# Patient Record
Sex: FEMALE | Race: BLACK OR AFRICAN AMERICAN | NOT HISPANIC OR LATINO | Employment: OTHER | URBAN - METROPOLITAN AREA
[De-identification: names, ages, dates, MRNs, and addresses within clinical notes are randomized per-mention and may not be internally consistent; named-entity substitution may affect disease eponyms.]

---

## 2017-01-11 ENCOUNTER — LAB VISIT (OUTPATIENT)
Dept: LAB | Facility: HOSPITAL | Age: 77
End: 2017-01-11
Attending: INTERNAL MEDICINE
Payer: MEDICARE

## 2017-01-11 ENCOUNTER — OFFICE VISIT (OUTPATIENT)
Dept: HEMATOLOGY/ONCOLOGY | Facility: CLINIC | Age: 77
End: 2017-01-11
Payer: MEDICARE

## 2017-01-11 VITALS
RESPIRATION RATE: 16 BRPM | BODY MASS INDEX: 39.29 KG/M2 | HEART RATE: 96 BPM | DIASTOLIC BLOOD PRESSURE: 64 MMHG | TEMPERATURE: 99 F | WEIGHT: 236.13 LBS | SYSTOLIC BLOOD PRESSURE: 153 MMHG

## 2017-01-11 DIAGNOSIS — Z51.11 ENCOUNTER FOR ANTINEOPLASTIC CHEMOTHERAPY: ICD-10-CM

## 2017-01-11 DIAGNOSIS — C50.412 MALIGNANT NEOPLASM OF UPPER-OUTER QUADRANT OF LEFT FEMALE BREAST: ICD-10-CM

## 2017-01-11 DIAGNOSIS — C50.412 MALIGNANT NEOPLASM OF UPPER-OUTER QUADRANT OF LEFT FEMALE BREAST: Primary | ICD-10-CM

## 2017-01-11 LAB
ALBUMIN SERPL BCP-MCNC: 3.5 G/DL
ALP SERPL-CCNC: 71 U/L
ALT SERPL W/O P-5'-P-CCNC: 13 U/L
ANION GAP SERPL CALC-SCNC: 11 MMOL/L
AST SERPL-CCNC: 18 U/L
BASOPHILS # BLD AUTO: 0.02 K/UL
BASOPHILS NFR BLD: 0.8 %
BILIRUB SERPL-MCNC: 0.4 MG/DL
BUN SERPL-MCNC: 15 MG/DL
CALCIUM SERPL-MCNC: 9.9 MG/DL
CHLORIDE SERPL-SCNC: 105 MMOL/L
CO2 SERPL-SCNC: 24 MMOL/L
CREAT SERPL-MCNC: 1.3 MG/DL
DIFFERENTIAL METHOD: ABNORMAL
EOSINOPHIL # BLD AUTO: 0.1 K/UL
EOSINOPHIL NFR BLD: 5 %
ERYTHROCYTE [DISTWIDTH] IN BLOOD BY AUTOMATED COUNT: 18.7 %
EST. GFR  (AFRICAN AMERICAN): 46.7 ML/MIN/1.73 M^2
EST. GFR  (NON AFRICAN AMERICAN): 40.5 ML/MIN/1.73 M^2
GLUCOSE SERPL-MCNC: 123 MG/DL
HCT VFR BLD AUTO: 30.7 %
HGB BLD-MCNC: 9.7 G/DL
LYMPHOCYTES # BLD AUTO: 1 K/UL
LYMPHOCYTES NFR BLD: 38.8 %
MCH RBC QN AUTO: 26.4 PG
MCHC RBC AUTO-ENTMCNC: 31.6 %
MCV RBC AUTO: 84 FL
MONOCYTES # BLD AUTO: 0.4 K/UL
MONOCYTES NFR BLD: 14.6 %
NEUTROPHILS # BLD AUTO: 1.1 K/UL
NEUTROPHILS NFR BLD: 40.4 %
PLATELET # BLD AUTO: 130 K/UL
PMV BLD AUTO: 9.4 FL
POTASSIUM SERPL-SCNC: 3.7 MMOL/L
PROT SERPL-MCNC: 7.4 G/DL
RBC # BLD AUTO: 3.67 M/UL
SODIUM SERPL-SCNC: 140 MMOL/L
WBC # BLD AUTO: 2.6 K/UL

## 2017-01-11 PROCEDURE — 1126F AMNT PAIN NOTED NONE PRSNT: CPT | Mod: S$GLB,,, | Performed by: INTERNAL MEDICINE

## 2017-01-11 PROCEDURE — 99999 PR PBB SHADOW E&M-EST. PATIENT-LVL III: CPT | Mod: PBBFAC,,, | Performed by: INTERNAL MEDICINE

## 2017-01-11 PROCEDURE — 3078F DIAST BP <80 MM HG: CPT | Mod: S$GLB,,, | Performed by: INTERNAL MEDICINE

## 2017-01-11 PROCEDURE — 3077F SYST BP >= 140 MM HG: CPT | Mod: S$GLB,,, | Performed by: INTERNAL MEDICINE

## 2017-01-11 PROCEDURE — 1157F ADVNC CARE PLAN IN RCRD: CPT | Mod: S$GLB,,, | Performed by: INTERNAL MEDICINE

## 2017-01-11 PROCEDURE — 99214 OFFICE O/P EST MOD 30 MIN: CPT | Mod: S$GLB,,, | Performed by: INTERNAL MEDICINE

## 2017-01-11 PROCEDURE — 1160F RVW MEDS BY RX/DR IN RCRD: CPT | Mod: S$GLB,,, | Performed by: INTERNAL MEDICINE

## 2017-01-11 PROCEDURE — 99499 UNLISTED E&M SERVICE: CPT | Mod: S$PBB,,, | Performed by: INTERNAL MEDICINE

## 2017-01-11 PROCEDURE — 1159F MED LIST DOCD IN RCRD: CPT | Mod: S$GLB,,, | Performed by: INTERNAL MEDICINE

## 2017-01-11 RX ORDER — SODIUM CHLORIDE 0.9 % (FLUSH) 0.9 %
10 SYRINGE (ML) INJECTION
Status: CANCELLED | OUTPATIENT
Start: 2017-01-11

## 2017-01-11 RX ORDER — FLUOROURACIL 50 MG/ML
600 INJECTION, SOLUTION INTRAVENOUS
Status: CANCELLED | OUTPATIENT
Start: 2017-01-11

## 2017-01-11 RX ORDER — HEPARIN 100 UNIT/ML
500 SYRINGE INTRAVENOUS
Status: CANCELLED | OUTPATIENT
Start: 2017-01-11

## 2017-01-11 RX ORDER — METHOTREXATE SODIUM/PF 25 MG/ML
40 VIAL (ML) INJECTION
Status: CANCELLED | OUTPATIENT
Start: 2017-01-11

## 2017-01-11 NOTE — PROGRESS NOTES
Yes mass  Subjective:       Patient ID: Renata Bergman is a 76 y.o. female.    Chief Complaint: Chemotherapy    HPI Mrs. Hussein is a 76-year-old female with stage I left breast cancer.  She  had 3 weeks of weekly Taxol and  tolerated that well with only some mild diarrhea.  Because of insurance issues it was necessary to change her to CMF and she's had 4 cycles of that.    She continues to feel well with no new problems.  Specifically, she has no pain and no shortness of breath.  Appetite and bowel function have been good.         Breast history: Screening mammogram on May 5 showed an irregular 22 mm mass with abnormal calcifications in the left breast 2:00 position. Follow-up ultrasound July 13 she noted a regular solid 17 mm mass.    On July 21 needle biopsy showed grade 2 infiltrating ductal carcinoma strongly ER VA positive (90%) and HER-2 negative.    On August 1 lumpectomy and sentinel lymph node biopsy were performed. That showed a 1.8 x 1.6 x 1.0 infiltrating carcinoma intermediate grade (histologic grade 3, nuclear grade 2, mitotic index 2.   The sentinel lymph node was negative and margins were negative. Final pathological stage TIc N0 stage IA.  Oncotype was 31 indicating a 21% risk of recurrence with tamoxifen alone.      She was hospitalized on November 18 with new onset of grand mal seizures.  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  LP with cytology was performed.  That was negative for malignancy.protein was elevated at 63, glucose was 74.  Follow-up Lp cytology was negativeand repeat MRI scan showed resoution of th leptomenineal ehancement.    Review of Systems   Constitutional: Positive for fatigue. Negative for activity change, appetite change, fever and unexpected weight change.   HENT: Negative for trouble swallowing.    Respiratory: Negative for cough and shortness of breath.    Gastrointestinal: Negative for abdominal pain, constipation, diarrhea  and nausea.   Musculoskeletal: Negative for back pain.   Neurological: Negative for headaches.   Psychiatric/Behavioral: Negative for dysphoric mood. The patient is not nervous/anxious.        Objective:      Physical Exam   Constitutional: She is oriented to person, place, and time. She appears well-developed and well-nourished.   HENT:   Mouth/Throat: No oropharyngeal exudate.   Eyes: No scleral icterus.   Cardiovascular: Regular rhythm and normal heart sounds.    Pulmonary/Chest: Effort normal and breath sounds normal. She has no wheezes. She has no rales. Right breast exhibits no mass, no nipple discharge and no skin change. Left breast exhibits no mass, no nipple discharge and no skin change.       Abdominal: Soft. She exhibits no mass. There is no tenderness.   Lymphadenopathy:     She has no cervical adenopathy.     She has no axillary adenopathy.        Right: No supraclavicular adenopathy present.        Left: No supraclavicular adenopathy present.   Neurological: She is alert and oriented to person, place, and time. No cranial nerve deficit.   Psychiatric: She has a normal mood and affect. Her behavior is normal. Thought content normal.   Vitals reviewed.      Assessment:      1. Malignant neoplasm of upper-outer quadrant of left female breast    2. Encounter for antineoplastic chemotherapy        Plan:     Complete  Therapy today and RTC 3 weeks.Refer to Rad Onc

## 2017-01-13 ENCOUNTER — INFUSION (OUTPATIENT)
Dept: INFUSION THERAPY | Facility: HOSPITAL | Age: 77
End: 2017-01-13
Attending: INTERNAL MEDICINE
Payer: MEDICARE

## 2017-01-13 VITALS
DIASTOLIC BLOOD PRESSURE: 66 MMHG | SYSTOLIC BLOOD PRESSURE: 132 MMHG | HEIGHT: 65 IN | TEMPERATURE: 98 F | RESPIRATION RATE: 18 BRPM | HEART RATE: 91 BPM

## 2017-01-13 DIAGNOSIS — C50.412 MALIGNANT NEOPLASM OF UPPER-OUTER QUADRANT OF LEFT FEMALE BREAST: Primary | ICD-10-CM

## 2017-01-13 PROCEDURE — 25000003 PHARM REV CODE 250: Performed by: INTERNAL MEDICINE

## 2017-01-13 PROCEDURE — 96367 TX/PROPH/DG ADDL SEQ IV INF: CPT

## 2017-01-13 PROCEDURE — 63600175 PHARM REV CODE 636 W HCPCS: Performed by: INTERNAL MEDICINE

## 2017-01-13 PROCEDURE — 96411 CHEMO IV PUSH ADDL DRUG: CPT

## 2017-01-13 PROCEDURE — 96413 CHEMO IV INFUSION 1 HR: CPT

## 2017-01-13 RX ORDER — HEPARIN 100 UNIT/ML
500 SYRINGE INTRAVENOUS
Status: DISCONTINUED | OUTPATIENT
Start: 2017-01-13 | End: 2017-01-13 | Stop reason: HOSPADM

## 2017-01-13 RX ORDER — SODIUM CHLORIDE 0.9 % (FLUSH) 0.9 %
10 SYRINGE (ML) INJECTION
Status: DISCONTINUED | OUTPATIENT
Start: 2017-01-13 | End: 2017-01-13 | Stop reason: HOSPADM

## 2017-01-13 RX ORDER — METHOTREXATE 25 MG/ML
40 INJECTION INTRA-ARTERIAL; INTRAMUSCULAR; INTRATHECAL; INTRAVENOUS
Status: COMPLETED | OUTPATIENT
Start: 2017-01-13 | End: 2017-01-13

## 2017-01-13 RX ORDER — FLUOROURACIL 50 MG/ML
600 INJECTION, SOLUTION INTRAVENOUS
Status: COMPLETED | OUTPATIENT
Start: 2017-01-13 | End: 2017-01-13

## 2017-01-13 RX ADMIN — CYCLOPHOSPHAMIDE 1325 MG: 1 INJECTION, POWDER, FOR SOLUTION INTRAVENOUS; ORAL at 02:01

## 2017-01-13 RX ADMIN — SODIUM CHLORIDE, PRESERVATIVE FREE 10 ML: 5 INJECTION INTRAVENOUS at 03:01

## 2017-01-13 RX ADMIN — METHOTREXATE 90 MG: 25 INJECTION, SOLUTION INTRA-ARTERIAL; INTRAMUSCULAR; INTRATHECAL; INTRAVENOUS at 02:01

## 2017-01-13 RX ADMIN — FLUOROURACIL 1325 MG: 50 INJECTION, SOLUTION INTRAVENOUS at 02:01

## 2017-01-13 RX ADMIN — SODIUM CHLORIDE: 0.9 INJECTION, SOLUTION INTRAVENOUS at 01:01

## 2017-01-13 RX ADMIN — HEPARIN 500 UNITS: 100 SYRINGE at 03:01

## 2017-01-13 RX ADMIN — DEXAMETHASONE SODIUM PHOSPHATE 0.25 MG: 4 INJECTION, SOLUTION INTRAMUSCULAR; INTRAVENOUS at 01:01

## 2017-01-13 NOTE — NURSING
1248:  [id number].  Dr. Buckley made aware and ok'd to proceed with today's chemo infusion.  Per Dr. Buckley, will send email to inform Dr. Guevara and who will decide how to proceed on Monday.

## 2017-01-13 NOTE — MR AVS SNAPSHOT
Patient Information     Patient Name Sex     Renata Bergman Female 1940      Visit Information        Provider Department Dept Phone Center    2017 1:30 PM NOM, CHEMO [facility] [phone] Grzegorz Caputo      Patient Instructions     None      Your Current Medications Are     allopurinol (ZYLOPRIM) 300 MG tablet    amlodipine (NORVASC) 5 MG tablet    aspirin 81 MG Chew    carvedilol (COREG) 25 MG tablet    chlorthalidone (HYGROTEN) 25 MG Tab    fexofenadine (ALLEGRA) 30 MG tablet    levetiracetam (KEPPRA) 750 MG Tab    lidocaine-prilocaine (EMLA) cream    metformin (GLUCOPHAGE) 850 MG tablet    nitroGLYCERIN (NITROSTAT) 0.4 MG SL tablet    omeprazole (PRILOSEC) 20 MG capsule    oxycodone-acetaminophen (PERCOCET) 5-325 mg per tablet    polyethylene glycol (GLYCOLAX) 17 gram/dose powder    potassium chloride (MICRO-K) 10 MEQ CpSR    rosuvastatin (CRESTOR) 40 MG Tab      Facility-Administered Medications     cyclophosphamide (CYTOXAN) 600 mg/m2 = 1,325 mg in sodium chloride 0.9% 250 mL chemo infusion    fluorouracil injection 1,325 mg    heparin, porcine (PF) 100 unit/mL injection flush 500 Units    methotrexate (PF) 25 mg/mL injection 90 mg    palonosetron 0.25mg/dexamethasone 20mg IVPB    sodium chloride 0.9% 250 mL flush bag    sodium chloride 0.9% flush 10 mL      Appointments for Next Year     2017  8:45 AM FASTING LAB (15 min.) [place] [facility], [place]    1. Do not eat or drink anything for TEN HOURS (10) PRIOR TO TEST. Do not chew gum or eat candy mints, even those claiming to be sugar free. Water is allowed but do not drink any other fluids 2. Take your regular daily medicines as your doctor has ordered. If you are diabetic, do not take your insulin or other diabetic medication until your blood is drawn and you are ready to eat. Your physician may have special instructions for diabetics. Check with your doctor if you have any questions.3. Alcoholic beverages  are not allowed starting at 6:00pm the evening before your appointment.    5th Floor    1/26/2017 10:00 AM ESTABLISHED PATIENT (30 min.) [facility] Internal Medicine Ethel Berger MD    Arrive at check-in approximately 15 minutes before your scheduled appointment time. Bring all outside medical records and imaging, along with a list of your current medications and insurance card.    [facility].    1/27/2017 10:00 AM ESTABLISHED PATIENT (30 min.) [place] - Cardiology Jaycee Puente MD    Arrive at check-in approximately 15 minutes before your scheduled appointment time. Bring all outside medical records and imaging, along with a list of your current medications and insurance card.    8th Floor    2/7/2017  1:30 PM ESTABLISHED PATIENT (30 min.) [facility] - Radiation Oncology Williams Barr Jr., MD    Arrive at check-in approximately 15 minutes before your scheduled appointment time. Bring all outside medical records and imaging, along with a list of your current medications and insurance card.    1st Floor  - [facility]    2/7/2017  2:00 PM SIMULATION 1 HR (60 min.) [facility] SIMULATION, RADIATION    Arrive at check-in approximately 15 minutes before your scheduled appointment time. Bring all outside medical records and imaging, along with a list of your current medications and insurance card.    1st Floor - [facility]    2/9/2017  9:30 AM NON FASTING LAB (10 min.) [facility] LAB, HEMON CANCER DG    Arrive at check-in approximately 15 minutes before your scheduled appointment time. Bring all outside medical records and imaging, along with a list of your current medications and insurance card.    [facility] 3rd Floor    2/9/2017 10:15 AM ESTABLISHED PATIENT SHORT (15 min.) [place] - Hematology Oncology Bismark Guevara MD    Arrive at check-in approximately 15 minutes before your scheduled appointment time. Bring all outside  "medical records and imaging, along with a list of your current medications and insurance card.    [facility] - 3rd Floor         Default Flowsheet Data (last 24 hours)      Amb Complex Vitals Jonathan        01/13/17 1418 01/13/17 1259             Measurements    Height 5' 5" (1.651 m)        BP  132/66       Temp  98.4 °F (36.9 °C)       Pulse  91       Resp  18       Pain Assessment    Pain Score  Zero               Allergies     Lisinopril Anaphylaxis      Medications You Received from 01/12/2017 1510 to 01/13/2017 1510        Date/Time Order Dose Route Action     01/13/2017 1420 cyclophosphamide (CYTOXAN) 600 mg/m2 = 1,325 mg in sodium chloride 0.9% 250 mL chemo infusion 1,325 mg Intravenous New Bag     01/13/2017 1415 fluorouracil injection 1,325 mg 1,325 mg Intravenous New Bag     01/13/2017 1412 methotrexate (PF) 25 mg/mL injection 90 mg 90 mg Intravenous Given     01/13/2017 1328 palonosetron 0.25mg/dexamethasone 20mg IVPB 0.25 mg Intravenous New Bag     01/13/2017 1312 sodium chloride 0.9% 250 mL flush bag   Intravenous New Bag      Current Discharge Medication List     Cannot display discharge medications since this is not an admission.      "

## 2017-01-13 NOTE — PLAN OF CARE
Problem: Patient Care Overview (Adult)  Goal: Plan of Care Review  Outcome: Ongoing (interventions implemented as appropriate)  Tolerated treatment well.  Advised to call MD for any problems or concerns.  AVS given.  RTC on 2/3/17 for infusion.  NAD noted upon discharge.

## 2017-01-26 ENCOUNTER — LAB VISIT (OUTPATIENT)
Dept: LAB | Facility: HOSPITAL | Age: 77
End: 2017-01-26
Attending: INTERNAL MEDICINE
Payer: MEDICARE

## 2017-01-26 ENCOUNTER — TELEPHONE (OUTPATIENT)
Dept: CARDIOLOGY | Facility: CLINIC | Age: 77
End: 2017-01-26

## 2017-01-26 ENCOUNTER — OFFICE VISIT (OUTPATIENT)
Dept: INTERNAL MEDICINE | Facility: CLINIC | Age: 77
End: 2017-01-26
Payer: MEDICARE

## 2017-01-26 VITALS
WEIGHT: 235.25 LBS | BODY MASS INDEX: 39.2 KG/M2 | DIASTOLIC BLOOD PRESSURE: 70 MMHG | SYSTOLIC BLOOD PRESSURE: 110 MMHG | HEIGHT: 65 IN | HEART RATE: 75 BPM

## 2017-01-26 DIAGNOSIS — G40.409 TONIC-CLONIC EPILEPTIC SEIZURES: ICD-10-CM

## 2017-01-26 DIAGNOSIS — E78.00 PURE HYPERCHOLESTEROLEMIA: Chronic | ICD-10-CM

## 2017-01-26 DIAGNOSIS — I25.83 CORONARY ARTERY DISEASE DUE TO LIPID RICH PLAQUE: Chronic | ICD-10-CM

## 2017-01-26 DIAGNOSIS — E13.9 DIABETES MELLITUS DUE TO ABNORMAL INSULIN: Primary | ICD-10-CM

## 2017-01-26 DIAGNOSIS — K21.9 GASTROESOPHAGEAL REFLUX DISEASE WITHOUT ESOPHAGITIS: ICD-10-CM

## 2017-01-26 DIAGNOSIS — I10 HTN (HYPERTENSION), BENIGN: Chronic | ICD-10-CM

## 2017-01-26 DIAGNOSIS — N18.30 CHRONIC RENAL DISEASE, STAGE 3, MODERATELY DECREASED GLOMERULAR FILTRATION RATE BETWEEN 30-59 ML/MIN/1.73 SQUARE METER: Chronic | ICD-10-CM

## 2017-01-26 DIAGNOSIS — E78.5 HYPERLIPIDEMIA: Chronic | ICD-10-CM

## 2017-01-26 DIAGNOSIS — I25.10 CORONARY ARTERY DISEASE DUE TO LIPID RICH PLAQUE: Chronic | ICD-10-CM

## 2017-01-26 LAB
ALBUMIN SERPL BCP-MCNC: 3.7 G/DL
ALP SERPL-CCNC: 76 U/L
ALT SERPL W/O P-5'-P-CCNC: 8 U/L
ANION GAP SERPL CALC-SCNC: 8 MMOL/L
AST SERPL-CCNC: 14 U/L
BILIRUB SERPL-MCNC: 0.4 MG/DL
BUN SERPL-MCNC: 16 MG/DL
CALCIUM SERPL-MCNC: 10 MG/DL
CHLORIDE SERPL-SCNC: 103 MMOL/L
CHOLEST/HDLC SERPL: 2.6 {RATIO}
CO2 SERPL-SCNC: 32 MMOL/L
CREAT SERPL-MCNC: 1.5 MG/DL
EST. GFR  (AFRICAN AMERICAN): 39.3 ML/MIN/1.73 M^2
EST. GFR  (NON AFRICAN AMERICAN): 34.1 ML/MIN/1.73 M^2
GLUCOSE SERPL-MCNC: 123 MG/DL
HDL/CHOLESTEROL RATIO: 38.3 %
HDLC SERPL-MCNC: 175 MG/DL
HDLC SERPL-MCNC: 67 MG/DL
LDLC SERPL CALC-MCNC: 93.6 MG/DL
NONHDLC SERPL-MCNC: 108 MG/DL
POTASSIUM SERPL-SCNC: 3.5 MMOL/L
PROT SERPL-MCNC: 7.8 G/DL
SODIUM SERPL-SCNC: 143 MMOL/L
TRIGL SERPL-MCNC: 72 MG/DL

## 2017-01-26 PROCEDURE — 3060F POS MICROALBUMINURIA REV: CPT | Mod: S$GLB,,, | Performed by: INTERNAL MEDICINE

## 2017-01-26 PROCEDURE — 3044F HG A1C LEVEL LT 7.0%: CPT | Mod: S$GLB,,, | Performed by: INTERNAL MEDICINE

## 2017-01-26 PROCEDURE — 3074F SYST BP LT 130 MM HG: CPT | Mod: S$GLB,,, | Performed by: INTERNAL MEDICINE

## 2017-01-26 PROCEDURE — 3078F DIAST BP <80 MM HG: CPT | Mod: S$GLB,,, | Performed by: INTERNAL MEDICINE

## 2017-01-26 PROCEDURE — 1160F RVW MEDS BY RX/DR IN RCRD: CPT | Mod: S$GLB,,, | Performed by: INTERNAL MEDICINE

## 2017-01-26 PROCEDURE — 99499 UNLISTED E&M SERVICE: CPT | Mod: S$PBB,,, | Performed by: INTERNAL MEDICINE

## 2017-01-26 PROCEDURE — 99214 OFFICE O/P EST MOD 30 MIN: CPT | Mod: S$GLB,,, | Performed by: INTERNAL MEDICINE

## 2017-01-26 PROCEDURE — 1157F ADVNC CARE PLAN IN RCRD: CPT | Mod: S$GLB,,, | Performed by: INTERNAL MEDICINE

## 2017-01-26 PROCEDURE — 99999 PR PBB SHADOW E&M-EST. PATIENT-LVL III: CPT | Mod: PBBFAC,,, | Performed by: INTERNAL MEDICINE

## 2017-01-26 PROCEDURE — 1159F MED LIST DOCD IN RCRD: CPT | Mod: S$GLB,,, | Performed by: INTERNAL MEDICINE

## 2017-01-27 ENCOUNTER — OFFICE VISIT (OUTPATIENT)
Dept: CARDIOLOGY | Facility: CLINIC | Age: 77
End: 2017-01-27
Payer: MEDICARE

## 2017-01-27 VITALS
SYSTOLIC BLOOD PRESSURE: 113 MMHG | BODY MASS INDEX: 39.63 KG/M2 | DIASTOLIC BLOOD PRESSURE: 62 MMHG | HEIGHT: 65 IN | HEART RATE: 66 BPM | WEIGHT: 237.88 LBS

## 2017-01-27 DIAGNOSIS — I25.10 CORONARY ARTERY DISEASE DUE TO LIPID RICH PLAQUE: Primary | Chronic | ICD-10-CM

## 2017-01-27 DIAGNOSIS — Z95.5 STENTED CORONARY ARTERY: ICD-10-CM

## 2017-01-27 DIAGNOSIS — I25.83 CORONARY ARTERY DISEASE DUE TO LIPID RICH PLAQUE: Primary | Chronic | ICD-10-CM

## 2017-01-27 DIAGNOSIS — E78.2 MIXED HYPERLIPIDEMIA: Chronic | ICD-10-CM

## 2017-01-27 DIAGNOSIS — N18.30 CHRONIC RENAL DISEASE, STAGE 3, MODERATELY DECREASED GLOMERULAR FILTRATION RATE BETWEEN 30-59 ML/MIN/1.73 SQUARE METER: Chronic | ICD-10-CM

## 2017-01-27 DIAGNOSIS — I25.2 OLD MI (MYOCARDIAL INFARCTION): ICD-10-CM

## 2017-01-27 DIAGNOSIS — I10 HTN (HYPERTENSION), BENIGN: Chronic | ICD-10-CM

## 2017-01-27 DIAGNOSIS — C50.412 MALIGNANT NEOPLASM OF UPPER-OUTER QUADRANT OF LEFT FEMALE BREAST: ICD-10-CM

## 2017-01-27 DIAGNOSIS — I45.10 RBBB: ICD-10-CM

## 2017-01-27 DIAGNOSIS — E11.51 CONTROLLED TYPE 2 DM WITH PERIPHERAL CIRCULATORY DISORDER: Chronic | ICD-10-CM

## 2017-01-27 PROCEDURE — 3060F POS MICROALBUMINURIA REV: CPT | Mod: S$GLB,,, | Performed by: INTERNAL MEDICINE

## 2017-01-27 PROCEDURE — 3078F DIAST BP <80 MM HG: CPT | Mod: S$GLB,,, | Performed by: INTERNAL MEDICINE

## 2017-01-27 PROCEDURE — 1159F MED LIST DOCD IN RCRD: CPT | Mod: S$GLB,,, | Performed by: INTERNAL MEDICINE

## 2017-01-27 PROCEDURE — 1126F AMNT PAIN NOTED NONE PRSNT: CPT | Mod: S$GLB,,, | Performed by: INTERNAL MEDICINE

## 2017-01-27 PROCEDURE — 99213 OFFICE O/P EST LOW 20 MIN: CPT | Mod: S$GLB,,, | Performed by: INTERNAL MEDICINE

## 2017-01-27 PROCEDURE — 99999 PR PBB SHADOW E&M-EST. PATIENT-LVL III: CPT | Mod: PBBFAC,,, | Performed by: INTERNAL MEDICINE

## 2017-01-27 PROCEDURE — 1157F ADVNC CARE PLAN IN RCRD: CPT | Mod: S$GLB,,, | Performed by: INTERNAL MEDICINE

## 2017-01-27 PROCEDURE — 1160F RVW MEDS BY RX/DR IN RCRD: CPT | Mod: S$GLB,,, | Performed by: INTERNAL MEDICINE

## 2017-01-27 PROCEDURE — 99499 UNLISTED E&M SERVICE: CPT | Mod: S$PBB,,, | Performed by: INTERNAL MEDICINE

## 2017-01-27 PROCEDURE — 3044F HG A1C LEVEL LT 7.0%: CPT | Mod: S$GLB,,, | Performed by: INTERNAL MEDICINE

## 2017-01-27 PROCEDURE — 3074F SYST BP LT 130 MM HG: CPT | Mod: S$GLB,,, | Performed by: INTERNAL MEDICINE

## 2017-01-27 NOTE — PROGRESS NOTES
"Subjective:   Patient ID:  Renata Bergman is a 76 y.o. female who presents for follow-up of Hypertension      HPI: No CV complaints. Since the last visit patient was diagnosed with breast cancer and is undergoing oncology therapy.      Lab Results   Component Value Date     01/26/2017    K 3.5 01/26/2017     01/26/2017    CO2 32 (H) 01/26/2017    BUN 16 01/26/2017    CREATININE 1.5 (H) 01/26/2017     (H) 01/26/2017    HGBA1C 6.2 03/12/2016    HGBA1C 6.2 03/12/2016    MG 2.2 11/19/2016    AST 14 01/26/2017    ALT 8 (L) 01/26/2017    ALBUMIN 3.7 01/26/2017    PROT 7.8 01/26/2017    BILITOT 0.4 01/26/2017    WBC 2.60 (L) 01/11/2017    HGB 9.7 (L) 01/11/2017    HCT 30.7 (L) 01/11/2017    MCV 84 01/11/2017     (L) 01/11/2017    INR 1.0 06/26/2016    TSH 2.004 11/18/2016    CHOL 175 01/26/2017    HDL 67 01/26/2017    LDLCALC 93.6 01/26/2017    TRIG 72 01/26/2017       Review of Systems   Constitution: Positive for malaise/fatigue and weight loss.   HENT: Negative.    Eyes: Negative.    Cardiovascular: Positive for dyspnea on exertion. Negative for chest pain, claudication, irregular heartbeat, leg swelling, near-syncope, palpitations and syncope.   Respiratory: Negative.  Negative for cough, shortness of breath, snoring and wheezing.    Endocrine: Negative.  Negative for cold intolerance, heat intolerance, polydipsia, polyphagia and polyuria.   Skin: Negative.    Musculoskeletal: Positive for arthritis.   Gastrointestinal: Negative.    Genitourinary: Negative.    Neurological: Negative.    Psychiatric/Behavioral: Negative.        Objective:   Physical Exam   Constitutional: She is oriented to person, place, and time. She appears well-developed and well-nourished.   /62  Pulse 66  Ht 5' 5" (1.651 m)  Wt 107.9 kg (237 lb 14 oz)  BMI 39.58 kg/m2     HENT:   Head: Normocephalic.   Eyes: Pupils are equal, round, and reactive to light.   Neck: Normal range of motion. Neck supple. Carotid " Bedside shift change report given to Anette Haridn (oncoming nurse) by Raúl Mchugh (offgoing nurse). Report included the following information SBAR, ED Summary and MAR. bruit is not present. No thyromegaly present.   Cardiovascular: Normal rate, regular rhythm, normal heart sounds and intact distal pulses.  Exam reveals no gallop and no friction rub.    No murmur heard.  Pulses:       Carotid pulses are 2+ on the right side, and 2+ on the left side.       Radial pulses are 2+ on the right side, and 2+ on the left side.        Femoral pulses are 2+ on the right side, and 2+ on the left side.       Popliteal pulses are 2+ on the right side, and 2+ on the left side.        Dorsalis pedis pulses are 2+ on the right side, and 2+ on the left side.        Posterior tibial pulses are 2+ on the right side, and 2+ on the left side.   Pulmonary/Chest: Effort normal and breath sounds normal. No respiratory distress. She has no wheezes. She has no rales. She exhibits no tenderness.   Abdominal: Soft. Bowel sounds are normal.   obese   Musculoskeletal: Normal range of motion. She exhibits no edema.   Neurological: She is alert and oriented to person, place, and time.   Skin: Skin is warm and dry.   Psychiatric: She has a normal mood and affect.   Nursing note and vitals reviewed.      Current Outpatient Prescriptions   Medication Sig    allopurinol (ZYLOPRIM) 300 MG tablet take 1 tablet by mouth once daily    amlodipine (NORVASC) 5 MG tablet TAKE 1 TABLET BY MOUTH DAILY    aspirin 81 MG Chew Take 81 mg by mouth once daily.      carvedilol (COREG) 25 MG tablet TAKE 1 TABLET BY MOUTH TWICE DAILY    chlorthalidone (HYGROTEN) 25 MG Tab Take 1/2 tablet by mouth daily.    fexofenadine (ALLEGRA) 30 MG tablet Take 30 mg by mouth daily as needed.    levetiracetam (KEPPRA) 750 MG Tab Take 500 mg by mouth 2 (two) times daily. Pt unsure of dosage.    lidocaine-prilocaine (EMLA) cream Apply to affected area once at least 15 minutes before chemotherapy    metformin (GLUCOPHAGE) 850 MG tablet TAKE ONE TABLET BY MOUTH TWICE DAILY    nitroGLYCERIN (NITROSTAT) 0.4 MG SL tablet Place 1 tablet (0.4 mg  total) under the tongue every 5 (five) minutes as needed for Chest pain.    omeprazole (PRILOSEC) 20 MG capsule TAKE 2 CAPSULES BY MOUTH ONCE DAILY.    polyethylene glycol (GLYCOLAX) 17 gram/dose powder Take 17 g by mouth once daily. (Patient taking differently: Take 17 g by mouth once daily. Pt taking PRN)    potassium chloride (MICRO-K) 10 MEQ CpSR Take 1 capsule (10 mEq total) by mouth once daily.    rosuvastatin (CRESTOR) 40 MG Tab TK ONE T   PO ONCE D     No current facility-administered medications for this visit.        Assessment:     1. Coronary artery disease due to lipid rich plaque    2. Old MI (myocardial infarction)    3. Controlled type 2 DM with peripheral circulatory disorder    4. HTN (hypertension), benign    5. Chronic renal disease, stage 3, moderately decreased glomerular filtration rate between 30-59 mL/min/1.73 square meter    6. RBBB    7. Mixed hyperlipidemia    8. Stented coronary artery    9. Malignant neoplasm of upper-outer quadrant of left female breast        Plan:     Coronary artery disease due to lipid rich plaque  -     Hemoglobin A1c; Future  -     TSH; Future; Expected date: 1/27/17  -     Comprehensive metabolic panel; Future; Expected date: 7/29/17  -     Lipid panel; Future; Expected date: 7/29/17    Old MI (myocardial infarction)    Controlled type 2 DM with peripheral circulatory disorder    HTN (hypertension), benign    Chronic renal disease, stage 3, moderately decreased glomerular filtration rate between 30-59 mL/min/1.73 square meter  -     Hemoglobin A1c; Future  -     TSH; Future; Expected date: 1/27/17  -     Comprehensive metabolic panel; Future; Expected date: 7/29/17  -     Lipid panel; Future; Expected date: 7/29/17    RBBB    Mixed hyperlipidemia  -     Hemoglobin A1c; Future  -     TSH; Future; Expected date: 1/27/17  -     Comprehensive metabolic panel; Future; Expected date: 7/29/17  -     Lipid panel; Future; Expected date: 7/29/17    Stented coronary  artery    Malignant neoplasm of upper-outer quadrant of left female breast    Follow up as planned    Orders Placed This Encounter   Procedures    Hemoglobin A1c    TSH    Comprehensive metabolic panel    Lipid panel     Return in about 6 months (around 7/27/2017).

## 2017-01-27 NOTE — PROGRESS NOTES
CHIEF COMPLAINT: follow up breast cancer, hypertension, cad, diabetes.    HISTORY OF PRESENT ILLNESS: 76-year-old woman who present for follow up of above.  She has not had anymore seizures. She conitnues to take Keppra twice daily.  No headaches. MRI abnormality resolved and she saw Dr Thrasher who felt that MRI findings were due to post ictal state. Cytology for malignancy negative. .      She has completed 4 cycles of CMF for her breast cancer. She will see Dr Barr for radiation next week.     The was admitted 6/26/16 to 6/27/16 with a NSTEMI. She presented with chest pain and had an elevated troponin at 0.038. PET stress prior discharge was negative.  No chest pain, shortness of breath      She was participating in the healthy back program once a week. This program is on hold due to breast cancer. Back is doing well. She is doing stretches every other day. She is off tylenol in the evening.        She continues to take Coreg 25 mg twice daily, Norvasc 5 mg daily and chlorthaladone 25 mg 1/2 tablet every other daily for her hypertension. She is lightheaded at times. No chest pain, shortness of breath, lightheadedness.        She continues to take allopurinol 300 mg daily for her gout. She has not had any gouty flares. She has occasional left elbow pain.       Her blood sugars have been normal. She continues to take metformin 850 mg twice daily. Occasional diarrhea with certain foods. She denies any polydipsia or polyuria. She continues to take aspirin for her coronary artery disease. Sinuses have been doing well. She has not had to take Allegra lately. She denies any nausea, vomiting, constipation, diarrhea.      Reflux is well controlled on omeprazole 20 mg two tablets daily. She continues to take Crestor 40 mg daily for her hyperlipidemia. She denies any joint pain or muscle pain from the Crestor. Knee is doing well. She is not having to take Tramadol      She is taking vitamin D 6334-1620 units daily for  "vitamin D deficiency      Her daughter who is 46 has stage 2 breast cancer. She has had a lumpectomy and chemo and radiation. Her daughter is doing well. She has finished her treatment. Her daughter might be BRCA positive. Mrs Bergman feels that she is coping well with her diagnosis of breast cancer. No anxiety, depression or insomnia      PAST MEDICAL HISTORY:   1. Hypertension.   2. Diabetes mellitus   3. Hyperlipidemia.   4. Reflux.   5. Gout.   6. Coronary artery disease, status post MI in  with stenting at that time, and then a right coronary artery stent placed in . Had a negative stress test 2008. [facility] 2012 - patent stents and non obstructive cad  7. Osteoarthritis of the right knee. S/P right knee replacement   8. Status post left knee replacement.   9. Status post LISA/BSO secondary to menstrual disorder or polyps after tubal ligation.     MEDICATIONS and ALLERGIES: Updated on epic.      Family History  Mother had breast cancer in her early 50's then had colon cancer in her 60's. She  of uterine cancer  2 Maternal aunts with breast cancer  Daughter with breast cancer at age 45 - BRCA positive  Father  of a gunshot wound  She is an only child    PHYSICAL EXAMINATION:    Visit Vitals    /70 (BP Location: Right arm, Patient Position: Sitting, BP Method: Manual)    Pulse 75    Ht 5' 5" (1.651 m)    Wt 106.7 kg (235 lb 3.7 oz)    BMI 39.14 kg/m2     General: Alert, oriented. No apparent distress. Affect within normal   limits.   Conjunctivae anicteric. Tympanic membranes clear. Oropharynx clear.   Neck supple.   Respiratory effort normal. Lungs clear to auscultation.   Heart: Regular rate and rhythm without murmurs, gallops or rubs.   No lower extremity edema.          ASSESESSMENT      1. Left breast cancer -Finished Chemo. to have radiation  2. Seizure -s table on KEra  3. NSTEMI - Risk factor management.   4. Hypertension -take chlorthalidone as needed due to low BP   5. " Diabetes - controlled. Continue metformin to 850 mg twice a day. CHeck A1C  6. Gout -controlled on allopurinol    7. Hyperlipidemia - on Crestor 40 mg daily. Controlled   8. Obesity - working at diet, exercise and weight loss.   9. Osteoarthritis of the right knee, status post knee replacement - doing well.  10. GERD - controlled on meds  11. Anemia - stable  12 .CRI - CMP.  13. Hypokalemia -  on KCL 10 meq daily            Screening - mammogram done 7/16. Colonoscopy 4/23/12 - normal, and 5/25/15 - 3 small polyps (one adenoma)- due 2018. Bone density was normal November 2008.    Prevnar 12/15  Tdap 5/16  Influenza needs to be held due to chemo      I'll see her back 4 months,  sooner if problems arise.

## 2017-01-27 NOTE — MR AVS SNAPSHOT
[place] - Cardiology   [facility]  [place] [address]  Phone: [phone]                  Renata Bergman   2017 10:00 AM   Office Visit    Description:  Female : 1940   Provider:  Jaycee Puente MD   Department:  [place] - Cardiology           Reason for Visit     Hypertension                To Do List           Future Appointments        Provider Department Dept Phone    2017 1:30 PM Williams Barr Jr., MD [facility] - Radiation Oncology [phone]    2017 2:00 PM SIMULATION, RADIATION [facility] [phone]    2017 9:30 AM LAB, HEMONC CANCER BLDG [facility] [phone]    2017 10:15 AM MD [facility] - Hematology Oncology [phone]    2017 10:00 AM Ethel Berger MD [facility] - Internal Medicine [phone]      Goals (5 Years of Data)     None      [facility]     [facility] Care Line -  Assistance  Registered nurses in the [facility] provide clinical advisement, health education, appointment booking, and other advisory services.  Call for this free service at [phone].             Medications           Message regarding Medications     Verify the changes and/or additions to your medication regime listed below are the same as discussed with your clinician today.  If any of these changes or additions are incorrect, please notify your healthcare provider.        STOP taking these medications     oxycodone-acetaminophen (PERCOCET) 5-325 mg per tablet Take 1 tablet by mouth every 4 (four) hours as needed for Pain.           Verify that the below list of medications is an accurate representation of the medications you are currently taking.  If none reported, the list may be blank. If incorrect, please contact your healthcare provider. Carry this list with you in case of emergency.           Current Medications     allopurinol (ZYLOPRIM) 300 MG tablet  "take 1 tablet by mouth once daily    amlodipine (NORVASC) 5 MG tablet TAKE 1 TABLET BY MOUTH DAILY    aspirin 81 MG Chew Take 81 mg by mouth once daily.      carvedilol (COREG) 25 MG tablet TAKE 1 TABLET BY MOUTH TWICE DAILY    chlorthalidone (HYGROTEN) 25 MG Tab Take 1/2 tablet by mouth daily.    fexofenadine (ALLEGRA) 30 MG tablet Take 30 mg by mouth daily as needed.    levetiracetam (KEPPRA) 750 MG Tab Take 500 mg by mouth 2 (two) times daily. Pt unsure of dosage.    lidocaine-prilocaine (EMLA) cream Apply to affected area once at least 15 minutes before chemotherapy    metformin (GLUCOPHAGE) 850 MG tablet TAKE ONE TABLET BY MOUTH TWICE DAILY    nitroGLYCERIN (NITROSTAT) 0.4 MG SL tablet Place 1 tablet (0.4 mg total) under the tongue every 5 (five) minutes as needed for Chest pain.    omeprazole (PRILOSEC) 20 MG capsule TAKE 2 CAPSULES BY MOUTH ONCE DAILY.    polyethylene glycol (GLYCOLAX) 17 gram/dose powder Take 17 g by mouth once daily.    potassium chloride (MICRO-K) 10 MEQ CpSR Take 1 capsule (10 mEq total) by mouth once daily.    rosuvastatin (CRESTOR) 40 MG Tab TK ONE T   PO ONCE D           Clinical Reference Information           Vital Signs - Last Recorded  Most recent update: 1/27/2017 10:34 AM by Liz Brody MA    BP Pulse Ht Wt BMI    113/62 66 5' 5" (1.651 m) 107.9 kg (237 lb 14 oz) 39.58 kg/m2      Blood Pressure          Most Recent Value    Right Arm BP - Sitting  113/62    Reason for not completing BP on both arms  mastectomy    BP  113/62      Allergies as of 1/27/2017     Lisinopril      Immunizations Administered on Date of Encounter - 1/27/2017     None      "

## 2017-01-30 ENCOUNTER — TELEPHONE (OUTPATIENT)
Dept: CARDIOLOGY | Facility: CLINIC | Age: 77
End: 2017-01-30

## 2017-01-30 NOTE — TELEPHONE ENCOUNTER
----- Message from Britta Sainz sent at 1/30/2017  2:17 PM CST -----  Contact: PT  Pt needs to know if she should be taking her Potassium Meds and can be reached at [phone].  She tried to get a refill and the pharmacy said it was stopped and she was not aware of this.    Thanks

## 2017-01-30 NOTE — TELEPHONE ENCOUNTER
Pt called stating that she could not get a refill for Potassium. Pt was told to dc potassium 11/2016 when she was discharged from the hospital. I contacted the pt(11/2016) to make sure she stopped taking the medication and she said that she stopped taking the medication. Pt stated that Dr. Berger put her back on the potassium after she was discharged from the hospital. I advised pt to contact Dr. Berger to make sure she is to continue taking potassium and to get a refill. Pt verbalized understanding.

## 2017-02-07 ENCOUNTER — OFFICE VISIT (OUTPATIENT)
Dept: RADIATION ONCOLOGY | Facility: CLINIC | Age: 77
End: 2017-02-07
Payer: MEDICARE

## 2017-02-07 ENCOUNTER — HOSPITAL ENCOUNTER (OUTPATIENT)
Dept: RADIATION THERAPY | Facility: HOSPITAL | Age: 77
Discharge: HOME | End: 2017-02-07
Attending: RADIOLOGY
Payer: MEDICARE

## 2017-02-07 VITALS — RESPIRATION RATE: 18 BRPM | DIASTOLIC BLOOD PRESSURE: 68 MMHG | HEART RATE: 86 BPM | SYSTOLIC BLOOD PRESSURE: 147 MMHG

## 2017-02-07 DIAGNOSIS — C50.412 MALIGNANT NEOPLASM OF UPPER-OUTER QUADRANT OF LEFT FEMALE BREAST: Primary | ICD-10-CM

## 2017-02-07 PROCEDURE — 3077F SYST BP >= 140 MM HG: CPT | Mod: S$GLB,,, | Performed by: RADIOLOGY

## 2017-02-07 PROCEDURE — 1157F ADVNC CARE PLAN IN RCRD: CPT | Mod: S$GLB,,, | Performed by: RADIOLOGY

## 2017-02-07 PROCEDURE — 1159F MED LIST DOCD IN RCRD: CPT | Mod: S$GLB,,, | Performed by: RADIOLOGY

## 2017-02-07 PROCEDURE — 1126F AMNT PAIN NOTED NONE PRSNT: CPT | Mod: S$GLB,,, | Performed by: RADIOLOGY

## 2017-02-07 PROCEDURE — 3078F DIAST BP <80 MM HG: CPT | Mod: S$GLB,,, | Performed by: RADIOLOGY

## 2017-02-07 PROCEDURE — 77290 THER RAD SIMULAJ FIELD CPLX: CPT | Mod: TC | Performed by: RADIOLOGY

## 2017-02-07 PROCEDURE — 77334 RADIATION TREATMENT AID(S): CPT | Mod: 26,,, | Performed by: RADIOLOGY

## 2017-02-07 PROCEDURE — 77263 THER RADIOLOGY TX PLNG CPLX: CPT | Mod: ,,, | Performed by: RADIOLOGY

## 2017-02-07 PROCEDURE — 99999 PR PBB SHADOW E&M-EST. PATIENT-LVL III: CPT | Mod: PBBFAC,,, | Performed by: RADIOLOGY

## 2017-02-07 PROCEDURE — 77014 HC CT GUIDANCE RADIATION THERAPY FLDS PLACEMENT: CPT | Mod: TC | Performed by: RADIOLOGY

## 2017-02-07 PROCEDURE — 99499 UNLISTED E&M SERVICE: CPT | Mod: S$PBB,,, | Performed by: RADIOLOGY

## 2017-02-07 PROCEDURE — 77334 RADIATION TREATMENT AID(S): CPT | Mod: TC | Performed by: RADIOLOGY

## 2017-02-07 PROCEDURE — 1160F RVW MEDS BY RX/DR IN RCRD: CPT | Mod: S$GLB,,, | Performed by: RADIOLOGY

## 2017-02-07 PROCEDURE — 77290 THER RAD SIMULAJ FIELD CPLX: CPT | Mod: 26,,, | Performed by: RADIOLOGY

## 2017-02-07 NOTE — MR AVS SNAPSHOT
[facility] Radiation Oncology  [address]  [facility] [address]  Phone: [phone]                  Renata Bergman   2017 1:30 PM   Office Visit    Description:  Female : 1940   Provider:  Williams Barr Jr., MD   Department:  [facility] Radiation Oncology           Reason for Visit     Breast Cancer           Diagnoses this Visit        Comments    Malignant neoplasm of upper-outer quadrant of left female breast    -  Primary            To Do List           Future Appointments        Provider Department Dept Phone    2017 9:30 AM LAB, HEMONC CANCER BLDG [facility] [phone]    2017 10:15 AM Bismark Guevara MD [facility] - Hematology Oncology [phone]    2017 10:00 AM Ethel Berger MD [facility] - Internal Medicine [phone]      Goals (5 Years of Data)     None      [facility] On Call     [facility] [place] [facility] Line -  Assistance  Registered nurses in the [facility] provide clinical advisement, health education, appointment booking, and other advisory services.  Call for this free service at [phone].             Medications           Message regarding Medications     Verify the changes and/or additions to your medication regime listed below are the same as discussed with your clinician today.  If any of these changes or additions are incorrect, please notify your healthcare provider.             Verify that the below list of medications is an accurate representation of the medications you are currently taking.  If none reported, the list may be blank. If incorrect, please contact your healthcare provider. Carry this list with you in case of emergency.           Current Medications     allopurinol (ZYLOPRIM) 300 MG tablet take 1 tablet by mouth once daily    amlodipine (NORVASC) 5 MG tablet TAKE 1 TABLET BY MOUTH DAILY    aspirin 81 MG Chew Take 81 mg by mouth once daily.      carvedilol (COREG) 25 MG tablet TAKE 1  TABLET BY MOUTH TWICE DAILY    chlorthalidone (HYGROTEN) 25 MG Tab Take 1/2 tablet by mouth daily.    fexofenadine (ALLEGRA) 30 MG tablet Take 30 mg by mouth daily as needed.    levetiracetam (KEPPRA) 750 MG Tab Take 500 mg by mouth 2 (two) times daily. Pt unsure of dosage.    lidocaine-prilocaine (EMLA) cream Apply to affected area once at least 15 minutes before chemotherapy    metformin (GLUCOPHAGE) 850 MG tablet TAKE ONE TABLET BY MOUTH TWICE DAILY    nitroGLYCERIN (NITROSTAT) 0.4 MG SL tablet Place 1 tablet (0.4 mg total) under the tongue every 5 (five) minutes as needed for Chest pain.    omeprazole (PRILOSEC) 20 MG capsule TAKE 2 CAPSULES BY MOUTH ONCE DAILY.    polyethylene glycol (GLYCOLAX) 17 gram/dose powder Take 17 g by mouth once daily.    potassium chloride (MICRO-K) 10 MEQ CpSR Take 1 capsule (10 mEq total) by mouth once daily.    rosuvastatin (CRESTOR) 40 MG Tab TK ONE T   PO ONCE D    rosuvastatin (CRESTOR) 40 MG Tab TAKE ONE TABLET BY MOUTH ONCE DAILY           Clinical Reference Information           Your Vitals Were     BP Pulse Resp             147/68 (BP Location: Right arm, Patient Position: Sitting, BP Method: Automatic) 86 18         Blood Pressure          Most Recent Value    BP  (!)  147/68      Allergies as of 2/7/2017     Lisinopril      Immunizations Administered on Date of Encounter - 2/7/2017     None      Language Assistance Services     ATTENTION: Language assistance services are available, free of charge. Please call [phone].      ATENCIÓN: Si allegrala nimo, tiene a vidales disposición servicios gratuitos de asistencia lingüística. Llame al [phone].     [facility] Ý: N?u b?n nói Ti?ng Vi?t, có các d?ch v? h? tr? ngôn ng? mi?n phí dành cho b?n. G?i s? [phone].         [facility] complies with applicable Federal civil rights laws and does not discriminate on the basis of race, color, national origin, age, disability, or sex.

## 2017-02-08 RX ORDER — POTASSIUM CHLORIDE 750 MG/1
10 CAPSULE, EXTENDED RELEASE ORAL DAILY
Qty: 90 CAPSULE | Refills: 4
Start: 2017-02-08 | End: 2017-02-08 | Stop reason: SDUPTHER

## 2017-02-08 RX ORDER — POTASSIUM CHLORIDE 750 MG/1
10 CAPSULE, EXTENDED RELEASE ORAL DAILY
Qty: 90 CAPSULE | Refills: 4 | Status: SHIPPED | OUTPATIENT
Start: 2017-02-08 | End: 2018-02-10 | Stop reason: SDUPTHER

## 2017-02-08 NOTE — TELEPHONE ENCOUNTER
----- Message from Laney Aguirre sent at 2/8/2017  9:25 AM CST -----  Contact: Self/ [phone]   Type: Rx    Name of medication(s): potassium chloride (MICRO-K) 10 MEQ CpSR    Is this a refill? New rx? Refill     Who prescribed medication? Dr. Berger     Pharmacy Name, Phone, & Location: [facility] on file     Comments: pt is calling to have a refill on the medication above. Please call and advise       Thank you

## 2017-02-08 NOTE — PROGRESS NOTES
Subjective:       Patient ID: Renata Bergman is a 76 y.o. female.    Chief Complaint: Breast Cancer (discuss xrt)    HPI Comments: This patient present for initiation of adjuvant radiotherapy for an infiltrating ductal carcinoma of the Lt. breast.     Mrs. 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  She initially presented in July of 2016 when mammogram revealed a high density irregular mass measuring 22 mm with spiculated margins and associated punctate calcifications in the left breast at the 2:00 position.  Core needle biopsy revealed grade 2 infiltrating ductal carcinoma.  Over 90% of the tumor cells were strongly ER and MI positive HER-2 was negative.  She subsequently underwent wire localization lumpectomy along with sentinel lymph node biopsy on 8/1/16.  Pathology revealed a 1.8 x 1.6 x 1 cm focus of infiltrating ductal carcinoma.  The tumor was histologic grade 3, nuclear grade 2, and mitotic index 2.  There was associated DCIS but no evidence of EIC.  The margins of resection were negative.  The superior margin was closest at 3 mm.  There was one sentinel lymph node removed which was negative for tumor involvement.  An Oncotype Dx score returned at 31.  The patient began adjuvant chemotherapy and completed 3 cycles of weekly Taxol.  This was changed secondary to insurance issues to [facility] for 4 cycles.  She now presents for adjuvant radiotherapy.  Today, the patient states she feels fairly well.  Complains of fatigue.  No Breast complaints.      Review of Systems   Constitutional: Positive for activity change and fatigue. Negative for appetite change, chills, fever and unexpected weight change.   Respiratory: Negative for cough and shortness of breath.    Cardiovascular: Negative for chest pain and palpitations.   Gastrointestinal: Negative for abdominal pain, constipation, diarrhea, nausea and vomiting.       Objective:      Physical Exam    Constitutional: She appears well-developed and well-nourished. No distress.   Neck: Normal range of motion. Neck supple.   Pulmonary/Chest: Left breast exhibits no inverted nipple, no mass, no nipple discharge, no skin change and no tenderness. There is no breast swelling.   Lymphadenopathy:     She has no cervical adenopathy.     She has no axillary adenopathy.        Right: No supraclavicular adenopathy present.        Left: No supraclavicular adenopathy present.       Assessment:       1. Malignant neoplasm of upper-outer quadrant of left female breast        Plan:       Discussed the rational for adjuvant radiotherapy to the Lt. breast with the patient and her daughter.  Discussed the procedures, risks and benefits of therapy.  Will plan simulation today with treatment to begin thereafter.

## 2017-02-09 ENCOUNTER — LAB VISIT (OUTPATIENT)
Dept: LAB | Facility: HOSPITAL | Age: 77
End: 2017-02-09
Attending: INTERNAL MEDICINE
Payer: MEDICARE

## 2017-02-09 ENCOUNTER — OFFICE VISIT (OUTPATIENT)
Dept: HEMATOLOGY/ONCOLOGY | Facility: CLINIC | Age: 77
End: 2017-02-09
Payer: MEDICARE

## 2017-02-09 VITALS
TEMPERATURE: 98 F | WEIGHT: 244.69 LBS | DIASTOLIC BLOOD PRESSURE: 67 MMHG | SYSTOLIC BLOOD PRESSURE: 147 MMHG | BODY MASS INDEX: 40.72 KG/M2 | HEART RATE: 92 BPM

## 2017-02-09 DIAGNOSIS — E13.9 DIABETES MELLITUS DUE TO ABNORMAL INSULIN: ICD-10-CM

## 2017-02-09 DIAGNOSIS — C50.912 MALIGNANT NEOPLASM OF LEFT BREAST: ICD-10-CM

## 2017-02-09 DIAGNOSIS — Z51.11 ENCOUNTER FOR ANTINEOPLASTIC CHEMOTHERAPY: ICD-10-CM

## 2017-02-09 DIAGNOSIS — C50.412 MALIGNANT NEOPLASM OF UPPER-OUTER QUADRANT OF LEFT FEMALE BREAST: Primary | ICD-10-CM

## 2017-02-09 DIAGNOSIS — C50.412 MALIGNANT NEOPLASM OF UPPER-OUTER QUADRANT OF LEFT FEMALE BREAST: ICD-10-CM

## 2017-02-09 LAB
ALBUMIN SERPL BCP-MCNC: 3.4 G/DL
ALP SERPL-CCNC: 73 U/L
ALT SERPL W/O P-5'-P-CCNC: 9 U/L
ANION GAP SERPL CALC-SCNC: 7 MMOL/L
AST SERPL-CCNC: 19 U/L
BASOPHILS # BLD AUTO: 0.04 K/UL
BASOPHILS NFR BLD: 0.5 %
BILIRUB SERPL-MCNC: 0.3 MG/DL
BUN SERPL-MCNC: 16 MG/DL
CALCIUM SERPL-MCNC: 9.7 MG/DL
CHLORIDE SERPL-SCNC: 106 MMOL/L
CO2 SERPL-SCNC: 29 MMOL/L
CREAT SERPL-MCNC: 1.3 MG/DL
DIFFERENTIAL METHOD: ABNORMAL
EOSINOPHIL # BLD AUTO: 0.3 K/UL
EOSINOPHIL NFR BLD: 3.7 %
ERYTHROCYTE [DISTWIDTH] IN BLOOD BY AUTOMATED COUNT: 19.8 %
EST. GFR  (AFRICAN AMERICAN): 46.7 ML/MIN/1.73 M^2
EST. GFR  (NON AFRICAN AMERICAN): 40.5 ML/MIN/1.73 M^2
ESTIMATED AVG GLUCOSE: 154 MG/DL
GLUCOSE SERPL-MCNC: 116 MG/DL
HBA1C MFR BLD HPLC: 7 %
HCT VFR BLD AUTO: 30 %
HGB BLD-MCNC: 9.3 G/DL
LYMPHOCYTES # BLD AUTO: 2.1 K/UL
LYMPHOCYTES NFR BLD: 26.1 %
MCH RBC QN AUTO: 25.5 PG
MCHC RBC AUTO-ENTMCNC: 31 %
MCV RBC AUTO: 82 FL
MONOCYTES # BLD AUTO: 0.8 K/UL
MONOCYTES NFR BLD: 10.4 %
NEUTROPHILS # BLD AUTO: 4.6 K/UL
NEUTROPHILS NFR BLD: 58.4 %
PLATELET # BLD AUTO: 283 K/UL
PMV BLD AUTO: 9 FL
POTASSIUM SERPL-SCNC: 4 MMOL/L
PROT SERPL-MCNC: 7.2 G/DL
RBC # BLD AUTO: 3.64 M/UL
SODIUM SERPL-SCNC: 142 MMOL/L
TSH SERPL DL<=0.005 MIU/L-ACNC: 2.23 UIU/ML
WBC # BLD AUTO: 7.92 K/UL

## 2017-02-09 PROCEDURE — 3077F SYST BP >= 140 MM HG: CPT | Mod: S$GLB,,, | Performed by: INTERNAL MEDICINE

## 2017-02-09 PROCEDURE — 1126F AMNT PAIN NOTED NONE PRSNT: CPT | Mod: S$GLB,,, | Performed by: INTERNAL MEDICINE

## 2017-02-09 PROCEDURE — 1157F ADVNC CARE PLAN IN RCRD: CPT | Mod: S$GLB,,, | Performed by: INTERNAL MEDICINE

## 2017-02-09 PROCEDURE — 99499 UNLISTED E&M SERVICE: CPT | Mod: S$PBB,,, | Performed by: INTERNAL MEDICINE

## 2017-02-09 PROCEDURE — 1160F RVW MEDS BY RX/DR IN RCRD: CPT | Mod: S$GLB,,, | Performed by: INTERNAL MEDICINE

## 2017-02-09 PROCEDURE — 99213 OFFICE O/P EST LOW 20 MIN: CPT | Mod: S$GLB,,, | Performed by: INTERNAL MEDICINE

## 2017-02-09 PROCEDURE — 99999 PR PBB SHADOW E&M-EST. PATIENT-LVL III: CPT | Mod: PBBFAC,,, | Performed by: INTERNAL MEDICINE

## 2017-02-09 PROCEDURE — 3078F DIAST BP <80 MM HG: CPT | Mod: S$GLB,,, | Performed by: INTERNAL MEDICINE

## 2017-02-09 PROCEDURE — 1159F MED LIST DOCD IN RCRD: CPT | Mod: S$GLB,,, | Performed by: INTERNAL MEDICINE

## 2017-02-09 NOTE — PROGRESS NOTES
Yes mass  Subjective:       Patient ID: Renata Bergman is a 76 y.o. female.    Chief Complaint: No chief complaint on file.    HPI Mrs. Hussein is a 76-year-old female with stage I left breast cancer.  She  had 3 weeks of weekly Taxol and then because of insurance issues she was changed to CMF and had 5 cycles of that.     That was completed in January 2017.    She has seen Dr. Barr in radiation oncology on February 7 and will start radiation therapy on the 13th.    She's been feeling well overall.  Appetite and bowel function have been good she has no pain.  She does note some occasional shortness of breath.    Breast history: Screening mammogram on May 5 showed an irregular 22 mm mass with abnormal calcifications in the left breast 2:00 position. Follow-up ultrasound July 13 she noted a regular solid 17 mm mass.    On July 21 needle biopsy showed grade 2 infiltrating ductal carcinoma strongly ER MT positive (90%) and HER-2 negative.    On August 1 lumpectomy and sentinel lymph node biopsy were performed. That showed a 1.8 x 1.6 x 1.0 infiltrating carcinoma intermediate grade (histologic grade 3, nuclear grade 2, mitotic index 2.   The sentinel lymph node was negative and margins were negative. Final pathological stage TIc N0 stage IA.  Oncotype was 31 indicating a 21% risk of recurrence with tamoxifen alone.        Review of Systems   Constitutional: Positive for fatigue. Negative for activity change, appetite change, fever and unexpected weight change.   HENT: Negative for trouble swallowing.    Respiratory: Negative for cough and shortness of breath.    Gastrointestinal: Negative for abdominal pain, constipation, diarrhea and nausea.   Musculoskeletal: Negative for back pain.   Neurological: Negative for headaches.   Psychiatric/Behavioral: Negative for dysphoric mood. The patient is not nervous/anxious.        Objective:      Physical Exam   Constitutional: She is oriented to person, place, and time.  She appears well-developed and well-nourished.   HENT:   Mouth/Throat: No oropharyngeal exudate.   Eyes: No scleral icterus.   Cardiovascular: Regular rhythm and normal heart sounds.    Pulmonary/Chest: Effort normal and breath sounds normal. She has no wheezes. She has no rales. Right breast exhibits no mass, no nipple discharge and no skin change. Left breast exhibits no mass, no nipple discharge and no skin change.       Abdominal: Soft. She exhibits no mass. There is no tenderness.   Lymphadenopathy:     She has no cervical adenopathy.     She has no axillary adenopathy.        Right: No supraclavicular adenopathy present.        Left: No supraclavicular adenopathy present.   Neurological: She is alert and oriented to person, place, and time. No cranial nerve deficit.   Psychiatric: She has a normal mood and affect. Her behavior is normal. Thought content normal.   Vitals reviewed.      Assessment:      1. Malignant neoplasm of upper-outer quadrant of left female breast        Plan:   She will follow-up with radiation therapy as planned.  I discussed adjuvant endocrine therapy and provided her with written information regarding letrozole.  She will start that after the completion of radiation.    She may have her port removed.    Follow-up for completion of treatment summary after radiation is ended.

## 2017-02-10 PROCEDURE — 77295 3-D RADIOTHERAPY PLAN: CPT | Mod: 26,,, | Performed by: RADIOLOGY

## 2017-02-10 PROCEDURE — 77300 RADIATION THERAPY DOSE PLAN: CPT | Mod: 26,,, | Performed by: RADIOLOGY

## 2017-02-10 PROCEDURE — 77300 RADIATION THERAPY DOSE PLAN: CPT | Mod: TC | Performed by: RADIOLOGY

## 2017-02-10 PROCEDURE — 77334 RADIATION TREATMENT AID(S): CPT | Mod: 26,,, | Performed by: RADIOLOGY

## 2017-02-10 PROCEDURE — 77334 RADIATION TREATMENT AID(S): CPT | Mod: TC | Performed by: RADIOLOGY

## 2017-02-10 PROCEDURE — 77295 3-D RADIOTHERAPY PLAN: CPT | Mod: TC | Performed by: RADIOLOGY

## 2017-02-13 ENCOUNTER — DOCUMENTATION ONLY (OUTPATIENT)
Dept: RADIATION ONCOLOGY | Facility: CLINIC | Age: 77
End: 2017-02-13

## 2017-02-13 PROCEDURE — 77280 THER RAD SIMULAJ FIELD SMPL: CPT | Mod: TC | Performed by: RADIOLOGY

## 2017-02-13 PROCEDURE — 77280 THER RAD SIMULAJ FIELD SMPL: CPT | Mod: 26,,, | Performed by: RADIOLOGY

## 2017-02-14 PROCEDURE — 77412 RADIATION TX DELIVERY LVL 3: CPT | Performed by: RADIOLOGY

## 2017-02-15 PROCEDURE — 77412 RADIATION TX DELIVERY LVL 3: CPT | Performed by: RADIOLOGY

## 2017-02-15 RX ORDER — METFORMIN HYDROCHLORIDE 850 MG/1
TABLET ORAL
Qty: 180 TABLET | Refills: 4 | Status: SHIPPED | OUTPATIENT
Start: 2017-02-15 | End: 2018-05-10 | Stop reason: SDUPTHER

## 2017-02-16 ENCOUNTER — DOCUMENTATION ONLY (OUTPATIENT)
Dept: RADIATION ONCOLOGY | Facility: CLINIC | Age: 77
End: 2017-02-16

## 2017-02-16 PROCEDURE — 77412 RADIATION TX DELIVERY LVL 3: CPT | Performed by: RADIOLOGY

## 2017-02-17 PROCEDURE — 77336 RADIATION PHYSICS CONSULT: CPT | Performed by: RADIOLOGY

## 2017-02-17 PROCEDURE — 77412 RADIATION TX DELIVERY LVL 3: CPT | Performed by: RADIOLOGY

## 2017-02-17 NOTE — PLAN OF CARE
Problem: Patient Care Overview  Goal: Plan of Care Review  Ambulates with cane Fall precautions in place   Outcome: Ongoing (interventions implemented as appropriate)  Day 3 of radiation to the left breast using miaderm  No skin changes

## 2017-02-20 PROCEDURE — 77412 RADIATION TX DELIVERY LVL 3: CPT | Performed by: RADIOLOGY

## 2017-02-21 PROCEDURE — 77412 RADIATION TX DELIVERY LVL 3: CPT | Performed by: RADIOLOGY

## 2017-02-21 PROCEDURE — 77417 THER RADIOLOGY PORT IMAGE(S): CPT | Performed by: RADIOLOGY

## 2017-02-22 PROCEDURE — 77412 RADIATION TX DELIVERY LVL 3: CPT | Performed by: RADIOLOGY

## 2017-02-23 ENCOUNTER — TELEPHONE (OUTPATIENT)
Dept: SURGERY | Facility: CLINIC | Age: 77
End: 2017-02-23

## 2017-02-23 NOTE — TELEPHONE ENCOUNTER
Pt to have port removed once she is finished with radiation. Pt instructed to call back after the completion of radiation for port to scheduled to be removed in the minor OR, 8th floor clinic tower.

## 2017-02-23 NOTE — TELEPHONE ENCOUNTER
----- Message from Gilson Nieto MD sent at 2/9/2017 12:49 PM CST -----   Need to get her in for a minor procedure room appointment for port removal  ----- Message -----     From: Bismark Guevara MD     Sent: 2/9/2017  10:31 AM       To: Ethel Berger MD, #

## 2017-02-24 ENCOUNTER — DOCUMENTATION ONLY (OUTPATIENT)
Dept: RADIATION ONCOLOGY | Facility: CLINIC | Age: 77
End: 2017-02-24

## 2017-02-24 PROCEDURE — 77412 RADIATION TX DELIVERY LVL 3: CPT | Performed by: RADIOLOGY

## 2017-02-24 NOTE — PLAN OF CARE
Problem: Patient Care Overview  Goal: Plan of Care Review  Ambulates with cane Fall precautions in place   Outcome: Ongoing (interventions implemented as appropriate)  Day 8 of radiation to the left breast no skin changes noted

## 2017-02-27 PROCEDURE — 77412 RADIATION TX DELIVERY LVL 3: CPT | Performed by: RADIOLOGY

## 2017-02-27 PROCEDURE — 77336 RADIATION PHYSICS CONSULT: CPT | Performed by: RADIOLOGY

## 2017-03-01 ENCOUNTER — HOSPITAL ENCOUNTER (OUTPATIENT)
Dept: RADIATION THERAPY | Facility: HOSPITAL | Age: 77
Discharge: HOME | End: 2017-03-01
Attending: RADIOLOGY
Payer: MEDICARE

## 2017-03-01 PROCEDURE — 77412 RADIATION TX DELIVERY LVL 3: CPT | Performed by: RADIOLOGY

## 2017-03-02 ENCOUNTER — DOCUMENTATION ONLY (OUTPATIENT)
Dept: RADIATION ONCOLOGY | Facility: CLINIC | Age: 77
End: 2017-03-02

## 2017-03-02 PROCEDURE — 77412 RADIATION TX DELIVERY LVL 3: CPT | Performed by: RADIOLOGY

## 2017-03-02 PROCEDURE — 77417 THER RADIOLOGY PORT IMAGE(S): CPT | Performed by: RADIOLOGY

## 2017-03-02 NOTE — PLAN OF CARE
Problem: Patient Care Overview  Goal: Plan of Care Review  Ambulates with cane Fall precautions in place   Outcome: Ongoing (interventions implemented as appropriate)  Day 11 of radiation to the left breast no skin changes noted

## 2017-03-03 PROCEDURE — 77412 RADIATION TX DELIVERY LVL 3: CPT | Performed by: RADIOLOGY

## 2017-03-06 PROCEDURE — 77412 RADIATION TX DELIVERY LVL 3: CPT | Performed by: RADIOLOGY

## 2017-03-07 PROCEDURE — 77412 RADIATION TX DELIVERY LVL 3: CPT | Performed by: RADIOLOGY

## 2017-03-08 PROCEDURE — 77412 RADIATION TX DELIVERY LVL 3: CPT | Performed by: RADIOLOGY

## 2017-03-08 PROCEDURE — 77336 RADIATION PHYSICS CONSULT: CPT | Performed by: RADIOLOGY

## 2017-03-09 ENCOUNTER — DOCUMENTATION ONLY (OUTPATIENT)
Dept: RADIATION ONCOLOGY | Facility: CLINIC | Age: 77
End: 2017-03-09

## 2017-03-09 ENCOUNTER — TELEPHONE (OUTPATIENT)
Dept: RADIATION ONCOLOGY | Facility: CLINIC | Age: 77
End: 2017-03-09

## 2017-03-09 PROCEDURE — 77417 THER RADIOLOGY PORT IMAGE(S): CPT | Performed by: RADIOLOGY

## 2017-03-09 PROCEDURE — 77412 RADIATION TX DELIVERY LVL 3: CPT | Performed by: RADIOLOGY

## 2017-03-09 NOTE — PLAN OF CARE
Problem: Patient Care Overview  Goal: Plan of Care Review  Ambulates with cane Fall precautions in place   Outcome: Ongoing (interventions implemented as appropriate)  Pt. On day 16 of xrt to left breast. Doing well.  No skin issues.

## 2017-03-09 NOTE — TELEPHONE ENCOUNTER
Spoke to the patient and arranged for her radiation transportation on request from Kayleen in Radiation Oncology: Faxed transportation request to [employer] for  at 10.15 am starting on 3/13/17 and ending on 4/3/17(weekends excluded).

## 2017-03-10 PROCEDURE — 77412 RADIATION TX DELIVERY LVL 3: CPT | Performed by: RADIOLOGY

## 2017-03-13 PROCEDURE — 77412 RADIATION TX DELIVERY LVL 3: CPT | Performed by: RADIOLOGY

## 2017-03-14 PROCEDURE — 77336 RADIATION PHYSICS CONSULT: CPT | Performed by: RADIOLOGY

## 2017-03-14 PROCEDURE — 77412 RADIATION TX DELIVERY LVL 3: CPT | Performed by: RADIOLOGY

## 2017-03-15 PROCEDURE — 77412 RADIATION TX DELIVERY LVL 3: CPT | Performed by: RADIOLOGY

## 2017-03-16 PROCEDURE — 77412 RADIATION TX DELIVERY LVL 3: CPT | Performed by: RADIOLOGY

## 2017-03-17 PROCEDURE — 77412 RADIATION TX DELIVERY LVL 3: CPT | Performed by: RADIOLOGY

## 2017-03-20 PROCEDURE — 77417 THER RADIOLOGY PORT IMAGE(S): CPT | Performed by: RADIOLOGY

## 2017-03-20 PROCEDURE — 77412 RADIATION TX DELIVERY LVL 3: CPT | Performed by: RADIOLOGY

## 2017-03-21 PROCEDURE — 77412 RADIATION TX DELIVERY LVL 3: CPT | Performed by: RADIOLOGY

## 2017-03-22 PROCEDURE — 77321 SPECIAL TELETX PORT PLAN: CPT | Mod: TC | Performed by: RADIOLOGY

## 2017-03-22 PROCEDURE — 77307 TELETHX ISODOSE PLAN CPLX: CPT | Mod: TC | Performed by: RADIOLOGY

## 2017-03-22 PROCEDURE — 77334 RADIATION TREATMENT AID(S): CPT | Mod: 26,,, | Performed by: RADIOLOGY

## 2017-03-22 PROCEDURE — 77334 RADIATION TREATMENT AID(S): CPT | Mod: TC | Performed by: RADIOLOGY

## 2017-03-22 PROCEDURE — 77321 SPECIAL TELETX PORT PLAN: CPT | Mod: 26,,, | Performed by: RADIOLOGY

## 2017-03-22 PROCEDURE — 77293 RESPIRATOR MOTION MGMT SIMUL: CPT | Mod: TC | Performed by: RADIOLOGY

## 2017-03-22 PROCEDURE — 77336 RADIATION PHYSICS CONSULT: CPT | Performed by: RADIOLOGY

## 2017-03-22 PROCEDURE — 77307 TELETHX ISODOSE PLAN CPLX: CPT | Mod: 26,59,, | Performed by: RADIOLOGY

## 2017-03-23 ENCOUNTER — DOCUMENTATION ONLY (OUTPATIENT)
Dept: RADIATION ONCOLOGY | Facility: CLINIC | Age: 77
End: 2017-03-23

## 2017-03-23 PROCEDURE — 77412 RADIATION TX DELIVERY LVL 3: CPT | Performed by: RADIOLOGY

## 2017-03-23 NOTE — PLAN OF CARE
Problem: Patient Care Overview  Goal: Plan of Care Review  Ambulates with cane Fall precautions in place   Outcome: Ongoing (interventions implemented as appropriate)  Pt. On day 25 of xrt to left breast.  Doing well.  Skin intact.

## 2017-03-24 PROCEDURE — 77412 RADIATION TX DELIVERY LVL 3: CPT | Performed by: RADIOLOGY

## 2017-03-29 PROCEDURE — 77412 RADIATION TX DELIVERY LVL 3: CPT | Performed by: RADIOLOGY

## 2017-03-30 PROCEDURE — 77412 RADIATION TX DELIVERY LVL 3: CPT | Performed by: RADIOLOGY

## 2017-03-31 ENCOUNTER — DOCUMENTATION ONLY (OUTPATIENT)
Dept: RADIATION ONCOLOGY | Facility: CLINIC | Age: 77
End: 2017-03-31

## 2017-03-31 PROCEDURE — 77336 RADIATION PHYSICS CONSULT: CPT | Performed by: RADIOLOGY

## 2017-03-31 PROCEDURE — 77412 RADIATION TX DELIVERY LVL 3: CPT | Performed by: RADIOLOGY

## 2017-03-31 NOTE — PROGRESS NOTES
Call received from Kayleen in radiation oncology requesting an extension on pts. Cab transportation. Arranged for [employer] ([phone]) to  pt. On 4/4, 4/5 and 4/6/17 for 10:15 . Contacted the pt. And informed of the above. Will follow.

## 2017-03-31 NOTE — PLAN OF CARE
Problem: Patient Care Overview  Goal: Plan of Care Review  Ambulates with cane Fall precautions in place   Outcome: Ongoing (interventions implemented as appropriate)  Day 29 of radiation to the left breast tolerating well

## 2017-04-03 ENCOUNTER — HOSPITAL ENCOUNTER (OUTPATIENT)
Dept: RADIATION THERAPY | Facility: HOSPITAL | Age: 77
Discharge: HOME | End: 2017-04-03
Attending: RADIOLOGY
Payer: MEDICARE

## 2017-04-03 PROCEDURE — 77412 RADIATION TX DELIVERY LVL 3: CPT | Performed by: RADIOLOGY

## 2017-04-04 PROCEDURE — 77412 RADIATION TX DELIVERY LVL 3: CPT | Performed by: RADIOLOGY

## 2017-04-05 ENCOUNTER — TELEPHONE (OUTPATIENT)
Dept: HEMATOLOGY/ONCOLOGY | Facility: CLINIC | Age: 77
End: 2017-04-05

## 2017-04-05 PROCEDURE — 77412 RADIATION TX DELIVERY LVL 3: CPT | Performed by: RADIOLOGY

## 2017-04-05 NOTE — TELEPHONE ENCOUNTER
----- Message from Sav Hollingsworth sent at 4/5/2017  8:04 AM CDT -----  Contact: Pt   Pt would like a call back from staff    Pt is requesting to reschedule appt to a later time, if available.     Pt can be reached at [phone]

## 2017-04-06 ENCOUNTER — DOCUMENTATION ONLY (OUTPATIENT)
Dept: RADIATION ONCOLOGY | Facility: CLINIC | Age: 77
End: 2017-04-06

## 2017-04-06 PROCEDURE — 77412 RADIATION TX DELIVERY LVL 3: CPT | Performed by: RADIOLOGY

## 2017-04-06 NOTE — PLAN OF CARE
Problem: Patient Care Overview  Goal: Plan of Care Review  Ambulates with cane Fall precautions in place   Outcome: Ongoing (interventions implemented as appropriate)  Day 33 of radiation to the left breast  Dry desquamation  To the breast noted  Using miaderm

## 2017-04-07 PROCEDURE — 77336 RADIATION PHYSICS CONSULT: CPT | Performed by: RADIOLOGY

## 2017-04-10 ENCOUNTER — OFFICE VISIT (OUTPATIENT)
Dept: HEMATOLOGY/ONCOLOGY | Facility: CLINIC | Age: 77
End: 2017-04-10
Payer: MEDICARE

## 2017-04-10 VITALS
TEMPERATURE: 99 F | SYSTOLIC BLOOD PRESSURE: 127 MMHG | BODY MASS INDEX: 43.12 KG/M2 | HEART RATE: 88 BPM | DIASTOLIC BLOOD PRESSURE: 60 MMHG | HEIGHT: 63 IN | WEIGHT: 243.38 LBS | RESPIRATION RATE: 13 BRPM

## 2017-04-10 DIAGNOSIS — Z13.820 OSTEOPOROSIS SCREENING: ICD-10-CM

## 2017-04-10 DIAGNOSIS — C50.412 MALIGNANT NEOPLASM OF UPPER-OUTER QUADRANT OF LEFT FEMALE BREAST: Primary | ICD-10-CM

## 2017-04-10 PROCEDURE — 1160F RVW MEDS BY RX/DR IN RCRD: CPT | Mod: S$GLB,,, | Performed by: PHYSICIAN ASSISTANT

## 2017-04-10 PROCEDURE — 1126F AMNT PAIN NOTED NONE PRSNT: CPT | Mod: S$GLB,,, | Performed by: PHYSICIAN ASSISTANT

## 2017-04-10 PROCEDURE — 3074F SYST BP LT 130 MM HG: CPT | Mod: S$GLB,,, | Performed by: PHYSICIAN ASSISTANT

## 2017-04-10 PROCEDURE — 99215 OFFICE O/P EST HI 40 MIN: CPT | Mod: S$GLB,,, | Performed by: PHYSICIAN ASSISTANT

## 2017-04-10 PROCEDURE — 1159F MED LIST DOCD IN RCRD: CPT | Mod: S$GLB,,, | Performed by: PHYSICIAN ASSISTANT

## 2017-04-10 PROCEDURE — 99999 PR PBB SHADOW E&M-EST. PATIENT-LVL III: CPT | Mod: PBBFAC,,, | Performed by: PHYSICIAN ASSISTANT

## 2017-04-10 PROCEDURE — 99499 UNLISTED E&M SERVICE: CPT | Mod: S$PBB,,, | Performed by: PHYSICIAN ASSISTANT

## 2017-04-10 PROCEDURE — 3078F DIAST BP <80 MM HG: CPT | Mod: S$GLB,,, | Performed by: PHYSICIAN ASSISTANT

## 2017-04-10 PROCEDURE — 1157F ADVNC CARE PLAN IN RCRD: CPT | Mod: S$GLB,,, | Performed by: PHYSICIAN ASSISTANT

## 2017-04-10 RX ORDER — LETROZOLE 2.5 MG/1
2.5 TABLET, FILM COATED ORAL DAILY
Qty: 30 TABLET | Refills: 2 | Status: SHIPPED | OUTPATIENT
Start: 2017-04-10 | End: 2017-07-18 | Stop reason: SDUPTHER

## 2017-04-10 NOTE — Clinical Note
Please schedule mammogram at same time as shabbri's appt in 3 months (she should be due for annual in July)

## 2017-04-12 NOTE — PROGRESS NOTES
Subjective:       Patient ID: Renata Bergman is a 76 y.o. female.    Chief Complaint: No chief complaint on file.    HPI Comments: Dr. Guevara's patient  Returns for surveillance and completion of treatment summary.     Mrs. Hussein is a 76-year-old female with stage I left breast cancer. She had 3 weeks of weekly Taxol and then because of insurance issues she was changed to CMF and had 5 cycles of that.   That was completed in January 2017.  She completed adjuvant radiation on 4/6/2017.      Patient has not yet started AI therapy.    Overall feeling quite well. Noted some fatigue from radiation but just happy that treatment is over.  No fever, chills, nausea, vomiting, shortness of breath or pain.  No change in bowel/urinary habits.  Emotionally doing quite well, no anxiety or depression.  Up to date on screening c-scope.        Breast history: Screening mammogram on May 5 showed an irregular 22 mm mass with abnormal calcifications in the left breast 2:00 position. Follow-up ultrasound July 13 she noted a regular solid 17 mm mass.     On July 21 needle biopsy showed grade 2 infiltrating ductal carcinoma strongly ER OH positive (90%) and HER-2 negative.     On August 1 lumpectomy and sentinel lymph node biopsy were performed. That showed a 1.8 x 1.6 x 1.0 infiltrating carcinoma intermediate grade (histologic grade 3, nuclear grade 2, mitotic index 2.   The sentinel lymph node was negative and margins were negative. Final pathological stage TIc N0 stage IA.  Oncotype was 31 indicating a 21% risk of recurrence with tamoxifen alone    Review of Systems   Constitutional: Negative.    HENT: Negative for congestion, rhinorrhea, sore throat and trouble swallowing.    Eyes: Negative for visual disturbance.   Respiratory: Negative for cough, chest tightness and shortness of breath.    Cardiovascular: Negative for chest pain, palpitations and leg swelling.   Gastrointestinal: Negative for abdominal pain, blood in stool,  constipation, diarrhea, nausea and vomiting.   Genitourinary: Negative for dysuria, hematuria and vaginal bleeding.   Musculoskeletal: Negative for arthralgias, back pain and myalgias.   Skin: Negative for pallor and rash.   Neurological: Negative for dizziness, weakness and headaches.   Hematological: Negative for adenopathy. Does not bruise/bleed easily.   Psychiatric/Behavioral: Negative for dysphoric mood and suicidal ideas. The patient is not nervous/anxious.        Objective:      Physical Exam   Constitutional: She is oriented to person, place, and time. She appears well-developed and well-nourished. No distress.   HENT:   Head: Normocephalic.   Mouth/Throat: Oropharynx is clear and moist. No oropharyngeal exudate.   Eyes: Conjunctivae are normal. Pupils are equal, round, and reactive to light. No scleral icterus.   Neck: Normal range of motion. Neck supple. No thyromegaly present.   Cardiovascular: Normal rate and regular rhythm.    Pulmonary/Chest: Effort normal and breath sounds normal. No respiratory distress.   Right breast without mass, nodule or skin changes. Left breast with well healed lumpectomy and SLNB incision. No mass, nodule or skin changes. No axillary or supraclavicular adenopathy.   Abdominal: Soft. Bowel sounds are normal. She exhibits no distension and no mass. There is no tenderness.   Musculoskeletal: Normal range of motion. She exhibits no edema or tenderness.   No spinal or paraspinal tenderness to palpation     Lymphadenopathy:     She has no cervical adenopathy.   Neurological: She is alert and oriented to person, place, and time. No cranial nerve deficit.   Skin: Skin is warm and dry.   Psychiatric: She has a normal mood and affect. Her behavior is normal. Thought content normal.   Vitals reviewed.      Assessment:       1. Malignant neoplasm of upper-outer quadrant of left female breast    2. Osteoporosis screening        Plan:       Patient will start Letrozole next week. Side  effects of medication dicussed with patient and written information provided to patient.  She will return to clinic in 3 months for next visit and knows to call in interim if any issues with that medication.    She will start taking vitamin D and calcium supplementation and will be scheduled for baseline bone density as last one was greater than 2 years ago and [facility].   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  Up to date on screening colonoscopy.      We discussed the role of the survivorship clinic in her care.  Today we reviewed all aspects of treatment and the potential long term side effects of treatment. We also discussed the emotional/psychological impact of diagnosis and treatment and I let her know about our psychosocial services (dedicated  and Psychologist).  She declines referral to these services at this time.       Written material on eating well, exercise, fear of recurrence, living with uncertainty and sadness depression provided to patient.       Please see Summary Treatment and Care plan filed under same date.    60 mins spent with patient, over 50% of which was in counseling and completion of treatment summary.

## 2017-04-13 ENCOUNTER — OFFICE VISIT (OUTPATIENT)
Dept: OPTOMETRY | Facility: CLINIC | Age: 77
End: 2017-04-13
Payer: MEDICARE

## 2017-04-13 DIAGNOSIS — Z13.5 SCREENING FOR GLAUCOMA: ICD-10-CM

## 2017-04-13 DIAGNOSIS — H52.4 HYPEROPIA WITH ASTIGMATISM AND PRESBYOPIA, BILATERAL: ICD-10-CM

## 2017-04-13 DIAGNOSIS — H52.03 HYPEROPIA WITH ASTIGMATISM AND PRESBYOPIA, BILATERAL: ICD-10-CM

## 2017-04-13 DIAGNOSIS — H52.203 HYPEROPIA WITH ASTIGMATISM AND PRESBYOPIA, BILATERAL: ICD-10-CM

## 2017-04-13 DIAGNOSIS — H25.13 SENILE NUCLEAR SCLEROSIS, BILATERAL: ICD-10-CM

## 2017-04-13 DIAGNOSIS — E11.9 DIABETES MELLITUS TYPE 2 WITHOUT RETINOPATHY: Primary | ICD-10-CM

## 2017-04-13 PROCEDURE — 92015 DETERMINE REFRACTIVE STATE: CPT | Mod: S$GLB,,, | Performed by: OPTOMETRIST

## 2017-04-13 PROCEDURE — 92014 COMPRE OPH EXAM EST PT 1/>: CPT | Mod: S$GLB,,, | Performed by: OPTOMETRIST

## 2017-04-13 PROCEDURE — 99999 PR PBB SHADOW E&M-EST. PATIENT-LVL II: CPT | Mod: PBBFAC,,, | Performed by: OPTOMETRIST

## 2017-04-13 NOTE — PROGRESS NOTES
HPI     Concerns About Ocular Health    Additional comments: 1 yr chk           Comments   Patient states that ever since she started her Chemotherapy 6 months ago,   she has experienced blurriness and FB sensation.    Artificial Tears PRN OU    Hemoglobin A1C       Date                     Value               Ref Range             Status                02/09/2017               7.0 (H)             4.5 - 6.2 %           Final                  03/12/2016               6.2                 4.5 - 6.2 %           Final                 03/12/2016               6.2                 4.5 - 6.2 %           Final            ----------         Last edited by Moe Noel, OD on 4/13/2017 10:01 AM. (History)            Assessment /Plan     For exam results, see Encounter Report.    Diabetes mellitus type 2 without retinopathy  -No retinopathy noted today.  Continued control with primary care physician and annual comprehensive eye exam.    Senile nuclear sclerosis, bilateral  -Educated patient on presence of cataracts at today's exam, monitor at annual dilated fundus exam. 5+ years surgical estimate.    Screening for glaucoma  -Monitor with annual eye exam and IOP check    Hyperopia with astigmatism and presbyopia, bilateral  Eyeglass Final Rx     Eyeglass Final Rx      Sphere Cylinder Axis Add   Right +2.50 +0.50 005 +2.50   Left +2.50 +0.50 180 +2.50       Type:  Bifocal    Expiration Date:  4/14/2018                  Systane Ultra PRN Dryness blur    RTC 1 yr

## 2017-04-13 NOTE — MR AVS SNAPSHOT
[facility]  [address]  [facility] [address]  Phone: [phone]  Fax: [phone]                  Renata Bergman   2017 9:30 AM   Office Visit    Description:  Female : 1940   Provider:  Moe Noel OD   Department:  [facility]           Reason for Visit     Concerns About Ocular Health           Diagnoses this Visit        Comments    Diabetes mellitus type 2 without retinopathy    -  Primary     Senile nuclear sclerosis, bilateral         Screening for glaucoma         Hyperopia with astigmatism and presbyopia, bilateral                To Do List           Future Appointments        Provider Department Dept Phone    5/3/2017 10:20 AM [facility], DEXA1 [facility] [phone]    2017 10:00 AM Ethel Berger MD [facility] - Internal Medicine [phone]    2017 11:00 AM Williams Barr Jr., MD [facility] - Radiation Oncology [phone]      Goals (5 Years of Data)     None      Follow-Up and Disposition     Return in about 1 year (around 2018).      [facility]     [facility] -  Assistance  Unless otherwise directed by your provider, please contact [facility], our nurse care line that is available for  assistance.     Registered nurses in the [facility] provide: appointment scheduling, clinical advisement, health education, and other advisory services.  Call: [phone] (toll free)               Medications           Message regarding Medications     Verify the changes and/or additions to your medication regime listed below are the same as discussed with your clinician today.  If any of these changes or additions are incorrect, please notify your healthcare provider.             Verify that the below list of medications is an accurate representation of the medications you are currently taking.  If none reported, the list may be blank. If incorrect, please contact your  healthcare provider. Carry this list with you in case of emergency.           Current Medications     allopurinol (ZYLOPRIM) 300 MG tablet take 1 tablet by mouth once daily    amlodipine (NORVASC) 5 MG tablet TAKE 1 TABLET BY MOUTH DAILY    aspirin 81 MG Chew Take 81 mg by mouth once daily.      carvedilol (COREG) 25 MG tablet TAKE 1 TABLET BY MOUTH TWICE DAILY    chlorthalidone (HYGROTEN) 25 MG Tab Take 1/2 tablet by mouth daily.    fexofenadine (ALLEGRA) 30 MG tablet Take 30 mg by mouth daily as needed.    letrozole (FEMARA) 2.5 mg Tab Take 1 tablet (2.5 mg total) by mouth once daily.    levetiracetam (KEPPRA) 750 MG Tab Take 500 mg by mouth 2 (two) times daily. Pt unsure of dosage.    lidocaine-prilocaine (EMLA) cream Apply to affected area once at least 15 minutes before chemotherapy    metformin (GLUCOPHAGE) 850 MG tablet TAKE 1 TABLET BY MOUTH TWICE DAILY    nitroGLYCERIN (NITROSTAT) 0.4 MG SL tablet Place 1 tablet (0.4 mg total) under the tongue every 5 (five) minutes as needed for Chest pain.    omeprazole (PRILOSEC) 20 MG capsule TAKE 2 CAPSULES BY MOUTH ONCE DAILY.    polyethylene glycol (GLYCOLAX) 17 gram/dose powder Take 17 g by mouth once daily.    potassium chloride (MICRO-K) 10 MEQ CpSR Take 1 capsule (10 mEq total) by mouth once daily.    rosuvastatin (CRESTOR) 40 MG Tab TK ONE T   PO ONCE D    rosuvastatin (CRESTOR) 40 MG Tab TAKE ONE TABLET BY MOUTH ONCE DAILY           Clinical Reference Information           Allergies as of 4/13/2017     Lisinopril      Immunizations Administered on Date of Encounter - 4/13/2017     None      Language Assistance Services     ATTENTION: Language assistance services are available, free of charge. Please call [phone].      ATENCIÓN: Si allegrala nimo, tiene a vidales disposición servicios gratuitos de asistencia lingüística. Llame al [phone].     CHÚ Ý: N?u b?n nói Ti?ng Vi?t, có các d?ch v? h? tr? ngôn ng? mi?n phí dành cho b?n. G?i s? [phone].          [facility] complies with applicable Federal civil rights laws and does not discriminate on the basis of race, color, national origin, age, disability, or sex.

## 2017-04-17 ENCOUNTER — TELEPHONE (OUTPATIENT)
Dept: SURGERY | Facility: CLINIC | Age: 77
End: 2017-04-17

## 2017-04-17 NOTE — TELEPHONE ENCOUNTER
Called patient regarding scheduling genetic counseling appointment per Deonna Madrid PA-C.  The patient is scheduled to be seen on Thursday 4/20/17 at 7:30 am.  The patient and her daughter voiced understanding of the appointment date and time.

## 2017-04-20 ENCOUNTER — OFFICE VISIT (OUTPATIENT)
Dept: SURGERY | Facility: CLINIC | Age: 77
End: 2017-04-20
Payer: MEDICARE

## 2017-04-20 DIAGNOSIS — Z80.0 FAMILY HISTORY OF COLON CANCER: ICD-10-CM

## 2017-04-20 DIAGNOSIS — Z80.3 FAMILY HISTORY OF BREAST CANCER: ICD-10-CM

## 2017-04-20 DIAGNOSIS — C50.412 MALIGNANT NEOPLASM OF UPPER-OUTER QUADRANT OF LEFT FEMALE BREAST: ICD-10-CM

## 2017-04-20 DIAGNOSIS — Z85.3 PERSONAL HISTORY OF BREAST CANCER: ICD-10-CM

## 2017-04-20 PROCEDURE — 1159F MED LIST DOCD IN RCRD: CPT | Mod: S$GLB,,, | Performed by: NURSE PRACTITIONER

## 2017-04-20 PROCEDURE — 99214 OFFICE O/P EST MOD 30 MIN: CPT | Mod: S$GLB,,, | Performed by: NURSE PRACTITIONER

## 2017-04-20 NOTE — PROGRESS NOTES
"Patient referred by ASHLEY BETANCOURT for genetic counseling. See pedigree for full family history which will be scanned into Epic media. Suggestive of hereditary breast cancer. Her daughter is with her today and reports "I might have had testing before but no one ever gave me any results". Discussed sporadic verses family clustering, verses hereditary breast cancer. Discussed option for genetic testing per current NCCN guidelines including various cancer susceptibility genes, types of results, implications for self and family, and medicare criteria for testing/cost of testing. She desires to proceed with testing. Her insurance requires to use [email or web address], authorization will be initiated and once approved she can present to [facility] for blood draw for testing, [facility] Plus. Will phone her once results are available.     Time in counseling 45 min, total time 45 min   "

## 2017-04-21 ENCOUNTER — TELEPHONE (OUTPATIENT)
Dept: SURGERY | Facility: CLINIC | Age: 77
End: 2017-04-21

## 2017-05-03 ENCOUNTER — HOSPITAL ENCOUNTER (OUTPATIENT)
Dept: RADIOLOGY | Facility: CLINIC | Age: 77
Discharge: HOME | End: 2017-05-03
Attending: INTERNAL MEDICINE
Payer: MEDICARE

## 2017-05-03 DIAGNOSIS — Z13.820 OSTEOPOROSIS SCREENING: ICD-10-CM

## 2017-05-03 PROCEDURE — 77080 DXA BONE DENSITY AXIAL: CPT | Mod: 26,,, | Performed by: INTERNAL MEDICINE

## 2017-05-03 PROCEDURE — 77080 DXA BONE DENSITY AXIAL: CPT | Mod: TC

## 2017-05-08 ENCOUNTER — DOCUMENTATION ONLY (OUTPATIENT)
Dept: HEMATOLOGY/ONCOLOGY | Facility: CLINIC | Age: 77
End: 2017-05-08

## 2017-05-15 RX ORDER — OMEPRAZOLE 20 MG/1
CAPSULE, DELAYED RELEASE ORAL
Qty: 180 CAPSULE | Refills: 4 | Status: SHIPPED | OUTPATIENT
Start: 2017-05-15 | End: 2018-08-09 | Stop reason: SDUPTHER

## 2017-05-21 ENCOUNTER — HOSPITAL ENCOUNTER (INPATIENT)
Facility: HOSPITAL | Age: 77
LOS: 2 days | Discharge: HOME | DRG: 247 | End: 2017-05-23
Attending: EMERGENCY MEDICINE | Admitting: EMERGENCY MEDICINE
Payer: MEDICARE

## 2017-05-21 DIAGNOSIS — I25.83 CORONARY ATHEROSCLEROSIS DUE TO LIPID RICH PLAQUE (CODE): ICD-10-CM

## 2017-05-21 DIAGNOSIS — C50.412 MALIGNANT NEOPLASM OF UPPER-OUTER QUADRANT OF LEFT FEMALE BREAST: ICD-10-CM

## 2017-05-21 DIAGNOSIS — I25.10 ATHEROSCLEROTIC HEART DISEASE OF NATIVE CORONARY ARTERY WITHOUT ANGINA PECTORIS: ICD-10-CM

## 2017-05-21 DIAGNOSIS — R07.9 CHEST PAIN: Primary | ICD-10-CM

## 2017-05-21 DIAGNOSIS — N18.30 CHRONIC RENAL DISEASE, STAGE 3, MODERATELY DECREASED GLOMERULAR FILTRATION RATE BETWEEN 30-59 ML/MIN/1.73 SQUARE METER: Chronic | ICD-10-CM

## 2017-05-21 DIAGNOSIS — I21.4 NSTEMI (NON-ST ELEVATED MYOCARDIAL INFARCTION): ICD-10-CM

## 2017-05-21 DIAGNOSIS — E11.51 CONTROLLED TYPE 2 DM WITH PERIPHERAL CIRCULATORY DISORDER: Chronic | ICD-10-CM

## 2017-05-21 DIAGNOSIS — I25.83 CORONARY ARTERY DISEASE DUE TO LIPID RICH PLAQUE: Chronic | ICD-10-CM

## 2017-05-21 DIAGNOSIS — I25.10 CORONARY ARTERY DISEASE DUE TO LIPID RICH PLAQUE: Chronic | ICD-10-CM

## 2017-05-21 DIAGNOSIS — E78.2 MIXED HYPERLIPIDEMIA: Chronic | ICD-10-CM

## 2017-05-21 LAB
ALBUMIN SERPL BCP-MCNC: 3.2 G/DL
ALP SERPL-CCNC: 66 U/L
ALT SERPL W/O P-5'-P-CCNC: 14 U/L
ANION GAP SERPL CALC-SCNC: 9 MMOL/L
APTT BLDCRRT: 22.9 SEC
AST SERPL-CCNC: 24 U/L
BASOPHILS # BLD AUTO: 0.02 K/UL
BASOPHILS # BLD AUTO: 0.03 K/UL
BASOPHILS NFR BLD: 0.4 %
BASOPHILS NFR BLD: 0.7 %
BILIRUB SERPL-MCNC: 0.3 MG/DL
BNP SERPL-MCNC: 31 PG/ML
BUN SERPL-MCNC: 14 MG/DL
CALCIUM SERPL-MCNC: 9.4 MG/DL
CHLORIDE SERPL-SCNC: 107 MMOL/L
CO2 SERPL-SCNC: 24 MMOL/L
CREAT SERPL-MCNC: 1.3 MG/DL
DIFFERENTIAL METHOD: ABNORMAL
DIFFERENTIAL METHOD: ABNORMAL
EOSINOPHIL # BLD AUTO: 0.3 K/UL
EOSINOPHIL # BLD AUTO: 0.3 K/UL
EOSINOPHIL NFR BLD: 5.5 %
EOSINOPHIL NFR BLD: 5.6 %
ERYTHROCYTE [DISTWIDTH] IN BLOOD BY AUTOMATED COUNT: 16.8 %
ERYTHROCYTE [DISTWIDTH] IN BLOOD BY AUTOMATED COUNT: 16.8 %
EST. GFR  (AFRICAN AMERICAN): 46.7 ML/MIN/1.73 M^2
EST. GFR  (NON AFRICAN AMERICAN): 40.5 ML/MIN/1.73 M^2
FACT X PPP CHRO-ACNC: 0.96 IU/ML
GLUCOSE SERPL-MCNC: 119 MG/DL
HCT VFR BLD AUTO: 29 %
HCT VFR BLD AUTO: 30.9 %
HGB BLD-MCNC: 9.1 G/DL
HGB BLD-MCNC: 9.6 G/DL
LYMPHOCYTES # BLD AUTO: 1.2 K/UL
LYMPHOCYTES # BLD AUTO: 1.5 K/UL
LYMPHOCYTES NFR BLD: 25.8 %
LYMPHOCYTES NFR BLD: 32.1 %
MCH RBC QN AUTO: 25.6 PG
MCH RBC QN AUTO: 25.6 PG
MCHC RBC AUTO-ENTMCNC: 31.1 %
MCHC RBC AUTO-ENTMCNC: 31.4 %
MCV RBC AUTO: 82 FL
MCV RBC AUTO: 82 FL
MONOCYTES # BLD AUTO: 0.6 K/UL
MONOCYTES # BLD AUTO: 0.7 K/UL
MONOCYTES NFR BLD: 13.9 %
MONOCYTES NFR BLD: 14 %
NEUTROPHILS # BLD AUTO: 2.3 K/UL
NEUTROPHILS # BLD AUTO: 2.5 K/UL
NEUTROPHILS NFR BLD: 47.8 %
NEUTROPHILS NFR BLD: 53.8 %
PLATELET # BLD AUTO: 251 K/UL
PLATELET # BLD AUTO: 268 K/UL
PMV BLD AUTO: 8.9 FL
PMV BLD AUTO: 9.2 FL
POCT GLUCOSE: 109 MG/DL (ref 70–110)
POCT GLUCOSE: 137 MG/DL (ref 70–110)
POTASSIUM SERPL-SCNC: 4.1 MMOL/L
PROT SERPL-MCNC: 6.9 G/DL
RBC # BLD AUTO: 3.55 M/UL
RBC # BLD AUTO: 3.75 M/UL
SODIUM SERPL-SCNC: 140 MMOL/L
TROPONIN I SERPL DL<=0.01 NG/ML-MCNC: 0.02 NG/ML
TROPONIN I SERPL DL<=0.01 NG/ML-MCNC: 0.03 NG/ML
TROPONIN I SERPL DL<=0.01 NG/ML-MCNC: 0.04 NG/ML
TROPONIN I SERPL DL<=0.01 NG/ML-MCNC: 0.07 NG/ML
WBC # BLD AUTO: 4.61 K/UL
WBC # BLD AUTO: 4.73 K/UL

## 2017-05-21 PROCEDURE — 93010 ELECTROCARDIOGRAM REPORT: CPT | Mod: ,,, | Performed by: INTERNAL MEDICINE

## 2017-05-21 PROCEDURE — 25000003 PHARM REV CODE 250: Performed by: HOSPITALIST

## 2017-05-21 PROCEDURE — 93005 ELECTROCARDIOGRAM TRACING: CPT

## 2017-05-21 PROCEDURE — 93306 TTE W/DOPPLER COMPLETE: CPT

## 2017-05-21 PROCEDURE — 84484 ASSAY OF TROPONIN QUANT: CPT

## 2017-05-21 PROCEDURE — 93306 TTE W/DOPPLER COMPLETE: CPT | Mod: 26,,, | Performed by: INTERNAL MEDICINE

## 2017-05-21 PROCEDURE — 80053 COMPREHEN METABOLIC PANEL: CPT

## 2017-05-21 PROCEDURE — 84484 ASSAY OF TROPONIN QUANT: CPT | Mod: 91

## 2017-05-21 PROCEDURE — 99285 EMERGENCY DEPT VISIT HI MDM: CPT | Mod: 25

## 2017-05-21 PROCEDURE — 85520 HEPARIN ASSAY: CPT

## 2017-05-21 PROCEDURE — 83880 ASSAY OF NATRIURETIC PEPTIDE: CPT

## 2017-05-21 PROCEDURE — 20600001 HC STEP DOWN PRIVATE ROOM

## 2017-05-21 PROCEDURE — 36415 COLL VENOUS BLD VENIPUNCTURE: CPT

## 2017-05-21 PROCEDURE — 25000003 PHARM REV CODE 250: Performed by: INTERNAL MEDICINE

## 2017-05-21 PROCEDURE — 85730 THROMBOPLASTIN TIME PARTIAL: CPT

## 2017-05-21 PROCEDURE — 85025 COMPLETE CBC W/AUTO DIFF WBC: CPT

## 2017-05-21 PROCEDURE — 99284 EMERGENCY DEPT VISIT MOD MDM: CPT | Mod: ,,, | Performed by: EMERGENCY MEDICINE

## 2017-05-21 RX ORDER — NAPROXEN SODIUM 220 MG/1
81 TABLET, FILM COATED ORAL DAILY
Status: DISCONTINUED | OUTPATIENT
Start: 2017-05-21 | End: 2017-05-23 | Stop reason: HOSPADM

## 2017-05-21 RX ORDER — CLOPIDOGREL BISULFATE 75 MG/1
75 TABLET ORAL DAILY
Status: DISCONTINUED | OUTPATIENT
Start: 2017-05-22 | End: 2017-05-23 | Stop reason: HOSPADM

## 2017-05-21 RX ORDER — PANTOPRAZOLE SODIUM 40 MG/1
40 TABLET, DELAYED RELEASE ORAL DAILY
Status: DISCONTINUED | OUTPATIENT
Start: 2017-05-21 | End: 2017-05-23 | Stop reason: HOSPADM

## 2017-05-21 RX ORDER — SODIUM CHLORIDE 0.9 % (FLUSH) 0.9 %
3 SYRINGE (ML) INJECTION EVERY 8 HOURS
Status: DISCONTINUED | OUTPATIENT
Start: 2017-05-21 | End: 2017-05-23 | Stop reason: HOSPADM

## 2017-05-21 RX ORDER — POLYETHYLENE GLYCOL 3350 17 G/17G
17 POWDER, FOR SOLUTION ORAL DAILY
Status: DISCONTINUED | OUTPATIENT
Start: 2017-05-21 | End: 2017-05-23 | Stop reason: HOSPADM

## 2017-05-21 RX ORDER — ASPIRIN 325 MG
325 TABLET ORAL
Status: DISCONTINUED | OUTPATIENT
Start: 2017-05-21 | End: 2017-05-21

## 2017-05-21 RX ORDER — ROSUVASTATIN CALCIUM 20 MG/1
40 TABLET, COATED ORAL DAILY
Status: DISCONTINUED | OUTPATIENT
Start: 2017-05-21 | End: 2017-05-23 | Stop reason: HOSPADM

## 2017-05-21 RX ORDER — AMLODIPINE BESYLATE 5 MG/1
5 TABLET ORAL DAILY
Status: DISCONTINUED | OUTPATIENT
Start: 2017-05-21 | End: 2017-05-23 | Stop reason: HOSPADM

## 2017-05-21 RX ORDER — LETROZOLE 2.5 MG/1
2.5 TABLET, FILM COATED ORAL DAILY
Status: DISCONTINUED | OUTPATIENT
Start: 2017-05-21 | End: 2017-05-23 | Stop reason: HOSPADM

## 2017-05-21 RX ORDER — ROSUVASTATIN CALCIUM 20 MG/1
40 TABLET, COATED ORAL DAILY
Status: DISCONTINUED | OUTPATIENT
Start: 2017-05-21 | End: 2017-05-21

## 2017-05-21 RX ORDER — CARVEDILOL 25 MG/1
25 TABLET ORAL 2 TIMES DAILY
Status: DISCONTINUED | OUTPATIENT
Start: 2017-05-21 | End: 2017-05-23 | Stop reason: HOSPADM

## 2017-05-21 RX ORDER — CLOPIDOGREL 300 MG/1
300 TABLET, FILM COATED ORAL ONCE
Status: COMPLETED | OUTPATIENT
Start: 2017-05-21 | End: 2017-05-21

## 2017-05-21 RX ORDER — HEPARIN SODIUM 5000 [USP'U]/ML
5000 INJECTION, SOLUTION INTRAVENOUS; SUBCUTANEOUS EVERY 8 HOURS
Status: DISCONTINUED | OUTPATIENT
Start: 2017-05-21 | End: 2017-05-21

## 2017-05-21 RX ORDER — CETIRIZINE HYDROCHLORIDE 5 MG/1
10 TABLET ORAL DAILY
Status: DISCONTINUED | OUTPATIENT
Start: 2017-05-21 | End: 2017-05-23 | Stop reason: HOSPADM

## 2017-05-21 RX ORDER — LEVETIRACETAM 500 MG/1
500 TABLET ORAL 2 TIMES DAILY
Status: DISCONTINUED | OUTPATIENT
Start: 2017-05-21 | End: 2017-05-23 | Stop reason: HOSPADM

## 2017-05-21 RX ORDER — HEPARIN SODIUM,PORCINE/D5W 25000/250
17 INTRAVENOUS SOLUTION INTRAVENOUS CONTINUOUS
Status: DISCONTINUED | OUTPATIENT
Start: 2017-05-21 | End: 2017-05-22

## 2017-05-21 RX ORDER — ALLOPURINOL 300 MG/1
300 TABLET ORAL DAILY
Status: DISCONTINUED | OUTPATIENT
Start: 2017-05-21 | End: 2017-05-23 | Stop reason: HOSPADM

## 2017-05-21 RX ORDER — CHLORTHALIDONE 25 MG/1
25 TABLET ORAL DAILY
Status: DISCONTINUED | OUTPATIENT
Start: 2017-05-21 | End: 2017-05-23 | Stop reason: HOSPADM

## 2017-05-21 RX ORDER — NITROGLYCERIN 0.4 MG/1
0.4 TABLET SUBLINGUAL EVERY 5 MIN PRN
Status: DISCONTINUED | OUTPATIENT
Start: 2017-05-21 | End: 2017-05-23 | Stop reason: HOSPADM

## 2017-05-21 RX ADMIN — CETIRIZINE HYDROCHLORIDE 10 MG: 5 TABLET, FILM COATED ORAL at 09:05

## 2017-05-21 RX ADMIN — PANTOPRAZOLE SODIUM 40 MG: 40 TABLET, DELAYED RELEASE ORAL at 09:05

## 2017-05-21 RX ADMIN — CARVEDILOL 25 MG: 25 TABLET, FILM COATED ORAL at 09:05

## 2017-05-21 RX ADMIN — LETROZOLE 2.5 MG: 2.5 TABLET, FILM COATED ORAL at 09:05

## 2017-05-21 RX ADMIN — Medication 3 ML: at 02:05

## 2017-05-21 RX ADMIN — LEVETIRACETAM 500 MG: 500 TABLET, FILM COATED ORAL at 09:05

## 2017-05-21 RX ADMIN — HEPARIN SODIUM AND DEXTROSE 17 UNITS/KG/HR: 10000; 5 INJECTION INTRAVENOUS at 03:05

## 2017-05-21 RX ADMIN — ROSUVASTATIN CALCIUM 40 MG: 20 TABLET, FILM COATED ORAL at 09:05

## 2017-05-21 RX ADMIN — HEPARIN SODIUM 5000 UNITS: 5000 INJECTION, SOLUTION INTRAVENOUS; SUBCUTANEOUS at 02:05

## 2017-05-21 RX ADMIN — ASPIRIN 81 MG CHEWABLE TABLET 81 MG: 81 TABLET CHEWABLE at 09:05

## 2017-05-21 RX ADMIN — CHLORTHALIDONE 25 MG: 25 TABLET ORAL at 09:05

## 2017-05-21 RX ADMIN — AMLODIPINE BESYLATE 5 MG: 5 TABLET ORAL at 09:05

## 2017-05-21 RX ADMIN — CLOPIDOGREL BISULFATE 300 MG: 300 TABLET, FILM COATED ORAL at 03:05

## 2017-05-21 RX ADMIN — POLYETHYLENE GLYCOL 3350 17 G: 17 POWDER, FOR SOLUTION ORAL at 09:05

## 2017-05-21 RX ADMIN — ALLOPURINOL 300 MG: 300 TABLET ORAL at 09:05

## 2017-05-21 NOTE — H&P
[facility]  Cardiology  History and Physical     Patient Name: Renata Bergman  MRN: [id number]  Admission Date: 5/21/2017  Code Status: Full Code   Attending Provider: Jesus Covarrubias MD   Primary Care Physician: Ethel Berger MD  Principal Problem:<principal problem not specified>    Patient information was obtained from patient and ER records.     Subjective:     Chief Complaint:  Chest pain      HPI:Ms. Bergman is a 76 YOAAF with PMH of HTN, HLD, DM, former smoker, CAD s/p PCI x4, Last PET stress in 06/2016 which was negative, left breast Ca s/p 38 sessions of XRT last one 3 weeks ago, who woke this early AM (12:45 am) with a 9/10 substernal sharp chest pain radiating down her left arm with some tingling in the finger tips, came to ED, with ASA at home and a SL NTG which has made her pain complete go away.   She denied SOB or palpitations. She reported never had such pain before. The pain she had PRIOR to her previous PCIs was milder than current.     In the ED EKG has old RBB and variable first degree AV block. No e/o ST elevation. Her first set of Troponin negative.     Her BP was stable.     No notes on file    Past Medical History:   Diagnosis Date    Cataract     Coronary artery disease 9/4/2012    Diabetes mellitus due to abnormal insulin 9/4/2012    Diabetes mellitus type II     GERD (gastroesophageal reflux disease) 9/4/2012    Gout, arthritis 9/4/2012    Hyperlipidemia 9/4/2012    Hypertension     Primary osteoarthritis of both knees 9/4/2012       Past Surgical History:   Procedure Laterality Date    CORONARY ANGIOPLASTY      HYSTERECTOMY      JOINT REPLACEMENT      left and right k nee    KNEE SURGERY      TOTAL ABDOMINAL HYSTERECTOMY W/ BILATERAL SALPINGOOPHORECTOMY         Review of patient's allergies indicates:   Allergen Reactions    Lisinopril Anaphylaxis       No current facility-administered medications on file prior to encounter.      Current  Outpatient Prescriptions on File Prior to Encounter   Medication Sig    allopurinol (ZYLOPRIM) 300 MG tablet take 1 tablet by mouth once daily    amlodipine (NORVASC) 5 MG tablet TAKE 1 TABLET BY MOUTH DAILY    aspirin 81 MG Chew Take 81 mg by mouth once daily.      carvedilol (COREG) 25 MG tablet TAKE 1 TABLET BY MOUTH TWICE DAILY    chlorthalidone (HYGROTEN) 25 MG Tab Take 1/2 tablet by mouth daily.    fexofenadine (ALLEGRA) 30 MG tablet Take 30 mg by mouth daily as needed.    letrozole (FEMARA) 2.5 mg Tab Take 1 tablet (2.5 mg total) by mouth once daily.    levetiracetam (KEPPRA) 750 MG Tab Take 500 mg by mouth 2 (two) times daily. Pt unsure of dosage.    metformin (GLUCOPHAGE) 850 MG tablet TAKE 1 TABLET BY MOUTH TWICE DAILY    nitroGLYCERIN (NITROSTAT) 0.4 MG SL tablet Place 1 tablet (0.4 mg total) under the tongue every 5 (five) minutes as needed for Chest pain.    omeprazole (PRILOSEC) 20 MG capsule TAKE 2 CAPSULES BY MOUTH EVERY DAY    potassium chloride (MICRO-K) 10 MEQ CpSR Take 1 capsule (10 mEq total) by mouth once daily.    rosuvastatin (CRESTOR) 40 MG Tab TAKE ONE TABLET BY MOUTH ONCE DAILY    lidocaine-prilocaine (EMLA) cream Apply to affected area once at least 15 minutes before chemotherapy    polyethylene glycol (GLYCOLAX) 17 gram/dose powder Take 17 g by mouth once daily. (Patient taking differently: Take 17 g by mouth once daily. Pt taking PRN)    rosuvastatin (CRESTOR) 40 MG Tab TK ONE T   PO ONCE D     Family History     Problem Relation (Age of Onset)    Cancer Mother (55)    Diabetes Mother    Heart disease Father    Hyperlipidemia Maternal Aunt    Hypertension Mother, Maternal Aunt, Maternal Uncle        Social History Main Topics    Smoking status: Former Smoker     Packs/day: 1.00     Types: Cigarettes     Quit date: 8/13/1962    Smokeless tobacco: Never Used    Alcohol use No      Comment: socially    Drug use: No    Sexual activity: No     Review of Systems    Constitution: Negative for chills, decreased appetite, diaphoresis, fever, weakness, malaise/fatigue, night sweats, weight gain and weight loss.   HENT: Negative.    Eyes: Negative.    Cardiovascular: Positive for chest pain. Negative for claudication, cyanosis, dyspnea on exertion, irregular heartbeat, leg swelling, near-syncope, orthopnea, palpitations, paroxysmal nocturnal dyspnea and syncope.   Respiratory: Negative.    Endocrine: Negative.    Skin: Negative.    Musculoskeletal: Negative.      Objective:     Vital Signs (Most Recent):  Temp: 97.9 °F (36.6 °C) (05/21/17 0228)  Pulse: 67 (05/21/17 0602)  Resp: 20 (05/21/17 0602)  BP: 128/62 (05/21/17 0602)  SpO2: 100 % (05/21/17 0602) Vital Signs (24h Range):  Temp:  [97.9 °F (36.6 °C)] 97.9 °F (36.6 °C)  Pulse:  [67-83] 67  Resp:  [16-25] 20  SpO2:  [98 %-100 %] 100 %  BP: (114-128)/(55-77) 128/62     Weight: 107 kg (236 lb)  Body mass index is 41.81 kg/m².    SpO2: 100 %  O2 Device (Oxygen Therapy): room air    No intake or output data in the 24 hours ending 05/21/17 0704    Lines/Drains/Airways          No matching active lines, drains, or airways          Physical Exam   Constitutional: She is oriented to person, place, and time. She appears well-developed and well-nourished.   HENT:   Head: Normocephalic and atraumatic.   Mouth/Throat: Oropharynx is clear and moist.   Eyes: Pupils are equal, round, and reactive to light.   Neck: Normal range of motion. Neck supple.   Pulmonary/Chest: Effort normal and breath sounds normal.   Abdominal: Soft. Bowel sounds are normal.   Neurological: She is alert and oriented to person, place, and time.   Skin: Skin is warm and dry.   Psychiatric: She has a normal mood and affect.   Nursing note and vitals reviewed.      Significant Labs:   CMP   Recent Labs  Lab 05/21/17  0256      K 4.1      CO2 24   *   BUN 14   CREATININE 1.3   CALCIUM 9.4   PROT 6.9   ALBUMIN 3.2*   BILITOT 0.3   ALKPHOS 66   AST 24    ALT 14   ANIONGAP 9   ESTGFRAFRICA 46.7*   EGFRNONAA 40.5*   , CBC   Recent Labs  Lab 05/21/17  0256   WBC 4.61   HGB 9.6*   HCT 30.9*       and INR No results for input(s): INR, PROTIME in the last 48 hours.    Significant Imaging: X-Ray: CXR: X-Ray Chest 1 View (CXR): No results found for this visit on 05/21/17.    Assessment and Plan:     Chest pain    - In the setting of all risk factors plus addition of radiation exposure, possible cause is CAD  - Trend troponin and if positive start on ACS protocol AND CONSULT Interventional cardiology/cardiology .   - IF consecutive troponins are negative, PET stress tomorrow.   - Mean PRN pain controle, and NTG as needed  - Optimal medical therapy for CAD.         Coronary artery disease due to lipid rich plaque    - Continue ASA, Statin, BB   - ACS protocol if troponin turns positive,         Controlled type 2 DM with peripheral circulatory disorder    - Hold oral Metformin and start sliding scale.         Hyperlipidemia    - Statin        HTN (hypertension), benign    - Continue Amlodipine, BB and Diuretic for now.         Chronic renal disease, stage 3, moderately decreased glomerular filtration rate between 30-59 mL/min/1.73 square meter    - Renal functions at baseline.   - Avoid/reduce nephrotoxicity.               VTE Risk Mitigation         Ordered     heparin (porcine) injection 5,000 Units  Every 8 hours     Route:  Subcutaneous        05/21/17 0655     Medium Risk of VTE  Once      05/21/17 0655          Debbie Mabry MD  Cardiology   [facility]

## 2017-05-21 NOTE — ED NOTES
Pt identifiers Renata Bergman checked and correct  LOC: The patient is awake, alert, aware of environment with an appropriate affect. Oriented x3, speaking appropriately  APPEARANCE: Pt resting comfortably, in no acute distress, pt is clean and well groomed, clothing properly fastened  SKIN: Skin warm, dry and intact, normal skin turgor, moist mucus membranes  RESPIRATORY: Airway is open and patent, respirations are spontaneous, even and unlabored, normal effort and rate  CARDIAC: Normal rate and rhythm, no peripheral edema noted, capillary refill < 3 seconds, bilateral radial pulses 2+  ABDOMEN: Soft, nontender, nondistended.   NEUROLOGIC: facial expression is symmetrical, patient moving all extremities spontaneously, normal sensation in all extremities when touched with a finger.  Follows all commands appropriately  MUSCULOSKELETAL: No obvious deformities.

## 2017-05-21 NOTE — ED TRIAGE NOTES
EMS reports pt having chest pain, described pain as pressure that woke her up about an hour ago. Reports pt took two doses of sublingual nitro at home. EMS reports giving 3 doses of 0.4 of nitro spray and 324 of aspirin in route.

## 2017-05-21 NOTE — CONSULTS
INTERVENTIONAL CARDIOLOGY CONSULT      Referring Physician:  Norman De La Garza MD  Indication: NSTEMI     HPI:    Ms. Bergman is a 76-yo female with CAD, s/p PCI to Cx, LAD (2004) and RCA (2006), with cath in 2012 showing non-obs CAD after being admitted for atypical chest pain and mild elevated troponin, and PET without ischemia in 2016 after admission for the same.  She additionally has CKD, DM, HTN, and HLD.  She has a history of stage 1 left breast cancer for which she is receiving chemo with Taxol then CMF (cytoxan, methotrexate, 5-FU) along with radiation.  She had chest pain 9/10 radiating to her left arm around 1 AM on 5/21, which abated with aspirin and SL nitro in the ER.     Past Medical History:   Diagnosis Date    Cataract     Coronary artery disease 9/4/2012    Diabetes mellitus due to abnormal insulin 9/4/2012    Diabetes mellitus type II     GERD (gastroesophageal reflux disease) 9/4/2012    Gout, arthritis 9/4/2012    Hyperlipidemia 9/4/2012    Hypertension     Primary osteoarthritis of both knees 9/4/2012     Past Surgical History:   Procedure Laterality Date    CORONARY ANGIOPLASTY      HYSTERECTOMY      JOINT REPLACEMENT      left and right k nee    KNEE SURGERY      TOTAL ABDOMINAL HYSTERECTOMY W/ BILATERAL SALPINGOOPHORECTOMY       Family History   Problem Relation Age of Onset    Cancer Mother 55     colon    Diabetes Mother     Hypertension Mother     Hypertension Maternal Aunt     Hyperlipidemia Maternal Aunt     Hypertension Maternal Uncle     Heart disease Father     Glaucoma Neg Hx     Heart attack Neg Hx     Heart failure Neg Hx     Stroke Neg Hx      Social History   Substance Use Topics    Smoking status: Former Smoker     Packs/day: 1.00     Types: Cigarettes     Quit date: 8/13/1962    Smokeless tobacco: Never Used    Alcohol use No      Comment: socially     Review of patient's allergies indicates:   Allergen Reactions    Lisinopril Anaphylaxis       allopurinol  300 mg Oral Daily    amlodipine  5 mg Oral Daily    aspirin  81 mg Oral Daily    carvedilol  25 mg Oral BID    cetirizine  10 mg Oral Daily    chlorthalidone  25 mg Oral Daily    [START ON 5/22/2017] clopidogrel  75 mg Oral Daily    letrozole  2.5 mg Oral Daily    levetiracetam  500 mg Oral BID    pantoprazole  40 mg Oral Daily    polyethylene glycol  17 g Oral Daily    rosuvastatin  40 mg Oral Daily    sodium chloride 0.9%  3 mL Intravenous Q8H     OBJECTIVE:     Vitals:    05/21/17 1100 05/21/17 1200 05/21/17 1500 05/21/17 1600   BP:  (!) 124/58  136/61   Pulse: 77 79 68 77   Resp:  16  20   Temp:  98.5 °F (36.9 °C)  98 °F (36.7 °C)   TempSrc:  Oral  Oral   SpO2:  97%  99%   Weight:       Height:         Gen: Lying in bed, no acute distress, obese  HEENT: Supple, no JVD  Cvs: Regular rate and rhythm, no murmurs  Resp: Normal work of breathing, clear bilaterally  Abd: Soft, non-tender and not distended  Ext: Warm, no edema  Vascular:   LEFT RIGHT   RADIAL 1+ 1+   FEMORAL 1+ 1+   DP 2+ 2+   TP 1+ 1+   Pola's Test Normal Normal     Labs:       Recent Labs  Lab 05/22/17  0346   WBC 4.60   HGB 9.3*   HCT 29.5*          Recent Labs  Lab 05/22/17  0346      K 3.6      CO2 26   BUN 18   CREATININE 1.3   CALCIUM 9.9       Recent Labs  Lab 05/21/17  0816 05/21/17  1157 05/21/17  2202   TROPONINI 0.028* 0.041* 0.068*       IMAGING:   EKGs: Incomplete RBBB, 1st degree AVB, no ischemic changes    TTE: Pending    Prior Ischemic Evaluation:  PET 2016  NORMAL MYOCARDIAL PERFUSION PET STRESS TEST  1. The perfusion scan is free of evidence for myocardial ischemia or injury.   2. Resting wall motion is physiologic. Stress wall motion is physiologic.   3. Visually estimated LV function is normal at rest and normal at stress.   4. The ventricular volumes are normal at rest and stress.   5. The extracardiac distribution of radioactivity is normal.   6. There was no previous study available  to compare.    [facility] 2012  DIAGNOSTIC:       Patient has a right dominant coronary artery.        - Left Main Coronary Artery:             The LM is normal. There is NADIRA 3 flow.       - Left Anterior Descending Artery:             The LAD has luminal irregularities. There is NADIRA 3 flow.       - Left Circumflex Artery:             The LCX has luminal irregularities. There is NADIRA 3 flow.       - Right Coronary Artery:             The RCA has luminal irregularities. There is NADIRA 3 flow. Patent stents.    IMPRESSION:    Ms. Bergman is a 76-yo female with HTN, HLD, DM, stage 1 breast ca s/p chemo/XRT, CAD s/p PCI to LAD and Cx (2004) and RCA (2006), who presents with NSTEMI.  She has CKD and therefore is at increased risk of contrast nephropathy.  She has anemia at baseline.       PROCEDURAL RISK STRATIFICATION:   NADIRA Score:  6   Prior Contrast Allergy:  None  Sedation   Prior Sedation: No issues    Pertinent Allergies:   Latex: No   ASA: No   Heparin-Induced Thrombocytopenia: No  PLAN:   - [facility] +/- PCI  - R radial access, 5-Fr sheath  - Luis A, JR4 catheters  - Hydration per POSEIDON protocol  -The risks, benefits & alternatives of the procedure were explained to the patient.    -The risks of coronary angiography include but are not limited to:  Bleeding, infection, heart rhythm abnormalities, allergic reactions, kidney injury, stroke and death.    -Should stenting be indicated, the patient has agreed to dual anti-platelet therapy for 1-consecutive year with a drug-eluting stent and a minimum of 1-month with the use of a bare metal stent.    -The risks of moderate sedation include hypotension, respiratory depression, arrhythmias, bronchospasm, & death.    -Informed consent was obtained & the patient is agreeable to proceed with the procedure.  -This patient was discussed with the attending interventional cardiologist who agrees with the above assessment & plan.      Taz Evans M.D.  Cardiology Fellow

## 2017-05-21 NOTE — ED PROVIDER NOTES
"Encounter Date: 5/21/2017       History     Chief Complaint   Patient presents with    Chest Pain     chest pain that started 1 hour ago. 5 total doses of NTG given PTA.   Pt with hx of stents.      Review of patient's allergies indicates:   Allergen Reactions    Lisinopril Anaphylaxis      HPI   76F with h/o HTN, HLT, CAD with 4 stents, EF 65% (2012) with normal diastology who presents for chest pain that woke her from her sleep 1 hour prior to arrival. It was substernal, constant, "like an elephant on her chest" and associated with nausea and tingling in her shoulder that radiated to her hand. Takes a baby asa daily and was given 325mg asa prior to arrival by EMS. No longer smokes. Denies fevers or chills. No recent illnesses, cough, congestion or sick contacts. No h/o heart failure. Denies leg swelling. Also h/o breast CA, s/p radiation and chemo.      Past Medical History:   Diagnosis Date    Cataract     Coronary artery disease 9/4/2012    Diabetes mellitus due to abnormal insulin 9/4/2012    Diabetes mellitus type II     GERD (gastroesophageal reflux disease) 9/4/2012    Gout, arthritis 9/4/2012    Hyperlipidemia 9/4/2012    Hypertension     Primary osteoarthritis of both knees 9/4/2012     Past Surgical History:   Procedure Laterality Date    CORONARY ANGIOPLASTY      JOINT REPLACEMENT      left and right k nee    KNEE SURGERY      TOTAL ABDOMINAL HYSTERECTOMY W/ BILATERAL SALPINGOOPHORECTOMY       Family History   Problem Relation Age of Onset    Cancer Mother 55     colon    Diabetes Mother     Hypertension Mother     Hypertension Maternal Aunt     Hyperlipidemia Maternal Aunt     Hypertension Maternal Uncle     Heart disease Father     Glaucoma Neg Hx     Heart attack Neg Hx     Heart failure Neg Hx     Stroke Neg Hx      Social History   Substance Use Topics    Smoking status: Former Smoker     Packs/day: 1.00     Types: Cigarettes     Quit date: 8/13/1962    Smokeless tobacco: " Never Used    Alcohol use No      Comment: socially     Review of Systems   Constitutional: Negative for activity change, chills and fever.   HENT: Negative for congestion and sore throat.    Eyes: Negative for photophobia, redness and visual disturbance.   Respiratory: Positive for chest tightness and shortness of breath.    Cardiovascular: Positive for chest pain. Negative for leg swelling.   Genitourinary: Negative for dysuria and flank pain.   Musculoskeletal: Negative for back pain and neck stiffness.   Skin: Negative for rash and wound.   Neurological: Negative for seizures and facial asymmetry.   Psychiatric/Behavioral: Negative for agitation and hallucinations.       Physical Exam     Initial Vitals [05/21/17 0228]   BP Pulse Resp Temp SpO2   115/77 83 18 97.9 °F (36.6 °C) 98 %     Physical Exam    Constitutional: She appears well-developed and well-nourished. She is not diaphoretic. No distress.   Pleasant, morbidly obese, AA female   HENT:   Head: Normocephalic and atraumatic.   Nose: Nose normal.   Mouth/Throat: Oropharynx is clear and moist. No oropharyngeal exudate.   Eyes: Conjunctivae and EOM are normal. Pupils are equal, round, and reactive to light. Right eye exhibits no discharge. Left eye exhibits no discharge. No scleral icterus.   Neck: Normal range of motion. Neck supple. No thyromegaly present. No tracheal deviation present. No JVD present.   Cardiovascular:   Distant heart sounds, possibly due to increased chest wall subcutaneous adipose; will correlate with EKG and CXR   Pulmonary/Chest: Breath sounds normal. No stridor. No respiratory distress. She has no wheezes. She has no rhonchi. She has no rales. She exhibits no tenderness.   Abdominal: Soft. Bowel sounds are normal. She exhibits no distension and no mass. There is no tenderness. There is no rebound and no guarding.   Musculoskeletal: Normal range of motion. She exhibits no edema or tenderness.   Lymphadenopathy:     She has no  cervical adenopathy.   Neurological: She is alert and oriented to person, place, and time. She has normal strength. No cranial nerve deficit or sensory deficit.   Skin: Skin is warm and dry. No rash and no abscess noted. No erythema. No pallor.   Bilateral knee surgical incisions, well-healed from replacements. Obese lower extremities with no edema.    Psychiatric: She has a normal mood and affect. Thought content normal.         ED Course   Procedures  Labs Reviewed   CBC W/ AUTO DIFFERENTIAL   COMPREHENSIVE METABOLIC PANEL   TROPONIN I   TROPONIN I   B-TYPE NATRIURETIC PEPTIDE                   APC / Resident Notes:   MDM: 76F with chest pain, HTN/HLD/CAD with NSTEM in 2016  Initial ddx included but was not limited to: ACS, exacerbation of CHF/pulmonary edema, pleural effusion, cardiac tamponade, pneumonia/consolidation, pneumothorax. PE also considered given breast cancer hx, but no SOB/hypoxia.    CBC wnl  CMP wnl  CXR negative for acute cardiopulmonary pathology  Trop #1 negative  Pending Trop #2.   Pt admitted to [facility] for ACS rule out given classic presentation of chest pain in the setting of previous cardiac disease.   Rene Mancini MD  PGY-1 [employer] EM           Attending Attestation:   Physician Attestation Statement for Resident:  As the supervising MD   Physician Attestation Statement: I have personally seen and examined this patient.   I agree with the above history. -:   As the supervising MD I agree with the above PE.    As the supervising MD I agree with the above treatment, course, plan, and disposition.   -:     H/o CAD with CP c/w previous CAD sx; initial ACS workup (-). PE considered given breast cancer hx or radiation effects, but no hypoxia/SOB. Admitted to Medicine for serial trops, Cards eval                    ED Course     Clinical Impression:   The encounter diagnosis was Chest pain.          Rene Mancini MD  Resident  05/21/17 2375       Jesus Covarrubias MD  05/21/17 4218

## 2017-05-21 NOTE — PROGRESS NOTES
Assumed care for patient this AM    Patient seen at the bedside, she reports no acute chest pain, her symptoms prior to presentation did occur at rest while sleeping, pain was initially severe enough to wake her up, mild relief with NTG at home and she states that pain officially subsided with the NTG doses she received by EMS.     She is pain free on bedside evaluation today.     Per admit plan, patient was having mildly elevated troponin values and 3rd value obtained that is still uptrending, given her co-morbid hx and known CAD.  ACS protocol started with Plavix load & IV heparin infusion.     NPO for midnight and interventional cardiology consult place.d     -She has an ECHO pending and is hemodynamically stable.  Continuing home meds of coreg 25mg, crestor 40mg.  On no acei/arb due to hx of anaphylaxis.         Norman De La Garza M.D.  Attending Physician  [facility] Medicine Dept.  Pager: [phone]

## 2017-05-21 NOTE — ASSESSMENT & PLAN NOTE
- In the setting of all risk factors plus addition of radiation exposure, possible cause is CAD  - Trend troponin and if positive start on ACS protocol AND CONSULT Interventional cardiology/cardiology .   - IF consecutive troponins are negative, PET stress tomorrow.   - Mean PRN pain controle, and NTG as needed  - Optimal medical therapy for CAD.

## 2017-05-21 NOTE — PROGRESS NOTES
Pt to room via stretcher with family at side.  Telemetry placed.  Pt on RA; VSS. MD Frederic notified. Pt and family oriented to room.  Pt resting in bed.  Bed in lowest position; siderails up x2; call bell in reach.  Admit orders implemented as ordered.  Plan of care reviewed.  Will continue to monitor.

## 2017-05-21 NOTE — PLAN OF CARE
Problem: Patient Care Overview  Goal: Plan of Care Review  Outcome: Ongoing (interventions implemented as appropriate)  Pt remain free of falls, injury and complaints throughout the shift. Generalized skin remains CDI with mild generalized edema noted; VSS. Pt had no CP during the shift. Troponin slowly trending up; team aware. Pt tolerating plan of care.

## 2017-05-21 NOTE — SUBJECTIVE & OBJECTIVE
Past Medical History:   Diagnosis Date    Cataract     Coronary artery disease 9/4/2012    Diabetes mellitus due to abnormal insulin 9/4/2012    Diabetes mellitus type II     GERD (gastroesophageal reflux disease) 9/4/2012    Gout, arthritis 9/4/2012    Hyperlipidemia 9/4/2012    Hypertension     Primary osteoarthritis of both knees 9/4/2012       Past Surgical History:   Procedure Laterality Date    CORONARY ANGIOPLASTY      HYSTERECTOMY      JOINT REPLACEMENT      left and right k nee    KNEE SURGERY      TOTAL ABDOMINAL HYSTERECTOMY W/ BILATERAL SALPINGOOPHORECTOMY         Review of patient's allergies indicates:   Allergen Reactions    Lisinopril Anaphylaxis       No current facility-administered medications on file prior to encounter.      Current Outpatient Prescriptions on File Prior to Encounter   Medication Sig    allopurinol (ZYLOPRIM) 300 MG tablet take 1 tablet by mouth once daily    amlodipine (NORVASC) 5 MG tablet TAKE 1 TABLET BY MOUTH DAILY    aspirin 81 MG Chew Take 81 mg by mouth once daily.      carvedilol (COREG) 25 MG tablet TAKE 1 TABLET BY MOUTH TWICE DAILY    chlorthalidone (HYGROTEN) 25 MG Tab Take 1/2 tablet by mouth daily.    fexofenadine (ALLEGRA) 30 MG tablet Take 30 mg by mouth daily as needed.    letrozole (FEMARA) 2.5 mg Tab Take 1 tablet (2.5 mg total) by mouth once daily.    levetiracetam (KEPPRA) 750 MG Tab Take 500 mg by mouth 2 (two) times daily. Pt unsure of dosage.    metformin (GLUCOPHAGE) 850 MG tablet TAKE 1 TABLET BY MOUTH TWICE DAILY    nitroGLYCERIN (NITROSTAT) 0.4 MG SL tablet Place 1 tablet (0.4 mg total) under the tongue every 5 (five) minutes as needed for Chest pain.    omeprazole (PRILOSEC) 20 MG capsule TAKE 2 CAPSULES BY MOUTH EVERY DAY    potassium chloride (MICRO-K) 10 MEQ CpSR Take 1 capsule (10 mEq total) by mouth once daily.    rosuvastatin (CRESTOR) 40 MG Tab TAKE ONE TABLET BY MOUTH ONCE DAILY    lidocaine-prilocaine (EMLA)  cream Apply to affected area once at least 15 minutes before chemotherapy    polyethylene glycol (GLYCOLAX) 17 gram/dose powder Take 17 g by mouth once daily. (Patient taking differently: Take 17 g by mouth once daily. Pt taking PRN)    rosuvastatin (CRESTOR) 40 MG Tab TK ONE T   PO ONCE D     Family History     Problem Relation (Age of Onset)    Cancer Mother (55)    Diabetes Mother    Heart disease Father    Hyperlipidemia Maternal Aunt    Hypertension Mother, Maternal Aunt, Maternal Uncle        Social History Main Topics    Smoking status: Former Smoker     Packs/day: 1.00     Types: Cigarettes     Quit date: 8/13/1962    Smokeless tobacco: Never Used    Alcohol use No      Comment: socially    Drug use: No    Sexual activity: No     Review of Systems   Constitution: Negative for chills, decreased appetite, diaphoresis, fever, weakness, malaise/fatigue, night sweats, weight gain and weight loss.   HENT: Negative.    Eyes: Negative.    Cardiovascular: Positive for chest pain. Negative for claudication, cyanosis, dyspnea on exertion, irregular heartbeat, leg swelling, near-syncope, orthopnea, palpitations, paroxysmal nocturnal dyspnea and syncope.   Respiratory: Negative.    Endocrine: Negative.    Skin: Negative.    Musculoskeletal: Negative.      Objective:     Vital Signs (Most Recent):  Temp: 97.9 °F (36.6 °C) (05/21/17 0228)  Pulse: 67 (05/21/17 0602)  Resp: 20 (05/21/17 0602)  BP: 128/62 (05/21/17 0602)  SpO2: 100 % (05/21/17 0602) Vital Signs (24h Range):  Temp:  [97.9 °F (36.6 °C)] 97.9 °F (36.6 °C)  Pulse:  [67-83] 67  Resp:  [16-25] 20  SpO2:  [98 %-100 %] 100 %  BP: (114-128)/(55-77) 128/62     Weight: 107 kg (236 lb)  Body mass index is 41.81 kg/m².    SpO2: 100 %  O2 Device (Oxygen Therapy): room air    No intake or output data in the 24 hours ending 05/21/17 0704    Lines/Drains/Airways          No matching active lines, drains, or airways          Physical Exam   Constitutional: She is  oriented to person, place, and time. She appears well-developed and well-nourished.   HENT:   Head: Normocephalic and atraumatic.   Mouth/Throat: Oropharynx is clear and moist.   Eyes: Pupils are equal, round, and reactive to light.   Neck: Normal range of motion. Neck supple.   Pulmonary/Chest: Effort normal and breath sounds normal.   Abdominal: Soft. Bowel sounds are normal.   Neurological: She is alert and oriented to person, place, and time.   Skin: Skin is warm and dry.   Psychiatric: She has a normal mood and affect.   Nursing note and vitals reviewed.      Significant Labs:   CMP   Recent Labs  Lab 05/21/17  0256      K 4.1      CO2 24   *   BUN 14   CREATININE 1.3   CALCIUM 9.4   PROT 6.9   ALBUMIN 3.2*   BILITOT 0.3   ALKPHOS 66   AST 24   ALT 14   ANIONGAP 9   ESTGFRAFRICA 46.7*   EGFRNONAA 40.5*   , CBC   Recent Labs  Lab 05/21/17  0256   WBC 4.61   HGB 9.6*   HCT 30.9*       and INR No results for input(s): INR, PROTIME in the last 48 hours.    Significant Imaging: X-Ray: CXR: X-Ray Chest 1 View (CXR): No results found for this visit on 05/21/17.

## 2017-05-22 DIAGNOSIS — Z98.61 POSTSURGICAL PERCUTANEOUS TRANSLUMINAL CORONARY ANGIOPLASTY STATUS: Primary | ICD-10-CM

## 2017-05-22 PROBLEM — I21.4 NSTEMI (NON-ST ELEVATED MYOCARDIAL INFARCTION): Status: ACTIVE | Noted: 2017-05-21

## 2017-05-22 LAB
ANION GAP SERPL CALC-SCNC: 9 MMOL/L
APTT BLDCRRT: 80.6 SEC
BASOPHILS # BLD AUTO: 0.03 K/UL
BASOPHILS NFR BLD: 0.7 %
BUN SERPL-MCNC: 18 MG/DL
CALCIUM SERPL-MCNC: 9.9 MG/DL
CHLORIDE SERPL-SCNC: 103 MMOL/L
CO2 SERPL-SCNC: 26 MMOL/L
CORONARY STENOSIS: ABNORMAL
CORONARY STENT: YES
CREAT SERPL-MCNC: 1.3 MG/DL
DIASTOLIC DYSFUNCTION: NO
DIFFERENTIAL METHOD: ABNORMAL
EOSINOPHIL # BLD AUTO: 0.3 K/UL
EOSINOPHIL NFR BLD: 7 %
ERYTHROCYTE [DISTWIDTH] IN BLOOD BY AUTOMATED COUNT: 16.8 %
EST. GFR  (AFRICAN AMERICAN): 46.7 ML/MIN/1.73 M^2
EST. GFR  (NON AFRICAN AMERICAN): 40.5 ML/MIN/1.73 M^2
ESTIMATED PA SYSTOLIC PRESSURE: 24.34
FACT X PPP CHRO-ACNC: 1.01 IU/ML
FACT X PPP CHRO-ACNC: 1.02 IU/ML
GLUCOSE SERPL-MCNC: 132 MG/DL
HCT VFR BLD AUTO: 29.5 %
HGB BLD-MCNC: 9.3 G/DL
INR PPP: 1.1
LYMPHOCYTES # BLD AUTO: 2 K/UL
LYMPHOCYTES NFR BLD: 44.1 %
MAGNESIUM SERPL-MCNC: 1.7 MG/DL
MCH RBC QN AUTO: 25.7 PG
MCHC RBC AUTO-ENTMCNC: 31.5 %
MCV RBC AUTO: 82 FL
MITRAL VALVE REGURGITATION: NORMAL
MONOCYTES # BLD AUTO: 0.5 K/UL
MONOCYTES NFR BLD: 10.9 %
NEUTROPHILS # BLD AUTO: 1.7 K/UL
NEUTROPHILS NFR BLD: 37.3 %
PLATELET # BLD AUTO: 257 K/UL
PMV BLD AUTO: 9.2 FL
POCT GLUCOSE: 132 MG/DL (ref 70–110)
POCT GLUCOSE: 152 MG/DL (ref 70–110)
POTASSIUM SERPL-SCNC: 3.6 MMOL/L
PROTHROMBIN TIME: 11.2 SEC
RBC # BLD AUTO: 3.62 M/UL
RETIRED EF AND QEF - SEE NOTES: 60 (ref 55–65)
SODIUM SERPL-SCNC: 138 MMOL/L
TRICUSPID VALVE REGURGITATION: NORMAL
TROPONIN I SERPL DL<=0.01 NG/ML-MCNC: 0.06 NG/ML
TROPONIN I SERPL DL<=0.01 NG/ML-MCNC: 0.21 NG/ML
WBC # BLD AUTO: 4.6 K/UL

## 2017-05-22 PROCEDURE — 93571 IV DOP VEL&/PRESS C FLO 1ST: CPT | Mod: 26,,, | Performed by: INTERNAL MEDICINE

## 2017-05-22 PROCEDURE — 83735 ASSAY OF MAGNESIUM: CPT

## 2017-05-22 PROCEDURE — 85025 COMPLETE CBC W/AUTO DIFF WBC: CPT

## 2017-05-22 PROCEDURE — 85610 PROTHROMBIN TIME: CPT

## 2017-05-22 PROCEDURE — 99152 MOD SED SAME PHYS/QHP 5/>YRS: CPT | Mod: ,,, | Performed by: INTERNAL MEDICINE

## 2017-05-22 PROCEDURE — 4A023N7 MEASUREMENT OF CARDIAC SAMPLING AND PRESSURE, LEFT HEART, PERCUTANEOUS APPROACH: ICD-10-PCS | Performed by: INTERNAL MEDICINE

## 2017-05-22 PROCEDURE — 80048 BASIC METABOLIC PNL TOTAL CA: CPT

## 2017-05-22 PROCEDURE — 93458 L HRT ARTERY/VENTRICLE ANGIO: CPT | Mod: 26,59,, | Performed by: INTERNAL MEDICINE

## 2017-05-22 PROCEDURE — 4A033BC MEASUREMENT OF ARTERIAL PRESSURE, CORONARY, PERCUTANEOUS APPROACH: ICD-10-PCS | Performed by: INTERNAL MEDICINE

## 2017-05-22 PROCEDURE — 99233 SBSQ HOSP IP/OBS HIGH 50: CPT | Mod: ,,, | Performed by: HOSPITALIST

## 2017-05-22 PROCEDURE — 25000003 PHARM REV CODE 250

## 2017-05-22 PROCEDURE — 36415 COLL VENOUS BLD VENIPUNCTURE: CPT

## 2017-05-22 PROCEDURE — 93005 ELECTROCARDIOGRAM TRACING: CPT

## 2017-05-22 PROCEDURE — 92928 PRQ TCAT PLMT NTRAC ST 1 LES: CPT | Mod: LC,,, | Performed by: INTERNAL MEDICINE

## 2017-05-22 PROCEDURE — 027034Z DILATION OF CORONARY ARTERY, ONE ARTERY WITH DRUG-ELUTING INTRALUMINAL DEVICE, PERCUTANEOUS APPROACH: ICD-10-PCS | Performed by: INTERNAL MEDICINE

## 2017-05-22 PROCEDURE — 93010 ELECTROCARDIOGRAM REPORT: CPT | Mod: ,,, | Performed by: INTERNAL MEDICINE

## 2017-05-22 PROCEDURE — 63600175 PHARM REV CODE 636 W HCPCS: Performed by: HOSPITALIST

## 2017-05-22 PROCEDURE — 85730 THROMBOPLASTIN TIME PARTIAL: CPT

## 2017-05-22 PROCEDURE — 84484 ASSAY OF TROPONIN QUANT: CPT | Mod: 91

## 2017-05-22 PROCEDURE — 25000003 PHARM REV CODE 250: Performed by: INTERNAL MEDICINE

## 2017-05-22 PROCEDURE — B2111ZZ FLUOROSCOPY OF MULTIPLE CORONARY ARTERIES USING LOW OSMOLAR CONTRAST: ICD-10-PCS | Performed by: INTERNAL MEDICINE

## 2017-05-22 PROCEDURE — 25000003 PHARM REV CODE 250: Performed by: HOSPITALIST

## 2017-05-22 PROCEDURE — 85520 HEPARIN ASSAY: CPT | Mod: 91

## 2017-05-22 PROCEDURE — 20600001 HC STEP DOWN PRIVATE ROOM

## 2017-05-22 PROCEDURE — C1887 CATHETER, GUIDING: HCPCS

## 2017-05-22 PROCEDURE — 63600175 PHARM REV CODE 636 W HCPCS

## 2017-05-22 PROCEDURE — C1760 CLOSURE DEV, VASC: HCPCS

## 2017-05-22 RX ORDER — ENOXAPARIN SODIUM 100 MG/ML
40 INJECTION SUBCUTANEOUS EVERY 24 HOURS
Status: DISCONTINUED | OUTPATIENT
Start: 2017-05-22 | End: 2017-05-23 | Stop reason: HOSPADM

## 2017-05-22 RX ORDER — POTASSIUM CHLORIDE 750 MG/1
30 CAPSULE, EXTENDED RELEASE ORAL ONCE
Status: COMPLETED | OUTPATIENT
Start: 2017-05-22 | End: 2017-05-22

## 2017-05-22 RX ORDER — SODIUM CHLORIDE 9 MG/ML
INJECTION, SOLUTION INTRAVENOUS CONTINUOUS
Status: DISCONTINUED | OUTPATIENT
Start: 2017-05-22 | End: 2017-05-23 | Stop reason: HOSPADM

## 2017-05-22 RX ORDER — DIPHENHYDRAMINE HCL 50 MG
50 CAPSULE ORAL ONCE
Status: COMPLETED | OUTPATIENT
Start: 2017-05-22 | End: 2017-05-22

## 2017-05-22 RX ORDER — ACETAMINOPHEN 325 MG/1
650 TABLET ORAL EVERY 6 HOURS PRN
Status: DISCONTINUED | OUTPATIENT
Start: 2017-05-22 | End: 2017-05-23 | Stop reason: HOSPADM

## 2017-05-22 RX ORDER — SODIUM CHLORIDE 9 MG/ML
20 INJECTION, SOLUTION INTRAVENOUS CONTINUOUS
Status: ACTIVE | OUTPATIENT
Start: 2017-05-22 | End: 2017-05-22

## 2017-05-22 RX ADMIN — CHLORTHALIDONE 25 MG: 25 TABLET ORAL at 08:05

## 2017-05-22 RX ADMIN — AMLODIPINE BESYLATE 5 MG: 5 TABLET ORAL at 08:05

## 2017-05-22 RX ADMIN — LEVETIRACETAM 500 MG: 500 TABLET, FILM COATED ORAL at 09:05

## 2017-05-22 RX ADMIN — CARVEDILOL 25 MG: 25 TABLET, FILM COATED ORAL at 08:05

## 2017-05-22 RX ADMIN — SODIUM CHLORIDE: 0.9 INJECTION, SOLUTION INTRAVENOUS at 08:05

## 2017-05-22 RX ADMIN — ASPIRIN 81 MG CHEWABLE TABLET 81 MG: 81 TABLET CHEWABLE at 08:05

## 2017-05-22 RX ADMIN — Medication 3 ML: at 10:05

## 2017-05-22 RX ADMIN — CETIRIZINE HYDROCHLORIDE 10 MG: 5 TABLET, FILM COATED ORAL at 08:05

## 2017-05-22 RX ADMIN — LETROZOLE 2.5 MG: 2.5 TABLET, FILM COATED ORAL at 08:05

## 2017-05-22 RX ADMIN — ALLOPURINOL 300 MG: 300 TABLET ORAL at 08:05

## 2017-05-22 RX ADMIN — Medication 3 ML: at 06:05

## 2017-05-22 RX ADMIN — DIPHENHYDRAMINE HYDROCHLORIDE 50 MG: 50 CAPSULE ORAL at 08:05

## 2017-05-22 RX ADMIN — POTASSIUM CHLORIDE 30 MEQ: 750 CAPSULE, EXTENDED RELEASE ORAL at 09:05

## 2017-05-22 RX ADMIN — SODIUM CHLORIDE 2180 ML: 0.9 INJECTION, SOLUTION INTRAVENOUS at 01:05

## 2017-05-22 RX ADMIN — LEVETIRACETAM 500 MG: 500 TABLET, FILM COATED ORAL at 08:05

## 2017-05-22 RX ADMIN — ENOXAPARIN SODIUM 40 MG: 100 INJECTION SUBCUTANEOUS at 09:05

## 2017-05-22 RX ADMIN — ROSUVASTATIN CALCIUM 40 MG: 20 TABLET, FILM COATED ORAL at 08:05

## 2017-05-22 RX ADMIN — CLOPIDOGREL 75 MG: 75 TABLET, FILM COATED ORAL at 08:05

## 2017-05-22 RX ADMIN — PANTOPRAZOLE SODIUM 40 MG: 40 TABLET, DELAYED RELEASE ORAL at 08:05

## 2017-05-22 RX ADMIN — CARVEDILOL 25 MG: 25 TABLET, FILM COATED ORAL at 09:05

## 2017-05-22 RX ADMIN — ACETAMINOPHEN 650 MG: 325 TABLET ORAL at 09:05

## 2017-05-22 NOTE — BRIEF OP NOTE
POST CATH NOTE    Procedure:  [facility]  Referring MD:  Dr. De La Garza  Indication:  NSTEMI  Access:  R Radial    Findings:  OM3 with 50% stenosis, FFR 0.68  LVEDP:  18mmHg    Intervention:  PCI with KONRAD to OM3    Patient tolerated the procedure well, no complications    Post Cath Exam:  Vitals:    05/22/17 0800   BP: 123/61   Pulse: 79   Resp: 19   Temp: 97.4 °F (36.3 °C)     No unusual pain, hematoma or thrill at vascular access site.  Distal pulse present without signs of ischemia.    Recommendation:  -continue dual antiplatelet therapy daily uninterrupted for at least one year  - Vascular risk factor control  - Cardiac rehab referral    Taz Evans MD  Cardiology Fellow

## 2017-05-22 NOTE — PROGRESS NOTES
Pt left for cath via stretcher on RA with NS infusing at 300 mL/hr. Pre cath procedures implemented; pre cath meds administered. Awaiting return.     1245: Pt return with R radial site WDL. Post procedure orders implemented. Pt tolerating plan of care; Will continue to monitor.

## 2017-05-22 NOTE — PLAN OF CARE
Problem: Patient Care Overview  Goal: Plan of Care Review  Outcome: Ongoing (interventions implemented as appropriate)  Plan of care discussed with pt. Pt free of falls/trauma/injuries this shift. [facility] plan for tomorrow 5/22. NPO @ midnight. Heparin infusing until one hour before procedure. VSS & NADN. Pt denies CP, SOB, or pain/discomfort at this time. All questions addressed.  Will continue to monitor.

## 2017-05-22 NOTE — CONSULTS
"Cardiac Rehab     Renata Bergman   [id number]   5/22/2017       Activity taught: Yes    Cardiac Rehab Phase Taught: Phase I & II    Risk Factors-Modifiable: diabetes, nutrition, hypertension, obesity, sedentary lifestyle, stress, exercise    Risk Factors-Non modifiable: age, race    Teaching Method: Verbal, Written and Living with Heart Disease book.    Understanding/Response: Pt verbalized understanding, all questions answered. Pt understands their cardiac rehab order is good for 12 months.   Pt stated she has participated in cardiac rehab previously (10+years ago). Pt is interested in participating in Cardiac Rehab at [facility].     Comments: S/P PTCA. Discussed cardiac rehab and risk factor modification. Team to refer patient to [facility] Phase II. Educational materials were used in the process and given to the patient. They included "Your Guide to Living with Heart Disease", Phase Two Cardiac Rehabilitation information along with a sample Mediterranean diet.The patient expressed understanding of the teaching and expressed desire to take a role in modifying the risk factors when they return home.    JEFF Burdick RN  Cardiac Rehab Nurse      "

## 2017-05-22 NOTE — PLAN OF CARE
05/22/17 1056   Discharge Assessment   Assessment Type Discharge Planning Assessment   Confirmed/corrected address and phone number on facesheet? Yes   Assessment information obtained from? Caregiver;Medical Record   Expected Length of Stay (days) 2   Communicated expected length of stay with patient/caregiver yes   Prior to hospitilization cognitive status: Alert/Oriented   Prior to hospitalization functional status: Independent   Current cognitive status: Alert/Oriented   Current Functional Status: Independent   Arrived From home or self-care   Lives With child(brendan), adult   Able to Return to Prior Arrangements yes   Is patient able to care for self after discharge? Yes   How many people do you have in your home that can help with your care after discharge? 1   Who are your caregiver(s) and their phone number(s)? Agnes partida   Patient's perception of discharge disposition home or selfcare   Readmission Within The Last 30 Days no previous admission in last 30 days   Patient currently being followed by outpatient case management? No   Patient currently receives home health services? No   Does the patient currently use HME? No   Patient currently receives private duty nursing? No   Patient currently receives any other outside agency services? No   Equipment Currently Used at Home bedside commode;cane, straight;walker, rolling   Do you have any problems affording any of your prescribed medications? No   Is the patient taking medications as prescribed? yes   Do you have any financial concerns preventing you from receiving the healthcare you need? No   Does the patient have transportation to healthcare appointments? Yes   Transportation Available family or friend will provide   On Dialysis? No   Does the patient receive services at the Coumadin Clinic? No   Are there any open cases? No   Discharge Plan A Home with family   Admitted with CP, + troponin. Lives with daughter and is independent in her ADLs. Plan is  to DC home. No DC needs identified.

## 2017-05-22 NOTE — PLAN OF CARE
Problem: Patient Care Overview  Goal: Plan of Care Review  Outcome: Ongoing (interventions implemented as appropriate)  Pt remain free of falls, injury, and complaints throughout the shift. Generalized skin remains CDI with mild generalized edema noted; VSS. Pt had LHC today with no complications. Post cath orders implemented. Pt tolerating plan of care.

## 2017-05-22 NOTE — PROGRESS NOTES
Paged Interventional cards fellow to determine when to turn off heparin drip prior to [facility] today. Awaiting callback.    2439 MD Mark Anthony inform to wait for Cath to call to determine time.

## 2017-05-23 VITALS
DIASTOLIC BLOOD PRESSURE: 68 MMHG | TEMPERATURE: 98 F | HEIGHT: 63 IN | BODY MASS INDEX: 42.89 KG/M2 | OXYGEN SATURATION: 99 % | SYSTOLIC BLOOD PRESSURE: 139 MMHG | RESPIRATION RATE: 19 BRPM | WEIGHT: 242.06 LBS | HEART RATE: 84 BPM

## 2017-05-23 LAB
ANION GAP SERPL CALC-SCNC: 9 MMOL/L
APTT BLDCRRT: <21 SEC
BASOPHILS # BLD AUTO: 0.02 K/UL
BASOPHILS NFR BLD: 0.4 %
BUN SERPL-MCNC: 19 MG/DL
CALCIUM SERPL-MCNC: 9.9 MG/DL
CHLORIDE SERPL-SCNC: 107 MMOL/L
CO2 SERPL-SCNC: 22 MMOL/L
CREAT SERPL-MCNC: 1.4 MG/DL
DIFFERENTIAL METHOD: ABNORMAL
EOSINOPHIL # BLD AUTO: 0.3 K/UL
EOSINOPHIL NFR BLD: 6.3 %
ERYTHROCYTE [DISTWIDTH] IN BLOOD BY AUTOMATED COUNT: 17.1 %
EST. GFR  (AFRICAN AMERICAN): 42.7 ML/MIN/1.73 M^2
EST. GFR  (NON AFRICAN AMERICAN): 37 ML/MIN/1.73 M^2
FACT X PPP CHRO-ACNC: 0.33 IU/ML
GLUCOSE SERPL-MCNC: 102 MG/DL
HCT VFR BLD AUTO: 28.8 %
HGB BLD-MCNC: 9 G/DL
INR PPP: 1
LYMPHOCYTES # BLD AUTO: 1.6 K/UL
LYMPHOCYTES NFR BLD: 35.3 %
MAGNESIUM SERPL-MCNC: 1.7 MG/DL
MCH RBC QN AUTO: 25.4 PG
MCHC RBC AUTO-ENTMCNC: 31.3 %
MCV RBC AUTO: 81 FL
MONOCYTES # BLD AUTO: 0.5 K/UL
MONOCYTES NFR BLD: 12.1 %
NEUTROPHILS # BLD AUTO: 2 K/UL
NEUTROPHILS NFR BLD: 45.9 %
PLATELET # BLD AUTO: 224 K/UL
PLATELET BLD QL SMEAR: ABNORMAL
PMV BLD AUTO: 9.4 FL
POCT GLUCOSE: 134 MG/DL (ref 70–110)
POTASSIUM SERPL-SCNC: 4.2 MMOL/L
PROTHROMBIN TIME: 10.5 SEC
RBC # BLD AUTO: 3.55 M/UL
SODIUM SERPL-SCNC: 138 MMOL/L
WBC # BLD AUTO: 4.45 K/UL

## 2017-05-23 PROCEDURE — 85025 COMPLETE CBC W/AUTO DIFF WBC: CPT

## 2017-05-23 PROCEDURE — 93005 ELECTROCARDIOGRAM TRACING: CPT

## 2017-05-23 PROCEDURE — 97161 PT EVAL LOW COMPLEX 20 MIN: CPT

## 2017-05-23 PROCEDURE — 85610 PROTHROMBIN TIME: CPT

## 2017-05-23 PROCEDURE — 36415 COLL VENOUS BLD VENIPUNCTURE: CPT

## 2017-05-23 PROCEDURE — 97165 OT EVAL LOW COMPLEX 30 MIN: CPT

## 2017-05-23 PROCEDURE — G8988 SELF CARE GOAL STATUS: HCPCS | Mod: CH

## 2017-05-23 PROCEDURE — 25000003 PHARM REV CODE 250: Performed by: INTERNAL MEDICINE

## 2017-05-23 PROCEDURE — G8987 SELF CARE CURRENT STATUS: HCPCS | Mod: CH

## 2017-05-23 PROCEDURE — 25000003 PHARM REV CODE 250: Performed by: HOSPITALIST

## 2017-05-23 PROCEDURE — 83735 ASSAY OF MAGNESIUM: CPT

## 2017-05-23 PROCEDURE — 85520 HEPARIN ASSAY: CPT

## 2017-05-23 PROCEDURE — 99238 HOSP IP/OBS DSCHRG MGMT 30/<: CPT | Mod: ,,, | Performed by: INTERNAL MEDICINE

## 2017-05-23 PROCEDURE — 85730 THROMBOPLASTIN TIME PARTIAL: CPT

## 2017-05-23 PROCEDURE — G8989 SELF CARE D/C STATUS: HCPCS | Mod: CH

## 2017-05-23 PROCEDURE — 80048 BASIC METABOLIC PNL TOTAL CA: CPT

## 2017-05-23 RX ORDER — CLOPIDOGREL BISULFATE 75 MG/1
75 TABLET ORAL DAILY
Qty: 30 TABLET | Refills: 0 | Status: SHIPPED | OUTPATIENT
Start: 2017-05-23 | End: 2017-06-28 | Stop reason: SDUPTHER

## 2017-05-23 RX ADMIN — CLOPIDOGREL 75 MG: 75 TABLET, FILM COATED ORAL at 08:05

## 2017-05-23 RX ADMIN — CARVEDILOL 25 MG: 25 TABLET, FILM COATED ORAL at 08:05

## 2017-05-23 RX ADMIN — LETROZOLE 2.5 MG: 2.5 TABLET, FILM COATED ORAL at 08:05

## 2017-05-23 RX ADMIN — LEVETIRACETAM 500 MG: 500 TABLET, FILM COATED ORAL at 08:05

## 2017-05-23 RX ADMIN — ROSUVASTATIN CALCIUM 40 MG: 20 TABLET, FILM COATED ORAL at 08:05

## 2017-05-23 RX ADMIN — POLYETHYLENE GLYCOL 3350 17 G: 17 POWDER, FOR SOLUTION ORAL at 08:05

## 2017-05-23 RX ADMIN — AMLODIPINE BESYLATE 5 MG: 5 TABLET ORAL at 08:05

## 2017-05-23 RX ADMIN — ALLOPURINOL 300 MG: 300 TABLET ORAL at 08:05

## 2017-05-23 RX ADMIN — CHLORTHALIDONE 25 MG: 25 TABLET ORAL at 08:05

## 2017-05-23 RX ADMIN — CETIRIZINE HYDROCHLORIDE 10 MG: 5 TABLET, FILM COATED ORAL at 08:05

## 2017-05-23 RX ADMIN — Medication 3 ML: at 06:05

## 2017-05-23 RX ADMIN — PANTOPRAZOLE SODIUM 40 MG: 40 TABLET, DELAYED RELEASE ORAL at 08:05

## 2017-05-23 RX ADMIN — ASPIRIN 81 MG CHEWABLE TABLET 81 MG: 81 TABLET CHEWABLE at 08:05

## 2017-05-23 NOTE — DISCHARGE INSTRUCTIONS
Continue aspirin and Plavix, also known as clopidogrel, for at least one year.  Follow up with your established cardiologist.

## 2017-05-23 NOTE — HPI
Ms. Bergman is a 76 YOAAF with PMH of HTN, HLD, DM, former smoker, CAD s/p PCI x4, Last PET stress in 06/2016 which was negative, left breast Ca s/p 38 sessions of XRT last one 3 weeks ago, who woke this early AM (12:45 am) with a 9/10 substernal sharp chest pain radiating down her left arm with some tingling in the finger tips, came to ED, with ASA at home and a SL NTG which has made her pain complete go away.   She denied SOB or palpitations. She reported never had such pain before. The pain she had PRIOR to her previous PCIs was milder than current.      In the ED EKG has old RBB and variable first degree AV block. No e/o ST elevation. Her first set of Troponin negative.      Her BP was stable.

## 2017-05-23 NOTE — ASSESSMENT & PLAN NOTE
Stable and currently asymptomatic.  1. Aspirin 81 mg daily.  2. Coreg 25 mg BID.  3.Rosuvastatin 40 mg daily.

## 2017-05-23 NOTE — ASSESSMENT & PLAN NOTE
Creatinine at baseline.   1. Monitor creatinine.  2. Renally dose medications and avoid nephrotoxic agents.

## 2017-05-23 NOTE — ASSESSMENT & PLAN NOTE
-continue ASA/Plavix  -continue coreg, statin, no ACEi/ARB due to hx of anaphylaxis  -s/p placemen of KONRAD to OM3 for occlusive lesion on LH  -cardiac rehab consult placed   -ECHO pending

## 2017-05-23 NOTE — DISCHARGE SUMMARY
[facility] Hospital Medicine  Discharge Summary      Patient Name: Renata Bergman  MRN: [id number]  Admission Date: 5/21/2017  Hospital Length of Stay: 2 days  Discharge Date and Time: 5/23/2017 12:09 PM  Attending Physician: Makenzie Escobar MD   Discharging Provider: Makenzie Escobar MD  Primary Care Provider: Ethel Berger MD    Hospital Medicine Team: [facility] HOSP MED C Makenzie Escobar MD    HPI: Ms. Bergman is a 76 YOAAF with PMH of HTN, HLD, DM, former smoker, CAD s/p PCI x4, Last PET stress in 06/2016 which was negative, left breast Ca s/p 38 sessions of XRT last one 3 weeks ago, who woke this early AM (12:45 am) with a 9/10 substernal sharp chest pain radiating down her left arm with some tingling in the finger tips, came to ED, with ASA at home and a SL NTG which has made her pain complete go away.   She denied SOB or palpitations. She reported never had such pain before. The pain she had PRIOR to her previous PCIs was milder than current.      In the ED EKG has old RBB and variable first degree AV block. No e/o ST elevation. Her first set of Troponin negative.      Her BP was stable.     Procedure(s) (LRB):  HEART CATH-LEFT (Left)      Indwelling Lines/Drains at time of discharge:   Lines/Drains/Airways          No matching active lines, drains, or airways        Hospital Course:    Patient seen at the bedside, she reports no acute chest pain, her symptoms prior to presentation did occur at rest while sleeping, pain was initially severe enough to wake her up, mild relief with NTG at home and she states that pain officially subsided with the NTG doses she received by EMS.      She is pain free on bedside evaluation today.      Per admit plan, patient was having mildly elevated troponin values and 3rd value obtained that is still uptrending, given her co-morbid hx and known CAD.  ACS protocol started with Plavix load & IV heparin infusion.      NPO for midnight and  interventional cardiology consult place.d      -She has an ECHO pending and is hemodynamically stable.  Continuing home meds of coreg 25mg, crestor 40mg.  On no acei/arb due to hx of anaphylaxis.      5/22 - Patient remained chest pain free with no further anginal complaints, she was taken for Cardiac catheterization with Interventional cardiology service.  She was found with lesion to OM3 vessel and had PCI with placement of 3 Drug-eluting stents.  Cardiology service recommends dual anti-platelet therapy for 12 months and a cardiac rehab referral was made.      5/23- Patient without anginal symptoms or SOB. Referred for cardiac rehab. Medically stable for discharge.    Consults:   Consults         Status Ordering Provider     Inpatient consult to Interventional Cardiology  Once     Provider:  (Not yet assigned)    Completed AMARILIS MONTOYA          Significant Diagnostic Studies: Labs:   BMP:   Recent Labs  Lab 05/22/17  0346 05/23/17  0653   * 102    138   K 3.6 4.2    107   CO2 26 22*   BUN 18 19   CREATININE 1.3 1.4   CALCIUM 9.9 9.9   MG 1.7 1.7    and CBC   Recent Labs  Lab 05/21/17  1502 05/22/17  0346 05/23/17  0653   WBC 4.73 4.60 4.45   HGB 9.1* 9.3* 9.0*   HCT 29.0* 29.5* 28.8*    257 224       Pending Diagnostic Studies:     Procedure Component Value Units Date/Time    Anti-Xa Heparin Monitoring [[id number]] Collected:  05/22/17 1005    Order Status:  Sent Lab Status:  In process Updated:  05/22/17 1005    Specimen:  Blood from Blood     Troponin I [[id number]] Collected:  05/22/17 1005    Order Status:  Sent Lab Status:  In process Updated:  05/22/17 1005    Specimen:  Blood from Blood         Final Active Diagnoses:    Diagnosis Date Noted POA    PRINCIPAL PROBLEM:  NSTEMI (non-ST elevated myocardial infarction) [I21.4] 05/21/2017 Yes    Malignant neoplasm of upper-outer quadrant of left female breast [C50.412] 08/18/2016 Yes    Controlled type 2 DM with peripheral  circulatory disorder [E11.51] 10/14/2013 Yes     Chronic    Chronic renal disease, stage 3, moderately decreased glomerular filtration rate between 30-59 mL/min/1.73 square meter [N18.3] 10/14/2013 Yes     Chronic    HTN (hypertension), benign [I10] 09/04/2012 Yes     Chronic    Hyperlipidemia [E78.5] 09/04/2012 Yes     Chronic    Coronary artery disease due to lipid rich plaque [I25.10, I25.83] 09/04/2012 Yes     Chronic      Problems Resolved During this Admission:    Diagnosis Date Noted Date Resolved POA      Discharged Condition: fair    Disposition: Home or Self Care    Follow Up:  Follow-up Information     Schedule an appointment as soon as possible for a visit with Ethel Berger MD.    Specialty:  Internal Medicine  Contact information:  [address]  [place] [address] [phone]             Schedule an appointment as soon as possible for a visit with Jaycee Puente MD.    Specialty:  Cardiology  Contact information:  [address] [facility]  [address]TH FLO[address]  [facility] [id number] [phone]                 Patient Instructions:   No discharge procedures on file.  Medications:  Reconciled Home Medications:   Current Discharge Medication List      START taking these medications    Details   clopidogrel (PLAVIX) 75 mg tablet Take 1 tablet (75 mg total) by mouth once daily.  Qty: 30 tablet, Refills: 0         CONTINUE these medications which have NOT CHANGED    Details   allopurinol (ZYLOPRIM) 300 MG tablet take 1 tablet by mouth once daily  Qty: 90 tablet, Refills: 4      amlodipine (NORVASC) 5 MG tablet TAKE 1 TABLET BY MOUTH DAILY  Qty: 90 tablet, Refills: 4      aspirin 81 MG Chew Take 81 mg by mouth once daily.        carvedilol (COREG) 25 MG tablet TAKE 1 TABLET BY MOUTH TWICE DAILY  Qty: 180 tablet, Refills: 4      chlorthalidone (HYGROTEN) 25 MG Tab Take 1/2 tablet by mouth daily.  Qty: 90 tablet, Refills: 4      fexofenadine (ALLEGRA) 30 MG tablet Take 30 mg by mouth daily as  needed.      letrozole (FEMARA) 2.5 mg Tab Take 1 tablet (2.5 mg total) by mouth once daily.  Qty: 30 tablet, Refills: 2    Associated Diagnoses: Malignant neoplasm of upper-outer quadrant of left female breast      levetiracetam (KEPPRA) 750 MG Tab Take 500 mg by mouth 2 (two) times daily. Pt unsure of dosage.      metformin (GLUCOPHAGE) 850 MG tablet TAKE 1 TABLET BY MOUTH TWICE DAILY  Qty: 180 tablet, Refills: 4      nitroGLYCERIN (NITROSTAT) 0.4 MG SL tablet Place 1 tablet (0.4 mg total) under the tongue every 5 (five) minutes as needed for Chest pain.  Qty: 90 tablet, Refills: 3      omeprazole (PRILOSEC) 20 MG capsule TAKE 2 CAPSULES BY MOUTH EVERY DAY  Qty: 180 capsule, Refills: 4      potassium chloride (MICRO-K) 10 MEQ CpSR Take 1 capsule (10 mEq total) by mouth once daily.  Qty: 90 capsule, Refills: 4      !! rosuvastatin (CRESTOR) 40 MG Tab TAKE ONE TABLET BY MOUTH ONCE DAILY  Qty: 90 tablet, Refills: 4      lidocaine-prilocaine (EMLA) cream Apply to affected area once at least 15 minutes before chemotherapy  Qty: 5 g, Refills: 1    Associated Diagnoses: Malignant neoplasm of upper-outer quadrant of left female breast; Encounter for antineoplastic chemotherapy      polyethylene glycol (GLYCOLAX) 17 gram/dose powder Take 17 g by mouth once daily.  Qty: 595 g, Refills: 11      !! rosuvastatin (CRESTOR) 40 MG Tab TK ONE T   PO ONCE D  Refills: 2       !! - Potential duplicate medications found. Please discuss with provider.        Time spent on the discharge of patient: 30 minutes    Makenzie Escobar MD  Department of Hospital Medicine  [facility]

## 2017-05-23 NOTE — PT/OT/SLP EVAL
Occupational Therapy  Evaluation/Discharge    Renata Bergman   MRN: [id number]   Admitting Diagnosis: NSTEMI (non-ST elevated myocardial infarction)    OT Date of Treatment: 05/23/17   OT Start Time: 0918  OT Stop Time: 0932  OT Total Time (min): 14 min    Billable Minutes:  Evaluation 14 minutes    Diagnosis: NSTEMI (non-ST elevated myocardial infarction)     Past Medical History:   Diagnosis Date    Cataract     Coronary artery disease 9/4/2012    Diabetes mellitus due to abnormal insulin 9/4/2012    Diabetes mellitus type II     GERD (gastroesophageal reflux disease) 9/4/2012    Gout, arthritis 9/4/2012    Hyperlipidemia 9/4/2012    Hypertension     Primary osteoarthritis of both knees 9/4/2012      Past Surgical History:   Procedure Laterality Date    CORONARY ANGIOPLASTY      HYSTERECTOMY      JOINT REPLACEMENT      left and right k nee    KNEE SURGERY      TOTAL ABDOMINAL HYSTERECTOMY W/ BILATERAL SALPINGOOPHORECTOMY         Referring physician: Dr. De La Garza  Date referred to OT: 5/22/17    General Precautions: Standard, fall  Orthopedic Precautions: N/A  Braces: N/A    Do you have any cultural, spiritual, Muslim conflicts, given your current situation?: None     Patient History:  Living Environment  Lives With: child(brendan), adult  Living Arrangements: house  Living Environment Comment: Pt lives with daughter in 1-story house with 0 ARYAN and walk-in shower; daughter works during the day. PTA, pt was using rollator or cane as needed, (I) with ADLs.  Equipment Currently Used at Home: bedside commode, cane, straight, walker, rolling, shower chair    Prior level of function:   Bed Mobility/Transfers: independent  Grooming: independent  Bathing: independent  Upper Body Dressing: independent  Lower Body Dressing: independent  Toileting: independent  Home Management Skills: independent      Subjective:  Communicated with RN prior to session.    Chief Complaint: None stated  Patient/Family stated  goals: Go home    Pain Ratin/10  Pain Rating Post-Intervention: 0/10    Objective:  Patient found with: telemetry    Cognitive Exam:  Oriented to: Person, Place, Time and Situation  Follows Commands/attention: Follows multistep commands  Communication: clear/fluent  Memory:  No Deficits noted  Safety awareness/insight to disability: intact  Coping skills/emotional control: Appropriate to situation    Visual/perceptual:  Intact    Physical Exam:  Postural examination/scapula alignment: No postural abnormalities identified  Skin integrity: Visible skin intact  Edema: None noted     Sensation:   Intact B UE    Upper Extremity Range of Motion:  Right Upper Extremity: WFL  Left Upper Extremity: WFL    Upper Extremity Strength:  Right Upper Extremity: WFL  Left Upper Extremity: WFL   Strength: WFL    Fine motor coordination:   Intact    Functional Mobility:  Bed Mobility:  Supine to Sit: Independent    Transfers:  Sit <> Stand Assistance: Supervision  Sit <> Stand Assistive Device: No Assistive Device  Toilet Transfer Assistance: Independent  Toilet Transfer Assistive Device: No Assistive Device    Functional Ambulation: Within room and hallway >200 ft supervision no AD    Activities of Daily Living:  LE Dressing Level of Assistance: Independent  Grooming Position: Standing at sink  Grooming Level of Assistance: Independent  Toileting Where Assessed: Toilet  Toileting Level of Assistance: Independent    Balance:   Static Sit: NORMAL: No deviations seen in posture held statically  Dynamic Sit: NORMAL: No deviations seen in posture held dynamically  Static Stand: NORMAL: No deviations seen in posture held statically  Dynamic stand: NORMAL: No deviations seen in posture held dynamically    Therapeutic Activities and Exercises:  OT/PT kellyal    AM-PAC 6 CLICK ADL  How much help from another person does this patient currently need?  1 = Unable, Total/Dependent Assistance  2 = A lot, Maximum/Moderate Assistance  3 = A  "little, Minimum/Contact Guard/Supervision  4 = None, Modified [place]/Independent    Putting on and taking off regular lower body clothing? : 4  Bathing (including washing, rinsing, drying)?: 4  Toileting, which includes using toilet, bedpan, or urinal? : 4  Putting on and taking off regular upper body clothing?: 4  Taking care of personal grooming such as brushing teeth?: 4  Eating meals?: 4  Total Score: 24    AM-PAC Raw Score CMS "G-Code Modifier Level of Impairment Assistance   6 % Total / Unable   7 - 9 CM 80 - 100% Maximal Assist   10 - 14 CL 60 - 80% Moderate Assist   15 - 19 CK 40 - 60% Moderate Assist   20 - 22 CJ 20 - 40% Minimal Assist   23 CI 1-20% SBA / CGA   24 CH 0% Independent/ Mod I       Patient left up in chair with all lines intact and call button in reach    Assessment:  Renata Bergman is a 76 y.o. female with a medical diagnosis of NSTEMI (non-ST elevated myocardial infarction) and presents with WFL strength and endurance needed for ADLs and functional mobility. Pt does not have acute OT goals at this time. Safe to return home with no needs.    Activity tolerance: Good    Discharge recommendations: Discharge Facility/Level Of Care Needs: home     Barriers to discharge: Barriers to Discharge: None    Equipment recommendations: none     GOALS:    Occupational Therapy Goals     Not on file          Multidisciplinary Problems (Resolved)        Problem: Occupational Therapy Goal    Goal Priority Disciplines Outcome Interventions   Occupational Therapy Goal   (Resolved)     OT, PT/OT Outcome(s) achieved                    PLAN:  (D/C OT)   Plan of Care reviewed with: patient    OT G-codes  Functional Assessment Tool Used: [facility]  Score: 24  Functional Limitation: Self care  Self Care Current Status ():   Self Care Goal Status ():   Self Care Discharge Status ():     ASIA Hernandez  05/23/2017  "

## 2017-05-23 NOTE — PLAN OF CARE
Problem: Patient Care Overview  Goal: Plan of Care Review  Outcome: Outcome(s) achieved Date Met: 05/23/17  Pt remain free of falls, injury, and complaints throughout the shift. Generalized skin remains CDI with trace generalized edema noted; VSS. PT s/p [facility]; tolerated well. Post cath orders completed; plan for discharge today. Pt tolerating plan of care.

## 2017-05-23 NOTE — PROGRESS NOTES
[facility] Hospital Medicine  Progress Note    Patient Name: Renata Bergman  MRN: [id number]  Patient Class: IP- Inpatient   Admission Date: 5/21/2017  Length of Stay: 1 days  Attending Physician: Norman De La Garza MD  Primary Care Provider: Ethel Berger MD    [facility] Medicine Team: [facility]OS[facility] MED C Norman De La Garza MD    Subjective:     Principal Problem:NSTEMI (non-ST elevated myocardial infarction)    HPI:  Ms. Bergman is a 76 YOAAF with PMH of HTN, HLD, DM, former smoker, CAD s/p PCI x4, Last PET stress in 06/2016 which was negative, left breast Ca s/p 38 sessions of XRT last one 3 weeks ago, who woke this early AM (12:45 am) with a 9/10 substernal sharp chest pain radiating down her left arm with some tingling in the finger tips, came to ED, with ASA at home and a SL NTG which has made her pain complete go away.   She denied SOB or palpitations. She reported never had such pain before. The pain she had PRIOR to her previous PCIs was milder than current.      In the ED EKG has old RBB and variable first degree AV block. No e/o ST elevation. Her first set of Troponin negative.      Her BP was stable.     Hospital Course:  5/21 - Assumed care for patient this AM     Patient seen at the bedside, she reports no acute chest pain, her symptoms prior to presentation did occur at rest while sleeping, pain was initially severe enough to wake her up, mild relief with NTG at home and she states that pain officially subsided with the NTG doses she received by EMS.      She is pain free on bedside evaluation today.      Per admit plan, patient was having mildly elevated troponin values and 3rd value obtained that is still uptrending, given her co-morbid hx and known CAD.  ACS protocol started with Plavix load & IV heparin infusion.      NPO for midnight and interventional cardiology consult place.d      -She has an ECHO pending and is hemodynamically stable.  Continuing home meds of coreg 25mg,  crestor 40mg.  On no acei/arb due to hx of anaphylaxis.     5/22 - Patient remained chest pain free with no further anginal complaints, she was taken for Cardiac catheterization with Interventional cardiology service.  She was found with lesion to OM3 vessel and had PCI with placement of 3 Drug-eluting stents.  Cardiology service recommends dual anti-platelet therapy for 12 months and a cardiac rehab referral was made.     Interval History: no events overnight, remains chest pain free.     Review of Systems   Constitutional: Negative for chills, diaphoresis, fatigue and fever.   HENT: Negative for sinus pressure and sore throat.    Eyes: Negative for visual disturbance.   Respiratory: Negative for chest tightness and shortness of breath.    Cardiovascular: Negative for chest pain, palpitations and leg swelling.   Gastrointestinal: Negative for abdominal distention, diarrhea, nausea and vomiting.   Genitourinary: Negative for dysuria.     Objective:     Vital Signs (Most Recent):  Temp: 97.4 °F (36.3 °C) (05/22/17 0800)  Pulse: 75 (05/22/17 1900)  Resp: 19 (05/22/17 0800)  BP: 108/66 (05/22/17 1730)  SpO2: 98 % (05/22/17 0800) Vital Signs (24h Range):  Temp:  [97.4 °F (36.3 °C)-98.6 °F (37 °C)] 97.4 °F (36.3 °C)  Pulse:  [64-79] 75  Resp:  [17-19] 19  SpO2:  [98 %-99 %] 98 %  BP: (100-177)/(55-90) 108/66     Weight: 109 kg (240 lb 4.8 oz)  Body mass index is 42.57 kg/m².    Intake/Output Summary (Last 24 hours) at 05/22/17 2020  Last data filed at 05/22/17 1800   Gross per 24 hour   Intake          3408.17 ml   Output             2100 ml   Net          1308.17 ml      Physical Exam   Constitutional: She is oriented to person, place, and time.   Morbid obesity   HENT:   Mouth/Throat: No oropharyngeal exudate.   Eyes: Conjunctivae are normal. No scleral icterus.   Neck: No JVD present.   Cardiovascular: Normal rate, regular rhythm, normal heart sounds and intact distal pulses.    Pulmonary/Chest: Effort normal and  breath sounds normal. No respiratory distress. She has no rales.   Abdominal: Soft. Bowel sounds are normal. She exhibits no distension. There is no guarding.   Lymphadenopathy:     She has no cervical adenopathy.   Neurological: She is alert and oriented to person, place, and time.   Skin: Skin is warm and dry.       Significant Labs:   CBC:   Recent Labs  Lab 05/21/17  0256 05/21/17  1502 05/22/17  0346   WBC 4.61 4.73 4.60   HGB 9.6* 9.1* 9.3*   HCT 30.9* 29.0* 29.5*    251 257     CMP:   Recent Labs  Lab 05/21/17  0256 05/22/17  0346    138   K 4.1 3.6    103   CO2 24 26   * 132*   BUN 14 18   CREATININE 1.3 1.3   CALCIUM 9.4 9.9   PROT 6.9  --    ALBUMIN 3.2*  --    BILITOT 0.3  --    ALKPHOS 66  --    AST 24  --    ALT 14  --    ANIONGAP 9 9   EGFRNONAA 40.5* 40.5*     Cardiac Markers:   Recent Labs  Lab 05/21/17  0256   BNP 31     Troponin:   Recent Labs  Lab 05/21/17  2202 05/22/17  0801 05/22/17  1818   TROPONINI 0.068* 0.055* 0.211*       Significant Imaging: I have reviewed all pertinent imaging results/findings within the past 24 hours.    Assessment/Plan:      * NSTEMI (non-ST elevated myocardial infarction)    -continue ASA/Plavix  -continue coreg, statin, no ACEi/ARB due to hx of anaphylaxis  -s/p placemen of KNORAD to OM3 for occlusive lesion on [facility]  -cardiac rehab consult placed   -ECHO pending        Coronary artery disease due to lipid rich plaque    Management as above          HTN (hypertension), benign    Continue coreg/amlodipine/chlorthalidone  -titrate amlodipine if uncontrolled          Hyperlipidemia    Statin therapy          Controlled type 2 DM with peripheral circulatory disorder    -holding home metforming as IP          Chronic renal disease, stage 3, moderately decreased glomerular filtration rate between 30-59 mL/min/1.73 square meter    -moniotor renal function s/p cath  -renal dosing of new medications          Malignant neoplasm of upper-outer quadrant of  left female breast    -letrozole            VTE Risk Mitigation         Ordered     enoxaparin injection 40 mg  Daily     Route:  Subcutaneous        05/22/17 8094          Norman De La Garza MD  Department of Hospital Medicine   [facility]

## 2017-05-23 NOTE — SUBJECTIVE & OBJECTIVE
Interval History: no events overnight, remains chest pain free.     Review of Systems   Constitutional: Negative for chills, diaphoresis, fatigue and fever.   HENT: Negative for sinus pressure and sore throat.    Eyes: Negative for visual disturbance.   Respiratory: Negative for chest tightness and shortness of breath.    Cardiovascular: Negative for chest pain, palpitations and leg swelling.   Gastrointestinal: Negative for abdominal distention, diarrhea, nausea and vomiting.   Genitourinary: Negative for dysuria.     Objective:     Vital Signs (Most Recent):  Temp: 97.4 °F (36.3 °C) (05/22/17 0800)  Pulse: 75 (05/22/17 1900)  Resp: 19 (05/22/17 0800)  BP: 108/66 (05/22/17 1730)  SpO2: 98 % (05/22/17 0800) Vital Signs (24h Range):  Temp:  [97.4 °F (36.3 °C)-98.6 °F (37 °C)] 97.4 °F (36.3 °C)  Pulse:  [64-79] 75  Resp:  [17-19] 19  SpO2:  [98 %-99 %] 98 %  BP: (100-177)/(55-90) 108/66     Weight: 109 kg (240 lb 4.8 oz)  Body mass index is 42.57 kg/m².    Intake/Output Summary (Last 24 hours) at 05/22/17 2020  Last data filed at 05/22/17 1800   Gross per 24 hour   Intake          3408.17 ml   Output             2100 ml   Net          1308.17 ml      Physical Exam   Constitutional: She is oriented to person, place, and time.   Morbid obesity   HENT:   Mouth/Throat: No oropharyngeal exudate.   Eyes: Conjunctivae are normal. No scleral icterus.   Neck: No JVD present.   Cardiovascular: Normal rate, regular rhythm, normal heart sounds and intact distal pulses.    Pulmonary/Chest: Effort normal and breath sounds normal. No respiratory distress. She has no rales.   Abdominal: Soft. Bowel sounds are normal. She exhibits no distension. There is no guarding.   Lymphadenopathy:     She has no cervical adenopathy.   Neurological: She is alert and oriented to person, place, and time.   Skin: Skin is warm and dry.       Significant Labs:   CBC:   Recent Labs  Lab 05/21/17  0256 05/21/17  1502 05/22/17  0346   WBC 4.61 4.73 4.60    HGB 9.6* 9.1* 9.3*   HCT 30.9* 29.0* 29.5*    251 257     CMP:   Recent Labs  Lab 05/21/17  0256 05/22/17  0346    138   K 4.1 3.6    103   CO2 24 26   * 132*   BUN 14 18   CREATININE 1.3 1.3   CALCIUM 9.4 9.9   PROT 6.9  --    ALBUMIN 3.2*  --    BILITOT 0.3  --    ALKPHOS 66  --    AST 24  --    ALT 14  --    ANIONGAP 9 9   EGFRNONAA 40.5* 40.5*     Cardiac Markers:   Recent Labs  Lab 05/21/17  0256   BNP 31     Troponin:   Recent Labs  Lab 05/21/17  2202 05/22/17  0801 05/22/17  1818   TROPONINI 0.068* 0.055* 0.211*       Significant Imaging: I have reviewed all pertinent imaging results/findings within the past 24 hours.

## 2017-05-23 NOTE — PROGRESS NOTES
[facility] Hospital Medicine  Progress Note    Patient Name: Renata Bergman  MRN: [id number]  Patient Class: IP- Inpatient   Admission Date: 5/21/2017  Length of Stay: 2 days  Attending Physician: Makenzie Escobar MD  Primary Care Provider: Ethel Berger MD    [facility] Medicine Team: [facility] HOSP MED C Makenzie Escobar MD    Subjective:     Principal Problem:NSTEMI (non-ST elevated myocardial infarction)    HPI:  Ms. Bergman is a 76 YOAAF with PMH of HTN, HLD, DM, former smoker, CAD s/p PCI x4, Last PET stress in 06/2016 which was negative, left breast Ca s/p 38 sessions of XRT last one 3 weeks ago, who woke this early AM (12:45 am) with a 9/10 substernal sharp chest pain radiating down her left arm with some tingling in the finger tips, came to ED, with ASA at home and a SL NTG which has made her pain complete go away.   She denied SOB or palpitations. She reported never had such pain before. The pain she had PRIOR to her previous PCIs was milder than current.      In the ED EKG has old RBB and variable first degree AV block. No e/o ST elevation. Her first set of Troponin negative.      Her BP was stable.     Hospital Course:  5/21 - Hospital Medicine assumed care for patient this AM     Patient seen at the bedside, she reports no acute chest pain, her symptoms prior to presentation did occur at rest while sleeping, pain was initially severe enough to wake her up, mild relief with NTG at home and she states that pain officially subsided with the NTG doses she received by EMS.      She is pain free on bedside evaluation today.      Per admit plan, patient was having mildly elevated troponin values and 3rd value obtained that is still uptrending, given her co-morbid hx and known CAD.  ACS protocol started with Plavix load & IV heparin infusion.      NPO for midnight and interventional cardiology consult place.d      -She has an ECHO pending and is hemodynamically stable.  Continuing  "home meds of coreg 25mg, crestor 40mg.  On no acei/arb due to hx of anaphylaxis.     5/22 - Patient remained chest pain free with no further anginal complaints, she was taken for Cardiac catheterization with Interventional cardiology service.  She was found with lesion to OM3 vessel and had PCI with placement of 3 Drug-eluting stents.  Cardiology service recommends dual anti-platelet therapy for 12 months and a cardiac rehab referral was made.     5/23- Patient without anginal symptoms or SOB. Referred for cardiac rehab. Medically stable for discharge.    Interval History: "OK. Ready to go home". No acute overnight events. No chest pain or SOB.    Review of Systems   Constitutional: Negative for chills, fatigue, fever and unexpected weight change.   HENT: Negative for ear pain, facial swelling, hearing loss, mouth sores, nosebleeds, rhinorrhea, sinus pressure, sore throat, tinnitus, trouble swallowing and voice change.    Eyes: Negative for photophobia, pain, redness and visual disturbance.   Respiratory: Negative for cough, chest tightness, shortness of breath and wheezing.    Cardiovascular: Negative for chest pain, palpitations and leg swelling.   Gastrointestinal: Negative for abdominal pain, blood in stool, constipation, diarrhea, nausea and vomiting.   Endocrine: Negative for cold intolerance, heat intolerance, polydipsia, polyphagia and polyuria.   Genitourinary: Negative for decreased urine volume, dysuria, flank pain, frequency, hematuria, menstrual problem, urgency, vaginal bleeding, vaginal discharge and vaginal pain.   Musculoskeletal: Negative for arthralgias, back pain, joint swelling, myalgias and neck pain.   Skin: Negative for rash.   Allergic/Immunologic: Negative for environmental allergies, food allergies and immunocompromised state.   Neurological: Negative for dizziness, seizures, syncope, weakness, light-headedness, numbness and headaches.   Hematological: Negative for adenopathy. Does not " bruise/bleed easily.   Psychiatric/Behavioral: Negative for confusion, hallucinations, self-injury, sleep disturbance and suicidal ideas. The patient is not nervous/anxious.      Objective:     Vital Signs (Most Recent):  Temp: 97.6 °F (36.4 °C) (05/23/17 0800)  Pulse: 84 (05/23/17 0800)  Resp: 19 (05/23/17 0800)  BP: 139/68 (05/23/17 0800)  SpO2: 99 % (05/23/17 0800) Vital Signs (24h Range):  Temp:  [97.6 °F (36.4 °C)-98.9 °F (37.2 °C)] 97.6 °F (36.4 °C)  Pulse:  [64-85] 84  Resp:  [17-20] 19  SpO2:  [97 %-99 %] 99 %  BP: (108-177)/(57-90) 139/68     Weight: 109.8 kg (242 lb 1 oz)  Body mass index is 42.88 kg/m².    Intake/Output Summary (Last 24 hours) at 05/23/17 1000  Last data filed at 05/23/17 0400   Gross per 24 hour   Intake          3353.05 ml   Output             1800 ml   Net          1553.05 ml      Physical Exam   Constitutional: She is oriented to person, place, and time. She appears well-developed and well-nourished. She is cooperative. She is easily aroused.  Non-toxic appearance. No distress.   HENT:   Head: Normocephalic and atraumatic. Head is without right periorbital erythema and without left periorbital erythema.   Right Ear: Hearing, tympanic membrane, external ear and ear canal normal. No swelling.   Left Ear: Hearing, tympanic membrane, external ear and ear canal normal. No swelling.   Nose: Nose normal. No nasal deformity.   Mouth/Throat: Uvula is midline, oropharynx is clear and moist and mucous membranes are normal. No oropharyngeal exudate.   Eyes: Conjunctivae, EOM and lids are normal. Pupils are equal, round, and reactive to light. Right eye exhibits no discharge and no exudate. Left eye exhibits no discharge and no exudate. Right conjunctiva is not injected. Left conjunctiva is not injected. No scleral icterus.   Neck: Normal range of motion. Neck supple. No tracheal deviation present. No thyromegaly present.   Cardiovascular: Normal rate, regular rhythm, S1 normal, S2 normal, normal  heart sounds and intact distal pulses.  Exam reveals no gallop and no friction rub.    No murmur heard.  Pulmonary/Chest: Effort normal and breath sounds normal. No accessory muscle usage or stridor. No tachypnea. No respiratory distress. She has no wheezes. She has no rales. She exhibits no tenderness.   Abdominal: Soft. Normal appearance and bowel sounds are normal. She exhibits no distension. There is no tenderness. There is no rebound and no guarding.   Musculoskeletal: Normal range of motion. She exhibits no edema or tenderness.   Neurological: She is alert, oriented to person, place, and time and easily aroused. No cranial nerve deficit. Coordination normal.   Skin: Skin is warm and dry. No lesion and no rash noted. She is not diaphoretic. No cyanosis or erythema. No pallor. Nails show no clubbing.   Psychiatric: She has a normal mood and affect. Her speech is normal and behavior is normal. Judgment and thought content normal.   Nursing note and vitals reviewed.      Significant Labs:   BMP:   Recent Labs  Lab 05/23/17  0653         K 4.2      CO2 22*   BUN 19   CREATININE 1.4   CALCIUM 9.9   MG 1.7     CBC:   Recent Labs  Lab 05/21/17  1502 05/22/17  0346 05/23/17  0653   WBC 4.73 4.60 4.45   HGB 9.1* 9.3* 9.0*   HCT 29.0* 29.5* 28.8*    257 224     POCT Glucose:   Recent Labs  Lab 05/22/17  1910 05/22/17  2108 05/23/17  0756   POCTGLUCOSE 152* 132* 134*       Significant Imaging: I have reviewed all pertinent imaging results/findings within the past 24 hours.    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  1. letrozole          Chronic renal disease, stage 3, moderately decreased glomerular filtration rate between 30-59 mL/min/1.73 square meter    Creatinine at baseline.   1. Monitor creatinine.  2. Renally dose medications and avoid nephrotoxic agents.          Controlled type 2 DM with peripheral circulatory disorder    Well  controlled with diabetic diet.  1. Resume metformin on discharge.          Coronary artery disease due to lipid rich plaque    Stable and currently asymptomatic.  1. Aspirin 81 mg daily.  2. Coreg 25 mg BID.  3.Rosuvastatin 40 mg daily.          Hyperlipidemia    Stable.  1. Rosuvastatin.          HTN (hypertension), benign    Well controlled on current therapy.  1. Amlodipine 5 mg daily.  2. Coreg 25 mg BID.  3. Chlorthalidone 25 mg daily.  4. Monitor blood pressure and titrate therapy as needed in the outpatient setting.           * NSTEMI (non-ST elevated myocardial infarction)    No chest pain or SOB. S/P KONRAD. Echocardiogram with EF 60% and without diastolic dysfunction.   1. ASA and Plavix for at least one year per Interventional Cardiology recommendations.  2. coreg, statin, no ACEi/ARB due to hx of anaphylaxis  3. Outpatient follow up with established cardiologist.           VTE Risk Mitigation         Ordered     enoxaparin injection 40 mg  Daily     Route:  Subcutaneous        05/22/17 6640          Makenzie Escobar MD  Department of Hospital Medicine   [facility]

## 2017-05-23 NOTE — HOSPITAL COURSE
5/21 - Hospital Medicine assumed care for patient this AM     Patient seen at the bedside, she reports no acute chest pain, her symptoms prior to presentation did occur at rest while sleeping, pain was initially severe enough to wake her up, mild relief with NTG at home and she states that pain officially subsided with the NTG doses she received by EMS.      She is pain free on bedside evaluation today.      Per admit plan, patient was having mildly elevated troponin values and 3rd value obtained that is still uptrending, given her co-morbid hx and known CAD.  ACS protocol started with Plavix load & IV heparin infusion.      NPO for midnight and interventional cardiology consult place.d      -She has an ECHO pending and is hemodynamically stable.  Continuing home meds of coreg 25mg, crestor 40mg.  On no acei/arb due to hx of anaphylaxis.     5/22 - Patient remained chest pain free with no further anginal complaints, she was taken for Cardiac catheterization with Interventional cardiology service.  She was found with lesion to OM3 vessel and had PCI with placement of 3 Drug-eluting stents.  Cardiology service recommends dual anti-platelet therapy for 12 months and a cardiac rehab referral was made.     5/23- Patient without anginal symptoms or SOB. Referred for cardiac rehab. Medically stable for discharge.

## 2017-05-23 NOTE — PT/OT/SLP EVAL
Physical Therapy  Evaluation/ Discharge    Renata Bergman   MRN: [id number]   Admitting Diagnosis: NSTEMI (non-ST elevated myocardial infarction)    PT Received On: 17  PT Start Time: 919     PT Stop Time: 934    PT Total Time (min): 15 min       Billable Minutes:  Evaluation 15    Diagnosis: NSTEMI (non-ST elevated myocardial infarction)      Past Medical History:   Diagnosis Date    Cataract     Coronary artery disease 2012    Diabetes mellitus due to abnormal insulin 2012    Diabetes mellitus type II     GERD (gastroesophageal reflux disease) 2012    Gout, arthritis 2012    Hyperlipidemia 2012    Hypertension     Primary osteoarthritis of both knees 2012      Past Surgical History:   Procedure Laterality Date    CORONARY ANGIOPLASTY      HYSTERECTOMY      JOINT REPLACEMENT      left and right k nee    KNEE SURGERY      TOTAL ABDOMINAL HYSTERECTOMY W/ BILATERAL SALPINGOOPHORECTOMY         Referring physician: Dr. De La Garza  Date referred to PT: 17    General Precautions: Standard, fall  Orthopedic Precautions: N/A   Braces: N/A       Do you have any cultural, spiritual, Mormon conflicts, given your current situation?: none     Patient History:  Lives With: child(brendan), adult  Living Arrangements: house  Transportation Available: family or friend will provide  Living Environment Comment: Lives with daughter in [facility] no [place]. Pt was using SPC or RW PRN PTA.   Equipment Currently Used at Home: bedside commode, cane, straight, walker, rolling, shower chair  DME owned (not currently used): none    Previous Level of Function:  Ambulation Skills: independent  Transfer Skills: independent  ADL Skills: independent  Work/Leisure Activity: independent    Subjective:  Communicated with RN prior to session.  Pt agreeable to working with PT.  Chief Complaint: none   Patient goals: return home     Pain Ratin/10               Pain Rating Post-Intervention:  0/10    Objective:   Patient found with: telemetry     Cognitive Exam:  Oriented to: Person, Place, Time and Situation    Follows Commands/attention: Follows multistep  commands  Communication: clear/fluent  Safety awareness/insight to disability: intact    Physical Exam:  Postural examination/scapula alignment: Rounded shoulder and Head forward    Skin integrity: Visible skin intact  Edema: None noted     Sensation:   Intact    Upper Extremity Range of Motion:  Right Upper Extremity: WFL  Left Upper Extremity: WFL    Upper Extremity Strength:  Right Upper Extremity: WFL  Left Upper Extremity: WFL    Lower Extremity Range of Motion:  Right Lower Extremity: WFL  Left Lower Extremity: WFL    Lower Extremity Strength:  Right Lower Extremity: WFL  Left Lower Extremity: WFL     Fine motor coordination:  Intact    Gross motor coordination: WFL    Functional Mobility:  Bed Mobility:  Rolling/Turning to Left: Independent  Rolling/Turning Right: Independent  Scooting/Bridging: Independent  Supine to Sit: Independent    Transfers:  Sit <> Stand Assistance: Supervision  Sit <> Stand Assistive Device: No Assistive Device    Gait:   Gait Distance: x350 feet   Assistance 1: Supervision  Gait Assistive Device: No device  Gait Pattern: 2-point gait      Balance:   Static Sit: NORMAL: No deviations seen in posture held statically  Dynamic Sit: NORMAL: No deviations seen in posture held dynamically  Static Stand: NORMAL: No deviations seen in posture held statically  Dynamic stand: NORMAL: No deviations seen in posture held dynamically    Therapeutic Activities and Exercises:  Patient educated on role of PT and safety with mobility. Patient verbalized and demonstrated understanding of safety precautions with mobility.     AM-PAC 6 CLICK MOBILITY  How much help from another person does this patient currently need?   1 = Unable, Total/Dependent Assistance  2 = A lot, Maximum/Moderate Assistance  3 = A little, Minimum/Contact  Guard/Supervision  4 = None, Modified [place]/Independent    Turning over in bed (including adjusting bedclothes, sheets and blankets)?: 4  Sitting down on and standing up from a chair with arms (e.g., wheelchair, bedside commode, etc.): 4  Moving from lying on back to sitting on the side of the bed?: 4  Moving to and from a bed to a chair (including a wheelchair)?: 4  Need to walk in hospital room?: 4  Climbing 3-5 steps with a railing?: 4  Total Score: 24     AM-PAC Raw Score CMS G-Code Modifier Level of Impairment Assistance   6 % Total / Unable   7 - 9 CM 80 - 100% Maximal Assist   10 - 14 CL 60 - 80% Moderate Assist   15 - 19 CK 40 - 60% Moderate Assist   20 - 22 CJ 20 - 40% Minimal Assist   23 CI 1-20% SBA / CGA   24 CH 0% Independent/ Mod I     Patient left up in chair with all lines intact, call button in reach and RN notified.    Assessment:   Renata Bergman is a 76 y.o. female with a medical diagnosis of NSTEMI (non-ST elevated myocardial infarction) and presents with at PLOF with strength and mobility. Pt would not require PT services in the acute care setting due to PLOF. Pt safe to discharge home with no DME needs. .    Rehab identified problem list/impairments:  none     Rehab potential is good.    Activity tolerance: Good    Discharge recommendations: Discharge Facility/Level Of Care Needs: home     Barriers to discharge: Barriers to Discharge: None    Equipment recommendations: Equipment Needed After Discharge: none       PLAN:    Patient  presents with no functional abnormalities/limitations noted. Patient demonstrated to be a safe ambulator and would require no further PT services in the acute care setting. Pt safe to ambulate on unit. Patient plan to discharge home with no therapy services required.   Plan of Care reviewed with: patient          Devon Figueredoh, PT  05/23/2017

## 2017-05-23 NOTE — ASSESSMENT & PLAN NOTE
Well controlled on current therapy.  1. Amlodipine 5 mg daily.  2. Coreg 25 mg BID.  3. Chlorthalidone 25 mg daily.  4. Monitor blood pressure and titrate therapy as needed in the outpatient setting.

## 2017-05-23 NOTE — PLAN OF CARE
Problem: Patient Care Overview  Goal: Plan of Care Review  Outcome: Ongoing (interventions implemented as appropriate)  Plan of care discussed with pt. Pt free of falls/trauma/injuries this shift. R radial site assessed-no bleeding or hematoma to site. BS checked-WNL. VSS & NADN. Pt denies CP, SOB, or pain/discomfort at this time. All questions addressed.  Will continue to monitor.

## 2017-05-23 NOTE — PLAN OF CARE
Problem: Occupational Therapy Goal  Goal: Occupational Therapy Goal  Outcome: Outcome(s) achieved Date Met: 05/23/17  Eval and D/C OT 5/23/17

## 2017-05-23 NOTE — ASSESSMENT & PLAN NOTE
No chest pain or SOB. S/P KONRAD. Echocardiogram with EF 60% and without diastolic dysfunction.   1. ASA and Plavix for at least one year per Interventional Cardiology recommendations.  2. coreg, statin, no ACEi/ARB due to hx of anaphylaxis  3. Outpatient follow up with established cardiologist.

## 2017-05-23 NOTE — SUBJECTIVE & OBJECTIVE
"Interval History: "OK. Ready to go home". No acute overnight events. No chest pain or SOB.    Review of Systems   Constitutional: Negative for chills, fatigue, fever and unexpected weight change.   HENT: Negative for ear pain, facial swelling, hearing loss, mouth sores, nosebleeds, rhinorrhea, sinus pressure, sore throat, tinnitus, trouble swallowing and voice change.    Eyes: Negative for photophobia, pain, redness and visual disturbance.   Respiratory: Negative for cough, chest tightness, shortness of breath and wheezing.    Cardiovascular: Negative for chest pain, palpitations and leg swelling.   Gastrointestinal: Negative for abdominal pain, blood in stool, constipation, diarrhea, nausea and vomiting.   Endocrine: Negative for cold intolerance, heat intolerance, polydipsia, polyphagia and polyuria.   Genitourinary: Negative for decreased urine volume, dysuria, flank pain, frequency, hematuria, menstrual problem, urgency, vaginal bleeding, vaginal discharge and vaginal pain.   Musculoskeletal: Negative for arthralgias, back pain, joint swelling, myalgias and neck pain.   Skin: Negative for rash.   Allergic/Immunologic: Negative for environmental allergies, food allergies and immunocompromised state.   Neurological: Negative for dizziness, seizures, syncope, weakness, light-headedness, numbness and headaches.   Hematological: Negative for adenopathy. Does not bruise/bleed easily.   Psychiatric/Behavioral: Negative for confusion, hallucinations, self-injury, sleep disturbance and suicidal ideas. The patient is not nervous/anxious.      Objective:     Vital Signs (Most Recent):  Temp: 97.6 °F (36.4 °C) (05/23/17 0800)  Pulse: 84 (05/23/17 0800)  Resp: 19 (05/23/17 0800)  BP: 139/68 (05/23/17 0800)  SpO2: 99 % (05/23/17 0800) Vital Signs (24h Range):  Temp:  [97.6 °F (36.4 °C)-98.9 °F (37.2 °C)] 97.6 °F (36.4 °C)  Pulse:  [64-85] 84  Resp:  [17-20] 19  SpO2:  [97 %-99 %] 99 %  BP: (108-177)/(57-90) 139/68     Weight: " 109.8 kg (242 lb 1 oz)  Body mass index is 42.88 kg/m².    Intake/Output Summary (Last 24 hours) at 05/23/17 1000  Last data filed at 05/23/17 0400   Gross per 24 hour   Intake          3353.05 ml   Output             1800 ml   Net          1553.05 ml      Physical Exam   Constitutional: She is oriented to person, place, and time. She appears well-developed and well-nourished. She is cooperative. She is easily aroused.  Non-toxic appearance. No distress.   HENT:   Head: Normocephalic and atraumatic. Head is without right periorbital erythema and without left periorbital erythema.   Right Ear: Hearing, tympanic membrane, external ear and ear canal normal. No swelling.   Left Ear: Hearing, tympanic membrane, external ear and ear canal normal. No swelling.   Nose: Nose normal. No nasal deformity.   Mouth/Throat: Uvula is midline, oropharynx is clear and moist and mucous membranes are normal. No oropharyngeal exudate.   Eyes: Conjunctivae, EOM and lids are normal. Pupils are equal, round, and reactive to light. Right eye exhibits no discharge and no exudate. Left eye exhibits no discharge and no exudate. Right conjunctiva is not injected. Left conjunctiva is not injected. No scleral icterus.   Neck: Normal range of motion. Neck supple. No tracheal deviation present. No thyromegaly present.   Cardiovascular: Normal rate, regular rhythm, S1 normal, S2 normal, normal heart sounds and intact distal pulses.  Exam reveals no gallop and no friction rub.    No murmur heard.  Pulmonary/Chest: Effort normal and breath sounds normal. No accessory muscle usage or stridor. No tachypnea. No respiratory distress. She has no wheezes. She has no rales. She exhibits no tenderness.   Abdominal: Soft. Normal appearance and bowel sounds are normal. She exhibits no distension. There is no tenderness. There is no rebound and no guarding.   Musculoskeletal: Normal range of motion. She exhibits no edema or tenderness.   Neurological: She is  alert, oriented to person, place, and time and easily aroused. No cranial nerve deficit. Coordination normal.   Skin: Skin is warm and dry. No lesion and no rash noted. She is not diaphoretic. No cyanosis or erythema. No pallor. Nails show no clubbing.   Psychiatric: She has a normal mood and affect. Her speech is normal and behavior is normal. Judgment and thought content normal.   Nursing note and vitals reviewed.      Significant Labs:   BMP:   Recent Labs  Lab 05/23/17  0653         K 4.2      CO2 22*   BUN 19   CREATININE 1.4   CALCIUM 9.9   MG 1.7     CBC:   Recent Labs  Lab 05/21/17  1502 05/22/17  0346 05/23/17  0653   WBC 4.73 4.60 4.45   HGB 9.1* 9.3* 9.0*   HCT 29.0* 29.5* 28.8*    257 224     POCT Glucose:   Recent Labs  Lab 05/22/17  1910 05/22/17  2108 05/23/17  0756   POCTGLUCOSE 152* 132* 134*       Significant Imaging: I have reviewed all pertinent imaging results/findings within the past 24 hours.

## 2017-05-24 ENCOUNTER — PATIENT OUTREACH (OUTPATIENT)
Dept: ADMINISTRATIVE | Facility: CLINIC | Age: 77
End: 2017-05-24
Payer: MEDICARE

## 2017-05-24 LAB
POC ACTIVATED CLOTTING TIME K: 219 SEC (ref 74–137)
POC ACTIVATED CLOTTING TIME K: 219 SEC (ref 74–137)
SAMPLE: ABNORMAL
SAMPLE: ABNORMAL

## 2017-05-26 ENCOUNTER — OFFICE VISIT (OUTPATIENT)
Dept: INTERNAL MEDICINE | Facility: CLINIC | Age: 77
End: 2017-05-26
Payer: MEDICARE

## 2017-05-26 VITALS
HEART RATE: 67 BPM | HEIGHT: 63 IN | OXYGEN SATURATION: 98 % | DIASTOLIC BLOOD PRESSURE: 60 MMHG | BODY MASS INDEX: 43.05 KG/M2 | WEIGHT: 243 LBS | SYSTOLIC BLOOD PRESSURE: 110 MMHG

## 2017-05-26 DIAGNOSIS — E11.51 CONTROLLED TYPE 2 DM WITH PERIPHERAL CIRCULATORY DISORDER: Chronic | ICD-10-CM

## 2017-05-26 DIAGNOSIS — I10 HTN (HYPERTENSION), BENIGN: Chronic | ICD-10-CM

## 2017-05-26 DIAGNOSIS — I21.4 NSTEMI (NON-ST ELEVATED MYOCARDIAL INFARCTION): Primary | ICD-10-CM

## 2017-05-26 DIAGNOSIS — E78.2 MIXED HYPERLIPIDEMIA: Chronic | ICD-10-CM

## 2017-05-26 PROCEDURE — 99999 PR PBB SHADOW E&M-EST. PATIENT-LVL II: CPT | Mod: PBBFAC,,, | Performed by: INTERNAL MEDICINE

## 2017-05-26 PROCEDURE — 1159F MED LIST DOCD IN RCRD: CPT | Mod: S$GLB,,, | Performed by: INTERNAL MEDICINE

## 2017-05-26 PROCEDURE — 99499 UNLISTED E&M SERVICE: CPT | Mod: S$PBB,,, | Performed by: INTERNAL MEDICINE

## 2017-05-26 PROCEDURE — 3045F PR MOST RECENT HEMOGLOBIN A1C LEVEL 7.0-9.0%: CPT | Mod: S$GLB,,, | Performed by: INTERNAL MEDICINE

## 2017-05-26 PROCEDURE — 99214 OFFICE O/P EST MOD 30 MIN: CPT | Mod: S$GLB,,, | Performed by: INTERNAL MEDICINE

## 2017-05-26 NOTE — PROGRESS NOTES
CHIEF COMPLAINT: follow up NSTEMI    HISTORY OF PRESENT ILLNESS: 76-year-old woman who present for follow up of above.  She woke up with Chest pain on 5/21/17 and took a NTG and called EMS. EMS gave her a second NTG and she was chest pain free by the time she got to the ED> Troponins were elevated. 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 She now takes aspirin 81 mg daily and plavix 75 mg daily. No chest pain or shortness of breath.  She continues to take Coreg 25 mg twice daily, Norvasc 5 mg daily and chlorthaladone 25 mg 1/2 tablet every other daily for her hypertension. She is lightheaded at times. No chest pain, shortness of breath, lightheadedness.        She has not had anymore seizures. She conitnues to take Keppra twice daily.  No headaches. MRI abnormality resolved and she saw Dr Thrasher who felt that MRI findings were due to post ictal state. Cytology for malignancy negative. .      She has completed 5 cycles of CMF for her breast cancer. She completed radiation the end of April 6, 2017.  She is now on Femara.       She was participating in the healthy back program once a week. This program is on hold due to breast cancer. Back is doing well. She is doing stretches every other day. She is off tylenol in the evening.       She continues to take allopurinol 300 mg daily for her gout. She has not had any gouty flares. She has occasional left elbow pain.      Her blood sugars have been normal. She continues to take metformin 850 mg twice daily. She checks blood sugars once daily. Occasional diarrhea with certain foods. She denies any polydipsia or polyuria. She continues to take aspirin for her coronary artery disease. Sinuses have been doing well. She has not had to take Allegra lately. She denies any nausea, vomiting, constipation, diarrhea.     Reflux is well controlled on omeprazole 20 mg two tablets daily. She continues to take Crestor 40 mg daily for her  "hyperlipidemia. She denies any joint pain or muscle pain from the Crestor. Knee is doing well. She is not having to take Tramadol     She is taking vitamin D 3757-2997 units daily for vitamin D deficiency     Her daughter who is 46 has stage 2 breast cancer. She has had a lumpectomy and chemo and radiation. Her daughter is doing well. She has finished her treatment. Her daughter might be BRCA positive. Mrs Bergman feels that she is coping well with her diagnosis of breast cancer. No anxiety, depression or insomnia     PAST MEDICAL HISTORY:   1. Hypertension.   2. Diabetes mellitus   3. Hyperlipidemia.   4. Reflux.   5. Gout.   6. Coronary artery disease, status post MI in  with stenting at that time, and then a right coronary artery stent placed in . Had a negative stress test 2008. [facility] 2012 - patent stents and non obstructive cad  7. Osteoarthritis of the right knee. S/P right knee replacement   8. Status post left knee replacement.   9. Status post LISA/BSO secondary to menstrual disorder or polyps after tubal ligation.     MEDICATIONS and ALLERGIES: Updated on epic.       Family History  Mother had breast cancer in her early 50's then had colon cancer in her 60's. She  of uterine cancer  2 Maternal aunts with breast cancer  Daughter with breast cancer at age 45 - BRCA positive  Father  of a gunshot wound  She is an only child    PHYSICAL EXAMINATION:    /60   Pulse 67   Ht 5' 3" (1.6 m)   Wt 110.2 kg (243 lb)   SpO2 98%   BMI 43.05 kg/m²      General: Alert, oriented. No apparent distress. Affect within normal   limits.   Conjunctivae anicteric. Tympanic membranes clear. Oropharynx clear.   Neck supple.   Respiratory effort normal. Lungs clear to auscultation.   Heart: Regular rate and rhythm without murmurs, gallops or rubs.   No lower extremity edema.         ASSESESSMENT  1. NSTEMI - s/p stenting. Continue aspirin and plavix. Follow up with cardiology  2.. Left breast cancer " -Finished chemo and  radiation  2. Seizure -stable on KEppra  3. NSTEMI - Risk factor management.   4. Hypertension -take chlorthalidone as needed due to low BP   5. Diabetes - controlled. Continue metformin to 850 mg twice a day.   6. Gout -controlled on allopurinol    7. Hyperlipidemia - on Crestor 40 mg daily. Controlled   8. Obesity - working at diet, exercise and weight loss.   9. Osteoarthritis of the right knee, status post knee replacement - doing well.  10. GERD - controlled on meds  11. Anemia - stable  12 .CRI - stable.  13. Hypokalemia -  on KCL 10 meq daily           Screening - mammogram done 7/16. Colonoscopy 4/23/12 - normal, and 5/25/15 - 3 small polyps (one adenoma)- due 2018. Bone density was normal November 2008.    Prevnar 12/15  Tdap 5/16  Influenza needs to be held due to chemo     I'll see her back 2 months,  sooner if problems arise.

## 2017-05-31 ENCOUNTER — DOCUMENTATION ONLY (OUTPATIENT)
Dept: SURGERY | Facility: CLINIC | Age: 77
End: 2017-05-31

## 2017-05-31 NOTE — PROGRESS NOTES
Results received from [employer] , positive for pathogenic mutation in BRCA2, [id number]. She was phoned and results discussed . Discussed increased cancer risks associated with BRCA2 pathogenetic mutations including second breast primary, pancreatic cancer and melanoma. Also discussed her children each have a 50% chance of inheriting this same mutation. I also spoke with her daughter today, she again informed me she thought she had genetic testing one year ago but never received the results. Recommended Single Site testing for this mutation in Mrs Hussein's two sons and her daughter (if her results are unavailable), as well as her maternal family members. She has a large family with 13 maternal aunts and uncles and large number of cousins, 5 of which have had breast cancer, in addition to this patient's mother. This patient had a complete hysterectomy. Discussed management of breast cancer risk with increased screening to include semiannual CBE, annual mmg and annual breast MRI per NCCN guidelines. At this time, she states she desires to discuss this with Dr Guevara. He will be copied on this note. She reports she is not interested in prophylactic mastectomy at this time. In addition, I would be happy to follow this patient as part of her survivorship and increased screening, she is due for mmg in July, I can see her at that time for CBE and mammogram and discuss again breast MRI

## 2017-06-02 ENCOUNTER — TELEPHONE (OUTPATIENT)
Dept: CARDIAC REHAB | Facility: CLINIC | Age: 77
End: 2017-06-02

## 2017-06-05 ENCOUNTER — TELEPHONE (OUTPATIENT)
Dept: SURGERY | Facility: CLINIC | Age: 77
End: 2017-06-05

## 2017-06-05 NOTE — TELEPHONE ENCOUNTER
Called patient regarding scheduling mammogram and breast exam appointment with Callie Morgan NP.  The patient is scheduled to be seen on Thursday 7/13/17 at 1:30 pm breast exam with Callie Morgan NP and 2:15 pm mammogram.  The patient voiced understanding of appointment date and time.  Reminder letter mailed to the patient.

## 2017-06-06 ENCOUNTER — OFFICE VISIT (OUTPATIENT)
Dept: SURGERY | Facility: CLINIC | Age: 77
End: 2017-06-06
Payer: MEDICARE

## 2017-06-06 VITALS
HEART RATE: 72 BPM | HEIGHT: 63 IN | TEMPERATURE: 98 F | DIASTOLIC BLOOD PRESSURE: 67 MMHG | SYSTOLIC BLOOD PRESSURE: 146 MMHG | WEIGHT: 244.69 LBS | BODY MASS INDEX: 43.36 KG/M2

## 2017-06-06 DIAGNOSIS — C50.912 MALIGNANT NEOPLASM OF LEFT FEMALE BREAST, UNSPECIFIED SITE OF BREAST: Primary | ICD-10-CM

## 2017-06-06 PROCEDURE — 99999 PR PBB SHADOW E&M-EST. PATIENT-LVL III: CPT | Mod: PBBFAC,,, | Performed by: SURGERY

## 2017-06-06 PROCEDURE — 1126F AMNT PAIN NOTED NONE PRSNT: CPT | Mod: S$GLB,,, | Performed by: SURGERY

## 2017-06-06 PROCEDURE — 99213 OFFICE O/P EST LOW 20 MIN: CPT | Mod: S$GLB,,, | Performed by: SURGERY

## 2017-06-06 PROCEDURE — 99499 UNLISTED E&M SERVICE: CPT | Mod: S$PBB,,, | Performed by: SURGERY

## 2017-06-06 PROCEDURE — 1159F MED LIST DOCD IN RCRD: CPT | Mod: S$GLB,,, | Performed by: SURGERY

## 2017-06-06 NOTE — PROGRESS NOTES
Progress note    SUBJECTIVE:     History of Present Illness:  Patient is a 76 y.o. female with a past medical hx of breast cancer s/p lumpectomy as well as chemo and radiation. She presents today for port removal given she no longer requires it. However, of note in the past two weeks the patient has had an NSTEMI with subsequent PCI and stent placement, patient has been place on plavix. She is doing well since this. Patient reports no significant discomfort at port-site.     Chief Complaint   Patient presents with    Procedure       Review of patient's allergies indicates:   Allergen Reactions    Lisinopril Anaphylaxis       Current Outpatient Prescriptions   Medication Sig Dispense Refill    allopurinol (ZYLOPRIM) 300 MG tablet take 1 tablet by mouth once daily 90 tablet 4    amlodipine (NORVASC) 5 MG tablet TAKE 1 TABLET BY MOUTH DAILY 90 tablet 4    aspirin 81 MG Chew Take 81 mg by mouth once daily.        carvedilol (COREG) 25 MG tablet TAKE 1 TABLET BY MOUTH TWICE DAILY 180 tablet 4    chlorthalidone (HYGROTEN) 25 MG Tab Take 1/2 tablet by mouth daily. 90 tablet 4    clopidogrel (PLAVIX) 75 mg tablet Take 1 tablet (75 mg total) by mouth once daily. 30 tablet 0    fexofenadine (ALLEGRA) 30 MG tablet Take 30 mg by mouth daily as needed.      letrozole (FEMARA) 2.5 mg Tab Take 1 tablet (2.5 mg total) by mouth once daily. 30 tablet 2    levetiracetam (KEPPRA) 750 MG Tab Take 500 mg by mouth 2 (two) times daily. Pt unsure of dosage.      lidocaine-prilocaine (EMLA) cream Apply to affected area once at least 15 minutes before chemotherapy 5 g 1    metformin (GLUCOPHAGE) 850 MG tablet TAKE 1 TABLET BY MOUTH TWICE DAILY 180 tablet 4    nitroGLYCERIN (NITROSTAT) 0.4 MG SL tablet Place 1 tablet (0.4 mg total) under the tongue every 5 (five) minutes as needed for Chest pain. 90 tablet 3    omeprazole (PRILOSEC) 20 MG capsule TAKE 2 CAPSULES BY MOUTH EVERY  capsule 4    polyethylene glycol (GLYCOLAX)  17 gram/dose powder Take 17 g by mouth once daily. (Patient taking differently: Take 17 g by mouth once daily. Pt taking PRN) 595 g 11    potassium chloride (MICRO-K) 10 MEQ CpSR Take 1 capsule (10 mEq total) by mouth once daily. 90 capsule 4    rosuvastatin (CRESTOR) 40 MG Tab TAKE ONE TABLET BY MOUTH ONCE DAILY 90 tablet 4     No current facility-administered medications for this visit.        Past Medical History:   Diagnosis Date    Cataract     Coronary artery disease 9/4/2012    Diabetes mellitus due to abnormal insulin 9/4/2012    Diabetes mellitus type II     GERD (gastroesophageal reflux disease) 9/4/2012    Gout, arthritis 9/4/2012    Hyperlipidemia 9/4/2012    Hypertension     Primary osteoarthritis of both knees 9/4/2012     Past Surgical History:   Procedure Laterality Date    CORONARY ANGIOPLASTY      HYSTERECTOMY      JOINT REPLACEMENT      left and right k nee    KNEE SURGERY      TOTAL ABDOMINAL HYSTERECTOMY W/ BILATERAL SALPINGOOPHORECTOMY       Family History   Problem Relation Age of Onset    Cancer Mother 55     colon    Diabetes Mother     Hypertension Mother     Hypertension Maternal Aunt     Hyperlipidemia Maternal Aunt     Hypertension Maternal Uncle     Heart disease Father     Glaucoma Neg Hx     Heart attack Neg Hx     Heart failure Neg Hx     Stroke Neg Hx      Social History   Substance Use Topics    Smoking status: Former Smoker     Packs/day: 1.00     Types: Cigarettes     Quit date: 8/13/1962    Smokeless tobacco: Never Used    Alcohol use No      Comment: socially        Review of Systems:  Review of Systems   Constitutional: Negative.    HENT: Negative.    Eyes: Negative.    Respiratory: Negative.    Cardiovascular: Negative.    Gastrointestinal: Negative.    Endocrine: Negative.    Genitourinary: Negative.    Musculoskeletal: Negative.    Skin: Negative.    Allergic/Immunologic: Negative.    Neurological: Negative.    Hematological: Negative.   "  Psychiatric/Behavioral: Negative.        OBJECTIVE:     Vital Signs (Most Recent)  Temp: 97.8 °F (36.6 °C) (06/06/17 0944)  Pulse: 72 (06/06/17 0944)  BP: (!) 146/67 (06/06/17 0944)  5' 3" (1.6 m)  111 kg (244 lb 11.2 oz)     Physical Exam:  Physical Exam   Constitutional: She is oriented to person, place, and time. She appears well-developed and well-nourished. No distress.   HENT:   Head: Normocephalic and atraumatic.   Eyes: Pupils are equal, round, and reactive to light.   Neck: No tracheal deviation present.   Cardiovascular: Normal rate.    Pulmonary/Chest: Effort normal. No respiratory distress.   Abdominal: Soft. She exhibits no distension.   Neurological: She is alert and oriented to person, place, and time. No cranial nerve deficit.   Skin: Skin is warm.   Psychiatric: She has a normal mood and affect. Her behavior is normal.         ASSESSMENT/PLAN:     Renata Bergman is a 76 y.o. female with port in place who no longer requires port, port was placed for chemotherapy for breast cancer      Given no urgent need for port removal and recent hx of MI with PCI and pt on plavix, will reschedule removal for future date.      Tom Bustillo MD PGY II  [phone]      I have personally performed a detailed history and physical examination on this patient. My findings are summarized in the resident's note included in the record.  "

## 2017-06-08 ENCOUNTER — TELEPHONE (OUTPATIENT)
Dept: CARDIAC REHAB | Facility: CLINIC | Age: 77
End: 2017-06-08

## 2017-06-12 DIAGNOSIS — I25.10 CORONARY ATHEROSCLEROSIS OF UNSPECIFIED TYPE OF VESSEL, NATIVE OR GRAFT: ICD-10-CM

## 2017-06-12 DIAGNOSIS — Z98.61 S/P PERCUTANEOUS TRANSLUMINAL CORONARY ANGIOPLASTY: Primary | ICD-10-CM

## 2017-06-14 ENCOUNTER — OFFICE VISIT (OUTPATIENT)
Dept: CARDIOLOGY | Facility: CLINIC | Age: 77
End: 2017-06-14
Payer: MEDICARE

## 2017-06-14 VITALS
HEIGHT: 64 IN | BODY MASS INDEX: 41.92 KG/M2 | WEIGHT: 245.56 LBS | HEART RATE: 71 BPM | DIASTOLIC BLOOD PRESSURE: 63 MMHG | SYSTOLIC BLOOD PRESSURE: 135 MMHG

## 2017-06-14 DIAGNOSIS — I10 HTN (HYPERTENSION), BENIGN: Chronic | ICD-10-CM

## 2017-06-14 DIAGNOSIS — I45.10 RBBB: ICD-10-CM

## 2017-06-14 DIAGNOSIS — E78.2 MIXED HYPERLIPIDEMIA: Chronic | ICD-10-CM

## 2017-06-14 DIAGNOSIS — R09.89 RIGHT CAROTID BRUIT: ICD-10-CM

## 2017-06-14 DIAGNOSIS — C50.412 MALIGNANT NEOPLASM OF UPPER-OUTER QUADRANT OF LEFT FEMALE BREAST: ICD-10-CM

## 2017-06-14 DIAGNOSIS — E11.51 CONTROLLED TYPE 2 DM WITH PERIPHERAL CIRCULATORY DISORDER: Chronic | ICD-10-CM

## 2017-06-14 DIAGNOSIS — N18.30 CHRONIC RENAL DISEASE, STAGE 3, MODERATELY DECREASED GLOMERULAR FILTRATION RATE BETWEEN 30-59 ML/MIN/1.73 SQUARE METER: Chronic | ICD-10-CM

## 2017-06-14 DIAGNOSIS — E66.01 MORBID OBESITY WITH BMI OF 40.0-44.9, ADULT: ICD-10-CM

## 2017-06-14 DIAGNOSIS — Z95.5 STENTED CORONARY ARTERY: ICD-10-CM

## 2017-06-14 DIAGNOSIS — I25.2 OLD MI (MYOCARDIAL INFARCTION): ICD-10-CM

## 2017-06-14 DIAGNOSIS — I10 HTN (HYPERTENSION), BENIGN: Primary | Chronic | ICD-10-CM

## 2017-06-14 PROCEDURE — 1159F MED LIST DOCD IN RCRD: CPT | Mod: S$GLB,,, | Performed by: INTERNAL MEDICINE

## 2017-06-14 PROCEDURE — 99499 UNLISTED E&M SERVICE: CPT | Mod: S$PBB,,, | Performed by: INTERNAL MEDICINE

## 2017-06-14 PROCEDURE — 99214 OFFICE O/P EST MOD 30 MIN: CPT | Mod: S$GLB,,, | Performed by: INTERNAL MEDICINE

## 2017-06-14 PROCEDURE — 99999 PR PBB SHADOW E&M-EST. PATIENT-LVL III: CPT | Mod: PBBFAC,,, | Performed by: INTERNAL MEDICINE

## 2017-06-14 PROCEDURE — 3045F PR MOST RECENT HEMOGLOBIN A1C LEVEL 7.0-9.0%: CPT | Mod: S$GLB,,, | Performed by: INTERNAL MEDICINE

## 2017-06-14 PROCEDURE — 1126F AMNT PAIN NOTED NONE PRSNT: CPT | Mod: S$GLB,,, | Performed by: INTERNAL MEDICINE

## 2017-06-14 RX ORDER — NITROGLYCERIN 0.4 MG/1
0.4 TABLET SUBLINGUAL EVERY 5 MIN PRN
Qty: 30 TABLET | Refills: 1 | Status: SHIPPED | OUTPATIENT
Start: 2017-06-14 | End: 2017-06-14 | Stop reason: SDUPTHER

## 2017-06-14 RX ORDER — NITROGLYCERIN 0.4 MG/1
TABLET SUBLINGUAL
Qty: 450 TABLET | Refills: 1 | Status: SHIPPED | OUTPATIENT
Start: 2017-06-14 | End: 2017-12-16 | Stop reason: SDUPTHER

## 2017-06-14 NOTE — PROGRESS NOTES
Subjective:   Patient ID:  Renata Bergman is a 76 y.o. female who presents for follow-up of Hospital Follow Up      HPI: Patient was recently hospitalized with NSTEMI and underwent KONRAD to OM3.  Since the discharge she is doing well and has no more symptoms. She is about to start cardiac rehab.      For her breast CA -She had 3 weeks of weekly Taxol and then because of insurance issues she was changed to CMF and had 5 cycles of that.   That was completed in January 2017.  She completed adjuvant radiation on 4/6/2017.    Lab Results   Component Value Date     05/23/2017    K 4.2 05/23/2017     05/23/2017    CO2 22 (L) 05/23/2017    BUN 19 05/23/2017    CREATININE 1.4 05/23/2017     05/23/2017    HGBA1C 7.0 (H) 02/09/2017    MG 1.7 05/23/2017    AST 24 05/21/2017    ALT 14 05/21/2017    ALBUMIN 3.2 (L) 05/21/2017    PROT 6.9 05/21/2017    BILITOT 0.3 05/21/2017    WBC 4.45 05/23/2017    HGB 9.0 (L) 05/23/2017    HCT 28.8 (L) 05/23/2017    MCV 81 (L) 05/23/2017     05/23/2017    INR 1.0 05/23/2017    TSH 2.233 02/09/2017    CHOL 175 01/26/2017    HDL 67 01/26/2017    LDLCALC 93.6 01/26/2017    TRIG 72 01/26/2017       Review of Systems   Constitution: Positive for weakness.   HENT: Negative.    Eyes: Negative.    Cardiovascular: Positive for dyspnea on exertion. Negative for chest pain, claudication, irregular heartbeat, leg swelling, near-syncope, palpitations and syncope.   Respiratory: Negative.  Negative for cough, shortness of breath, snoring and wheezing.    Endocrine: Negative.  Negative for cold intolerance, heat intolerance, polydipsia, polyphagia and polyuria.   Skin: Negative.    Musculoskeletal: Positive for arthritis, back pain and muscle cramps.   Gastrointestinal: Negative.    Genitourinary: Negative.    Neurological: Positive for light-headedness and loss of balance.   Psychiatric/Behavioral: Negative.        Current Outpatient Prescriptions:     allopurinol (ZYLOPRIM) 300  "MG tablet, take 1 tablet by mouth once daily, Disp: 90 tablet, Rfl: 4    amlodipine (NORVASC) 5 MG tablet, TAKE 1 TABLET BY MOUTH DAILY, Disp: 90 tablet, Rfl: 4    aspirin 81 MG Chew, Take 81 mg by mouth once daily.  , Disp: , Rfl:     carvedilol (COREG) 25 MG tablet, TAKE 1 TABLET BY MOUTH TWICE DAILY, Disp: 180 tablet, Rfl: 4    chlorthalidone (HYGROTEN) 25 MG Tab, Take 1/2 tablet by mouth daily., Disp: 90 tablet, Rfl: 4    clopidogrel (PLAVIX) 75 mg tablet, Take 1 tablet (75 mg total) by mouth once daily., Disp: 30 tablet, Rfl: 0    fexofenadine (ALLEGRA) 30 MG tablet, Take 30 mg by mouth daily as needed., Disp: , Rfl:     letrozole (FEMARA) 2.5 mg Tab, Take 1 tablet (2.5 mg total) by mouth once daily., Disp: 30 tablet, Rfl: 2    levetiracetam (KEPPRA) 750 MG Tab, Take 500 mg by mouth 2 (two) times daily. Pt unsure of dosage., Disp: , Rfl:     metformin (GLUCOPHAGE) 850 MG tablet, TAKE 1 TABLET BY MOUTH TWICE DAILY, Disp: 180 tablet, Rfl: 4    omeprazole (PRILOSEC) 20 MG capsule, TAKE 2 CAPSULES BY MOUTH EVERY DAY, Disp: 180 capsule, Rfl: 4    polyethylene glycol (GLYCOLAX) 17 gram/dose powder, Take 17 g by mouth once daily. (Patient taking differently: Take 17 g by mouth once daily. Pt taking PRN), Disp: 595 g, Rfl: 11    potassium chloride (MICRO-K) 10 MEQ CpSR, Take 1 capsule (10 mEq total) by mouth once daily., Disp: 90 capsule, Rfl: 4    rosuvastatin (CRESTOR) 40 MG Tab, TAKE ONE TABLET BY MOUTH ONCE DAILY, Disp: 90 tablet, Rfl: 4    nitroGLYCERIN (NITROSTAT) 0.4 MG SL tablet, PLACE 1 TABLET UNDER THE TONGUE EVERY 5 MINUTES AS NEEDED FOR CHEST PAIN., Disp: 450 tablet, Rfl: 1    Objective:   Physical Exam   Constitutional: She is oriented to person, place, and time. She appears well-developed and well-nourished.   /63   Pulse 71   Ht 5' 4" (1.626 m)   Wt 111.4 kg (245 lb 9.5 oz)   BMI 42.16 kg/m²      HENT:   Head: Normocephalic.   Eyes: Pupils are equal, round, and reactive to light. "   Neck: Normal range of motion. Neck supple. No thyromegaly present.   Cardiovascular: Normal rate, regular rhythm, normal heart sounds and intact distal pulses.  Exam reveals no gallop and no friction rub.    No murmur heard.  Pulses:       Carotid pulses are 2+ on the right side with bruit, and 2+ on the left side with bruit.       Radial pulses are 2+ on the right side, and 2+ on the left side.        Femoral pulses are 2+ on the right side, and 2+ on the left side.       Popliteal pulses are 2+ on the right side, and 2+ on the left side.        Dorsalis pedis pulses are 2+ on the right side, and 2+ on the left side.        Posterior tibial pulses are 2+ on the right side, and 2+ on the left side.   Pulmonary/Chest: Effort normal and breath sounds normal. No respiratory distress. She has no wheezes. She has no rales. She exhibits no tenderness.   Abdominal: Soft. Bowel sounds are normal.   Musculoskeletal: Normal range of motion. She exhibits no edema.   Neurological: She is alert and oriented to person, place, and time.   Skin: Skin is warm and dry.   Psychiatric: She has a normal mood and affect.   Nursing note and vitals reviewed.      Assessment and Plan:     Problem List Items Addressed This Visit        Cardiology Problems    HTN (hypertension), benign - Primary (Chronic)    Relevant Orders    CAR Ultrasound doppler carotid bliateral    Hemoglobin A1c    Comprehensive metabolic panel    Lipid panel    Hyperlipidemia (Chronic)    Relevant Orders    CAR Ultrasound doppler carotid bliateral    Hemoglobin A1c    Comprehensive metabolic panel    Lipid panel    Controlled type 2 DM with peripheral circulatory disorder (Chronic)    Relevant Orders    CAR Ultrasound doppler carotid bliateral    Hemoglobin A1c    Comprehensive metabolic panel    Lipid panel    Old MI (myocardial infarction)    Relevant Orders    CAR Ultrasound doppler carotid bliateral    Hemoglobin A1c    Comprehensive metabolic panel    Lipid  panel    RBBB       Other    Chronic renal disease, stage 3, moderately decreased glomerular filtration rate between 30-59 mL/min/1.73 square meter (Chronic)    Relevant Orders    CAR Ultrasound doppler carotid bliateral    Hemoglobin A1c    Comprehensive metabolic panel    Lipid panel    Morbid obesity with BMI of 40.0-44.9, adult    Relevant Orders    Hemoglobin A1c    Comprehensive metabolic panel    Lipid panel    Stented coronary artery    Relevant Orders    CAR Ultrasound doppler carotid bliateral    Malignant neoplasm of upper-outer quadrant of left female breast    Relevant Orders    CAR Ultrasound doppler carotid bliateral    Right carotid bruit    Relevant Orders    CAR Ultrasound doppler carotid bliateral    Hemoglobin A1c    Comprehensive metabolic panel    Lipid panel      Other Visit Diagnoses    None.       Sign up for cardiac rehab and continue on the present regimen.  Return in about 6 months (around 12/14/2017).

## 2017-06-19 ENCOUNTER — TELEPHONE (OUTPATIENT)
Dept: CARDIOLOGY | Facility: CLINIC | Age: 77
End: 2017-06-19

## 2017-06-19 ENCOUNTER — OFFICE VISIT (OUTPATIENT)
Dept: RADIATION ONCOLOGY | Facility: CLINIC | Age: 77
End: 2017-06-19
Payer: MEDICARE

## 2017-06-19 ENCOUNTER — CLINICAL SUPPORT (OUTPATIENT)
Dept: CARDIOLOGY | Facility: CLINIC | Age: 77
End: 2017-06-19
Payer: MEDICARE

## 2017-06-19 VITALS
WEIGHT: 244.94 LBS | HEART RATE: 84 BPM | HEIGHT: 64 IN | BODY MASS INDEX: 41.82 KG/M2 | RESPIRATION RATE: 16 BRPM | SYSTOLIC BLOOD PRESSURE: 129 MMHG | DIASTOLIC BLOOD PRESSURE: 58 MMHG

## 2017-06-19 DIAGNOSIS — I25.2 OLD MI (MYOCARDIAL INFARCTION): ICD-10-CM

## 2017-06-19 DIAGNOSIS — E78.2 MIXED HYPERLIPIDEMIA: Chronic | ICD-10-CM

## 2017-06-19 DIAGNOSIS — R09.89 RIGHT CAROTID BRUIT: ICD-10-CM

## 2017-06-19 DIAGNOSIS — I10 HTN (HYPERTENSION), BENIGN: Chronic | ICD-10-CM

## 2017-06-19 DIAGNOSIS — C50.412 MALIGNANT NEOPLASM OF UPPER-OUTER QUADRANT OF LEFT FEMALE BREAST: ICD-10-CM

## 2017-06-19 DIAGNOSIS — C50.412 MALIGNANT NEOPLASM OF UPPER-OUTER QUADRANT OF LEFT FEMALE BREAST: Primary | ICD-10-CM

## 2017-06-19 DIAGNOSIS — E11.51 CONTROLLED TYPE 2 DM WITH PERIPHERAL CIRCULATORY DISORDER: Chronic | ICD-10-CM

## 2017-06-19 DIAGNOSIS — Z95.5 STENTED CORONARY ARTERY: ICD-10-CM

## 2017-06-19 DIAGNOSIS — N18.30 CHRONIC RENAL DISEASE, STAGE 3, MODERATELY DECREASED GLOMERULAR FILTRATION RATE BETWEEN 30-59 ML/MIN/1.73 SQUARE METER: Chronic | ICD-10-CM

## 2017-06-19 LAB — INTERNAL CAROTID STENOSIS: ABNORMAL

## 2017-06-19 PROCEDURE — 99999 PR PBB SHADOW E&M-EST. PATIENT-LVL III: CPT | Mod: PBBFAC,,, | Performed by: RADIOLOGY

## 2017-06-19 PROCEDURE — 99499 UNLISTED E&M SERVICE: CPT | Mod: S$PBB,,, | Performed by: RADIOLOGY

## 2017-06-19 PROCEDURE — 99499 UNLISTED E&M SERVICE: CPT | Mod: S$GLB,,, | Performed by: RADIOLOGY

## 2017-06-19 PROCEDURE — 93880 EXTRACRANIAL BILAT STUDY: CPT | Mod: S$GLB,,, | Performed by: INTERNAL MEDICINE

## 2017-06-19 NOTE — TELEPHONE ENCOUNTER
----- Message from Jaycee Puente MD sent at 6/19/2017  3:04 PM CDT -----  Please inform the patient that Carotid duplex was c/w mild - moderate bilateral disease. Please continue on the same medical regimen.  Will check it again in 6 months.

## 2017-06-19 NOTE — PROGRESS NOTES
Subjective:       Patient ID: Renata Bergman is a 76 y.o. female.    Chief Complaint: Breast Cancer (f/u after xrt)    This patient presents for her initial follow up visit.     Ms. 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  She  presented in July of 2016 when mammogram revealed a high density irregular mass measuring 22 mm with spiculated margins and associated punctate calcifications in the left breast at the 2:00 position. Core needle biopsy revealed grade 2 infiltrating ductal carcinoma. Over 90% of the tumor cells were strongly ER and ME positive HER-2 was negative. She subsequently underwent wire localization lumpectomy along with sentinel lymph node biopsy on 8/1/16. Pathology revealed a 1.8 x 1.6 x 1 cm focus of infiltrating ductal carcinoma. The tumor was histologic grade 3, nuclear grade 2, and mitotic index 2. There was associated DCIS but no evidence of EIC. The margins of resection were negative. The superior margin was closest at 3 mm. There was one sentinel lymph node removed which was negative for tumor involvement. An Oncotype Dx score returned at 31. The patient began adjuvant chemotherapy and completed 3 cycles of weekly Taxol. This was changed secondary to insurance issues to [facility] for 4 cycles. She was referred to our department for adjuvant radiotherapy and completed 45 Gy to the Lt. breast followed by a boost to the primary site on 4/6/17.  Today, the patient states she feels well.  Denies Lt. breast complaints.  Of note the patient underwent genetic testing which was positive for a significant BRCA2 mutation.        Review of Systems   Constitutional: Negative for activity change, appetite change, chills, fatigue and fever.   Respiratory: Negative for cough and shortness of breath.    Cardiovascular: Negative for chest pain and palpitations.   Gastrointestinal: Negative for abdominal pain, constipation, diarrhea, nausea and vomiting.        Objective:      Physical Exam   Constitutional: She appears well-developed and well-nourished. No distress.   Neck: Normal range of motion. Neck supple.   Pulmonary/Chest: Effort normal. No respiratory distress. Right breast exhibits no inverted nipple, no mass, no nipple discharge, no skin change and no tenderness. Left breast exhibits skin change (mild tanning of the skin). Left breast exhibits no inverted nipple, no mass, no nipple discharge and no tenderness.   Lymphadenopathy:     She has no cervical adenopathy.     She has no axillary adenopathy.        Right: No supraclavicular adenopathy present.        Left: No supraclavicular adenopathy present.       Assessment:       1. Malignant neoplasm of upper-outer quadrant of left female breast        Plan:       Recovering well from the acute effects of radiotherapy.  She is planned for follow up MMG and follow up at [facility].  She will continue on hormonal therapy.  Plan follow up with us PRN.

## 2017-06-21 ENCOUNTER — HOSPITAL ENCOUNTER (OUTPATIENT)
Dept: CARDIOLOGY | Facility: CLINIC | Age: 77
Discharge: HOME | End: 2017-06-21
Payer: MEDICARE

## 2017-06-21 DIAGNOSIS — I25.10 CORONARY ATHEROSCLEROSIS OF UNSPECIFIED TYPE OF VESSEL, NATIVE OR GRAFT: ICD-10-CM

## 2017-06-21 DIAGNOSIS — Z98.61 S/P PERCUTANEOUS TRANSLUMINAL CORONARY ANGIOPLASTY: ICD-10-CM

## 2017-06-21 LAB — DIASTOLIC DYSFUNCTION: NO

## 2017-06-21 PROCEDURE — 94621 CARDIOPULM EXERCISE TESTING: CPT | Mod: S$GLB,,, | Performed by: INTERNAL MEDICINE

## 2017-06-26 ENCOUNTER — TELEPHONE (OUTPATIENT)
Dept: CARDIAC REHAB | Facility: CLINIC | Age: 77
End: 2017-06-26

## 2017-06-27 ENCOUNTER — CLINICAL SUPPORT (OUTPATIENT)
Dept: CARDIAC REHAB | Facility: CLINIC | Age: 77
End: 2017-06-27
Payer: MEDICARE

## 2017-06-27 VITALS
RESPIRATION RATE: 16 BRPM | DIASTOLIC BLOOD PRESSURE: 60 MMHG | HEART RATE: 74 BPM | SYSTOLIC BLOOD PRESSURE: 110 MMHG | OXYGEN SATURATION: 99 %

## 2017-06-27 DIAGNOSIS — I25.10 CORONARY ATHEROSCLEROSIS OF UNSPECIFIED TYPE OF VESSEL, NATIVE OR GRAFT: ICD-10-CM

## 2017-06-27 DIAGNOSIS — Z98.61 POSTSURGICAL PERCUTANEOUS TRANSLUMINAL CORONARY ANGIOPLASTY STATUS: ICD-10-CM

## 2017-06-27 PROCEDURE — 93798 PHYS/QHP OP CAR RHAB W/ECG: CPT | Mod: S$GLB,,, | Performed by: DIETITIAN, REGISTERED

## 2017-06-27 PROCEDURE — 99999 PR PBB SHADOW E&M-EST. PATIENT-LVL III: CPT | Mod: PBBFAC,,,

## 2017-06-27 NOTE — PROGRESS NOTES
Met with Renata Bergman for orientation to Phase II cardiac rehab.      Weight: 244 lbs  BMI: 41.87  Abdominal girth: 45.5 inches  Body fat: 36.4%    Pt's weight goal is <220 lbs.    Labs reviewed with patient. Patient confirms she is taking rosuvastatin for cholesterol control.    Lab Results   Component Value Date    CHOL 190 06/21/2017     Lab Results   Component Value Date    HDL 61 06/21/2017     Lab Results   Component Value Date    LDLCALC 111.6 06/21/2017     Lab Results   Component Value Date    TRIG 87 06/21/2017     Lab Results   Component Value Date    CHOLHDL 32.1 06/21/2017         Lab Results   Component Value Date    GLUF 96 06/21/2017     Lab Results   Component Value Date    HGBA1C 6.6 (H) 06/21/2017       Vitamins/supplements: going to start taking fish oil    History of diabetes for 6 years and is followed by Dr. Ethel Berger.  Diabetic medications currently taking include Metformin.  Patient has a home glucometer and checks her glucose 1x/day, may forget some days.  Reports typical morning glucose ranges between 107-120 mg/dL.  Patient verbalizes understanding to bring home glucometer and check glucose pre and post each exercise session.  Per cardiac rehab protocols, patient's glucose must be between 90 and 270 mg/dL to exercise.  Patient denies any recent glucose levels less than 60 mg/dL or greater than 300 mg/dL.  Patient reports that she is not aware of signs and symptoms of hypoglycemia; has many side effects from medication.  Patient verbalizes importance of notifying rehab staff if symptoms of hypoglycemia (or if she is feeling bad) occur while at cardiac rehab.     Patient eats 2-3 meals daily. Pt may skip breakfast or lunch at times. Pt prepares meals at home.  Seasons food with kosher salt, Mrs Dash, garlic powder.  Denies use of a salt shaker at the table on prepared foods. Dines out 3-4 meals per month. Pt reports that she is limiting fried foods, may choose fried foods  1x/month.  Chooses fish 1x/week. Patient willing to increase fish intake (non-fried varieties) to a goal of 2-3 servings per week.  Beverages include ginger ale, sprite, water.  Alcohol: none.    3-day food record given to patient to complete and return for nutritional assessment.  Will follow Renata Bergman while enrolled in Phase II cardiac rehab and encourage compliance to 2 gram sodium, Mediterranean style eating.      Bri Delgadillo MS, RD, LDN

## 2017-06-27 NOTE — PROGRESS NOTES
Patient here for Phase II Cardiac Rehab orientation.      Discussed QOL, Risk factors & goals with patient.  Head to toe nursing assessment done.  Vital signs obtained. Patient denies any chest pain, shortness of breath or palpitations. There is no edema upper and lower extremities. Upper and lower extremity pulses are good. Patient is type II diabetic under control no evidence of foot ulcers or circulation problems. Patient has recently undergone radiation and chemo for breast cancer.    QOL:  Discussed Millie & SF36 Questionnaire scores with patient.  Patient denies any overwhelming stress or anxiety and states she has good coping skills. Patient has been instructed to notify staff in the event that circumstances worsen.  Patient verbalizes understanding.    RISK FACTORS:  The following risk factors are present:  diabetes, nutrition, hyperlipidemia, hypertension, obesity, positive family history, sedentary lifestyle, exercise    GOALS:  Patient has set the following goals:  Decrease cholesterol level  Increase exercise tolerance  Increase knowledge of CAD  Decrease blood pressure  Weight loss  Demonstrate accurate pulse taking  ID & manage personal areas of stress  Control diabetes by adjusting diet and exercise  Learn more about healthy eating    Discussed Cardiac Rehab program in depth with patient.  Medication list updated per patient & marked as reviewed.  Patient has been instructed to notify staff of any problems while attending rehab (ie: chest pain, shortness of breath, lightheadedness, dizziness).  Patient has been instructed to monitor blood pressure readings outside of rehab & to keep a daily log of the readings.  Patient verbalizes understanding.    Jose Lovell RN  Cardiac Rehab Nurse

## 2017-06-27 NOTE — PROGRESS NOTES
Exercise:  Met with the patient for orientation.  Consent forms were signed, entry CPX test results were reviewed, proper attire and shoes were discussed, and strength assessment was performed.         Entry CPX test results included weight (244 lb), abdominal girth (45.50 in), BMI (41.87), and body composition (36.4%).  An estimated MET Level of 3.0 and a Peak VO2 of 10.2ml/kg/min (2.9 actual METS) were measured.  This places the patient in the high risk category.  Upon exit CPX an estimated MET Level of 3.9 will be desired to achieve the goal of a 30% improvement.     Based on entry CPX test, the target heart rate range for patient is 92 to 110 bpm.  The patient will attend cardiac rehab class 3 times per week which will include both aerobic and resistance training which will be modified to fit limitations.  Aerobic exercise will be 30-60 minutes with a goal intensity of 12-15 on the RPE scale.  Resistance training will incorporate free weights 1-2 sets, 10-15 repetitions with a beginning weight of 3 to 5 pounds based on strength assessment.    There were no limitations noted by patient but she did state she had bilateral knee replacement and has on occasion some pain in the left knee.  The patient stated she is currently not exercising.  Patient was encouraged to begin exercising aerobically and to prepare to exercise at least two additional days per week outside of attending cardiac rehab class for a minimum of 30 minutes.  The patient stated understanding.      The patient will begin Cardiac Rehab on Monday, July  at 10:00am.    Exercise prescription will be adjusted based on tolerance of exercise intensity by patient.    Lisa Garnica., CEP

## 2017-06-28 RX ORDER — CLOPIDOGREL BISULFATE 75 MG/1
75 TABLET ORAL DAILY
Qty: 30 TABLET | Refills: 6 | Status: SHIPPED | OUTPATIENT
Start: 2017-06-28 | End: 2018-01-23 | Stop reason: SDUPTHER

## 2017-06-29 ENCOUNTER — TELEPHONE (OUTPATIENT)
Dept: INTERNAL MEDICINE | Facility: CLINIC | Age: 77
End: 2017-06-29

## 2017-06-29 NOTE — TELEPHONE ENCOUNTER
----- Message from Andrews Reina MA sent at 6/29/2017  8:53 AM CDT -----  Contact: [employer]- [phone] Fax # [phone]   Requesting Orders for a New True Metrix Glucometer with Diagnosis code and last office notes. Please call. Thanks!

## 2017-07-03 ENCOUNTER — CLINICAL SUPPORT (OUTPATIENT)
Dept: CARDIAC REHAB | Facility: CLINIC | Age: 77
End: 2017-07-03
Payer: MEDICARE

## 2017-07-03 DIAGNOSIS — I25.10 CORONARY ATHEROSCLEROSIS OF UNSPECIFIED TYPE OF VESSEL, NATIVE OR GRAFT: ICD-10-CM

## 2017-07-03 DIAGNOSIS — Z98.61 S/P PERCUTANEOUS TRANSLUMINAL CORONARY ANGIOPLASTY: ICD-10-CM

## 2017-07-03 PROCEDURE — 93798 PHYS/QHP OP CAR RHAB W/ECG: CPT | Mod: S$GLB,,, | Performed by: INTERNAL MEDICINE

## 2017-07-05 ENCOUNTER — CLINICAL SUPPORT (OUTPATIENT)
Dept: CARDIAC REHAB | Facility: CLINIC | Age: 77
End: 2017-07-05
Payer: MEDICARE

## 2017-07-05 DIAGNOSIS — I25.10 CORONARY ATHEROSCLEROSIS OF UNSPECIFIED TYPE OF VESSEL, NATIVE OR GRAFT: ICD-10-CM

## 2017-07-05 DIAGNOSIS — Z98.61 POSTSURGICAL PERCUTANEOUS TRANSLUMINAL CORONARY ANGIOPLASTY STATUS: ICD-10-CM

## 2017-07-05 PROCEDURE — 93798 PHYS/QHP OP CAR RHAB W/ECG: CPT | Mod: S$GLB,,, | Performed by: INTERNAL MEDICINE

## 2017-07-07 ENCOUNTER — CLINICAL SUPPORT (OUTPATIENT)
Dept: CARDIAC REHAB | Facility: CLINIC | Age: 77
End: 2017-07-07
Payer: MEDICARE

## 2017-07-07 DIAGNOSIS — Z98.61 S/P PERCUTANEOUS TRANSLUMINAL CORONARY ANGIOPLASTY: ICD-10-CM

## 2017-07-07 DIAGNOSIS — I25.10 CORONARY ATHEROSCLEROSIS OF UNSPECIFIED TYPE OF VESSEL, NATIVE OR GRAFT: ICD-10-CM

## 2017-07-07 PROCEDURE — 93798 PHYS/QHP OP CAR RHAB W/ECG: CPT | Mod: S$GLB,,, | Performed by: INTERNAL MEDICINE

## 2017-07-10 ENCOUNTER — CLINICAL SUPPORT (OUTPATIENT)
Dept: CARDIAC REHAB | Facility: CLINIC | Age: 77
End: 2017-07-10
Payer: MEDICARE

## 2017-07-10 DIAGNOSIS — I25.10 CORONARY ATHEROSCLEROSIS OF UNSPECIFIED TYPE OF VESSEL, NATIVE OR GRAFT: ICD-10-CM

## 2017-07-10 DIAGNOSIS — Z98.61 POSTSURGICAL PERCUTANEOUS TRANSLUMINAL CORONARY ANGIOPLASTY STATUS: ICD-10-CM

## 2017-07-10 PROCEDURE — 93798 PHYS/QHP OP CAR RHAB W/ECG: CPT | Mod: S$GLB,,, | Performed by: INTERNAL MEDICINE

## 2017-07-11 ENCOUNTER — OFFICE VISIT (OUTPATIENT)
Dept: HEMATOLOGY/ONCOLOGY | Facility: CLINIC | Age: 77
End: 2017-07-11
Payer: MEDICARE

## 2017-07-11 VITALS
RESPIRATION RATE: 16 BRPM | SYSTOLIC BLOOD PRESSURE: 176 MMHG | WEIGHT: 244.69 LBS | BODY MASS INDEX: 42 KG/M2 | DIASTOLIC BLOOD PRESSURE: 74 MMHG | HEART RATE: 86 BPM | TEMPERATURE: 99 F

## 2017-07-11 DIAGNOSIS — C50.412 MALIGNANT NEOPLASM OF UPPER-OUTER QUADRANT OF LEFT BREAST IN FEMALE, ESTROGEN RECEPTOR POSITIVE: Primary | ICD-10-CM

## 2017-07-11 DIAGNOSIS — Z17.0 MALIGNANT NEOPLASM OF UPPER-OUTER QUADRANT OF LEFT BREAST IN FEMALE, ESTROGEN RECEPTOR POSITIVE: Primary | ICD-10-CM

## 2017-07-11 PROCEDURE — 1126F AMNT PAIN NOTED NONE PRSNT: CPT | Mod: S$GLB,,, | Performed by: INTERNAL MEDICINE

## 2017-07-11 PROCEDURE — 99499 UNLISTED E&M SERVICE: CPT | Mod: S$PBB,,, | Performed by: INTERNAL MEDICINE

## 2017-07-11 PROCEDURE — 1159F MED LIST DOCD IN RCRD: CPT | Mod: S$GLB,,, | Performed by: INTERNAL MEDICINE

## 2017-07-11 PROCEDURE — 99999 PR PBB SHADOW E&M-EST. PATIENT-LVL III: CPT | Mod: PBBFAC,,, | Performed by: INTERNAL MEDICINE

## 2017-07-11 PROCEDURE — 99213 OFFICE O/P EST LOW 20 MIN: CPT | Mod: S$GLB,,, | Performed by: INTERNAL MEDICINE

## 2017-07-11 NOTE — PROGRESS NOTES
Yes mass  Subjective:       Patient ID: Renata Bergman is a 76 y.o. female.    Chief Complaint: Breast Cancer    HPI Mrs. Hussein is a 76-year-old female with stage I left breast cancer.  She  had 3 weeks of weekly Taxol and then because of insurance issues she was changed to CMF and had 5 cycles of that.     That was completed in January 2017.  She then received radiation therapy and started letrozole in April 2017.    Since her last visit she had an MI and is currently in cardiac rehabilitation.    She's been feeling well overall.  Appetite is off a bit, bowel function has been good.  She has no pain.  She denies shortness of breath    Breast history: Screening mammogram on May 5 showed an irregular 22 mm mass with abnormal calcifications in the left breast 2:00 position. Follow-up ultrasound July 13 she noted a regular solid 17 mm mass.    On July 21 needle biopsy showed grade 2 infiltrating ductal carcinoma strongly ER FL positive (90%) and HER-2 negative.    On August 1 lumpectomy and sentinel lymph node biopsy were performed. That showed a 1.8 x 1.6 x 1.0 infiltrating carcinoma intermediate grade (histologic grade 3, nuclear grade 2, mitotic index 2.   The sentinel lymph node was negative and margins were negative. Final pathological stage TIc N0 stage IA.  Oncotype was 31 indicating a 21% risk of recurrence with tamoxifen alone.    completed 45 Gy to the Lt. breast followed by a boost to the primary site on 4/6/17    Review of Systems   Constitutional: Positive for fatigue. Negative for activity change, appetite change, fever and unexpected weight change.   HENT: Negative for trouble swallowing.    Respiratory: Negative for cough and shortness of breath.    Gastrointestinal: Negative for abdominal pain, constipation, diarrhea and nausea.   Musculoskeletal: Negative for back pain.   Neurological: Negative for headaches.   Psychiatric/Behavioral: Negative for dysphoric mood. The patient is not  nervous/anxious.        Objective:      Physical Exam   Constitutional: She is oriented to person, place, and time. She appears well-developed and well-nourished.   HENT:   Mouth/Throat: No oropharyngeal exudate.   Eyes: No scleral icterus.   Cardiovascular: Regular rhythm and normal heart sounds.    Pulmonary/Chest: Effort normal and breath sounds normal. She has no wheezes. She has no rales. Right breast exhibits no mass, no nipple discharge and no skin change. Left breast exhibits no mass, no nipple discharge and no skin change.       Abdominal: Soft. She exhibits no mass. There is no tenderness.   Lymphadenopathy:     She has no cervical adenopathy.     She has no axillary adenopathy.        Right: No supraclavicular adenopathy present.        Left: No supraclavicular adenopathy present.   Neurological: She is alert and oriented to person, place, and time. No cranial nerve deficit.   Psychiatric: She has a normal mood and affect. Her behavior is normal. Thought content normal.   Vitals reviewed.      Assessment:      1. Malignant neoplasm of upper-outer quadrant of left breast in female, estrogen receptor positive        Plan:     She is scheduled to have mammograms this week.  Return to clinic in 3 months.

## 2017-07-12 ENCOUNTER — CLINICAL SUPPORT (OUTPATIENT)
Dept: CARDIAC REHAB | Facility: CLINIC | Age: 77
End: 2017-07-12
Payer: MEDICARE

## 2017-07-12 DIAGNOSIS — I25.10 CORONARY ATHEROSCLEROSIS OF UNSPECIFIED TYPE OF VESSEL, NATIVE OR GRAFT: ICD-10-CM

## 2017-07-12 DIAGNOSIS — Z98.61 POSTSURGICAL PERCUTANEOUS TRANSLUMINAL CORONARY ANGIOPLASTY STATUS: ICD-10-CM

## 2017-07-12 PROCEDURE — 93798 PHYS/QHP OP CAR RHAB W/ECG: CPT | Mod: S$GLB,,, | Performed by: INTERNAL MEDICINE

## 2017-07-13 ENCOUNTER — HOSPITAL ENCOUNTER (OUTPATIENT)
Dept: RADIOLOGY | Facility: HOSPITAL | Age: 77
Discharge: HOME | End: 2017-07-13
Attending: INTERNAL MEDICINE
Payer: MEDICARE

## 2017-07-13 ENCOUNTER — OFFICE VISIT (OUTPATIENT)
Dept: SURGERY | Facility: CLINIC | Age: 77
End: 2017-07-13
Payer: MEDICARE

## 2017-07-13 VITALS
BODY MASS INDEX: 40.57 KG/M2 | DIASTOLIC BLOOD PRESSURE: 72 MMHG | HEART RATE: 73 BPM | SYSTOLIC BLOOD PRESSURE: 113 MMHG | WEIGHT: 243.5 LBS | HEIGHT: 65 IN | TEMPERATURE: 98 F

## 2017-07-13 DIAGNOSIS — Z15.01 BRCA2 GENE MUTATION POSITIVE: ICD-10-CM

## 2017-07-13 DIAGNOSIS — C50.412 MALIGNANT NEOPLASM OF UPPER-OUTER QUADRANT OF LEFT FEMALE BREAST: ICD-10-CM

## 2017-07-13 DIAGNOSIS — Z15.09 BRCA2 GENE MUTATION POSITIVE: ICD-10-CM

## 2017-07-13 DIAGNOSIS — Z80.3 FAMILY HISTORY OF BREAST CANCER: ICD-10-CM

## 2017-07-13 DIAGNOSIS — Z85.3 PERSONAL HISTORY OF BREAST CANCER: Primary | ICD-10-CM

## 2017-07-13 DIAGNOSIS — Z12.39 BREAST CANCER SCREENING, HIGH RISK PATIENT: ICD-10-CM

## 2017-07-13 PROCEDURE — 77062 BREAST TOMOSYNTHESIS BI: CPT | Mod: 26,,, | Performed by: RADIOLOGY

## 2017-07-13 PROCEDURE — 77066 DX MAMMO INCL CAD BI: CPT | Mod: 26,,, | Performed by: RADIOLOGY

## 2017-07-13 PROCEDURE — 1126F AMNT PAIN NOTED NONE PRSNT: CPT | Mod: S$GLB,,, | Performed by: NURSE PRACTITIONER

## 2017-07-13 PROCEDURE — 99999 PR PBB SHADOW E&M-EST. PATIENT-LVL III: CPT | Mod: PBBFAC,,, | Performed by: NURSE PRACTITIONER

## 2017-07-13 PROCEDURE — 99213 OFFICE O/P EST LOW 20 MIN: CPT | Mod: S$GLB,,, | Performed by: NURSE PRACTITIONER

## 2017-07-13 PROCEDURE — 77066 DX MAMMO INCL CAD BI: CPT | Mod: TC

## 2017-07-13 PROCEDURE — 1159F MED LIST DOCD IN RCRD: CPT | Mod: S$GLB,,, | Performed by: NURSE PRACTITIONER

## 2017-07-13 NOTE — PROGRESS NOTES
Subjective:      Patient ID: Renata Bergman is a 76 y.o. female.    Chief Complaint: No chief complaint on file.      HPI: (PF, EPF - 1-3) (Detailed, Comp, - 4) patient presents for breast cancer surveillance . She is BRCA2 positive.     7/21/2016 core biopsy left breast with IDC, ER/SD +, HER-2 negative  8/1/2016 left lumpectomy with DCIS and IDC 1.8cm, negative SN. Adjuvant XRT    2016 BRCA2, [id number]      Review of Systems   Constitutional: Negative for appetite change, fatigue and fever.   Respiratory: Negative for cough and shortness of breath.    Cardiovascular: Negative for chest pain.   Musculoskeletal: Negative for back pain.     Objective:   Physical Exam  Assessment:       No diagnosis found.    Plan:       ***

## 2017-07-13 NOTE — LETTER
July 13, 2017      Gilson Nieto MD  [address]  [place] [facility] [address]           [facility]  [address]  [place] [facility] [address]  Phone: [phone]  Fax: [phone]          Patient: Renata Bergman   MR Number: [id number]   YOB: 1940   Date of Visit: 7/13/2017       Dear Dr. Gilson Nieto:    Thank you for referring Renata Bergman to me for evaluation. Attached you will find relevant portions of my assessment and plan of care.    If you have questions, please do not hesitate to call me. I look forward to following Renata Bergman along with you.    Sincerely,    Callie Morgan, NURIA    Enclosure  CC:  No Recipients    If you would like to receive this communication electronically, please contact [email or web address] or [phone] to request more information on [employer] [facility] access.    For providers and/or their staff who would like to refer a patient to [facility], please contact us through our one-stop-shop provider referral line, [facility], at [phone].    If you feel you have received this communication in error or would no longer like to receive these types of communications, please e-mail [email or web address]

## 2017-07-13 NOTE — PROGRESS NOTES
patient presents for breast cancer surveillance . She is BRCA2 positive. She was seen by Dr Guevara 2 days ago with no clinical abnormality or concerns and is requesting not to have another CBE today. Denies breast mass, pain, nipple discharge, skin changes.     7/21/2016 core biopsy left breast with IDC, ER/WA +, HER-2 negative  8/1/2016 left lumpectomy with DCIS and IDC 1.8cm, negative SN. Adjuvant XRT    2016 BRCA2, [id number]    We discussed again results of her genetic testing, associated cancer risks with BRCA2 pathogenic mutations, and options for increased screening. She has a history of complete hysterectomy. Discussed option of referral to GI secondary to pancreatic cancer risk, patient declines. Discussed prophylactic mastectomy, patient is not interested secondary to her age. Discussed increased breast cancer screening including semiannual CBE , annual mmg, and consideration of annual MRI. She states she would like to return in 6 months for CBE and discussion again at that time for MRI. She will continue her f/u as planned with medical oncology as well. I would like to try to better coordinate these appointments and avoid both appointments within the same month/week.     At this time, will plan on her returning in 6 months CBE    Highly encouraged her children to get tested for the BRCA2 mutation identified in this patient, her daughter is with her today and is scheduled for testing with her oncologist. She states her sons have copies of her genetic test results but have not made appointments for testing as of yet. Also encouraged testing for any living maternal family members.     Time in counseling 20 min, total time 20 min

## 2017-07-14 ENCOUNTER — CLINICAL SUPPORT (OUTPATIENT)
Dept: CARDIAC REHAB | Facility: CLINIC | Age: 77
End: 2017-07-14
Payer: MEDICARE

## 2017-07-14 DIAGNOSIS — I25.10 CORONARY ATHEROSCLEROSIS OF UNSPECIFIED TYPE OF VESSEL, NATIVE OR GRAFT: ICD-10-CM

## 2017-07-14 DIAGNOSIS — Z98.61 POSTSURGICAL PERCUTANEOUS TRANSLUMINAL CORONARY ANGIOPLASTY STATUS: ICD-10-CM

## 2017-07-14 PROCEDURE — 93798 PHYS/QHP OP CAR RHAB W/ECG: CPT | Mod: S$GLB,,, | Performed by: INTERNAL MEDICINE

## 2017-07-17 ENCOUNTER — CLINICAL SUPPORT (OUTPATIENT)
Dept: CARDIAC REHAB | Facility: CLINIC | Age: 77
End: 2017-07-17
Payer: MEDICARE

## 2017-07-17 DIAGNOSIS — Z98.61 POSTSURGICAL PERCUTANEOUS TRANSLUMINAL CORONARY ANGIOPLASTY STATUS: ICD-10-CM

## 2017-07-17 DIAGNOSIS — I25.10 CORONARY ARTERY DISEASE DUE TO LIPID RICH PLAQUE: Chronic | ICD-10-CM

## 2017-07-17 DIAGNOSIS — I25.83 CORONARY ARTERY DISEASE DUE TO LIPID RICH PLAQUE: Chronic | ICD-10-CM

## 2017-07-17 PROCEDURE — 93798 PHYS/QHP OP CAR RHAB W/ECG: CPT | Mod: S$GLB,,, | Performed by: INTERNAL MEDICINE

## 2017-07-18 ENCOUNTER — TELEPHONE (OUTPATIENT)
Dept: INTERNAL MEDICINE | Facility: CLINIC | Age: 77
End: 2017-07-18

## 2017-07-18 DIAGNOSIS — C50.919 BREAST CANCER, STAGE 1, UNSPECIFIED LATERALITY: Primary | ICD-10-CM

## 2017-07-18 RX ORDER — LETROZOLE 2.5 MG/1
2.5 TABLET, FILM COATED ORAL DAILY
Qty: 30 TABLET | Refills: 11 | Status: SHIPPED | OUTPATIENT
Start: 2017-07-18 | End: 2018-04-24 | Stop reason: ALTCHOICE

## 2017-07-18 NOTE — TELEPHONE ENCOUNTER
Pt states that she received a letter from [employer] stating that she had a diabetic monitor approved. Pt wants to know what she should do with this. Please advise. Pt states that the letter came in the mail on yesterday.

## 2017-07-18 NOTE — TELEPHONE ENCOUNTER
----- Message from Darlene Pelayo sent at 7/17/2017 12:47 PM CDT -----  Contact: self [phone]  Patient receive a letter from [email or web address] advise her to speak to PCP regarding her diabetic  meter     Please advise

## 2017-07-18 NOTE — TELEPHONE ENCOUNTER
----- Message from Rhonda Escalona sent at 7/18/2017 12:22 PM CDT -----  Contact: Pt can be reached at [phone]  Pt is calling to request a refill for letrozole (FEMARA) 2.5 mg Tab, pt is out of medication.      [facility] on [address] phone [phone]      Thank you!

## 2017-07-19 ENCOUNTER — CLINICAL SUPPORT (OUTPATIENT)
Dept: CARDIAC REHAB | Facility: CLINIC | Age: 77
End: 2017-07-19
Payer: MEDICARE

## 2017-07-19 DIAGNOSIS — Z98.61 S/P PERCUTANEOUS TRANSLUMINAL CORONARY ANGIOPLASTY: ICD-10-CM

## 2017-07-19 DIAGNOSIS — I25.10 CORONARY ATHEROSCLEROSIS OF UNSPECIFIED TYPE OF VESSEL, NATIVE OR GRAFT: ICD-10-CM

## 2017-07-19 PROCEDURE — 93798 PHYS/QHP OP CAR RHAB W/ECG: CPT | Mod: S$GLB,,, | Performed by: INTERNAL MEDICINE

## 2017-07-19 NOTE — TELEPHONE ENCOUNTER
Gave message to Bakari with [employer].a meter will be sent to pt's home. Bakari spoke with pt and got everything taking care of.

## 2017-07-21 ENCOUNTER — CLINICAL SUPPORT (OUTPATIENT)
Dept: CARDIAC REHAB | Facility: CLINIC | Age: 77
End: 2017-07-21
Payer: MEDICARE

## 2017-07-21 DIAGNOSIS — I25.10 CORONARY ATHEROSCLEROSIS OF UNSPECIFIED TYPE OF VESSEL, NATIVE OR GRAFT: ICD-10-CM

## 2017-07-21 DIAGNOSIS — Z98.61 POSTSURGICAL PERCUTANEOUS TRANSLUMINAL CORONARY ANGIOPLASTY STATUS: ICD-10-CM

## 2017-07-21 PROCEDURE — 93798 PHYS/QHP OP CAR RHAB W/ECG: CPT | Mod: S$GLB,,, | Performed by: INTERNAL MEDICINE

## 2017-07-24 ENCOUNTER — CLINICAL SUPPORT (OUTPATIENT)
Dept: CARDIAC REHAB | Facility: CLINIC | Age: 77
End: 2017-07-24
Payer: MEDICARE

## 2017-07-24 DIAGNOSIS — I25.10 CORONARY ATHEROSCLEROSIS OF UNSPECIFIED TYPE OF VESSEL, NATIVE OR GRAFT: ICD-10-CM

## 2017-07-24 DIAGNOSIS — Z98.61 S/P PERCUTANEOUS TRANSLUMINAL CORONARY ANGIOPLASTY: ICD-10-CM

## 2017-07-24 PROCEDURE — 93798 PHYS/QHP OP CAR RHAB W/ECG: CPT | Mod: S$GLB,,, | Performed by: INTERNAL MEDICINE

## 2017-07-26 ENCOUNTER — CLINICAL SUPPORT (OUTPATIENT)
Dept: CARDIAC REHAB | Facility: CLINIC | Age: 77
End: 2017-07-26
Payer: MEDICARE

## 2017-07-26 DIAGNOSIS — Z98.61 POSTSURGICAL PERCUTANEOUS TRANSLUMINAL CORONARY ANGIOPLASTY STATUS: ICD-10-CM

## 2017-07-26 DIAGNOSIS — I25.10 CORONARY ATHEROSCLEROSIS OF UNSPECIFIED TYPE OF VESSEL, NATIVE OR GRAFT: ICD-10-CM

## 2017-07-26 PROCEDURE — 93798 PHYS/QHP OP CAR RHAB W/ECG: CPT | Mod: S$GLB,,, | Performed by: INTERNAL MEDICINE

## 2017-07-28 ENCOUNTER — CLINICAL SUPPORT (OUTPATIENT)
Dept: CARDIAC REHAB | Facility: CLINIC | Age: 77
End: 2017-07-28
Payer: MEDICARE

## 2017-07-28 DIAGNOSIS — Z98.61 POSTSURGICAL PERCUTANEOUS TRANSLUMINAL CORONARY ANGIOPLASTY STATUS: ICD-10-CM

## 2017-07-28 DIAGNOSIS — I25.10 CORONARY ATHEROSCLEROSIS OF UNSPECIFIED TYPE OF VESSEL, NATIVE OR GRAFT: ICD-10-CM

## 2017-07-28 PROCEDURE — 93798 PHYS/QHP OP CAR RHAB W/ECG: CPT | Mod: S$GLB,,, | Performed by: INTERNAL MEDICINE

## 2017-07-28 NOTE — PROGRESS NOTES
Patient tolerated exercises well without complaints.  Patient had brief run of atrial tachycardia after exercises during weight session with HR up to 137 but was asymptomatic. Dr. Up reviewed monitor strips and states patient was OK to continue exercise session.  Patient comleted exercise session with complaints.

## 2017-07-31 ENCOUNTER — OFFICE VISIT (OUTPATIENT)
Dept: INTERNAL MEDICINE | Facility: CLINIC | Age: 77
End: 2017-07-31
Payer: MEDICARE

## 2017-07-31 VITALS
DIASTOLIC BLOOD PRESSURE: 60 MMHG | BODY MASS INDEX: 42.04 KG/M2 | HEART RATE: 88 BPM | WEIGHT: 246.25 LBS | SYSTOLIC BLOOD PRESSURE: 116 MMHG | OXYGEN SATURATION: 98 % | HEIGHT: 64 IN

## 2017-07-31 DIAGNOSIS — Z00.00 ROUTINE GENERAL MEDICAL EXAMINATION AT A HEALTH CARE FACILITY: ICD-10-CM

## 2017-07-31 DIAGNOSIS — E78.2 MIXED HYPERLIPIDEMIA: Chronic | ICD-10-CM

## 2017-07-31 DIAGNOSIS — I10 HTN (HYPERTENSION), BENIGN: Chronic | ICD-10-CM

## 2017-07-31 DIAGNOSIS — K21.9 GASTROESOPHAGEAL REFLUX DISEASE WITHOUT ESOPHAGITIS: ICD-10-CM

## 2017-07-31 DIAGNOSIS — Z17.0 MALIGNANT NEOPLASM OF UPPER-OUTER QUADRANT OF LEFT BREAST IN FEMALE, ESTROGEN RECEPTOR POSITIVE: ICD-10-CM

## 2017-07-31 DIAGNOSIS — E11.51 CONTROLLED TYPE 2 DM WITH PERIPHERAL CIRCULATORY DISORDER: Chronic | ICD-10-CM

## 2017-07-31 DIAGNOSIS — M10.9 GOUT, ARTHRITIS: ICD-10-CM

## 2017-07-31 DIAGNOSIS — R56.9 SEIZURE: Primary | ICD-10-CM

## 2017-07-31 DIAGNOSIS — E66.01 MORBID OBESITY WITH BMI OF 40.0-44.9, ADULT: ICD-10-CM

## 2017-07-31 DIAGNOSIS — I25.10 CORONARY ARTERY DISEASE DUE TO LIPID RICH PLAQUE: Chronic | ICD-10-CM

## 2017-07-31 DIAGNOSIS — E78.5 HYPERLIPIDEMIA, UNSPECIFIED HYPERLIPIDEMIA TYPE: Chronic | ICD-10-CM

## 2017-07-31 DIAGNOSIS — C50.412 MALIGNANT NEOPLASM OF UPPER-OUTER QUADRANT OF LEFT BREAST IN FEMALE, ESTROGEN RECEPTOR POSITIVE: ICD-10-CM

## 2017-07-31 DIAGNOSIS — I25.83 CORONARY ARTERY DISEASE DUE TO LIPID RICH PLAQUE: Chronic | ICD-10-CM

## 2017-07-31 PROCEDURE — 99397 PER PM REEVAL EST PAT 65+ YR: CPT | Mod: S$GLB,,, | Performed by: INTERNAL MEDICINE

## 2017-07-31 PROCEDURE — 99499 UNLISTED E&M SERVICE: CPT | Mod: S$PBB,,, | Performed by: INTERNAL MEDICINE

## 2017-07-31 PROCEDURE — 99213 OFFICE O/P EST LOW 20 MIN: CPT | Mod: 25,S$GLB,, | Performed by: INTERNAL MEDICINE

## 2017-07-31 PROCEDURE — 99999 PR PBB SHADOW E&M-EST. PATIENT-LVL III: CPT | Mod: PBBFAC,,, | Performed by: INTERNAL MEDICINE

## 2017-07-31 NOTE — PROGRESS NOTES
CHIEF COMPLAINT: Annual exam and follow up NSTEMI, breast cancer, diabetes, hypertension, hyperlipidemia.     HISTORY OF PRESENT ILLNESS: 76-year-old woman who present for follow up of above.    She accidentally missed her evening Keppra dose 7/27/17 and the next day she felt much better. She was more energetic. She felt that her balance was better.  She was able to do cardiac rehab better.  SHe is taking Keppra 750 mg twice daily due to a seizure in Nov 2016.     She woke up with Chest pain on 5/21/17 and took a NTG and called EMS. EMS gave her a second NTG and she was chest pain free by the time she got to the ED> Troponins were elevated. 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 She now takes aspirin 81 mg daily and plavix 75 mg daily. No chest pain or shortness of breath.  She continues to take Coreg 25 mg twice daily, Norvasc 5 mg daily and chlorthaladone 25 mg 1/2 tablet every other daily for her hypertension. No chest pain, shortness of breath, lightheadedness.  She is in cardiac rehab and is now able to do more     She has completed 5 cycles of CMF for her breast cancer. She completed radiation the end of April 6, 2017.  She is now on Femara.     She was participating in the healthy back program once a week. This program is on hold due to breast cancer. Back is doing well. She is doing stretches every other day. She is off tylenol in the evening.       She continues to take allopurinol 300 mg daily for her gout. She has not had any gouty flares. She has occasional left elbow pain.      Her blood sugars have been normal. She continues to take metformin 850 mg twice daily. She checks blood sugars once daily. Occasional diarrhea with certain foods. She denies any polydipsia or polyuria. She continues to take aspirin for her coronary artery disease. Sinuses have been doing well. She has not had to take Allegra lately. She denies any nausea, vomiting, constipation,  "diarrhea.     Reflux is well controlled on omeprazole 20 mg two tablets daily. She continues to take Crestor 40 mg daily for her hyperlipidemia. She denies any joint pain or muscle pain from the Crestor. Knee is doing well. She is not having to take Tramadol     She is taking vitamin D 2936-4809 units daily for vitamin D deficiency     Her daughter who is 46 has stage 2 breast cancer. She has had a lumpectomy and chemo and radiation. Her daughter is doing well. She has finished her treatment. Her daughter might be BRCA positive. Mrs Bergman feels that she is coping well with her diagnosis of breast cancer. No anxiety, depression or insomnia     PAST MEDICAL HISTORY:   1. Hypertension.   2. Diabetes mellitus   3. Hyperlipidemia.   4. Reflux.   5. Gout.   6. Coronary artery disease, status post MI in  with stenting at that time, and then a right coronary artery stent placed in . Had a negative stress test 2008. [facility] 2012 - patent stents and non obstructive cad  7. Osteoarthritis of the right knee. S/P right knee replacement   8. Status post left knee replacement.   9. Status post LISA/BSO secondary to menstrual disorder or polyps after tubal ligation.     MEDICATIONS and ALLERGIES: Updated on epic.       Family History  Mother had breast cancer in her early 50's then had colon cancer in her 60's. She  of uterine cancer  2 Maternal aunts with breast cancer  Daughter with breast cancer at age 45 - BRCA positive  Father  of a gunshot wound  She is an only child    PHYSICAL EXAMINATION:    /60 (BP Location: Right arm, Patient Position: Sitting, BP Method: Manual)   Pulse 88   Ht 5' 4" (1.626 m)   Wt 111.7 kg (246 lb 4.1 oz)   SpO2 98%   BMI 42.27 kg/m²     General: Alert, oriented. No apparent distress. Affect within normal   limits.   Conjunctivae anicteric. Tympanic membranes clear. Oropharynx clear.   Neck supple.   Respiratory effort normal. Lungs clear to auscultation.   Heart: " Regular rate and rhythm without murmurs, gallops or rubs.   No lower extremity edema.      Labs  6/21/17 reviewed       ASSESESSMENT    Annual exam - discussed diet, exercise and safety issues.    Screening - mammogram done 7/17. Colonoscopy 4/23/12 - normal, and 5/25/15 - 3 small polyps (one adenoma)- due 2018. Bone density was normal November 2008.    Prevnar 12/15  Tdap 5/16  Influenza this fall    Other medical problems discussed at this visit. Billing will be separate and based on time. Spent 15 minutes in counseling regarding these medical problems.    1. NSTEMI - s/p stenting. Continue aspirin and plavix. Follow up with cardiology  2.. Left breast cancer -Finished chemo and  radiation  2. Seizure -on KEppra- having some sedation on Keppra.  Only had one seizure. Decrease Keppra to 750 mg 1/2 in am and whole in evening and see neurology to see if could taper to a lower dose or even off.   3. NSTEMI - Risk factor management.   4. Hypertension -take chlorthalidone as needed due to low BP   5. Diabetes - controlled. Continue metformin to 850 mg twice a day.   6. Gout -controlled on allopurinol    7. Hyperlipidemia - on Crestor 40 mg daily. Controlled   8. Obesity - working at diet, exercise and weight loss.   9. Osteoarthritis of the right knee, status post knee replacement - doing well.  10. GERD - controlled on meds  11. Anemia - stable  12 .CRI - stable.  13. Hypokalemia -  on KCL 10 meq daily           Screening - mammogram done 7/17. Colonoscopy 4/23/12 - normal, and 5/25/15 - 3 small polyps (one adenoma)- due 2018. Bone density was normal November 2008.    Prevnar 12/15  Tdap 5/16  Influenza this fall     I'll see her back 4 months,  sooner if problems arise.

## 2017-08-02 ENCOUNTER — CLINICAL SUPPORT (OUTPATIENT)
Dept: CARDIAC REHAB | Facility: CLINIC | Age: 77
End: 2017-08-02
Payer: MEDICARE

## 2017-08-02 DIAGNOSIS — I25.10 CORONARY ATHEROSCLEROSIS OF UNSPECIFIED TYPE OF VESSEL, NATIVE OR GRAFT: ICD-10-CM

## 2017-08-02 DIAGNOSIS — Z98.61 S/P PERCUTANEOUS TRANSLUMINAL CORONARY ANGIOPLASTY: ICD-10-CM

## 2017-08-02 PROCEDURE — 93798 PHYS/QHP OP CAR RHAB W/ECG: CPT | Mod: S$GLB,,, | Performed by: INTERNAL MEDICINE

## 2017-08-04 ENCOUNTER — CLINICAL SUPPORT (OUTPATIENT)
Dept: CARDIAC REHAB | Facility: CLINIC | Age: 77
End: 2017-08-04
Payer: MEDICARE

## 2017-08-04 DIAGNOSIS — Z98.61 POSTSURGICAL PERCUTANEOUS TRANSLUMINAL CORONARY ANGIOPLASTY STATUS: ICD-10-CM

## 2017-08-04 DIAGNOSIS — I25.10 CORONARY ATHEROSCLEROSIS OF UNSPECIFIED TYPE OF VESSEL, NATIVE OR GRAFT: ICD-10-CM

## 2017-08-04 PROCEDURE — 93798 PHYS/QHP OP CAR RHAB W/ECG: CPT | Mod: S$GLB,,, | Performed by: INTERNAL MEDICINE

## 2017-08-07 ENCOUNTER — CLINICAL SUPPORT (OUTPATIENT)
Dept: CARDIAC REHAB | Facility: CLINIC | Age: 77
End: 2017-08-07
Payer: MEDICARE

## 2017-08-07 DIAGNOSIS — I25.10 CORONARY ATHEROSCLEROSIS OF UNSPECIFIED TYPE OF VESSEL, NATIVE OR GRAFT: ICD-10-CM

## 2017-08-07 DIAGNOSIS — Z98.61 POSTSURGICAL PERCUTANEOUS TRANSLUMINAL CORONARY ANGIOPLASTY STATUS: ICD-10-CM

## 2017-08-07 PROCEDURE — 93798 PHYS/QHP OP CAR RHAB W/ECG: CPT | Mod: S$GLB,,, | Performed by: INTERNAL MEDICINE

## 2017-08-09 ENCOUNTER — CLINICAL SUPPORT (OUTPATIENT)
Dept: CARDIAC REHAB | Facility: CLINIC | Age: 77
End: 2017-08-09
Payer: MEDICARE

## 2017-08-09 DIAGNOSIS — Z98.61 POSTSURGICAL PERCUTANEOUS TRANSLUMINAL CORONARY ANGIOPLASTY STATUS: ICD-10-CM

## 2017-08-09 DIAGNOSIS — I25.10 CORONARY ATHEROSCLEROSIS OF UNSPECIFIED TYPE OF VESSEL, NATIVE OR GRAFT: ICD-10-CM

## 2017-08-09 PROCEDURE — 93798 PHYS/QHP OP CAR RHAB W/ECG: CPT | Mod: S$GLB,,, | Performed by: INTERNAL MEDICINE

## 2017-08-09 NOTE — PROGRESS NOTES
Patient complained of shortness of breath at a 15 on scale while on the treadmill. Stopped exercise and she sat down. O2 sats =100%, drank 1 cup water. Shortness of breath improved while seated and at an 11. Resumed exercise after 6 minutes on the NuStep. Shortness of breath resolved. No chest pain noted. Tolerated rest of class well.

## 2017-08-11 ENCOUNTER — CLINICAL SUPPORT (OUTPATIENT)
Dept: CARDIAC REHAB | Facility: CLINIC | Age: 77
End: 2017-08-11
Payer: MEDICARE

## 2017-08-11 DIAGNOSIS — Z98.61 POSTSURGICAL PERCUTANEOUS TRANSLUMINAL CORONARY ANGIOPLASTY STATUS: ICD-10-CM

## 2017-08-11 DIAGNOSIS — I25.10 CORONARY ATHEROSCLEROSIS OF UNSPECIFIED TYPE OF VESSEL, NATIVE OR GRAFT: ICD-10-CM

## 2017-08-11 PROCEDURE — 93798 PHYS/QHP OP CAR RHAB W/ECG: CPT | Mod: S$GLB,,, | Performed by: INTERNAL MEDICINE

## 2017-08-14 ENCOUNTER — CLINICAL SUPPORT (OUTPATIENT)
Dept: CARDIAC REHAB | Facility: CLINIC | Age: 77
End: 2017-08-14
Payer: MEDICARE

## 2017-08-14 DIAGNOSIS — E11.8 TYPE 2 DIABETES MELLITUS WITH COMPLICATION, UNSPECIFIED LONG TERM INSULIN USE STATUS: Primary | ICD-10-CM

## 2017-08-14 DIAGNOSIS — Z98.61 POSTSURGICAL PERCUTANEOUS TRANSLUMINAL CORONARY ANGIOPLASTY STATUS: ICD-10-CM

## 2017-08-14 DIAGNOSIS — I25.10 CORONARY ATHEROSCLEROSIS OF UNSPECIFIED TYPE OF VESSEL, NATIVE OR GRAFT: ICD-10-CM

## 2017-08-14 LAB
GLUCOSE SERPL-MCNC: 117 MG/DL (ref 70–110)
GLUCOSE SERPL-MCNC: 142 MG/DL (ref 70–110)

## 2017-08-14 PROCEDURE — 82948 REAGENT STRIP/BLOOD GLUCOSE: CPT | Mod: 91,S$GLB,, | Performed by: INTERNAL MEDICINE

## 2017-08-14 PROCEDURE — 82948 REAGENT STRIP/BLOOD GLUCOSE: CPT | Mod: S$GLB,,, | Performed by: INTERNAL MEDICINE

## 2017-08-14 PROCEDURE — 93798 PHYS/QHP OP CAR RHAB W/ECG: CPT | Mod: S$GLB,,, | Performed by: INTERNAL MEDICINE

## 2017-08-16 ENCOUNTER — CLINICAL SUPPORT (OUTPATIENT)
Dept: CARDIAC REHAB | Facility: CLINIC | Age: 77
End: 2017-08-16
Payer: MEDICARE

## 2017-08-16 DIAGNOSIS — Z98.61 POSTSURGICAL PERCUTANEOUS TRANSLUMINAL CORONARY ANGIOPLASTY STATUS: ICD-10-CM

## 2017-08-16 DIAGNOSIS — I25.10 CORONARY ATHEROSCLEROSIS OF UNSPECIFIED TYPE OF VESSEL, NATIVE OR GRAFT: ICD-10-CM

## 2017-08-16 PROCEDURE — 93798 PHYS/QHP OP CAR RHAB W/ECG: CPT | Mod: S$GLB,,, | Performed by: INTERNAL MEDICINE

## 2017-08-16 NOTE — PROGRESS NOTES
During exercise pt c/o feeling lightheaded bp checked 130/70 and glucose 134. Pt was able to continue to exercise doing non-weight bearing exercises without difficulty. Pt able to be discharged with vss and no further c/o of feeling lightheaded.

## 2017-08-17 NOTE — PROGRESS NOTES
[facility]:   INITIAL VISIT    Name: Renata Begrman  MRN:[id number]   CSN: [id number]  Date of service: 8/17/2017    Age:77 y.o.   Gender:female     Referring Physician/Service: Ethel Berger MD  [id number] [place] [address]   The patient is here today alone  History obtained from the patient as well as her daughter (over the phone)    CHIEF COMPLAINT:   - evaluation of seizure    PRESENT ILLNESS:    This is a 77 y.o. right handed female who presents with a chief complaint of 1 seizure ~ 9 months ago    She presents to discuss the need for continued AED therapy after having taken Levetiracetam for 9 months following a single seizure episode. She was previously taking 750mg of Levetiracetam BID, but has recently weaned her medication by taking 250-750mg daily. She has been experiencing fatigue during cardiac rehab (s/p MI and stent placement) and is concerned that the AED could be precipitating her fatigue.    a) Seizure type I  The patient does not have an aura and has no recollection of the seizure     Observers who have witnessed (daughter) the seizure, describe it as: Daughter heard the patient fall. Patient was on the floor with hands clenched and her head was moving side-to-side. Eyes were closed. Convulsion lasted 30 seconds and then she had urinary incontinence. No hx of tongue biting and during the seizure patient reportedly had labored breathing .      Seizure duration is typically 30.   The last seizure occurred Nov, 2017.  Triggers for seizures: Unknown.  If yes, details: Patient suspects that the change in delivery of Taxol (port to vein) may have caused the seizure  Compliance with anti-epileptic medications: yes  Last dose of AED taken on:  Date 8/18; in the morning        The last generalized motor seizure was Nov, 2017.  There is no history of tongue biting, but there is a history of bladder incontinence.  The longest seizure-free interval: 9 months  There is no history of  status epilepticus.  There is no history of physical injury as a result of seizures.   Postictal symptoms include: confusion that resolved in the emergency department. She remembers the ambulance ride     Possible lateralizing signs by history: (Neurological signs): none     Any other relevant information:  - hx of stage I breast ca who was on chemotx at the time of the seizure  - MRI Brain suggestive of PRES that resolved on repeat study    Any other relevant information:  Neurology notes of 11/18/16:   Patient is a 76 y.o. female with stage I breast cancer currently on CMF (cyclophosphamide, MTX, 5-fu), previous 3 rounds of taxol presents to the ED due to event concerning for seizure. Patient accompanied by daughter who states that at about midnight, patient was found having a generalized tonic clonic seizure described as rhythmic flexion and extension of both arms with eyes open associated with urinary incontinence. Patient denies any tongue biting. Patient denies ever having a seizure before in the past. Patient does not endorse remembering the event and states that the next thing she recalls was being in the ambulance. Patient described as confused post-ictally which had resolved while admitted in the ED. CT head was negative for any acute intracranial process. 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    MRI Brain:  Results for orders placed or performed during the hospital encounter of 12/21/16   MRI Brain W WO Contrast    Narrative    Procedure: MRI the brain with andwithout contrast.    Technique: Sagittal and axial T1, axial T2, axial FLAIR, axial gradient, axial diffusion, and axial, sagittal, and coronal postcontrast T1 images of the whole brain.  10 ml of  Gadavist injected intravenously.         Comparison: 11/18/2016    Findings: Resolution of previous leptomeningeal enhancement and edema signal involving the frontal lobes and cerebellum there is no current diffusion signal abnormality. The ventricles are stable without hydrocephalus. There is no midline shift or mass effect.    There is continued scattered foci of T2/flair signal hyperintensity in the supratentorial white matter suggestive of chronic ischemic change with previous cortical/subcortical signal hyperintensity in the frontal subcortical white matter and parietal cortex having resolved good configuration suggestive for sequela prior posterior reversible encephalopathy.    Impression     Interval resolution of previous gyriform enhancement and edema signal within the frontal cortex and subcortical white matter with additional resolution of enhancement involving the cerebellar hemispheres overall configuration most compatible with sequela prior posterior reversible encephalopathy.    Continued superimposed scattered foci of T2/flair signal abnormality supratentorial white matter suggestive for sequela of chronic ischemic change. No evidence for acute infarction or current enhancing lesion.      Electronically signed by: MELODY FLOOD DO  Date:     12/21/16  Time:    14:04    Results for orders placed or performed during the hospital encounter of 11/18/16   CT Head Without Contrast    Narrative    Clinical indication: Seizure, no history of epilepsy, being treated for breast cancer.    Comparison: None.    Technique: 5-mm axial images of the head were performed without the administration of contrast.  Sagittal and coronal reformatted images were also obtained.    Findings:    There is generalized cerebral volume loss. There is patchy hypoattenuation in the deep cerebral and periventricular white matter. While nonspecific, this likely represents sequela of chronic microvascular ischemic change. Otherwise,  gray-white matter differentiation appears relatively well maintained. There is no evidence of acute intracranial hemorrhage, hydrocephalus, midline shift or mass effect. The paranasal sinuses and mastoid air cells are free of acute process. The osseous calvarium is intact. The surrounding soft tissues are unremarkable.    Impression    1. No CT evidence of acute intracranial abnormality.  Please note that MRI would provide more sensitive assessment for intracranial metastatic disease.  ______________________________________     Electronically signed by resident: NEGRA MARTINEZ MD  Date:     11/18/16  Time:    05:15            As the supervising and teaching physician, I personally reviewed the images and resident's interpretation and I agree with the findings.          Electronically signed by: JOHNSON LEYVA  Date:     11/18/16  Time:    05:24       Additional tests:  1)CT Scan: as noted   2) EEG\Video Monitoring: no  3) PET Scan: no  4) Neuropsychological evaluation: no  5) DEXA Scan: no  6) Others: no    RISK FACTORS FOR SEIZURES:    1. Head Trauma:  No    2. CNS Infections:  No  3. CNS Tumors: No     4. CNS Vascular Disease: No     5. Febrile Seizures: No    6. Developmental Delay: No       7. Family History of Seizures: No    8. Birth history: No abnormalities per patient     Pregnancy/Labor/Delivery: No abnormalities per patient    CURRENT MEDICATIONS:   Current Outpatient Prescriptions   Medication Sig Dispense Refill    allopurinol (ZYLOPRIM) 300 MG tablet take 1 tablet by mouth once daily 90 tablet 4    amlodipine (NORVASC) 5 MG tablet TAKE 1 TABLET BY MOUTH DAILY 90 tablet 4    aspirin 81 MG Chew Take 81 mg by mouth once daily.        carvedilol (COREG) 25 MG tablet TAKE 1 TABLET BY MOUTH TWICE DAILY 180 tablet 4    chlorthalidone (HYGROTEN) 25 MG Tab Take 1/2 tablet by mouth daily. (Patient taking differently: Take 1/2 tablet by mouth every other daily.) 90 tablet 4    clopidogrel (PLAVIX) 75 mg tablet  Take 1 tablet (75 mg total) by mouth once daily. 30 tablet 6    fexofenadine (ALLEGRA) 30 MG tablet Take 30 mg by mouth daily as needed.      letrozole (FEMARA) 2.5 mg Tab Take 1 tablet (2.5 mg total) by mouth once daily. 30 tablet 11    levetiracetam (KEPPRA) 750 MG Tab Take 750 mg by mouth 2 (two) times daily. Pt unsure of dosage.      metformin (GLUCOPHAGE) 850 MG tablet TAKE 1 TABLET BY MOUTH TWICE DAILY 180 tablet 4    nitroGLYCERIN (NITROSTAT) 0.4 MG SL tablet PLACE 1 TABLET UNDER THE TONGUE EVERY 5 MINUTES AS NEEDED FOR CHEST PAIN. 450 tablet 1    omeprazole (PRILOSEC) 20 MG capsule TAKE 2 CAPSULES BY MOUTH EVERY  capsule 4    polyethylene glycol (GLYCOLAX) 17 gram/dose powder Take 17 g by mouth once daily. (Patient taking differently: Take 17 g by mouth once daily. Pt taking PRN) 595 g 11    potassium chloride (MICRO-K) 10 MEQ CpSR Take 1 capsule (10 mEq total) by mouth once daily. 90 capsule 4    rosuvastatin (CRESTOR) 40 MG Tab TAKE ONE TABLET BY MOUTH ONCE DAILY 90 tablet 4     No current facility-administered medications for this visit.       Folic acid: no    CURRENT ANTI EPILEPTIC MEDICATIONS:  Levetiracetam 250-750mg q day    VAGAL NERVE STIMULATOR: n/a    PRIOR ANTICONVULSANT HISTORY: none      PAST MEDICAL HISTORY:   Active Ambulatory Problems     Diagnosis Date Noted    HTN (hypertension), benign 09/04/2012    Hyperlipidemia 09/04/2012    Coronary artery disease due to lipid rich plaque 09/04/2012    Gout, arthritis 09/04/2012    GERD (gastroesophageal reflux disease) 09/04/2012    Primary osteoarthritis of both knees 09/04/2012    S/P coronary artery stent placement 09/10/2012    Controlled type 2 DM with peripheral circulatory disorder 10/14/2013    Chronic renal disease, stage 3, moderately decreased glomerular filtration rate between 30-59 mL/min/1.73 square meter 10/14/2013    Carpal tunnel syndrome 06/23/2014    Morbid obesity with BMI of 40.0-44.9, adult 10/22/2014     Family history of colon cancer 05/25/2015    Normocytic anemia 06/27/2016    Stented coronary artery     Old MI (myocardial infarction)     RBBB 06/27/2016    Malignant neoplasm of upper-outer quadrant of left female breast 08/18/2016    History of left breast cancer 08/25/2016    Encounter for antineoplastic chemotherapy 09/08/2016    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    NSTEMI (non-ST elevated myocardial infarction) 05/21/2017    Right carotid bruit 06/14/2017     Resolved Ambulatory Problems     Diagnosis Date Noted    Atypical chest pain 06/27/2016    NSTEMI (non-ST elevated myocardial infarction) 06/27/2016    Troponin I above reference range 06/27/2016    Generalized convulsive seizure 11/18/2016    Hypokalemia 11/19/2016    Hypomagnesemia 11/19/2016    Hypophosphatemia 11/19/2016     Past Medical History:   Diagnosis Date    Breast cancer     Cataract     Coronary artery disease 9/4/2012    Diabetes mellitus due to abnormal insulin 9/4/2012    Diabetes mellitus type II     GERD (gastroesophageal reflux disease) 9/4/2012    Gout, arthritis 9/4/2012    Hyperlipidemia 9/4/2012    Hypertension     Primary osteoarthritis of both knees 9/4/2012      PAST PSYCHIATRIC HISTORY:  none    PAST SURGICAL HISTORY including Epilepsy surgery:   Past Surgical History:   Procedure Laterality Date    BREAST LUMPECTOMY      CORONARY ANGIOPLASTY      HYSTERECTOMY      JOINT REPLACEMENT      left and right k nee    KNEE SURGERY      TOTAL ABDOMINAL HYSTERECTOMY W/ BILATERAL SALPINGOOPHORECTOMY          FAMILY HISTORY:   Family History   Problem Relation Age of Onset    Cancer Mother 55     colon    Diabetes Mother     Hypertension Mother     Breast cancer Mother     Hypertension Maternal Aunt     Hyperlipidemia Maternal Aunt     Breast cancer Maternal Aunt     Hypertension Maternal Uncle     Heart disease Father     Glaucoma Neg Hx     Heart attack Neg Hx     Heart failure  Neg Hx     Stroke Neg Hx          SOCIAL HISTORY:   Social History     Social History    Marital status:      Spouse name: N/A    Number of children: N/A    Years of education: N/A     Occupational History    Not on file.     Social History Main Topics    Smoking status: Former Smoker     Packs/day: 1.00     Types: Cigarettes     Quit date: 8/13/1962    Smokeless tobacco: Never Used    Alcohol use No      Comment: socially    Drug use: No    Sexual activity: No     Other Topics Concern    Not on file     Social History Narrative    No narrative on file      a) Marital status: not                                                     b) Living situation: patient lives with daughter  c) Employed/Unemployed/Other: Retired     DRIVING HISTORY:  Currently driving: Yes       LEVEL OF EDUCATION: some college     SUBSTANCE USE:no alcohol use, tobacco, or illicit drugs currently    ALLERGIES: Lisinopril     REVIEW OF SYSTEMS:  Review of Systems    GENERAL EXAMINATION:  There were no vitals taken for this visit.     General Appearance:    This is an average built female who appears well.  HEENT: There are no dysmorphic features  Skin: There are no obvious stigmata for neurocutaneous disorders.  Neck: supple   Cardiovascular: regular rate and rhythm  Lungs: clear  Abdomen: deferred  The spine is non-tender.  Kyphosis:  NoScoliosis: No   Extremities: Warm and well perfused    NEUROLOGICAL EXAMINATION:  Mental status: Alert and oriented x 4; pleasant and cooperative with exam  Memory: Recent memory intact, Remote memory intact, Age correct, Month correct  Attention and concentration: intact  Fund of knowledge: adequate  Speech: normal  Dysarthria: No   Eyes: PERRL; EOM intact; No nystagmus.The visual pursuits  were smooth with normal saccadic eye movements.   Fundoscopic Exam: normal w/o hemorrhages, exudates, or papilledema  No facial asymmetry. Intact facial sensation bilaterally.  Hearing was intact  bilaterally to finger rub  Tongue and palate was in the midline  Intact SCM and trapezii bilaterally     Motor examination:  Normal bulk and tone bilaterally. Power: no pronater drift; 5/5 bilaterally symmetric UE/LE  Abnormal movements: none  Deep tendon reflexes: 2+ bilaterally symmetric UE/LE with flexor plantars  Dysmetria: No     Sensory examination:   Normal, light touch, pin prick, and vibration bilaterally symmetric UE/LE    Gait:  Normal gait and station; able to tandem walk without any difficulty    OTHER RELEVANT LABS AND TESTS:    IMPRESSION:    Tonic-clonic epileptic seizures  76yo F with hx of 1 seizure in the setting of post-chemotx  - MRI Brain initially suggestive of possible PRES - subsequently resolved  - however, patient is high risk for seizures in view of continuing tx for breast ca    Plan:  Continue Levetiracetam 1000mg XR q day pm  - will check levels to ensure therapeutic level  - had a detailed discussion with patient re risk/benefits of continuing/discontinuing AED - she agreed with plan of care as outlined.    Seizure precautions/restrictions.    Malignant neoplasm of upper-outer quadrant of left female breast  - followed by Oncology    Controlled type 2 DM with peripheral circulatory disorder  - followed by PCP    HTN (hypertension), benign  - followed by PCP    Hyperlipidemia  - followed by PCP    NSTEMI (non-ST elevated myocardial infarction)  - followed by Cardiology    Old MI (myocardial infarction)  - followed by Cardiology    The patient was asked to call me/the clinic 1 week after the test(s) are done to obtain results.    More than 50% of the time with the patient (as well as family/caregiver(s) was spent on face-to-face counseling about:    1. Diagnosis, plans, prognosis, medications and possible side-effects, risks and benefits of treatment, other alternatives to AEDs.  2. Risks related to continued seizures, status epilepticus, SUDEP, driving restrictions and seizure  precautions ( no baths but showers are ok, no swimming unsupervised, no use of heavy machinery, no use of sharp moving objects, avoid heights).   3. Issues related to pregnancy, OCP and breast feeding as it relates to epilepsy.  4. The potential of teratogenicity and suicidal risks of anti-epileptic medications.  5.Avoid any activity that compromise patient safety related to seizures.    Questions and concerns raised by the patient and family/care-giver(s) were addressed and they indicated understanding of everything discussed and agreed to plans as above.    Return in 4 months or earlier prn    Patti Peralta MD, DAKOTA(), FACNS.  Neurology-Epilepsy.

## 2017-08-18 ENCOUNTER — OFFICE VISIT (OUTPATIENT)
Dept: NEUROLOGY | Facility: CLINIC | Age: 77
End: 2017-08-18
Payer: MEDICARE

## 2017-08-18 VITALS
HEART RATE: 95 BPM | BODY MASS INDEX: 42.34 KG/M2 | SYSTOLIC BLOOD PRESSURE: 143 MMHG | HEIGHT: 64 IN | WEIGHT: 248 LBS | DIASTOLIC BLOOD PRESSURE: 79 MMHG

## 2017-08-18 DIAGNOSIS — I25.2 OLD MI (MYOCARDIAL INFARCTION): ICD-10-CM

## 2017-08-18 DIAGNOSIS — G40.409 TONIC-CLONIC EPILEPTIC SEIZURES: Primary | ICD-10-CM

## 2017-08-18 DIAGNOSIS — I21.4 NSTEMI (NON-ST ELEVATED MYOCARDIAL INFARCTION): ICD-10-CM

## 2017-08-18 DIAGNOSIS — E78.5 HYPERLIPIDEMIA, UNSPECIFIED HYPERLIPIDEMIA TYPE: Chronic | ICD-10-CM

## 2017-08-18 DIAGNOSIS — Z85.3 HISTORY OF LEFT BREAST CANCER: ICD-10-CM

## 2017-08-18 DIAGNOSIS — E11.51 CONTROLLED TYPE 2 DM WITH PERIPHERAL CIRCULATORY DISORDER: Chronic | ICD-10-CM

## 2017-08-18 DIAGNOSIS — I10 HTN (HYPERTENSION), BENIGN: Chronic | ICD-10-CM

## 2017-08-18 PROCEDURE — 3077F SYST BP >= 140 MM HG: CPT | Mod: S$GLB,,, | Performed by: PSYCHIATRY & NEUROLOGY

## 2017-08-18 PROCEDURE — 1126F AMNT PAIN NOTED NONE PRSNT: CPT | Mod: S$GLB,,, | Performed by: PSYCHIATRY & NEUROLOGY

## 2017-08-18 PROCEDURE — 3008F BODY MASS INDEX DOCD: CPT | Mod: S$GLB,,, | Performed by: PSYCHIATRY & NEUROLOGY

## 2017-08-18 PROCEDURE — 99999 PR PBB SHADOW E&M-EST. PATIENT-LVL III: CPT | Mod: PBBFAC,,, | Performed by: PSYCHIATRY & NEUROLOGY

## 2017-08-18 PROCEDURE — 1159F MED LIST DOCD IN RCRD: CPT | Mod: S$GLB,,, | Performed by: PSYCHIATRY & NEUROLOGY

## 2017-08-18 PROCEDURE — 99215 OFFICE O/P EST HI 40 MIN: CPT | Mod: S$GLB,,, | Performed by: PSYCHIATRY & NEUROLOGY

## 2017-08-18 PROCEDURE — 99499 UNLISTED E&M SERVICE: CPT | Mod: S$PBB,,, | Performed by: PSYCHIATRY & NEUROLOGY

## 2017-08-18 PROCEDURE — 3078F DIAST BP <80 MM HG: CPT | Mod: S$GLB,,, | Performed by: PSYCHIATRY & NEUROLOGY

## 2017-08-18 PROCEDURE — 3044F HG A1C LEVEL LT 7.0%: CPT | Mod: S$GLB,,, | Performed by: PSYCHIATRY & NEUROLOGY

## 2017-08-18 RX ORDER — LEVETIRACETAM 500 MG/1
1000 TABLET, EXTENDED RELEASE ORAL DAILY
Qty: 60 TABLET | Refills: 5 | Status: SHIPPED | OUTPATIENT
Start: 2017-08-18 | End: 2018-07-30 | Stop reason: SDUPTHER

## 2017-08-18 NOTE — MEDICAL/APP STUDENT
EPILEPSY CLINIC:   INITIAL VISIT    Name: Renata Bergman  MRN:[id number]   CSN: [id number]  Date of service: 8/18/2017    Age:77 y.o.   Gender:female     Referring Physician/Service: Ethel Berger MD  [address] [place] [address]   The patient is here today with: hx of a single seizure  History obtained from the patient and her daughter via cell-phone call    CHIEF COMPLAINT: Consult      PRESENT ILLNESS:      This is a 77 y.o.  year old female who presents with a chief complaint of   Chief Complaint   Patient presents with    Consult    .    The history of seizure began 9 months ago.    a) Seizure type I  The patient does not recall an aura    The seizures are described by the patient as: patient was unaware at the time    Observers who have witnessed (daughter) the seizure, describe it as: Daughter heard the patient fall. Patient was on the floor with hands clenched and her head was moving side-to-side. Eyes were closed. Convulsion lasted 30 seconds and then she had urinary incontinence. No tongue biting. During the seizure she had breathing problems during the convulsion, but had no problems breathing post-ictally. Patient reports that the daughter recalls her following instructions after the seizure and was even able to walk to the ambulance unassisted.      Seizure duration is typically 30.   The last seizure occurred Nov, 2017.  Triggers for seizures: Unknown.  If yes, details: Patient suspects that the change in delivery of Taxol (port to vein) may have caused the seizure  Compliance with anti-epileptic medications: yes  Last dose of AED taken on:  Date 8/18; in the morning        The last generalized motor seizure was Nov, 2017.  There is no history of tongue biting, but there is a history of  Bladder incontinence.  The longest seizure-free interval: 9 months  There is no history of status epilepticus.  There is no history of physical injury as a result of seizures.   Postictal symptoms  include: confusion that resolved in the emergency department. She remembers the ambulance ride    Possible lateralizing signs by history: (Neurological signs): none    Any other relevant information:  This 78 yo retired  is presenting today after having taken Keppra for 9 months following a single seizure episode. She was previously taking 750mg of Keppra BID, but has recently weaned her medication by taking 750mg and 250mg doses per day (1000mg total). She has been experiencing fatigue during cardiac rehab (s/p MI and stent placement) and is concerned that her Keppra could be precipitating her fatigue.    PREVIOUS EVALUATIONS:      Previous EEGs: Read by Dr. Darby  - done at [facility] on 11/22/16 showed:     EEG FINDINGS:  The patient was awake throughout the recording.  The patient was   interacting with the family and the technologist.  The posterior dominant rhythm   was a somewhat irregular 9 to 10 Hz frequency seen in the occipital, parietal,   and posterior temporal regions bilaterally.  There was a mixture of irregular   mid range beta activity seen diffusely.  Some intermixed theta activity was also   seen diffusely.  There was no evidence for lateralized or focal changes and no   spike or sharp wave activity was seen.  Activation procedures were not carried   out.  The patient was asked questions and reported the date, location, name of   the president, and sum of the nickel, dime, and dianne correctly.  The patient   then was able to spell world forward and backwards.     IMPRESSION:  Mildly abnormal EEG with an intermixed theta activity noted   indicative of a mild nonfocal and nonspecific encephalopathy.     CLINICAL CORRELATION:  The patient is a 76-year-old female with a history of   breast cancer, hypertension, and coronary artery stent, who was reported to have   had a convulsion.  There is no prior history of seizures.  This only shows some   organized disorganization and slowing of the  background rhythms indicative of   an encephalopathy.  The pattern does not indicate a specific etiology      Results for orders placed or performed during the hospital encounter of 12/21/16   MRI Brain W WO Contrast    Narrative    Procedure: MRI the brain with andwithout contrast.    Technique: Sagittal and axial T1, axial T2, axial FLAIR, axial gradient, axial diffusion, and axial, sagittal, and coronal postcontrast T1 images of the whole brain.  10 ml of Gadavist injected intravenously.         Comparison: 11/18/2016    Findings: Resolution of previous leptomeningeal enhancement and edema signal involving the frontal lobes and cerebellum there is no current diffusion signal abnormality. The ventricles are stable without hydrocephalus. There is no midline shift or mass effect.    There is continued scattered foci of T2/flair signal hyperintensity in the supratentorial white matter suggestive of chronic ischemic change with previous cortical/subcortical signal hyperintensity in the frontal subcortical white matter and parietal cortex having resolved good configuration suggestive for sequela prior posterior reversible encephalopathy.    Impression     Interval resolution of previous gyriform enhancement and edema signal within the frontal cortex and subcortical white matter with additional resolution of enhancement involving the cerebellar hemispheres overall configuration most compatible with sequela prior posterior reversible encephalopathy.    Continued superimposed scattered foci of T2/flair signal abnormality supratentorial white matter suggestive for sequela of chronic ischemic change. No evidence for acute infarction or current enhancing lesion.      Electronically signed by: MELODY FLOOD DO  Date:     12/21/16  Time:    14:04    Results for orders placed or performed during the hospital encounter of 11/18/16   CT Head Without Contrast    Narrative    Clinical indication: Seizure, no history of epilepsy,  being treated for breast cancer.    Comparison: None.    Technique: 5-mm axial images of the head were performed without the administration of contrast.  Sagittal and coronal reformatted images were also obtained.    Findings:    There is generalized cerebral volume loss. There is patchy hypoattenuation in the deep cerebral and periventricular white matter. While nonspecific, this likely represents sequela of chronic microvascular ischemic change. Otherwise, gray-white matter differentiation appears relatively well maintained. There is no evidence of acute intracranial hemorrhage, hydrocephalus, midline shift or mass effect. The paranasal sinuses and mastoid air cells are free of acute process. The osseous calvarium is intact. The surrounding soft tissues are unremarkable.    Impression    1. No CT evidence of acute intracranial abnormality.  Please note that MRI would provide more sensitive assessment for intracranial metastatic disease.  ______________________________________     Electronically signed by resident: NEGRA MARTINEZ MD  Date:     11/18/16  Time:    05:15            As the supervising and teaching physician, I personally reviewed the images and resident's interpretation and I agree with the findings.          Electronically signed by: JOHNSON LEYVA  Date:     11/18/16  Time:    05:24           Previous MRIs:   Results for orders placed or performed during the hospital encounter of 12/21/16   MRI Brain W WO Contrast    Narrative    Procedure: MRI the brain with andwithout contrast.    Technique: Sagittal and axial T1, axial T2, axial FLAIR, axial gradient, axial diffusion, and axial, sagittal, and coronal postcontrast T1 images of the whole brain.  10 ml of Gadavist injected intravenously.         Comparison: 11/18/2016    Findings: Resolution of previous leptomeningeal enhancement and edema signal involving the frontal lobes and cerebellum there is no current diffusion signal abnormality. The ventricles  are stable without hydrocephalus. There is no midline shift or mass effect.    There is continued scattered foci of T2/flair signal hyperintensity in the supratentorial white matter suggestive of chronic ischemic change with previous cortical/subcortical signal hyperintensity in the frontal subcortical white matter and parietal cortex having resolved good configuration suggestive for sequela prior posterior reversible encephalopathy.    Impression     Interval resolution of previous gyriform enhancement and edema signal within the frontal cortex and subcortical white matter with additional resolution of enhancement involving the cerebellar hemispheres overall configuration most compatible with sequela prior posterior reversible encephalopathy.    Continued superimposed scattered foci of T2/flair signal abnormality supratentorial white matter suggestive for sequela of chronic ischemic change. No evidence for acute infarction or current enhancing lesion.      Electronically signed by: MELODY FLOOD DO  Date:     12/21/16  Time:    14:04    Results for orders placed or performed during the hospital encounter of 11/18/16   CT Head Without Contrast    Narrative    Clinical indication: Seizure, no history of epilepsy, being treated for breast cancer.    Comparison: None.    Technique: 5-mm axial images of the head were performed without the administration of contrast.  Sagittal and coronal reformatted images were also obtained.    Findings:    There is generalized cerebral volume loss. There is patchy hypoattenuation in the deep cerebral and periventricular white matter. While nonspecific, this likely represents sequela of chronic microvascular ischemic change. Otherwise, gray-white matter differentiation appears relatively well maintained. There is no evidence of acute intracranial hemorrhage, hydrocephalus, midline shift or mass effect. The paranasal sinuses and mastoid air cells are free of acute process. The osseous  calvarium is intact. The surrounding soft tissues are unremarkable.    Impression    1. No CT evidence of acute intracranial abnormality.  Please note that MRI would provide more sensitive assessment for intracranial metastatic disease.  ______________________________________     Electronically signed by resident: NEGRA MARTINEZ MD  Date:     11/18/16  Time:    05:15            As the supervising and teaching physician, I personally reviewed the images and resident's interpretation and I agree with the findings.          Electronically signed by: JOHNSON LEYVA  Date:     11/18/16  Time:    05:24         Additional tests:  1)CT Scan: see above   2) EEG\Video Monitoring: see above  3) PET Scan:   4) Neuropsychological evaluation:   5) DEXA Scan:  6) Others:     RISK FACTORS FOR SEIZURES:    1. Head Trauma:  No    2. CNS Infections:  No  3. CNS Tumors: No     4. CNS Vascular Disease: No     5. Febrile Seizures: No    6. Developmental Delay: No       7. Family History of Seizures: No    8. Birth history: No abnormalities per patient    Pregnancy/Labor/Delivery: No abnormalities per patient    CURRENT MEDICATIONS:   Current Outpatient Prescriptions   Medication Sig Dispense Refill    allopurinol (ZYLOPRIM) 300 MG tablet take 1 tablet by mouth once daily 90 tablet 4    amlodipine (NORVASC) 5 MG tablet TAKE 1 TABLET BY MOUTH DAILY 90 tablet 4    aspirin 81 MG Chew Take 81 mg by mouth once daily.        carvedilol (COREG) 25 MG tablet TAKE 1 TABLET BY MOUTH TWICE DAILY 180 tablet 4    chlorthalidone (HYGROTEN) 25 MG Tab Take 1/2 tablet by mouth daily. (Patient taking differently: Take 1/2 tablet by mouth every other daily.) 90 tablet 4    clopidogrel (PLAVIX) 75 mg tablet Take 1 tablet (75 mg total) by mouth once daily. 30 tablet 6    fexofenadine (ALLEGRA) 30 MG tablet Take 30 mg by mouth daily as needed.      letrozole (FEMARA) 2.5 mg Tab Take 1 tablet (2.5 mg total) by mouth once daily. 30 tablet 11    levetiracetam  (KEPPRA) 750 MG Tab Take 750 mg by mouth 2 (two) times daily. Pt unsure of dosage.      metformin (GLUCOPHAGE) 850 MG tablet TAKE 1 TABLET BY MOUTH TWICE DAILY 180 tablet 4    nitroGLYCERIN (NITROSTAT) 0.4 MG SL tablet PLACE 1 TABLET UNDER THE TONGUE EVERY 5 MINUTES AS NEEDED FOR CHEST PAIN. 450 tablet 1    omeprazole (PRILOSEC) 20 MG capsule TAKE 2 CAPSULES BY MOUTH EVERY  capsule 4    polyethylene glycol (GLYCOLAX) 17 gram/dose powder Take 17 g by mouth once daily. (Patient taking differently: Take 17 g by mouth once daily. Pt taking PRN) 595 g 11    potassium chloride (MICRO-K) 10 MEQ CpSR Take 1 capsule (10 mEq total) by mouth once daily. 90 capsule 4    rosuvastatin (CRESTOR) 40 MG Tab TAKE ONE TABLET BY MOUTH ONCE DAILY 90 tablet 4     No current facility-administered medications for this visit.         Folic acid: not taking currently    CURRENT ANTI EPILEPTIC MEDICATIONS:  Anticonvulsants have been prescribed and taken in the past including  levetiracetam (Keppra).    VAGAL NERVE STIMULATOR: none    PRIOR ANTICONVULSANT HISTORY:   1) Acetazolamide (Diamox, AZM): medication not used  2) Carbamazepine (Tegretol, CBZ): medication not used  3) Ethosuximide (Zarontin, ESM): medication not used  4) Gabapentin(Neurontin, GPN): medication not used  5) Eslicarbazepine:  medication not used  6) Lacosamide (Vimpat, LCS): medication not used  7) Lamotrigine (Lamictal, LTG): medication not used    9) Methosuximide (Celontin, MSM): medication not used  10) Methylphenytoin (Mesantoin, MHT):medication not used  11) Oxcarbazepine (Trileptal, OXC): medication not used  12) Phenobarbital (PB): medication not used  13) Phenytoin (Dilantin, PHT):  medication not used  14) Pregabalin (Lyrica, PGB): medication not used  15) Primidone (Mysoline, PRM): medication not used  16) Retigabine (Potega, RTG): medication not used  17) Rufinamide (Banzel, RUF): medication not used  18) Tiagabine (Gabatril, TGB): medication not  used  19) Topiramate (Topamax, TPM):  medication not used  20) Vigabatrin (Sabril, VGB): medication not used  21) Valproic acid (Depakote, VPA): medication not used  22) Zonisamide (Zonegran, ZNA): medication not used    Benzodiazepines:  1) Diazepam: Rectal (Diastat): medication not used  2) Diazepam: Oral (Valium, DZ): medication not used  3) Clorazepate (Tranxene, CLZ):  medication not used  4) Clobazem (Omfi, Frisium, CLB): medication not used    Vagal Nerve Stimulator: medication not used      PAST MEDICAL HISTORY:   Active Ambulatory Problems     Diagnosis Date Noted    HTN (hypertension), benign 09/04/2012    Hyperlipidemia 09/04/2012    Coronary artery disease due to lipid rich plaque 09/04/2012    Gout, arthritis 09/04/2012    GERD (gastroesophageal reflux disease) 09/04/2012    Primary osteoarthritis of both knees 09/04/2012    S/P coronary artery stent placement 09/10/2012    Controlled type 2 DM with peripheral circulatory disorder 10/14/2013    Chronic renal disease, stage 3, moderately decreased glomerular filtration rate between 30-59 mL/min/1.73 square meter 10/14/2013    Carpal tunnel syndrome 06/23/2014    Morbid obesity with BMI of 40.0-44.9, adult 10/22/2014    Family history of colon cancer 05/25/2015    Normocytic anemia 06/27/2016    Stented coronary artery     Old MI (myocardial infarction)     RBBB 06/27/2016    Malignant neoplasm of upper-outer quadrant of left female breast 08/18/2016    History of left breast cancer 08/25/2016    Encounter for antineoplastic chemotherapy 09/08/2016    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    NSTEMI (non-ST elevated myocardial infarction) 05/21/2017    Right carotid bruit 06/14/2017     Resolved Ambulatory Problems     Diagnosis Date Noted    Atypical chest pain 06/27/2016    NSTEMI (non-ST elevated myocardial infarction) 06/27/2016    Troponin I above reference range 06/27/2016    Generalized convulsive seizure 11/18/2016     Hypokalemia 11/19/2016    Hypomagnesemia 11/19/2016    Hypophosphatemia 11/19/2016     Past Medical History:   Diagnosis Date    Breast cancer     Cataract     Coronary artery disease 9/4/2012    Diabetes mellitus due to abnormal insulin 9/4/2012    Diabetes mellitus type II     GERD (gastroesophageal reflux disease) 9/4/2012    Gout, arthritis 9/4/2012    Hyperlipidemia 9/4/2012    Hypertension     Primary osteoarthritis of both knees 9/4/2012        PAST PSYCHIATRIC HISTORY:  none    PAST SURGICAL HISTORY including Epilepsy surgery:   Past Surgical History:   Procedure Laterality Date    BREAST LUMPECTOMY      CORONARY ANGIOPLASTY      HYSTERECTOMY      JOINT REPLACEMENT      left and right k nee    KNEE SURGERY      TOTAL ABDOMINAL HYSTERECTOMY W/ BILATERAL SALPINGOOPHORECTOMY          FAMILY HISTORY:   Family History   Problem Relation Age of Onset    Cancer Mother 55     colon    Diabetes Mother     Hypertension Mother     Breast cancer Mother     Hypertension Maternal Aunt     Hyperlipidemia Maternal Aunt     Breast cancer Maternal Aunt     Hypertension Maternal Uncle     Heart disease Father     Glaucoma Neg Hx     Heart attack Neg Hx     Heart failure Neg Hx     Stroke Neg Hx          SOCIAL HISTORY:   Social History     Social History    Marital status:      Spouse name: N/A    Number of children: N/A    Years of education: N/A     Occupational History    Not on file.     Social History Main Topics    Smoking status: Former Smoker     Packs/day: 1.00     Types: Cigarettes     Quit date: 8/13/1962    Smokeless tobacco: Never Used    Alcohol use No      Comment: socially    Drug use: No    Sexual activity: No     Other Topics Concern    Not on file     Social History Narrative    No narrative on file      a) Marital status: not                                                     b) Living situation: patient lives with daughter  c)  "Employed/Unemployed/Other: Retired    DRIVING HISTORY:  Currently driving: Yes      LEVEL OF EDUCATION: some college    SUBSTANCE USE:no alcohol use, tobacco, or illicit drugs currently    ALLERGIES: Lisinopril     REVIEW OF SYSTEMS:  Review of Systems    GENERAL EXAMINATION:  BP (!) 143/79   Pulse 95   Ht 5' 4" (1.626 m)   Wt 112.5 kg (248 lb 0.3 oz)   BMI 42.57 kg/m²      General Appearance:    This is an average built female who appears well.  HEENT: There are no dysmorphic features  Skin: There are no obvious stigmata for neurocutaneous disorders.  Neck: supple   Cardiovascular: regular rate and rhythm  Lungs: clear  Abdomen: deferred  The spine is non-tender.  Kyphosis:  NoScoliosis: No   Extremities: Warm and well perfused    NEUROLOGICAL EXAMINATION:  Mental status: Alert and oriented x 4; pleasant and cooperative with exam  Memory: Recent memory intact, Remote memory intact, Age correct, Month correct  Attention and concentration: intact  Fund of knowledge: adequate  Speech: normal  Dysarthria: No   Eyes: PERRL; EOM intact; No nystagmus.The visual pursuits  were smooth with normal saccadic eye movements. Fundoscopic Exam: normal w/o hemorrhages, exudates, or papilledema  No facial asymmetry. Intact facial sensation bilaterally.  Hearing was intact bilaterally to finger rub  Tongue and palate was in the midline  Intact SCM and trapezii bilaterally     Motor examination:  Normal bulk and tone bilaterally. Power: no pronater drift; 5/5 bilaterally symmetric UE/LE  Abnormal movements: none  Deep tendon reflexes: 2+ bilaterally symmetric UE/LE with flexor plantars  Dysmetria: No     Sensory examination:   Normal, light touch, pin prick, and vibration bilaterally symmetric UE/LE    Gait:  Normal gait and station; able to tandem walk without any difficulty    OTHER RELEVANT LABS AND TESTS:    IMPRESSION:  The patient's history is consistent with single seizure in the context of breast cancer  - rule out: "     Problem List Items Addressed This Visit     None      Visit Diagnoses    None.            Related Condition(s): Breast cancer, T1cN0 Stage 1A, on letrozole    PLAN:  There are no diagnoses linked to this encounter.      2014 EPILEPSY QUALITY MEASUREMENT (AAN)  1 a. Seizure Frequency:  1b. Seizure Intervention:    2.  a. Etiology:   b. Seizure Type:   c. Epilepsy Syndrome:    3.  a. Side-effects of AED:   b. Intervention for side-effects:      4. Screening for psychiatric or behavioral disorders:    5. Personalized epilepsy safety issues and education:     6. Counseling for women of child-bearing potential and epilepsy:     7. Referral of treatment-resistant epilepsy to comprehensive epilepsy center (q 2 years): N/A.    The patient was asked to call me/the clinic 1 week after the test(s) are done to obtain results.    More than 50% of the time with the patient (as well as family/caregiver(s) was spent on face-to-face counseling about:    1. Diagnosis, plans, prognosis, medications and possible side-effects, risks and benefits of treatment, other alternatives to AEDs.  2. Risks related to continued seizures, status epilepticus, SUDEP, driving restrictions and seizure precautions ( no baths but showers are ok, no swimming unsupervised, no use of heavy machinery, no use of sharp moving objects, avoid heights).   3. Issues related to pregnancy, OCP and breast feeding as it relates to epilepsy.  4. The potential of teratogenicity and suicidal risks of anti-epileptic medications.  5.Avoid any activity that compromise patient safety related to seizures.     Questions and concerns raised by the patient and family/care-giver(s) were addressed and they indicated understanding of everything discussed and agreed to plans as above.    No Follow-up on file.    Patti Peralta MD, DAKOTA(), FACNS.  Neurology-Epilepsy.

## 2017-08-18 NOTE — LETTER
August 21, 2017      Ethel Berger MD  [address]  [facility] [address]           [facility] - Neurology Epilepsy  [address]  [facility] [address]  Phone: [phone]  Fax: [phone]          Patient: Renata Bergman   MR Number: [id number]   YOB: 1940   Date of Visit: 8/18/2017       Dear Dr. Ethel Berger:    Thank you for referring Renata Bergman to me for evaluation. Attached you will find relevant portions of my assessment and plan of care.    If you have questions, please do not hesitate to call me. I look forward to following Rneata Bergman along with you.    Sincerely,    Patti Peralta MD    Enclosure  CC:  No Recipients    If you would like to receive this communication electronically, please contact [email or web address] or [phone] to request more information on [employer] access.    For providers and/or their staff who would like to refer a patient to [facility], please contact us through our one-stop-shop provider referral line, [facility], at [phone].    If you feel you have received this communication in error or would no longer like to receive these types of communications, please e-mail [email or web address]

## 2017-08-21 ENCOUNTER — CLINICAL SUPPORT (OUTPATIENT)
Dept: CARDIAC REHAB | Facility: CLINIC | Age: 77
End: 2017-08-21
Payer: MEDICARE

## 2017-08-21 DIAGNOSIS — Z98.61 POSTSURGICAL PERCUTANEOUS TRANSLUMINAL CORONARY ANGIOPLASTY STATUS: ICD-10-CM

## 2017-08-21 DIAGNOSIS — I25.10 CORONARY ATHEROSCLEROSIS OF UNSPECIFIED TYPE OF VESSEL, NATIVE OR GRAFT: ICD-10-CM

## 2017-08-21 PROCEDURE — 93798 PHYS/QHP OP CAR RHAB W/ECG: CPT | Mod: S$GLB,,, | Performed by: INTERNAL MEDICINE

## 2017-08-21 NOTE — ASSESSMENT & PLAN NOTE
78yo F with hx of 1 seizure in the setting of post-chemotx  - MRI Brain initially suggestive of possible PRES - subsequently resolved  - however, patient is high risk for seizures in view of continuing tx for breast ca    Plan:  Continue Levetiracetam 1000mg XR q day pm  - will check levels to ensure therapeutic level  - had a detailed discussion with patient re risk/benefits of continuing/discontinuing AED - she agreed with plan of care as outlined.    Seizure precautions/restrictions.

## 2017-08-25 ENCOUNTER — CLINICAL SUPPORT (OUTPATIENT)
Dept: CARDIAC REHAB | Facility: CLINIC | Age: 77
End: 2017-08-25
Payer: MEDICARE

## 2017-08-25 DIAGNOSIS — I25.10 CORONARY ATHEROSCLEROSIS OF UNSPECIFIED TYPE OF VESSEL, NATIVE OR GRAFT: ICD-10-CM

## 2017-08-25 DIAGNOSIS — Z98.61 POSTSURGICAL PERCUTANEOUS TRANSLUMINAL CORONARY ANGIOPLASTY STATUS: ICD-10-CM

## 2017-08-25 PROCEDURE — 93798 PHYS/QHP OP CAR RHAB W/ECG: CPT | Mod: S$GLB,,, | Performed by: INTERNAL MEDICINE

## 2017-08-28 ENCOUNTER — CLINICAL SUPPORT (OUTPATIENT)
Dept: CARDIAC REHAB | Facility: CLINIC | Age: 77
End: 2017-08-28
Payer: MEDICARE

## 2017-08-28 DIAGNOSIS — Z98.61 POSTSURGICAL PERCUTANEOUS TRANSLUMINAL CORONARY ANGIOPLASTY STATUS: ICD-10-CM

## 2017-08-28 DIAGNOSIS — I25.10 CORONARY ATHEROSCLEROSIS OF UNSPECIFIED TYPE OF VESSEL, NATIVE OR GRAFT: ICD-10-CM

## 2017-08-28 PROCEDURE — 93798 PHYS/QHP OP CAR RHAB W/ECG: CPT | Mod: S$GLB,,, | Performed by: INTERNAL MEDICINE

## 2017-08-30 ENCOUNTER — CLINICAL SUPPORT (OUTPATIENT)
Dept: CARDIAC REHAB | Facility: CLINIC | Age: 77
End: 2017-08-30
Payer: MEDICARE

## 2017-08-30 DIAGNOSIS — I25.10 CORONARY ATHEROSCLEROSIS OF UNSPECIFIED TYPE OF VESSEL, NATIVE OR GRAFT: ICD-10-CM

## 2017-08-30 DIAGNOSIS — Z98.61 S/P PERCUTANEOUS TRANSLUMINAL CORONARY ANGIOPLASTY: ICD-10-CM

## 2017-08-30 PROCEDURE — 93798 PHYS/QHP OP CAR RHAB W/ECG: CPT | Mod: S$GLB,,, | Performed by: INTERNAL MEDICINE

## 2017-09-01 ENCOUNTER — CLINICAL SUPPORT (OUTPATIENT)
Dept: CARDIAC REHAB | Facility: CLINIC | Age: 77
End: 2017-09-01
Payer: MEDICARE

## 2017-09-01 DIAGNOSIS — I25.10 CORONARY ATHEROSCLEROSIS OF UNSPECIFIED TYPE OF VESSEL, NATIVE OR GRAFT: ICD-10-CM

## 2017-09-01 DIAGNOSIS — Z98.61 POSTSURGICAL PERCUTANEOUS TRANSLUMINAL CORONARY ANGIOPLASTY STATUS: ICD-10-CM

## 2017-09-01 PROCEDURE — 93798 PHYS/QHP OP CAR RHAB W/ECG: CPT | Mod: S$GLB,,, | Performed by: INTERNAL MEDICINE

## 2017-09-06 ENCOUNTER — CLINICAL SUPPORT (OUTPATIENT)
Dept: CARDIAC REHAB | Facility: CLINIC | Age: 77
End: 2017-09-06
Payer: MEDICARE

## 2017-09-06 DIAGNOSIS — Z95.5 S/P CORONARY ARTERY STENT PLACEMENT: ICD-10-CM

## 2017-09-06 DIAGNOSIS — I25.10 CORONARY ATHEROSCLEROSIS OF UNSPECIFIED TYPE OF VESSEL, NATIVE OR GRAFT: ICD-10-CM

## 2017-09-06 PROCEDURE — 93798 PHYS/QHP OP CAR RHAB W/ECG: CPT | Mod: S$GLB,,, | Performed by: INTERNAL MEDICINE

## 2017-09-08 ENCOUNTER — CLINICAL SUPPORT (OUTPATIENT)
Dept: CARDIAC REHAB | Facility: CLINIC | Age: 77
End: 2017-09-08
Payer: MEDICARE

## 2017-09-08 DIAGNOSIS — Z98.61 POSTSURGICAL PERCUTANEOUS TRANSLUMINAL CORONARY ANGIOPLASTY STATUS: ICD-10-CM

## 2017-09-08 DIAGNOSIS — I25.10 CORONARY ATHEROSCLEROSIS OF UNSPECIFIED TYPE OF VESSEL, NATIVE OR GRAFT: ICD-10-CM

## 2017-09-08 PROCEDURE — 93798 PHYS/QHP OP CAR RHAB W/ECG: CPT | Mod: S$GLB,,, | Performed by: INTERNAL MEDICINE

## 2017-09-11 ENCOUNTER — CLINICAL SUPPORT (OUTPATIENT)
Dept: CARDIAC REHAB | Facility: CLINIC | Age: 77
End: 2017-09-11
Payer: MEDICARE

## 2017-09-11 DIAGNOSIS — Z98.61 POSTSURGICAL PERCUTANEOUS TRANSLUMINAL CORONARY ANGIOPLASTY STATUS: ICD-10-CM

## 2017-09-11 DIAGNOSIS — I25.10 CORONARY ATHEROSCLEROSIS OF UNSPECIFIED TYPE OF VESSEL, NATIVE OR GRAFT: ICD-10-CM

## 2017-09-11 PROCEDURE — 93798 PHYS/QHP OP CAR RHAB W/ECG: CPT | Mod: S$GLB,,, | Performed by: INTERNAL MEDICINE

## 2017-09-15 ENCOUNTER — CLINICAL SUPPORT (OUTPATIENT)
Dept: CARDIAC REHAB | Facility: CLINIC | Age: 77
End: 2017-09-15
Payer: MEDICARE

## 2017-09-15 DIAGNOSIS — I25.10 CORONARY ATHEROSCLEROSIS OF UNSPECIFIED TYPE OF VESSEL, NATIVE OR GRAFT: ICD-10-CM

## 2017-09-15 DIAGNOSIS — Z98.61 POSTSURGICAL PERCUTANEOUS TRANSLUMINAL CORONARY ANGIOPLASTY STATUS: ICD-10-CM

## 2017-09-15 PROCEDURE — 93798 PHYS/QHP OP CAR RHAB W/ECG: CPT | Mod: S$GLB,,, | Performed by: INTERNAL MEDICINE

## 2017-09-18 ENCOUNTER — CLINICAL SUPPORT (OUTPATIENT)
Dept: CARDIAC REHAB | Facility: CLINIC | Age: 77
End: 2017-09-18
Payer: MEDICARE

## 2017-09-18 DIAGNOSIS — I25.10 CORONARY ATHEROSCLEROSIS OF UNSPECIFIED TYPE OF VESSEL, NATIVE OR GRAFT: ICD-10-CM

## 2017-09-18 DIAGNOSIS — Z98.61 POSTSURGICAL PERCUTANEOUS TRANSLUMINAL CORONARY ANGIOPLASTY STATUS: Primary | ICD-10-CM

## 2017-09-18 DIAGNOSIS — Z98.61 STATUS POST PERCUTANEOUS TRANSLUMINAL CORONARY ANGIOPLASTY: ICD-10-CM

## 2017-09-18 PROCEDURE — 93798 PHYS/QHP OP CAR RHAB W/ECG: CPT | Mod: S$GLB,,, | Performed by: INTERNAL MEDICINE

## 2017-09-20 ENCOUNTER — CLINICAL SUPPORT (OUTPATIENT)
Dept: CARDIAC REHAB | Facility: CLINIC | Age: 77
End: 2017-09-20
Payer: MEDICARE

## 2017-09-20 DIAGNOSIS — I25.10 CORONARY ATHEROSCLEROSIS OF UNSPECIFIED TYPE OF VESSEL, NATIVE OR GRAFT: ICD-10-CM

## 2017-09-20 DIAGNOSIS — Z98.61 POSTSURGICAL PERCUTANEOUS TRANSLUMINAL CORONARY ANGIOPLASTY STATUS: ICD-10-CM

## 2017-09-20 PROCEDURE — 93798 PHYS/QHP OP CAR RHAB W/ECG: CPT | Mod: S$GLB,,, | Performed by: INTERNAL MEDICINE

## 2017-09-22 ENCOUNTER — CLINICAL SUPPORT (OUTPATIENT)
Dept: CARDIAC REHAB | Facility: CLINIC | Age: 77
End: 2017-09-22
Payer: MEDICARE

## 2017-09-22 DIAGNOSIS — Z98.61 POSTSURGICAL PERCUTANEOUS TRANSLUMINAL CORONARY ANGIOPLASTY STATUS: ICD-10-CM

## 2017-09-22 DIAGNOSIS — I25.10 CORONARY ATHEROSCLEROSIS OF UNSPECIFIED TYPE OF VESSEL, NATIVE OR GRAFT: ICD-10-CM

## 2017-09-22 PROCEDURE — 93798 PHYS/QHP OP CAR RHAB W/ECG: CPT | Mod: S$GLB,,, | Performed by: INTERNAL MEDICINE

## 2017-09-25 ENCOUNTER — CLINICAL SUPPORT (OUTPATIENT)
Dept: CARDIAC REHAB | Facility: CLINIC | Age: 77
End: 2017-09-25
Payer: MEDICARE

## 2017-09-25 DIAGNOSIS — Z98.61 POSTSURGICAL PERCUTANEOUS TRANSLUMINAL CORONARY ANGIOPLASTY STATUS: ICD-10-CM

## 2017-09-25 DIAGNOSIS — I25.10 CORONARY ATHEROSCLEROSIS OF UNSPECIFIED TYPE OF VESSEL, NATIVE OR GRAFT: ICD-10-CM

## 2017-09-25 PROCEDURE — 93798 PHYS/QHP OP CAR RHAB W/ECG: CPT | Mod: S$GLB,,, | Performed by: INTERNAL MEDICINE

## 2017-09-29 ENCOUNTER — CLINICAL SUPPORT (OUTPATIENT)
Dept: CARDIAC REHAB | Facility: CLINIC | Age: 77
End: 2017-09-29
Payer: MEDICARE

## 2017-09-29 DIAGNOSIS — I25.10 CORONARY ATHEROSCLEROSIS OF UNSPECIFIED TYPE OF VESSEL, NATIVE OR GRAFT: ICD-10-CM

## 2017-09-29 DIAGNOSIS — Z98.61 POSTSURGICAL PERCUTANEOUS TRANSLUMINAL CORONARY ANGIOPLASTY STATUS: ICD-10-CM

## 2017-09-29 PROCEDURE — 93798 PHYS/QHP OP CAR RHAB W/ECG: CPT | Mod: S$GLB,,, | Performed by: INTERNAL MEDICINE

## 2017-09-30 RX ORDER — ALLOPURINOL 300 MG/1
TABLET ORAL
Qty: 90 TABLET | Refills: 4 | Status: SHIPPED | OUTPATIENT
Start: 2017-09-30 | End: 2018-10-07 | Stop reason: SDUPTHER

## 2017-10-02 ENCOUNTER — CLINICAL SUPPORT (OUTPATIENT)
Dept: CARDIAC REHAB | Facility: CLINIC | Age: 77
End: 2017-10-02
Payer: MEDICARE

## 2017-10-02 DIAGNOSIS — Z98.61 POSTSURGICAL PERCUTANEOUS TRANSLUMINAL CORONARY ANGIOPLASTY STATUS: ICD-10-CM

## 2017-10-02 DIAGNOSIS — I25.10 CORONARY ATHEROSCLEROSIS OF NATIVE CORONARY ARTERY: ICD-10-CM

## 2017-10-02 PROCEDURE — 93798 PHYS/QHP OP CAR RHAB W/ECG: CPT | Mod: S$GLB,,, | Performed by: INTERNAL MEDICINE

## 2017-10-04 ENCOUNTER — HOSPITAL ENCOUNTER (OUTPATIENT)
Dept: CARDIOLOGY | Facility: CLINIC | Age: 77
Discharge: HOME | End: 2017-10-04
Payer: MEDICARE

## 2017-10-04 DIAGNOSIS — I25.10 CORONARY ATHEROSCLEROSIS: ICD-10-CM

## 2017-10-04 DIAGNOSIS — Z98.61 POSTSURGICAL PERCUTANEOUS TRANSLUMINAL CORONARY ANGIOPLASTY STATUS: ICD-10-CM

## 2017-10-04 LAB — DIASTOLIC DYSFUNCTION: NO

## 2017-10-04 PROCEDURE — 94621 CARDIOPULM EXERCISE TESTING: CPT | Mod: S$GLB,,, | Performed by: INTERNAL MEDICINE

## 2017-10-05 RX ORDER — AMLODIPINE BESYLATE 5 MG/1
TABLET ORAL
Qty: 90 TABLET | Refills: 4 | Status: SHIPPED | OUTPATIENT
Start: 2017-10-05 | End: 2018-10-07 | Stop reason: SDUPTHER

## 2017-10-06 ENCOUNTER — CLINICAL SUPPORT (OUTPATIENT)
Dept: CARDIAC REHAB | Facility: CLINIC | Age: 77
End: 2017-10-06
Payer: MEDICARE

## 2017-10-06 DIAGNOSIS — I25.10 CORONARY ARTERY DISEASE, ANGINA PRESENCE UNSPECIFIED, UNSPECIFIED VESSEL OR LESION TYPE, UNSPECIFIED WHETHER NATIVE OR TRANSPLANTED HEART: ICD-10-CM

## 2017-10-06 DIAGNOSIS — Z98.61 POSTSURGICAL PERCUTANEOUS TRANSLUMINAL CORONARY ANGIOPLASTY STATUS: ICD-10-CM

## 2017-10-06 PROCEDURE — 93798 PHYS/QHP OP CAR RHAB W/ECG: CPT | Mod: S$GLB,,, | Performed by: INTERNAL MEDICINE

## 2017-10-06 PROCEDURE — 99999 PR PBB SHADOW E&M-EST. PATIENT-LVL II: CPT | Mod: PBBFAC,,,

## 2017-10-06 NOTE — PROGRESS NOTES
Exercise:  Met with the patient for exit interview.  Entry and exit CPX test results were discussed as well as current and future exercise routine.      On entry, an estimated MET Level of 3.0 and a Peak VO2 of 10.2ml/kg/min (2.9 actual METS) were measured.  Upon exit, the patient achieved 3.0 estimated METS on the CPX test and a Peak VO2 of 9.4ml/kg/min (2.7 actual METS).      The goal of a 30% improvement to 3.9 was not reached.    Based on exit CPX test, the target heart range during aerobic exercise for this patient is 91 to 109 bpm.      Exit CPX test results also included weight (247 lb), abdominal girth (43.5 in), BMI (42.39), and body composition (37.8%).    Patient stated she walks 1 to 2 days/week for 35 to 45 minutes.  She is also stretching after each walk but not doing resistance training exercises.      Aerobic exercise recommendations of 5 to 6 days/week for 30 to 60 minutes, resistance training 2 to 3 non-consecutive days/week and stretching after each session of exercise was reviewed and patient was asked to call if they have and questions in the future.  Patient stated understanding.    Patient was compliant with attendance to the rehab classes.    Lisa Garnica., CEP

## 2017-10-06 NOTE — PROGRESS NOTES
"Met with Renata Bergman for exit to Phase II cardiac rehab.      Weight: 247 lbs  BMI: 42.39  Abdominal girth: 43.5 inches  Body fat: 37.8%    Pt did not reach weight goal of <220 lbs. Pt did have a 2" decrease in abdominal girth.    Labs reviewed with patient. Patient confirms she is taking rosuvastatin for cholesterol control.    Lab Results   Component Value Date    CHOL 153 10/04/2017     Lab Results   Component Value Date    HDL 51 10/04/2017     Lab Results   Component Value Date    LDLCALC 83.8 10/04/2017     Lab Results   Component Value Date    TRIG 91 10/04/2017     Lab Results   Component Value Date    CHOLHDL 33.3 10/04/2017         Lab Results   Component Value Date    GLUF 117 (H) 10/04/2017     Lab Results   Component Value Date    HGBA1C 6.3 (H) 10/04/2017         Patient eats3 meals daily. Pt prepares meals at home.  Seasons food with Mrs Dash, kosher salt, garlic powder.  Denies use of a salt shaker at the table on prepared foods. Pt reports that she has not been dining out lately, eating mostly at home. No fried foods. Chooses fish 2x/week. Vegetables daily. Beverages include water, sugar free lemonade.  Alcohol none.    Renata Bergman encouraged to contact cardiac rehab staff with any additional questions or concerns regarding diet.      Bri Delgadillo MS, RD, LDN        "

## 2017-10-06 NOTE — PROGRESS NOTES
Patient here for exit interview for Phase II Cardiac rehab.    Discussed with patient pre/post labs, stress tests, QOL, risk factors, goals & home exercise prescription.      QOL:  The following changes were noted on Millie & SF36 Questionnaire:  Millie:                 PRE:       POST:              SF36         PRE:        POST:  Anxiety                    0            0                                         102             123                                Somatic                   5            3                                       Depression             0            0                                       Hostility                   1            0          Patient reports that she is having some situational family issues.  Gave patient telephone # to behavioral medicine program in the event that she feels she needs to speak with someone.   Patient verbalizes understanding.       RISK FACTORS:    Hypertension-well controlled  Diabetes-HGA1C improvement for 6.6 to 6.2.  Sedentary lifestyle-pt reports that she is exercising 3 days per week.  Strongly encouraged 5-6 days per week.   Hyperlipidemia-well controlled.  Stress/anxiety-see above note.  Obesity-2 inch loss in abdominal girth, but noted 3 lb weight gain.    GOALS:  The following goals have been met:  Decrease cholesterol level  Increase exercise tolerance  Increase knowledge of CAD  Decrease blood pressure  Accurate pulse taking  ID & manage personal areas of stress  Control diabetes by adjusting diet and exercise  Learn more about healthy eating    Home exercise prescription discussed with patient.  Patient has been instructed to follow up with Cardiologist.   Information on Medical Fitness Program at [facility] given to patient.  Patient plans to join [employer] near her home.     Flores Hernandes RN  Cardiac Rehab Nurse

## 2017-10-10 ENCOUNTER — TELEPHONE (OUTPATIENT)
Dept: CARDIOLOGY | Facility: CLINIC | Age: 77
End: 2017-10-10

## 2017-10-10 NOTE — TELEPHONE ENCOUNTER
----- Message from Jaycee Puente MD sent at 10/9/2017  6:36 PM CDT -----  Mrs. Hussein,  Please review results of  Your CPX.  It shows findings consistent with your recent heart attack, but it should get better with the exercise program ( rehabilitation)

## 2017-10-10 NOTE — TELEPHONE ENCOUNTER
----- Message from Jaycee Puente MD sent at 10/9/2017  6:37 PM CDT -----  ...and your blood work is fine and it also should get better with rehab

## 2017-10-16 NOTE — PROGRESS NOTES
"Subjective:       Patient ID: Renata Bergman is a 77 y.o. female.    Chief Complaint: Breast Cancer    Mrs. Hussein is a 76-year-old female with stage I left breast cancer.  She  had 3 weeks of weekly Taxol and then because of insurance issues she was changed to CMF and had 5 cycles of that.     That was completed in January 2017.  She then received radiation therapy and started letrozole in April 2017.    She has some mild aches and pains. She also complains of dry mouth, using listerine without ETOH.   Some occasional hot flashes in the afternoon.      Just finished cardiac rehab for history of MI. No chest pain or shortness of breath. Has f/u with cardiology in January.  Weight has been stable but reports "losing inches". Anxious to keep on exercise regimen.   Some problems with equilibrium, she states this has been present for over a year, using a walker. No acute change since last visit.   Appetite is good.        Breast history: Screening mammogram on May 5 showed an irregular 22 mm mass with abnormal calcifications in the left breast 2:00 position. Follow-up ultrasound July 13 she noted a regular solid 17 mm mass.     On July 21 2016 needle biopsy showed grade 2 infiltrating ductal carcinoma strongly ER NE positive (90%) and HER-2 negative.     On August 1 lumpectomy and sentinel lymph node biopsy were performed. That showed a 1.8 x 1.6 x 1.0 infiltrating carcinoma intermediate grade (histologic grade 3, nuclear grade 2, mitotic index 2.   The sentinel lymph node was negative and margins were negative. Final pathological stage TIc N0 stage IA.  Oncotype was 31 indicating a 21% risk of recurrence with tamoxifen alone.     completed 45 Gy to the Lt. breast followed by a boost to the primary site on 4/6/17         Review of Systems   Constitutional: Negative.    HENT: Negative for congestion, rhinorrhea, sore throat and trouble swallowing.         Dry mouth     Eyes: Negative for visual disturbance. "   Respiratory: Negative for cough, chest tightness and shortness of breath.    Cardiovascular: Negative for chest pain, palpitations and leg swelling.   Gastrointestinal: Negative for abdominal pain, blood in stool, constipation, diarrhea, nausea and vomiting.   Genitourinary: Negative for dysuria, hematuria and vaginal bleeding.   Musculoskeletal: Positive for gait problem (ambulates with walker). Negative for arthralgias, back pain and myalgias.   Skin: Negative for pallor and rash.   Neurological: Negative for dizziness, weakness and headaches.   Hematological: Negative for adenopathy. Does not bruise/bleed easily.   Psychiatric/Behavioral: Negative for dysphoric mood and suicidal ideas. The patient is not nervous/anxious.        Objective:      Physical Exam   Constitutional: She is oriented to person, place, and time. She appears well-developed and well-nourished. No distress.   ECOG 0   HENT:   Head: Normocephalic.   Mouth/Throat: Oropharynx is clear and moist. No oropharyngeal exudate.   Eyes: Conjunctivae are normal. Pupils are equal, round, and reactive to light. No scleral icterus.   Neck: Normal range of motion. Neck supple. No thyromegaly present.   Cardiovascular: Normal rate and regular rhythm.    Pulmonary/Chest: Effort normal and breath sounds normal. No respiratory distress.   Right breast without mass, nodule or skin changes. Left breast with well healed lumpectomy and SLNB incision. No mass, nodule or skin changes. No axillary or supraclavicular adenopathy.  Lungs clear   Abdominal: Soft. Bowel sounds are normal. She exhibits no distension and no mass. There is no tenderness.   Musculoskeletal: Normal range of motion. She exhibits no edema or tenderness.   No spinal or paraspinal tenderness to palpation     Lymphadenopathy:     She has no cervical adenopathy.   Neurological: She is alert and oriented to person, place, and time. No cranial nerve deficit.   Ambulates with walker   Skin: Skin is warm  and dry.   Psychiatric: She has a normal mood and affect. Her behavior is normal. Thought content normal.   Vitals reviewed.      Assessment:       1. Malignant neoplasm of upper-outer quadrant of left breast in female, estrogen receptor positive    2. Morbid obesity with BMI of 40.0-44.9, adult    3. Weight loss        Plan:       Continue Letrozole and return to clinic in 3 months. Mammogram due 7/2018  Referral to [facility] as patient just out of cardiac rehab and expresses desire for weight loss.          Distress Screening Results: Psychosocial Distress screening score of Distress Score: 0 noted and reviewed. No intervention indicated.

## 2017-10-17 ENCOUNTER — OFFICE VISIT (OUTPATIENT)
Dept: HEMATOLOGY/ONCOLOGY | Facility: CLINIC | Age: 77
End: 2017-10-17
Payer: MEDICARE

## 2017-10-17 VITALS
HEART RATE: 94 BPM | DIASTOLIC BLOOD PRESSURE: 88 MMHG | WEIGHT: 244.69 LBS | RESPIRATION RATE: 17 BRPM | BODY MASS INDEX: 42 KG/M2 | TEMPERATURE: 98 F | SYSTOLIC BLOOD PRESSURE: 177 MMHG

## 2017-10-17 DIAGNOSIS — C50.412 MALIGNANT NEOPLASM OF UPPER-OUTER QUADRANT OF LEFT BREAST IN FEMALE, ESTROGEN RECEPTOR POSITIVE: ICD-10-CM

## 2017-10-17 DIAGNOSIS — E66.01 MORBID OBESITY WITH BMI OF 40.0-44.9, ADULT: ICD-10-CM

## 2017-10-17 DIAGNOSIS — R63.4 WEIGHT LOSS: ICD-10-CM

## 2017-10-17 DIAGNOSIS — Z17.0 MALIGNANT NEOPLASM OF UPPER-OUTER QUADRANT OF LEFT BREAST IN FEMALE, ESTROGEN RECEPTOR POSITIVE: ICD-10-CM

## 2017-10-17 PROCEDURE — 99213 OFFICE O/P EST LOW 20 MIN: CPT | Mod: S$GLB,,, | Performed by: PHYSICIAN ASSISTANT

## 2017-10-17 PROCEDURE — 99999 PR PBB SHADOW E&M-EST. PATIENT-LVL III: CPT | Mod: PBBFAC,,, | Performed by: PHYSICIAN ASSISTANT

## 2017-10-17 PROCEDURE — 99499 UNLISTED E&M SERVICE: CPT | Mod: S$PBB,,, | Performed by: PHYSICIAN ASSISTANT

## 2017-11-13 RX ORDER — CARVEDILOL 25 MG/1
TABLET ORAL
Qty: 180 TABLET | Refills: 5 | Status: SHIPPED | OUTPATIENT
Start: 2017-11-13 | End: 2019-02-03 | Stop reason: SDUPTHER

## 2017-11-13 RX ORDER — CHLORTHALIDONE 25 MG/1
TABLET ORAL
Qty: 90 TABLET | Refills: 4 | Status: SHIPPED | OUTPATIENT
Start: 2017-11-13 | End: 2019-02-10 | Stop reason: SDUPTHER

## 2017-11-20 ENCOUNTER — IMMUNIZATION (OUTPATIENT)
Dept: INTERNAL MEDICINE | Facility: CLINIC | Age: 77
End: 2017-11-20
Payer: MEDICARE

## 2017-11-20 ENCOUNTER — OFFICE VISIT (OUTPATIENT)
Dept: INTERNAL MEDICINE | Facility: CLINIC | Age: 77
End: 2017-11-20
Payer: MEDICARE

## 2017-11-20 VITALS
WEIGHT: 248 LBS | DIASTOLIC BLOOD PRESSURE: 70 MMHG | HEART RATE: 80 BPM | BODY MASS INDEX: 42.34 KG/M2 | SYSTOLIC BLOOD PRESSURE: 110 MMHG | OXYGEN SATURATION: 96 % | HEIGHT: 64 IN

## 2017-11-20 DIAGNOSIS — M10.9 GOUT, ARTHRITIS: ICD-10-CM

## 2017-11-20 DIAGNOSIS — K21.9 GASTROESOPHAGEAL REFLUX DISEASE WITHOUT ESOPHAGITIS: ICD-10-CM

## 2017-11-20 DIAGNOSIS — E11.51 CONTROLLED TYPE 2 DM WITH PERIPHERAL CIRCULATORY DISORDER: Chronic | ICD-10-CM

## 2017-11-20 DIAGNOSIS — I10 HTN (HYPERTENSION), BENIGN: Primary | Chronic | ICD-10-CM

## 2017-11-20 DIAGNOSIS — E78.5 HYPERLIPIDEMIA, UNSPECIFIED HYPERLIPIDEMIA TYPE: Chronic | ICD-10-CM

## 2017-11-20 DIAGNOSIS — Z17.0 MALIGNANT NEOPLASM OF UPPER-OUTER QUADRANT OF LEFT BREAST IN FEMALE, ESTROGEN RECEPTOR POSITIVE: ICD-10-CM

## 2017-11-20 DIAGNOSIS — C50.412 MALIGNANT NEOPLASM OF UPPER-OUTER QUADRANT OF LEFT BREAST IN FEMALE, ESTROGEN RECEPTOR POSITIVE: ICD-10-CM

## 2017-11-20 PROCEDURE — 99499 UNLISTED E&M SERVICE: CPT | Mod: S$PBB,,, | Performed by: INTERNAL MEDICINE

## 2017-11-20 PROCEDURE — 90662 IIV NO PRSV INCREASED AG IM: CPT | Mod: S$GLB,,, | Performed by: INTERNAL MEDICINE

## 2017-11-20 PROCEDURE — 99999 PR PBB SHADOW E&M-EST. PATIENT-LVL II: CPT | Mod: PBBFAC,,, | Performed by: INTERNAL MEDICINE

## 2017-11-20 PROCEDURE — G0008 ADMIN INFLUENZA VIRUS VAC: HCPCS | Mod: S$GLB,,, | Performed by: INTERNAL MEDICINE

## 2017-11-20 PROCEDURE — 99214 OFFICE O/P EST MOD 30 MIN: CPT | Mod: S$GLB,,, | Performed by: INTERNAL MEDICINE

## 2017-11-20 NOTE — PROGRESS NOTES
CHIEF COMPLAINT: follow up NSTEMI, breast cancer, diabetes, hypertension, hyperlipidemia.     HISTORY OF PRESENT ILLNESS: 77-year-old woman who present for follow up of above.    She is taking Keppra  mg one tablet twice daily. Her gait is unsteady.  Worse some days than others. Fatigue and unsteady gait are not as bad as the regular release Keppra       Left heart catherization 5/2017 revealed a stenosis in the OMG and she has 3 drug eluding stents. NO chest pain or shortness of breath. She  takes aspirin 81 mg daily and plavix 75 mg daily. No chest pain or shortness of breath.  She continues to take Coreg 25 mg twice daily, Norvasc 5 mg daily and chlorthaladone 25 mg 1/2 tablet every other daily for her hypertension. No chest pain, shortness of breath, lightheadedness.  She is in cardiac rehab and is now able to do more     She has completed 5 cycles of CMF for her breast cancer. She completed radiation the end of April 6, 2017.  She is now on Femara since 4/2017. She has had irritation of her lips since on the Femara which has gotten worse.      She was participating in the healthy back program once a week. This program is on hold due to breast cancer. Back is doing well. She is doing stretches every other day. She is off tylenol in the evening.       She continues to take allopurinol 300 mg daily for her gout. She has not had any gouty flares. She has occasional left elbow pain.      Her blood sugars have been normal. She continues to take metformin 850 mg twice daily. She checks blood sugars once daily. Occasional diarrhea with certain foods. She denies any polydipsia or polyuria. She continues to take aspirin for her coronary artery disease. Sinuses have been doing well. She has not had to take Allegra lately. She denies any nausea, vomiting, constipation, diarrhea.     Reflux is well controlled on omeprazole 20 mg two tablets daily. She continues to take Crestor 40 mg daily for her hyperlipidemia. She  "denies any joint pain or muscle pain from the Crestor. Knee is doing well. She is not having to take Tramadol     She is taking vitamin D 5112-8293 units daily for vitamin D deficiency     Her daughter who is 46 has stage 2 breast cancer. She has had a lumpectomy and chemo and radiation. Her daughter is doing well. She has finished her treatment. Her daughter might be BRCA positive. Mrs Bergman feels that she is coping well with her diagnosis of breast cancer. No anxiety, depression or insomnia     PAST MEDICAL HISTORY:   1. Hypertension.   2. Diabetes mellitus   3. Hyperlipidemia.   4. Reflux.   5. Gout.   6. Coronary artery disease, status post MI in  with stenting at that time, and then a right coronary artery stent placed in . Had a negative stress test 2008. [facility] 2012 - patent stents and non obstructive cad  7. Osteoarthritis of the right knee. S/P right knee replacement   8. Status post left knee replacement.   9. Status post LISA/BSO secondary to menstrual disorder or polyps after tubal ligation.     MEDICATIONS and ALLERGIES: Updated on epic.       Family History  Mother had breast cancer in her early 50's then had colon cancer in her 60's. She  of uterine cancer  2 Maternal aunts with breast cancer  Daughter with breast cancer at age 45 - BRCA positive  Father  of a gunshot wound  She is an only child    PHYSICAL EXAMINATION:    /70   Pulse 80   Ht 5' 4" (1.626 m)   Wt 112.5 kg (248 lb)   SpO2 96%   BMI 42.57 kg/m²     General: Alert, oriented. No apparent distress. Affect within normal   limits.   Conjunctivae anicteric. Tympanic membranes clear. Oropharynx clear. Irritation of the lips  Neck supple.   Respiratory effort normal. Lungs clear to auscultation.   Heart: Regular rate and rhythm without murmurs, gallops or rubs.   No lower extremity edema.       Labs  10/4/17 reviewed       ASSESESSMENT     1. NSTEMI - s/p stenting. Continue aspirin and plavix. Follow up with " cardiology  2.. Left breast cancer -Finished chemo and  radiation. On Femara -  3. Seizure -Better on Keppra XR - take both at night  4. Hypertension -stable  5. Diabetes - controlled. Continue metformin to 850 mg twice a day.   6. Gout -controlled on allopurinol    7. Hyperlipidemia - on Crestor 40 mg daily. Controlled   8. Obesity - working at diet, exercise and weight loss.   9. Osteoarthritis of the right knee, status post knee replacement - doing well.  10. GERD - controlled on meds  11. Anemia - stable  12 .CRI - stable.  13. Hypokalemia -  on KCL 10 meq daily  14. Irritation of the lips - hydrocortisone cream twice daily         Screening - mammogram done 7/17. Colonoscopy 4/23/12 - normal, and 5/25/15 - 3 small polyps (one adenoma)- due 2018. Bone density was normal November 2008.    Prevnar 12/15  Tdap 5/16  Influenza this fall     I'll see her back 4 months,  sooner if problems arise.

## 2017-12-16 DIAGNOSIS — N18.30 CHRONIC RENAL DISEASE, STAGE 3, MODERATELY DECREASED GLOMERULAR FILTRATION RATE BETWEEN 30-59 ML/MIN/1.73 SQUARE METER: Chronic | ICD-10-CM

## 2017-12-16 DIAGNOSIS — I25.2 OLD MI (MYOCARDIAL INFARCTION): ICD-10-CM

## 2017-12-16 DIAGNOSIS — R09.89 RIGHT CAROTID BRUIT: ICD-10-CM

## 2017-12-16 DIAGNOSIS — E11.51 CONTROLLED TYPE 2 DM WITH PERIPHERAL CIRCULATORY DISORDER: Chronic | ICD-10-CM

## 2017-12-16 DIAGNOSIS — I10 HTN (HYPERTENSION), BENIGN: Chronic | ICD-10-CM

## 2017-12-16 DIAGNOSIS — E78.2 MIXED HYPERLIPIDEMIA: Chronic | ICD-10-CM

## 2017-12-16 DIAGNOSIS — C50.412 MALIGNANT NEOPLASM OF UPPER-OUTER QUADRANT OF LEFT FEMALE BREAST: ICD-10-CM

## 2017-12-16 DIAGNOSIS — Z95.5 STENTED CORONARY ARTERY: ICD-10-CM

## 2017-12-17 RX ORDER — NITROGLYCERIN 0.4 MG/1
TABLET SUBLINGUAL
Qty: 450 TABLET | Refills: 0 | Status: SHIPPED | OUTPATIENT
Start: 2017-12-17 | End: 2023-06-27 | Stop reason: SDUPTHER

## 2017-12-30 ENCOUNTER — LAB VISIT (OUTPATIENT)
Dept: LAB | Facility: HOSPITAL | Age: 77
End: 2017-12-30
Attending: INTERNAL MEDICINE
Payer: MEDICARE

## 2017-12-30 DIAGNOSIS — E11.51 CONTROLLED TYPE 2 DM WITH PERIPHERAL CIRCULATORY DISORDER: Chronic | ICD-10-CM

## 2017-12-30 DIAGNOSIS — E78.2 MIXED HYPERLIPIDEMIA: Chronic | ICD-10-CM

## 2017-12-30 DIAGNOSIS — I25.2 OLD MI (MYOCARDIAL INFARCTION): ICD-10-CM

## 2017-12-30 DIAGNOSIS — I25.83 CORONARY ARTERY DISEASE DUE TO LIPID RICH PLAQUE: Chronic | ICD-10-CM

## 2017-12-30 DIAGNOSIS — I25.10 CORONARY ARTERY DISEASE DUE TO LIPID RICH PLAQUE: Chronic | ICD-10-CM

## 2017-12-30 DIAGNOSIS — E66.01 MORBID OBESITY WITH BMI OF 40.0-44.9, ADULT: ICD-10-CM

## 2017-12-30 DIAGNOSIS — I10 HTN (HYPERTENSION), BENIGN: Chronic | ICD-10-CM

## 2017-12-30 DIAGNOSIS — R09.89 RIGHT CAROTID BRUIT: ICD-10-CM

## 2017-12-30 DIAGNOSIS — N18.30 CHRONIC RENAL DISEASE, STAGE 3, MODERATELY DECREASED GLOMERULAR FILTRATION RATE BETWEEN 30-59 ML/MIN/1.73 SQUARE METER: Chronic | ICD-10-CM

## 2017-12-30 LAB
ALBUMIN SERPL BCP-MCNC: 3.5 G/DL
ALP SERPL-CCNC: 75 U/L
ALT SERPL W/O P-5'-P-CCNC: 13 U/L
ANION GAP SERPL CALC-SCNC: 10 MMOL/L
AST SERPL-CCNC: 18 U/L
BILIRUB SERPL-MCNC: 0.4 MG/DL
BUN SERPL-MCNC: 15 MG/DL
CALCIUM SERPL-MCNC: 10.3 MG/DL
CHLORIDE SERPL-SCNC: 104 MMOL/L
CHOLEST SERPL-MCNC: 170 MG/DL
CHOLEST/HDLC SERPL: 3 {RATIO}
CO2 SERPL-SCNC: 28 MMOL/L
CREAT SERPL-MCNC: 1.4 MG/DL
EST. GFR  (AFRICAN AMERICAN): 42.4 ML/MIN/1.73 M^2
EST. GFR  (NON AFRICAN AMERICAN): 36.8 ML/MIN/1.73 M^2
ESTIMATED AVG GLUCOSE: 131 MG/DL
GLUCOSE SERPL-MCNC: 111 MG/DL
HBA1C MFR BLD HPLC: 6.2 %
HDLC SERPL-MCNC: 56 MG/DL
HDLC SERPL: 32.9 %
LDLC SERPL CALC-MCNC: 95.8 MG/DL
NONHDLC SERPL-MCNC: 114 MG/DL
POTASSIUM SERPL-SCNC: 3.9 MMOL/L
PROT SERPL-MCNC: 7.7 G/DL
SODIUM SERPL-SCNC: 142 MMOL/L
TRIGL SERPL-MCNC: 91 MG/DL
TSH SERPL DL<=0.005 MIU/L-ACNC: 2.67 UIU/ML

## 2017-12-30 PROCEDURE — 84443 ASSAY THYROID STIM HORMONE: CPT

## 2017-12-30 PROCEDURE — 80061 LIPID PANEL: CPT

## 2017-12-30 PROCEDURE — 80053 COMPREHEN METABOLIC PANEL: CPT

## 2017-12-30 PROCEDURE — 83036 HEMOGLOBIN GLYCOSYLATED A1C: CPT

## 2017-12-30 PROCEDURE — 36415 COLL VENOUS BLD VENIPUNCTURE: CPT | Mod: PO

## 2018-01-02 ENCOUNTER — TELEPHONE (OUTPATIENT)
Dept: CARDIOLOGY | Facility: CLINIC | Age: 78
End: 2018-01-02

## 2018-01-02 NOTE — TELEPHONE ENCOUNTER
----- Message from Jaycee Puente MD sent at 1/2/2018 11:29 AM CST -----  Please mail patient the blood work results and inform the patient that we will discuss it in detail during the upcoming visit. It looks good.

## 2018-01-05 ENCOUNTER — OFFICE VISIT (OUTPATIENT)
Dept: CARDIOLOGY | Facility: CLINIC | Age: 78
End: 2018-01-05
Payer: MEDICARE

## 2018-01-05 VITALS
WEIGHT: 249.44 LBS | HEIGHT: 64 IN | BODY MASS INDEX: 42.58 KG/M2 | SYSTOLIC BLOOD PRESSURE: 137 MMHG | DIASTOLIC BLOOD PRESSURE: 66 MMHG

## 2018-01-05 DIAGNOSIS — E11.51 CONTROLLED TYPE 2 DM WITH PERIPHERAL CIRCULATORY DISORDER: Chronic | ICD-10-CM

## 2018-01-05 DIAGNOSIS — I65.23 BILATERAL CAROTID ARTERY STENOSIS: ICD-10-CM

## 2018-01-05 DIAGNOSIS — Z95.5 S/P CORONARY ARTERY STENT PLACEMENT: ICD-10-CM

## 2018-01-05 DIAGNOSIS — I25.2 OLD MI (MYOCARDIAL INFARCTION): ICD-10-CM

## 2018-01-05 DIAGNOSIS — I45.10 RBBB: ICD-10-CM

## 2018-01-05 DIAGNOSIS — E78.2 MIXED HYPERLIPIDEMIA: Chronic | ICD-10-CM

## 2018-01-05 DIAGNOSIS — I25.10 CORONARY ARTERY DISEASE DUE TO LIPID RICH PLAQUE: Primary | Chronic | ICD-10-CM

## 2018-01-05 DIAGNOSIS — I10 HTN (HYPERTENSION), BENIGN: Chronic | ICD-10-CM

## 2018-01-05 DIAGNOSIS — Z85.3 HISTORY OF LEFT BREAST CANCER: ICD-10-CM

## 2018-01-05 DIAGNOSIS — E66.01 MORBID OBESITY WITH BMI OF 40.0-44.9, ADULT: ICD-10-CM

## 2018-01-05 DIAGNOSIS — I25.83 CORONARY ARTERY DISEASE DUE TO LIPID RICH PLAQUE: Primary | Chronic | ICD-10-CM

## 2018-01-05 PROCEDURE — 99214 OFFICE O/P EST MOD 30 MIN: CPT | Mod: S$GLB,,, | Performed by: INTERNAL MEDICINE

## 2018-01-05 PROCEDURE — 99999 PR PBB SHADOW E&M-EST. PATIENT-LVL III: CPT | Mod: PBBFAC,,, | Performed by: INTERNAL MEDICINE

## 2018-01-05 NOTE — PROGRESS NOTES
"Subjective:   Patient ID:  Renata Bergman is a 77 y.o. female who presents for follow-up of Hypertension      HPI: Doing well. No new symptoms. Admits to dietary indiscretions over the holidays    Lab Results   Component Value Date     12/30/2017    K 3.9 12/30/2017     12/30/2017    CO2 28 12/30/2017    BUN 15 12/30/2017    CREATININE 1.4 12/30/2017     (H) 12/30/2017    HGBA1C 6.2 (H) 12/30/2017    MG 1.7 05/23/2017    AST 18 12/30/2017    ALT 13 12/30/2017    ALBUMIN 3.5 12/30/2017    PROT 7.7 12/30/2017    BILITOT 0.4 12/30/2017    WBC 6.07 10/04/2017    HGB 9.6 (L) 10/04/2017    HCT 31.6 (L) 10/04/2017    MCV 81 (L) 10/04/2017     (H) 10/04/2017    INR 1.0 05/23/2017    TSH 2.665 12/30/2017    CHOL 170 12/30/2017    HDL 56 12/30/2017    LDLCALC 95.8 12/30/2017    TRIG 91 12/30/2017       Review of Systems   Constitution: Positive for weight loss.   HENT: Negative.    Eyes: Negative.    Cardiovascular: Negative.  Negative for chest pain, claudication, dyspnea on exertion, irregular heartbeat, leg swelling, near-syncope, palpitations and syncope.   Respiratory: Positive for cough. Negative for shortness of breath, snoring and wheezing.    Endocrine: Negative.  Negative for cold intolerance, heat intolerance, polydipsia, polyphagia and polyuria.   Skin: Negative.    Musculoskeletal: Positive for arthritis.   Gastrointestinal: Negative.    Genitourinary: Negative.    Neurological: Positive for light-headedness and loss of balance.   Psychiatric/Behavioral: Negative.        Objective:   Physical Exam   Constitutional: She is oriented to person, place, and time. She appears well-developed and well-nourished.   /66 (BP Location: Right arm, Patient Position: Sitting, BP Method: Large (Automatic))   Ht 5' 4" (1.626 m)   Wt 113.2 kg (249 lb 7.2 oz)   BMI 42.82 kg/m²      HENT:   Head: Normocephalic.   Eyes: Pupils are equal, round, and reactive to light.   Neck: Normal range of " motion. Neck supple. No thyromegaly present.   Cardiovascular: Normal rate, regular rhythm, normal heart sounds and intact distal pulses.  Exam reveals no gallop and no friction rub.    No murmur heard.  Pulses:       Carotid pulses are 2+ on the right side with bruit, and 2+ on the left side with bruit.       Radial pulses are 2+ on the right side, and 2+ on the left side.        Femoral pulses are 2+ on the right side, and 2+ on the left side.       Popliteal pulses are 2+ on the right side, and 2+ on the left side.        Dorsalis pedis pulses are 2+ on the right side, and 2+ on the left side.        Posterior tibial pulses are 2+ on the right side, and 2+ on the left side.   Pulmonary/Chest: Effort normal and breath sounds normal. No respiratory distress. She has no wheezes. She has no rales. She exhibits no tenderness.   Abdominal: Soft. Bowel sounds are normal.   Musculoskeletal: Normal range of motion. She exhibits no edema.   Neurological: She is alert and oriented to person, place, and time.   Skin: Skin is warm and dry.   Psychiatric: She has a normal mood and affect.   Nursing note and vitals reviewed.        Assessment and Plan:     Problem List Items Addressed This Visit        Cardiology Problems    HTN (hypertension), benign (Chronic)    Relevant Orders    Comprehensive metabolic panel    Lipid panel    TSH    Hemoglobin A1c    Hyperlipidemia (Chronic)    Relevant Orders    Comprehensive metabolic panel    Lipid panel    TSH    Hemoglobin A1c    Coronary artery disease due to lipid rich plaque - Primary (Chronic)    Relevant Orders    Comprehensive metabolic panel    Lipid panel    TSH    Hemoglobin A1c    Controlled type 2 DM with peripheral circulatory disorder (Chronic)    Relevant Orders    Comprehensive metabolic panel    Lipid panel    TSH    Hemoglobin A1c    Old MI (myocardial infarction)    Relevant Orders    Comprehensive metabolic panel    Lipid panel    TSH    Hemoglobin A1c    RBBB     Bilateral carotid artery stenosis    Relevant Orders    CAR Ultrasound doppler carotid bliateral    Comprehensive metabolic panel    Lipid panel    TSH    Hemoglobin A1c       Other    S/P coronary artery stent placement    Relevant Orders    Comprehensive metabolic panel    Lipid panel    TSH    Hemoglobin A1c    Morbid obesity with BMI of 40.0-44.9, adult    Relevant Orders    Comprehensive metabolic panel    Lipid panel    TSH    Hemoglobin A1c    History of left breast cancer          Patient's Medications   New Prescriptions    No medications on file   Previous Medications    ALLOPURINOL (ZYLOPRIM) 300 MG TABLET    take 1 tablet by mouth once daily    AMLODIPINE (NORVASC) 5 MG TABLET    TAKE 1 TABLET BY MOUTH DAILY    ASPIRIN 81 MG CHEW    Take 81 mg by mouth once daily.      CARVEDILOL (COREG) 25 MG TABLET    TAKE 1 TABLET BY MOUTH TWICE DAILY    CHLORTHALIDONE (HYGROTEN) 25 MG TAB    Take 1/2 tablet by mouth daily.    CLOPIDOGREL (PLAVIX) 75 MG TABLET    Take 1 tablet (75 mg total) by mouth once daily.    FEXOFENADINE (ALLEGRA) 30 MG TABLET    Take 30 mg by mouth daily as needed.    LETROZOLE (FEMARA) 2.5 MG TAB    Take 1 tablet (2.5 mg total) by mouth once daily.    LEVETIRACETAM XR (KEPPRA XR) 500 MG TB24 24 HR TABLET    Take 2 tablets (1,000 mg total) by mouth once daily.    METFORMIN (GLUCOPHAGE) 850 MG TABLET    TAKE 1 TABLET BY MOUTH TWICE DAILY    NITROGLYCERIN (NITROSTAT) 0.4 MG SL TABLET    PLACE 1 TABLET UNDER THE TONGUE EVERY 5 MINUTES AS NEEDED FOR CHEST PAIN.    OMEPRAZOLE (PRILOSEC) 20 MG CAPSULE    TAKE 2 CAPSULES BY MOUTH EVERY DAY    POLYETHYLENE GLYCOL (GLYCOLAX) 17 GRAM/DOSE POWDER    Take 17 g by mouth once daily.    POTASSIUM CHLORIDE (MICRO-K) 10 MEQ CPSR    Take 1 capsule (10 mEq total) by mouth once daily.    ROSUVASTATIN (CRESTOR) 40 MG TAB    TAKE ONE TABLET BY MOUTH ONCE DAILY   Modified Medications    No medications on file   Discontinued Medications    No medications on file      Continue on the present regimen.  Return in about 6 months (around 7/5/2018).

## 2018-01-15 ENCOUNTER — OFFICE VISIT (OUTPATIENT)
Dept: SURGERY | Facility: CLINIC | Age: 78
End: 2018-01-15
Payer: MEDICARE

## 2018-01-15 ENCOUNTER — TELEPHONE (OUTPATIENT)
Dept: HEMATOLOGY/ONCOLOGY | Facility: CLINIC | Age: 78
End: 2018-01-15

## 2018-01-15 VITALS
BODY MASS INDEX: 41.27 KG/M2 | HEIGHT: 64 IN | TEMPERATURE: 98 F | SYSTOLIC BLOOD PRESSURE: 132 MMHG | DIASTOLIC BLOOD PRESSURE: 79 MMHG | HEART RATE: 77 BPM | WEIGHT: 241.75 LBS

## 2018-01-15 DIAGNOSIS — Z85.3 PERSONAL HISTORY OF BREAST CANCER: ICD-10-CM

## 2018-01-15 DIAGNOSIS — Z15.09 BRCA2 GENE MUTATION POSITIVE: Primary | ICD-10-CM

## 2018-01-15 DIAGNOSIS — Z15.01 BRCA2 GENE MUTATION POSITIVE: Primary | ICD-10-CM

## 2018-01-15 PROCEDURE — 99999 PR PBB SHADOW E&M-EST. PATIENT-LVL III: CPT | Mod: PBBFAC,,, | Performed by: NURSE PRACTITIONER

## 2018-01-15 PROCEDURE — 99213 OFFICE O/P EST LOW 20 MIN: CPT | Mod: S$GLB,,, | Performed by: NURSE PRACTITIONER

## 2018-01-15 NOTE — PROGRESS NOTES
Subjective:      Patient ID: Renata Bergman is a 77 y.o. female.    Chief Complaint: Breast Cancer (follow-up)      HPI: (PF, EPF - 1-3) (Detailed, Comp, - 4) returning patient presents for breast cancer surveillance . She is BRCA2 positive. Denies breast mass, pain, nipple discharge, skin changes, unexplained weight loss, new onset bone pain.      7/21/2016 core biopsy left breast with IDC, ER/MT +, HER-2 negative  8/1/2016 left lumpectomy with DCIS and IDC 1.8cm, negative SN. Adjuvant XRT, chemo, and started letrozole in April 2017.     2016 BRCA2 positive, [id number]       7- last mmg with no abnormality reported     Review of Systems   Constitutional: Negative for appetite change, fatigue and fever.   Respiratory: Negative for cough and shortness of breath.    Cardiovascular: Negative for chest pain.   Musculoskeletal: Negative for back pain.     Objective:   Physical Exam   Pulmonary/Chest: Right breast exhibits no inverted nipple, no mass, no nipple discharge, no skin change and no tenderness. Left breast exhibits no inverted nipple, no mass, no nipple discharge, no skin change and no tenderness. Breasts are symmetrical. There is no breast swelling.   Lumpectomy scar left breast with no suspicious mass or skin changes, no upper extremity lymphedema. Breathing non-labored. Port remains right upper chest    Lymphadenopathy:     She has no cervical adenopathy.        Right: No supraclavicular adenopathy present.        Left: No supraclavicular adenopathy present.     Assessment:       1. BRCA2 gene mutation positive    2. Personal history of breast cancer        Plan:       Clinically JOSE  Port remains, on plavix    She can return in one year CBE, to continue to see med onc during the interval, mmg due in July   Call for any interval palpable breast mass, pain, nipple discharge, skin changes or other breast related concerns

## 2018-01-15 NOTE — TELEPHONE ENCOUNTER
----- Message from Aida Stewart sent at 1/15/2018  8:01 AM CST -----  Contact: Pt  Pt calling regarding appt on tomorrow. She would like to move it to a different day          Pt call back number [phone]

## 2018-01-22 ENCOUNTER — OFFICE VISIT (OUTPATIENT)
Dept: HEMATOLOGY/ONCOLOGY | Facility: CLINIC | Age: 78
End: 2018-01-22
Payer: MEDICARE

## 2018-01-22 VITALS
RESPIRATION RATE: 16 BRPM | OXYGEN SATURATION: 95 % | HEART RATE: 86 BPM | DIASTOLIC BLOOD PRESSURE: 63 MMHG | WEIGHT: 243.38 LBS | HEIGHT: 64 IN | SYSTOLIC BLOOD PRESSURE: 138 MMHG | TEMPERATURE: 98 F | BODY MASS INDEX: 41.55 KG/M2

## 2018-01-22 DIAGNOSIS — C50.412 MALIGNANT NEOPLASM OF UPPER-OUTER QUADRANT OF LEFT BREAST IN FEMALE, ESTROGEN RECEPTOR POSITIVE: Primary | ICD-10-CM

## 2018-01-22 DIAGNOSIS — E11.51 CONTROLLED TYPE 2 DM WITH PERIPHERAL CIRCULATORY DISORDER: Chronic | ICD-10-CM

## 2018-01-22 DIAGNOSIS — Z17.0 MALIGNANT NEOPLASM OF UPPER-OUTER QUADRANT OF LEFT BREAST IN FEMALE, ESTROGEN RECEPTOR POSITIVE: Primary | ICD-10-CM

## 2018-01-22 DIAGNOSIS — N18.30 CHRONIC RENAL DISEASE, STAGE 3, MODERATELY DECREASED GLOMERULAR FILTRATION RATE BETWEEN 30-59 ML/MIN/1.73 SQUARE METER: Chronic | ICD-10-CM

## 2018-01-22 PROCEDURE — 99999 PR PBB SHADOW E&M-EST. PATIENT-LVL IV: CPT | Mod: PBBFAC,,, | Performed by: PHYSICIAN ASSISTANT

## 2018-01-22 PROCEDURE — 99214 OFFICE O/P EST MOD 30 MIN: CPT | Mod: S$GLB,,, | Performed by: PHYSICIAN ASSISTANT

## 2018-01-22 PROCEDURE — 99499 UNLISTED E&M SERVICE: CPT | Mod: S$GLB,,, | Performed by: PHYSICIAN ASSISTANT

## 2018-01-22 NOTE — PROGRESS NOTES
Subjective:       Patient ID: Renata Bergman is a 77 y.o. female.    Chief Complaint: Malignant neoplasm of upper-outer quadrant of left breast in    Mrs. Hussein is a 76-year-old female with stage I left breast cancer.  She  had 3 weeks of weekly Taxol and then because of insurance issues she was changed to CMF and had 5 cycles of that.     That was completed in January 2017.  She then received radiation therapy and started letrozole in April 2017.    She has some mild aches and pains but is overall doing quite well.    Some occasional hot flashes in the afternoon.   No breast pain.   Appetite is good.        Breast history: Screening mammogram on May 5 showed an irregular 22 mm mass with abnormal calcifications in the left breast 2:00 position. Follow-up ultrasound July 13 she noted a regular solid 17 mm mass.     On July 21 2016 needle biopsy showed grade 2 infiltrating ductal carcinoma strongly ER [place] positive (90%) and HER-2 negative.     On August 1 lumpectomy and sentinel lymph node biopsy were performed. That showed a 1.8 x 1.6 x 1.0 infiltrating carcinoma intermediate grade (histologic grade 3, nuclear grade 2, mitotic index 2.   The sentinel lymph node was negative and margins were negative. Final pathological stage TIc N0 stage IA.  Oncotype was 31 indicating a 21% risk of recurrence with tamoxifen alone.     completed 45 Gy to the Lt. breast followed by a boost to the primary site on 4/6/17         Review of Systems   Constitutional: Negative.    HENT: Negative for congestion, rhinorrhea, sore throat and trouble swallowing.         Dry mouth     Eyes: Negative for visual disturbance.   Respiratory: Negative for cough, chest tightness and shortness of breath.    Cardiovascular: Negative for chest pain, palpitations and leg swelling (lower extremity edema, chronic).   Gastrointestinal: Negative for abdominal pain, blood in stool, constipation, diarrhea, nausea and vomiting.   Genitourinary: Negative for  dysuria, hematuria and vaginal bleeding.   Musculoskeletal: Positive for gait problem (ambulates with walker). Negative for arthralgias, back pain and myalgias.   Skin: Negative for pallor and rash.   Neurological: Negative for dizziness, weakness and headaches.   Hematological: Negative for adenopathy. Does not bruise/bleed easily.   Psychiatric/Behavioral: Negative for dysphoric mood and suicidal ideas. The patient is not nervous/anxious.        Objective:      Physical Exam   Constitutional: She is oriented to person, place, and time. She appears well-developed and well-nourished. No distress.   ECOG 0  Presents alone   HENT:   Head: Normocephalic.   Mouth/Throat: Oropharynx is clear and moist. No oropharyngeal exudate.   Eyes: Conjunctivae are normal. Pupils are equal, round, and reactive to light. No scleral icterus.   Neck: Normal range of motion. Neck supple. No thyromegaly present.   Cardiovascular: Normal rate and regular rhythm.    Pulmonary/Chest: Effort normal and breath sounds normal. No respiratory distress.   Right breast without mass, nodule or skin changes. Left breast with well healed lumpectomy and SLNB incision. No mass, nodule or skin changes. No axillary or supraclavicular adenopathy.  Lungs clear   Abdominal: Soft. Bowel sounds are normal. She exhibits no distension and no mass. There is no tenderness.   Musculoskeletal: Normal range of motion. She exhibits no edema or tenderness.   No spinal or paraspinal tenderness to palpation     Lymphadenopathy:     She has no cervical adenopathy.   Neurological: She is alert and oriented to person, place, and time. No cranial nerve deficit.   Ambulates with walker   Skin: Skin is warm and dry.   Psychiatric: She has a normal mood and affect. Her behavior is normal. Thought content normal.   Vitals reviewed.      Assessment:       1. Malignant neoplasm of upper-outer quadrant of left breast in female, estrogen receptor positive    2. Chronic renal  disease, stage 3, moderately decreased glomerular filtration rate between 30-59 mL/min/1.73 square meter    3. Controlled type 2 DM with peripheral circulatory disorder        Plan:       1)Continue Letrozole and return to clinic in 3 months. Mammogram due 7/2018  2) Recent CMP per her PCP shows overall stability, has upcoming PCP appt for monitoring  3)recent CMP shows relatively good blood sugar control; to be monitored by PCP as above.              Distress Screening Results: Psychosocial Distress screening score of Distress Score: 3 noted and reviewed. No intervention indicated.

## 2018-01-23 RX ORDER — CLOPIDOGREL BISULFATE 75 MG/1
TABLET ORAL
Qty: 30 TABLET | Refills: 0 | Status: SHIPPED | OUTPATIENT
Start: 2018-01-23 | End: 2018-02-25 | Stop reason: SDUPTHER

## 2018-02-11 NOTE — TELEPHONE ENCOUNTER
----- Message from Jaycee Puente MD sent at 1/26/2017  2:19 PM CST -----  Please mail patient the blood work results and inform the patient that we will discuss it in detail during the upcoming visit. Kidney functions are a little worse, but stable    
Pt notified. Pt verbalized understanding. Copy of results will be given to pt at her appointment tomorrow.   
11-Feb-2018 14:01

## 2018-02-12 ENCOUNTER — CLINICAL SUPPORT (OUTPATIENT)
Dept: CARDIOLOGY | Facility: CLINIC | Age: 78
End: 2018-02-12
Attending: INTERNAL MEDICINE
Payer: MEDICARE

## 2018-02-12 ENCOUNTER — TELEPHONE (OUTPATIENT)
Dept: CARDIOLOGY | Facility: CLINIC | Age: 78
End: 2018-02-12

## 2018-02-12 DIAGNOSIS — I65.23 BILATERAL CAROTID ARTERY STENOSIS: ICD-10-CM

## 2018-02-12 LAB — INTERNAL CAROTID STENOSIS: ABNORMAL

## 2018-02-12 PROCEDURE — 93880 EXTRACRANIAL BILAT STUDY: CPT | Mod: S$GLB,,, | Performed by: INTERNAL MEDICINE

## 2018-02-12 RX ORDER — ROSUVASTATIN CALCIUM 40 MG/1
TABLET, COATED ORAL
Qty: 90 TABLET | Refills: 0 | Status: SHIPPED | OUTPATIENT
Start: 2018-02-12 | End: 2018-04-24 | Stop reason: SDUPTHER

## 2018-02-12 RX ORDER — POTASSIUM CHLORIDE 750 MG/1
CAPSULE, EXTENDED RELEASE ORAL
Qty: 90 CAPSULE | Refills: 0 | Status: SHIPPED | OUTPATIENT
Start: 2018-02-12 | End: 2018-11-09 | Stop reason: SDUPTHER

## 2018-02-12 NOTE — TELEPHONE ENCOUNTER
----- Message from Jaycee Puente MD sent at 2/12/2018  3:09 PM CST -----  Please inform the patient that Carotid duplex was c/w mild-moderate bilateral disease. Please continue on the same medical regimen.  Will check it again in 6 months.

## 2018-02-16 DIAGNOSIS — E11.9 TYPE 2 DIABETES MELLITUS WITHOUT COMPLICATION: ICD-10-CM

## 2018-02-19 RX ORDER — POTASSIUM CHLORIDE 750 MG/1
CAPSULE, EXTENDED RELEASE ORAL
Qty: 90 CAPSULE | Refills: 0 | Status: ON HOLD | OUTPATIENT
Start: 2018-02-19 | End: 2018-05-31 | Stop reason: HOSPADM

## 2018-02-26 RX ORDER — CLOPIDOGREL BISULFATE 75 MG/1
TABLET ORAL
Qty: 30 TABLET | Refills: 0 | Status: SHIPPED | OUTPATIENT
Start: 2018-02-26 | End: 2018-03-25 | Stop reason: SDUPTHER

## 2018-02-28 ENCOUNTER — OFFICE VISIT (OUTPATIENT)
Dept: OPTOMETRY | Facility: CLINIC | Age: 78
End: 2018-02-28
Payer: MEDICARE

## 2018-02-28 ENCOUNTER — TELEPHONE (OUTPATIENT)
Dept: NEUROLOGY | Facility: CLINIC | Age: 78
End: 2018-02-28

## 2018-02-28 DIAGNOSIS — H25.13 SENILE NUCLEAR SCLEROSIS, BILATERAL: ICD-10-CM

## 2018-02-28 DIAGNOSIS — H43.811 PVD (POSTERIOR VITREOUS DETACHMENT), RIGHT EYE: Primary | ICD-10-CM

## 2018-02-28 DIAGNOSIS — E11.9 DIABETES MELLITUS TYPE 2 WITHOUT RETINOPATHY: ICD-10-CM

## 2018-02-28 DIAGNOSIS — H52.4 HYPEROPIA WITH ASTIGMATISM AND PRESBYOPIA, BILATERAL: ICD-10-CM

## 2018-02-28 DIAGNOSIS — Z13.5 SCREENING FOR GLAUCOMA: ICD-10-CM

## 2018-02-28 DIAGNOSIS — H52.03 HYPEROPIA WITH ASTIGMATISM AND PRESBYOPIA, BILATERAL: ICD-10-CM

## 2018-02-28 DIAGNOSIS — H52.203 HYPEROPIA WITH ASTIGMATISM AND PRESBYOPIA, BILATERAL: ICD-10-CM

## 2018-02-28 PROCEDURE — 99999 PR PBB SHADOW E&M-EST. PATIENT-LVL II: CPT | Mod: PBBFAC,,, | Performed by: OPTOMETRIST

## 2018-02-28 PROCEDURE — 92015 DETERMINE REFRACTIVE STATE: CPT | Mod: S$GLB,,, | Performed by: OPTOMETRIST

## 2018-02-28 PROCEDURE — 92014 COMPRE OPH EXAM EST PT 1/>: CPT | Mod: S$GLB,,, | Performed by: OPTOMETRIST

## 2018-02-28 NOTE — PROGRESS NOTES
HPI     Ms. Renata Bergman for urgent visit   Patient complains of floaters OS for 3 days, denies flashes  (-)drops  (-)flashes  (+)floaters  (-)diplopia    KAYE/ADELE 04/13/2017    Last edited by Adan Salcedo, PCT on 2/28/2018 10:04 AM. (History)            Assessment /Plan     For exam results, see Encounter Report.    PVD (posterior vitreous detachment), right eye  -Reviewed signs and symptoms of retinal detachment. Pt instructed to return to clinic immediately with flashes, floaters, or veil of darkness in vision.      Diabetes mellitus type 2 without retinopathy  -No retinopathy noted today.  Continued control with primary care physician and annual comprehensive eye exam.    Senile nuclear sclerosis, bilateral  -Educated patient on presence of cataracts at today's exam, monitor at annual dilated fundus exam. 5 years surgical estimate.'    Screening for glaucoma  -Monitor with annual eye exam and IOP check    Hyperopia with astigmatism and presbyopia, bilateral  Eyeglass Final Rx     Eyeglass Final Rx       Sphere Cylinder Axis Dist VA Add    Right +2.50 +0.50 005 20/30-- +2.50    Left +2.50 +0.50 180 20/30-- +2.50    Type:  Bifocal    Expiration Date:  3/1/2019                  RTC 1 yr

## 2018-02-28 NOTE — PATIENT INSTRUCTIONS
"What is vitreous detachment?  Most of the eye's interior is filled with vitreous, a gel-like substance that helps the eye maintain a round shape. There are millions of fine fibers intertwined within the vitreous that are attached to the surface of the retina, the eye's light-sensitive tissue. As we age, the vitreous slowly shrinks, and these fine fibers pull on the retinal surface. Usually the fibers break, allowing the vitreous to separate and shrink from the retina. This is a vitreous detachment.   As the vitreous shrinks, it becomes somewhat stringy, and the strands can cast tiny shadows on the retina that you may notice as floaters, which appear as little "cobwebs" or specks that seem to float about in your field of vision. If you try to look at these shadows they appear to quickly dart out of the way.  One symptom of a vitreous detachment is a small but sudden increase in the number of new floaters. This increase in floaters may be accompanied by flashes of light (lightning streaks) in your peripheral, or side, vision. In most cases, either you will not notice a vitreous detachment, or you will find it merely annoying because of the increase in floaters.  Because these symptoms are often similar to a retinal detachment you should make an appointment immediately if you notice any new flashes and/or floaters so that a dilated eye exam can rule out a more severe, sight threatening condition.    In most cases, a vitreous detachment, also known as a posterior vitreous detachment, is not sight-threatening and requires no treatment.      "

## 2018-03-26 RX ORDER — CLOPIDOGREL BISULFATE 75 MG/1
TABLET ORAL
Qty: 30 TABLET | Refills: 0 | Status: SHIPPED | OUTPATIENT
Start: 2018-03-26 | End: 2018-04-24 | Stop reason: SDUPTHER

## 2018-03-27 ENCOUNTER — OFFICE VISIT (OUTPATIENT)
Dept: INTERNAL MEDICINE | Facility: CLINIC | Age: 78
End: 2018-03-27
Payer: MEDICARE

## 2018-03-27 VITALS
WEIGHT: 239.44 LBS | SYSTOLIC BLOOD PRESSURE: 110 MMHG | HEIGHT: 64 IN | DIASTOLIC BLOOD PRESSURE: 80 MMHG | OXYGEN SATURATION: 99 % | HEART RATE: 72 BPM | BODY MASS INDEX: 40.88 KG/M2

## 2018-03-27 DIAGNOSIS — I25.83 CORONARY ARTERY DISEASE DUE TO LIPID RICH PLAQUE: Chronic | ICD-10-CM

## 2018-03-27 DIAGNOSIS — M10.9 GOUT, ARTHRITIS: ICD-10-CM

## 2018-03-27 DIAGNOSIS — I25.10 CORONARY ARTERY DISEASE DUE TO LIPID RICH PLAQUE: Chronic | ICD-10-CM

## 2018-03-27 DIAGNOSIS — K21.9 GASTROESOPHAGEAL REFLUX DISEASE WITHOUT ESOPHAGITIS: ICD-10-CM

## 2018-03-27 DIAGNOSIS — E78.2 MIXED HYPERLIPIDEMIA: Primary | Chronic | ICD-10-CM

## 2018-03-27 DIAGNOSIS — I10 HTN (HYPERTENSION), BENIGN: Chronic | ICD-10-CM

## 2018-03-27 DIAGNOSIS — E11.51 CONTROLLED TYPE 2 DM WITH PERIPHERAL CIRCULATORY DISORDER: Chronic | ICD-10-CM

## 2018-03-27 PROBLEM — I21.4 NSTEMI (NON-ST ELEVATED MYOCARDIAL INFARCTION): Status: RESOLVED | Noted: 2017-05-21 | Resolved: 2018-03-27

## 2018-03-27 LAB
CREAT UR-MCNC: 194 MG/DL
MICROALBUMIN UR DL<=1MG/L-MCNC: 228 UG/ML
MICROALBUMIN/CREATININE RATIO: 117.5 UG/MG

## 2018-03-27 PROCEDURE — 3074F SYST BP LT 130 MM HG: CPT | Mod: CPTII,S$GLB,, | Performed by: INTERNAL MEDICINE

## 2018-03-27 PROCEDURE — 3044F HG A1C LEVEL LT 7.0%: CPT | Mod: CPTII,S$GLB,, | Performed by: INTERNAL MEDICINE

## 2018-03-27 PROCEDURE — 99214 OFFICE O/P EST MOD 30 MIN: CPT | Mod: S$GLB,,, | Performed by: INTERNAL MEDICINE

## 2018-03-27 PROCEDURE — 99499 UNLISTED E&M SERVICE: CPT | Mod: S$GLB,,, | Performed by: INTERNAL MEDICINE

## 2018-03-27 PROCEDURE — 82043 UR ALBUMIN QUANTITATIVE: CPT

## 2018-03-27 PROCEDURE — 3079F DIAST BP 80-89 MM HG: CPT | Mod: CPTII,S$GLB,, | Performed by: INTERNAL MEDICINE

## 2018-03-27 PROCEDURE — 99999 PR PBB SHADOW E&M-EST. PATIENT-LVL II: CPT | Mod: PBBFAC,,, | Performed by: INTERNAL MEDICINE

## 2018-03-27 NOTE — PROGRESS NOTES
CHIEF COMPLAINT: follow up NSTEMI, breast cancer, diabetes, hypertension, hyperlipidemia.     HISTORY OF PRESENT ILLNESS: 77-year-old woman who present for follow up of above.     She is taking Keppra  mg 2 in the afternoon.  Gait is better with taking the Keppra in the late afternoon.  No falls.  No seizures.        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 NO chest pain or shortness of breath. She  takes aspirin 81 mg daily and plavix 75 mg daily. No chest pain or shortness of breath.  She continues to take Coreg 25 mg twice daily, Norvasc 5 mg daily and chlorthaladone 25 mg 1/2 tablet every other daily for her hypertension. No chest pain, shortness of breath, lightheadedness.  She is in cardiac rehab and is now able to do more     She has completed 5 cycles of CMF for her breast cancer. She completed radiation the end of April 6, 2017.  She is now on Femara since 4/2017. She has irritation of her lips since on the Femara. Symptoms wax and wane. .      She was participating in the healthy back program once a week. This program is on hold due to breast cancer. Back is doing well. She is doing stretches every other day. She is off tylenol in the evening.  She has occasional right sided back pain.      She continues to take allopurinol 300 mg daily for her gout. She has not had any gouty flares. She has occasional left elbow pain.      Her blood sugars have been normal. She continues to take metformin 850 mg twice daily. She checks blood sugars once daily. Occasional diarrhea with certain foods. She denies any polydipsia or polyuria. She continues to take aspirin for her coronary artery disease. Sinuses have been doing well. She has not had to take Allegra lately. She denies any nausea, vomiting, constipation, diarrhea.     Reflux is well controlled on omeprazole 20 mg two tablets daily. She continues to take Crestor 40 mg daily for her hyperlipidemia. She  "denies any joint pain or muscle pain from the Crestor. Knee is doing well. She is not having to take Tramadol     She is taking vitamin D 3020-0862 units daily for vitamin D deficiency     Her daughter who is 46 has stage 2 breast cancer. She has had a lumpectomy and chemo and radiation. Her daughter is doing well. She has finished her treatment. Her daughter might be BRCA positive. Mrs Bergman feels that she is coping well with her diagnosis of breast cancer. No anxiety, depression or insomnia     PAST MEDICAL HISTORY:   1. Hypertension.   2. Diabetes mellitus   3. Hyperlipidemia.   4. Reflux.   5. Gout.   6. Coronary artery disease, status post MI in  with stenting at that time, and then a right coronary artery stent placed in . Had a negative stress test 2008. [facility] 2012 - patent stents and non obstructive cad  7. Osteoarthritis of the right knee. S/P right knee replacement   8. Status post left knee replacement.   9. Status post LISA/BSO secondary to menstrual disorder or polyps after tubal ligation.   10. Left breast cancer and BRCA2 positive    MEDICATIONS and ALLERGIES: Updated on epic.       Family History  Mother had breast cancer in her early 50's then had colon cancer in her 60's. She  of uterine cancer  2 Maternal aunts with breast cancer  Daughter with breast cancer at age 45 - BRCA positive  Father  of a gunshot wound  She is an only child    PHYSICAL EXAMINATION:     /80   Pulse 72   Ht 5' 4" (1.626 m)   Wt 108.6 kg (239 lb 6.7 oz)   SpO2 99%   BMI 41.10 kg/m²     General: Alert, oriented. No apparent distress. Affect within normal   limits.   Conjunctivae anicteric. Tympanic membranes clear. Oropharynx clear. Irritation of the lips  Neck supple.   Respiratory effort normal. Lungs clear to auscultation.   Heart: Regular rate and rhythm without murmurs, gallops or rubs.   No lower extremity edema.       Labs   reviewed       ASSESESSMENT     1. NSTEMI - s/p " stenting. Continue aspirin and plavix. Follow up with cardiology. Year of Plavix will be over in May 2018. May stop plavix at that time to have port removed.   2.. Left breast cancer with BRCA2 positive -Finished chemo and  radiation. On Femara -follow up with Dr Guevara  3. Seizure -Better on Keppra XR   4. Hypertension -stable  5. Diabetes - controlled. Continue metformin to 850 mg twice a day.   6. Gout -controlled on allopurinol    7. Hyperlipidemia - on Crestor 40 mg daily. Controlled   8. Obesity - working at diet, exercise and weight loss.   9. Osteoarthritis of the right knee, status post knee replacement - doing well.  10. GERD - controlled on meds  11. Anemia - stable  12 .CRI - stable.  13. Hypokalemia -  on KCL 10 meq daily  14. Irritation of the lips -try hydrocortisone cream twice daily         Screening - mammogram done 7/17. Colonoscopy 4/23/12 - normal, and 5/25/15 - 3 small polyps (one adenoma)- due 2018. Bone density was normal November 2008.    Prevnar 12/15  Tdap 5/16  Influenza 2017     I'll see her back 4 months,  sooner if problems arise.

## 2018-04-23 NOTE — PROGRESS NOTES
Yes mass  Subjective:       Patient ID: Renata Bergman is a 77 y.o. female.    Chief Complaint: No chief complaint on file.    HPI Mrs. Hussein is a 77-year-old female with stage I left breast cancer.  She is currently on letrozole endocrine therapy.  Her other medical issues include diabetes, seizures and coronary artery disease.    Her major complaint is some mouth tenderness and loss of taste which started after she began her letrozole.  Otherwise she's been feeling well.  She has no shortness of breath and no unusual pain.      Breast history: Screening mammogram on May 5, 2016 showed an irregular 22 mm mass with abnormal calcifications in the left breast 2:00 position. Follow-up ultrasound July 13 she noted a regular solid 17 mm mass.    On July 21 needle biopsy showed grade 2 infiltrating ductal carcinoma strongly ER [place] positive (90%) and HER-2 negative.    On August 1, 2016 lumpectomy and sentinel lymph node biopsy were performed. That showed a 1.8 x 1.6 x 1.0 infiltrating carcinoma intermediate grade (histologic grade 3, nuclear grade 2, mitotic index 2.   The sentinel lymph node was negative and margins were negative. Final pathological stage TIc N0 stage IA.  Oncotype was 31 indicating a 21% risk of recurrence with tamoxifen alone.    She  had 3 weeks of weekly Taxol and then because of insurance issues she was changed to CMF and had 5 cycles of that.     That was completed in January 2017.  She completed radiation in April 2017 and began letrozole endocrine therapy that month as well.    Review of Systems   Constitutional: Positive for fatigue. Negative for activity change, appetite change, fever and unexpected weight change.   HENT: Positive for mouth sores (tendernessnd loss of taste). Negative for trouble swallowing.    Respiratory: Negative for cough and shortness of breath.    Gastrointestinal: Negative for abdominal pain, constipation, diarrhea and nausea.   Musculoskeletal: Negative for back  pain.   Neurological: Negative for headaches.   Psychiatric/Behavioral: Negative for dysphoric mood. The patient is not nervous/anxious.        Objective:      Physical Exam   Constitutional: She is oriented to person, place, and time. She appears well-developed and well-nourished.   HENT:   Mouth/Throat: No oropharyngeal exudate.   Eyes: No scleral icterus.   Cardiovascular: Regular rhythm and normal heart sounds.    Pulmonary/Chest: Effort normal and breath sounds normal. She has no wheezes. She has no rales. Right breast exhibits no mass, no nipple discharge and no skin change. Left breast exhibits no mass, no nipple discharge and no skin change.       Abdominal: Soft. She exhibits no mass. There is no tenderness.   Lymphadenopathy:     She has no cervical adenopathy.     She has no axillary adenopathy.        Right: No supraclavicular adenopathy present.        Left: No supraclavicular adenopathy present.   Neurological: She is alert and oriented to person, place, and time. No cranial nerve deficit.   Psychiatric: She has a normal mood and affect. Her behavior is normal. Thought content normal.   Vitals reviewed.      Assessment:      1. Malignant neoplasm of upper-outer quadrant of left breast in female, estrogen receptor positive        Plan:   Will change her hormonal therapy to anastrozole to see if her oral symptoms improve.    She's due for mammograms in July - ? Breast [place].  I will see her 3 months after that.    She would like to have her port removed.  We'll check with cardiology to make sure she can be off of her anticoagulants for that procedure.    Distress Screening Results: Psychosocial Distress screening score of Distress Score: 4 noted and reviewed. No intervention indicated.

## 2018-04-24 ENCOUNTER — TELEPHONE (OUTPATIENT)
Dept: CARDIOLOGY | Facility: CLINIC | Age: 78
End: 2018-04-24

## 2018-04-24 ENCOUNTER — OFFICE VISIT (OUTPATIENT)
Dept: HEMATOLOGY/ONCOLOGY | Facility: CLINIC | Age: 78
End: 2018-04-24
Payer: MEDICARE

## 2018-04-24 VITALS
SYSTOLIC BLOOD PRESSURE: 134 MMHG | HEART RATE: 78 BPM | BODY MASS INDEX: 40.11 KG/M2 | DIASTOLIC BLOOD PRESSURE: 67 MMHG | RESPIRATION RATE: 16 BRPM | TEMPERATURE: 98 F | WEIGHT: 233.69 LBS

## 2018-04-24 DIAGNOSIS — Z17.0 MALIGNANT NEOPLASM OF UPPER-OUTER QUADRANT OF LEFT BREAST IN FEMALE, ESTROGEN RECEPTOR POSITIVE: Primary | ICD-10-CM

## 2018-04-24 DIAGNOSIS — C50.412 MALIGNANT NEOPLASM OF UPPER-OUTER QUADRANT OF LEFT BREAST IN FEMALE, ESTROGEN RECEPTOR POSITIVE: Primary | ICD-10-CM

## 2018-04-24 PROCEDURE — 3078F DIAST BP <80 MM HG: CPT | Mod: CPTII,S$GLB,, | Performed by: INTERNAL MEDICINE

## 2018-04-24 PROCEDURE — 99999 PR PBB SHADOW E&M-EST. PATIENT-LVL III: CPT | Mod: PBBFAC,,, | Performed by: INTERNAL MEDICINE

## 2018-04-24 PROCEDURE — 99213 OFFICE O/P EST LOW 20 MIN: CPT | Mod: S$GLB,,, | Performed by: INTERNAL MEDICINE

## 2018-04-24 PROCEDURE — 3075F SYST BP GE 130 - 139MM HG: CPT | Mod: CPTII,S$GLB,, | Performed by: INTERNAL MEDICINE

## 2018-04-24 RX ORDER — ANASTROZOLE 1 MG/1
1 TABLET ORAL DAILY
Qty: 30 TABLET | Refills: 11 | Status: SHIPPED | OUTPATIENT
Start: 2018-04-24 | End: 2018-07-30 | Stop reason: SDUPTHER

## 2018-04-24 RX ORDER — CLOPIDOGREL BISULFATE 75 MG/1
TABLET ORAL
Qty: 30 TABLET | Refills: 0 | Status: SHIPPED | OUTPATIENT
Start: 2018-04-24 | End: 2018-05-21 | Stop reason: SDUPTHER

## 2018-04-24 RX ORDER — ROSUVASTATIN CALCIUM 40 MG/1
TABLET, COATED ORAL
Qty: 90 TABLET | Refills: 4 | Status: SHIPPED | OUTPATIENT
Start: 2018-04-24 | End: 2019-12-20 | Stop reason: SDUPTHER

## 2018-04-24 NOTE — TELEPHONE ENCOUNTER
----- Message from Jaycee Puente MD sent at 4/24/2018 10:38 AM CDT -----  Plavix can be stopped 5 days and ASA 7 days prior to the procedure to be restarted ASAP.     ----- Message -----  From: Bismark Guevara MD  Sent: 4/24/2018   9:53 AM  To: ABILIO PeraltaP, #

## 2018-05-01 ENCOUNTER — TELEPHONE (OUTPATIENT)
Dept: SURGERY | Facility: CLINIC | Age: 78
End: 2018-05-01

## 2018-05-01 NOTE — TELEPHONE ENCOUNTER
----- Message from Bismark Guevaar MD sent at 4/24/2018 12:01 PM CDT -----  For port removal  ----- Message -----  From: Jaycee Puente MD  Sent: 4/24/2018  10:38 AM  To: Bismark Guevara MD, Liz Brody MA    Plavix can be stopped 5 days and ASA 7 days prior to the procedure to be restarted ASAP.     ----- Message -----  From: Bismark Guevara MD  Sent: 4/24/2018   9:53 AM  To: ABILIO PeraltaP, #

## 2018-05-01 NOTE — TELEPHONE ENCOUNTER
Call to pt to scheduled appt for port removal. Person answering phone stated pt was not available but would giver her the message to call back. Direct number provided to pt.

## 2018-05-03 ENCOUNTER — TELEPHONE (OUTPATIENT)
Dept: SURGERY | Facility: CLINIC | Age: 78
End: 2018-05-03

## 2018-05-03 NOTE — TELEPHONE ENCOUNTER
Spoke w/pt. Port removal scheduled on 5/31/18 at 9:30 am. Pt to stop Plavix and aspirin on 5/25/18 in preparation for port removal. Pt voiced understanding.

## 2018-05-03 NOTE — TELEPHONE ENCOUNTER
----- Message from Bismark Guevara MD sent at 4/24/2018 12:01 PM CDT -----  For port removal  ----- Message -----  From: Jaycee Puente MD  Sent: 4/24/2018  10:38 AM  To: Bismark Guevara MD, Liz Brody MA    Plavix can be stopped 5 days and ASA 7 days prior to the procedure to be restarted ASAP.     ----- Message -----  From: Bismark Guevara MD  Sent: 4/24/2018   9:53 AM  To: ABILIO PeraltaP, #

## 2018-05-08 ENCOUNTER — TELEPHONE (OUTPATIENT)
Dept: CARDIOLOGY | Facility: CLINIC | Age: 78
End: 2018-05-08

## 2018-05-08 DIAGNOSIS — E11.51 CONTROLLED TYPE 2 DM WITH PERIPHERAL CIRCULATORY DISORDER: ICD-10-CM

## 2018-05-08 DIAGNOSIS — I25.83 CORONARY ARTERY DISEASE DUE TO LIPID RICH PLAQUE: ICD-10-CM

## 2018-05-08 DIAGNOSIS — I25.10 CORONARY ARTERY DISEASE DUE TO LIPID RICH PLAQUE: ICD-10-CM

## 2018-05-08 DIAGNOSIS — I10 HTN (HYPERTENSION), BENIGN: Primary | ICD-10-CM

## 2018-05-08 DIAGNOSIS — E78.2 MIXED HYPERLIPIDEMIA: ICD-10-CM

## 2018-05-10 RX ORDER — METFORMIN HYDROCHLORIDE 850 MG/1
TABLET ORAL
Qty: 180 TABLET | Refills: 4 | Status: SHIPPED | OUTPATIENT
Start: 2018-05-10 | End: 2019-06-25 | Stop reason: SDUPTHER

## 2018-05-17 RX ORDER — ROSUVASTATIN CALCIUM 40 MG/1
TABLET, COATED ORAL
Qty: 90 TABLET | Refills: 6 | Status: ON HOLD | OUTPATIENT
Start: 2018-05-17 | End: 2018-05-31 | Stop reason: HOSPADM

## 2018-05-17 RX ORDER — POTASSIUM CHLORIDE 750 MG/1
CAPSULE, EXTENDED RELEASE ORAL
Qty: 90 CAPSULE | Refills: 0 | Status: ON HOLD | OUTPATIENT
Start: 2018-05-17 | End: 2018-05-31 | Stop reason: HOSPADM

## 2018-05-21 RX ORDER — CLOPIDOGREL BISULFATE 75 MG/1
TABLET ORAL
Qty: 30 TABLET | Refills: 0 | Status: SHIPPED | OUTPATIENT
Start: 2018-05-21 | End: 2018-06-20 | Stop reason: SDUPTHER

## 2018-05-30 ENCOUNTER — HOSPITAL ENCOUNTER (OUTPATIENT)
Facility: HOSPITAL | Age: 78
Discharge: HOME | End: 2018-05-31
Attending: EMERGENCY MEDICINE | Admitting: EMERGENCY MEDICINE
Payer: MEDICARE

## 2018-05-30 DIAGNOSIS — R07.9 CHEST PAIN IN ADULT: ICD-10-CM

## 2018-05-30 DIAGNOSIS — I24.9 ACUTE CORONARY SYNDROME: Primary | ICD-10-CM

## 2018-05-30 DIAGNOSIS — R07.89 ATYPICAL CHEST PAIN: ICD-10-CM

## 2018-05-30 DIAGNOSIS — R07.9 CHEST PAIN: ICD-10-CM

## 2018-05-30 LAB
ALBUMIN SERPL BCP-MCNC: 3.5 G/DL
ALP SERPL-CCNC: 68 U/L
ALT SERPL W/O P-5'-P-CCNC: 8 U/L
ANION GAP SERPL CALC-SCNC: 12 MMOL/L
AST SERPL-CCNC: 12 U/L
BACTERIA #/AREA URNS AUTO: ABNORMAL /HPF
BASOPHILS # BLD AUTO: 0.03 K/UL
BASOPHILS NFR BLD: 0.4 %
BILIRUB SERPL-MCNC: 0.6 MG/DL
BILIRUB UR QL STRIP: NEGATIVE
BNP SERPL-MCNC: 13 PG/ML
BUN SERPL-MCNC: 19 MG/DL
CALCIUM SERPL-MCNC: 10.5 MG/DL
CHLORIDE SERPL-SCNC: 103 MMOL/L
CK MB SERPL-MCNC: 1.4 NG/ML
CK MB SERPL-RTO: 0.7 %
CK SERPL-CCNC: 195 U/L
CK SERPL-CCNC: 195 U/L
CLARITY UR REFRACT.AUTO: CLEAR
CO2 SERPL-SCNC: 26 MMOL/L
COLOR UR AUTO: YELLOW
CREAT SERPL-MCNC: 1.9 MG/DL
DIFFERENTIAL METHOD: ABNORMAL
EOSINOPHIL # BLD AUTO: 0.3 K/UL
EOSINOPHIL NFR BLD: 4.6 %
ERYTHROCYTE [DISTWIDTH] IN BLOOD BY AUTOMATED COUNT: 19.4 %
EST. GFR  (AFRICAN AMERICAN): 29.3 ML/MIN/1.73 M^2
EST. GFR  (NON AFRICAN AMERICAN): 25.4 ML/MIN/1.73 M^2
ESTIMATED AVG GLUCOSE: 143 MG/DL
GLUCOSE SERPL-MCNC: 133 MG/DL
GLUCOSE UR QL STRIP: NEGATIVE
HBA1C MFR BLD HPLC: 6.6 %
HCT VFR BLD AUTO: 28.7 %
HGB BLD-MCNC: 8.5 G/DL
HGB UR QL STRIP: ABNORMAL
HYALINE CASTS UR QL AUTO: 0 /LPF
IMM GRANULOCYTES # BLD AUTO: 0.02 K/UL
IMM GRANULOCYTES NFR BLD AUTO: 0.3 %
KETONES UR QL STRIP: NEGATIVE
LEUKOCYTE ESTERASE UR QL STRIP: ABNORMAL
LYMPHOCYTES # BLD AUTO: 1.6 K/UL
LYMPHOCYTES NFR BLD: 23.2 %
MCH RBC QN AUTO: 22.8 PG
MCHC RBC AUTO-ENTMCNC: 29.6 G/DL
MCV RBC AUTO: 77 FL
MICROSCOPIC COMMENT: ABNORMAL
MONOCYTES # BLD AUTO: 0.9 K/UL
MONOCYTES NFR BLD: 12.4 %
NEUTROPHILS # BLD AUTO: 4.2 K/UL
NEUTROPHILS NFR BLD: 59.1 %
NITRITE UR QL STRIP: NEGATIVE
NRBC BLD-RTO: 0 /100 WBC
PH UR STRIP: 5 [PH] (ref 5–8)
PLATELET # BLD AUTO: 362 K/UL
PMV BLD AUTO: 9.9 FL
POCT GLUCOSE: 114 MG/DL (ref 70–110)
POCT GLUCOSE: 128 MG/DL (ref 70–110)
POTASSIUM SERPL-SCNC: 3 MMOL/L
PROT SERPL-MCNC: 7.4 G/DL
PROT UR QL STRIP: ABNORMAL
RBC # BLD AUTO: 3.72 M/UL
RBC #/AREA URNS AUTO: 7 /HPF (ref 0–4)
SODIUM SERPL-SCNC: 141 MMOL/L
SP GR UR STRIP: 1.01 (ref 1–1.03)
SQUAMOUS #/AREA URNS AUTO: 2 /HPF
TROPONIN I SERPL DL<=0.01 NG/ML-MCNC: 0.03 NG/ML
TROPONIN I SERPL DL<=0.01 NG/ML-MCNC: 0.03 NG/ML
TROPONIN I SERPL DL<=0.01 NG/ML-MCNC: 0.05 NG/ML
TSH SERPL DL<=0.005 MIU/L-ACNC: 2.98 UIU/ML
URN SPEC COLLECT METH UR: ABNORMAL
UROBILINOGEN UR STRIP-ACNC: NEGATIVE EU/DL
WBC # BLD AUTO: 7.03 K/UL
WBC #/AREA URNS AUTO: 8 /HPF (ref 0–5)

## 2018-05-30 PROCEDURE — 82553 CREATINE MB FRACTION: CPT

## 2018-05-30 PROCEDURE — 84484 ASSAY OF TROPONIN QUANT: CPT | Mod: 91

## 2018-05-30 PROCEDURE — 99219 PR INITIAL OBSERVATION CARE,LEVL II: CPT | Mod: ,,, | Performed by: HOSPITALIST

## 2018-05-30 PROCEDURE — G0378 HOSPITAL OBSERVATION PER HR: HCPCS

## 2018-05-30 PROCEDURE — 93005 ELECTROCARDIOGRAM TRACING: CPT

## 2018-05-30 PROCEDURE — 99285 EMERGENCY DEPT VISIT HI MDM: CPT | Mod: 25

## 2018-05-30 PROCEDURE — 82962 GLUCOSE BLOOD TEST: CPT

## 2018-05-30 PROCEDURE — 81001 URINALYSIS AUTO W/SCOPE: CPT

## 2018-05-30 PROCEDURE — 93010 ELECTROCARDIOGRAM REPORT: CPT | Mod: ,,, | Performed by: INTERNAL MEDICINE

## 2018-05-30 PROCEDURE — 99285 EMERGENCY DEPT VISIT HI MDM: CPT | Mod: ,,, | Performed by: EMERGENCY MEDICINE

## 2018-05-30 PROCEDURE — 25000003 PHARM REV CODE 250: Performed by: HOSPITALIST

## 2018-05-30 PROCEDURE — 83036 HEMOGLOBIN GLYCOSYLATED A1C: CPT

## 2018-05-30 PROCEDURE — 36415 COLL VENOUS BLD VENIPUNCTURE: CPT

## 2018-05-30 PROCEDURE — 82550 ASSAY OF CK (CPK): CPT

## 2018-05-30 PROCEDURE — 84484 ASSAY OF TROPONIN QUANT: CPT

## 2018-05-30 PROCEDURE — 83880 ASSAY OF NATRIURETIC PEPTIDE: CPT

## 2018-05-30 PROCEDURE — 85025 COMPLETE CBC W/AUTO DIFF WBC: CPT

## 2018-05-30 PROCEDURE — 80053 COMPREHEN METABOLIC PANEL: CPT

## 2018-05-30 PROCEDURE — 93010 ELECTROCARDIOGRAM REPORT: CPT | Mod: 76,,, | Performed by: INTERNAL MEDICINE

## 2018-05-30 PROCEDURE — 84443 ASSAY THYROID STIM HORMONE: CPT

## 2018-05-30 RX ORDER — LEVETIRACETAM 500 MG/1
1000 TABLET, EXTENDED RELEASE ORAL DAILY
Status: DISCONTINUED | OUTPATIENT
Start: 2018-05-30 | End: 2018-05-31 | Stop reason: HOSPADM

## 2018-05-30 RX ORDER — INSULIN ASPART 100 [IU]/ML
0-5 INJECTION, SOLUTION INTRAVENOUS; SUBCUTANEOUS
Status: DISCONTINUED | OUTPATIENT
Start: 2018-05-30 | End: 2018-05-31 | Stop reason: HOSPADM

## 2018-05-30 RX ORDER — ACETAMINOPHEN 325 MG/1
650 TABLET ORAL EVERY 8 HOURS PRN
Status: DISCONTINUED | OUTPATIENT
Start: 2018-05-30 | End: 2018-05-31 | Stop reason: HOSPADM

## 2018-05-30 RX ORDER — CLOPIDOGREL BISULFATE 75 MG/1
75 TABLET ORAL DAILY
Status: DISCONTINUED | OUTPATIENT
Start: 2018-05-30 | End: 2018-05-30

## 2018-05-30 RX ORDER — POTASSIUM CHLORIDE 20 MEQ/1
40 TABLET, EXTENDED RELEASE ORAL ONCE
Status: COMPLETED | OUTPATIENT
Start: 2018-05-30 | End: 2018-05-30

## 2018-05-30 RX ORDER — ASPIRIN 325 MG
325 TABLET ORAL
Status: DISCONTINUED | OUTPATIENT
Start: 2018-05-30 | End: 2018-05-30

## 2018-05-30 RX ORDER — LEVETIRACETAM 500 MG/1
1000 TABLET ORAL 2 TIMES DAILY
Status: DISCONTINUED | OUTPATIENT
Start: 2018-05-30 | End: 2018-05-30

## 2018-05-30 RX ORDER — GLUCAGON 1 MG
1 KIT INJECTION
Status: DISCONTINUED | OUTPATIENT
Start: 2018-05-30 | End: 2018-05-31 | Stop reason: HOSPADM

## 2018-05-30 RX ORDER — SODIUM CHLORIDE 9 MG/ML
INJECTION, SOLUTION INTRAVENOUS CONTINUOUS
Status: DISCONTINUED | OUTPATIENT
Start: 2018-05-30 | End: 2018-05-30

## 2018-05-30 RX ORDER — NAPROXEN SODIUM 220 MG/1
81 TABLET, FILM COATED ORAL DAILY
Status: DISCONTINUED | OUTPATIENT
Start: 2018-05-30 | End: 2018-05-30

## 2018-05-30 RX ORDER — SODIUM CHLORIDE 9 MG/ML
INJECTION, SOLUTION INTRAVENOUS CONTINUOUS
Status: ACTIVE | OUTPATIENT
Start: 2018-05-30 | End: 2018-05-30

## 2018-05-30 RX ORDER — ROSUVASTATIN CALCIUM 20 MG/1
40 TABLET, COATED ORAL DAILY
Status: DISCONTINUED | OUTPATIENT
Start: 2018-05-30 | End: 2018-05-31 | Stop reason: HOSPADM

## 2018-05-30 RX ORDER — PROMETHAZINE HYDROCHLORIDE 12.5 MG/1
25 TABLET ORAL EVERY 6 HOURS PRN
Status: DISCONTINUED | OUTPATIENT
Start: 2018-05-30 | End: 2018-05-31 | Stop reason: HOSPADM

## 2018-05-30 RX ORDER — AMLODIPINE BESYLATE 5 MG/1
5 TABLET ORAL DAILY
Status: DISCONTINUED | OUTPATIENT
Start: 2018-05-30 | End: 2018-05-31 | Stop reason: HOSPADM

## 2018-05-30 RX ORDER — ONDANSETRON 8 MG/1
8 TABLET, ORALLY DISINTEGRATING ORAL EVERY 8 HOURS PRN
Status: DISCONTINUED | OUTPATIENT
Start: 2018-05-30 | End: 2018-05-31 | Stop reason: HOSPADM

## 2018-05-30 RX ORDER — POLYETHYLENE GLYCOL 3350 17 G/17G
17 POWDER, FOR SOLUTION ORAL 2 TIMES DAILY PRN
Status: DISCONTINUED | OUTPATIENT
Start: 2018-05-30 | End: 2018-05-31 | Stop reason: HOSPADM

## 2018-05-30 RX ORDER — IBUPROFEN 200 MG
16 TABLET ORAL
Status: DISCONTINUED | OUTPATIENT
Start: 2018-05-30 | End: 2018-05-31 | Stop reason: HOSPADM

## 2018-05-30 RX ORDER — NITROGLYCERIN 0.4 MG/1
0.4 TABLET SUBLINGUAL EVERY 5 MIN PRN
Status: DISCONTINUED | OUTPATIENT
Start: 2018-05-30 | End: 2018-05-31 | Stop reason: HOSPADM

## 2018-05-30 RX ORDER — SODIUM CHLORIDE 0.9 % (FLUSH) 0.9 %
5 SYRINGE (ML) INJECTION
Status: DISCONTINUED | OUTPATIENT
Start: 2018-05-30 | End: 2018-05-31 | Stop reason: HOSPADM

## 2018-05-30 RX ORDER — IBUPROFEN 200 MG
24 TABLET ORAL
Status: DISCONTINUED | OUTPATIENT
Start: 2018-05-30 | End: 2018-05-31 | Stop reason: HOSPADM

## 2018-05-30 RX ORDER — RAMELTEON 8 MG/1
8 TABLET ORAL NIGHTLY PRN
Status: DISCONTINUED | OUTPATIENT
Start: 2018-05-30 | End: 2018-05-31 | Stop reason: HOSPADM

## 2018-05-30 RX ORDER — LEVETIRACETAM 500 MG/1
1000 TABLET, EXTENDED RELEASE ORAL 2 TIMES DAILY
Status: DISCONTINUED | OUTPATIENT
Start: 2018-05-30 | End: 2018-05-30

## 2018-05-30 RX ORDER — CARVEDILOL 25 MG/1
25 TABLET ORAL 2 TIMES DAILY
Status: DISCONTINUED | OUTPATIENT
Start: 2018-05-30 | End: 2018-05-31 | Stop reason: HOSPADM

## 2018-05-30 RX ADMIN — SODIUM CHLORIDE: 0.9 INJECTION, SOLUTION INTRAVENOUS at 10:05

## 2018-05-30 RX ADMIN — LEVETIRACETAM 500 MG: 500 TABLET, EXTENDED RELEASE ORAL at 10:05

## 2018-05-30 RX ADMIN — AMLODIPINE BESYLATE 5 MG: 5 TABLET ORAL at 11:05

## 2018-05-30 RX ADMIN — CARVEDILOL 25 MG: 25 TABLET, FILM COATED ORAL at 08:05

## 2018-05-30 RX ADMIN — CARVEDILOL 25 MG: 25 TABLET, FILM COATED ORAL at 11:05

## 2018-05-30 RX ADMIN — POTASSIUM CHLORIDE 40 MEQ: 1500 TABLET, EXTENDED RELEASE ORAL at 12:05

## 2018-05-30 RX ADMIN — ROSUVASTATIN CALCIUM 40 MG: 20 TABLET, FILM COATED ORAL at 10:05

## 2018-05-30 NOTE — HPI
"The patient is a 77 y.o. Female with GERD, CAD, DM type 2, HLD, and HTN who presents to the ED with a complaint of CP that woke her from sleep this AM. CP is described as a heaviness "like someone is sitting on my chest" and lasted for 45 minutes. Pt also complained of left arm numbness that was intermittent and lasted approximately 15 minutes. She denies any numbness to her face, chest, or legs. Pt states that she has been off of Plavix and aspirin for the past 7 days as she is scheduled to have her port removed tomorrow. Chest pain now 0/10, she is feeling good at this time.      "

## 2018-05-30 NOTE — ASSESSMENT & PLAN NOTE
- follow Dr Guevara here  - pt does not want to take ASA/Plavix given port removal plan tomorrow, at this time given neg cardiac work up, Ok to hold for now, pt and daughter ask if port can be removed today, Dr Lin team paged

## 2018-05-30 NOTE — ED PROVIDER NOTES
"Encounter Date: 5/30/2018    SCRIBE #1 NOTE: I, Rebecca Carvajal, am scribing for, and in the presence of,  Dr. Flynn. I have scribed the entire note.       History     Chief Complaint   Patient presents with    Chest Pain     Patient brought in by [facility]. Patient woke up with chest pain, denies SOB     Time patient was seen by the provider: 6:39 AM      The patient is a 77 y.o. female with co-morbidities including: GERD, CAD, DM type 2, HLD, and HTN who presents to the ED with a complaint of CP that woke her from sleep this AM. CP is described as a heaviness "like someone is sitting on my chest" and lasted for 45 minutes. Pt also complained of left arm numbness that was intermittent and lasted approximately 15 minutes. She denies any numbness to her face, chest, or legs. She now states that she has nausea. Pt states that she has been off of Plavix and aspirin for the past 7 days as she is scheduled to have her port removed tomorrow.       The history is provided by the patient and medical records.     Review of patient's allergies indicates:   Allergen Reactions    Lisinopril Anaphylaxis     Past Medical History:   Diagnosis Date    Breast cancer 2016    DCIS and IDC, stage I    Cataract     Coronary artery disease 9/4/2012    Diabetes mellitus due to abnormal insulin 9/4/2012    Diabetes mellitus type II     Genetic testing     brca2 positive     GERD (gastroesophageal reflux disease) 9/4/2012    Gout, arthritis 9/4/2012    Hyperlipidemia 9/4/2012    Hypertension     Primary osteoarthritis of both knees 9/4/2012     Past Surgical History:   Procedure Laterality Date    BREAST LUMPECTOMY Left 08/01/2016    DCIS and IDC, s/p lumpectomy    CORONARY ANGIOPLASTY      HYSTERECTOMY      JOINT REPLACEMENT      left and right k nee    KNEE SURGERY      TOTAL ABDOMINAL HYSTERECTOMY W/ BILATERAL SALPINGOOPHORECTOMY       Family History   Problem Relation Age of Onset    Cancer Mother 55        colon    " Diabetes Mother     Hypertension Mother     Breast cancer Mother 45    Ovarian cancer Mother     Hypertension Maternal Aunt     Hyperlipidemia Maternal Aunt     Breast cancer Maternal Aunt     Hypertension Maternal Uncle     Heart disease Father     Breast cancer Daughter 45    Breast cancer Maternal Aunt     Glaucoma Neg Hx     Heart attack Neg Hx     Heart failure Neg Hx     Stroke Neg Hx      Social History   Substance Use Topics    Smoking status: Former Smoker     Packs/day: 1.00     Types: Cigarettes     Quit date: 8/13/1962    Smokeless tobacco: Never Used    Alcohol use No      Comment: socially     Review of Systems   Constitutional: Negative for fever.   HENT: Negative for sore throat.    Respiratory: Negative for shortness of breath.    Cardiovascular: Positive for chest pain.   Gastrointestinal: Positive for nausea.   Genitourinary: Negative for dysuria.   Musculoskeletal: Negative for back pain.   Skin: Negative for rash.   Neurological: Positive for numbness (left arm). Negative for weakness.   Hematological: Does not bruise/bleed easily.       Physical Exam     Initial Vitals [05/30/18 0622]   BP Pulse Resp Temp SpO2   (!) 104/50 85 18 98.3 °F (36.8 °C) 99 %      MAP       68         Physical Exam   Appearance: No acute distress.  Skin: No rashes seen.  Good turgor.  No abrasions.  No ecchymoses.  Eyes: No conjunctival injection.  ENT: Oropharynx clear.    Chest: Clear to auscultation bilaterally.  Good air movement.  No wheezes.  No rhonchi.  Cardiovascular: Regular rate and rhythm.  No murmurs. No gallops. No rubs.  Abdomen: Soft.  Not distended.  Nontender.  No guarding.  No rebound.  Musculoskeletal: Good range of motion all joints.  No deformities.  Neck supple.  No meningismus.  Neurologic: Motor intact.  Sensation intact.  Cerebellar intact.  Cranial nerves intact.  Mental Status:  Alert and oriented x 3.  Appropriate, conversant.      ED Course   Procedures  Labs Reviewed    CBC W/ AUTO DIFFERENTIAL - Abnormal; Notable for the following:        Result Value    RBC 3.72 (*)     Hemoglobin 8.5 (*)     Hematocrit 28.7 (*)     MCV 77 (*)     MCH 22.8 (*)     MCHC 29.6 (*)     RDW 19.4 (*)     Platelets 362 (*)     All other components within normal limits   COMPREHENSIVE METABOLIC PANEL - Abnormal; Notable for the following:     Potassium 3.0 (*)     Glucose 133 (*)     Creatinine 1.9 (*)     ALT 8 (*)     eGFR if  29.3 (*)     eGFR if non  25.4 (*)     All other components within normal limits   TROPONIN I - Abnormal; Notable for the following:     Troponin I 0.032 (*)     All other components within normal limits   CK-MB - Abnormal; Notable for the following:      (*)     All other components within normal limits   HEMOGLOBIN A1C - Abnormal; Notable for the following:     Hemoglobin A1C 6.6 (*)     Estimated Avg Glucose 143 (*)     All other components within normal limits   CK - Abnormal; Notable for the following:      (*)     All other components within normal limits   POCT GLUCOSE - Abnormal; Notable for the following:     POCT Glucose 114 (*)     All other components within normal limits   B-TYPE NATRIURETIC PEPTIDE   TROPONIN I   TSH   URINALYSIS   TROPONIN I   POCT GLUCOSE MONITORING CONTINUOUS     EKG Readings: (Independently Interpreted)   SR. Similar to previous. No acute ischemia.           Medical Decision Making:   History:   Old Medical Records: I decided to obtain old medical records.  Initial Assessment:   This is an emergent evaluation of a 77 y.o. female presenting with CP. Pt has a hx of CAD. She has been off of plavix and aspirin for 7 days to have port taken out. 45 minutes of chest heaviness this morning. Will give aspirin, do cardiac workup, and place in observation.   Clinical Tests:   Lab Tests: Ordered and Reviewed  Medical Tests: Ordered and Reviewed    Discussed with  for observation.          Jayla  Attestation:   Scribe #1: I performed the above scribed service and the documentation accurately describes the services I performed. I attest to the accuracy of the note.               Clinical Impression:   The primary encounter diagnosis was Acute coronary syndrome. Diagnoses of Chest pain and Chest pain in adult were also pertinent to this visit.    Disposition:   Disposition: Placed in Observation                        Tom Flynn MD  05/30/18 8378

## 2018-05-30 NOTE — PLAN OF CARE
Problem: Diabetes, Type 2 (Adult)  Goal: Signs and Symptoms of Listed Potential Problems Will be Absent, Minimized or Managed (Diabetes, Type 2)  Signs and symptoms of listed potential problems will be absent, minimized or managed by discharge/transition of care (reference Diabetes, Type 2 (Adult) CPG).   Outcome: Ongoing (interventions implemented as appropriate)  Pt arrived to OBS 5 from ED via stretcher. Pt aao x 4, ambulatory with steady gait. Pt denies CP or discomfort. VSS. Safety precautions maintained. Bed in low positon wheels locked side rails up x 2. Call light within reach. Pt free of falls.

## 2018-05-30 NOTE — ED NOTES
Pt changed into hospital gown and pt's belongings/clothes placed in pt belongings bag and given to pt's daughter.

## 2018-05-30 NOTE — H&P
"[facility] Hospital Medicine  History & Physical    Patient Name: Renata Bergman  MRN: [id number]  Admission Date: 5/30/2018  Attending Physician: Vick Ozuna MD  Primary Care Provider: Ethel Berger MD    [facility] Medicine Team: [facility] HOSP MED A Vick Ozuna MD     Patient information was obtained from patient, relative(s), past medical records and ER records.     Subjective:     Principal Problem:Atypical chest pain    Chief Complaint:   Chief Complaint   Patient presents with    Chest Pain     Patient brought in by [facility]. Patient woke up with chest pain, denies SOB        HPI: The patient is a 77 y.o. Female with GERD, CAD, DM type 2, HLD, and HTN who presents to the ED with a complaint of CP that woke her from sleep this AM. CP is described as a heaviness "like someone is sitting on my chest" and lasted for 45 minutes. Pt also complained of left arm numbness that was intermittent and lasted approximately 15 minutes. She denies any numbness to her face, chest, or legs. Pt states that she has been off of Plavix and aspirin for the past 7 days as she is scheduled to have her port removed tomorrow. Chest pain now 0/10, she is feeling good at this time.        Past Medical History:   Diagnosis Date    Breast cancer 2016    DCIS and IDC, stage I    Cataract     Coronary artery disease 9/4/2012    Diabetes mellitus due to abnormal insulin 9/4/2012    Diabetes mellitus type II     Genetic testing     brca2 positive     GERD (gastroesophageal reflux disease) 9/4/2012    Gout, arthritis 9/4/2012    Hyperlipidemia 9/4/2012    Hypertension     Primary osteoarthritis of both knees 9/4/2012       Past Surgical History:   Procedure Laterality Date    BREAST LUMPECTOMY Left 08/01/2016    DCIS and IDC, s/p lumpectomy    CORONARY ANGIOPLASTY      HYSTERECTOMY      JOINT REPLACEMENT      left and right k nee    KNEE SURGERY      TOTAL ABDOMINAL HYSTERECTOMY W/ BILATERAL " SALPINGOOPHORECTOMY         Review of patient's allergies indicates:   Allergen Reactions    Lisinopril Anaphylaxis       No current facility-administered medications on file prior to encounter.      Current Outpatient Prescriptions on File Prior to Encounter   Medication Sig    allopurinol (ZYLOPRIM) 300 MG tablet take 1 tablet by mouth once daily    amlodipine (NORVASC) 5 MG tablet TAKE 1 TABLET BY MOUTH DAILY    anastrozole (ARIMIDEX) 1 mg Tab Take 1 tablet (1 mg total) by mouth once daily.    aspirin 81 MG Chew Take 81 mg by mouth once daily.      carvedilol (COREG) 25 MG tablet TAKE 1 TABLET BY MOUTH TWICE DAILY    chlorthalidone (HYGROTEN) 25 MG Tab Take 1/2 tablet by mouth daily. (Patient taking differently: Take 1/2 tablet by mouth every other daily.)    clopidogrel (PLAVIX) 75 mg tablet TAKE 1 TABLET BY MOUTH EVERY DAY    fexofenadine (ALLEGRA) 30 MG tablet Take 30 mg by mouth daily as needed.    levetiracetam XR (KEPPRA XR) 500 mg Tb24 24 hr tablet Take 2 tablets (1,000 mg total) by mouth once daily.    metFORMIN (GLUCOPHAGE) 850 MG tablet TAKE 1 TABLET BY MOUTH TWICE DAILY    nitroGLYCERIN (NITROSTAT) 0.4 MG SL tablet PLACE 1 TABLET UNDER THE TONGUE EVERY 5 MINUTES AS NEEDED FOR CHEST PAIN.    omeprazole (PRILOSEC) 20 MG capsule TAKE 2 CAPSULES BY MOUTH EVERY DAY    polyethylene glycol (GLYCOLAX) 17 gram/dose powder Take 17 g by mouth once daily. (Patient taking differently: Take 17 g by mouth once daily. Pt taking PRN)    potassium chloride (MICRO-K) 10 MEQ CpSR TAKE 1 CAPSULE BY MOUTH ONCE DAILY.    potassium chloride (MICRO-K) 10 MEQ CpSR TAKE 1 CAPSULE BY MOUTH ONCE DAILY.    potassium chloride (MICRO-K) 10 MEQ CpSR TAKE 1 CAPSULE BY MOUTH ONCE DAILY.    rosuvastatin (CRESTOR) 40 MG Tab TAKE ONE TABLET BY MOUTH ONCE DAILY    rosuvastatin (CRESTOR) 40 MG Tab TAKE 1 TABLET BY MOUTH EVERY DAY    rosuvastatin (CRESTOR) 40 MG Tab TAKE 1 TABLET BY MOUTH EVERY DAY     Family History      Problem Relation (Age of Onset)    Breast cancer Mother (45), Maternal Aunt, Daughter (45), Maternal Aunt    Cancer Mother (55)    Diabetes Mother    Heart disease Father    Hyperlipidemia Maternal Aunt    Hypertension Mother, Maternal Aunt, Maternal Uncle    Ovarian cancer Mother        Social History Main Topics    Smoking status: Former Smoker     Packs/day: 1.00     Types: Cigarettes     Quit date: 8/13/1962    Smokeless tobacco: Never Used    Alcohol use No      Comment: socially    Drug use: No    Sexual activity: No     Review of Systems   Constitutional: Negative for activity change and fever.   Respiratory: Positive for chest tightness. Negative for cough, shortness of breath and wheezing.    Cardiovascular: Positive for chest pain. Negative for palpitations and leg swelling.   Gastrointestinal: Negative for abdominal distention, abdominal pain, diarrhea, nausea and vomiting.   Endocrine: Negative.    Genitourinary: Negative for dysuria.   Musculoskeletal: Negative for joint swelling and neck pain.   Skin: Negative for pallor and rash.   Neurological: Negative.  Negative for weakness and light-headedness.   Hematological: Negative.    Psychiatric/Behavioral: Negative.         Objective:     Vital Signs (Most Recent):  Temp: 98.3 °F (36.8 °C) (05/30/18 0957)  Pulse: 73 (05/30/18 0957)  Resp: 18 (05/30/18 0957)  BP: (!) 108/54 (05/30/18 0957)  SpO2: 100 % (05/30/18 0957) Vital Signs (24h Range):  Temp:  [98.3 °F (36.8 °C)] 98.3 °F (36.8 °C)  Pulse:  [69-85] 73  Resp:  [18-26] 18  SpO2:  [99 %-100 %] 100 %  BP: (104-119)/(50-59) 108/54        There is no height or weight on file to calculate BMI.    Physical Exam    Appearance: No acute distress.  Skin: No rashes seen.  Good turgor.  No abrasions.  No ecchymoses.  Eyes: No conjunctival injection.  ENT: Oropharynx clear.    Chest: Clear to auscultation bilaterally.  Good air movement.  No wheezes.  No rhonchi.  Cardiovascular: Regular rate and rhythm.  No  murmurs. No gallops. No rubs.  Abdomen: Soft.  Not distended.  Nontender.  No guarding.  No rebound.  Musculoskeletal: Good range of motion all joints.  No deformities.  Neck supple.  No meningismus.  Neurologic: Motor intact.  Sensation intact.  Cerebellar intact.  Cranial nerves intact.  Mental Status:  Alert and oriented x 3.  Appropriate, conversant.          Significant Labs:   CBC:   Recent Labs  Lab 05/30/18  0636   WBC 7.03   HGB 8.5*   HCT 28.7*   *     CMP:   Recent Labs  Lab 05/30/18  0636      K 3.0*      CO2 26   *   BUN 19   CREATININE 1.9*   CALCIUM 10.5   PROT 7.4   ALBUMIN 3.5   BILITOT 0.6   ALKPHOS 68   AST 12   ALT 8*   ANIONGAP 12   EGFRNONAA 25.4*     Cardiac Markers:   Recent Labs  Lab 05/30/18  0636   BNP 13     Troponin:   Recent Labs  Lab 05/30/18  0636 05/30/18  0854   TROPONINI 0.032* 0.026     TSH:   Recent Labs  Lab 05/30/18  0854   TSH 2.985     Urine Studies: No results for input(s): COLORU, APPEARANCEUA, PHUR, SPECGRAV, PROTEINUA, GLUCUA, KETONESU, BILIRUBINUA, OCCULTUA, NITRITE, UROBILINOGEN, LEUKOCYTESUR, RBCUA, WBCUA, BACTERIA, SQUAMEPITHEL, HYALINECASTS in the last 48 hours.    Invalid input(s): BELGICASUPAUL    Significant Imaging: I have reviewed all pertinent imaging results/findings within the past 24 hours.    Assessment/Plan:     * Atypical chest pain    - chest pain that woke her up today, so far cardiac work up is negative. Trop Negative X2  - No chest pain at this time in ED  - EKG reviewed, will get repeat to compare for any new changes  - Telemetry, BB and statin  - Possibly D/C home this afternoon if no further issue.   - checking CXR  - pt does not want to take ASA/Plavix given port removal plan tomorrow, at this time given neg cardiac work up, Ok to hold for now, pt and daughter ask if port can be removed today, Dr Lin team paged  -           Tonic-clonic epileptic seizures    - chronic, resume keppra          History of left breast cancer     - follow Dr Guevara here  - pt does not want to take ASA/Plavix given port removal plan tomorrow, at this time given neg cardiac work up, Ok to hold for now, pt and daughter ask if port can be removed today, Dr Lin team paged          Chronic renal disease, stage 3, moderately decreased glomerular filtration rate between 30-59 mL/min/1.73 square meter    - MARIA ALEJANDRA on CKD stage 3  - Cr at 1.9 on admit, baseline is at 1.4-1.5  - No sign of dehydration on exam, pt report no changes in oral intake  - IVF started at 100cc/hr for now for hydration  - can just encourage fluid intake at home        Controlled type 2 DM with peripheral circulatory disorder    - A1C at 6.6  - POCT glucose check ACHS  - SSI for now while in the hospital            GERD (gastroesophageal reflux disease)    - resume home meds          Coronary artery disease due to lipid rich plaque    - holding asa/plavix for now  - has outpatient cardiologist          HTN (hypertension), benign    - resume home meds            VTE Risk Mitigation         Ordered     IP VTE LOW RISK PATIENT  Once      05/30/18 0849             Vick Ozuna MD  Department of Hospital Medicine   [facility]

## 2018-05-30 NOTE — ASSESSMENT & PLAN NOTE
- MARIA ALEJANDRA on CKD stage 3  - Cr at 1.9 on admit, baseline is at 1.4-1.5  - No sign of dehydration on exam, pt report no changes in oral intake  - IVF started at 100cc/hr for now for hydration  - can just encourage fluid intake at home

## 2018-05-30 NOTE — ED NOTES
Pt reports substernal chest pain which woke her up this morning. Pt was given 1 SL nitro en route per EMS which brought pain from 5 to 0. Pt also reports nausea with no vomiting. Pt has port in place, right chest, and reports being off plavix for last week because port is scheduled to be removed soon. Pt denies SOB, headache.     Patient identifiers verified and correct for Renata Bergman.    LOC: The patient is awake, alert and aware of environment with an appropriate affect, the patient is oriented x 3 and speaking appropriately.  APPEARANCE: Patient resting comfortably and in no acute distress, patient is clean and well groomed, patient's clothing is properly fastened. Limb alert placed on left arm due to lymph node removal. Allergy band and fall risk band applied.   SKIN: The skin is warm and dry, color consistent with ethnicity, patient has normal skin turgor and moist mucus membranes, skin intact, no breakdown or bruising noted. Healed scars to bilateral knees.   MUSCULOSKELETAL: Patient moving all extremities spontaneously, no obvious swelling or deformities noted.  RESPIRATORY: Airway is open and patent, respirations are spontaneous, patient has a normal effort and rate, no accessory muscle use noted  CARDIAC: Patient has a normal rate and regular rhythm, no periphreal edema noted, capillary refill < 3 seconds.  ABDOMEN: Soft and non tender to palpation, no distention noted, normoactive bowel sounds present in all four quadrants.  NEUROLOGIC: PERRL, 3mm bilaterally, eyes open spontaneously, behavior appropriate to situation, follows commands, facial expression symmetrical, bilateral hand grasp equal and even, purposeful motor response noted, normal sensation in all extremities when touched with a finger.

## 2018-05-30 NOTE — ASSESSMENT & PLAN NOTE
- chest pain that woke her up today, so far cardiac work up is negative. Trop Negative X2  - No chest pain at this time in ED  - EKG reviewed, will get repeat to compare for any new changes  - Telemetry, BB and statin  - Possibly D/C home this afternoon if no further issue.   - checking CXR  - pt does not want to take ASA/Plavix given port removal plan tomorrow, at this time given neg cardiac work up, Ok to hold for now, pt and daughter ask if port can be removed today, Dr Lin team paged  -

## 2018-05-30 NOTE — SUBJECTIVE & OBJECTIVE
Past Medical History:   Diagnosis Date    Breast cancer 2016    DCIS and IDC, stage I    Cataract     Coronary artery disease 9/4/2012    Diabetes mellitus due to abnormal insulin 9/4/2012    Diabetes mellitus type II     Genetic testing     brca2 positive     GERD (gastroesophageal reflux disease) 9/4/2012    Gout, arthritis 9/4/2012    Hyperlipidemia 9/4/2012    Hypertension     Primary osteoarthritis of both knees 9/4/2012       Past Surgical History:   Procedure Laterality Date    BREAST LUMPECTOMY Left 08/01/2016    DCIS and IDC, s/p lumpectomy    CORONARY ANGIOPLASTY      HYSTERECTOMY      JOINT REPLACEMENT      left and right k nee    KNEE SURGERY      TOTAL ABDOMINAL HYSTERECTOMY W/ BILATERAL SALPINGOOPHORECTOMY         Review of patient's allergies indicates:   Allergen Reactions    Lisinopril Anaphylaxis       No current facility-administered medications on file prior to encounter.      Current Outpatient Prescriptions on File Prior to Encounter   Medication Sig    allopurinol (ZYLOPRIM) 300 MG tablet take 1 tablet by mouth once daily    amlodipine (NORVASC) 5 MG tablet TAKE 1 TABLET BY MOUTH DAILY    anastrozole (ARIMIDEX) 1 mg Tab Take 1 tablet (1 mg total) by mouth once daily.    aspirin 81 MG Chew Take 81 mg by mouth once daily.      carvedilol (COREG) 25 MG tablet TAKE 1 TABLET BY MOUTH TWICE DAILY    chlorthalidone (HYGROTEN) 25 MG Tab Take 1/2 tablet by mouth daily. (Patient taking differently: Take 1/2 tablet by mouth every other daily.)    clopidogrel (PLAVIX) 75 mg tablet TAKE 1 TABLET BY MOUTH EVERY DAY    fexofenadine (ALLEGRA) 30 MG tablet Take 30 mg by mouth daily as needed.    levetiracetam XR (KEPPRA XR) 500 mg Tb24 24 hr tablet Take 2 tablets (1,000 mg total) by mouth once daily.    metFORMIN (GLUCOPHAGE) 850 MG tablet TAKE 1 TABLET BY MOUTH TWICE DAILY    nitroGLYCERIN (NITROSTAT) 0.4 MG SL tablet PLACE 1 TABLET UNDER THE TONGUE EVERY 5 MINUTES AS NEEDED FOR  CHEST PAIN.    omeprazole (PRILOSEC) 20 MG capsule TAKE 2 CAPSULES BY MOUTH EVERY DAY    polyethylene glycol (GLYCOLAX) 17 gram/dose powder Take 17 g by mouth once daily. (Patient taking differently: Take 17 g by mouth once daily. Pt taking PRN)    potassium chloride (MICRO-K) 10 MEQ CpSR TAKE 1 CAPSULE BY MOUTH ONCE DAILY.    potassium chloride (MICRO-K) 10 MEQ CpSR TAKE 1 CAPSULE BY MOUTH ONCE DAILY.    potassium chloride (MICRO-K) 10 MEQ CpSR TAKE 1 CAPSULE BY MOUTH ONCE DAILY.    rosuvastatin (CRESTOR) 40 MG Tab TAKE ONE TABLET BY MOUTH ONCE DAILY    rosuvastatin (CRESTOR) 40 MG Tab TAKE 1 TABLET BY MOUTH EVERY DAY    rosuvastatin (CRESTOR) 40 MG Tab TAKE 1 TABLET BY MOUTH EVERY DAY     Family History     Problem Relation (Age of Onset)    Breast cancer Mother (45), Maternal Aunt, Daughter (45), Maternal Aunt    Cancer Mother (55)    Diabetes Mother    Heart disease Father    Hyperlipidemia Maternal Aunt    Hypertension Mother, Maternal Aunt, Maternal Uncle    Ovarian cancer Mother        Social History Main Topics    Smoking status: Former Smoker     Packs/day: 1.00     Types: Cigarettes     Quit date: 8/13/1962    Smokeless tobacco: Never Used    Alcohol use No      Comment: socially    Drug use: No    Sexual activity: No     Review of Systems   Constitutional: Negative for activity change and fever.   Respiratory: Positive for chest tightness. Negative for cough, shortness of breath and wheezing.    Cardiovascular: Positive for chest pain. Negative for palpitations and leg swelling.   Gastrointestinal: Negative for abdominal distention, abdominal pain, diarrhea, nausea and vomiting.   Endocrine: Negative.    Genitourinary: Negative for dysuria.   Musculoskeletal: Negative for joint swelling and neck pain.   Skin: Negative for pallor and rash.   Neurological: Negative.  Negative for weakness and light-headedness.   Hematological: Negative.    Psychiatric/Behavioral: Negative.         Objective:      Vital Signs (Most Recent):  Temp: 98.3 °F (36.8 °C) (05/30/18 0957)  Pulse: 73 (05/30/18 0957)  Resp: 18 (05/30/18 0957)  BP: (!) 108/54 (05/30/18 0957)  SpO2: 100 % (05/30/18 0957) Vital Signs (24h Range):  Temp:  [98.3 °F (36.8 °C)] 98.3 °F (36.8 °C)  Pulse:  [69-85] 73  Resp:  [18-26] 18  SpO2:  [99 %-100 %] 100 %  BP: (104-119)/(50-59) 108/54        There is no height or weight on file to calculate BMI.    Physical Exam    Appearance: No acute distress.  Skin: No rashes seen.  Good turgor.  No abrasions.  No ecchymoses.  Eyes: No conjunctival injection.  ENT: Oropharynx clear.    Chest: Clear to auscultation bilaterally.  Good air movement.  No wheezes.  No rhonchi.  Cardiovascular: Regular rate and rhythm.  No murmurs. No gallops. No rubs.  Abdomen: Soft.  Not distended.  Nontender.  No guarding.  No rebound.  Musculoskeletal: Good range of motion all joints.  No deformities.  Neck supple.  No meningismus.  Neurologic: Motor intact.  Sensation intact.  Cerebellar intact.  Cranial nerves intact.  Mental Status:  Alert and oriented x 3.  Appropriate, conversant.          Significant Labs:   CBC:   Recent Labs  Lab 05/30/18  0636   WBC 7.03   HGB 8.5*   HCT 28.7*   *     CMP:   Recent Labs  Lab 05/30/18  0636      K 3.0*      CO2 26   *   BUN 19   CREATININE 1.9*   CALCIUM 10.5   PROT 7.4   ALBUMIN 3.5   BILITOT 0.6   ALKPHOS 68   AST 12   ALT 8*   ANIONGAP 12   EGFRNONAA 25.4*     Cardiac Markers:   Recent Labs  Lab 05/30/18  0636   BNP 13     Troponin:   Recent Labs  Lab 05/30/18  0636 05/30/18  0854   TROPONINI 0.032* 0.026     TSH:   Recent Labs  Lab 05/30/18  0854   TSH 2.985     Urine Studies: No results for input(s): COLORU, APPEARANCEUA, PHUR, SPECGRAV, PROTEINUA, GLUCUA, KETONESU, BILIRUBINUA, OCCULTUA, NITRITE, UROBILINOGEN, LEUKOCYTESUR, RBCUA, WBCUA, BACTERIA, SQUAMEPITHEL, HYALINECASTS in the last 48 hours.    Invalid input(s): RADHA    Significant Imaging: I have  reviewed all pertinent imaging results/findings within the past 24 hours.

## 2018-05-31 ENCOUNTER — OFFICE VISIT (OUTPATIENT)
Dept: SURGERY | Facility: CLINIC | Age: 78
End: 2018-05-31
Attending: HOSPITALIST
Payer: MEDICARE

## 2018-05-31 VITALS
WEIGHT: 239 LBS | RESPIRATION RATE: 17 BRPM | HEART RATE: 73 BPM | TEMPERATURE: 98 F | OXYGEN SATURATION: 98 % | SYSTOLIC BLOOD PRESSURE: 114 MMHG | BODY MASS INDEX: 43.98 KG/M2 | HEIGHT: 62 IN | DIASTOLIC BLOOD PRESSURE: 55 MMHG

## 2018-05-31 VITALS
TEMPERATURE: 98 F | WEIGHT: 232.38 LBS | DIASTOLIC BLOOD PRESSURE: 79 MMHG | BODY MASS INDEX: 42.76 KG/M2 | HEIGHT: 62 IN | HEART RATE: 74 BPM | SYSTOLIC BLOOD PRESSURE: 144 MMHG

## 2018-05-31 DIAGNOSIS — C50.912 MALIGNANT NEOPLASM OF LEFT FEMALE BREAST, UNSPECIFIED ESTROGEN RECEPTOR STATUS, UNSPECIFIED SITE OF BREAST: Primary | ICD-10-CM

## 2018-05-31 DIAGNOSIS — N18.9 CHRONIC KIDNEY DISEASE, UNSPECIFIED CKD STAGE: ICD-10-CM

## 2018-05-31 PROBLEM — I24.9 ACUTE CORONARY SYNDROME: Status: RESOLVED | Noted: 2018-05-30 | Resolved: 2018-05-31

## 2018-05-31 LAB
ANION GAP SERPL CALC-SCNC: 10 MMOL/L
BUN SERPL-MCNC: 20 MG/DL
CALCIUM SERPL-MCNC: 10.5 MG/DL
CHLORIDE SERPL-SCNC: 104 MMOL/L
CO2 SERPL-SCNC: 26 MMOL/L
CREAT SERPL-MCNC: 1.6 MG/DL
EST. GFR  (AFRICAN AMERICAN): 36.1 ML/MIN/1.73 M^2
EST. GFR  (NON AFRICAN AMERICAN): 31.3 ML/MIN/1.73 M^2
GLUCOSE SERPL-MCNC: 110 MG/DL
POCT GLUCOSE: 122 MG/DL (ref 70–110)
POTASSIUM SERPL-SCNC: 3.7 MMOL/L
SODIUM SERPL-SCNC: 140 MMOL/L
TROPONIN I SERPL DL<=0.01 NG/ML-MCNC: 0.04 NG/ML

## 2018-05-31 PROCEDURE — 36415 COLL VENOUS BLD VENIPUNCTURE: CPT

## 2018-05-31 PROCEDURE — 99999 PR PBB SHADOW E&M-EST. PATIENT-LVL III: CPT | Mod: PBBFAC,,, | Performed by: SURGERY

## 2018-05-31 PROCEDURE — 3078F DIAST BP <80 MM HG: CPT | Mod: CPTII,S$GLB,, | Performed by: SURGERY

## 2018-05-31 PROCEDURE — G0378 HOSPITAL OBSERVATION PER HR: HCPCS

## 2018-05-31 PROCEDURE — 84484 ASSAY OF TROPONIN QUANT: CPT

## 2018-05-31 PROCEDURE — 80048 BASIC METABOLIC PNL TOTAL CA: CPT

## 2018-05-31 PROCEDURE — 99217 PR OBSERVATION CARE DISCHARGE: CPT | Mod: ,,, | Performed by: HOSPITALIST

## 2018-05-31 PROCEDURE — 36590 REMOVAL TUNNELED CV CATH: CPT | Mod: S$GLB,,, | Performed by: SURGERY

## 2018-05-31 PROCEDURE — 3077F SYST BP >= 140 MM HG: CPT | Mod: CPTII,S$GLB,, | Performed by: SURGERY

## 2018-05-31 PROCEDURE — 25000003 PHARM REV CODE 250: Performed by: HOSPITALIST

## 2018-05-31 PROCEDURE — 99211 OFF/OP EST MAY X REQ PHY/QHP: CPT | Mod: 25,S$GLB,, | Performed by: SURGERY

## 2018-05-31 RX ADMIN — AMLODIPINE BESYLATE 5 MG: 5 TABLET ORAL at 08:05

## 2018-05-31 RX ADMIN — ROSUVASTATIN CALCIUM 40 MG: 20 TABLET, FILM COATED ORAL at 08:05

## 2018-05-31 RX ADMIN — CARVEDILOL 25 MG: 25 TABLET, FILM COATED ORAL at 08:05

## 2018-05-31 NOTE — PLAN OF CARE
Problem: Patient Care Overview  Goal: Plan of Care Review  Outcome: Ongoing (interventions implemented as appropriate)  Pt aaox3, vss, no s/s of distress noted.  Pt denies pain.  Accuchecks ac/hs.  Safety precautions maintained, pt remains free of falls.  No events noted on telemetry.  Call light within reach.

## 2018-05-31 NOTE — DISCHARGE SUMMARY
"[facility] Hospital Medicine  Discharge Summary      Patient Name: Renata Bergman  MRN: [id number]  Admission Date: 5/30/2018  Hospital Length of Stay: 0 days  Discharge Date and Time: 5/31/2018  9:45 AM  Attending Physician: No att. providers found   Discharging Provider: Vick Ozuna MD  Primary Care Provider: Ethel Berger MD  [facility] Medicine Team: [facility] HOSP MED A Vick Ozuna MD    HPI:   The patient is a 77 y.o. Female with GERD, CAD, DM type 2, HLD, and HTN who presents to the ED with a complaint of CP that woke her from sleep this AM. CP is described as a heaviness "like someone is sitting on my chest" and lasted for 45 minutes. Pt also complained of left arm numbness that was intermittent and lasted approximately 15 minutes. She denies any numbness to her face, chest, or legs. Pt states that she has been off of Plavix and aspirin for the past 7 days as she is scheduled to have her port removed tomorrow. Chest pain now 0/10, she is feeling good at this time.        * No surgery found *      Hospital Course:   Admitted for chest pain work up. Chest pain that woke her up on 5/30 so far cardiac work up is negative. Trop trended down. No chest pain for 24 hrs here. EKG reviewed, no new changes. CXR negative for infectious cause/pulm cause. Pt does not want to take ASA/Plavix given port removal plan. MARIA ALEJANDRA on CKD, Cr at 1.9 on admit, baseline is at 1.4-1.5, no sign of dehydration on exam, pt report no changes in oral intake. Small IVF started at 100cc/hr for hydration, Cr improved to baseline on D/C. She wants to go to her surgeon appt for port removal, appt today. I will D/C her per her wish. Lab work negative with improvement Cr as well, Trop trended down. Called pt, she is doing great. Port removed. She is to follow up with her outpatient cardiologist. Restart ASA and plavix.        Consults:     No new Assessment & Plan notes have been filed under this hospital service since the " last note was generated.  Service: Hospital Medicine    Final Active Diagnoses:    Diagnosis Date Noted POA    PRINCIPAL PROBLEM:  Atypical chest pain [R07.89] 06/27/2016 Yes    Tonic-clonic epileptic seizures [G40.409] 11/22/2016 Yes    History of left breast cancer [Z85.3] 08/25/2016 Not Applicable    Controlled type 2 DM with peripheral circulatory disorder [E11.51] 10/14/2013 Yes     Chronic    Chronic renal disease, stage 3, moderately decreased glomerular filtration rate between 30-59 mL/min/1.73 square meter [N18.3] 10/14/2013 Yes     Chronic    HTN (hypertension), benign [I10] 09/04/2012 Yes     Chronic    GERD (gastroesophageal reflux disease) [K21.9] 09/04/2012 Yes    Coronary artery disease due to lipid rich plaque [I25.10, I25.83] 09/04/2012 Yes     Chronic      Problems Resolved During this Admission:    Diagnosis Date Noted Date Resolved POA    Acute coronary syndrome [I24.9] 05/30/2018 05/31/2018 Yes       Discharged Condition: stable    Disposition: Home or Self Care    Follow Up:    Patient Instructions:     Diet Cardiac     Activity as tolerated         Significant Diagnostic Studies: Labs:   CMP   Recent Labs  Lab 05/30/18  0636 05/31/18  0842    140   K 3.0* 3.7    104   CO2 26 26   * 110   BUN 19 20   CREATININE 1.9* 1.6*   CALCIUM 10.5 10.5   PROT 7.4  --    ALBUMIN 3.5  --    BILITOT 0.6  --    ALKPHOS 68  --    AST 12  --    ALT 8*  --    ANIONGAP 12 10   ESTGFRAFRICA 29.3* 36.1*   EGFRNONAA 25.4* 31.3*   , CBC   Recent Labs  Lab 05/30/18  0636   WBC 7.03   HGB 8.5*   HCT 28.7*   *   , Troponin   Recent Labs  Lab 05/31/18  0842   TROPONINI 0.041*    and All labs within the past 24 hours have been reviewed    Pending Diagnostic Studies:     None         Medications:  Reconciled Home Medications:      Medication List      CHANGE how you take these medications    chlorthalidone 25 MG Tab  Commonly known as:  HYGROTEN  Take 1/2 tablet by mouth daily.  What  changed:  additional instructions     polyethylene glycol 17 gram/dose powder  Commonly known as:  GLYCOLAX  Take 17 g by mouth once daily.  What changed:  additional instructions     potassium chloride 10 MEQ Cpsr  Commonly known as:  MICRO-K  TAKE 1 CAPSULE BY MOUTH ONCE DAILY.  What changed:  Another medication with the same name was removed. Continue taking this medication, and follow the directions you see here.     rosuvastatin 40 MG Tab  Commonly known as:  CRESTOR  TAKE 1 TABLET BY MOUTH EVERY DAY  What changed:  Another medication with the same name was removed. Continue taking this medication, and follow the directions you see here.        CONTINUE taking these medications    allopurinol 300 MG tablet  Commonly known as:  ZYLOPRIM  take 1 tablet by mouth once daily     amLODIPine 5 MG tablet  Commonly known as:  NORVASC  TAKE 1 TABLET BY MOUTH DAILY     anastrozole 1 mg Tab  Commonly known as:  ARIMIDEX  Take 1 tablet (1 mg total) by mouth once daily.     aspirin 81 MG Chew  Take 81 mg by mouth once daily.     carvedilol 25 MG tablet  Commonly known as:  COREG  TAKE 1 TABLET BY MOUTH TWICE DAILY     clopidogrel 75 mg tablet  Commonly known as:  PLAVIX  TAKE 1 TABLET BY MOUTH EVERY DAY     fexofenadine 30 MG tablet  Commonly known as:  ALLEGRA  Take 30 mg by mouth daily as needed.     levetiracetam  mg Tb24 24 hr tablet  Commonly known as:  KEPPRA XR  Take 2 tablets (1,000 mg total) by mouth once daily.     metFORMIN 850 MG tablet  Commonly known as:  GLUCOPHAGE  TAKE 1 TABLET BY MOUTH TWICE DAILY     nitroGLYCERIN 0.4 MG SL tablet  Commonly known as:  NITROSTAT  PLACE 1 TABLET UNDER THE TONGUE EVERY 5 MINUTES AS NEEDED FOR CHEST PAIN.     omeprazole 20 MG capsule  Commonly known as:  PRILOSEC  TAKE 2 CAPSULES BY MOUTH EVERY DAY            Indwelling Lines/Drains at time of discharge:   Lines/Drains/Airways          No matching active lines, drains, or airways          Time spent on the discharge of  patient: >35 minutes  Patient was seen and examined on the date of discharge and determined to be suitable for discharge.         Vick Ozuna MD  Department of Hospital Medicine  [facility]

## 2018-05-31 NOTE — PROGRESS NOTES
Pt discharged from unit.  Pt aaox3, vss, no distress noted.  Discharge instructions given to pt and she verbalized understanding.  Home meds and f/u appts reviewed as well.  Pt informed that if there is a problem w/ her lab work, Dr. Ozuna will notify her.  Pt verbalized understanding.  IV removed from rac w/ catheter intact, no redness or swelling noted to area.  Pt refused w/d and ambulated of unit.  Pt was accompanied by daughter.

## 2018-05-31 NOTE — PROGRESS NOTES
"[facility] Hospital Medicine  Progress Note    Patient Name: Renata Bergman  MRN: [id number]  Patient Class: OP- Observation   Admission Date: 5/30/2018  Length of Stay: 0 days  Attending Physician: No att. providers found  Primary Care Provider: Ethel Berger MD    [facility] Medicine Team: [facility] A Vick Ozuna MD    Subjective:     Principal Problem:Atypical chest pain    HPI:  The patient is a 77 y.o. Female with GERD, CAD, DM type 2, HLD, and HTN who presents to the ED with a complaint of CP that woke her from sleep this AM. CP is described as a heaviness "like someone is sitting on my chest" and lasted for 45 minutes. Pt also complained of left arm numbness that was intermittent and lasted approximately 15 minutes. She denies any numbness to her face, chest, or legs. Pt states that she has been off of Plavix and aspirin for the past 7 days as she is scheduled to have her port removed tomorrow. Chest pain now 0/10, she is feeling good at this time.        Hospital Course:  Admitted for chest pain work up. Negative work up so far.     Interval History: Patient report NO chest pain this AM, she feels great overall. Due to lab work being slow this AM, we have not gotten morning troponin or BMP back yet. I called lab work and they are "in process". She wants to go to her surgeon appt for port removal, appt today. I will D/C her per her wish.       Review of Systems   Constitutional: Negative for activity change and fever.   Respiratory:  Negative for cough, shortness of breath and wheezing.    Cardiovascular:   Negative for chest pain, palpitations and leg swelling.   Gastrointestinal: Negative for abdominal distention, abdominal pain, diarrhea, nausea and vomiting.   Endocrine: Negative.    Genitourinary: Negative for dysuria.   Musculoskeletal: Negative for joint swelling and neck pain.   Skin: Negative for pallor and rash.   Neurological: Negative.  Negative for weakness and " light-headedness.   Hematological: Negative.    Psychiatric/Behavioral: Negative.      Objective:     Vital Signs (Most Recent):  Temp: 98.3 °F (36.8 °C) (05/31/18 0752)  Pulse: 73 (05/31/18 0800)  Resp: 17 (05/31/18 0752)  BP: (!) 114/55 (05/31/18 0752)  SpO2: 98 % (05/31/18 0752) Vital Signs (24h Range):  Temp:  [97.3 °F (36.3 °C)-98.3 °F (36.8 °C)] 97.7 °F (36.5 °C)  Pulse:  [73-82] 74  Resp:  [17-18] 17  SpO2:  [93 %-98 %] 98 %  BP: (105-144)/(52-79) 144/79     Weight: 108.4 kg (239 lb)  Body mass index is 43.71 kg/m².  No intake or output data in the 24 hours ending 05/31/18 1505     Physical Exam  Appearance: No acute distress.  Skin: No rashes seen.  Good turgor.  No abrasions.  No ecchymoses.  Eyes: No conjunctival injection.  ENT: Oropharynx clear.    Chest: Clear to auscultation bilaterally.  Good air movement.  No wheezes.  No rhonchi.  Cardiovascular: Regular rate and rhythm.  No murmurs. No gallops. No rubs.  Abdomen: Soft.  Not distended.  Nontender.  No guarding.  No rebound.  Musculoskeletal: Good range of motion all joints.  No deformities.  Neck supple.  No meningismus.  Neurologic: Motor intact.  Sensation intact.  Cerebellar intact.  Cranial nerves intact.  Mental Status:  Alert and oriented x 3.  Appropriate, conversant.    Computed MELD-Na score unavailable. Necessary lab results were not found in the last year.  Computed MELD score unavailable. Necessary lab results were not found in the last year.    Significant Labs:  CBC:    Recent Labs  Lab 05/30/18  0636   WBC 7.03   HGB 8.5*   HCT 28.7*   *     CMP:    Recent Labs  Lab 05/30/18  0636 05/31/18  0842    140   K 3.0* 3.7    104   CO2 26 26   * 110   BUN 19 20   CREATININE 1.9* 1.6*   CALCIUM 10.5 10.5   PROT 7.4  --    ALBUMIN 3.5  --    BILITOT 0.6  --    ALKPHOS 68  --    AST 12  --    ALT 8*  --    ANIONGAP 12 10   EGFRNONAA 25.4* 31.3*     PTINR:  No results for input(s): INR in the last 48  hours.    Significant Procedures:   Dobutamine Stress Test with Color Flow: No results found for this or any previous visit.    None    Assessment/Plan:      * Atypical chest pain    - Chest pain that woke her up on 5/30 so far cardiac work up is negative. Trop trended down. No chest pain for 24 hrs here. EKG reviewed, now new changes  - CXR negative for infectious cause/pulm cause  - pt does not want to take ASA/Plavix given port removal plan    - She wants to go to her surgeon appt for port removal, appt today. I will D/C her per her wish. As lab work is pending    Update: Lab work negative with improvement Cr as well, Trop trended down. Called pt, she is doing great. Port removed.         Tonic-clonic epileptic seizures    - chronic, resume keppra          History of left breast cancer    - follow Dr Guevara here        Chronic renal disease, stage 3, moderately decreased glomerular filtration rate between 30-59 mL/min/1.73 square meter    - MARIA ALEJANDRA on CKD stage 3  - Cr at 1.9 on admit, baseline is at 1.4-1.5  - No sign of dehydration on exam, pt report no changes in oral intake  - IVF started at 100cc/hr for hydration  - Cr improved        Controlled type 2 DM with peripheral circulatory disorder    - A1C at 6.6  - POCT glucose check ACHS  - SSI for now while in the hospital            GERD (gastroesophageal reflux disease)    - resume home meds          Coronary artery disease due to lipid rich plaque    - holding asa/plavix for now  - has outpatient cardiologist          HTN (hypertension), benign    - resume home meds            VTE Risk Mitigation         Ordered     IP VTE HIGH RISK PATIENT  Once      05/31/18 0748        D/C home    Vick Ozuna MD  Department of Hospital Medicine   [facility]

## 2018-05-31 NOTE — SIGNIFICANT EVENT
Renal function improved to baseline. Trop trended down  Stable at this time.     I called and updated patient via cellphone    Vick Ozuna MD

## 2018-05-31 NOTE — SUBJECTIVE & OBJECTIVE
"Interval History: Patient report NO chest pain this AM, she feels great overall. Due to lab work being slow this AM, we have not gotten morning troponin or BMP back yet. I called lab work and they are "in process". She wants to go to her surgeon appt for port removal, appt today. I will D/C her per her wish.       Review of Systems   Constitutional: Negative for activity change and fever.   Respiratory:  Negative for cough, shortness of breath and wheezing.    Cardiovascular:   Negative for chest pain, palpitations and leg swelling.   Gastrointestinal: Negative for abdominal distention, abdominal pain, diarrhea, nausea and vomiting.   Endocrine: Negative.    Genitourinary: Negative for dysuria.   Musculoskeletal: Negative for joint swelling and neck pain.   Skin: Negative for pallor and rash.   Neurological: Negative.  Negative for weakness and light-headedness.   Hematological: Negative.    Psychiatric/Behavioral: Negative.      Objective:     Vital Signs (Most Recent):  Temp: 98.3 °F (36.8 °C) (05/31/18 0752)  Pulse: 73 (05/31/18 0800)  Resp: 17 (05/31/18 0752)  BP: (!) 114/55 (05/31/18 0752)  SpO2: 98 % (05/31/18 0752) Vital Signs (24h Range):  Temp:  [97.3 °F (36.3 °C)-98.3 °F (36.8 °C)] 97.7 °F (36.5 °C)  Pulse:  [73-82] 74  Resp:  [17-18] 17  SpO2:  [93 %-98 %] 98 %  BP: (105-144)/(52-79) 144/79     Weight: 108.4 kg (239 lb)  Body mass index is 43.71 kg/m².  No intake or output data in the 24 hours ending 05/31/18 1505     Physical Exam  Appearance: No acute distress.  Skin: No rashes seen.  Good turgor.  No abrasions.  No ecchymoses.  Eyes: No conjunctival injection.  ENT: Oropharynx clear.    Chest: Clear to auscultation bilaterally.  Good air movement.  No wheezes.  No rhonchi.  Cardiovascular: Regular rate and rhythm.  No murmurs. No gallops. No rubs.  Abdomen: Soft.  Not distended.  Nontender.  No guarding.  No rebound.  Musculoskeletal: Good range of motion all joints.  No deformities.  Neck supple.  No " meningismus.  Neurologic: Motor intact.  Sensation intact.  Cerebellar intact.  Cranial nerves intact.  Mental Status:  Alert and oriented x 3.  Appropriate, conversant.    Computed MELD-Na score unavailable. Necessary lab results were not found in the last year.  Computed MELD score unavailable. Necessary lab results were not found in the last year.    Significant Labs:  CBC:    Recent Labs  Lab 05/30/18  0636   WBC 7.03   HGB 8.5*   HCT 28.7*   *     CMP:    Recent Labs  Lab 05/30/18  0636 05/31/18  0842    140   K 3.0* 3.7    104   CO2 26 26   * 110   BUN 19 20   CREATININE 1.9* 1.6*   CALCIUM 10.5 10.5   PROT 7.4  --    ALBUMIN 3.5  --    BILITOT 0.6  --    ALKPHOS 68  --    AST 12  --    ALT 8*  --    ANIONGAP 12 10   EGFRNONAA 25.4* 31.3*     PTINR:  No results for input(s): INR in the last 48 hours.    Significant Procedures:   Dobutamine Stress Test with Color Flow: No results found for this or any previous visit.    None

## 2018-05-31 NOTE — LETTER
May 31, 2018      Vick Ozuna MD  [address] [facility] [address]           [facility] - General Surgery  [address]  [place] [address]  Phone: [phone]          Patient: Renata Bergman   MR Number: [id number]   YOB: 1940   Date of Visit: 5/31/2018       Dear Dr. Vick Ozuna:    Thank you for referring Renata Bergman to me for evaluation. Attached you will find relevant portions of my assessment and plan of care.    If you have questions, please do not hesitate to call me. I look forward to following Renata Bergman along with you.    Sincerely,    Gilson Nieto MD    Enclosure  CC:  No Recipients    If you would like to receive this communication electronically, please contact [email or web address] or [phone] to request more information on [employer] access.    For providers and/or their staff who would like to refer a patient to [facility], please contact us through our one-stop-shop provider referral line, [facility], at [phone].    If you feel you have received this communication in error or would no longer like to receive these types of communications, please e-mail [email or web address]

## 2018-05-31 NOTE — ASSESSMENT & PLAN NOTE
- Chest pain that woke her up on 5/30 so far cardiac work up is negative. Trop trended down. No chest pain for 24 hrs here. EKG reviewed, now new changes  - CXR negative for infectious cause/pulm cause  - pt does not want to take ASA/Plavix given port removal plan    - She wants to go to her surgeon appt for port removal, appt today. I will D/C her per her wish. As lab work is pending    Update: Lab work negative with improvement Cr as well, Trop trended down. Called pt, she is doing great. Port removed.

## 2018-05-31 NOTE — ASSESSMENT & PLAN NOTE
- MARIA ALEJANDRA on CKD stage 3  - Cr at 1.9 on admit, baseline is at 1.4-1.5  - No sign of dehydration on exam, pt report no changes in oral intake  - IVF started at 100cc/hr for hydration  - Cr improved

## 2018-05-31 NOTE — PROGRESS NOTES
"Patient report NO chest pain this AM, she feels great overall. Due to lab work being slow this AM, we have not gotten morning troponin or BMP back yet. I called lab work and they are "in process"    She wants to go to her surgeon appt for port removal, appt today. I will D/C her per her wish. I will review lab work when they come back, if there are issue I will call her. CM will set up follow up with her cardiologist.    Restart ASA and plavix when stable post port removal.     Pt verbalized understanding of this plan    Vick Ozuna MD  "

## 2018-05-31 NOTE — PLAN OF CARE
Problem: Patient Care Overview  Goal: Plan of Care Review  Outcome: Ongoing (interventions implemented as appropriate)  Plan of care reviewed with patient and family. No distress noted at this time. IV fluids completed. Cardiac monitoring maintained. Fall precautions in place with bed in lowest position, wheels locked, and call bell within reach. Will continue to monitor closely.

## 2018-05-31 NOTE — PROGRESS NOTES
HPI:  Rentaa Bergman is a 77 y.o. female with h/o left breast cancer who presents for removal of right sided port.    PHYSICAL EXAM:  Physical Exam   Constitutional: She appears well-developed and well-nourished. No distress.   Cardiovascular: Normal rate and regular rhythm.    Pulmonary/Chest: Effort normal.   Abdominal: Soft.   Musculoskeletal:   Well healed incision on right chest       PROCEDURE:  Patient was taken to procedure room and placed supine. She was sterilely prepped and draped. 10ml of 1% lidocaine with epi was injected. A 2cm incision was made at the prior incision site down to the port. The adhesions were dissected and the port was removed. The tract was sutured closed with a 2-0 vicryl suture. Hemostasis was ensured. The incision was closed in layers with 2-0 vicryl in deep dermal and running 4-0 monocryl subcuticular stitch. Mastisol, steri-strips, telfa, and tegaderm were applied.        PLAN:  RTC PRN       I have personally performed a detailed history and physical examination on this patient. My findings are summarized in the resident's note included in the record.

## 2018-05-31 NOTE — PLAN OF CARE
Future Appointments  Date Time Provider Department Center   7/20/2018 9:15 AM LAB, [place] [facility] [place]   7/20/2018 9:30 AM VASCULAR, [place] [facility] VASCARD [place]   7/23/2018 9:30 AM Jaycee Puente MD [facility] CARDIO [place]   7/28/2018 8:00 AM LAB, APPOINTMENT [facility]   7/30/2018 9:30 AM Ethel Berger MD [facility] IM [facility]        05/31/18 0954   Final Note   Assessment Type Final Discharge Note   Discharge Disposition Home   Hospital Follow Up  Appt(s) scheduled? Yes   Discharge plans and expectations educations in teach back method with documentation complete? Yes   Right Care Referral Info   Post Acute Recommendation Home-care   home , not home care

## 2018-05-31 NOTE — PLAN OF CARE
Says Dr Gloria is her card      05/31/18 0911   Discharge Assessment   Assessment Type Discharge Planning Assessment   Confirmed/corrected address and phone number on facesheet? Yes   Assessment information obtained from? Patient   Expected Length of Stay (days) 1   Communicated expected length of stay with patient/caregiver yes   Prior to hospitilization cognitive status: Alert/Oriented   Prior to hospitalization functional status: Independent;Assistive Equipment   Current cognitive status: Alert/Oriented   Current Functional Status: Independent;Assistive Equipment   Lives With child(brendan), adult   Able to Return to Prior Arrangements yes   Is patient able to care for self after discharge? Yes   Who are your caregiver(s) and their phone number(s)? daughter at   [phone]   Patient's perception of discharge disposition home or selfcare   Readmission Within The Last 30 Days no previous admission in last 30 days   Patient currently being followed by outpatient case management? No   Patient currently receives any other outside agency services? No   Equipment Currently Used at Home bedside commode;cane, straight;walker, rolling;shower chair;wheelchair   Do you have any problems affording any of your prescribed medications? No   Is the patient taking medications as prescribed? yes   Does the patient have transportation home? Yes   Transportation Available family or friend will provide   Does the patient receive services at the [facility]? No   Discharge Plan A Home with family   Discharge Plan B Home with family   Patient/Family In Agreement With Plan yes

## 2018-06-04 ENCOUNTER — TELEPHONE (OUTPATIENT)
Dept: ADMINISTRATIVE | Facility: HOSPITAL | Age: 78
End: 2018-06-04

## 2018-06-04 NOTE — TELEPHONE ENCOUNTER
Message from [facility] Nurse:    JUNE KHOURY [id number] WAS DISCHARGED 5/31/18. POST HOSPITAL FOLLOW UP APPOINTMENT WITH PCP NEEDED 7-14 DAYS POST DISCHARGE. PLEASE ADVISE PATIENT.       Thanks,    Sangita Bond, RN  RN Navigator  [address], [address]  [place] [address]  Direct Dial Number: [phone]  Main Number: [phone], ext.

## 2018-06-13 ENCOUNTER — OFFICE VISIT (OUTPATIENT)
Dept: INTERNAL MEDICINE | Facility: CLINIC | Age: 78
End: 2018-06-13
Payer: MEDICARE

## 2018-06-13 VITALS
DIASTOLIC BLOOD PRESSURE: 60 MMHG | HEART RATE: 87 BPM | SYSTOLIC BLOOD PRESSURE: 100 MMHG | WEIGHT: 232.75 LBS | HEIGHT: 65 IN | BODY MASS INDEX: 38.78 KG/M2 | OXYGEN SATURATION: 98 %

## 2018-06-13 DIAGNOSIS — K21.9 GASTROESOPHAGEAL REFLUX DISEASE WITHOUT ESOPHAGITIS: ICD-10-CM

## 2018-06-13 DIAGNOSIS — E53.8 VITAMIN B12 DEFICIENCY: ICD-10-CM

## 2018-06-13 DIAGNOSIS — E55.9 VITAMIN D DEFICIENCY DISEASE: ICD-10-CM

## 2018-06-13 DIAGNOSIS — C50.512 MALIGNANT NEOPLASM OF LOWER-OUTER QUADRANT OF LEFT FEMALE BREAST, UNSPECIFIED ESTROGEN RECEPTOR STATUS: ICD-10-CM

## 2018-06-13 DIAGNOSIS — I25.10 CORONARY ARTERY DISEASE DUE TO LIPID RICH PLAQUE: Primary | Chronic | ICD-10-CM

## 2018-06-13 DIAGNOSIS — M10.9 GOUT, ARTHRITIS: ICD-10-CM

## 2018-06-13 DIAGNOSIS — C50.919 MALIGNANT NEOPLASM OF FEMALE BREAST, UNSPECIFIED ESTROGEN RECEPTOR STATUS, UNSPECIFIED LATERALITY, UNSPECIFIED SITE OF BREAST: ICD-10-CM

## 2018-06-13 DIAGNOSIS — G40.409 TONIC-CLONIC EPILEPTIC SEIZURES: ICD-10-CM

## 2018-06-13 DIAGNOSIS — D64.9 ANEMIA, UNSPECIFIED TYPE: ICD-10-CM

## 2018-06-13 DIAGNOSIS — I10 HTN (HYPERTENSION), BENIGN: Chronic | ICD-10-CM

## 2018-06-13 DIAGNOSIS — I25.83 CORONARY ARTERY DISEASE DUE TO LIPID RICH PLAQUE: Primary | Chronic | ICD-10-CM

## 2018-06-13 PROCEDURE — 3078F DIAST BP <80 MM HG: CPT | Mod: CPTII,S$GLB,, | Performed by: INTERNAL MEDICINE

## 2018-06-13 PROCEDURE — 99499 UNLISTED E&M SERVICE: CPT | Mod: S$GLB,,, | Performed by: INTERNAL MEDICINE

## 2018-06-13 PROCEDURE — 99999 PR PBB SHADOW E&M-EST. PATIENT-LVL III: CPT | Mod: PBBFAC,,, | Performed by: INTERNAL MEDICINE

## 2018-06-13 PROCEDURE — 3074F SYST BP LT 130 MM HG: CPT | Mod: CPTII,S$GLB,, | Performed by: INTERNAL MEDICINE

## 2018-06-13 PROCEDURE — 99214 OFFICE O/P EST MOD 30 MIN: CPT | Mod: S$GLB,,, | Performed by: INTERNAL MEDICINE

## 2018-06-13 NOTE — PROGRESS NOTES
CHIEF COMPLAINT: follow up chest pain,  NSTEMI, breast cancer, diabetes, hypertension, hyperlipidemia.     HISTORY OF PRESENT ILLNESS: 77-year-old woman who present for follow up of above.    She was admitted from 5/30/18 to 5/31/18 due to chest pain.She had been off Asprin and Plavix for 6 days due to upcoming port removal.  Work up was unremarkable. Creatinine was slightly elevated which improved with gentle hydration.   She had her port removed on 5/31/18 and then she started back on her aspirin and plavix. No chest pain since back on aspirin and plavix.     She is taking Keppra  mg 2 in the afternoon.  Gait is better with taking the Keppra in the late afternoon.  No falls.  No seizures.        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 She  takes aspirin 81 mg daily and plavix 75 mg daily. No chest pain or shortness of breath.  She continues to take Coreg 25 mg twice daily, Norvasc 5 mg daily and chlorthaladone 25 mg 1/2 tablet every other daily for her hypertension.   She completed cardiac rehab.    She has completed 5 cycles of CMF for her breast cancer. She completed radiation the end of April 6, 2017.  She took Femara  4/2017 - 4/2018. She had irritation of the lips on Femara. She started on anastrozole 1 mg daily and does not have lip irritation with this medication.     She graduated from the healthy back program. This program is on hold due to breast cancer. Back is doing well. She is doing stretches every other day. She is off tylenol in the evening.  She has occasional right sided back pain. She will start water aerobics in a week.      She continues to take allopurinol 300 mg daily for her gout. She has not had any gouty flares. She has occasional left elbow pain.      Her blood sugars have been normal. She continues to take metformin 850 mg twice daily. She checks blood sugars once daily. Occasional diarrhea with certain foods. She denies  any polydipsia or polyuria. She continues to take aspirin for her coronary artery disease. Sinuses have been doing well. She has not had to take Allegra lately. She denies any nausea, vomiting, constipation, diarrhea.     Reflux is well controlled on omeprazole 20 mg two tablets daily. She continues to take Crestor 40 mg daily for her hyperlipidemia. She denies any joint pain or muscle pain from the Crestor. Knee is doing well. She is not having to take Tramadol     She is taking vitamin D 2154-2816 units daily for vitamin D deficiency     Her daughter who is 46 has stage 2 breast cancer. She has had a lumpectomy and chemo and radiation. Her daughter is doing well. She has finished her treatment. Her daughter might be BRCA positive. Mrs Bergman feels that she is coping well with her diagnosis of breast cancer. No anxiety, depression or insomnia    She is working at her diet and has lost 10 pounds recently.      PAST MEDICAL HISTORY:   1. Hypertension.   2. Diabetes mellitus   3. Hyperlipidemia.   4. Reflux.   5. Gout.   6. Coronary artery disease, status post MI in  with stenting at that time, and then a right coronary artery stent placed in . Had a negative stress test 2008. [facility] 2012 - patent stents and non obstructive cad  7. Osteoarthritis of the right knee. S/P right knee replacement   8. Status post left knee replacement.   9. Status post LISA/BSO secondary to menstrual disorder or polyps after tubal ligation.   10. Left breast cancer and BRCA2 positive    MEDICATIONS and ALLERGIES: Updated on epic.       Family History  Mother had breast cancer in her early 50's then had colon cancer in her 60's. She  of uterine cancer  2 Maternal aunts with breast cancer  Daughter with breast cancer at age 45 - BRCA positive  Father  of a gunshot wound  She is an only child    PHYSICAL EXAMINATION:     /60 (BP Location: Right arm, Patient Position: Sitting, BP Method: Medium (Manual))   Pulse 87   " Ht 5' 5" (1.651 m)   Wt 105.6 kg (232 lb 12.2 oz)   SpO2 98%   BMI 38.73 kg/m²        General: Alert, oriented. No apparent distress. Affect within normal   limits.   Conjunctivae anicteric. Tympanic membranes clear. Oropharynx clear. Irritation of the lips  Neck supple.   Respiratory effort normal. Lungs clear to auscultation.   Heart: Regular rate and rhythm without murmurs, gallops or rubs.   No lower extremity edema.       Labs  12/17 reviewed       ASSESESSMENT     1. CAD with NSTEMI 5/2017- s/p stenting 5/2017 - continue risk factor modification. 2.. Left breast cancer with BRCA2 positive -Finished chemo and  radiation. On anastrazole-follow up with Dr Guevara  3. Seizure -Better on Keppra XR   4. Hypertension -take chlortalidone as needed  5. Diabetes - controlled. Continue metformin to 850 mg twice a day.   6. Gout -controlled on allopurinol    7. Hyperlipidemia - on Crestor 40 mg daily. Controlled   8. Obesity - working at diet, exercise and weight loss.   9. Osteoarthritis of the right knee, status post knee replacement - doing well.  10. GERD - controlled on meds  11. Anemia - check labs - slightly worse  12 .CRI - stable.  13. Hypokalemia -  on KCL 10 meq daily     Screening - mammogram done 7/17. Colonoscopy 4/23/12 - normal, and 5/25/15 - 3 small polyps (one adenoma)- due 2018. Bone density was normal November 2008.    Prevnar 12/15  Tdap 5/16  Influenza 2017     I'll see her back 2 months,  sooner if problems arise.  "

## 2018-06-20 RX ORDER — CLOPIDOGREL BISULFATE 75 MG/1
TABLET ORAL
Qty: 30 TABLET | Refills: 0 | Status: SHIPPED | OUTPATIENT
Start: 2018-06-20 | End: 2018-07-18 | Stop reason: SDUPTHER

## 2018-06-23 DIAGNOSIS — C50.919 BREAST CANCER, STAGE 1, UNSPECIFIED LATERALITY: ICD-10-CM

## 2018-06-25 RX ORDER — LETROZOLE 2.5 MG/1
TABLET, FILM COATED ORAL
Qty: 90 TABLET | Refills: 11 | Status: SHIPPED | OUTPATIENT
Start: 2018-06-25 | End: 2018-07-30

## 2018-07-18 RX ORDER — CLOPIDOGREL BISULFATE 75 MG/1
TABLET ORAL
Qty: 30 TABLET | Refills: 0 | Status: SHIPPED | OUTPATIENT
Start: 2018-07-18 | End: 2018-08-16 | Stop reason: SDUPTHER

## 2018-07-20 ENCOUNTER — TELEPHONE (OUTPATIENT)
Dept: CARDIOLOGY | Facility: CLINIC | Age: 78
End: 2018-07-20

## 2018-07-20 ENCOUNTER — LAB VISIT (OUTPATIENT)
Dept: LAB | Facility: HOSPITAL | Age: 78
End: 2018-07-20
Attending: INTERNAL MEDICINE
Payer: MEDICARE

## 2018-07-20 ENCOUNTER — CLINICAL SUPPORT (OUTPATIENT)
Dept: CARDIOLOGY | Facility: CLINIC | Age: 78
End: 2018-07-20
Attending: INTERNAL MEDICINE
Payer: MEDICARE

## 2018-07-20 DIAGNOSIS — E78.2 MIXED HYPERLIPIDEMIA: ICD-10-CM

## 2018-07-20 DIAGNOSIS — E11.51 CONTROLLED TYPE 2 DM WITH PERIPHERAL CIRCULATORY DISORDER: ICD-10-CM

## 2018-07-20 DIAGNOSIS — I10 HTN (HYPERTENSION), BENIGN: ICD-10-CM

## 2018-07-20 DIAGNOSIS — I25.10 CORONARY ARTERY DISEASE DUE TO LIPID RICH PLAQUE: ICD-10-CM

## 2018-07-20 DIAGNOSIS — I25.83 CORONARY ARTERY DISEASE DUE TO LIPID RICH PLAQUE: ICD-10-CM

## 2018-07-20 DIAGNOSIS — E11.51 CONTROLLED TYPE 2 DM WITH PERIPHERAL CIRCULATORY DISORDER: Chronic | ICD-10-CM

## 2018-07-20 DIAGNOSIS — D64.9 ANEMIA: ICD-10-CM

## 2018-07-20 LAB
ALBUMIN SERPL BCP-MCNC: 3.7 G/DL
ALP SERPL-CCNC: 70 U/L
ALT SERPL W/O P-5'-P-CCNC: 8 U/L
ANION GAP SERPL CALC-SCNC: 10 MMOL/L
AST SERPL-CCNC: 15 U/L
BASOPHILS # BLD AUTO: 0.05 K/UL
BASOPHILS NFR BLD: 0.7 %
BILIRUB SERPL-MCNC: 0.7 MG/DL
BUN SERPL-MCNC: 19 MG/DL
CALCIUM SERPL-MCNC: 10.5 MG/DL
CHLORIDE SERPL-SCNC: 106 MMOL/L
CHOLEST SERPL-MCNC: 147 MG/DL
CHOLEST/HDLC SERPL: 2.8 {RATIO}
CO2 SERPL-SCNC: 25 MMOL/L
CREAT SERPL-MCNC: 1.6 MG/DL
DIFFERENTIAL METHOD: ABNORMAL
EOSINOPHIL # BLD AUTO: 1 K/UL
EOSINOPHIL NFR BLD: 13.3 %
ERYTHROCYTE [DISTWIDTH] IN BLOOD BY AUTOMATED COUNT: 19.4 %
EST. GFR  (AFRICAN AMERICAN): 36.1 ML/MIN/1.73 M^2
EST. GFR  (NON AFRICAN AMERICAN): 31.3 ML/MIN/1.73 M^2
ESTIMATED AVG GLUCOSE: 137 MG/DL
FERRITIN SERPL-MCNC: 13 NG/ML
GLUCOSE SERPL-MCNC: 110 MG/DL
HBA1C MFR BLD HPLC: 6.4 %
HCT VFR BLD AUTO: 29.1 %
HDLC SERPL-MCNC: 52 MG/DL
HDLC SERPL: 35.4 %
HGB BLD-MCNC: 8.4 G/DL
IMM GRANULOCYTES # BLD AUTO: 0.03 K/UL
IMM GRANULOCYTES NFR BLD AUTO: 0.4 %
INTERNAL CAROTID STENOSIS: ABNORMAL
LDLC SERPL CALC-MCNC: 78.8 MG/DL
LYMPHOCYTES # BLD AUTO: 1.7 K/UL
LYMPHOCYTES NFR BLD: 22.8 %
MCH RBC QN AUTO: 22.2 PG
MCHC RBC AUTO-ENTMCNC: 28.9 G/DL
MCV RBC AUTO: 77 FL
MONOCYTES # BLD AUTO: 0.9 K/UL
MONOCYTES NFR BLD: 12.4 %
NEUTROPHILS # BLD AUTO: 3.8 K/UL
NEUTROPHILS NFR BLD: 50.4 %
NONHDLC SERPL-MCNC: 95 MG/DL
NRBC BLD-RTO: 0 /100 WBC
PLATELET # BLD AUTO: 395 K/UL
PMV BLD AUTO: 9.6 FL
POTASSIUM SERPL-SCNC: 4.8 MMOL/L
PROT SERPL-MCNC: 7.7 G/DL
RBC # BLD AUTO: 3.78 M/UL
SODIUM SERPL-SCNC: 141 MMOL/L
TRIGL SERPL-MCNC: 81 MG/DL
TSH SERPL DL<=0.005 MIU/L-ACNC: 2.59 UIU/ML
WBC # BLD AUTO: 7.44 K/UL

## 2018-07-20 PROCEDURE — 84443 ASSAY THYROID STIM HORMONE: CPT

## 2018-07-20 PROCEDURE — 93880 EXTRACRANIAL BILAT STUDY: CPT | Mod: S$GLB,,, | Performed by: INTERNAL MEDICINE

## 2018-07-20 PROCEDURE — 80061 LIPID PANEL: CPT

## 2018-07-20 PROCEDURE — 80053 COMPREHEN METABOLIC PANEL: CPT

## 2018-07-20 PROCEDURE — 85025 COMPLETE CBC W/AUTO DIFF WBC: CPT

## 2018-07-20 PROCEDURE — 36415 COLL VENOUS BLD VENIPUNCTURE: CPT | Mod: PO

## 2018-07-20 PROCEDURE — 83036 HEMOGLOBIN GLYCOSYLATED A1C: CPT

## 2018-07-20 PROCEDURE — 82728 ASSAY OF FERRITIN: CPT

## 2018-07-20 NOTE — TELEPHONE ENCOUNTER
----- Message from Jaycee Puente MD sent at 7/20/2018  2:27 PM CDT -----  Please inform the patient that HbA1c was fine

## 2018-07-20 NOTE — TELEPHONE ENCOUNTER
----- Message from Jaycee Puente MD sent at 7/20/2018  2:28 PM CDT -----  ...and carotid duplex was c/w moderate bilateral blockages. We should repeat this test within 6-12 months

## 2018-07-20 NOTE — TELEPHONE ENCOUNTER
----- Message from Jaycee Puente MD sent at 7/20/2018  3:26 PM CDT -----  Please mail patient the blood work results and inform the patient that we will discuss it in detail during the upcoming visit. It looks good except for CKD

## 2018-07-23 ENCOUNTER — OFFICE VISIT (OUTPATIENT)
Dept: CARDIOLOGY | Facility: CLINIC | Age: 78
End: 2018-07-23
Payer: MEDICARE

## 2018-07-23 VITALS
WEIGHT: 231.69 LBS | BODY MASS INDEX: 39.55 KG/M2 | SYSTOLIC BLOOD PRESSURE: 127 MMHG | HEIGHT: 64 IN | DIASTOLIC BLOOD PRESSURE: 60 MMHG | HEART RATE: 79 BPM

## 2018-07-23 DIAGNOSIS — Z85.3 HISTORY OF LEFT BREAST CANCER: ICD-10-CM

## 2018-07-23 DIAGNOSIS — E78.2 MIXED HYPERLIPIDEMIA: Chronic | ICD-10-CM

## 2018-07-23 DIAGNOSIS — Z95.5 S/P CORONARY ARTERY STENT PLACEMENT: ICD-10-CM

## 2018-07-23 DIAGNOSIS — I45.10 RBBB: ICD-10-CM

## 2018-07-23 DIAGNOSIS — E11.51 CONTROLLED TYPE 2 DM WITH PERIPHERAL CIRCULATORY DISORDER: Chronic | ICD-10-CM

## 2018-07-23 DIAGNOSIS — I10 HTN (HYPERTENSION), BENIGN: Chronic | ICD-10-CM

## 2018-07-23 DIAGNOSIS — E66.01 MORBID OBESITY WITH BMI OF 40.0-44.9, ADULT: ICD-10-CM

## 2018-07-23 DIAGNOSIS — I25.10 CORONARY ARTERY DISEASE DUE TO LIPID RICH PLAQUE: Primary | Chronic | ICD-10-CM

## 2018-07-23 DIAGNOSIS — I25.83 CORONARY ARTERY DISEASE DUE TO LIPID RICH PLAQUE: Primary | Chronic | ICD-10-CM

## 2018-07-23 DIAGNOSIS — I25.2 OLD MI (MYOCARDIAL INFARCTION): ICD-10-CM

## 2018-07-23 DIAGNOSIS — N18.30 CHRONIC RENAL DISEASE, STAGE 3, MODERATELY DECREASED GLOMERULAR FILTRATION RATE BETWEEN 30-59 ML/MIN/1.73 SQUARE METER: Chronic | ICD-10-CM

## 2018-07-23 DIAGNOSIS — Z51.11 ENCOUNTER FOR ANTINEOPLASTIC CHEMOTHERAPY: ICD-10-CM

## 2018-07-23 DIAGNOSIS — I65.23 BILATERAL CAROTID ARTERY STENOSIS: ICD-10-CM

## 2018-07-23 PROCEDURE — 99999 PR PBB SHADOW E&M-EST. PATIENT-LVL III: CPT | Mod: PBBFAC,,, | Performed by: INTERNAL MEDICINE

## 2018-07-23 PROCEDURE — 3044F HG A1C LEVEL LT 7.0%: CPT | Mod: CPTII,S$GLB,, | Performed by: INTERNAL MEDICINE

## 2018-07-23 PROCEDURE — 99499 UNLISTED E&M SERVICE: CPT | Mod: S$GLB,,, | Performed by: INTERNAL MEDICINE

## 2018-07-23 PROCEDURE — 99214 OFFICE O/P EST MOD 30 MIN: CPT | Mod: S$GLB,,, | Performed by: INTERNAL MEDICINE

## 2018-07-23 PROCEDURE — 3074F SYST BP LT 130 MM HG: CPT | Mod: CPTII,S$GLB,, | Performed by: INTERNAL MEDICINE

## 2018-07-23 PROCEDURE — 3078F DIAST BP <80 MM HG: CPT | Mod: CPTII,S$GLB,, | Performed by: INTERNAL MEDICINE

## 2018-07-23 NOTE — PROGRESS NOTES
"Subjective:   Patient ID:  Renata Bergman is a 77 y.o. female who presents for follow-up of Hypertension      HPI: Couple of months ago patient was seen in ER with chest heaviness that woke her up from sleep. She took s.l. NTG and symptoms subsided. She was then evaluated in ER with mildly elevated TP and normal ECG.  Out-patient follow up was recommended.  She is feeling well today.    Lab Results   Component Value Date     07/20/2018    K 4.8 07/20/2018     07/20/2018    CO2 25 07/20/2018    BUN 19 07/20/2018    CREATININE 1.6 (H) 07/20/2018     07/20/2018    HGBA1C 6.4 (H) 07/20/2018    MG 1.7 05/23/2017    AST 15 07/20/2018    ALT 8 (L) 07/20/2018    ALBUMIN 3.7 07/20/2018    PROT 7.7 07/20/2018    BILITOT 0.7 07/20/2018    WBC 7.44 07/20/2018    HGB 8.4 (L) 07/20/2018    HCT 29.1 (L) 07/20/2018    MCV 77 (L) 07/20/2018     (H) 07/20/2018    INR 1.0 05/23/2017    TSH 2.592 07/20/2018    CHOL 147 07/20/2018    HDL 52 07/20/2018    LDLCALC 78.8 07/20/2018    TRIG 81 07/20/2018       Review of Systems   Constitution: Positive for weight loss.   HENT: Negative.    Eyes: Negative.    Cardiovascular: Negative.  Negative for chest pain, claudication, dyspnea on exertion, irregular heartbeat, leg swelling, near-syncope, palpitations and syncope.   Respiratory: Negative.  Negative for cough, shortness of breath, snoring and wheezing.    Endocrine: Negative.  Negative for cold intolerance, heat intolerance, polydipsia, polyphagia and polyuria.   Skin: Negative.    Musculoskeletal: Positive for back pain.   Gastrointestinal: Negative.    Genitourinary: Negative.    Neurological: Negative.    Psychiatric/Behavioral: Negative.        Objective:   Physical Exam   Constitutional: She is oriented to person, place, and time. She appears well-developed and well-nourished.   /60   Pulse 79   Ht 5' 4" (1.626 m)   Wt 105.1 kg (231 lb 11.3 oz)   BMI 39.77 kg/m²      HENT:   Head: " Normocephalic.   Eyes: Pupils are equal, round, and reactive to light.   Neck: Normal range of motion. Neck supple. Carotid bruit is present. No thyromegaly present.   Cardiovascular: Normal rate, regular rhythm, normal heart sounds and intact distal pulses.  Exam reveals no gallop and no friction rub.    No murmur heard.  Pulses:       Carotid pulses are 2+ on the right side with bruit, and 2+ on the left side with bruit.       Radial pulses are 2+ on the right side, and 2+ on the left side.        Femoral pulses are 2+ on the right side, and 2+ on the left side.       Popliteal pulses are 2+ on the right side, and 2+ on the left side.        Dorsalis pedis pulses are 2+ on the right side, and 2+ on the left side.        Posterior tibial pulses are 2+ on the right side, and 2+ on the left side.   Pulmonary/Chest: Effort normal and breath sounds normal. No respiratory distress. She has no wheezes. She has no rales. She exhibits no tenderness.   Abdominal: Soft. Bowel sounds are normal.   obese   Musculoskeletal: Normal range of motion. She exhibits no edema.   Neurological: She is alert and oriented to person, place, and time.   Skin: Skin is warm and dry.   Psychiatric: She has a normal mood and affect.   Nursing note and vitals reviewed.        Assessment and Plan:     Problem List Items Addressed This Visit        Cardiology Problems    HTN (hypertension), benign (Chronic)    Relevant Orders    Cardiac PET Scan Stress    Hyperlipidemia (Chronic)    Relevant Orders    Cardiac PET Scan Stress    Coronary artery disease due to lipid rich plaque - Primary (Chronic)    Relevant Orders    Cardiac PET Scan Stress    Controlled type 2 DM with peripheral circulatory disorder (Chronic)    Old MI (myocardial infarction)    Relevant Orders    Cardiac PET Scan Stress    RBBB    Bilateral carotid artery stenosis    Relevant Orders    Cardiac PET Scan Stress       Other    Chronic renal disease, stage 3, moderately decreased  glomerular filtration rate between 30-59 mL/min/1.73 square meter (Chronic)    S/P coronary artery stent placement    Morbid obesity with BMI of 40.0-44.9, adult    Relevant Orders    Cardiac PET Scan Stress    History of left breast cancer    Encounter for antineoplastic chemotherapy          Patient's Medications   New Prescriptions    No medications on file   Previous Medications    ALLOPURINOL (ZYLOPRIM) 300 MG TABLET    take 1 tablet by mouth once daily    AMLODIPINE (NORVASC) 5 MG TABLET    TAKE 1 TABLET BY MOUTH DAILY    ANASTROZOLE (ARIMIDEX) 1 MG TAB    Take 1 tablet (1 mg total) by mouth once daily.    ASPIRIN 81 MG CHEW    Take 81 mg by mouth once daily.      CARVEDILOL (COREG) 25 MG TABLET    TAKE 1 TABLET BY MOUTH TWICE DAILY    CHLORTHALIDONE (HYGROTEN) 25 MG TAB    Take 1/2 tablet by mouth daily.    CLOPIDOGREL (PLAVIX) 75 MG TABLET    TAKE 1 TABLET BY MOUTH EVERY DAY    FEXOFENADINE (ALLEGRA) 30 MG TABLET    Take 30 mg by mouth daily as needed.    LETROZOLE (FEMARA) 2.5 MG TAB    TAKE 1 TABLET(2.5 MG) BY MOUTH EVERY DAY    LEVETIRACETAM XR (KEPPRA XR) 500 MG TB24 24 HR TABLET    Take 2 tablets (1,000 mg total) by mouth once daily.    METFORMIN (GLUCOPHAGE) 850 MG TABLET    TAKE 1 TABLET BY MOUTH TWICE DAILY    NITROGLYCERIN (NITROSTAT) 0.4 MG SL TABLET    PLACE 1 TABLET UNDER THE TONGUE EVERY 5 MINUTES AS NEEDED FOR CHEST PAIN.    OMEPRAZOLE (PRILOSEC) 20 MG CAPSULE    TAKE 2 CAPSULES BY MOUTH EVERY DAY    POLYETHYLENE GLYCOL (GLYCOLAX) 17 GRAM/DOSE POWDER    Take 17 g by mouth once daily.    POTASSIUM CHLORIDE (MICRO-K) 10 MEQ CPSR    TAKE 1 CAPSULE BY MOUTH ONCE DAILY.    ROSUVASTATIN (CRESTOR) 40 MG TAB    TAKE 1 TABLET BY MOUTH EVERY DAY   Modified Medications    No medications on file   Discontinued Medications    No medications on file     Chest pain history with abnormal TP in May. Known CAD and NSTEMI in 2017.  Repeat PET scan and advise.  Life style modifications encouraged    Follow-up in  about 6 months (around 1/23/2019).

## 2018-07-25 ENCOUNTER — CLINICAL SUPPORT (OUTPATIENT)
Dept: CARDIOLOGY | Facility: CLINIC | Age: 78
End: 2018-07-25
Attending: INTERNAL MEDICINE
Payer: MEDICARE

## 2018-07-25 DIAGNOSIS — I25.10 CORONARY ARTERY DISEASE DUE TO LIPID RICH PLAQUE: Chronic | ICD-10-CM

## 2018-07-25 DIAGNOSIS — I10 HTN (HYPERTENSION), BENIGN: Chronic | ICD-10-CM

## 2018-07-25 DIAGNOSIS — I65.23 BILATERAL CAROTID ARTERY STENOSIS: ICD-10-CM

## 2018-07-25 DIAGNOSIS — E66.01 MORBID OBESITY WITH BMI OF 40.0-44.9, ADULT: ICD-10-CM

## 2018-07-25 DIAGNOSIS — I25.2 OLD MI (MYOCARDIAL INFARCTION): ICD-10-CM

## 2018-07-25 DIAGNOSIS — I25.83 CORONARY ARTERY DISEASE DUE TO LIPID RICH PLAQUE: Chronic | ICD-10-CM

## 2018-07-25 DIAGNOSIS — E78.2 MIXED HYPERLIPIDEMIA: Chronic | ICD-10-CM

## 2018-07-25 LAB — DIASTOLIC DYSFUNCTION: NO

## 2018-07-25 PROCEDURE — A9555 RB82 RUBIDIUM: HCPCS | Mod: S$GLB,,, | Performed by: INTERNAL MEDICINE

## 2018-07-25 PROCEDURE — 93015 CV STRESS TEST SUPVJ I&R: CPT | Mod: S$GLB,,, | Performed by: INTERNAL MEDICINE

## 2018-07-25 PROCEDURE — 78492 MYOCRD IMG PET MLT RST&STRS: CPT | Mod: S$GLB,,, | Performed by: INTERNAL MEDICINE

## 2018-07-26 ENCOUNTER — TELEPHONE (OUTPATIENT)
Dept: CARDIOLOGY | Facility: CLINIC | Age: 78
End: 2018-07-26

## 2018-07-26 NOTE — TELEPHONE ENCOUNTER
----- Message from Jaycee Puente MD sent at 7/26/2018  3:05 PM CDT -----  Please inform the patient that  Pet SCAN WAS NORMAL.  Continue on the present regimen

## 2018-07-30 ENCOUNTER — OFFICE VISIT (OUTPATIENT)
Dept: INTERNAL MEDICINE | Facility: CLINIC | Age: 78
End: 2018-07-30
Payer: MEDICARE

## 2018-07-30 ENCOUNTER — HOSPITAL ENCOUNTER (OUTPATIENT)
Dept: RADIOLOGY | Facility: HOSPITAL | Age: 78
Discharge: HOME | End: 2018-07-30
Attending: INTERNAL MEDICINE
Payer: MEDICARE

## 2018-07-30 VITALS
HEART RATE: 83 BPM | DIASTOLIC BLOOD PRESSURE: 60 MMHG | SYSTOLIC BLOOD PRESSURE: 100 MMHG | WEIGHT: 229.63 LBS | OXYGEN SATURATION: 99 % | BODY MASS INDEX: 39.2 KG/M2 | HEIGHT: 64 IN

## 2018-07-30 DIAGNOSIS — D50.0 IRON DEFICIENCY ANEMIA DUE TO CHRONIC BLOOD LOSS: Primary | ICD-10-CM

## 2018-07-30 DIAGNOSIS — I25.10 CORONARY ARTERY DISEASE DUE TO LIPID RICH PLAQUE: Chronic | ICD-10-CM

## 2018-07-30 DIAGNOSIS — C50.412 MALIGNANT NEOPLASM OF UPPER-OUTER QUADRANT OF LEFT BREAST IN FEMALE, ESTROGEN RECEPTOR POSITIVE: ICD-10-CM

## 2018-07-30 DIAGNOSIS — I25.83 CORONARY ARTERY DISEASE DUE TO LIPID RICH PLAQUE: Chronic | ICD-10-CM

## 2018-07-30 DIAGNOSIS — E78.2 MIXED HYPERLIPIDEMIA: Chronic | ICD-10-CM

## 2018-07-30 DIAGNOSIS — Z17.0 MALIGNANT NEOPLASM OF UPPER-OUTER QUADRANT OF LEFT BREAST IN FEMALE, ESTROGEN RECEPTOR POSITIVE: ICD-10-CM

## 2018-07-30 DIAGNOSIS — K21.9 GASTROESOPHAGEAL REFLUX DISEASE WITHOUT ESOPHAGITIS: ICD-10-CM

## 2018-07-30 DIAGNOSIS — I10 HTN (HYPERTENSION), BENIGN: Chronic | ICD-10-CM

## 2018-07-30 DIAGNOSIS — C50.919 MALIGNANT NEOPLASM OF FEMALE BREAST, UNSPECIFIED ESTROGEN RECEPTOR STATUS, UNSPECIFIED LATERALITY, UNSPECIFIED SITE OF BREAST: ICD-10-CM

## 2018-07-30 DIAGNOSIS — C50.512 MALIGNANT NEOPLASM OF LOWER-OUTER QUADRANT OF LEFT FEMALE BREAST, UNSPECIFIED ESTROGEN RECEPTOR STATUS: ICD-10-CM

## 2018-07-30 DIAGNOSIS — G40.409 TONIC-CLONIC EPILEPTIC SEIZURES: ICD-10-CM

## 2018-07-30 PROCEDURE — 77066 DX MAMMO INCL CAD BI: CPT | Mod: TC,PO

## 2018-07-30 PROCEDURE — 99499 UNLISTED E&M SERVICE: CPT | Mod: S$GLB,,, | Performed by: INTERNAL MEDICINE

## 2018-07-30 PROCEDURE — 3078F DIAST BP <80 MM HG: CPT | Mod: CPTII,S$GLB,, | Performed by: INTERNAL MEDICINE

## 2018-07-30 PROCEDURE — 77062 BREAST TOMOSYNTHESIS BI: CPT | Mod: 26,,, | Performed by: RADIOLOGY

## 2018-07-30 PROCEDURE — 99214 OFFICE O/P EST MOD 30 MIN: CPT | Mod: S$GLB,,, | Performed by: INTERNAL MEDICINE

## 2018-07-30 PROCEDURE — 77066 DX MAMMO INCL CAD BI: CPT | Mod: 26,,, | Performed by: RADIOLOGY

## 2018-07-30 PROCEDURE — 99999 PR PBB SHADOW E&M-EST. PATIENT-LVL III: CPT | Mod: PBBFAC,,, | Performed by: INTERNAL MEDICINE

## 2018-07-30 PROCEDURE — 3074F SYST BP LT 130 MM HG: CPT | Mod: CPTII,S$GLB,, | Performed by: INTERNAL MEDICINE

## 2018-07-30 RX ORDER — FERROUS SULFATE 325(65) MG
325 TABLET, DELAYED RELEASE (ENTERIC COATED) ORAL
Refills: 0 | COMMUNITY
Start: 2018-07-30 | End: 2019-08-09 | Stop reason: SDUPTHER

## 2018-07-30 RX ORDER — LEVETIRACETAM 500 MG/1
1000 TABLET, EXTENDED RELEASE ORAL DAILY
Qty: 60 TABLET | Refills: 11 | Status: SHIPPED | OUTPATIENT
Start: 2018-07-30 | End: 2019-08-16 | Stop reason: SDUPTHER

## 2018-07-30 RX ORDER — ANASTROZOLE 1 MG/1
1 TABLET ORAL DAILY
Qty: 30 TABLET | Refills: 11 | Status: SHIPPED | OUTPATIENT
Start: 2018-07-30 | End: 2019-04-23 | Stop reason: SDUPTHER

## 2018-07-30 RX ORDER — ASCORBIC ACID 500 MG
500 TABLET ORAL DAILY
COMMUNITY
Start: 2018-07-30 | End: 2019-08-09 | Stop reason: SDUPTHER

## 2018-07-30 NOTE — PROGRESS NOTES
CHIEF COMPLAINT: follow up NSTEMI, breast cancer, diabetes, hypertension, hyperlipidemia.     HISTORY OF PRESENT ILLNESS: 77-year-old woman who present for follow up of above.      No chest pain since back on aspirin and plavix. She saw Dr Puente on 7/23/18.  Cardica PET stress was normal on 7/25/18.    She is taking Keppra  mg 2 in the afternoon.  Gait is better with taking the Keppra in the late afternoon.  No falls.  No seizures.        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 She  takes aspirin 81 mg daily and plavix 75 mg daily. No chest pain or shortness of breath.  She continues to take Coreg 25 mg twice daily, Norvasc 5 mg daily and chlorthaladone 25 mg 1/2 tablet every other daily for her hypertension.   She completed cardiac rehab.     She has completed 5 cycles of CMF for her breast cancer. She completed radiation the end of April 6, 2017.  She took Femara  4/2017 - 4/2018. She had irritation of the lips on Femara. She started on anastrozole 1 mg daily and does not have lip irritation with this medication.     She graduated from the healthy back program. This program is on hold due to breast cancer. Back is doing well. She is doing stretches every other day. She is off tylenol in the evening.  She has occasional right sided back pain. She will start water aerobics in a week.      She continues to take allopurinol 300 mg daily for her gout. She has not had any gouty flares. She has occasional left elbow pain.      Her blood sugars have been normal. She continues to take metformin 850 mg twice daily. She checks blood sugars once daily. Occasional diarrhea with certain foods. She denies any polydipsia or polyuria. She continues to take aspirin for her coronary artery disease. Sinuses have been doing well. She has not had to take Allegra lately. She denies any nausea, vomiting, constipation, diarrhea.     Reflux is well controlled on omeprazole  "20 mg two tablets daily. She continues to take Crestor 40 mg daily for her hyperlipidemia. She denies any joint pain or muscle pain from the Crestor. Knee is doing well. She is not having to take Tramadol     She is taking vitamin D 6422-1098 units daily for vitamin D deficiency     Her daughter who is 46 has stage 2 breast cancer. She has had a lumpectomy and chemo and radiation. Her daughter is doing well. She has finished her treatment. Her daughter might be BRCA positive. Mrs Bergman feels that she is coping well with her diagnosis of breast cancer. No anxiety, depression or insomnia.      PAST MEDICAL HISTORY:   1. Hypertension.   2. Diabetes mellitus   3. Hyperlipidemia.   4. Reflux.   5. Gout.   6. Coronary artery disease, status post MI in  with stenting at that time, and then a right coronary artery stent placed in . Had a negative stress test 2008. [facility] 2012 - patent stents and non obstructive cad  7. Osteoarthritis of the right knee. S/P right knee replacement   8. Status post left knee replacement.   9. Status post LISA/BSO secondary to menstrual disorder or polyps after tubal ligation.   10. Left breast cancer and BRCA2 positive    MEDICATIONS and ALLERGIES: Updated on epic.       Family History  Mother had breast cancer in her early 50's then had colon cancer in her 60's. She  of uterine cancer  2 Maternal aunts with breast cancer  Daughter with breast cancer at age 45 - BRCA positive  Father  of a gunshot wound  She is an only child    PHYSICAL EXAMINATION:       /60   Pulse 83   Ht 5' 4" (1.626 m)   Wt 104.1 kg (229 lb 9.6 oz)   SpO2 99%   BMI 39.41 kg/m²     General: Alert, oriented. No apparent distress. Affect within normal   limits.   Conjunctivae anicteric. Tympanic membranes clear. Oropharynx clear. Irritation of the lips  Neck supple.   Respiratory effort normal. Lungs clear to auscultation.   Heart: Regular rate and rhythm without murmurs, gallops or rubs. "   No lower extremity edema.       Labs  12/17 reviewed       ASSESESSMENT     1. CAD with NSTEMI 5/2017- s/p stenting 5/2017 - continue risk factor modification.   2.. Left breast cancer with BRCA2 positive -Finished chemo and  radiation. On anastrazole-follow up with Dr Guevara  3. Seizure -stable on Keppra XR   4. Hypertension -take chlortalidone as needed  5. Diabetes - controlled. Continue metformin to 850 mg twice a day.   6. Gout -controlled on allopurinol    7. Hyperlipidemia - on Crestor 40 mg daily. Controlled   8. Obesity - working at diet, exercise and weight loss.   9. Osteoarthritis of the right knee, status post knee replacement - doing well.  10. GERD - controlled on meds  11. Anemia - low on ferritin.  Fergon once daily with vitamin C once daily. Stool for occult blood  12 .CRI - stable.  13. Hypokalemia -  on KCL 10 meq daily     Screening - mammogram done 7/18. Colonoscopy 4/23/12 - normal, and 5/25/15 - 3 small polyps (one adenoma)- due 2018. Bone density was normal November 2008.    Prevnar 12/15  Tdap 5/16  Influenza 2017     I'll see her back 2 months,  sooner if problems arise.

## 2018-08-09 RX ORDER — OMEPRAZOLE 20 MG/1
CAPSULE, DELAYED RELEASE ORAL
Qty: 180 CAPSULE | Refills: 0 | Status: SHIPPED | OUTPATIENT
Start: 2018-08-09 | End: 2018-11-23 | Stop reason: SDUPTHER

## 2018-08-16 RX ORDER — CLOPIDOGREL BISULFATE 75 MG/1
TABLET ORAL
Qty: 30 TABLET | Refills: 0 | Status: SHIPPED | OUTPATIENT
Start: 2018-08-16 | End: 2018-09-04 | Stop reason: SDUPTHER

## 2018-08-18 ENCOUNTER — LAB VISIT (OUTPATIENT)
Dept: LAB | Facility: HOSPITAL | Age: 78
End: 2018-08-18
Attending: INTERNAL MEDICINE
Payer: MEDICARE

## 2018-08-18 DIAGNOSIS — D50.0 IRON DEFICIENCY ANEMIA DUE TO CHRONIC BLOOD LOSS: ICD-10-CM

## 2018-08-18 DIAGNOSIS — D64.9 ANEMIA, UNSPECIFIED TYPE: ICD-10-CM

## 2018-08-18 LAB
ANION GAP SERPL CALC-SCNC: 9 MMOL/L
ANISOCYTOSIS BLD QL SMEAR: ABNORMAL
BASOPHILS # BLD AUTO: 0.06 K/UL
BASOPHILS NFR BLD: 0.9 %
BUN SERPL-MCNC: 20 MG/DL
CALCIUM SERPL-MCNC: 10.4 MG/DL
CHLORIDE SERPL-SCNC: 104 MMOL/L
CO2 SERPL-SCNC: 28 MMOL/L
CREAT SERPL-MCNC: 1.4 MG/DL
DIFFERENTIAL METHOD: ABNORMAL
EOSINOPHIL # BLD AUTO: 0.9 K/UL
EOSINOPHIL NFR BLD: 12.6 %
ERYTHROCYTE [DISTWIDTH] IN BLOOD BY AUTOMATED COUNT: 24.3 %
EST. GFR  (AFRICAN AMERICAN): 42 ML/MIN/1.73 M^2
EST. GFR  (NON AFRICAN AMERICAN): 37 ML/MIN/1.73 M^2
FERRITIN SERPL-MCNC: 16 NG/ML
GLUCOSE SERPL-MCNC: 108 MG/DL
HAPTOGLOB SERPL-MCNC: 96 MG/DL
HCT VFR BLD AUTO: 31.4 %
HGB BLD-MCNC: 9.4 G/DL
HYPOCHROMIA BLD QL SMEAR: ABNORMAL
IRON SERPL-MCNC: 22 UG/DL
LDH SERPL L TO P-CCNC: 171 U/L
LYMPHOCYTES # BLD AUTO: 1.5 K/UL
LYMPHOCYTES NFR BLD: 22.4 %
MCH RBC QN AUTO: 24.2 PG
MCHC RBC AUTO-ENTMCNC: 29.9 G/DL
MCV RBC AUTO: 81 FL
MONOCYTES # BLD AUTO: 0.8 K/UL
MONOCYTES NFR BLD: 11.9 %
NEUTROPHILS # BLD AUTO: 3.6 K/UL
NEUTROPHILS NFR BLD: 52.1 %
NRBC BLD-RTO: 0 /100 WBC
PLATELET # BLD AUTO: 346 K/UL
PLATELET BLD QL SMEAR: ABNORMAL
PMV BLD AUTO: 9.9 FL
POIKILOCYTOSIS BLD QL SMEAR: SLIGHT
POLYCHROMASIA BLD QL SMEAR: ABNORMAL
POTASSIUM SERPL-SCNC: 3.9 MMOL/L
RBC # BLD AUTO: 3.88 M/UL
RETICS/RBC NFR AUTO: 2.3 %
SATURATED IRON: 5 %
SCHISTOCYTES BLD QL SMEAR: PRESENT
SODIUM SERPL-SCNC: 141 MMOL/L
TOTAL IRON BINDING CAPACITY: 487 UG/DL
TRANSFERRIN SERPL-MCNC: 329 MG/DL
WBC # BLD AUTO: 6.88 K/UL

## 2018-08-18 PROCEDURE — 82728 ASSAY OF FERRITIN: CPT

## 2018-08-18 PROCEDURE — 83615 LACTATE (LD) (LDH) ENZYME: CPT

## 2018-08-18 PROCEDURE — 85025 COMPLETE CBC W/AUTO DIFF WBC: CPT

## 2018-08-18 PROCEDURE — 85045 AUTOMATED RETICULOCYTE COUNT: CPT

## 2018-08-18 PROCEDURE — 36415 COLL VENOUS BLD VENIPUNCTURE: CPT

## 2018-08-18 PROCEDURE — 83010 ASSAY OF HAPTOGLOBIN QUANT: CPT

## 2018-08-18 PROCEDURE — 80048 BASIC METABOLIC PNL TOTAL CA: CPT

## 2018-08-18 PROCEDURE — 83540 ASSAY OF IRON: CPT

## 2018-08-19 ENCOUNTER — TELEPHONE (OUTPATIENT)
Dept: INTERNAL MEDICINE | Facility: CLINIC | Age: 78
End: 2018-08-19

## 2018-08-19 DIAGNOSIS — R19.5 HEME POSITIVE STOOL: Primary | ICD-10-CM

## 2018-08-19 NOTE — TELEPHONE ENCOUNTER
PLease notify pt  Your anemia is slowly improving. YOur blood counts have gone from 8.4 to 9.4 (normal is 12)  Your iron stores level (ferritin) has gone from 13 to 16 (normal is 20)    You do have blood in your stool.     You will need an upper endoscopy and colonoscopy.    Dr ORR will discuss with Dr Agudelo if you can stop your aspirin and plavix prior these procedures.

## 2018-08-20 NOTE — TELEPHONE ENCOUNTER
----- Message from Jaycee Puente MD sent at 8/20/2018 10:09 AM CDT -----  Yes.  ASA 7 days and Plavix at least 5 days.    ----- Message -----  From: Ethel Berger MD  Sent: 8/19/2018  10:23 AM  To: MD Dr Cindi Reeves  Pt is anemic with heme positive stools.  She needs an egd and colonoscopy.  She is on aspirin and plavix. Late may she stopped aspirin and plavix to have her port removed and had chest pain. You have seen her since and she had a negative pet stress.  Is it safe for her to stop the aspirin and plavix for the procedures?  Thanks  Ethel Berger M.D.

## 2018-08-20 NOTE — TELEPHONE ENCOUNTER
PLease notify pt  Your anemia is slowly improving. YOur blood counts have gone from 8.4 to 9.4 (normal is 12)  Your iron stores level (ferritin) has gone from 13 to 16 (normal is 20)     You do have blood in your stool.      You will need an upper endoscopy and colonoscopy.    Dr ORR has gotten in touch with Dr Puente who recommends that you stop the aspirin 7 days prior the procedure and plavix 5 days prior the procedure

## 2018-08-22 ENCOUNTER — TELEPHONE (OUTPATIENT)
Dept: ENDOSCOPY | Facility: HOSPITAL | Age: 78
End: 2018-08-22

## 2018-08-22 NOTE — TELEPHONE ENCOUNTER
Pt needs to be scheduled for an egd/colonoscopy, not booked yet. She is on Plavix and we need to know if OK for her to hold med 5 days prior to procedure. Please message back with response for documentation.

## 2018-08-24 DIAGNOSIS — R19.5 HEME POSITIVE STOOL: Primary | ICD-10-CM

## 2018-08-24 RX ORDER — POLYETHYLENE GLYCOL 3350, SODIUM SULFATE ANHYDROUS, SODIUM BICARBONATE, SODIUM CHLORIDE, POTASSIUM CHLORIDE 236; 22.74; 6.74; 5.86; 2.97 G/4L; G/4L; G/4L; G/4L; G/4L
POWDER, FOR SOLUTION ORAL
Qty: 4000 ML | Refills: 0 | Status: SHIPPED | OUTPATIENT
Start: 2018-08-24 | End: 2019-02-26 | Stop reason: ALTCHOICE

## 2018-09-04 RX ORDER — CLOPIDOGREL BISULFATE 75 MG/1
TABLET ORAL
Qty: 30 TABLET | Refills: 0 | Status: SHIPPED | OUTPATIENT
Start: 2018-09-04 | End: 2018-10-08 | Stop reason: SDUPTHER

## 2018-09-05 ENCOUNTER — TELEPHONE (OUTPATIENT)
Dept: HEMATOLOGY/ONCOLOGY | Facility: CLINIC | Age: 78
End: 2018-09-05

## 2018-09-05 NOTE — TELEPHONE ENCOUNTER
----- Message from Flash Amaral sent at 9/5/2018  1:27 PM CDT -----  Contact: Pt   Will like to schedule f/u appt with dr shea     Contact::[phone]

## 2018-09-07 DIAGNOSIS — N18.9 CHRONIC KIDNEY DISEASE, UNSPECIFIED CKD STAGE: ICD-10-CM

## 2018-09-15 ENCOUNTER — TELEPHONE (OUTPATIENT)
Dept: INTERNAL MEDICINE | Facility: CLINIC | Age: 78
End: 2018-09-15

## 2018-09-15 ENCOUNTER — LAB VISIT (OUTPATIENT)
Dept: LAB | Facility: HOSPITAL | Age: 78
End: 2018-09-15
Attending: INTERNAL MEDICINE
Payer: MEDICARE

## 2018-09-15 DIAGNOSIS — D50.0 IRON DEFICIENCY ANEMIA DUE TO CHRONIC BLOOD LOSS: ICD-10-CM

## 2018-09-15 LAB
ANION GAP SERPL CALC-SCNC: 10 MMOL/L
BASOPHILS # BLD AUTO: 0.03 K/UL
BASOPHILS NFR BLD: 0.5 %
BUN SERPL-MCNC: 23 MG/DL
CALCIUM SERPL-MCNC: 10.8 MG/DL
CHLORIDE SERPL-SCNC: 103 MMOL/L
CO2 SERPL-SCNC: 27 MMOL/L
CREAT SERPL-MCNC: 1.7 MG/DL
DIFFERENTIAL METHOD: ABNORMAL
EOSINOPHIL # BLD AUTO: 0.5 K/UL
EOSINOPHIL NFR BLD: 8.3 %
ERYTHROCYTE [DISTWIDTH] IN BLOOD BY AUTOMATED COUNT: 22.9 %
EST. GFR  (AFRICAN AMERICAN): 33 ML/MIN/1.73 M^2
EST. GFR  (NON AFRICAN AMERICAN): 29 ML/MIN/1.73 M^2
FERRITIN SERPL-MCNC: 14 NG/ML
GLUCOSE SERPL-MCNC: 109 MG/DL
HCT VFR BLD AUTO: 34.5 %
HGB BLD-MCNC: 9.9 G/DL
LYMPHOCYTES # BLD AUTO: 1.6 K/UL
LYMPHOCYTES NFR BLD: 25.6 %
MCH RBC QN AUTO: 23.8 PG
MCHC RBC AUTO-ENTMCNC: 28.7 G/DL
MCV RBC AUTO: 83 FL
MONOCYTES # BLD AUTO: 0.7 K/UL
MONOCYTES NFR BLD: 11.3 %
NEUTROPHILS # BLD AUTO: 3.4 K/UL
NEUTROPHILS NFR BLD: 54.1 %
NRBC BLD-RTO: 0 /100 WBC
PLATELET # BLD AUTO: 346 K/UL
PMV BLD AUTO: 10.1 FL
POTASSIUM SERPL-SCNC: 4.5 MMOL/L
RBC # BLD AUTO: 4.16 M/UL
SODIUM SERPL-SCNC: 140 MMOL/L
WBC # BLD AUTO: 6.3 K/UL

## 2018-09-15 PROCEDURE — 82728 ASSAY OF FERRITIN: CPT

## 2018-09-15 PROCEDURE — 80048 BASIC METABOLIC PNL TOTAL CA: CPT

## 2018-09-15 PROCEDURE — 36415 COLL VENOUS BLD VENIPUNCTURE: CPT

## 2018-09-15 PROCEDURE — 85025 COMPLETE CBC W/AUTO DIFF WBC: CPT

## 2018-09-16 NOTE — TELEPHONE ENCOUNTER
Please notify pt  Your anemia is stable.  Your iron levels are still low.  Continue to take iron supplement and vitamin C 500 mg once daily for your anemia.   Get the colonoscopy and upper endoscopy as scheduled on 10/8/18

## 2018-10-07 RX ORDER — ALLOPURINOL 300 MG/1
TABLET ORAL
Qty: 90 TABLET | Refills: 4 | Status: SHIPPED | OUTPATIENT
Start: 2018-10-07 | End: 2019-10-07 | Stop reason: SDUPTHER

## 2018-10-07 RX ORDER — AMLODIPINE BESYLATE 5 MG/1
TABLET ORAL
Qty: 90 TABLET | Refills: 4 | Status: SHIPPED | OUTPATIENT
Start: 2018-10-07 | End: 2019-02-26 | Stop reason: SDUPTHER

## 2018-10-08 ENCOUNTER — ANESTHESIA EVENT (OUTPATIENT)
Dept: ENDOSCOPY | Facility: HOSPITAL | Age: 78
End: 2018-10-08
Payer: MEDICARE

## 2018-10-08 ENCOUNTER — ANESTHESIA (OUTPATIENT)
Dept: ENDOSCOPY | Facility: HOSPITAL | Age: 78
End: 2018-10-08
Payer: MEDICARE

## 2018-10-08 ENCOUNTER — HOSPITAL ENCOUNTER (OUTPATIENT)
Facility: HOSPITAL | Age: 78
Discharge: HOME | End: 2018-10-08
Attending: INTERNAL MEDICINE | Admitting: INTERNAL MEDICINE
Payer: MEDICARE

## 2018-10-08 VITALS
OXYGEN SATURATION: 97 % | SYSTOLIC BLOOD PRESSURE: 129 MMHG | DIASTOLIC BLOOD PRESSURE: 86 MMHG | BODY MASS INDEX: 30.78 KG/M2 | HEIGHT: 61 IN | TEMPERATURE: 97 F | RESPIRATION RATE: 13 BRPM | WEIGHT: 163 LBS | HEART RATE: 62 BPM

## 2018-10-08 DIAGNOSIS — D50.9 IRON DEFICIENCY ANEMIA: ICD-10-CM

## 2018-10-08 LAB — POCT GLUCOSE: 124 MG/DL (ref 70–110)

## 2018-10-08 PROCEDURE — 63600175 PHARM REV CODE 636 W HCPCS: Performed by: NURSE ANESTHETIST, CERTIFIED REGISTERED

## 2018-10-08 PROCEDURE — 25000003 PHARM REV CODE 250: Performed by: NURSE ANESTHETIST, CERTIFIED REGISTERED

## 2018-10-08 PROCEDURE — 43270 EGD LESION ABLATION: CPT | Mod: 51,,, | Performed by: INTERNAL MEDICINE

## 2018-10-08 PROCEDURE — 44378 SMALL BOWEL ENDOSCOPY: CPT | Performed by: INTERNAL MEDICINE

## 2018-10-08 PROCEDURE — 43255 EGD CONTROL BLEEDING ANY: CPT | Performed by: INTERNAL MEDICINE

## 2018-10-08 PROCEDURE — 45380 COLONOSCOPY AND BIOPSY: CPT | Performed by: INTERNAL MEDICINE

## 2018-10-08 PROCEDURE — 45380 COLONOSCOPY AND BIOPSY: CPT | Mod: ,,, | Performed by: INTERNAL MEDICINE

## 2018-10-08 PROCEDURE — 43255 EGD CONTROL BLEEDING ANY: CPT | Mod: 59,,, | Performed by: INTERNAL MEDICINE

## 2018-10-08 PROCEDURE — 37000009 HC ANESTHESIA EA ADD 15 MINS: Performed by: INTERNAL MEDICINE

## 2018-10-08 PROCEDURE — 27201012 HC FORCEPS, HOT/COLD, DISP: Performed by: INTERNAL MEDICINE

## 2018-10-08 PROCEDURE — 43270 EGD LESION ABLATION: CPT | Performed by: INTERNAL MEDICINE

## 2018-10-08 PROCEDURE — E9220 PRA ENDO ANESTHESIA: HCPCS | Mod: ,,, | Performed by: NURSE ANESTHETIST, CERTIFIED REGISTERED

## 2018-10-08 PROCEDURE — 88305 TISSUE EXAM BY PATHOLOGIST: CPT | Performed by: PATHOLOGY

## 2018-10-08 PROCEDURE — 25000003 PHARM REV CODE 250: Performed by: INTERNAL MEDICINE

## 2018-10-08 PROCEDURE — 37000008 HC ANESTHESIA 1ST 15 MINUTES: Performed by: INTERNAL MEDICINE

## 2018-10-08 RX ORDER — PROPOFOL 10 MG/ML
VIAL (ML) INTRAVENOUS
Status: DISCONTINUED | OUTPATIENT
Start: 2018-10-08 | End: 2018-10-08

## 2018-10-08 RX ORDER — GLYCOPYRROLATE 0.2 MG/ML
INJECTION INTRAMUSCULAR; INTRAVENOUS
Status: DISCONTINUED | OUTPATIENT
Start: 2018-10-08 | End: 2018-10-08

## 2018-10-08 RX ORDER — LIDOCAINE HCL/PF 100 MG/5ML
SYRINGE (ML) INTRAVENOUS
Status: DISCONTINUED | OUTPATIENT
Start: 2018-10-08 | End: 2018-10-08

## 2018-10-08 RX ORDER — SODIUM CHLORIDE 9 MG/ML
INJECTION, SOLUTION INTRAVENOUS CONTINUOUS
Status: DISCONTINUED | OUTPATIENT
Start: 2018-10-08 | End: 2018-10-08 | Stop reason: HOSPADM

## 2018-10-08 RX ORDER — PROPOFOL 10 MG/ML
VIAL (ML) INTRAVENOUS CONTINUOUS PRN
Status: DISCONTINUED | OUTPATIENT
Start: 2018-10-08 | End: 2018-10-08

## 2018-10-08 RX ORDER — CLOPIDOGREL BISULFATE 75 MG/1
TABLET ORAL
Qty: 30 TABLET | Refills: 0 | Status: SHIPPED | OUTPATIENT
Start: 2018-10-08 | End: 2018-11-08 | Stop reason: SDUPTHER

## 2018-10-08 RX ORDER — SODIUM CHLORIDE 0.9 % (FLUSH) 0.9 %
3 SYRINGE (ML) INJECTION
Status: DISCONTINUED | OUTPATIENT
Start: 2018-10-08 | End: 2018-10-08 | Stop reason: HOSPADM

## 2018-10-08 RX ADMIN — SODIUM CHLORIDE: 0.9 INJECTION, SOLUTION INTRAVENOUS at 10:10

## 2018-10-08 RX ADMIN — PROPOFOL 70 MG: 10 INJECTION, EMULSION INTRAVENOUS at 11:10

## 2018-10-08 RX ADMIN — PROPOFOL 150 MCG/KG/MIN: 10 INJECTION, EMULSION INTRAVENOUS at 11:10

## 2018-10-08 RX ADMIN — GLYCOPYRROLATE 0.1 MG: 0.2 INJECTION, SOLUTION INTRAMUSCULAR; INTRAVENOUS at 11:10

## 2018-10-08 RX ADMIN — LIDOCAINE HYDROCHLORIDE 100 MG: 20 INJECTION, SOLUTION INTRAVENOUS at 11:10

## 2018-10-08 NOTE — DISCHARGE INSTRUCTIONS

## 2018-10-08 NOTE — ANESTHESIA POSTPROCEDURE EVALUATION
"Anesthesia Post Evaluation    Patient: Renata Bergman    Procedure(s) Performed: Procedure(s) (LRB):  ESOPHAGOGASTRODUODENOSCOPY (EGD) (N/A)  COLONOSCOPY (N/A)    Final Anesthesia Type: general  Patient location during evaluation: GI PACU  Patient participation: Yes- Able to Participate  Level of consciousness: awake and alert, awake and oriented  Post-procedure vital signs: reviewed and stable  Pain management: adequate  Airway patency: patent  PONV status at discharge: No PONV  Anesthetic complications: no      Cardiovascular status: stable  Respiratory status: unassisted, spontaneous ventilation and room air  Hydration status: euvolemic  Follow-up not needed.        Visit Vitals  /86   Pulse 62   Temp 36.3 °C (97.3 °F)   Resp 13   Ht 5' 1" (1.549 m)   Wt 73.9 kg (163 lb)   SpO2 97%   Breastfeeding? No   BMI 30.80 kg/m²       Pain/Perri Score: Presence of Pain: denies (10/8/2018 12:46 PM)  Perri Score: 10 (10/8/2018  1:08 PM)        "

## 2018-10-08 NOTE — H&P
[facility]  History & Physical    Subjective:      Chief Complaint/Reason for Admission:     EGD and colonoscopy    Renata Bergman is a 78 y.o. female.    Past Medical History:   Diagnosis Date    Breast cancer 2016    DCIS and IDC, stage I    Cataract     Coronary artery disease 9/4/2012    Diabetes mellitus due to abnormal insulin 9/4/2012    Diabetes mellitus type II     Genetic testing     brca2 positive     GERD (gastroesophageal reflux disease) 9/4/2012    Gout, arthritis 9/4/2012    Hyperlipidemia 9/4/2012    Hypertension     Primary osteoarthritis of both knees 9/4/2012     Past Surgical History:   Procedure Laterality Date    BIOPSY-SENTINEL NODE Left 8/1/2016    Performed by [facility] at [facility] 2ND FLR    BREAST LUMPECTOMY Left 08/01/2016    DCIS and IDC, s/p lumpectomy    COLONOSCOPY N/A 5/25/2015    Performed by Marcio Louie MD at [facility] ENDO (4TH FLR)    CORONARY ANGIOPLASTY      HEART CATH-LEFT Left 5/22/2017    Performed by David Duron MD at [facility] CATH LAB    HYSTERECTOMY      INJECTION-SENTINEL NODE Left 8/1/2016    Performed by [facility] at [facility] OR 2ND FLR    OBEJNSOPU-WNDP-Z-CATH Right 11/17/2016    Performed by [facility] at [facility] OR 2ND FLR    DHQUNAGWK-GXPQ-C-CATH-neck or chest CONSENT AM OF SURGERY Right 8/25/2016    Performed by [facility] at [facility] OR 2ND FLR    JOINT REPLACEMENT      left and right k nee    KNEE SURGERY      LUMPECTOMY-BREAST-w/wire localization  Pt to report to [facility] at 8:45 am Left 8/1/2016    Performed by [facility]o MD at [facility] OR 2ND FLR    REMOVAL-PORT-A-CATH vs revision of present port Right 11/17/2016    Performed by [facility] at [facility] OR 2ND FLR    TOTAL ABDOMINAL HYSTERECTOMY W/ BILATERAL SALPINGOOPHORECTOMY       Family History   Problem Relation Age of Onset    Cancer Mother 55        colon    Diabetes Mother     Hypertension  Mother     Breast cancer Mother 45    Ovarian cancer Mother     Hypertension Maternal Aunt     Hyperlipidemia Maternal Aunt     Breast cancer Maternal Aunt     Hypertension Maternal Uncle     Heart disease Father     Breast cancer Daughter 45    Breast cancer Maternal Aunt     Glaucoma Neg Hx     Heart attack Neg Hx     Heart failure Neg Hx     Stroke Neg Hx      Social History     Tobacco Use    Smoking status: Former Smoker     Packs/day: 1.00     Types: Cigarettes     Last attempt to quit: 1962     Years since quittin.1    Smokeless tobacco: Never Used   Substance Use Topics    Alcohol use: No     Comment: socially    Drug use: No       PTA Medications   Medication Sig    allopurinol (ZYLOPRIM) 300 MG tablet TAKE 1 TABLET BY MOUTH ONCE DAILY    amLODIPine (NORVASC) 5 MG tablet TAKE 1 TABLET BY MOUTH DAILY    anastrozole (ARIMIDEX) 1 mg Tab Take 1 tablet (1 mg total) by mouth once daily. Stop Letrozole (femara)    ascorbic acid, vitamin C, (VITAMIN C) 500 MG tablet Take 1 tablet (500 mg total) by mouth once daily.    carvedilol (COREG) 25 MG tablet TAKE 1 TABLET BY MOUTH TWICE DAILY    chlorthalidone (HYGROTEN) 25 MG Tab Take 1/2 tablet by mouth daily. (Patient taking differently: Take 1/2 tablet by mouth every other daily.)    ferrous sulfate 325 (65 FE) MG EC tablet Take 1 tablet (325 mg total) by mouth 3 (three) times daily with meals.    levetiracetam XR (KEPPRA XR) 500 mg Tb24 24 hr tablet Take 2 tablets (1,000 mg total) by mouth once daily.    metFORMIN (GLUCOPHAGE) 850 MG tablet TAKE 1 TABLET BY MOUTH TWICE DAILY    omeprazole (PRILOSEC) 20 MG capsule TAKE 2 CAPSULES BY MOUTH EVERY DAY    polyethylene glycol (GOLYTELY,NULYTELY) 236-22.74-6.74 -5.86 gram suspension take As directed-please contact pt to see if she wants to  or have delivered    potassium chloride (MICRO-K) 10 MEQ CpSR TAKE 1 CAPSULE BY MOUTH ONCE DAILY.    rosuvastatin (CRESTOR) 40 MG Tab TAKE 1  TABLET BY MOUTH EVERY DAY    aspirin 81 MG Chew Take 81 mg by mouth once daily.      clopidogrel (PLAVIX) 75 mg tablet TAKE 1 TABLET BY MOUTH EVERY DAY    fexofenadine (ALLEGRA) 30 MG tablet Take 30 mg by mouth daily as needed.    nitroGLYCERIN (NITROSTAT) 0.4 MG SL tablet PLACE 1 TABLET UNDER THE TONGUE EVERY 5 MINUTES AS NEEDED FOR CHEST PAIN.    polyethylene glycol (GLYCOLAX) 17 gram/dose powder Take 17 g by mouth once daily. (Patient taking differently: Take 17 g by mouth once daily. Pt taking PRN)     Review of patient's allergies indicates:   Allergen Reactions    Lisinopril Anaphylaxis        Review of Systems   Constitutional: Negative for chills, fever and weight loss.   Respiratory: Negative for cough and shortness of breath.    Cardiovascular: Negative for chest pain.   Gastrointestinal: Negative for abdominal pain, diarrhea and melena.       Objective:      Vital Signs (Most Recent)  Temp: 97.7 °F (36.5 °C) (10/08/18 1045)  Pulse: 71 (10/08/18 1045)  Resp: 14 (10/08/18 1045)  BP: (!) 152/94 (10/08/18 1045)  SpO2: 97 % (10/08/18 1045)    Vital Signs Range (Last 24H):  Temp:  [97.7 °F (36.5 °C)]   Pulse:  [71]   Resp:  [14]   BP: (152)/(94)   SpO2:  [97 %]     Physical Exam   Constitutional: She appears well-developed and well-nourished.   Cardiovascular: Normal rate.   Pulmonary/Chest: Effort normal and breath sounds normal.   Abdominal: Soft.   Skin: Skin is warm and dry.   Psychiatric: She has a normal mood and affect. Her behavior is normal. Judgment and thought content normal.           Assessment:      Active Hospital Problems    Diagnosis  POA    Iron deficiency anemia [D50.9]  Yes      Resolved Hospital Problems   No resolved problems to display.       Plan:    EGD and colonoscopy for iron deficency

## 2018-10-08 NOTE — ANESTHESIA PREPROCEDURE EVALUATION
10/08/2018  Renata Bergman is a 78 y.o., female.  Past Medical History:   Diagnosis Date    Breast cancer 2016    DCIS and IDC, stage I    Cataract     Coronary artery disease 9/4/2012    Diabetes mellitus due to abnormal insulin 9/4/2012    Diabetes mellitus type II     Genetic testing     brca2 positive     GERD (gastroesophageal reflux disease) 9/4/2012    Gout, arthritis 9/4/2012    Hyperlipidemia 9/4/2012    Hypertension     Primary osteoarthritis of both knees 9/4/2012     Patient Active Problem List   Diagnosis    HTN (hypertension), benign    Hyperlipidemia    Coronary artery disease due to lipid rich plaque    Gout, arthritis    GERD (gastroesophageal reflux disease)    Primary osteoarthritis of both knees    S/P coronary artery stent placement    Controlled type 2 DM with peripheral circulatory disorder    Chronic renal disease, stage 3, moderately decreased glomerular filtration rate between 30-59 mL/min/1.73 square meter    Carpal tunnel syndrome    Morbid obesity with BMI of 40.0-44.9, adult    Family history of colon cancer    Normocytic anemia    Old MI (myocardial infarction)    RBBB    Malignant neoplasm of upper-outer quadrant of left female breast    History of left breast cancer    Encounter for antineoplastic chemotherapy    Tonic-clonic epileptic seizures    Bilateral carotid artery stenosis     Past Surgical History:   Procedure Laterality Date    BIOPSY-SENTINEL NODE Left 8/1/2016    [facility] 2ND FLR    BREAST LUMPECTOMY Left 08/01/2016    DCIS and IDC, s/p lumpectomy    COLONOSCOPY N/A 5/25/2015    Performed by [facility]ie [facility] at [facility] ENDO (4TH FLR)    CORONARY ANGIOPLASTY      HEART CATH-LEFT Left 5/22/2017    Performed by [facility]on [facility] at [facility] CATH LAB    HYSTERECTOMY      INJECTION-SENTINEL  NODE Left 8/1/2016    Performed by [facility] at [facility] OR 2ND FLR    UKWXKBMTM-BODM-I-CATH Right 11/17/2016    [facility] by [facility] at [facility] OR 2ND FLR    VIKJKZPAX-VJEK-Y-CATH-neck or chest CONSENT AM OF SURGERY Right 8/25/2016    Performed by [facility] at [facility] OR 2ND FLR    JOINT REPLACEMENT      left and right k nee    KNEE SURGERY      LUMPECTOMY-BREAST-w/wire localization  Pt to report to [facility] at 8:45 am Left 8/1/2016    Performed by [facility] MD at [facility] OR 2ND FLR    REMOVAL-PORT-A-CATH vs revision of present port Right 11/17/2016    Performed by [facility] at [facility] OR 2ND FLR    TOTAL ABDOMINAL HYSTERECTOMY W/ BILATERAL SALPINGOOPHORECTOMY       Review of patient's allergies indicates:   Allergen Reactions    Lisinopril Anaphylaxis       Anesthesia Evaluation    I have reviewed the Patient Summary Reports.    I have reviewed the Nursing Notes.   I have reviewed the Medications.     Review of Systems  Anesthesia Hx:  No problems with previous Anesthesia    Cardiovascular:   Hypertension Past MI CAD  CABG/stent  stentsx4   Pulmonary:   Denies Asthma.  Denies Shortness of breath.    Renal/:   Chronic Renal Disease, CRI    Hepatic/GI:   GERD    Musculoskeletal:   Arthritis     Neurological:   Seizures    Endocrine:   Diabetes    7/18 Myocardial Perfusion PET Stress  Test    CONCLUSIONS: NORMAL MYOCARDIAL PERFUSION PET STRESS TEST  1. The perfusion scan is free of evidence for myocardial ischemia or injury.   2. Resting wall motion is physiologic. Stress wall motion is physiologic.   3. LV function is normal at rest and stress.  (normal is >= 51%)  4. The ventricular volumes are normal at rest and stress.   5. The extracardiac distribution of radioactivity is normal.   6. When compared to the previous study from 6/27/16, there are no significant changes.     Physical Exam  General:  Well nourished    Airway/Jaw/Neck:  Airway Findings:  Mouth Opening: Normal Tongue: Normal  General Airway Assessment: Adult  Mallampati: II  TM Distance: Normal, at least 6 cm  Jaw/Neck Findings:  Neck ROM: Normal ROM  Neck Findings:      Dental:  Dental Findings: Upper Dentures        Mental Status:  Mental Status Findings:  Cooperative         Anesthesia Plan  Type of Anesthesia, risks & benefits discussed:  Anesthesia Type:  general  Patient's Preference:   Intra-op Monitoring Plan: standard ASA monitors  Intra-op Monitoring Plan Comments:   Post Op Pain Control Plan: per primary service following discharge from PACU and multimodal analgesia  Post Op Pain Control Plan Comments:   Induction:   IV  Beta Blocker:  Patient is on a Beta-Blocker and has received one dose within the past 24 hours (No further documentation required).       Informed Consent: Patient understands risks and agrees with Anesthesia plan.  Questions answered. Anesthesia consent signed with patient.  ASA Score: 3     Day of Surgery Review of History & Physical:    H&P update referred to the surgeon.         Ready For Surgery From Anesthesia Perspective.

## 2018-10-08 NOTE — TRANSFER OF CARE
"Anesthesia Transfer of Care Note    Patient: Renata Bergman    Procedure(s) Performed: Procedure(s) (LRB):  ESOPHAGOGASTRODUODENOSCOPY (EGD) (N/A)  COLONOSCOPY (N/A)    Patient location: PACU    Anesthesia Type: general    Transport from OR: Transported from OR on room air with adequate spontaneous ventilation    Post pain: adequate analgesia    Post assessment: no apparent anesthetic complications and tolerated procedure well    Post vital signs: stable    Level of consciousness: awake    Nausea/Vomiting: no nausea/vomiting    Complications: none    Transfer of care protocol was followed      Last vitals:   Visit Vitals  BP (!) 152/94 (BP Location: Right leg, Patient Position: Lying)   Pulse 71   Temp 36.5 °C (97.7 °F) (Temporal)   Resp 14   Ht 5' 1" (1.549 m)   Wt 73.9 kg (163 lb)   SpO2 97%   Breastfeeding? No   BMI 30.80 kg/m²     "

## 2018-10-08 NOTE — [employer] PATIENT INSTRUCTIONS
Discharge Summary/Instructions after an Endoscopic Procedure  Patient Name: Renata Bergman  Patient MRN: [id number]  Patient YOB: 1940  Monday, October 08, 2018  Marcio Louie MD  RESTRICTIONS:  During your procedure today, you received medications for sedation.  These   medications may affect your judgment, balance and coordination.  Therefore,   for 24 hours, you have the following restrictions:   - DO NOT drive a car, operate machinery, make legal/financial decisions,   sign important papers or drink alcohol.    ACTIVITY:  Today: no heavy lifting, straining or running due to procedural   sedation/anesthesia.  The following day: return to full activity including work.  DIET:  Eat and drink normally unless instructed otherwise.     TREATMENT FOR COMMON SIDE EFFECTS:  - Mild abdominal pain, nausea, belching, bloating or excessive gas:  rest,   eat lightly and use a heating pad.  - Sore Throat: treat with throat lozenges and/or gargle with warm salt   water.  - Because air was used during the procedure, expelling large amounts of air   from your rectum or belching is normal.  - If a bowel prep was taken, you may not have a bowel movement for 1-3 days.    This is normal.  SYMPTOMS TO WATCH FOR AND REPORT TO YOUR PHYSICIAN:  1. Abdominal pain or bloating, other than gas cramps.  2. Chest pain.  3. Back pain.  4. Signs of infection such as: chills or fever occurring within 24 hours   after the procedure.  5. Rectal bleeding, which would show as bright red, maroon, or black stools.   (A tablespoon of blood from the rectum is not serious, especially if   hemorrhoids are present.)  6. Vomiting.  7. Weakness or dizziness.  GO DIRECTLY TO THE NEAREST EMERGENCY ROOM IF YOU HAVE ANY OF THE FOLLOWING:      Difficulty breathing              Chills and/or fever over 101 F   Persistent vomiting and/or vomiting blood   Severe abdominal pain   Severe chest pain   Black, tarry stools   Bleeding- more than one  tablespoon   Any other symptom or condition that you feel may need urgent attention  Your doctor recommends these additional instructions:  If any biopsies were taken, your doctors clinic will contact you in 1 to 2   weeks with any results.  - Discharge patient to home.   - Await pathology results.   - Telephone endoscopist for pathology results in 2 weeks.   - Repeat colonoscopy in 5 years for surveillance.   - The findings and recommendations were discussed with the patient.   - Return to referring physician.   - Resume Plavix (clopidogrel) at prior dose tomorrow.  For questions, problems or results please call your physician - Marcio Louie MD at Work:  [phone].  [facility], EMERGENCY ROOM PHONE NUMBER: [phone]  IF A COMPLICATION OR EMERGENCY SITUATION ARISES AND YOU ARE UNABLE TO REACH   YOUR PHYSICIAN - GO DIRECTLY TO THE EMERGENCY ROOM.  Marcio Louie MD  10/8/2018 12:36:42 PM  This report has been verified and signed electronically.  [employer]

## 2018-10-09 NOTE — [employer]ATION PATIENT INSTRUCTIONS
Discharge Summary/Instructions after an Endoscopic Procedure  Patient Name: Renata Bergman  Patient MRN: [id number]  Patient YOB: 1940  Monday, October 08, 2018  Marcio Louie MD  RESTRICTIONS:  During your procedure today, you received medications for sedation.  These   medications may affect your judgment, balance and coordination.  Therefore,   for 24 hours, you have the following restrictions:   - DO NOT drive a car, operate machinery, make legal/financial decisions,   sign important papers or drink alcohol.    ACTIVITY:  Today: no heavy lifting, straining or running due to procedural   sedation/anesthesia.  The following day: return to full activity including work.  DIET:  Eat and drink normally unless instructed otherwise.     TREATMENT FOR COMMON SIDE EFFECTS:  - Mild abdominal pain, nausea, belching, bloating or excessive gas:  rest,   eat lightly and use a heating pad.  - Sore Throat: treat with throat lozenges and/or gargle with warm salt   water.  - Because air was used during the procedure, expelling large amounts of air   from your rectum or belching is normal.  - If a bowel prep was taken, you may not have a bowel movement for 1-3 days.    This is normal.  SYMPTOMS TO WATCH FOR AND REPORT TO YOUR PHYSICIAN:  1. Abdominal pain or bloating, other than gas cramps.  2. Chest pain.  3. Back pain.  4. Signs of infection such as: chills or fever occurring within 24 hours   after the procedure.  5. Rectal bleeding, which would show as bright red, maroon, or black stools.   (A tablespoon of blood from the rectum is not serious, especially if   hemorrhoids are present.)  6. Vomiting.  7. Weakness or dizziness.  GO DIRECTLY TO THE NEAREST EMERGENCY ROOM IF YOU HAVE ANY OF THE FOLLOWING:      Difficulty breathing              Chills and/or fever over 101 F   Persistent vomiting and/or vomiting blood   Severe abdominal pain   Severe chest pain   Black, tarry stools   Bleeding- more than one  tablespoon   Any other symptom or condition that you feel may need urgent attention  Your doctor recommends these additional instructions:  If any biopsies were taken, your doctors clinic will contact you in 1 to 2   weeks with any results.  - Discharge patient to home.   - Use a proton pump inhibitor PO daily.   - Resume Plavix (clopidogrel) at prior dose tomorrow.   - The findings and recommendations were discussed with the patient.   - Return to referring physician.  For questions, problems or results please call your physician - Marcio Louie MD at Work:  [phone].  [facility], EMERGENCY ROOM PHONE NUMBER: [phone]  IF A COMPLICATION OR EMERGENCY SITUATION ARISES AND YOU ARE UNABLE TO REACH   YOUR PHYSICIAN - GO DIRECTLY TO THE EMERGENCY ROOM.  Marcio Louie MD  10/9/2018 12:42:30 PM  This report has been verified and signed electronically.  [employer]

## 2018-10-11 ENCOUNTER — TELEPHONE (OUTPATIENT)
Dept: GASTROENTEROLOGY | Facility: CLINIC | Age: 78
End: 2018-10-11

## 2018-10-11 NOTE — TELEPHONE ENCOUNTER
----- Message from Marcio Louie MD sent at 10/11/2018 11:21 AM CDT -----  Abbie -please tell Renata that her colon polyp was benign but was an adenoma and recommend her next surveillance colonoscopy in 5 years.    SPECIMEN  1) Ascending colon polyp.  FINAL PATHOLOGIC DIAGNOSIS  ASCENDING COLON, POLYPECTOMY:  Tubular adenoma  Diagnosed by: Beatrice Sorto M.D.

## 2018-10-15 ENCOUNTER — OFFICE VISIT (OUTPATIENT)
Dept: HEMATOLOGY/ONCOLOGY | Facility: CLINIC | Age: 78
End: 2018-10-15
Payer: MEDICARE

## 2018-10-15 ENCOUNTER — TELEPHONE (OUTPATIENT)
Dept: ENDOSCOPY | Facility: HOSPITAL | Age: 78
End: 2018-10-15

## 2018-10-15 VITALS
OXYGEN SATURATION: 97 % | SYSTOLIC BLOOD PRESSURE: 147 MMHG | HEART RATE: 65 BPM | DIASTOLIC BLOOD PRESSURE: 65 MMHG | RESPIRATION RATE: 18 BRPM | HEIGHT: 61 IN | TEMPERATURE: 98 F | BODY MASS INDEX: 42.92 KG/M2 | WEIGHT: 227.31 LBS

## 2018-10-15 DIAGNOSIS — Z17.0 MALIGNANT NEOPLASM OF UPPER-OUTER QUADRANT OF LEFT BREAST IN FEMALE, ESTROGEN RECEPTOR POSITIVE: Primary | ICD-10-CM

## 2018-10-15 DIAGNOSIS — C50.412 MALIGNANT NEOPLASM OF UPPER-OUTER QUADRANT OF LEFT BREAST IN FEMALE, ESTROGEN RECEPTOR POSITIVE: Primary | ICD-10-CM

## 2018-10-15 PROCEDURE — 99213 OFFICE O/P EST LOW 20 MIN: CPT | Mod: PBBFAC | Performed by: INTERNAL MEDICINE

## 2018-10-15 PROCEDURE — 3078F DIAST BP <80 MM HG: CPT | Mod: CPTII,,, | Performed by: INTERNAL MEDICINE

## 2018-10-15 PROCEDURE — 3077F SYST BP >= 140 MM HG: CPT | Mod: CPTII,,, | Performed by: INTERNAL MEDICINE

## 2018-10-15 PROCEDURE — 1101F PT FALLS ASSESS-DOCD LE1/YR: CPT | Mod: CPTII,,, | Performed by: INTERNAL MEDICINE

## 2018-10-15 PROCEDURE — 99999 PR PBB SHADOW E&M-EST. PATIENT-LVL III: CPT | Mod: PBBFAC,,, | Performed by: INTERNAL MEDICINE

## 2018-10-15 PROCEDURE — 99213 OFFICE O/P EST LOW 20 MIN: CPT | Mod: S$PBB,,, | Performed by: INTERNAL MEDICINE

## 2018-10-15 NOTE — PROGRESS NOTES
Yes mass  Subjective:       Patient ID: Renata Bergman is a 78 y.o. female.    Chief Complaint: No chief complaint on file.    HPI Mrs. Hussein is a 78-year-old female with stage I left breast cancer.  She is currently on letrozole endocrine therapy.  Her other medical issues include diabetes, seizures and coronary artery disease.    At the time of her last visit she was changed from letrozole to anastrozole due to some oral symptoms of mouth tenderness some loss of taste.  She reports that that has resulted in improvement in her symptoms.  She still does have some mild mouth sensitivity.  She is currently taking iron and vitamin C for some iron-deficiency anemia.  She had upper endoscopy and colonoscopy to evaluate that apparently had some bleeding vessels on upper endoscopy that were cauterized.  She reports no shortness of breath.  Bowel function has been normal.  Mammogram in July was unremarkable.      Breast history: Screening mammogram on May 5, 2016 showed an irregular 22 mm mass with abnormal calcifications in the left breast 2:00 position. Follow-up ultrasound July 13 she noted a regular solid 17 mm mass.    On July 21 needle biopsy showed grade 2 infiltrating ductal carcinoma strongly ER VA positive (90%) and HER-2 negative.    On August 1, 2016 lumpectomy and sentinel lymph node biopsy were performed. That showed a 1.8 x 1.6 x 1.0 infiltrating carcinoma intermediate grade (histologic grade 3, nuclear grade 2, mitotic index 2.   The sentinel lymph node was negative and margins were negative. Final pathological stage TIc N0 stage IA.  Oncotype was 31 indicating a 21% risk of recurrence with tamoxifen alone.    She  had 3 weeks of weekly Taxol and then because of insurance issues she was changed to CMF and had 5 cycles of that.     Chemotherapy was completed in January 2017.    She completed radiation in April 2017 and began letrozole endocrine therapy that month as well.    Review of Systems    Constitutional: Positive for fatigue. Negative for activity change, appetite change, fever and unexpected weight change.   HENT: Negative for trouble swallowing.         Mild oral sensitivity   Respiratory: Negative for cough and shortness of breath.    Gastrointestinal: Negative for abdominal pain, constipation, diarrhea and nausea.   Musculoskeletal: Negative for back pain.   Neurological: Negative for headaches.   Psychiatric/Behavioral: Negative for dysphoric mood. The patient is not nervous/anxious.        Objective:      Physical Exam   Constitutional: She is oriented to person, place, and time. She appears well-developed and well-nourished.   HENT:   Mouth/Throat: No oropharyngeal exudate.   Eyes: No scleral icterus.   Cardiovascular: Regular rhythm and normal heart sounds.   Pulmonary/Chest: Effort normal and breath sounds normal. She has no wheezes. She has no rales. Right breast exhibits no mass, no nipple discharge and no skin change. Left breast exhibits no mass, no nipple discharge and no skin change.       Abdominal: Soft. She exhibits no mass. There is no tenderness.   Lymphadenopathy:     She has no cervical adenopathy.     She has no axillary adenopathy.        Right: No supraclavicular adenopathy present.        Left: No supraclavicular adenopathy present.   Neurological: She is alert and oriented to person, place, and time. No cranial nerve deficit.   Psychiatric: She has a normal mood and affect. Her behavior is normal. Thought content normal.   Vitals reviewed.      Assessment:      1. Malignant neoplasm of upper-outer quadrant of left breast in female, estrogen receptor positive        Plan:     Return to clinic in 3 months.  Continue anastrozole.

## 2018-10-17 RX ORDER — OMEPRAZOLE 20 MG/1
CAPSULE, DELAYED RELEASE ORAL
Qty: 180 CAPSULE | Refills: 4 | Status: SHIPPED | OUTPATIENT
Start: 2018-10-17 | End: 2019-02-26 | Stop reason: SDUPTHER

## 2018-10-17 RX ORDER — AMLODIPINE BESYLATE 5 MG/1
TABLET ORAL
Qty: 90 TABLET | Refills: 4 | Status: SHIPPED | OUTPATIENT
Start: 2018-10-17 | End: 2019-12-20 | Stop reason: SDUPTHER

## 2018-11-09 RX ORDER — POTASSIUM CHLORIDE 750 MG/1
TABLET, EXTENDED RELEASE ORAL
Qty: 90 TABLET | Refills: 3 | Status: SHIPPED | OUTPATIENT
Start: 2018-11-09 | End: 2019-11-07 | Stop reason: SDUPTHER

## 2018-11-09 RX ORDER — CLOPIDOGREL BISULFATE 75 MG/1
TABLET ORAL
Qty: 30 TABLET | Refills: 0 | Status: SHIPPED | OUTPATIENT
Start: 2018-11-09 | End: 2018-12-07 | Stop reason: SDUPTHER

## 2018-11-23 RX ORDER — OMEPRAZOLE 20 MG/1
CAPSULE, DELAYED RELEASE ORAL
Qty: 180 CAPSULE | Refills: 4 | Status: SHIPPED | OUTPATIENT
Start: 2018-11-23 | End: 2019-02-26 | Stop reason: SDUPTHER

## 2018-11-25 RX ORDER — OMEPRAZOLE 20 MG/1
CAPSULE, DELAYED RELEASE ORAL
Qty: 180 CAPSULE | Refills: 3 | Status: SHIPPED | OUTPATIENT
Start: 2018-11-25 | End: 2019-12-17 | Stop reason: SDUPTHER

## 2018-12-03 ENCOUNTER — TELEPHONE (OUTPATIENT)
Dept: CARDIOLOGY | Facility: CLINIC | Age: 78
End: 2018-12-03

## 2018-12-03 DIAGNOSIS — I10 HYPERTENSION, ESSENTIAL: ICD-10-CM

## 2018-12-03 DIAGNOSIS — I65.23 BILATERAL CAROTID ARTERY STENOSIS: ICD-10-CM

## 2018-12-03 DIAGNOSIS — I25.10 CORONARY ARTERY DISEASE DUE TO LIPID RICH PLAQUE: Primary | ICD-10-CM

## 2018-12-03 DIAGNOSIS — I25.83 CORONARY ARTERY DISEASE DUE TO LIPID RICH PLAQUE: Primary | ICD-10-CM

## 2018-12-03 DIAGNOSIS — E78.2 MIXED HYPERLIPIDEMIA: ICD-10-CM

## 2018-12-03 DIAGNOSIS — E11.51 CONTROLLED TYPE 2 DM WITH PERIPHERAL CIRCULATORY DISORDER: ICD-10-CM

## 2018-12-09 RX ORDER — CLOPIDOGREL BISULFATE 75 MG/1
TABLET ORAL
Qty: 30 TABLET | Refills: 0 | Status: SHIPPED | OUTPATIENT
Start: 2018-12-09 | End: 2019-01-08 | Stop reason: SDUPTHER

## 2018-12-27 NOTE — TELEPHONE ENCOUNTER
Called patient and informed her refill request for letrozole has been sent to Dr shea and she should check with her pharmacy later today.   3

## 2019-01-08 RX ORDER — CLOPIDOGREL BISULFATE 75 MG/1
TABLET ORAL
Qty: 30 TABLET | Refills: 0 | Status: SHIPPED | OUTPATIENT
Start: 2019-01-08 | End: 2019-02-05 | Stop reason: SDUPTHER

## 2019-01-16 ENCOUNTER — TELEPHONE (OUTPATIENT)
Dept: HEMATOLOGY/ONCOLOGY | Facility: CLINIC | Age: 79
End: 2019-01-16

## 2019-01-16 NOTE — TELEPHONE ENCOUNTER
----- Message from Lavonne Victor RN sent at 1/16/2019 10:37 AM CST -----  Contact: Pt  Can you help Ms Yanez with this? She saw Dr Guevara in October and needs her 3 month f/u scheduled.   Thanks    ----- Message -----  From: Claudia Lobato  Sent: 1/16/2019  10:30 AM  To: [facility]    Pt called to speak to the nurse to schedule an appt and would like a call back.    Pt can be reached at [phone].    Thanks

## 2019-01-29 ENCOUNTER — OFFICE VISIT (OUTPATIENT)
Dept: HEMATOLOGY/ONCOLOGY | Facility: CLINIC | Age: 79
End: 2019-01-29
Payer: MEDICARE

## 2019-01-29 VITALS
HEART RATE: 83 BPM | SYSTOLIC BLOOD PRESSURE: 155 MMHG | HEIGHT: 61 IN | OXYGEN SATURATION: 97 % | RESPIRATION RATE: 20 BRPM | TEMPERATURE: 99 F | DIASTOLIC BLOOD PRESSURE: 72 MMHG | BODY MASS INDEX: 42.87 KG/M2 | WEIGHT: 227.06 LBS

## 2019-01-29 DIAGNOSIS — C50.412 MALIGNANT NEOPLASM OF UPPER-OUTER QUADRANT OF LEFT BREAST IN FEMALE, ESTROGEN RECEPTOR POSITIVE: Primary | ICD-10-CM

## 2019-01-29 DIAGNOSIS — D50.9 IRON DEFICIENCY ANEMIA, UNSPECIFIED IRON DEFICIENCY ANEMIA TYPE: ICD-10-CM

## 2019-01-29 DIAGNOSIS — Z17.0 MALIGNANT NEOPLASM OF UPPER-OUTER QUADRANT OF LEFT BREAST IN FEMALE, ESTROGEN RECEPTOR POSITIVE: Primary | ICD-10-CM

## 2019-01-29 PROCEDURE — 3077F PR MOST RECENT SYSTOLIC BLOOD PRESSURE >= 140 MM HG: ICD-10-PCS | Mod: CPTII,S$GLB,, | Performed by: PHYSICIAN ASSISTANT

## 2019-01-29 PROCEDURE — 99499 RISK ADDL DX/OHS AUDIT: ICD-10-PCS | Mod: S$GLB,,, | Performed by: PHYSICIAN ASSISTANT

## 2019-01-29 PROCEDURE — 1101F PR PT FALLS ASSESS DOC 0-1 FALLS W/OUT INJ PAST YR: ICD-10-PCS | Mod: CPTII,S$GLB,, | Performed by: PHYSICIAN ASSISTANT

## 2019-01-29 PROCEDURE — 99999 PR PBB SHADOW E&M-EST. PATIENT-LVL IV: ICD-10-PCS | Mod: PBBFAC,,, | Performed by: PHYSICIAN ASSISTANT

## 2019-01-29 PROCEDURE — 99999 PR PBB SHADOW E&M-EST. PATIENT-LVL IV: CPT | Mod: PBBFAC,,, | Performed by: PHYSICIAN ASSISTANT

## 2019-01-29 PROCEDURE — 99213 PR OFFICE/OUTPT VISIT, EST, LEVL III, 20-29 MIN: ICD-10-PCS | Mod: S$GLB,,, | Performed by: PHYSICIAN ASSISTANT

## 2019-01-29 PROCEDURE — 99499 UNLISTED E&M SERVICE: CPT | Mod: S$GLB,,, | Performed by: PHYSICIAN ASSISTANT

## 2019-01-29 PROCEDURE — 3078F PR MOST RECENT DIASTOLIC BLOOD PRESSURE < 80 MM HG: ICD-10-PCS | Mod: CPTII,S$GLB,, | Performed by: PHYSICIAN ASSISTANT

## 2019-01-29 PROCEDURE — 3078F DIAST BP <80 MM HG: CPT | Mod: CPTII,S$GLB,, | Performed by: PHYSICIAN ASSISTANT

## 2019-01-29 PROCEDURE — 99213 OFFICE O/P EST LOW 20 MIN: CPT | Mod: S$GLB,,, | Performed by: PHYSICIAN ASSISTANT

## 2019-01-29 PROCEDURE — 1101F PT FALLS ASSESS-DOCD LE1/YR: CPT | Mod: CPTII,S$GLB,, | Performed by: PHYSICIAN ASSISTANT

## 2019-01-29 PROCEDURE — 3077F SYST BP >= 140 MM HG: CPT | Mod: CPTII,S$GLB,, | Performed by: PHYSICIAN ASSISTANT

## 2019-01-29 NOTE — PROGRESS NOTES
Subjective:       Patient ID: Renata Bergman is a 78 y.o. female.    Chief Complaint: Malignant neoplasm of upper-outer quadrant of left breast in    Mrs. Hussein is a 78-year-old female with stage I left breast cancer.  She is currently on anastrazole endocrine therapy.  Her other medical issues include diabetes, seizures and coronary artery disease.    She is tolerating anastrozole well and does not have any oral symptoms which she had with previous AI therapies.     She is currently taking iron and vitamin C for some iron-deficiency anemia.  She reports no shortness of breath.  Bowel function has been normal.  Mammogram in July was unremarkable.    EGD in 10/2018 showed a few bleeding angioectasias which were treated with APC.  C-scope from that same date showed one polyp and recommended repeat in 5 years.         Breast history: Screening mammogram on May 5, 2016 showed an irregular 22 mm mass with abnormal calcifications in the left breast 2:00 position. Follow-up ultrasound July 13 she noted a regular solid 17 mm mass.    On July 21 needle biopsy showed grade 2 infiltrating ductal carcinoma strongly ER OH positive (90%) and HER-2 negative.    On August 1, 2016 lumpectomy and sentinel lymph node biopsy were performed. That showed a 1.8 x 1.6 x 1.0 infiltrating carcinoma intermediate grade (histologic grade 3, nuclear grade 2, mitotic index 2.   The sentinel lymph node was negative and margins were negative. Final pathological stage TIc N0 stage IA.  Oncotype was 31 indicating a 21% risk of recurrence with tamoxifen alone.    She  had 3 weeks of weekly Taxol and then because of insurance issues she was changed to CMF and had 5 cycles of that.     Chemotherapy was completed in January 2017.    She completed radiation in April 2017 and began letrozole endocrine therapy that month as well.          Review of Systems   Constitutional: Negative.    HENT: Negative for congestion, rhinorrhea, sore throat and  trouble swallowing.         Dry mouth     Eyes: Negative for visual disturbance.   Respiratory: Negative for cough, chest tightness and shortness of breath.    Cardiovascular: Negative for chest pain, palpitations and leg swelling (lower extremity edema, chronic).   Gastrointestinal: Negative for abdominal pain, blood in stool, constipation, diarrhea, nausea and vomiting.   Genitourinary: Negative for dysuria, hematuria and vaginal bleeding.   Musculoskeletal: Positive for gait problem (ambulates with walker). Negative for arthralgias, back pain and myalgias.   Skin: Negative for pallor and rash.   Neurological: Negative for dizziness, weakness and headaches.   Hematological: Negative for adenopathy. Does not bruise/bleed easily.   Psychiatric/Behavioral: Negative for dysphoric mood and suicidal ideas. The patient is not nervous/anxious.        Objective:      Physical Exam   Constitutional: She is oriented to person, place, and time. She appears well-developed and well-nourished. No distress.   ECOG 0  Presents alone   HENT:   Head: Normocephalic.   Mouth/Throat: Oropharynx is clear and moist. No oropharyngeal exudate.   Eyes: Conjunctivae are normal. Pupils are equal, round, and reactive to light. No scleral icterus.   Neck: Normal range of motion. Neck supple. No thyromegaly present.   Cardiovascular: Normal rate and regular rhythm.   Pulmonary/Chest: Effort normal and breath sounds normal. No respiratory distress.   Right breast without mass, nodule or skin changes. Left breast with well healed lumpectomy and SLNB incision. No mass, nodule or skin changes. No axillary or supraclavicular adenopathy.  Lungs clear   Abdominal: Soft. Bowel sounds are normal. She exhibits no distension and no mass. There is no tenderness.   Musculoskeletal: Normal range of motion. She exhibits no edema or tenderness.   No spinal or paraspinal tenderness to palpation     Lymphadenopathy:     She has no cervical adenopathy.    Neurological: She is alert and oriented to person, place, and time. No cranial nerve deficit.   Ambulates with walker   Skin: Skin is warm and dry.   Psychiatric: She has a normal mood and affect. Her behavior is normal. Thought content normal.   Vitals reviewed.      Assessment:       1. Malignant neoplasm of upper-outer quadrant of left breast in female, estrogen receptor positive    2. Iron deficiency anemia, unspecified iron deficiency anemia type        Plan:       1)Continue Anastrazole and return to clinic in 3 months, annual mammogram due in July.  2)remains on oral iron, recent GI workup outlined below and bleeding angioectasias treated with APC.   Distress Screening Results: Psychosocial Distress screening score of Distress Score: 0 noted and reviewed. No intervention indicated.

## 2019-02-03 RX ORDER — CARVEDILOL 25 MG/1
TABLET ORAL
Qty: 180 TABLET | Refills: 4 | Status: ON HOLD | OUTPATIENT
Start: 2019-02-03 | End: 2019-12-27 | Stop reason: HOSPADM

## 2019-02-05 RX ORDER — CLOPIDOGREL BISULFATE 75 MG/1
TABLET ORAL
Qty: 30 TABLET | Refills: 0 | Status: SHIPPED | OUTPATIENT
Start: 2019-02-05 | End: 2019-03-07 | Stop reason: SDUPTHER

## 2019-02-10 RX ORDER — CHLORTHALIDONE 25 MG/1
TABLET ORAL
Qty: 90 TABLET | Refills: 4 | Status: ON HOLD | OUTPATIENT
Start: 2019-02-10 | End: 2019-12-27 | Stop reason: HOSPADM

## 2019-02-23 ENCOUNTER — LAB VISIT (OUTPATIENT)
Dept: LAB | Facility: HOSPITAL | Age: 79
End: 2019-02-23
Attending: INTERNAL MEDICINE
Payer: MEDICARE

## 2019-02-23 DIAGNOSIS — I25.83 CORONARY ARTERY DISEASE DUE TO LIPID RICH PLAQUE: ICD-10-CM

## 2019-02-23 DIAGNOSIS — I25.10 CORONARY ARTERY DISEASE DUE TO LIPID RICH PLAQUE: ICD-10-CM

## 2019-02-23 DIAGNOSIS — I10 HYPERTENSION, ESSENTIAL: ICD-10-CM

## 2019-02-23 DIAGNOSIS — I65.23 BILATERAL CAROTID ARTERY STENOSIS: ICD-10-CM

## 2019-02-23 DIAGNOSIS — E11.51 CONTROLLED TYPE 2 DM WITH PERIPHERAL CIRCULATORY DISORDER: ICD-10-CM

## 2019-02-23 DIAGNOSIS — E78.2 MIXED HYPERLIPIDEMIA: ICD-10-CM

## 2019-02-23 LAB
ALBUMIN SERPL BCP-MCNC: 3.7 G/DL
ALP SERPL-CCNC: 71 U/L
ALT SERPL W/O P-5'-P-CCNC: 12 U/L
ANION GAP SERPL CALC-SCNC: 11 MMOL/L
AST SERPL-CCNC: 17 U/L
BILIRUB SERPL-MCNC: 0.5 MG/DL
BUN SERPL-MCNC: 20 MG/DL
CALCIUM SERPL-MCNC: 10.5 MG/DL
CHLORIDE SERPL-SCNC: 100 MMOL/L
CHOLEST SERPL-MCNC: 138 MG/DL
CHOLEST/HDLC SERPL: 2.5 {RATIO}
CO2 SERPL-SCNC: 28 MMOL/L
CREAT SERPL-MCNC: 1.8 MG/DL
EST. GFR  (AFRICAN AMERICAN): 31.1 ML/MIN/1.73 M^2
EST. GFR  (NON AFRICAN AMERICAN): 27 ML/MIN/1.73 M^2
ESTIMATED AVG GLUCOSE: 134 MG/DL
GLUCOSE SERPL-MCNC: 93 MG/DL
HBA1C MFR BLD HPLC: 6.3 %
HDLC SERPL-MCNC: 55 MG/DL
HDLC SERPL: 39.9 %
LDLC SERPL CALC-MCNC: 70 MG/DL
NONHDLC SERPL-MCNC: 83 MG/DL
POTASSIUM SERPL-SCNC: 3.8 MMOL/L
PROT SERPL-MCNC: 7.4 G/DL
SODIUM SERPL-SCNC: 139 MMOL/L
TRIGL SERPL-MCNC: 65 MG/DL
TSH SERPL DL<=0.005 MIU/L-ACNC: 2.41 UIU/ML

## 2019-02-23 PROCEDURE — 36415 COLL VENOUS BLD VENIPUNCTURE: CPT | Mod: PO

## 2019-02-23 PROCEDURE — 80053 COMPREHEN METABOLIC PANEL: CPT

## 2019-02-23 PROCEDURE — 80061 LIPID PANEL: CPT

## 2019-02-23 PROCEDURE — 84443 ASSAY THYROID STIM HORMONE: CPT

## 2019-02-23 PROCEDURE — 83036 HEMOGLOBIN GLYCOSYLATED A1C: CPT

## 2019-02-25 ENCOUNTER — TELEPHONE (OUTPATIENT)
Dept: CARDIOLOGY | Facility: CLINIC | Age: 79
End: 2019-02-25

## 2019-02-25 NOTE — TELEPHONE ENCOUNTER
----- Message from Jaycee Puente MD sent at 2/25/2019  7:49 AM CST -----  Please mail patient the blood work results and inform the patient that we will discuss it in detail during the upcoming visit. It looks good.

## 2019-02-26 ENCOUNTER — OFFICE VISIT (OUTPATIENT)
Dept: CARDIOLOGY | Facility: CLINIC | Age: 79
End: 2019-02-26
Payer: MEDICARE

## 2019-02-26 VITALS
DIASTOLIC BLOOD PRESSURE: 62 MMHG | SYSTOLIC BLOOD PRESSURE: 132 MMHG | HEIGHT: 64 IN | WEIGHT: 227.06 LBS | BODY MASS INDEX: 38.76 KG/M2 | HEART RATE: 69 BPM

## 2019-02-26 DIAGNOSIS — I25.83 CORONARY ARTERY DISEASE DUE TO LIPID RICH PLAQUE: Primary | Chronic | ICD-10-CM

## 2019-02-26 DIAGNOSIS — I65.23 BILATERAL CAROTID ARTERY STENOSIS: ICD-10-CM

## 2019-02-26 DIAGNOSIS — I45.10 RBBB: ICD-10-CM

## 2019-02-26 DIAGNOSIS — N18.30 CHRONIC RENAL DISEASE, STAGE 3, MODERATELY DECREASED GLOMERULAR FILTRATION RATE BETWEEN 30-59 ML/MIN/1.73 SQUARE METER: Chronic | ICD-10-CM

## 2019-02-26 DIAGNOSIS — C50.412 MALIGNANT NEOPLASM OF UPPER-OUTER QUADRANT OF LEFT FEMALE BREAST, UNSPECIFIED ESTROGEN RECEPTOR STATUS: ICD-10-CM

## 2019-02-26 DIAGNOSIS — I10 HTN (HYPERTENSION), BENIGN: Chronic | ICD-10-CM

## 2019-02-26 DIAGNOSIS — I25.2 OLD MI (MYOCARDIAL INFARCTION): ICD-10-CM

## 2019-02-26 DIAGNOSIS — I25.10 CORONARY ARTERY DISEASE DUE TO LIPID RICH PLAQUE: Primary | Chronic | ICD-10-CM

## 2019-02-26 DIAGNOSIS — E78.2 MIXED HYPERLIPIDEMIA: Chronic | ICD-10-CM

## 2019-02-26 DIAGNOSIS — D50.9 IRON DEFICIENCY ANEMIA, UNSPECIFIED IRON DEFICIENCY ANEMIA TYPE: ICD-10-CM

## 2019-02-26 DIAGNOSIS — Z95.5 S/P CORONARY ARTERY STENT PLACEMENT: ICD-10-CM

## 2019-02-26 PROCEDURE — 99214 OFFICE O/P EST MOD 30 MIN: CPT | Mod: S$GLB,,, | Performed by: INTERNAL MEDICINE

## 2019-02-26 PROCEDURE — 99499 RISK ADDL DX/OHS AUDIT: ICD-10-PCS | Mod: S$GLB,,, | Performed by: INTERNAL MEDICINE

## 2019-02-26 PROCEDURE — 3075F PR MOST RECENT SYSTOLIC BLOOD PRESS GE 130-139MM HG: ICD-10-PCS | Mod: CPTII,S$GLB,, | Performed by: INTERNAL MEDICINE

## 2019-02-26 PROCEDURE — 99999 PR PBB SHADOW E&M-EST. PATIENT-LVL III: ICD-10-PCS | Mod: PBBFAC,,, | Performed by: INTERNAL MEDICINE

## 2019-02-26 PROCEDURE — 99499 UNLISTED E&M SERVICE: CPT | Mod: S$GLB,,, | Performed by: INTERNAL MEDICINE

## 2019-02-26 PROCEDURE — 1101F PR PT FALLS ASSESS DOC 0-1 FALLS W/OUT INJ PAST YR: ICD-10-PCS | Mod: CPTII,S$GLB,, | Performed by: INTERNAL MEDICINE

## 2019-02-26 PROCEDURE — 3078F DIAST BP <80 MM HG: CPT | Mod: CPTII,S$GLB,, | Performed by: INTERNAL MEDICINE

## 2019-02-26 PROCEDURE — 3078F PR MOST RECENT DIASTOLIC BLOOD PRESSURE < 80 MM HG: ICD-10-PCS | Mod: CPTII,S$GLB,, | Performed by: INTERNAL MEDICINE

## 2019-02-26 PROCEDURE — 99999 PR PBB SHADOW E&M-EST. PATIENT-LVL III: CPT | Mod: PBBFAC,,, | Performed by: INTERNAL MEDICINE

## 2019-02-26 PROCEDURE — 1101F PT FALLS ASSESS-DOCD LE1/YR: CPT | Mod: CPTII,S$GLB,, | Performed by: INTERNAL MEDICINE

## 2019-02-26 PROCEDURE — 99214 PR OFFICE/OUTPT VISIT, EST, LEVL IV, 30-39 MIN: ICD-10-PCS | Mod: S$GLB,,, | Performed by: INTERNAL MEDICINE

## 2019-02-26 PROCEDURE — 3075F SYST BP GE 130 - 139MM HG: CPT | Mod: CPTII,S$GLB,, | Performed by: INTERNAL MEDICINE

## 2019-02-26 NOTE — PROGRESS NOTES
"Subjective:   Patient ID:  Renata Bergman is a 78 y.o. female who presents for follow-up of Hypertension      HPI:  No complaints. Last week few minutes of palpitations. Otherwise no symptoms. Doing well.    Lab Results   Component Value Date     02/23/2019    K 3.8 02/23/2019     02/23/2019    CO2 28 02/23/2019    BUN 20 02/23/2019    CREATININE 1.8 (H) 02/23/2019    GLU 93 02/23/2019    HGBA1C 6.3 (H) 02/23/2019    MG 1.7 05/23/2017    AST 17 02/23/2019    ALT 12 02/23/2019    ALBUMIN 3.7 02/23/2019    PROT 7.4 02/23/2019    BILITOT 0.5 02/23/2019    WBC 6.30 09/15/2018    HGB 9.9 (L) 09/15/2018    HCT 34.5 (L) 09/15/2018    MCV 83 09/15/2018     09/15/2018    INR 1.0 05/23/2017    TSH 2.411 02/23/2019    CHOL 138 02/23/2019    HDL 55 02/23/2019    LDLCALC 70.0 02/23/2019    TRIG 65 02/23/2019       Review of Systems   Constitution: Negative.   HENT: Negative.    Eyes: Negative.    Cardiovascular: Positive for palpitations. Negative for chest pain, claudication, dyspnea on exertion, irregular heartbeat, leg swelling, near-syncope and syncope.   Respiratory: Negative.  Negative for cough, shortness of breath, snoring and wheezing.    Endocrine: Negative.  Negative for cold intolerance, heat intolerance, polydipsia, polyphagia and polyuria.   Skin: Negative.    Musculoskeletal: Negative.    Gastrointestinal: Negative.    Genitourinary: Negative.    Neurological: Positive for light-headedness and loss of balance.   Psychiatric/Behavioral: Negative.        Objective:   Physical Exam   Constitutional: She is oriented to person, place, and time. She appears well-developed and well-nourished.   /62   Pulse 69   Ht 5' 4" (1.626 m)   Wt 103 kg (227 lb 1.2 oz)   BMI 38.98 kg/m²      HENT:   Head: Normocephalic.   Eyes: Pupils are equal, round, and reactive to light.   Neck: Normal range of motion. Neck supple. Carotid bruit is not present. No thyromegaly present.   Cardiovascular: Normal " rate, regular rhythm, normal heart sounds and intact distal pulses. Exam reveals no gallop and no friction rub.   No murmur heard.  Pulses:       Carotid pulses are 2+ on the right side, and 2+ on the left side.       Radial pulses are 2+ on the right side, and 2+ on the left side.        Femoral pulses are 2+ on the right side, and 2+ on the left side.       Popliteal pulses are 2+ on the right side, and 2+ on the left side.        Dorsalis pedis pulses are 2+ on the right side, and 2+ on the left side.        Posterior tibial pulses are 2+ on the right side, and 2+ on the left side.   Pulmonary/Chest: Effort normal and breath sounds normal. No respiratory distress. She has no wheezes. She has no rales. She exhibits no tenderness.   Abdominal: Soft. Bowel sounds are normal.   Musculoskeletal: Normal range of motion. She exhibits no edema.   Neurological: She is alert and oriented to person, place, and time.   Skin: Skin is warm and dry.   Psychiatric: She has a normal mood and affect.   Nursing note and vitals reviewed.        Assessment and Plan:     Problem List Items Addressed This Visit        Cardiology Problems    HTN (hypertension), benign (Chronic)    Hyperlipidemia (Chronic)    Coronary artery disease due to lipid rich plaque - Primary (Chronic)    Old MI (myocardial infarction)    RBBB    Bilateral carotid artery stenosis    Relevant Orders    CV Ultrasound doppler carotid (Cupid Only)       Other    Chronic renal disease, stage 3, moderately decreased glomerular filtration rate between 30-59 mL/min/1.73 square meter (Chronic)    S/P coronary artery stent placement    Malignant neoplasm of upper-outer quadrant of left female breast    Iron deficiency anemia          Patient's Medications   New Prescriptions    No medications on file   Previous Medications    ALLOPURINOL (ZYLOPRIM) 300 MG TABLET    TAKE 1 TABLET BY MOUTH ONCE DAILY    AMLODIPINE (NORVASC) 5 MG TABLET    TAKE 1 TABLET BY MOUTH DAILY     ANASTROZOLE (ARIMIDEX) 1 MG TAB    Take 1 tablet (1 mg total) by mouth once daily. Stop Letrozole (femara)    ASCORBIC ACID, VITAMIN C, (VITAMIN C) 500 MG TABLET    Take 1 tablet (500 mg total) by mouth once daily.    ASPIRIN 81 MG CHEW    Take 81 mg by mouth once daily.      CARVEDILOL (COREG) 25 MG TABLET    TAKE 1 TABLET BY MOUTH TWICE DAILY    CHLORTHALIDONE (HYGROTEN) 25 MG TAB    TAKE ONE-HALF TABLET BY MOUTH EVERY DAY    CLOPIDOGREL (PLAVIX) 75 MG TABLET    TAKE 1 TABLET BY MOUTH EVERY DAY    FERROUS SULFATE 325 (65 FE) MG EC TABLET    Take 1 tablet (325 mg total) by mouth 3 (three) times daily with meals.    FEXOFENADINE (ALLEGRA) 30 MG TABLET    Take 30 mg by mouth daily as needed.    LEVETIRACETAM XR (KEPPRA XR) 500 MG TB24 24 HR TABLET    Take 2 tablets (1,000 mg total) by mouth once daily.    METFORMIN (GLUCOPHAGE) 850 MG TABLET    TAKE 1 TABLET BY MOUTH TWICE DAILY    NITROGLYCERIN (NITROSTAT) 0.4 MG SL TABLET    PLACE 1 TABLET UNDER THE TONGUE EVERY 5 MINUTES AS NEEDED FOR CHEST PAIN.    OMEPRAZOLE (PRILOSEC) 20 MG CAPSULE    TAKE 2 CAPSULES BY MOUTH EVERY DAY    POTASSIUM CHLORIDE (KLOR-CON) 10 MEQ TBSR    TAKE 1 TABLET BY MOUTH EVERY DAY    ROSUVASTATIN (CRESTOR) 40 MG TAB    TAKE 1 TABLET BY MOUTH EVERY DAY   Modified Medications    No medications on file   Discontinued Medications    AMLODIPINE (NORVASC) 5 MG TABLET    TAKE 1 TABLET BY MOUTH DAILY    OMEPRAZOLE (PRILOSEC) 20 MG CAPSULE    TAKE 2 CAPSULES BY MOUTH EVERY DAY    OMEPRAZOLE (PRILOSEC) 20 MG CAPSULE    TAKE 2 CAPSULES BY MOUTH EVERY DAY    POLYETHYLENE GLYCOL (GOLYTELY,NULYTELY) 236-22.74-6.74 -5.86 GRAM SUSPENSION    take As directed-please contact pt to see if she wants to  or have delivered     Continue on the present regimen.    Follow-up in about 6 months (around 8/26/2019).

## 2019-02-26 NOTE — PROGRESS NOTES
Patient, Renata Bergman (MRN #[id number]), presented with a recorded BMI of 38.98 kg/m^2 and a documented comorbidity(s):  - Hypertension  - Hyperlipidemia  to which the severe obesity is a contributing factor. This is consistent with the definition of severe obesity (BMI 35.0-39.9) with comorbidity (ICD-10 E66.01, Z68.35). The patient's severe obesity was monitored, evaluated, addressed and/or treated. This addendum to the medical record is made on 02/26/2019.

## 2019-03-06 ENCOUNTER — CLINICAL SUPPORT (OUTPATIENT)
Dept: CARDIOLOGY | Facility: CLINIC | Age: 79
End: 2019-03-06
Attending: INTERNAL MEDICINE
Payer: MEDICARE

## 2019-03-06 DIAGNOSIS — I65.23 BILATERAL CAROTID ARTERY STENOSIS: ICD-10-CM

## 2019-03-06 LAB
LEFT CBA DIAS: 13 CM/S
LEFT CBA SYS: 85 CM/S
LEFT CCA DIST DIAS: 11 CM/S
LEFT CCA DIST SYS: 75 CM/S
LEFT CCA MID DIAS: 10 CM/S
LEFT CCA MID SYS: 80 CM/S
LEFT CCA PROX DIAS: 11 CM/S
LEFT CCA PROX SYS: 112 CM/S
LEFT ECA DIAS: 5 CM/S
LEFT ECA SYS: 75 CM/S
LEFT ICA DIST DIAS: 18 CM/S
LEFT ICA DIST SYS: 80 CM/S
LEFT ICA MID DIAS: 17 CM/S
LEFT ICA MID SYS: 83 CM/S
LEFT ICA PROX DIAS: 18 CM/S
LEFT ICA PROX SYS: 114 CM/S
LEFT VERTEBRAL DIAS: 11 CM/S
LEFT VERTEBRAL SYS: 63 CM/S
OHS CV CAROTID RIGHT ICA EDV HIGHEST: 24
OHS CV CAROTID ULTRASOUND LEFT ICA/CCA RATIO: 1.02
OHS CV CAROTID ULTRASOUND RIGHT ICA/CCA RATIO: 1.4
OHS CV PV CAROTID LEFT HIGHEST CCA: 112
OHS CV PV CAROTID LEFT HIGHEST ICA: 114
OHS CV PV CAROTID RIGHT HIGHEST CCA: 104
OHS CV PV CAROTID RIGHT HIGHEST ICA: 146
OHS CV US CAROTID LEFT HIGHEST EDV: 18
RIGHT ARM DIASTOLIC BLOOD PRESSURE: 78 MMHG
RIGHT ARM SYSTOLIC BLOOD PRESSURE: 122 MMHG
RIGHT CBA DIAS: 24 CM/S
RIGHT CBA SYS: 151 CM/S
RIGHT CCA DIST DIAS: 8 CM/S
RIGHT CCA DIST SYS: 65 CM/S
RIGHT CCA MID DIAS: 8 CM/S
RIGHT CCA MID SYS: 69 CM/S
RIGHT CCA PROX DIAS: 12 CM/S
RIGHT CCA PROX SYS: 104 CM/S
RIGHT ECA DIAS: 21 CM/S
RIGHT ECA SYS: 93 CM/S
RIGHT ICA DIST DIAS: 21 CM/S
RIGHT ICA DIST SYS: 85 CM/S
RIGHT ICA MID DIAS: 17 CM/S
RIGHT ICA MID SYS: 78 CM/S
RIGHT ICA PROX DIAS: 24 CM/S
RIGHT ICA PROX SYS: 146 CM/S
RIGHT VERTEBRAL DIAS: 14 CM/S
RIGHT VERTEBRAL SYS: 70 CM/S

## 2019-03-06 PROCEDURE — 93880 EXTRACRANIAL BILAT STUDY: CPT | Mod: S$GLB,,, | Performed by: INTERNAL MEDICINE

## 2019-03-06 PROCEDURE — 93880 CV US DOPPLER CAROTID (CUPID ONLY): ICD-10-PCS | Mod: S$GLB,,, | Performed by: INTERNAL MEDICINE

## 2019-03-07 ENCOUNTER — TELEPHONE (OUTPATIENT)
Dept: CARDIOLOGY | Facility: CLINIC | Age: 79
End: 2019-03-07

## 2019-03-07 RX ORDER — CLOPIDOGREL BISULFATE 75 MG/1
TABLET ORAL
Qty: 30 TABLET | Refills: 0 | Status: SHIPPED | OUTPATIENT
Start: 2019-03-07 | End: 2019-05-13 | Stop reason: SDUPTHER

## 2019-03-07 NOTE — TELEPHONE ENCOUNTER
----- Message from Jaycee Puente MD sent at 3/7/2019  8:13 AM CST -----  Please inform the patient that Carotid duplex was c/w mild bilateral disease. Please continue on the same medical regimen.  Will check it again in 12-18 months.

## 2019-03-27 ENCOUNTER — PES CALL (OUTPATIENT)
Dept: ADMINISTRATIVE | Facility: CLINIC | Age: 79
End: 2019-03-27

## 2019-04-23 DIAGNOSIS — Z17.0 MALIGNANT NEOPLASM OF UPPER-OUTER QUADRANT OF LEFT BREAST IN FEMALE, ESTROGEN RECEPTOR POSITIVE: ICD-10-CM

## 2019-04-23 DIAGNOSIS — C50.412 MALIGNANT NEOPLASM OF UPPER-OUTER QUADRANT OF LEFT BREAST IN FEMALE, ESTROGEN RECEPTOR POSITIVE: ICD-10-CM

## 2019-04-23 RX ORDER — ANASTROZOLE 1 MG/1
TABLET ORAL
Qty: 30 TABLET | Refills: 11 | Status: SHIPPED | OUTPATIENT
Start: 2019-04-23 | End: 2019-06-25 | Stop reason: ALTCHOICE

## 2019-05-01 NOTE — PROGRESS NOTES
Yes mass  Subjective:       Patient ID: Renata Bergman is a 78 y.o. female.    Chief Complaint: No chief complaint on file.    HPI Mrs. Hussein is a 78-year-old female with stage I left breast cancer.  She is currently on letrozole endocrine therapy.  Her other medical issues include diabetes, seizures and coronary artery disease.    She reports some fatigue and some variable appetite but otherwise has been doing well.  She has no unusual pain. She denies any shortness of breath.  She has some occasional constipation.      Breast history: Screening mammogram on May 5, 2016 showed an irregular 22 mm mass with abnormal calcifications in the left breast 2:00 position. Follow-up ultrasound July 13 she noted a regular solid 17 mm mass.    On July 21 needle biopsy showed grade 2 infiltrating ductal carcinoma strongly ER NE positive (90%) and HER-2 negative.    On August 1, 2016 lumpectomy and sentinel lymph node biopsy were performed. That showed a 1.8 x 1.6 x 1.0 infiltrating carcinoma intermediate grade (histologic grade 3, nuclear grade 2, mitotic index 2.   The sentinel lymph node was negative and margins were negative. Final pathological stage TIc N0 stage IA.  Oncotype was 31 indicating a 21% risk of recurrence with tamoxifen alone.    She  had 3 weeks of weekly Taxol and then because of insurance issues she was changed to CMF and had 5 cycles of that.     Chemotherapy was completed in January 2017.    She completed radiation in April 2017 and began letrozole endocrine therapy that month as well.    Review of Systems   Constitutional: Positive for fatigue. Negative for activity change, appetite change (Variable), fever and unexpected weight change.   HENT: Negative for trouble swallowing.         Mild oral sensitivity   Respiratory: Negative for cough and shortness of breath.    Gastrointestinal: Positive for constipation. Negative for abdominal pain, diarrhea and nausea.   Musculoskeletal: Negative for back  pain.   Neurological: Negative for headaches.   Psychiatric/Behavioral: Negative for dysphoric mood. The patient is not nervous/anxious.        Objective:      Physical Exam   Constitutional: She is oriented to person, place, and time. She appears well-developed and well-nourished.   HENT:   Mouth/Throat: No oropharyngeal exudate.   Eyes: No scleral icterus.   Cardiovascular: Regular rhythm and normal heart sounds.   Pulmonary/Chest: Effort normal and breath sounds normal. She has no wheezes. She has no rales. Right breast exhibits no mass, no nipple discharge and no skin change. Left breast exhibits no mass, no nipple discharge and no skin change.       Abdominal: Soft. She exhibits no mass. There is no tenderness.   Lymphadenopathy:     She has no cervical adenopathy.     She has no axillary adenopathy.        Right: No supraclavicular adenopathy present.        Left: No supraclavicular adenopathy present.   Neurological: She is alert and oriented to person, place, and time. No cranial nerve deficit.   Psychiatric: She has a normal mood and affect. Her behavior is normal. Thought content normal.   Vitals reviewed.      Assessment:      1. Malignant neoplasm of upper-outer quadrant of left breast in female, estrogen receptor positive        Plan:     Return to clinic in 3 months, mammogram at that time.  Continue anastrozole.    Distress Screening Results: Psychosocial Distress screening score of Distress Score: 0 noted and reviewed. No intervention indicated.

## 2019-05-02 ENCOUNTER — OFFICE VISIT (OUTPATIENT)
Dept: HEMATOLOGY/ONCOLOGY | Facility: CLINIC | Age: 79
End: 2019-05-02
Payer: MEDICARE

## 2019-05-02 VITALS
HEIGHT: 65 IN | TEMPERATURE: 98 F | WEIGHT: 218.25 LBS | RESPIRATION RATE: 16 BRPM | DIASTOLIC BLOOD PRESSURE: 79 MMHG | HEART RATE: 107 BPM | BODY MASS INDEX: 36.36 KG/M2 | OXYGEN SATURATION: 96 % | SYSTOLIC BLOOD PRESSURE: 144 MMHG

## 2019-05-02 DIAGNOSIS — Z17.0 MALIGNANT NEOPLASM OF UPPER-OUTER QUADRANT OF LEFT BREAST IN FEMALE, ESTROGEN RECEPTOR POSITIVE: Primary | ICD-10-CM

## 2019-05-02 DIAGNOSIS — C50.412 MALIGNANT NEOPLASM OF UPPER-OUTER QUADRANT OF LEFT BREAST IN FEMALE, ESTROGEN RECEPTOR POSITIVE: Primary | ICD-10-CM

## 2019-05-02 PROCEDURE — 99213 PR OFFICE/OUTPT VISIT, EST, LEVL III, 20-29 MIN: ICD-10-PCS | Mod: S$GLB,,, | Performed by: INTERNAL MEDICINE

## 2019-05-02 PROCEDURE — 3078F DIAST BP <80 MM HG: CPT | Mod: CPTII,S$GLB,, | Performed by: INTERNAL MEDICINE

## 2019-05-02 PROCEDURE — 99499 RISK ADDL DX/OHS AUDIT: ICD-10-PCS | Mod: S$GLB,,, | Performed by: INTERNAL MEDICINE

## 2019-05-02 PROCEDURE — 3077F PR MOST RECENT SYSTOLIC BLOOD PRESSURE >= 140 MM HG: ICD-10-PCS | Mod: CPTII,S$GLB,, | Performed by: INTERNAL MEDICINE

## 2019-05-02 PROCEDURE — 99999 PR PBB SHADOW E&M-EST. PATIENT-LVL III: CPT | Mod: PBBFAC,,, | Performed by: INTERNAL MEDICINE

## 2019-05-02 PROCEDURE — 1101F PT FALLS ASSESS-DOCD LE1/YR: CPT | Mod: CPTII,S$GLB,, | Performed by: INTERNAL MEDICINE

## 2019-05-02 PROCEDURE — 99213 OFFICE O/P EST LOW 20 MIN: CPT | Mod: S$GLB,,, | Performed by: INTERNAL MEDICINE

## 2019-05-02 PROCEDURE — 99499 UNLISTED E&M SERVICE: CPT | Mod: S$GLB,,, | Performed by: INTERNAL MEDICINE

## 2019-05-02 PROCEDURE — 3077F SYST BP >= 140 MM HG: CPT | Mod: CPTII,S$GLB,, | Performed by: INTERNAL MEDICINE

## 2019-05-02 PROCEDURE — 3078F PR MOST RECENT DIASTOLIC BLOOD PRESSURE < 80 MM HG: ICD-10-PCS | Mod: CPTII,S$GLB,, | Performed by: INTERNAL MEDICINE

## 2019-05-02 PROCEDURE — 1101F PR PT FALLS ASSESS DOC 0-1 FALLS W/OUT INJ PAST YR: ICD-10-PCS | Mod: CPTII,S$GLB,, | Performed by: INTERNAL MEDICINE

## 2019-05-02 PROCEDURE — 99999 PR PBB SHADOW E&M-EST. PATIENT-LVL III: ICD-10-PCS | Mod: PBBFAC,,, | Performed by: INTERNAL MEDICINE

## 2019-05-02 RX ORDER — LETROZOLE 2.5 MG/1
TABLET, FILM COATED ORAL
Refills: 11 | COMMUNITY
Start: 2019-03-24 | End: 2019-05-02

## 2019-05-13 RX ORDER — CLOPIDOGREL BISULFATE 75 MG/1
TABLET ORAL
Qty: 30 TABLET | Refills: 0 | Status: SHIPPED | OUTPATIENT
Start: 2019-05-13 | End: 2019-09-11 | Stop reason: SDUPTHER

## 2019-05-16 RX ORDER — CLOPIDOGREL BISULFATE 75 MG/1
TABLET ORAL
Qty: 30 TABLET | Refills: 0 | Status: SHIPPED | OUTPATIENT
Start: 2019-05-16 | End: 2019-06-03 | Stop reason: SDUPTHER

## 2019-05-31 ENCOUNTER — TELEPHONE (OUTPATIENT)
Dept: INTERNAL MEDICINE | Facility: CLINIC | Age: 79
End: 2019-05-31

## 2019-05-31 NOTE — TELEPHONE ENCOUNTER
[facility] left [phone] to cancel/reschedule

## 2019-06-03 ENCOUNTER — OFFICE VISIT (OUTPATIENT)
Dept: INTERNAL MEDICINE | Facility: CLINIC | Age: 79
End: 2019-06-03
Payer: MEDICARE

## 2019-06-03 ENCOUNTER — CLINICAL SUPPORT (OUTPATIENT)
Dept: OPTOMETRY | Facility: CLINIC | Age: 79
End: 2019-06-03
Attending: NURSE PRACTITIONER
Payer: MEDICARE

## 2019-06-03 VITALS
WEIGHT: 209 LBS | SYSTOLIC BLOOD PRESSURE: 102 MMHG | DIASTOLIC BLOOD PRESSURE: 60 MMHG | HEIGHT: 65 IN | HEART RATE: 71 BPM | BODY MASS INDEX: 34.82 KG/M2

## 2019-06-03 DIAGNOSIS — I73.9 PERIPHERAL VASCULAR DISEASE: ICD-10-CM

## 2019-06-03 DIAGNOSIS — C50.412 MALIGNANT NEOPLASM OF UPPER-OUTER QUADRANT OF LEFT BREAST IN FEMALE, ESTROGEN RECEPTOR POSITIVE: ICD-10-CM

## 2019-06-03 DIAGNOSIS — I25.10 CORONARY ARTERY DISEASE DUE TO LIPID RICH PLAQUE: Chronic | ICD-10-CM

## 2019-06-03 DIAGNOSIS — Z17.0 MALIGNANT NEOPLASM OF UPPER-OUTER QUADRANT OF LEFT BREAST IN FEMALE, ESTROGEN RECEPTOR POSITIVE: ICD-10-CM

## 2019-06-03 DIAGNOSIS — E11.69 DIABETES MELLITUS TYPE 2 IN OBESE: ICD-10-CM

## 2019-06-03 DIAGNOSIS — G40.409 TONIC-CLONIC EPILEPTIC SEIZURES: ICD-10-CM

## 2019-06-03 DIAGNOSIS — D50.8 OTHER IRON DEFICIENCY ANEMIA: ICD-10-CM

## 2019-06-03 DIAGNOSIS — M10.9 GOUT, ARTHRITIS: ICD-10-CM

## 2019-06-03 DIAGNOSIS — E11.59 TYPE 2 DIABETES MELLITUS WITH OTHER CIRCULATORY COMPLICATION, WITHOUT LONG-TERM CURRENT USE OF INSULIN: ICD-10-CM

## 2019-06-03 DIAGNOSIS — E11.21 TYPE 2 DIABETES MELLITUS WITH DIABETIC NEPHROPATHY, WITHOUT LONG-TERM CURRENT USE OF INSULIN: ICD-10-CM

## 2019-06-03 DIAGNOSIS — I25.83 CORONARY ARTERY DISEASE DUE TO LIPID RICH PLAQUE: Chronic | ICD-10-CM

## 2019-06-03 DIAGNOSIS — E66.09 CLASS 1 OBESITY DUE TO EXCESS CALORIES WITH SERIOUS COMORBIDITY IN ADULT, UNSPECIFIED BMI: ICD-10-CM

## 2019-06-03 DIAGNOSIS — R09.89 PROMINENT AORTA: ICD-10-CM

## 2019-06-03 DIAGNOSIS — I25.2 OLD MI (MYOCARDIAL INFARCTION): ICD-10-CM

## 2019-06-03 DIAGNOSIS — E78.5 HYPERLIPIDEMIA, UNSPECIFIED HYPERLIPIDEMIA TYPE: Chronic | ICD-10-CM

## 2019-06-03 DIAGNOSIS — I10 HTN (HYPERTENSION), BENIGN: Chronic | ICD-10-CM

## 2019-06-03 DIAGNOSIS — E66.9 DIABETES MELLITUS TYPE 2 IN OBESE: ICD-10-CM

## 2019-06-03 DIAGNOSIS — M17.0 PRIMARY OSTEOARTHRITIS OF BOTH KNEES: ICD-10-CM

## 2019-06-03 DIAGNOSIS — I65.23 BILATERAL CAROTID ARTERY STENOSIS: ICD-10-CM

## 2019-06-03 DIAGNOSIS — Z95.5 S/P CORONARY ARTERY STENT PLACEMENT: ICD-10-CM

## 2019-06-03 DIAGNOSIS — Z00.00 ENCOUNTER FOR PREVENTIVE HEALTH EXAMINATION: Primary | ICD-10-CM

## 2019-06-03 DIAGNOSIS — I45.10 RBBB: ICD-10-CM

## 2019-06-03 DIAGNOSIS — K21.9 GASTROESOPHAGEAL REFLUX DISEASE, ESOPHAGITIS PRESENCE NOT SPECIFIED: ICD-10-CM

## 2019-06-03 DIAGNOSIS — N18.30 CHRONIC RENAL DISEASE, STAGE 3, MODERATELY DECREASED GLOMERULAR FILTRATION RATE BETWEEN 30-59 ML/MIN/1.73 SQUARE METER: Chronic | ICD-10-CM

## 2019-06-03 PROBLEM — E11.29 TYPE 2 DIABETES MELLITUS WITH KIDNEY COMPLICATION, WITHOUT LONG-TERM CURRENT USE OF INSULIN: Status: ACTIVE | Noted: 2019-06-03

## 2019-06-03 PROCEDURE — 99999 PR PBB SHADOW E&M-EST. PATIENT-LVL I: CPT | Mod: PBBFAC,,,

## 2019-06-03 PROCEDURE — 92250 FUNDUS PHOTOGRAPHY W/I&R: CPT | Mod: S$GLB,,, | Performed by: OPHTHALMOLOGY

## 2019-06-03 PROCEDURE — 3074F PR MOST RECENT SYSTOLIC BLOOD PRESSURE < 130 MM HG: ICD-10-PCS | Mod: CPTII,S$GLB,, | Performed by: NURSE PRACTITIONER

## 2019-06-03 PROCEDURE — G0439 PPPS, SUBSEQ VISIT: HCPCS | Mod: S$GLB,,, | Performed by: NURSE PRACTITIONER

## 2019-06-03 PROCEDURE — 92250 DIABETIC EYE SCREENING PHOTO: ICD-10-PCS | Mod: S$GLB,,, | Performed by: OPHTHALMOLOGY

## 2019-06-03 PROCEDURE — 99499 RISK ADDL DX/OHS AUDIT: ICD-10-PCS | Mod: S$GLB,,, | Performed by: NURSE PRACTITIONER

## 2019-06-03 PROCEDURE — 3078F DIAST BP <80 MM HG: CPT | Mod: CPTII,S$GLB,, | Performed by: NURSE PRACTITIONER

## 2019-06-03 PROCEDURE — 99999 PR PBB SHADOW E&M-EST. PATIENT-LVL I: ICD-10-PCS | Mod: PBBFAC,,,

## 2019-06-03 PROCEDURE — 3074F SYST BP LT 130 MM HG: CPT | Mod: CPTII,S$GLB,, | Performed by: NURSE PRACTITIONER

## 2019-06-03 PROCEDURE — 3078F PR MOST RECENT DIASTOLIC BLOOD PRESSURE < 80 MM HG: ICD-10-PCS | Mod: CPTII,S$GLB,, | Performed by: NURSE PRACTITIONER

## 2019-06-03 PROCEDURE — 99499 UNLISTED E&M SERVICE: CPT | Mod: S$GLB,,, | Performed by: NURSE PRACTITIONER

## 2019-06-03 PROCEDURE — 99999 PR PBB SHADOW E&M-EST. PATIENT-LVL V: CPT | Mod: PBBFAC,,, | Performed by: NURSE PRACTITIONER

## 2019-06-03 PROCEDURE — G0439 PR MEDICARE ANNUAL WELLNESS SUBSEQUENT VISIT: ICD-10-PCS | Mod: S$GLB,,, | Performed by: NURSE PRACTITIONER

## 2019-06-03 PROCEDURE — 99999 PR PBB SHADOW E&M-EST. PATIENT-LVL V: ICD-10-PCS | Mod: PBBFAC,,, | Performed by: NURSE PRACTITIONER

## 2019-06-03 NOTE — PROGRESS NOTES
"Renata Bergman presented for a  Medicare AWV and comprehensive Health Risk Assessment today. The following components were reviewed and updated:    · Medical history  · Family History  · Social history  · Allergies and Current Medications  · Health Risk Assessment  · Health Maintenance  · Care Team     ** See Completed Assessments for Annual Wellness Visit within the encounter summary.**       The following assessments were completed:  · Living Situation  · CAGE  · Depression Screening  · Timed Get Up and Go  · Whisper Test  · Cognitive Function Screening  ·   ·   ·   · Nutrition Screening  · ADL Screening  · PAQ Screening    Vitals:    06/03/19 1008   BP: 102/60   BP Location: Right arm   Pulse: 71   Weight: 94.8 kg (208 lb 15.9 oz)   Height: 5' 5" (1.651 m)     Body mass index is 34.78 kg/m².  Physical Exam   Constitutional: She is oriented to person, place, and time.   Obese   HENT:   Head: Normocephalic.   Cardiovascular: Normal rate and regular rhythm.   Pulses:       Dorsalis pedis pulses are 1+ on the right side, and 1+ on the left side.        Posterior tibial pulses are 0 on the right side, and 0 on the left side.   Pulmonary/Chest: Effort normal and breath sounds normal.   Abdominal: Soft. Bowel sounds are normal.   Obese   Musculoskeletal: She exhibits edema.        Right foot: There is normal range of motion and no deformity.        Left foot: There is normal range of motion and no deformity.   Gait antalgic, ambulates with walker   Feet:   Right Foot:   Protective Sensation: 7 sites tested.   Skin Integrity: Negative for ulcer, blister, skin breakdown, erythema, warmth, callus or dry skin.   Left Foot:   Protective Sensation: 7 sites tested. 7 sites sensed.   Skin Integrity: Negative for ulcer, blister, skin breakdown, erythema, warmth, callus or dry skin.   Neurological: She is alert and oriented to person, place, and time.   Skin: Skin is warm.   Psychiatric: She has a normal mood and affect. "   Nursing note and vitals reviewed.        Diagnoses and health risks identified today and associated recommendations/orders:    1. Encounter for preventive health examination  Here for Health Risk Assessment/Annual Wellness Visit.  Health maintenance reviewed and updated. Follow up in one year.  Due for Pneumovax - she declined and wishes to discuss with Dr. Guevara.    2. HTN (hypertension), benign  Chronic, stable on current medications. Followed by PCP.    3. Prominent aorta  Chronic, stable on current medications. Noted CXR 8/21/09. Followed by PCP.    4. Coronary artery disease due to lipid rich plaque  Chronic, stable on current medications. Followed by Cardiology.    5. Old MI (myocardial infarction)  Stable on current medications. Followed by Cardiology    6. S/P coronary artery stent placement  Stable on current medications. Followed by Cardiology.    7. RBBB  Chronic, stable. Followed by Cardiology.    8. Bilateral carotid artery stenosis  Chronic, stable on current medications. Followed by PCP, Cardiology.    9. Peripheral vascular disease  Chronic, stable. Bilateral carotid artery stenosis - 40-49% right, 20-39% left noted on U/S. Followed by PCP, Cardiology.    10. Hyperlipidemia, unspecified hyperlipidemia type  Chronic, stable on current medication. Followed by PCP, Cardiology.    11. Type 2 diabetes mellitus with diabetic nephropathy, without long-term current use of insulin  Chronic, stable on current medication. Last A1C 6.3. Followed by PCP.  - Diabetic Eye Screening Photo; Future  - Microalbumin/creatinine urine ratio    12. Chronic renal disease, stage 3, moderately decreased glomerular filtration rate between 30-59 mL/min/1.73 square meter  Chronic, stable.  Followed by PCP.    13. Type 2 diabetes mellitus with other circulatory complication, without long-term current use of insulin  Chronic, stable on current medication. Followed by PCP.    14. Diabetes mellitus type 2 in obese  Chronic, stable  on current medication. Followed by PCP.    15. Class 1 obesity due to excess calories with serious comorbidity in adult, unspecified BMI  Chronic, weight decreased 21 pounds from Physicl 7/2018. She reports that she has not been trying to lose weight. Followed by PCP.    16. Tonic-clonic epileptic seizures  Chronic, stable on current medication. Followed by PCP.    17. Malignant neoplasm of upper-outer quadrant of left breast in female, estrogen receptor positive  Chronic, stable with letrozole. Followed by Oncology    18. Other iron deficiency anemia  Chronic, stable on current medication. Followed by PCP.    19. Gastroesophageal reflux disease, esophagitis presence not specified  Chronic, stable on current medication. Followed by PCP.    20. Gout, arthritis  Chronic, stable on current medication. Followed by PCP.    21. Primary osteoarthritis of both knees  Chronic, stable. Stable S/P TKA. Followed by Orthopedics      Provided Renata with a 5-10 year written screening schedule and personal prevention plan. Recommendations were developed using the USPSTF age appropriate recommendations. Education, counseling, and referrals were provided as needed. After Visit Summary printed and given to patient which includes a list of additional screenings\tests needed.    Follow up in 2 months (on 8/8/2019).with PCP    Norma Whatley NP

## 2019-06-03 NOTE — PATIENT INSTRUCTIONS
Counseling and Referral of Other Preventative  (Italic type indicates deductible and co-insurance are waived)    Patient Name: Renata Bergman  Today's Date: 6/3/2019    Health Maintenance       Date Due Completion Date    Eye Exam 02/28/2019 2/28/2018- Eye Cam done today 6/03    Override on 2/28/2018: Done    Override on 4/13/2017: Done    Urine Microalbumin 05/30/2019 5/30/2018 -specimen sent home    Pneumococcal Vaccine (65+ High/Highest Risk) (2 of 2 - PPSV23) 07/01/2019 (Originally 2/9/2016) 12/15/2015 - Discuss Pneumovax booster with Dr. Guevara    Shingles Vaccine (1 of 2) 09/01/2019 (Originally 8/10/1992) Discuss new shingles vaccine - SHINGRIX - with Dr. Guevara    Influenza Vaccine 08/01/2019 11/20/2017    Hemoglobin A1c 08/23/2019 2/23/2019    Lipid Panel 02/23/2020 2/23/2019    DEXA SCAN 05/03/2020 5/3/2017    Override on 11/6/2008: Done    Aspirin/Antiplatelet Therapy 05/16/2020 5/16/2019    Foot Exam 06/03/2020 6/3/2019 (Done)    Override on 6/3/2019: Done    Override on 5/5/2016: Done    TETANUS VACCINE 05/05/2026 5/5/2016        Orders Placed This Encounter   Procedures    MICROALBUMIN / CREATININE RATIO URINE    Diabetic Eye Screening Photo     The following information is provided to all patients.  This information is to help you find resources for any of the problems found today that may be affecting your health:                Living healthy guide: www.[facility].[place].gov      Understanding Diabetes: www.diabetes.org      Eating healthy: www.cdc.gov/healthyweight      CDC home safety checklist: www.cdc.gov/steadi/patient.html      Agency on Aging: www.goea.[place].gov      Alcoholics anonymous (AA): www.aa.org      Physical Activity: www.soni.nih.gov/sa9jzfc      Tobacco use: www.quitwithusla.org

## 2019-06-03 NOTE — PROGRESS NOTES
HPI     Diabetic Eye Exam      Additional comments: photos              Comments     Screening photos          Last edited by Amparo Head MA on 6/3/2019 11:28 AM. (History)            Assessment /Plan     For exam results, see Encounter Report.    Type 2 diabetes mellitus with diabetic nephropathy, without long-term current use of insulin  -     Diabetic Eye Screening Photo      78 y.o. y/o here for screening for Diabetic Renopathy with non-dilated fundus photos per Ethel Berger MD

## 2019-06-05 ENCOUNTER — TELEPHONE (OUTPATIENT)
Dept: OPHTHALMOLOGY | Facility: CLINIC | Age: 79
End: 2019-06-05

## 2019-06-05 NOTE — TELEPHONE ENCOUNTER
Called patient regarding diabetic eye screening results ; gave patient her results and schedule her an appointment.      ----- Message from Yazmin Ferguson sent at 6/5/2019  8:28 AM CDT -----      ----- Message -----  From: Anatoly Torre MD  Sent: 6/4/2019   6:55 PM  To: Sadie Laurent

## 2019-06-11 ENCOUNTER — TELEPHONE (OUTPATIENT)
Dept: HEMATOLOGY/ONCOLOGY | Facility: CLINIC | Age: 79
End: 2019-06-11

## 2019-06-11 NOTE — TELEPHONE ENCOUNTER
----- Message from Talon Dietrich sent at 6/11/2019  9:53 AM CDT -----  Contact: Patient  Pt would like a call to schedule 3mo f/u with Dr Guevara.      Contact:: [phone]

## 2019-06-13 ENCOUNTER — TELEPHONE (OUTPATIENT)
Dept: CARDIOLOGY | Facility: CLINIC | Age: 79
End: 2019-06-13

## 2019-06-13 DIAGNOSIS — E11.51 CONTROLLED TYPE 2 DM WITH PERIPHERAL CIRCULATORY DISORDER: ICD-10-CM

## 2019-06-13 DIAGNOSIS — I25.10 CORONARY ARTERY DISEASE DUE TO LIPID RICH PLAQUE: ICD-10-CM

## 2019-06-13 DIAGNOSIS — I65.23 BILATERAL CAROTID ARTERY STENOSIS: Primary | ICD-10-CM

## 2019-06-13 DIAGNOSIS — E78.2 MIXED HYPERLIPIDEMIA: ICD-10-CM

## 2019-06-13 DIAGNOSIS — I10 HTN (HYPERTENSION), BENIGN: ICD-10-CM

## 2019-06-13 DIAGNOSIS — I25.2 OLD MI (MYOCARDIAL INFARCTION): ICD-10-CM

## 2019-06-13 DIAGNOSIS — I25.83 CORONARY ARTERY DISEASE DUE TO LIPID RICH PLAQUE: ICD-10-CM

## 2019-06-25 DIAGNOSIS — C50.919 BREAST CANCER, STAGE 1, UNSPECIFIED LATERALITY: ICD-10-CM

## 2019-06-25 RX ORDER — LETROZOLE 2.5 MG/1
TABLET, FILM COATED ORAL
Qty: 90 TABLET | Refills: 0 | Status: SHIPPED | OUTPATIENT
Start: 2019-06-25 | End: 2019-08-09

## 2019-06-25 RX ORDER — METFORMIN HYDROCHLORIDE 850 MG/1
TABLET ORAL
Qty: 180 TABLET | Refills: 1 | Status: ON HOLD | OUTPATIENT
Start: 2019-06-25 | End: 2019-12-27 | Stop reason: HOSPADM

## 2019-06-27 ENCOUNTER — TELEPHONE (OUTPATIENT)
Dept: INTERNAL MEDICINE | Facility: CLINIC | Age: 79
End: 2019-06-27

## 2019-06-27 ENCOUNTER — OFFICE VISIT (OUTPATIENT)
Dept: OPTOMETRY | Facility: CLINIC | Age: 79
End: 2019-06-27
Payer: MEDICARE

## 2019-06-27 DIAGNOSIS — E11.21 TYPE 2 DIABETES MELLITUS WITH DIABETIC NEPHROPATHY, WITHOUT LONG-TERM CURRENT USE OF INSULIN: Primary | ICD-10-CM

## 2019-06-27 DIAGNOSIS — H25.13 SENILE NUCLEAR SCLEROSIS, BILATERAL: ICD-10-CM

## 2019-06-27 DIAGNOSIS — Z13.5 SCREENING FOR GLAUCOMA: ICD-10-CM

## 2019-06-27 PROCEDURE — 92014 COMPRE OPH EXAM EST PT 1/>: CPT | Mod: S$GLB,,, | Performed by: OPTOMETRIST

## 2019-06-27 PROCEDURE — 99999 PR PBB SHADOW E&M-EST. PATIENT-LVL II: CPT | Mod: PBBFAC,,, | Performed by: OPTOMETRIST

## 2019-06-27 PROCEDURE — 92014 PR EYE EXAM, EST PATIENT,COMPREHESV: ICD-10-PCS | Mod: S$GLB,,, | Performed by: OPTOMETRIST

## 2019-06-27 PROCEDURE — 99999 PR PBB SHADOW E&M-EST. PATIENT-LVL II: ICD-10-PCS | Mod: PBBFAC,,, | Performed by: OPTOMETRIST

## 2019-06-27 NOTE — PROGRESS NOTES
HPI     Patient is here for annual diabetic eye exam. Watery eyes and floaters   Hemoglobin A1C       Date                     Value               Ref Range             Status                02/23/2019               6.3 (H)             4.0 - 5.6 %           Final                 07/20/2018               6.4 (H)             4.0 - 5.6 %           Final                 05/30/2018               6.6 (H)             4.0 - 5.6 %           Final              Last edited by Adan Salcedo, PCT on 6/27/2019  8:57 AM. (History)            Assessment /Plan     For exam results, see Encounter Report.    Type 2 diabetes mellitus with diabetic nephropathy, without long-term current use of insulin  -No retinopathy noted today.  Continued control with primary care physician and annual comprehensive eye exam.    Senile nuclear sclerosis, bilateral  -Educated patient on presence of cataracts at today's exam, monitor at annual dilated fundus exam. 5+ years surgical estimate.    Screening for glaucoma  -Monitor with annual eye exam and IOP check      RTC 1 yr

## 2019-07-19 RX ORDER — CLOPIDOGREL BISULFATE 75 MG/1
TABLET ORAL
Qty: 30 TABLET | Refills: 0 | Status: SHIPPED | OUTPATIENT
Start: 2019-07-19 | End: 2019-08-18 | Stop reason: SDUPTHER

## 2019-07-31 ENCOUNTER — TELEPHONE (OUTPATIENT)
Dept: INTERNAL MEDICINE | Facility: CLINIC | Age: 79
End: 2019-07-31

## 2019-07-31 NOTE — TELEPHONE ENCOUNTER
----- Message from Lay Tomlinson sent at 7/31/2019  9:34 AM CDT -----  Contact: Patient [phone]  Pt states that she is calling to speak with someone in your office. She states that she would like to speak with you in regards to getting her appt switched to 08/09/19. Pt states that is the only day that she has a ride. Please call back and advise.      thanks

## 2019-08-02 NOTE — PROGRESS NOTES
Yes mass  Subjective:       Patient ID: Renata Bergman is a 78 y.o. female.    Chief Complaint: No chief complaint on file.    HPI Mrs. Hussein is a 78-year-old female with stage I left breast cancer.  She is currently on letrozole endocrine therapy.  Her other medical issues include diabetes, seizures and coronary artery disease.    Today she reports that she has been feeling well with no new issues.  Specifically she has no pain or shortness of breath      Breast history: Screening mammogram on May 5, 2016 showed an irregular 22 mm mass with abnormal calcifications in the left breast 2:00 position. Follow-up ultrasound July 13 she noted a regular solid 17 mm mass.    On July 21 needle biopsy showed grade 2 infiltrating ductal carcinoma strongly ER [place] positive (90%) and HER-2 negative.    On August 1, 2016 lumpectomy and sentinel lymph node biopsy were performed. That showed a 1.8 x 1.6 x 1.0 infiltrating carcinoma intermediate grade (histologic grade 3, nuclear grade 2, mitotic index 2.   The sentinel lymph node was negative and margins were negative. Final pathological stage TIc N0 stage IA.  Oncotype was 31 indicating a 21% risk of recurrence with tamoxifen alone.    She  had 3 weeks of weekly Taxol and then because of insurance issues she was changed to CMF and had 5 cycles of that.     Chemotherapy was completed in January 2017.    She completed radiation in April 2017 and began letrozole endocrine therapy that month as well.    Review of Systems   Constitutional: Negative for activity change, appetite change, fatigue, fever and unexpected weight change.   HENT: Negative for trouble swallowing.    Respiratory: Negative for cough and shortness of breath.    Gastrointestinal: Negative for abdominal pain, constipation, diarrhea and nausea.   Musculoskeletal: Negative for back pain.   Neurological: Negative for headaches.   Psychiatric/Behavioral: Negative for dysphoric mood. The patient is not  nervous/anxious.        Objective:      Physical Exam   Constitutional: She is oriented to person, place, and time. She appears well-developed and well-nourished.   HENT:   Mouth/Throat: No oropharyngeal exudate.   Eyes: No scleral icterus.   Cardiovascular: Regular rhythm and normal heart sounds.   Pulmonary/Chest: Effort normal and breath sounds normal. She has no wheezes. She has no rales. Right breast exhibits no mass, no nipple discharge and no skin change. Left breast exhibits no mass, no nipple discharge and no skin change.       Abdominal: Soft. She exhibits no mass. There is no tenderness.   Lymphadenopathy:     She has no cervical adenopathy.     She has no axillary adenopathy.        Right: No supraclavicular adenopathy present.        Left: No supraclavicular adenopathy present.   Neurological: She is alert and oriented to person, place, and time. No cranial nerve deficit.   Psychiatric: She has a normal mood and affect. Her behavior is normal. Thought content normal.   Vitals reviewed.      Assessment:     mammograms unremarkable  1. Malignant neoplasm of upper-outer quadrant of left breast in female, estrogen receptor positive        Plan:     Return to clinic in 4 months  Continue anastrozole.

## 2019-08-05 ENCOUNTER — OFFICE VISIT (OUTPATIENT)
Dept: HEMATOLOGY/ONCOLOGY | Facility: CLINIC | Age: 79
End: 2019-08-05
Payer: MEDICARE

## 2019-08-05 ENCOUNTER — HOSPITAL ENCOUNTER (OUTPATIENT)
Dept: RADIOLOGY | Facility: HOSPITAL | Age: 79
Discharge: HOME | End: 2019-08-05
Attending: INTERNAL MEDICINE
Payer: MEDICARE

## 2019-08-05 VITALS
SYSTOLIC BLOOD PRESSURE: 143 MMHG | HEIGHT: 65 IN | WEIGHT: 219.56 LBS | TEMPERATURE: 98 F | BODY MASS INDEX: 36.58 KG/M2 | OXYGEN SATURATION: 97 % | HEART RATE: 85 BPM | DIASTOLIC BLOOD PRESSURE: 65 MMHG | RESPIRATION RATE: 18 BRPM

## 2019-08-05 VITALS — BODY MASS INDEX: 34.66 KG/M2 | WEIGHT: 208 LBS | HEIGHT: 65 IN

## 2019-08-05 DIAGNOSIS — Z17.0 MALIGNANT NEOPLASM OF UPPER-OUTER QUADRANT OF LEFT BREAST IN FEMALE, ESTROGEN RECEPTOR POSITIVE: Primary | ICD-10-CM

## 2019-08-05 DIAGNOSIS — C50.412 MALIGNANT NEOPLASM OF UPPER-OUTER QUADRANT OF LEFT BREAST IN FEMALE, ESTROGEN RECEPTOR POSITIVE: Primary | ICD-10-CM

## 2019-08-05 DIAGNOSIS — Z17.0 MALIGNANT NEOPLASM OF UPPER-OUTER QUADRANT OF LEFT BREAST IN FEMALE, ESTROGEN RECEPTOR POSITIVE: ICD-10-CM

## 2019-08-05 DIAGNOSIS — C50.412 MALIGNANT NEOPLASM OF UPPER-OUTER QUADRANT OF LEFT BREAST IN FEMALE, ESTROGEN RECEPTOR POSITIVE: ICD-10-CM

## 2019-08-05 PROCEDURE — 1101F PT FALLS ASSESS-DOCD LE1/YR: CPT | Mod: CPTII,S$GLB,, | Performed by: INTERNAL MEDICINE

## 2019-08-05 PROCEDURE — 77062 BREAST TOMOSYNTHESIS BI: CPT | Mod: 26,,, | Performed by: RADIOLOGY

## 2019-08-05 PROCEDURE — 77066 MAMMO DIGITAL DIAGNOSTIC BILAT WITH TOMOSYNTHESIS_CAD: ICD-10-PCS | Mod: 26,,, | Performed by: RADIOLOGY

## 2019-08-05 PROCEDURE — 99213 PR OFFICE/OUTPT VISIT, EST, LEVL III, 20-29 MIN: ICD-10-PCS | Mod: S$GLB,,, | Performed by: INTERNAL MEDICINE

## 2019-08-05 PROCEDURE — 77062 BREAST TOMOSYNTHESIS BI: CPT | Mod: TC,PO

## 2019-08-05 PROCEDURE — 99213 OFFICE O/P EST LOW 20 MIN: CPT | Mod: S$GLB,,, | Performed by: INTERNAL MEDICINE

## 2019-08-05 PROCEDURE — 99499 RISK ADDL DX/OHS AUDIT: ICD-10-PCS | Mod: S$GLB,,, | Performed by: INTERNAL MEDICINE

## 2019-08-05 PROCEDURE — 77066 DX MAMMO INCL CAD BI: CPT | Mod: 26,,, | Performed by: RADIOLOGY

## 2019-08-05 PROCEDURE — 3077F PR MOST RECENT SYSTOLIC BLOOD PRESSURE >= 140 MM HG: ICD-10-PCS | Mod: CPTII,S$GLB,, | Performed by: INTERNAL MEDICINE

## 2019-08-05 PROCEDURE — 3077F SYST BP >= 140 MM HG: CPT | Mod: CPTII,S$GLB,, | Performed by: INTERNAL MEDICINE

## 2019-08-05 PROCEDURE — 99499 UNLISTED E&M SERVICE: CPT | Mod: S$GLB,,, | Performed by: INTERNAL MEDICINE

## 2019-08-05 PROCEDURE — 99999 PR PBB SHADOW E&M-EST. PATIENT-LVL III: CPT | Mod: PBBFAC,,, | Performed by: INTERNAL MEDICINE

## 2019-08-05 PROCEDURE — 77062 MAMMO DIGITAL DIAGNOSTIC BILAT WITH TOMOSYNTHESIS_CAD: ICD-10-PCS | Mod: 26,,, | Performed by: RADIOLOGY

## 2019-08-05 PROCEDURE — 3078F DIAST BP <80 MM HG: CPT | Mod: CPTII,S$GLB,, | Performed by: INTERNAL MEDICINE

## 2019-08-05 PROCEDURE — 3078F PR MOST RECENT DIASTOLIC BLOOD PRESSURE < 80 MM HG: ICD-10-PCS | Mod: CPTII,S$GLB,, | Performed by: INTERNAL MEDICINE

## 2019-08-05 PROCEDURE — 99999 PR PBB SHADOW E&M-EST. PATIENT-LVL III: ICD-10-PCS | Mod: PBBFAC,,, | Performed by: INTERNAL MEDICINE

## 2019-08-05 PROCEDURE — 1101F PR PT FALLS ASSESS DOC 0-1 FALLS W/OUT INJ PAST YR: ICD-10-PCS | Mod: CPTII,S$GLB,, | Performed by: INTERNAL MEDICINE

## 2019-08-09 ENCOUNTER — OFFICE VISIT (OUTPATIENT)
Dept: INTERNAL MEDICINE | Facility: CLINIC | Age: 79
End: 2019-08-09
Payer: MEDICARE

## 2019-08-09 VITALS
SYSTOLIC BLOOD PRESSURE: 100 MMHG | DIASTOLIC BLOOD PRESSURE: 60 MMHG | OXYGEN SATURATION: 98 % | WEIGHT: 216.5 LBS | HEIGHT: 65 IN | HEART RATE: 77 BPM | TEMPERATURE: 98 F | BODY MASS INDEX: 36.07 KG/M2

## 2019-08-09 DIAGNOSIS — Z00.00 ROUTINE GENERAL MEDICAL EXAMINATION AT A HEALTH CARE FACILITY: ICD-10-CM

## 2019-08-09 DIAGNOSIS — E55.9 VITAMIN D DEFICIENCY DISEASE: ICD-10-CM

## 2019-08-09 DIAGNOSIS — E13.9 DIABETES MELLITUS DUE TO ABNORMAL INSULIN: Primary | ICD-10-CM

## 2019-08-09 DIAGNOSIS — E53.8 VITAMIN B12 DEFICIENCY: ICD-10-CM

## 2019-08-09 DIAGNOSIS — D64.9 ANEMIA, UNSPECIFIED TYPE: ICD-10-CM

## 2019-08-09 PROCEDURE — 99213 OFFICE O/P EST LOW 20 MIN: CPT | Mod: S$GLB,,, | Performed by: INTERNAL MEDICINE

## 2019-08-09 PROCEDURE — 3074F PR MOST RECENT SYSTOLIC BLOOD PRESSURE < 130 MM HG: ICD-10-PCS | Mod: CPTII,S$GLB,, | Performed by: INTERNAL MEDICINE

## 2019-08-09 PROCEDURE — 99999 PR PBB SHADOW E&M-EST. PATIENT-LVL III: ICD-10-PCS | Mod: PBBFAC,,, | Performed by: INTERNAL MEDICINE

## 2019-08-09 PROCEDURE — 99999 PR PBB SHADOW E&M-EST. PATIENT-LVL III: CPT | Mod: PBBFAC,,, | Performed by: INTERNAL MEDICINE

## 2019-08-09 PROCEDURE — 3074F SYST BP LT 130 MM HG: CPT | Mod: CPTII,S$GLB,, | Performed by: INTERNAL MEDICINE

## 2019-08-09 PROCEDURE — 99499 UNLISTED E&M SERVICE: CPT | Mod: S$GLB,,, | Performed by: INTERNAL MEDICINE

## 2019-08-09 PROCEDURE — 99499 RISK ADDL DX/OHS AUDIT: ICD-10-PCS | Mod: S$GLB,,, | Performed by: INTERNAL MEDICINE

## 2019-08-09 PROCEDURE — 3078F PR MOST RECENT DIASTOLIC BLOOD PRESSURE < 80 MM HG: ICD-10-PCS | Mod: CPTII,S$GLB,, | Performed by: INTERNAL MEDICINE

## 2019-08-09 PROCEDURE — 99213 PR OFFICE/OUTPT VISIT, EST, LEVL III, 20-29 MIN: ICD-10-PCS | Mod: S$GLB,,, | Performed by: INTERNAL MEDICINE

## 2019-08-09 PROCEDURE — 3078F DIAST BP <80 MM HG: CPT | Mod: CPTII,S$GLB,, | Performed by: INTERNAL MEDICINE

## 2019-08-09 RX ORDER — VIT C/E/ZN/COPPR/LUTEIN/ZEAXAN 250MG-90MG
1000 CAPSULE ORAL DAILY
Qty: 90 CAPSULE | Refills: 4 | Status: SHIPPED | OUTPATIENT
Start: 2019-08-09 | End: 2019-09-11

## 2019-08-09 RX ORDER — FERROUS SULFATE 325(65) MG
325 TABLET, DELAYED RELEASE (ENTERIC COATED) ORAL DAILY
Qty: 90 TABLET | Refills: 4 | Status: SHIPPED | OUTPATIENT
Start: 2019-08-09 | End: 2022-04-26

## 2019-08-09 RX ORDER — ASCORBIC ACID 500 MG
500 TABLET ORAL DAILY
Qty: 90 TABLET | Refills: 4 | Status: SHIPPED | OUTPATIENT
Start: 2019-08-09 | End: 2019-09-11

## 2019-08-09 RX ORDER — ANASTROZOLE 1 MG/1
1 TABLET ORAL DAILY
Refills: 11 | COMMUNITY
Start: 2019-07-22 | End: 2019-12-20 | Stop reason: SDUPTHER

## 2019-08-09 NOTE — PROGRESS NOTES
CHIEF COMPLAINT: Annual exam and follow up NSTEMI, breast cancer, diabetes, hypertension, hyperlipidemia.     HISTORY OF PRESENT ILLNESS: 78-year-old woman who present for follow up of above.      No chest pain since back on aspirin and plavix. She saw Dr Puente on 7/23/18.  Cardica PET stress was normal on 7/25/18.     She is taking Keppra  mg 2 in the afternoon.  Gait is better with taking the Keppra in the late afternoon.  No falls.  No seizures.        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 She  takes aspirin 81 mg daily and plavix 75 mg daily. She has carotid artery disawse on ultrasound 3/6/19.  No chest pain or shortness of breath.  She continues to take Coreg 25 mg twice daily, Norvasc 5 mg daily and chlorthaladone 25 mg 1/2 tablet every other daily for her hypertension.   She completed cardiac rehab.     She has completed 5 cycles of CMF for her breast cancer. She completed radiation the end of April 6, 2017.  She took Femara  4/2017 - 4/2018. She had irritation of the lips on Femara. She is taking anastrozole 1 mg daily. Saw Stanley on 8/5/19.     She graduated from the healthy back program. This program is on hold due to breast cancer. Back is doing well. She is doing stretches every other day. She is off tylenol in the evening.  She has occasional right sided back pain. She will start water aerobics in a week.      She continues to take allopurinol 300 mg daily for her gout. She has not had any gouty flares. She has occasional left elbow pain.      Her blood sugars have been normal. She continues to take metformin 850 mg twice daily. She checks blood sugars once daily. Occasional diarrhea with certain foods. She denies any polydipsia or polyuria. She continues to take aspirin for her coronary artery disease. Sinuses have been doing well. She has not had to take Allegra lately. She denies any nausea, vomiting, constipation,  "diarrhea.     Reflux is well controlled on omeprazole 20 mg two tablets daily. She continues to take Crestor 40 mg daily for her hyperlipidemia. She denies any joint pain or muscle pain from the Crestor. Knee is doing well. She is not having to take Tramadol     She is taking vitamin D 1000 units daily for vitamin D deficiency     Her daughter who is 46 has stage 2 breast cancer. She has had a lumpectomy and chemo and radiation. Her daughter is doing well. She has finished her treatment. Her daughter might be BRCA positive. Mrs Bergman feels that she is coping well with her diagnosis of breast cancer. No anxiety, depression or insomnia.      PAST MEDICAL HISTORY:   1. Hypertension.   2. Diabetes mellitus   3. Hyperlipidemia.   4. Reflux.   5. Gout.   6. Coronary artery disease, status post MI in  with stenting at that time, and then a right coronary artery stent placed in . Had a negative stress test 2008. [facility] 2012 - patent stents and non obstructive cad  7. Osteoarthritis of the right knee. S/P right knee replacement   8. Status post left knee replacement.   9. Status post LISA/BSO secondary to menstrual disorder or polyps after tubal ligation.   10. Left breast cancer and BRCA2 positive    MEDICATIONS and ALLERGIES: Updated on epic.       Family History  Mother had breast cancer in her early 50's then had colon cancer in her 60's. She  of uterine cancer  2 Maternal aunts with breast cancer  Daughter with breast cancer at age 45 - BRCA positive  Father  of a gunshot wound  She is an only child    PHYSICAL EXAMINATION:      /60 (BP Location: Right arm, Patient Position: Sitting, BP Method: Large (Manual))   Pulse 77   Temp 97.6 °F (36.4 °C) (Oral)   Ht 5' 5" (1.651 m)   Wt 98.2 kg (216 lb 7.9 oz)   SpO2 98%   BMI 36.03 kg/m²     General: Alert, oriented. No apparent distress. Affect within normal   limits.   Conjunctivae anicteric. Tympanic membranes clear. Oropharynx clear. " Irritation of the lips  Neck supple.   Respiratory effort normal. Lungs clear to auscultation.   Heart: Regular rate and rhythm without murmurs, gallops or rubs.   No lower extremity edema.            ASSESESSMENT    Annual exam - discussed diet, exercise and safety issues.       1. CAD with NSTEMI 5/2017- s/p stenting 5/2017 - continue risk factor modification.   2.. Left breast cancer with BRCA2 positive -Finished chemo and  radiation. On anastrazole-follow up with Dr Guevara 8/5/19 and [facility] 8/5/19  3. Seizure -stable on Keppra XR   4. Hypertension -take chlortalidone as needed  5. Diabetes - controlled. Continue metformin to 850 mg twice a day. labs  6. Gout -controlled on allopurinol    7. Hyperlipidemia - on Crestor 40 mg daily. Controlled   8. Obesity - working at diet, exercise and weight loss.   9. Osteoarthritis of the right knee, status post knee replacement - doing well.  10. GERD - controlled on meds  11. Anemia - llabs.  Fergon once daily with vitamin C once daily. Stool for occult blood  12 .CRI - stable.  13. Hypokalemia -  on KCL 10 meq daily     Screening - mammogram done 7/18. Colonoscopy 4/23/12 - normal, and 5/25/15 - 3 small polyps (one adenoma).  Colonoscopy 10/8/18 - one polyp - due 2023. EGD revealed A single non-bleeding angioectasia in the duodenum. Treated with argon plasma coagulation  (APC).  A few bleeding angioectasias in the stomach. Treated with argon plasma coagulation (APC).    Bone density was normal November 2008.    Prevnar 12/15  Tdap 5/16  Influenza 2018     I'll see her back 6 months,  sooner if problems arise.

## 2019-08-16 DIAGNOSIS — G40.409 TONIC-CLONIC EPILEPTIC SEIZURES: ICD-10-CM

## 2019-08-16 RX ORDER — LEVETIRACETAM 500 MG/1
TABLET, EXTENDED RELEASE ORAL
Qty: 60 TABLET | Refills: 6 | Status: SHIPPED | OUTPATIENT
Start: 2019-08-16 | End: 2020-02-12

## 2019-08-18 RX ORDER — CLOPIDOGREL BISULFATE 75 MG/1
TABLET ORAL
Qty: 30 TABLET | Refills: 0 | Status: SHIPPED | OUTPATIENT
Start: 2019-08-18 | End: 2019-09-11 | Stop reason: SDUPTHER

## 2019-09-07 ENCOUNTER — LAB VISIT (OUTPATIENT)
Dept: LAB | Facility: HOSPITAL | Age: 79
End: 2019-09-07
Attending: INTERNAL MEDICINE
Payer: MEDICARE

## 2019-09-07 DIAGNOSIS — I10 HTN (HYPERTENSION), BENIGN: ICD-10-CM

## 2019-09-07 DIAGNOSIS — I25.83 CORONARY ARTERY DISEASE DUE TO LIPID RICH PLAQUE: ICD-10-CM

## 2019-09-07 DIAGNOSIS — E55.9 VITAMIN D DEFICIENCY DISEASE: ICD-10-CM

## 2019-09-07 DIAGNOSIS — E13.9 DIABETES MELLITUS DUE TO ABNORMAL INSULIN: ICD-10-CM

## 2019-09-07 DIAGNOSIS — I25.10 CORONARY ARTERY DISEASE DUE TO LIPID RICH PLAQUE: ICD-10-CM

## 2019-09-07 DIAGNOSIS — I65.23 BILATERAL CAROTID ARTERY STENOSIS: ICD-10-CM

## 2019-09-07 DIAGNOSIS — I25.2 OLD MI (MYOCARDIAL INFARCTION): ICD-10-CM

## 2019-09-07 DIAGNOSIS — D64.9 ANEMIA, UNSPECIFIED TYPE: ICD-10-CM

## 2019-09-07 DIAGNOSIS — E11.51 CONTROLLED TYPE 2 DM WITH PERIPHERAL CIRCULATORY DISORDER: ICD-10-CM

## 2019-09-07 DIAGNOSIS — E53.8 VITAMIN B12 DEFICIENCY: ICD-10-CM

## 2019-09-07 DIAGNOSIS — E78.2 MIXED HYPERLIPIDEMIA: ICD-10-CM

## 2019-09-07 LAB
25(OH)D3+25(OH)D2 SERPL-MCNC: 19 NG/ML (ref 30–96)
ALBUMIN SERPL BCP-MCNC: 3.9 G/DL (ref 3.5–5.2)
ALP SERPL-CCNC: 64 U/L (ref 55–135)
ALT SERPL W/O P-5'-P-CCNC: 10 U/L (ref 10–44)
ANION GAP SERPL CALC-SCNC: 14 MMOL/L (ref 8–16)
AST SERPL-CCNC: 20 U/L (ref 10–40)
BASOPHILS # BLD AUTO: 0.05 K/UL (ref 0–0.2)
BASOPHILS NFR BLD: 0.8 % (ref 0–1.9)
BILIRUB SERPL-MCNC: 0.6 MG/DL (ref 0.1–1)
BUN SERPL-MCNC: 22 MG/DL (ref 8–23)
CALCIUM SERPL-MCNC: 10.6 MG/DL (ref 8.7–10.5)
CHLORIDE SERPL-SCNC: 102 MMOL/L (ref 95–110)
CHOLEST SERPL-MCNC: 131 MG/DL (ref 120–199)
CHOLEST/HDLC SERPL: 2.6 {RATIO} (ref 2–5)
CO2 SERPL-SCNC: 27 MMOL/L (ref 23–29)
CREAT SERPL-MCNC: 1.9 MG/DL (ref 0.5–1.4)
DIFFERENTIAL METHOD: ABNORMAL
EOSINOPHIL # BLD AUTO: 0.3 K/UL (ref 0–0.5)
EOSINOPHIL NFR BLD: 5.1 % (ref 0–8)
ERYTHROCYTE [DISTWIDTH] IN BLOOD BY AUTOMATED COUNT: 18.1 % (ref 11.5–14.5)
EST. GFR  (AFRICAN AMERICAN): 28.9 ML/MIN/1.73 M^2
EST. GFR  (NON AFRICAN AMERICAN): 25.1 ML/MIN/1.73 M^2
ESTIMATED AVG GLUCOSE: 134 MG/DL (ref 68–131)
FERRITIN SERPL-MCNC: 9 NG/ML (ref 20–300)
FERRITIN SERPL-MCNC: 9 NG/ML (ref 20–300)
GLUCOSE SERPL-MCNC: 112 MG/DL (ref 70–110)
HBA1C MFR BLD HPLC: 6.3 % (ref 4–5.6)
HCT VFR BLD AUTO: 30.9 % (ref 37–48.5)
HDLC SERPL-MCNC: 50 MG/DL (ref 40–75)
HDLC SERPL: 38.2 % (ref 20–50)
HGB BLD-MCNC: 8.7 G/DL (ref 12–16)
IMM GRANULOCYTES # BLD AUTO: 0.02 K/UL (ref 0–0.04)
IMM GRANULOCYTES NFR BLD AUTO: 0.3 % (ref 0–0.5)
LDLC SERPL CALC-MCNC: 65.2 MG/DL (ref 63–159)
LYMPHOCYTES # BLD AUTO: 1.6 K/UL (ref 1–4.8)
LYMPHOCYTES NFR BLD: 24.2 % (ref 18–48)
MCH RBC QN AUTO: 23.3 PG (ref 27–31)
MCHC RBC AUTO-ENTMCNC: 28.2 G/DL (ref 32–36)
MCV RBC AUTO: 83 FL (ref 82–98)
MONOCYTES # BLD AUTO: 0.8 K/UL (ref 0.3–1)
MONOCYTES NFR BLD: 12.6 % (ref 4–15)
NEUTROPHILS # BLD AUTO: 3.8 K/UL (ref 1.8–7.7)
NEUTROPHILS NFR BLD: 57 % (ref 38–73)
NONHDLC SERPL-MCNC: 81 MG/DL
NRBC BLD-RTO: 0 /100 WBC
PLATELET # BLD AUTO: 385 K/UL (ref 150–350)
PMV BLD AUTO: 9.8 FL (ref 9.2–12.9)
POTASSIUM SERPL-SCNC: 3.3 MMOL/L (ref 3.5–5.1)
PROT SERPL-MCNC: 7.7 G/DL (ref 6–8.4)
RBC # BLD AUTO: 3.73 M/UL (ref 4–5.4)
SODIUM SERPL-SCNC: 143 MMOL/L (ref 136–145)
TRIGL SERPL-MCNC: 79 MG/DL (ref 30–150)
TSH SERPL DL<=0.005 MIU/L-ACNC: 1.75 UIU/ML (ref 0.4–4)
URATE SERPL-MCNC: 2.6 MG/DL (ref 2.4–5.7)
VIT B12 SERPL-MCNC: <146 PG/ML (ref 210–950)
WBC # BLD AUTO: 6.66 K/UL (ref 3.9–12.7)

## 2019-09-07 PROCEDURE — 82607 VITAMIN B-12: CPT

## 2019-09-07 PROCEDURE — 80061 LIPID PANEL: CPT

## 2019-09-07 PROCEDURE — 85025 COMPLETE CBC W/AUTO DIFF WBC: CPT

## 2019-09-07 PROCEDURE — 36415 COLL VENOUS BLD VENIPUNCTURE: CPT | Mod: PO

## 2019-09-07 PROCEDURE — 82306 VITAMIN D 25 HYDROXY: CPT

## 2019-09-07 PROCEDURE — 82728 ASSAY OF FERRITIN: CPT

## 2019-09-07 PROCEDURE — 83036 HEMOGLOBIN GLYCOSYLATED A1C: CPT

## 2019-09-07 PROCEDURE — 84550 ASSAY OF BLOOD/URIC ACID: CPT

## 2019-09-07 PROCEDURE — 80053 COMPREHEN METABOLIC PANEL: CPT

## 2019-09-07 PROCEDURE — 84443 ASSAY THYROID STIM HORMONE: CPT

## 2019-09-09 ENCOUNTER — TELEPHONE (OUTPATIENT)
Dept: CARDIOLOGY | Facility: CLINIC | Age: 79
End: 2019-09-09

## 2019-09-09 NOTE — TELEPHONE ENCOUNTER
----- Message from Jaycee Puente MD sent at 9/9/2019 11:14 AM CDT -----  Please mail patient the blood work results and inform the patient that we will discuss it in detail during the upcoming visit.except for chronic kidney disease it looks fine.

## 2019-09-10 ENCOUNTER — TELEPHONE (OUTPATIENT)
Dept: INTERNAL MEDICINE | Facility: CLINIC | Age: 79
End: 2019-09-10

## 2019-09-10 RX ORDER — ERGOCALCIFEROL 1.25 MG/1
50000 CAPSULE ORAL
Qty: 24 CAPSULE | Refills: 1 | Status: SHIPPED | OUTPATIENT
Start: 2019-09-12 | End: 2020-04-06 | Stop reason: SDUPTHER

## 2019-09-10 RX ORDER — CYANOCOBALAMIN 1000 UG/ML
1000 INJECTION, SOLUTION INTRAMUSCULAR; SUBCUTANEOUS WEEKLY
Qty: 12 ML | Refills: 3 | Status: ON HOLD | OUTPATIENT
Start: 2019-09-10 | End: 2021-05-11 | Stop reason: HOSPADM

## 2019-09-10 NOTE — TELEPHONE ENCOUNTER
Please notfiy pt  Your recent blood work reveals that  You are anemic  You are low in iron and vitamin B12 and vitamin D.    Take the iron supplement and vitamin C supplement as we discussed in clinic.     You need to take prescription vitamin D 50,000 units twice a week for vitamin D deficiency    You need to take vitamin B12 injections weekly - do you have someone who can give you vitamin B12 injections at home?    The [facility] the [facility] B12 and D [facility]  [place]

## 2019-09-11 ENCOUNTER — OFFICE VISIT (OUTPATIENT)
Dept: CARDIOLOGY | Facility: CLINIC | Age: 79
End: 2019-09-11
Payer: MEDICARE

## 2019-09-11 VITALS
BODY MASS INDEX: 36.31 KG/M2 | HEART RATE: 74 BPM | SYSTOLIC BLOOD PRESSURE: 108 MMHG | DIASTOLIC BLOOD PRESSURE: 60 MMHG | WEIGHT: 217.94 LBS | HEIGHT: 65 IN

## 2019-09-11 DIAGNOSIS — E78.2 MIXED HYPERLIPIDEMIA: Chronic | ICD-10-CM

## 2019-09-11 DIAGNOSIS — N18.30 CHRONIC RENAL DISEASE, STAGE 3, MODERATELY DECREASED GLOMERULAR FILTRATION RATE BETWEEN 30-59 ML/MIN/1.73 SQUARE METER: Chronic | ICD-10-CM

## 2019-09-11 DIAGNOSIS — I45.10 RBBB: ICD-10-CM

## 2019-09-11 DIAGNOSIS — E11.51 CONTROLLED TYPE 2 DM WITH PERIPHERAL CIRCULATORY DISORDER: Chronic | ICD-10-CM

## 2019-09-11 DIAGNOSIS — I25.83 CORONARY ARTERY DISEASE DUE TO LIPID RICH PLAQUE: Primary | Chronic | ICD-10-CM

## 2019-09-11 DIAGNOSIS — D50.8 OTHER IRON DEFICIENCY ANEMIA: ICD-10-CM

## 2019-09-11 DIAGNOSIS — I10 HTN (HYPERTENSION), BENIGN: Chronic | ICD-10-CM

## 2019-09-11 DIAGNOSIS — R09.89 PROMINENT AORTA: ICD-10-CM

## 2019-09-11 DIAGNOSIS — Z95.5 S/P CORONARY ARTERY STENT PLACEMENT: ICD-10-CM

## 2019-09-11 DIAGNOSIS — C50.412 MALIGNANT NEOPLASM OF UPPER-OUTER QUADRANT OF LEFT FEMALE BREAST, UNSPECIFIED ESTROGEN RECEPTOR STATUS: ICD-10-CM

## 2019-09-11 DIAGNOSIS — I73.9 PERIPHERAL VASCULAR DISEASE: ICD-10-CM

## 2019-09-11 DIAGNOSIS — I25.2 OLD MI (MYOCARDIAL INFARCTION): ICD-10-CM

## 2019-09-11 DIAGNOSIS — I25.10 CORONARY ARTERY DISEASE DUE TO LIPID RICH PLAQUE: Primary | Chronic | ICD-10-CM

## 2019-09-11 DIAGNOSIS — I65.23 BILATERAL CAROTID ARTERY STENOSIS: ICD-10-CM

## 2019-09-11 PROCEDURE — 3288F FALL RISK ASSESSMENT DOCD: CPT | Mod: CPTII,S$GLB,, | Performed by: INTERNAL MEDICINE

## 2019-09-11 PROCEDURE — 99999 PR PBB SHADOW E&M-EST. PATIENT-LVL III: CPT | Mod: PBBFAC,,, | Performed by: INTERNAL MEDICINE

## 2019-09-11 PROCEDURE — 1100F PR PT FALLS ASSESS DOC 2+ FALLS/FALL W/INJURY/YR: ICD-10-PCS | Mod: CPTII,S$GLB,, | Performed by: INTERNAL MEDICINE

## 2019-09-11 PROCEDURE — 99214 OFFICE O/P EST MOD 30 MIN: CPT | Mod: S$GLB,,, | Performed by: INTERNAL MEDICINE

## 2019-09-11 PROCEDURE — 3074F SYST BP LT 130 MM HG: CPT | Mod: CPTII,S$GLB,, | Performed by: INTERNAL MEDICINE

## 2019-09-11 PROCEDURE — 3288F PR FALLS RISK ASSESSMENT DOCUMENTED: ICD-10-PCS | Mod: CPTII,S$GLB,, | Performed by: INTERNAL MEDICINE

## 2019-09-11 PROCEDURE — 3078F PR MOST RECENT DIASTOLIC BLOOD PRESSURE < 80 MM HG: ICD-10-PCS | Mod: CPTII,S$GLB,, | Performed by: INTERNAL MEDICINE

## 2019-09-11 PROCEDURE — 3078F DIAST BP <80 MM HG: CPT | Mod: CPTII,S$GLB,, | Performed by: INTERNAL MEDICINE

## 2019-09-11 PROCEDURE — 3074F PR MOST RECENT SYSTOLIC BLOOD PRESSURE < 130 MM HG: ICD-10-PCS | Mod: CPTII,S$GLB,, | Performed by: INTERNAL MEDICINE

## 2019-09-11 PROCEDURE — 99214 PR OFFICE/OUTPT VISIT, EST, LEVL IV, 30-39 MIN: ICD-10-PCS | Mod: S$GLB,,, | Performed by: INTERNAL MEDICINE

## 2019-09-11 PROCEDURE — 1100F PTFALLS ASSESS-DOCD GE2>/YR: CPT | Mod: CPTII,S$GLB,, | Performed by: INTERNAL MEDICINE

## 2019-09-11 PROCEDURE — 99999 PR PBB SHADOW E&M-EST. PATIENT-LVL III: ICD-10-PCS | Mod: PBBFAC,,, | Performed by: INTERNAL MEDICINE

## 2019-09-11 RX ORDER — CLOPIDOGREL BISULFATE 75 MG/1
75 TABLET ORAL DAILY
Qty: 90 TABLET | Refills: 3 | Status: SHIPPED | OUTPATIENT
Start: 2019-09-11 | End: 2020-09-14 | Stop reason: SDUPTHER

## 2019-09-11 NOTE — TELEPHONE ENCOUNTER
Informed pt of lab results, pt will get vitamins and  her rx's that were called in. Her daughter will give her the B12 injections once a week

## 2019-09-11 NOTE — PROGRESS NOTES
"Subjective:   Patient ID:  Renata Bergman is a 79 y.o. female who presents for follow-up of Coronary Artery Disease      HPI: Patient has no new CV symptoms. She is doing well.  Since the last visit lost 10 lbs stating her appetite is not always good. She was also diagnosed with iron def. anemia and is in the process of work up.    Follows with Dr. Guevara for breast CA.    Blood work shows low K level, CKD and elevated Ca along with worsened anemia.    Lab Results   Component Value Date     09/07/2019    K 3.3 (L) 09/07/2019     09/07/2019    CO2 27 09/07/2019    BUN 22 09/07/2019    CREATININE 1.9 (H) 09/07/2019     (H) 09/07/2019    HGBA1C 6.3 (H) 09/07/2019    MG 1.7 05/23/2017    AST 20 09/07/2019    ALT 10 09/07/2019    ALBUMIN 3.9 09/07/2019    PROT 7.7 09/07/2019    BILITOT 0.6 09/07/2019    WBC 6.66 09/07/2019    HGB 8.7 (L) 09/07/2019    HCT 30.9 (L) 09/07/2019    MCV 83 09/07/2019     (H) 09/07/2019    INR 1.0 05/23/2017    TSH 1.751 09/07/2019    CHOL 131 09/07/2019    HDL 50 09/07/2019    LDLCALC 65.2 09/07/2019    TRIG 79 09/07/2019       Review of Systems   Constitution: Negative.   HENT: Negative.    Eyes: Negative.    Cardiovascular: Positive for dyspnea on exertion and palpitations. Negative for chest pain, claudication, irregular heartbeat, leg swelling, near-syncope and syncope.   Respiratory: Negative.  Negative for cough, shortness of breath, snoring and wheezing.    Endocrine: Negative.  Negative for cold intolerance, heat intolerance, polydipsia, polyphagia and polyuria.   Skin: Negative.    Musculoskeletal: Positive for arthritis and back pain.   Gastrointestinal: Negative.    Genitourinary: Negative.    Neurological: Positive for light-headedness.   Psychiatric/Behavioral: Negative.        Objective:   Physical Exam   Constitutional: She is oriented to person, place, and time. She appears well-developed and well-nourished.   /60   Pulse 74   Ht 5' 5" " (1.651 m)   Wt 98.9 kg (217 lb 14.8 oz)   BMI 36.26 kg/m²      HENT:   Head: Normocephalic.   Eyes: Pupils are equal, round, and reactive to light.   Neck: Normal range of motion. Neck supple. Carotid bruit is not present. No thyromegaly present.   Cardiovascular: Normal rate, regular rhythm, normal heart sounds and intact distal pulses. Exam reveals no gallop and no friction rub.   No murmur heard.  Pulses:       Carotid pulses are 2+ on the right side, and 2+ on the left side.       Radial pulses are 2+ on the right side, and 2+ on the left side.        Femoral pulses are 2+ on the right side, and 2+ on the left side.       Popliteal pulses are 2+ on the right side, and 2+ on the left side.        Dorsalis pedis pulses are 2+ on the right side, and 2+ on the left side.        Posterior tibial pulses are 2+ on the right side, and 2+ on the left side.   Pulmonary/Chest: Effort normal and breath sounds normal. No respiratory distress. She has no wheezes. She has no rales. She exhibits no tenderness.   Abdominal: Soft. Bowel sounds are normal.   Musculoskeletal: Normal range of motion. She exhibits no edema.   Neurological: She is alert and oriented to person, place, and time.   Skin: Skin is warm and dry.   Psychiatric: She has a normal mood and affect.   Nursing note and vitals reviewed.        Assessment and Plan:     Problem List Items Addressed This Visit        Cardiology Problems    HTN (hypertension), benign (Chronic)    Relevant Medications    clopidogrel (PLAVIX) 75 mg tablet    Other Relevant Orders    POTASSIUM    Hyperlipidemia (Chronic)    Coronary artery disease due to lipid rich plaque - Primary (Chronic)    Controlled type 2 DM with peripheral circulatory disorder (Chronic)    Old MI (myocardial infarction)    RBBB    Bilateral carotid artery stenosis    Prominent aorta    Peripheral vascular disease       Other    Chronic renal disease, stage 3, moderately decreased glomerular filtration rate  between 30-59 mL/min/1.73 square meter (Chronic)    S/P coronary artery stent placement    Malignant neoplasm of upper-outer quadrant of left female breast    Iron deficiency anemia          Patient's Medications   New Prescriptions    No medications on file   Previous Medications    ALLOPURINOL (ZYLOPRIM) 300 MG TABLET    TAKE 1 TABLET BY MOUTH ONCE DAILY    AMLODIPINE (NORVASC) 5 MG TABLET    TAKE 1 TABLET BY MOUTH DAILY    ANASTROZOLE (ARIMIDEX) 1 MG TAB    Take 1 mg by mouth once daily .    ASPIRIN 81 MG CHEW    Take 81 mg by mouth once daily.      CARVEDILOL (COREG) 25 MG TABLET    TAKE 1 TABLET BY MOUTH TWICE DAILY    CHLORTHALIDONE (HYGROTEN) 25 MG TAB    TAKE ONE-HALF TABLET BY MOUTH EVERY DAY    CYANOCOBALAMIN 1,000 MCG/ML INJECTION    Inject 1 mL (1,000 mcg total) into the muscle once a week. Dispense #12 25 gague one inch needles and #12 one ml syringes    ERGOCALCIFEROL (ERGOCALCIFEROL) 50,000 UNIT CAP    Take 1 capsule (50,000 Units total) by mouth twice a week.    FERROUS SULFATE 325 (65 FE) MG EC TABLET    Take 1 tablet (325 mg total) by mouth once daily.    FEXOFENADINE (ALLEGRA) 30 MG TABLET    Take 30 mg by mouth daily as needed.    LEVETIRACETAM XR (KEPPRA XR) 500 MG TB24 24 HR TABLET    TAKE 2 TABLETS(1000 MG) BY MOUTH EVERY DAY    METFORMIN (GLUCOPHAGE) 850 MG TABLET    TAKE 1 TABLET BY MOUTH TWICE DAILY    NITROGLYCERIN (NITROSTAT) 0.4 MG SL TABLET    PLACE 1 TABLET UNDER THE TONGUE EVERY 5 MINUTES AS NEEDED FOR CHEST PAIN.    OMEPRAZOLE (PRILOSEC) 20 MG CAPSULE    TAKE 2 CAPSULES BY MOUTH EVERY DAY    POTASSIUM CHLORIDE (KLOR-CON) 10 MEQ TBSR    TAKE 1 TABLET BY MOUTH EVERY DAY    ROSUVASTATIN (CRESTOR) 40 MG TAB    TAKE 1 TABLET BY MOUTH EVERY DAY   Modified Medications    Modified Medication Previous Medication    CLOPIDOGREL (PLAVIX) 75 MG TABLET clopidogrel (PLAVIX) 75 mg tablet       Take 1 tablet (75 mg total) by mouth once daily.    TAKE 1 TABLET BY MOUTH EVERY DAY   Discontinued  Medications    ASCORBIC ACID, VITAMIN C, (VITAMIN C) 500 MG TABLET    Take 1 tablet (500 mg total) by mouth once daily.    CHOLECALCIFEROL, VITAMIN D3, (VITAMIN D3) 1,000 UNIT CAPSULE    Take 1 capsule (1,000 Units total) by mouth once daily.    CLOPIDOGREL (PLAVIX) 75 MG TABLET    TAKE 1 TABLET BY MOUTH EVERY DAY     If Patient continues to loose weight we may have to back off some of her HTN meds.  Replace K with dietary supplements and for couple of days double of potassium pills.  K level in 1 week.  Follow up in about 6 months (around 3/11/2020).

## 2019-09-21 ENCOUNTER — LAB VISIT (OUTPATIENT)
Dept: LAB | Facility: HOSPITAL | Age: 79
End: 2019-09-21
Attending: INTERNAL MEDICINE
Payer: MEDICARE

## 2019-09-21 DIAGNOSIS — I10 HTN (HYPERTENSION), BENIGN: Chronic | ICD-10-CM

## 2019-09-21 LAB — POTASSIUM SERPL-SCNC: 4 MMOL/L (ref 3.5–5.1)

## 2019-09-21 PROCEDURE — 84132 ASSAY OF SERUM POTASSIUM: CPT

## 2019-09-21 PROCEDURE — 36415 COLL VENOUS BLD VENIPUNCTURE: CPT | Mod: PO

## 2019-09-23 ENCOUNTER — TELEPHONE (OUTPATIENT)
Dept: CARDIOLOGY | Facility: CLINIC | Age: 79
End: 2019-09-23

## 2019-09-23 NOTE — TELEPHONE ENCOUNTER
----- Message from Jaycee Puente MD sent at 9/23/2019  8:11 AM CDT -----  Please inform the patient that potassium level normalized.

## 2019-10-07 RX ORDER — ALLOPURINOL 300 MG/1
TABLET ORAL
Qty: 90 TABLET | Refills: 4 | Status: SHIPPED | OUTPATIENT
Start: 2019-10-07 | End: 2020-10-15

## 2019-10-07 RX ORDER — AMLODIPINE BESYLATE 5 MG/1
TABLET ORAL
Qty: 90 TABLET | Refills: 4 | Status: ON HOLD | OUTPATIENT
Start: 2019-10-07 | End: 2019-12-27 | Stop reason: HOSPADM

## 2019-10-08 ENCOUNTER — TELEPHONE (OUTPATIENT)
Dept: INTERNAL MEDICINE | Facility: CLINIC | Age: 79
End: 2019-10-08

## 2019-10-08 NOTE — TELEPHONE ENCOUNTER
----- Message from Destin Kc sent at 10/8/2019  8:29 AM CDT -----  Contact: Patient [phone]  Patient would like to get medical advice.  Symptoms (please be specific):  Vitamin questions    Comments: B12- would like to take the pill the B-12, does not have any one to give shot, also would like to know the MG for  Vitamin C and D      Please call an advise  Thank you

## 2019-10-08 NOTE — TELEPHONE ENCOUNTER
Spoke to patient and states that she wants to know if she can take the Vitamin B12 over the counter instead of the injections---she does not have anyone to give them to her---pls advise

## 2019-10-09 NOTE — TELEPHONE ENCOUNTER
Called and spoke to pt making her aware of the b12 needing to be injections.  Pt verbalized understanding of this.

## 2019-10-09 NOTE — TELEPHONE ENCOUNTER
----- Message from Brittany Brown sent at 10/9/2019  9:10 AM CDT -----  Contact: self/[phone]  Pt called in regard to waiting on an answerer about getting the b12.    Please advise

## 2019-10-21 DIAGNOSIS — C50.412 MALIGNANT NEOPLASM OF UPPER-OUTER QUADRANT OF LEFT BREAST IN FEMALE, ESTROGEN RECEPTOR POSITIVE: ICD-10-CM

## 2019-10-21 DIAGNOSIS — Z17.0 MALIGNANT NEOPLASM OF UPPER-OUTER QUADRANT OF LEFT BREAST IN FEMALE, ESTROGEN RECEPTOR POSITIVE: ICD-10-CM

## 2019-10-21 RX ORDER — ANASTROZOLE 1 MG/1
TABLET ORAL
Qty: 30 TABLET | Refills: 11 | Status: SHIPPED | OUTPATIENT
Start: 2019-10-21 | End: 2020-11-05 | Stop reason: SDUPTHER

## 2019-10-25 DIAGNOSIS — Z12.39 BREAST CANCER SCREENING: ICD-10-CM

## 2019-10-25 DIAGNOSIS — N18.9 CHRONIC KIDNEY DISEASE, UNSPECIFIED CKD STAGE: ICD-10-CM

## 2019-11-07 RX ORDER — POTASSIUM CHLORIDE 750 MG/1
TABLET, EXTENDED RELEASE ORAL
Qty: 90 TABLET | Refills: 0 | Status: ON HOLD | OUTPATIENT
Start: 2019-11-07 | End: 2019-12-27 | Stop reason: HOSPADM

## 2019-11-12 RX ORDER — ROSUVASTATIN CALCIUM 40 MG/1
TABLET, COATED ORAL
Qty: 90 TABLET | Refills: 4 | Status: SHIPPED | OUTPATIENT
Start: 2019-11-12 | End: 2020-12-23

## 2019-11-26 ENCOUNTER — TELEPHONE (OUTPATIENT)
Dept: INTERNAL MEDICINE | Facility: CLINIC | Age: 79
End: 2019-11-26

## 2019-11-26 NOTE — TELEPHONE ENCOUNTER
----- Message from Brittany Brown sent at 11/26/2019  7:43 AM CST -----  Contact: self/[phone]  Pt has a bad cough and wanted to know if she should come in and get a flu shot.      Please advise

## 2019-11-27 RX ORDER — OSELTAMIVIR PHOSPHATE 75 MG/1
75 CAPSULE ORAL 2 TIMES DAILY
Qty: 10 CAPSULE | Refills: 0 | Status: SHIPPED | OUTPATIENT
Start: 2019-11-27 | End: 2019-12-02

## 2019-11-27 NOTE — TELEPHONE ENCOUNTER
Pt hasn't received a flu shot, however her son has the flu. Pt not has a dry cough, pt coughing up clear mucus and she has a slight temperature. Does pt have to come in as per Dr. Mortensen ? Pt wants to know what she should do.

## 2019-12-04 RX ORDER — ERGOCALCIFEROL 1.25 MG/1
CAPSULE ORAL
Qty: 26 CAPSULE | Refills: 0 | OUTPATIENT
Start: 2019-12-04

## 2019-12-04 NOTE — TELEPHONE ENCOUNTER
Refill Authorization Note     is requesting a refill authorization.    Brief assessment and rationale for refill: QUICK DC: rts (3/20)  Name and strength of medication: VITAMIN D2 50,000IU (ERGO) CAP RX                                      Comments:   Last Prescribed Info:   Pharmacy:  [id number] - [address] AT [facility] [place] #:  [id number]     Pharmacy Comments:  --          Fill quantity remaining:  -- Fill quantity used:  -- Next fill due: --       Outpatient Medication Detail      Disp Refills Start End    ergocalciferol (ERGOCALCIFEROL) 50,000 unit Cap 24 capsule 1 9/12/2019     Sig - Route: Take 1 capsule (50,000 Units total) by mouth twice a week. - Oral    Sent to pharmacy as: ergocalciferol (ERGOCALCIFEROL) 50,000 unit Cap    E-Prescribing Status: Receipt confirmed by pharmacy (9/10/2019  5:59 PM CDT)

## 2019-12-04 NOTE — PROGRESS NOTES
Yes mass  Subjective:       Patient ID: Renata Bergman is a 79 y.o. female.    Chief Complaint: No chief complaint on file.    HPI Mrs. Hussein is a 79-year-old female with stage I left breast cancer.  She is currently on letrozole endocrine therapy.  Her other medical issues include diabetes, seizures and coronary artery disease.    She had the flu last week but took medication and is feeling much better.  She has no shortness of breath or fever.  Her appetite is improving.  She has no pain.      Breast history: Screening mammogram on May 5, 2016 showed an irregular 22 mm mass with abnormal calcifications in the left breast 2:00 position. Follow-up ultrasound July 13 she noted a regular solid 17 mm mass.    On July 21 needle biopsy showed grade 2 infiltrating ductal carcinoma strongly ER [place] positive (90%) and HER-2 negative.    On August 1, 2016 lumpectomy and sentinel lymph node biopsy were performed. That showed a 1.8 x 1.6 x 1.0 infiltrating carcinoma intermediate grade (histologic grade 3, nuclear grade 2, mitotic index 2.   The sentinel lymph node was negative and margins were negative. Final pathological stage TIc N0 stage IA.  Oncotype was 31 indicating a 21% risk of recurrence with tamoxifen alone.    She  had 3 weeks of weekly Taxol and then because of insurance issues she was changed to CMF and had 5 cycles of that.     Chemotherapy was completed in January 2017.    She completed radiation in April 2017 and began letrozole endocrine therapy that month as well.    Review of Systems   Constitutional: Positive for fatigue. Negative for activity change, appetite change, fever and unexpected weight change.   HENT: Negative for trouble swallowing.    Respiratory: Negative for cough and shortness of breath.    Gastrointestinal: Negative for abdominal pain, constipation, diarrhea and nausea.   Musculoskeletal: Negative for back pain.   Neurological: Negative for headaches.   Psychiatric/Behavioral: Negative  for dysphoric mood. The patient is not nervous/anxious.        Objective:      Physical Exam   Constitutional: She is oriented to person, place, and time. She appears well-developed and well-nourished.   HENT:   Mouth/Throat: No oropharyngeal exudate.   Eyes: No scleral icterus.   Cardiovascular: Regular rhythm and normal heart sounds.   Pulmonary/Chest: Effort normal and breath sounds normal. She has no wheezes. She has no rales. Right breast exhibits no mass, no nipple discharge and no skin change. Left breast exhibits no mass, no nipple discharge and no skin change.       Abdominal: Soft. She exhibits no mass. There is no tenderness.   Lymphadenopathy:     She has no cervical adenopathy.     She has no axillary adenopathy.        Right: No supraclavicular adenopathy present.        Left: No supraclavicular adenopathy present.   Neurological: She is alert and oriented to person, place, and time. No cranial nerve deficit.   Psychiatric: She has a normal mood and affect. Her behavior is normal. Thought content normal.   Vitals reviewed.      Assessment:     mammogram in August 2019 was unremarkable.  1. Malignant neoplasm of upper-outer quadrant of left breast in female, estrogen receptor positive        Plan:     Return to clinic in 4 months  Continue anastrozole.

## 2019-12-05 ENCOUNTER — OFFICE VISIT (OUTPATIENT)
Dept: HEMATOLOGY/ONCOLOGY | Facility: CLINIC | Age: 79
End: 2019-12-05
Payer: MEDICARE

## 2019-12-05 VITALS
SYSTOLIC BLOOD PRESSURE: 129 MMHG | WEIGHT: 205.5 LBS | BODY MASS INDEX: 34.24 KG/M2 | TEMPERATURE: 98 F | OXYGEN SATURATION: 100 % | DIASTOLIC BLOOD PRESSURE: 59 MMHG | HEART RATE: 86 BPM | RESPIRATION RATE: 18 BRPM | HEIGHT: 65 IN

## 2019-12-05 DIAGNOSIS — C50.412 MALIGNANT NEOPLASM OF UPPER-OUTER QUADRANT OF LEFT BREAST IN FEMALE, ESTROGEN RECEPTOR POSITIVE: Primary | ICD-10-CM

## 2019-12-05 DIAGNOSIS — Z17.0 MALIGNANT NEOPLASM OF UPPER-OUTER QUADRANT OF LEFT BREAST IN FEMALE, ESTROGEN RECEPTOR POSITIVE: Primary | ICD-10-CM

## 2019-12-05 PROCEDURE — 1159F PR MEDICATION LIST DOCUMENTED IN MEDICAL RECORD: ICD-10-PCS | Mod: S$GLB,,, | Performed by: INTERNAL MEDICINE

## 2019-12-05 PROCEDURE — 99499 RISK ADDL DX/OHS AUDIT: ICD-10-PCS | Mod: S$GLB,,, | Performed by: INTERNAL MEDICINE

## 2019-12-05 PROCEDURE — 99999 PR PBB SHADOW E&M-EST. PATIENT-LVL IV: ICD-10-PCS | Mod: PBBFAC,,, | Performed by: INTERNAL MEDICINE

## 2019-12-05 PROCEDURE — 99999 PR PBB SHADOW E&M-EST. PATIENT-LVL IV: CPT | Mod: PBBFAC,,, | Performed by: INTERNAL MEDICINE

## 2019-12-05 PROCEDURE — 99213 PR OFFICE/OUTPT VISIT, EST, LEVL III, 20-29 MIN: ICD-10-PCS | Mod: S$GLB,,, | Performed by: INTERNAL MEDICINE

## 2019-12-05 PROCEDURE — 3078F PR MOST RECENT DIASTOLIC BLOOD PRESSURE < 80 MM HG: ICD-10-PCS | Mod: CPTII,S$GLB,, | Performed by: INTERNAL MEDICINE

## 2019-12-05 PROCEDURE — 3074F PR MOST RECENT SYSTOLIC BLOOD PRESSURE < 130 MM HG: ICD-10-PCS | Mod: CPTII,S$GLB,, | Performed by: INTERNAL MEDICINE

## 2019-12-05 PROCEDURE — 1101F PT FALLS ASSESS-DOCD LE1/YR: CPT | Mod: CPTII,S$GLB,, | Performed by: INTERNAL MEDICINE

## 2019-12-05 PROCEDURE — 1159F MED LIST DOCD IN RCRD: CPT | Mod: S$GLB,,, | Performed by: INTERNAL MEDICINE

## 2019-12-05 PROCEDURE — 3074F SYST BP LT 130 MM HG: CPT | Mod: CPTII,S$GLB,, | Performed by: INTERNAL MEDICINE

## 2019-12-05 PROCEDURE — 1126F PR PAIN SEVERITY QUANTIFIED, NO PAIN PRESENT: ICD-10-PCS | Mod: S$GLB,,, | Performed by: INTERNAL MEDICINE

## 2019-12-05 PROCEDURE — 1101F PR PT FALLS ASSESS DOC 0-1 FALLS W/OUT INJ PAST YR: ICD-10-PCS | Mod: CPTII,S$GLB,, | Performed by: INTERNAL MEDICINE

## 2019-12-05 PROCEDURE — 99213 OFFICE O/P EST LOW 20 MIN: CPT | Mod: S$GLB,,, | Performed by: INTERNAL MEDICINE

## 2019-12-05 PROCEDURE — 3078F DIAST BP <80 MM HG: CPT | Mod: CPTII,S$GLB,, | Performed by: INTERNAL MEDICINE

## 2019-12-05 PROCEDURE — 99499 UNLISTED E&M SERVICE: CPT | Mod: S$GLB,,, | Performed by: INTERNAL MEDICINE

## 2019-12-05 PROCEDURE — 1126F AMNT PAIN NOTED NONE PRSNT: CPT | Mod: S$GLB,,, | Performed by: INTERNAL MEDICINE

## 2019-12-17 RX ORDER — ERGOCALCIFEROL 1.25 MG/1
CAPSULE ORAL
Qty: 26 CAPSULE | Refills: 0 | OUTPATIENT
Start: 2019-12-17

## 2019-12-17 RX ORDER — OMEPRAZOLE 20 MG/1
CAPSULE, DELAYED RELEASE ORAL
Qty: 180 CAPSULE | Refills: 0 | Status: SHIPPED | OUTPATIENT
Start: 2019-12-17 | End: 2020-04-28

## 2019-12-17 NOTE — TELEPHONE ENCOUNTER
I have reviewed and agree with the assessment below.  Has been receiving without complications; will approve 3 more; gerd controlled on medication per LOV.  Thanks

## 2019-12-17 NOTE — PROGRESS NOTES
Quick DC. Refill Too Soon  Refill Authorization Note     is requesting a refill authorization.    Brief assessment and rationale for refill: Quick DC: rts (03/2020)  Name and strength of medication: ergocalciferol (ERGOCALCIFEROL) 50,000 unit Cap       Medication Therapy Plan: last escripted for 6 month supply refill too soon, re routed the original info via right source to the pharmacy. med-minded confirms last filled 12/15/2019    Medication reconciliation completed: Yes                         Comments:   Last Prescribed Info:        Ordering User:  Ethel Berger MD               Pharmacy:  [facility] #[id number] [address] AT [facility] STEVIE #:  [id number]     Pharmacy Comments:  --          Fill quantity remaining:  -- Fill quantity used:  -- Next fill due: --       Outpatient Medication Detail      Disp Refills Start End    ergocalciferol (ERGOCALCIFEROL) 50,000 unit Cap 24 capsule 1 9/12/2019     Sig - Route: Take 1 capsule (50,000 Units total) by mouth twice a week. - Oral    Sent to pharmacy as: ergocalciferol (ERGOCALCIFEROL) 50,000 unit Cap    E-Prescribing Status: Receipt confirmed by pharmacy (9/10/2019  5:59 PM CDT)    Ordering Encounter Report     Associated Reports   View Encounter

## 2019-12-20 DIAGNOSIS — E13.9 DIABETES MELLITUS DUE TO ABNORMAL INSULIN: ICD-10-CM

## 2019-12-20 DIAGNOSIS — D64.9 ANEMIA, UNSPECIFIED TYPE: Primary | ICD-10-CM

## 2019-12-20 RX ORDER — GLIPIZIDE 5 MG/1
2.5 TABLET ORAL
Qty: 30 TABLET | Refills: 2 | Status: CANCELLED | OUTPATIENT
Start: 2019-12-20 | End: 2020-12-19

## 2019-12-20 RX ORDER — METFORMIN HYDROCHLORIDE 850 MG/1
TABLET ORAL
Qty: 60 TABLET | Refills: 2 | OUTPATIENT
Start: 2019-12-20

## 2019-12-20 NOTE — TELEPHONE ENCOUNTER
PLease notify pt  Dr ORR got refill of your metformin.  Your last blood work revealed that your kidney function has worsened and you can no longer take meformin.    You have not see Dr ORR since 7/30/18    Dr ORR would like for you to come in and get some blood work in the next 2 weeks and see one of the nurse practioners for further evaluation of your anemia and diabetes.    Please book her to have labs then see a NP in the next 1-2 weeks.    Book her to see me in February.

## 2019-12-20 NOTE — PROGRESS NOTES
Refill Authorization Note     is requesting a refill authorization.    Brief assessment and rationale for refill: REVIEW: abnormal labs          Medication Therapy Plan: Cr-elevated, eGFR-low; Review    Medication reconciliation completed: No   Pharmacist Review Requested: Yes         Comments:   Requested Prescriptions   Pending Prescriptions Disp Refills    metFORMIN (GLUCOPHAGE) 850 MG tablet [Pharmacy Med Name: METFORMIN 850MG TABLETS] 180 tablet 0     Sig: TAKE 1 TABLET BY MOUTH TWICE DAILY       Endocrinology:  Diabetes - Biguanides Failed - 12/20/2019  9:24 AM        Failed - Cr is 1.4 or below and within 360 days     Creatinine   Date Value Ref Range Status   09/07/2019 1.9 (H) 0.5 - 1.4 mg/dL Final   02/23/2019 1.8 (H) 0.5 - 1.4 mg/dL Final   09/15/2018 1.7 (H) 0.5 - 1.4 mg/dL Final              Failed - eGFR is 30 or above and within 360 days     eGFR if non    Date Value Ref Range Status   09/07/2019 25.1 (A) >60 mL/min/1.73 m^2 Final     Comment:     Calculation used to obtain the estimated glomerular filtration  rate (eGFR) is the CKD-EPI equation.      02/23/2019 27.0 (A) >60 mL/min/1.73 m^2 Final     Comment:     Calculation used to obtain the estimated glomerular filtration  rate (eGFR) is the CKD-EPI equation.      09/15/2018 29 (A) >60 mL/min/1.73 m^2 Final     Comment:     Calculation used to obtain the estimated glomerular filtration  rate (eGFR) is the CKD-EPI equation.        eGFR if    Date Value Ref Range Status   09/07/2019 28.9 (A) >60 mL/min/1.73 m^2 Final   02/23/2019 31.1 (A) >60 mL/min/1.73 m^2 Final   09/15/2018 33 (A) >60 mL/min/1.73 m^2 Final              Passed - Patient is at least 18 years old        Passed - Office visit in past 12 months or future 90 days     Recent Outpatient Visits            2 weeks ago Malignant neoplasm of upper-outer quadrant of left breast in female, estrogen receptor positive    [place] - Hematology Oncology  Bismark Guevara MD    3 months ago Coronary artery disease due to lipid rich plaque    [place] - Cardiology Jaycee Puente MD    4 months ago Diabetes mellitus due to abnormal insulin    [facility] Medicine Ethel Berger MD    4 months ago Malignant neoplasm of upper-outer quadrant of left breast in female, estrogen receptor positive    [place] - Hematology Oncology Bismark Guevara MD    5 months ago Type 2 diabetes mellitus with diabetic nephropathy, without long-term current use of insulin    [facility] - Optometry Moe Noel, PATO                    Passed - HBA1C is 7.9 or below and within 180 days     Hemoglobin A1C   Date Value Ref Range Status   09/07/2019 6.3 (H) 4.0 - 5.6 % Final     Comment:     ADA Screening Guidelines:  5.7-6.4%  Consistent with prediabetes  >or=6.5%  Consistent with diabetes  High levels of fetal hemoglobin interfere with the HbA1C  assay. Heterozygous hemoglobin variants (HbS, HgC, etc)do  not significantly interfere with this assay.   However, presence of multiple variants may affect accuracy.     02/23/2019 6.3 (H) 4.0 - 5.6 % Final     Comment:     ADA Screening Guidelines:  5.7-6.4%  Consistent with prediabetes  >or=6.5%  Consistent with diabetes  High levels of fetal hemoglobin interfere with the HbA1C  assay. Heterozygous hemoglobin variants (HbS, HgC, etc)do  not significantly interfere with this assay.   However, presence of multiple variants may affect accuracy.     07/20/2018 6.4 (H) 4.0 - 5.6 % Final     Comment:     ADA Screening Guidelines:  5.7-6.4%  Consistent with prediabetes  >or=6.5%  Consistent with diabetes  High levels of fetal hemoglobin interfere with the HbA1C  assay. Heterozygous hemoglobin variants (HbS, HgC, etc)do  not significantly interfere with this assay.   However, presence of multiple variants may affect accuracy.

## 2019-12-20 NOTE — TELEPHONE ENCOUNTER
I have reviewed and agree with the assessment below.  Metformin use with eGFR <30 ml/min is contraindicated.  Although A1c is controlled; recommend stopping 2/2 increased risk of accumulation leading to lactic acidosis and other ADRs.  Recommend switching to glipizide 2.5 mg daily, which is not renally cleared and safe to use. Pt does not appear to have hypoglycemia episodes.  Please see trends below based on labs.  Will schedule 3m f/u w/ labs if you approve new therapy.  Thanks     Lab Results   Component Value Date     (H) 09/07/2019    GLU 93 02/23/2019     09/15/2018     08/18/2018     07/20/2018       Lab Results   Component Value Date    CREATININE 1.9 (H) 09/07/2019    CREATININE 1.8 (H) 02/23/2019    CREATININE 1.7 (H) 09/15/2018       Lab Results   Component Value Date    EGFRNONAA 25.1 (A) 09/07/2019    EGFRNONAA 27.0 (A) 02/23/2019    EGFRNONAA 29 (A) 09/15/2018     Lab Results   Component Value Date    ESTGFRAFRICA 28.9 (A) 09/07/2019    ESTGFRAFRICA 31.1 (A) 02/23/2019    ESTGFRAFRICA 33 (A) 09/15/2018

## 2019-12-21 ENCOUNTER — LAB VISIT (OUTPATIENT)
Dept: LAB | Facility: HOSPITAL | Age: 79
End: 2019-12-21
Attending: INTERNAL MEDICINE
Payer: MEDICARE

## 2019-12-21 DIAGNOSIS — D64.9 ANEMIA, UNSPECIFIED TYPE: ICD-10-CM

## 2019-12-21 DIAGNOSIS — E13.9 DIABETES MELLITUS DUE TO ABNORMAL INSULIN: ICD-10-CM

## 2019-12-21 LAB
ALBUMIN SERPL BCP-MCNC: 3.9 G/DL (ref 3.5–5.2)
ALP SERPL-CCNC: 63 U/L (ref 55–135)
ALT SERPL W/O P-5'-P-CCNC: 20 U/L (ref 10–44)
ANION GAP SERPL CALC-SCNC: 13 MMOL/L (ref 8–16)
AST SERPL-CCNC: 17 U/L (ref 10–40)
BASOPHILS # BLD AUTO: 0.03 K/UL (ref 0–0.2)
BASOPHILS NFR BLD: 0.4 % (ref 0–1.9)
BILIRUB SERPL-MCNC: 0.6 MG/DL (ref 0.1–1)
BUN SERPL-MCNC: 20 MG/DL (ref 8–23)
CALCIUM SERPL-MCNC: 10.9 MG/DL (ref 8.7–10.5)
CHLORIDE SERPL-SCNC: 103 MMOL/L (ref 95–110)
CO2 SERPL-SCNC: 27 MMOL/L (ref 23–29)
CREAT SERPL-MCNC: 2.3 MG/DL (ref 0.5–1.4)
DIFFERENTIAL METHOD: ABNORMAL
EOSINOPHIL # BLD AUTO: 0.3 K/UL (ref 0–0.5)
EOSINOPHIL NFR BLD: 3.8 % (ref 0–8)
ERYTHROCYTE [DISTWIDTH] IN BLOOD BY AUTOMATED COUNT: 19.5 % (ref 11.5–14.5)
EST. GFR  (AFRICAN AMERICAN): 23 ML/MIN/1.73 M^2
EST. GFR  (NON AFRICAN AMERICAN): 20 ML/MIN/1.73 M^2
ESTIMATED AVG GLUCOSE: 126 MG/DL (ref 68–131)
FERRITIN SERPL-MCNC: 8 NG/ML (ref 20–300)
GLUCOSE SERPL-MCNC: 116 MG/DL (ref 70–110)
HBA1C MFR BLD HPLC: 6 % (ref 4–5.6)
HCT VFR BLD AUTO: 27.4 % (ref 37–48.5)
HGB BLD-MCNC: 8.4 G/DL (ref 12–16)
LYMPHOCYTES # BLD AUTO: 1.9 K/UL (ref 1–4.8)
LYMPHOCYTES NFR BLD: 26 % (ref 18–48)
MCH RBC QN AUTO: 23.2 PG (ref 27–31)
MCHC RBC AUTO-ENTMCNC: 30.7 G/DL (ref 32–36)
MCV RBC AUTO: 76 FL (ref 82–98)
MONOCYTES # BLD AUTO: 1 K/UL (ref 0.3–1)
MONOCYTES NFR BLD: 14.1 % (ref 4–15)
NEUTROPHILS # BLD AUTO: 4.1 K/UL (ref 1.8–7.7)
NEUTROPHILS NFR BLD: 55.7 % (ref 38–73)
PLATELET # BLD AUTO: 425 K/UL (ref 150–350)
PMV BLD AUTO: 8.8 FL (ref 9.2–12.9)
POTASSIUM SERPL-SCNC: 3.8 MMOL/L (ref 3.5–5.1)
PROT SERPL-MCNC: 7.7 G/DL (ref 6–8.4)
RBC # BLD AUTO: 3.62 M/UL (ref 4–5.4)
SODIUM SERPL-SCNC: 143 MMOL/L (ref 136–145)
WBC # BLD AUTO: 7.32 K/UL (ref 3.9–12.7)

## 2019-12-21 PROCEDURE — 85025 COMPLETE CBC W/AUTO DIFF WBC: CPT

## 2019-12-21 PROCEDURE — 36415 COLL VENOUS BLD VENIPUNCTURE: CPT

## 2019-12-21 PROCEDURE — 80053 COMPREHEN METABOLIC PANEL: CPT

## 2019-12-21 PROCEDURE — 82728 ASSAY OF FERRITIN: CPT

## 2019-12-21 PROCEDURE — 83036 HEMOGLOBIN GLYCOSYLATED A1C: CPT

## 2019-12-26 ENCOUNTER — PATIENT OUTREACH (OUTPATIENT)
Dept: ADMINISTRATIVE | Facility: HOSPITAL | Age: 79
End: 2019-12-26

## 2019-12-26 ENCOUNTER — HOSPITAL ENCOUNTER (OUTPATIENT)
Facility: HOSPITAL | Age: 79
Discharge: HOME | End: 2019-12-27
Attending: EMERGENCY MEDICINE | Admitting: HOSPITALIST
Payer: MEDICARE

## 2019-12-26 DIAGNOSIS — E86.0 DEHYDRATION: Primary | ICD-10-CM

## 2019-12-26 DIAGNOSIS — N17.9 AKI (ACUTE KIDNEY INJURY): ICD-10-CM

## 2019-12-26 DIAGNOSIS — E87.6 HYPOKALEMIA: ICD-10-CM

## 2019-12-26 DIAGNOSIS — R55 SYNCOPE: ICD-10-CM

## 2019-12-26 DIAGNOSIS — M17.0 PRIMARY OSTEOARTHRITIS OF BOTH KNEES: ICD-10-CM

## 2019-12-26 PROBLEM — N18.4 CHRONIC RENAL DISEASE, STAGE 4, SEVERELY DECREASED GLOMERULAR FILTRATION RATE (GFR) BETWEEN 15-29 ML/MIN/1.73 SQUARE METER: Status: ACTIVE | Noted: 2019-12-26

## 2019-12-26 LAB
ALBUMIN SERPL BCP-MCNC: 3.5 G/DL (ref 3.5–5.2)
ALP SERPL-CCNC: 65 U/L (ref 55–135)
ALT SERPL W/O P-5'-P-CCNC: 21 U/L (ref 10–44)
ANION GAP SERPL CALC-SCNC: 11 MMOL/L (ref 8–16)
ANION GAP SERPL CALC-SCNC: 8 MMOL/L (ref 8–16)
AST SERPL-CCNC: 24 U/L (ref 10–40)
BACTERIA #/AREA URNS AUTO: NORMAL /HPF
BASOPHILS # BLD AUTO: 0.04 K/UL (ref 0–0.2)
BASOPHILS NFR BLD: 0.6 % (ref 0–1.9)
BILIRUB SERPL-MCNC: 0.6 MG/DL (ref 0.1–1)
BILIRUB UR QL STRIP: NEGATIVE
BUN SERPL-MCNC: 16 MG/DL (ref 8–23)
BUN SERPL-MCNC: 18 MG/DL (ref 8–23)
CALCIUM SERPL-MCNC: 10 MG/DL (ref 8.7–10.5)
CALCIUM SERPL-MCNC: 9.4 MG/DL (ref 8.7–10.5)
CHLORIDE SERPL-SCNC: 103 MMOL/L (ref 95–110)
CHLORIDE SERPL-SCNC: 105 MMOL/L (ref 95–110)
CLARITY UR REFRACT.AUTO: CLEAR
CO2 SERPL-SCNC: 28 MMOL/L (ref 23–29)
CO2 SERPL-SCNC: 30 MMOL/L (ref 23–29)
COLOR UR AUTO: ABNORMAL
CREAT SERPL-MCNC: 2.4 MG/DL (ref 0.5–1.4)
CREAT SERPL-MCNC: 2.8 MG/DL (ref 0.5–1.4)
CREAT UR-MCNC: 43 MG/DL (ref 15–325)
DIFFERENTIAL METHOD: ABNORMAL
EOSINOPHIL # BLD AUTO: 0.3 K/UL (ref 0–0.5)
EOSINOPHIL NFR BLD: 4.8 % (ref 0–8)
ERYTHROCYTE [DISTWIDTH] IN BLOOD BY AUTOMATED COUNT: 19.6 % (ref 11.5–14.5)
EST. GFR  (AFRICAN AMERICAN): 18.1 ML/MIN/1.73 M^2
EST. GFR  (AFRICAN AMERICAN): 21.8 ML/MIN/1.73 M^2
EST. GFR  (NON AFRICAN AMERICAN): 15.7 ML/MIN/1.73 M^2
EST. GFR  (NON AFRICAN AMERICAN): 18.9 ML/MIN/1.73 M^2
GLUCOSE SERPL-MCNC: 107 MG/DL (ref 70–110)
GLUCOSE SERPL-MCNC: 145 MG/DL (ref 70–110)
GLUCOSE UR QL STRIP: NEGATIVE
HCT VFR BLD AUTO: 26.5 % (ref 37–48.5)
HGB BLD-MCNC: 7.5 G/DL (ref 12–16)
HGB UR QL STRIP: ABNORMAL
HYALINE CASTS UR QL AUTO: 0 /LPF
IMM GRANULOCYTES # BLD AUTO: 0.01 K/UL (ref 0–0.04)
IMM GRANULOCYTES NFR BLD AUTO: 0.1 % (ref 0–0.5)
KETONES UR QL STRIP: NEGATIVE
LEUKOCYTE ESTERASE UR QL STRIP: ABNORMAL
LYMPHOCYTES # BLD AUTO: 1.2 K/UL (ref 1–4.8)
LYMPHOCYTES NFR BLD: 18.2 % (ref 18–48)
MAGNESIUM SERPL-MCNC: 1.8 MG/DL (ref 1.6–2.6)
MCH RBC QN AUTO: 22.1 PG (ref 27–31)
MCHC RBC AUTO-ENTMCNC: 28.3 G/DL (ref 32–36)
MCV RBC AUTO: 78 FL (ref 82–98)
MICROSCOPIC COMMENT: NORMAL
MONOCYTES # BLD AUTO: 1 K/UL (ref 0.3–1)
MONOCYTES NFR BLD: 14.2 % (ref 4–15)
NEUTROPHILS # BLD AUTO: 4.2 K/UL (ref 1.8–7.7)
NEUTROPHILS NFR BLD: 62.1 % (ref 38–73)
NITRITE UR QL STRIP: NEGATIVE
NRBC BLD-RTO: 0 /100 WBC
OSMOLALITY UR: 298 MOSM/KG (ref 50–1200)
PH UR STRIP: 6 [PH] (ref 5–8)
PLATELET # BLD AUTO: 398 K/UL (ref 150–350)
PMV BLD AUTO: 10.2 FL (ref 9.2–12.9)
POCT GLUCOSE: 143 MG/DL (ref 70–110)
POCT GLUCOSE: 163 MG/DL (ref 70–110)
POCT GLUCOSE: 182 MG/DL (ref 70–110)
POTASSIUM SERPL-SCNC: 2.9 MMOL/L (ref 3.5–5.1)
POTASSIUM SERPL-SCNC: 2.9 MMOL/L (ref 3.5–5.1)
PROT SERPL-MCNC: 7.2 G/DL (ref 6–8.4)
PROT UR QL STRIP: ABNORMAL
RBC # BLD AUTO: 3.39 M/UL (ref 4–5.4)
RBC #/AREA URNS AUTO: 1 /HPF (ref 0–4)
SODIUM SERPL-SCNC: 142 MMOL/L (ref 136–145)
SODIUM SERPL-SCNC: 143 MMOL/L (ref 136–145)
SODIUM UR-SCNC: 91 MMOL/L (ref 20–250)
SP GR UR STRIP: 1.01 (ref 1–1.03)
SQUAMOUS #/AREA URNS AUTO: 0 /HPF
TROPONIN I SERPL DL<=0.01 NG/ML-MCNC: 0.01 NG/ML (ref 0–0.03)
TROPONIN I SERPL DL<=0.01 NG/ML-MCNC: 0.03 NG/ML (ref 0–0.03)
URN SPEC COLLECT METH UR: ABNORMAL
UUN UR-MCNC: 161 MG/DL (ref 140–1050)
WBC # BLD AUTO: 6.7 K/UL (ref 3.9–12.7)
WBC #/AREA URNS AUTO: 3 /HPF (ref 0–5)

## 2019-12-26 PROCEDURE — 85025 COMPLETE CBC W/AUTO DIFF WBC: CPT

## 2019-12-26 PROCEDURE — 96361 HYDRATE IV INFUSION ADD-ON: CPT

## 2019-12-26 PROCEDURE — 99220 PR INITIAL OBSERVATION CARE,LEVL III: ICD-10-PCS | Mod: GC,,, | Performed by: HOSPITALIST

## 2019-12-26 PROCEDURE — 63600175 PHARM REV CODE 636 W HCPCS: Performed by: STUDENT IN AN ORGANIZED HEALTH CARE EDUCATION/TRAINING PROGRAM

## 2019-12-26 PROCEDURE — 99285 EMERGENCY DEPT VISIT HI MDM: CPT | Mod: 25

## 2019-12-26 PROCEDURE — G0378 HOSPITAL OBSERVATION PER HR: HCPCS

## 2019-12-26 PROCEDURE — 93010 ELECTROCARDIOGRAM REPORT: CPT | Mod: ,,, | Performed by: INTERNAL MEDICINE

## 2019-12-26 PROCEDURE — 96372 THER/PROPH/DIAG INJ SC/IM: CPT | Mod: 59 | Performed by: EMERGENCY MEDICINE

## 2019-12-26 PROCEDURE — 84484 ASSAY OF TROPONIN QUANT: CPT

## 2019-12-26 PROCEDURE — 25000003 PHARM REV CODE 250: Performed by: STUDENT IN AN ORGANIZED HEALTH CARE EDUCATION/TRAINING PROGRAM

## 2019-12-26 PROCEDURE — 96360 HYDRATION IV INFUSION INIT: CPT

## 2019-12-26 PROCEDURE — 80053 COMPREHEN METABOLIC PANEL: CPT

## 2019-12-26 PROCEDURE — 81001 URINALYSIS AUTO W/SCOPE: CPT

## 2019-12-26 PROCEDURE — 80048 BASIC METABOLIC PNL TOTAL CA: CPT

## 2019-12-26 PROCEDURE — 82962 GLUCOSE BLOOD TEST: CPT

## 2019-12-26 PROCEDURE — 99220 PR INITIAL OBSERVATION CARE,LEVL III: CPT | Mod: GC,,, | Performed by: HOSPITALIST

## 2019-12-26 PROCEDURE — 99285 PR EMERGENCY DEPT VISIT,LEVEL V: ICD-10-PCS | Mod: ,,, | Performed by: EMERGENCY MEDICINE

## 2019-12-26 PROCEDURE — 93005 ELECTROCARDIOGRAM TRACING: CPT

## 2019-12-26 PROCEDURE — 36415 COLL VENOUS BLD VENIPUNCTURE: CPT

## 2019-12-26 PROCEDURE — 84300 ASSAY OF URINE SODIUM: CPT

## 2019-12-26 PROCEDURE — 63600175 PHARM REV CODE 636 W HCPCS: Performed by: EMERGENCY MEDICINE

## 2019-12-26 PROCEDURE — 25000003 PHARM REV CODE 250: Performed by: EMERGENCY MEDICINE

## 2019-12-26 PROCEDURE — 99285 EMERGENCY DEPT VISIT HI MDM: CPT | Mod: ,,, | Performed by: EMERGENCY MEDICINE

## 2019-12-26 PROCEDURE — 83935 ASSAY OF URINE OSMOLALITY: CPT

## 2019-12-26 PROCEDURE — 82570 ASSAY OF URINE CREATININE: CPT

## 2019-12-26 PROCEDURE — 93010 EKG 12-LEAD: ICD-10-PCS | Mod: ,,, | Performed by: INTERNAL MEDICINE

## 2019-12-26 PROCEDURE — 83735 ASSAY OF MAGNESIUM: CPT

## 2019-12-26 PROCEDURE — 84540 ASSAY OF URINE/UREA-N: CPT

## 2019-12-26 RX ORDER — SODIUM CHLORIDE 0.9 % (FLUSH) 0.9 %
10 SYRINGE (ML) INJECTION
Status: DISCONTINUED | OUTPATIENT
Start: 2019-12-26 | End: 2019-12-27 | Stop reason: HOSPADM

## 2019-12-26 RX ORDER — IBUPROFEN 200 MG
16 TABLET ORAL
Status: DISCONTINUED | OUTPATIENT
Start: 2019-12-26 | End: 2019-12-27 | Stop reason: HOSPADM

## 2019-12-26 RX ORDER — ROSUVASTATIN CALCIUM 10 MG/1
40 TABLET, COATED ORAL DAILY
Status: DISCONTINUED | OUTPATIENT
Start: 2019-12-26 | End: 2019-12-27 | Stop reason: HOSPADM

## 2019-12-26 RX ORDER — SODIUM CHLORIDE 0.9 % (FLUSH) 0.9 %
10 SYRINGE (ML) INJECTION
Status: CANCELLED | OUTPATIENT
Start: 2019-12-26

## 2019-12-26 RX ORDER — IBUPROFEN 200 MG
24 TABLET ORAL
Status: DISCONTINUED | OUTPATIENT
Start: 2019-12-26 | End: 2019-12-27 | Stop reason: HOSPADM

## 2019-12-26 RX ORDER — CLOPIDOGREL BISULFATE 75 MG/1
75 TABLET ORAL DAILY
Status: DISCONTINUED | OUTPATIENT
Start: 2019-12-26 | End: 2019-12-27 | Stop reason: HOSPADM

## 2019-12-26 RX ORDER — PANTOPRAZOLE SODIUM 40 MG/1
40 TABLET, DELAYED RELEASE ORAL DAILY
Status: DISCONTINUED | OUTPATIENT
Start: 2019-12-26 | End: 2019-12-27 | Stop reason: HOSPADM

## 2019-12-26 RX ORDER — POTASSIUM CHLORIDE 20 MEQ/1
40 TABLET, EXTENDED RELEASE ORAL ONCE
Status: COMPLETED | OUTPATIENT
Start: 2019-12-26 | End: 2019-12-26

## 2019-12-26 RX ORDER — FERROUS SULFATE 325(65) MG
325 TABLET, DELAYED RELEASE (ENTERIC COATED) ORAL DAILY
Status: DISCONTINUED | OUTPATIENT
Start: 2019-12-26 | End: 2019-12-27 | Stop reason: HOSPADM

## 2019-12-26 RX ORDER — NAPROXEN SODIUM 220 MG/1
81 TABLET, FILM COATED ORAL DAILY
Status: DISCONTINUED | OUTPATIENT
Start: 2019-12-26 | End: 2019-12-27 | Stop reason: HOSPADM

## 2019-12-26 RX ORDER — PROMETHAZINE HYDROCHLORIDE 25 MG/1
25 TABLET ORAL EVERY 6 HOURS PRN
Status: DISCONTINUED | OUTPATIENT
Start: 2019-12-26 | End: 2019-12-27 | Stop reason: HOSPADM

## 2019-12-26 RX ORDER — LEVETIRACETAM 500 MG/1
500 TABLET ORAL 2 TIMES DAILY
Status: DISCONTINUED | OUTPATIENT
Start: 2019-12-26 | End: 2019-12-27 | Stop reason: HOSPADM

## 2019-12-26 RX ORDER — ALLOPURINOL 300 MG/1
300 TABLET ORAL DAILY
Status: DISCONTINUED | OUTPATIENT
Start: 2019-12-26 | End: 2019-12-26

## 2019-12-26 RX ORDER — ONDANSETRON 2 MG/ML
4 INJECTION INTRAMUSCULAR; INTRAVENOUS EVERY 8 HOURS PRN
Status: DISCONTINUED | OUTPATIENT
Start: 2019-12-26 | End: 2019-12-27 | Stop reason: HOSPADM

## 2019-12-26 RX ORDER — GLUCAGON 1 MG
1 KIT INJECTION
Status: DISCONTINUED | OUTPATIENT
Start: 2019-12-26 | End: 2019-12-27 | Stop reason: HOSPADM

## 2019-12-26 RX ORDER — HEPARIN SODIUM 5000 [USP'U]/ML
5000 INJECTION, SOLUTION INTRAVENOUS; SUBCUTANEOUS EVERY 8 HOURS
Status: DISCONTINUED | OUTPATIENT
Start: 2019-12-26 | End: 2019-12-27 | Stop reason: HOSPADM

## 2019-12-26 RX ADMIN — CLOPIDOGREL BISULFATE 75 MG: 75 TABLET ORAL at 03:12

## 2019-12-26 RX ADMIN — SODIUM CHLORIDE 1000 ML: 0.9 INJECTION, SOLUTION INTRAVENOUS at 08:12

## 2019-12-26 RX ADMIN — SODIUM CHLORIDE 1000 ML: 0.9 INJECTION, SOLUTION INTRAVENOUS at 03:12

## 2019-12-26 RX ADMIN — PANTOPRAZOLE SODIUM 40 MG: 40 TABLET, DELAYED RELEASE ORAL at 03:12

## 2019-12-26 RX ADMIN — HEPARIN SODIUM 5000 UNITS: 5000 INJECTION, SOLUTION INTRAVENOUS; SUBCUTANEOUS at 10:12

## 2019-12-26 RX ADMIN — POTASSIUM CHLORIDE 40 MEQ: 1500 TABLET, EXTENDED RELEASE ORAL at 06:12

## 2019-12-26 RX ADMIN — ROSUVASTATIN CALCIUM 40 MG: 10 TABLET, FILM COATED ORAL at 03:12

## 2019-12-26 RX ADMIN — POTASSIUM CHLORIDE 40 MEQ: 1500 TABLET, EXTENDED RELEASE ORAL at 03:12

## 2019-12-26 RX ADMIN — ASPIRIN 81 MG CHEWABLE TABLET 81 MG: 81 TABLET CHEWABLE at 03:12

## 2019-12-26 RX ADMIN — POTASSIUM BICARBONATE 50 MEQ: 978 TABLET, EFFERVESCENT ORAL at 10:12

## 2019-12-26 RX ADMIN — HEPARIN SODIUM 5000 UNITS: 5000 INJECTION, SOLUTION INTRAVENOUS; SUBCUTANEOUS at 03:12

## 2019-12-26 RX ADMIN — LEVETIRACETAM 500 MG: 500 TABLET, FILM COATED ORAL at 10:12

## 2019-12-26 RX ADMIN — FERROUS SULFATE TAB EC 325 MG (65 MG FE EQUIVALENT) 325 MG: 325 (65 FE) TABLET DELAYED RESPONSE at 03:12

## 2019-12-26 NOTE — ASSESSMENT & PLAN NOTE
See chronic renal disease. Cr 2.8 (baseline ~1.8)    Plan  -BMP BID  -urine electrolyte studies pending

## 2019-12-26 NOTE — ASSESSMENT & PLAN NOTE
Patient endorses prior episodes of weakness that resolved upon sitting down. The patient reports poor oral intake and was positive for orthostatic hypotension on testing in the ED. She received 2 L of fluid and her blood pressure recovered greatly and she reported feeling well.    Plan  -monitor vitals q4h, fluids prn  -encourage oral intake

## 2019-12-26 NOTE — ASSESSMENT & PLAN NOTE
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 In remission with no current complications. Currently on anastrazole     Plan  -hold anastrazole while inpatient

## 2019-12-26 NOTE — NURSING
PIV to right ac discontinued due to complications. Catheter intact. Attempted to gain new access x 2 and was unsuccessful. Charge RN to call [facility] to request assistance.

## 2019-12-26 NOTE — ED NOTES
Patient identifiers verified and correct for Renata Bergman.    LOC: The patient is awake, alert and aware of environment with an appropriate affect, the patient is oriented x 3 and speaking appropriately.  APPEARANCE: Patient resting comfortably and in no acute distress, patient is clean and well groomed, patient's clothing is properly fastened.  SKIN: The skin is warm and dry, color consistent with ethnicity, patient has normal skin turgor and moist mucus membranes, skin intact, no breakdown or bruising noted but scarring on bilateral knees.  MUSCULOSKELETAL: Patient moving all extremities spontaneously, no obvious swelling or deformities noted. Pt ambulates with the use of a rollator  RESPIRATORY: Airway is open and patent, respirations are spontaneous, patient has a normal effort and rate, no accessory muscle use noted, bilateral breath sounds clear.  CARDIAC: Patient has a normal rate and regular rhythm, no periphreal edema noted, capillary refill < 3 seconds.  ABDOMEN: Soft and non tender to palpation, no distention noted but obese, normoactive bowel sounds present in all four quadrants.  NEUROLOGIC: PERRL, eyes open spontaneously, behavior appropriate to situation, follows commands, facial expression symmetrical, bilateral hand grasp equal and even, purposeful motor response noted, normal sensation in all extremities when touched with a finger.

## 2019-12-26 NOTE — ASSESSMENT & PLAN NOTE
Patient with known CAD s/p stenting years ago.    Plan  -continue home aspirin  -continue home plavix

## 2019-12-26 NOTE — NURSING
Patient admitted to Observation.  Reviewed plan of care with patient and daughter, who is at bedside.  Diet ordered, Nutrition services contacted.

## 2019-12-26 NOTE — MEDICAL/APP STUDENT
"[facility] Medicine  History & Physical    Patient Name: Renata Bergman  MRN: [id number]  Admission Date: 12/26/2019  Primary Care Provider: Ethel Berger MD    [facility] Medicine Team: [facility] MED 5 Linda Mccrary       Subjective:     Principal Problem: Orthostatic hypotension     Chief Complaint:   Chief Complaint   Patient presents with    near-syncope     daughter found patient "lethargic" sitting with walker; EMS states patient became more awake and alert upon their arrival; GCS 15; ; denies chest pain or SOB        HPI:     79 year old female with history of DM2, CAD, Stage 1 breast cancer in remission post chemo/rad/lumpectecomy, and hypertension who presents after a pre-syncopal episode     At 6 am this morning patient went to bathroom to urinate, upon getting up and taking a few steps she felt weak and light headed. She proceeded to sit down on her walker and call her daughter. Patient denies any LOC, palpitations, post-ictal state and bowel/bladder incontinence. Daughter arrived and helped the pt to her bedroom to lay down. Daughter stated pt was weak, unable to stand on her own and drowsy, but denies any alteration in mental status. Patient states she had a similar episode 2 days ago that was less severe, in which she felt week in the legs while standing for a prolonged period of time, no LOC.     Pt was told last Thursday 12/19, to stop her metformin due to an increase in creatinine. Pt denies any chest pain, fevers, N/V, SOB, cough, urinary symptoms or change in bowel habits. She denies any urinary frequency or dysuria.       Past Medical History:   Diagnosis Date    Breast cancer 2016    DCIS and IDC, stage I    Cataract     Coronary artery disease 9/4/2012    Diabetes mellitus due to abnormal insulin 9/4/2012    Diabetes mellitus type II     Genetic testing     brca2 positive     GERD (gastroesophageal reflux disease) 9/4/2012    Gout, arthritis " 9/4/2012    Hyperlipidemia 9/4/2012    Hypertension     Obesity     Primary osteoarthritis of both knees 9/4/2012    Renal manifestation of secondary diabetes mellitus     Type 2 diabetes with peripheral circulatory disorder, controlled        Past Surgical History:   Procedure Laterality Date    BREAST BIOPSY      BREAST LUMPECTOMY Left 08/01/2016    DCIS and IDC, s/p lumpectomy    COLONOSCOPY N/A 10/8/2018    Procedure: COLONOSCOPY;  Surgeon: Marcio Louie MD;  Location: [facility] ENDO (4TH FLR);  Service: Endoscopy;  Laterality: N/A;    CORONARY ANGIOPLASTY      ESOPHAGOGASTRODUODENOSCOPY N/A 10/8/2018    Procedure: ESOPHAGOGASTRODUODENOSCOPY (EGD);  Surgeon: Marcio Louie MD;  Location: [facility] ENDO (4TH FLR);  Service: Endoscopy;  Laterality: N/A;  combined egd/colon order/Plavix/OK to hold med 5 days prior to procedures per Dr Puente/see telephone encounter dated 8/22/18/svn    HYSTERECTOMY      JOINT REPLACEMENT      left and right k nee    KNEE SURGERY      TOTAL ABDOMINAL HYSTERECTOMY W/ BILATERAL SALPINGOOPHORECTOMY         Review of patient's allergies indicates:   Allergen Reactions    Lisinopril Anaphylaxis       No current facility-administered medications on file prior to encounter.      Current Outpatient Medications on File Prior to Encounter   Medication Sig    allopurinol (ZYLOPRIM) 300 MG tablet TAKE 1 TABLET BY MOUTH ONCE DAILY    amLODIPine (NORVASC) 5 MG tablet TAKE 1 TABLET BY MOUTH DAILY    anastrozole (ARIMIDEX) 1 mg Tab TAKE 1 TABLET(1 MG) BY MOUTH EVERY DAY. STOP LETROZOLE FEMARA    aspirin 81 MG Chew Take 81 mg by mouth once daily.      carvedilol (COREG) 25 MG tablet TAKE 1 TABLET BY MOUTH TWICE DAILY    chlorthalidone (HYGROTEN) 25 MG Tab TAKE ONE-HALF TABLET BY MOUTH EVERY DAY (Patient taking differently: TAKE ONE-HALF TABLET every other day)    clopidogrel (PLAVIX) 75 mg tablet Take 1 tablet (75 mg total) by mouth once daily.    cyanocobalamin 1,000 mcg/mL  injection Inject 1 mL (1,000 mcg total) into the muscle once a week. Dispense #12 25 gague one inch needles and #12 one ml syringes    ergocalciferol (ERGOCALCIFEROL) 50,000 unit Cap Take 1 capsule (50,000 Units total) by mouth twice a week.    ferrous sulfate 325 (65 FE) MG EC tablet Take 1 tablet (325 mg total) by mouth once daily.    fexofenadine (ALLEGRA) 30 MG tablet Take 30 mg by mouth daily as needed.    levetiracetam XR (KEPPRA XR) 500 mg Tb24 24 hr tablet TAKE 2 TABLETS(1000 MG) BY MOUTH EVERY DAY    metFORMIN (GLUCOPHAGE) 850 MG tablet TAKE 1 TABLET BY MOUTH TWICE DAILY    nitroGLYCERIN (NITROSTAT) 0.4 MG SL tablet PLACE 1 TABLET UNDER THE TONGUE EVERY 5 MINUTES AS NEEDED FOR CHEST PAIN.    omeprazole (PRILOSEC) 20 MG capsule TAKE 2 CAPSULES BY MOUTH EVERY DAY    potassium chloride (KLOR-CON) 10 MEQ TbSR TAKE 1 TABLET BY MOUTH EVERY DAY    rosuvastatin (CRESTOR) 40 MG Tab TAKE 1 TABLET BY MOUTH EVERY DAY     Family History     Problem Relation (Age of Onset)    Breast cancer Mother (45), Maternal Aunt, Daughter (45), Maternal Aunt    Cancer Mother (55)    Diabetes Mother, Son    Heart disease Father    Hyperlipidemia Maternal Aunt    Hypertension Mother, Maternal Aunt, Maternal Uncle    Ovarian cancer Mother    Stroke Son        Tobacco Use    Smoking status: Former Smoker     Packs/day: 1.00     Types: Cigarettes     Last attempt to quit: 1962     Years since quittin.4    Smokeless tobacco: Never Used   Substance and Sexual Activity    Alcohol use: No    Drug use: No    Sexual activity: Never     Review of Systems   Constitutional: Negative for activity change, appetite change, chills, diaphoresis and fatigue.   HENT: Negative for congestion, ear pain and postnasal drip.    Eyes: Negative.    Respiratory: Negative for apnea, chest tightness and shortness of breath.    Cardiovascular: Negative for chest pain, palpitations and leg swelling.   Gastrointestinal: Negative for abdominal  distention, abdominal pain, constipation and diarrhea.   Endocrine: Negative for polydipsia and polyuria.   Genitourinary: Negative for dysuria, frequency and urgency.   Skin: Negative for color change and pallor.   Neurological: Positive for dizziness, weakness and light-headedness. Negative for seizures and speech difficulty.   Hematological: Negative.    Psychiatric/Behavioral: Negative.      Objective:     Vital Signs (Most Recent):  Temp: 97.8 °F (36.6 °C) (12/26/19 1351)  Pulse: 76 (12/26/19 1351)  Resp: 16 (12/26/19 1351)  BP: 135/61 (12/26/19 1351)  SpO2: 99 % (12/26/19 1351) Vital Signs (24h Range):  Temp:  [97.8 °F (36.6 °C)-97.9 °F (36.6 °C)] 97.8 °F (36.6 °C)  Pulse:  [62-82] 76  Resp:  [11-21] 16  SpO2:  [95 %-100 %] 99 %  BP: ()/(48-88) 135/61     Weight: 93 kg (205 lb)  Body mass index is 34.11 kg/m².    Physical Exam   Constitutional: She appears well-developed and well-nourished.   HENT:   Head: Normocephalic and atraumatic.   Eyes: EOM are normal.   Neck: Normal range of motion. Neck supple.   Cardiovascular: Normal rate, regular rhythm, normal heart sounds and intact distal pulses.   Pulmonary/Chest: Effort normal and breath sounds normal. No respiratory distress.   Abdominal: Soft. Bowel sounds are normal. She exhibits no distension. There is no tenderness.   Musculoskeletal: Normal range of motion. She exhibits no edema.   Neurological: She is alert. No cranial nerve deficit.   Skin: Skin is warm and dry.   Psychiatric: She has a normal mood and affect.         CRANIAL NERVES     CN III, IV, VI   Extraocular motions are normal.       Significant Labs:   CBC:   Recent Labs   Lab 12/26/19  0805   WBC 6.70   HGB 7.5*   HCT 26.5*   *     CMP:   Recent Labs   Lab 12/26/19  0805 12/26/19  1512    143   K 2.9* 2.9*    105   CO2 28 30*   * 107   BUN 18 16   CREATININE 2.8* 2.4*   CALCIUM 10.0 9.4   PROT 7.2  --    ALBUMIN 3.5  --    BILITOT 0.6  --    ALKPHOS 65  --     AST 24  --    ALT 21  --    ANIONGAP 11 8   EGFRNONAA 15.7* 18.9*     Magnesium:   Recent Labs   Lab 12/26/19  0805   MG 1.8     Troponin:   Recent Labs   Lab 12/26/19  0805 12/26/19  1212   TROPONINI 0.033* 0.015     Urine Studies:   Recent Labs   Lab 12/26/19  0825   COLORU Straw   APPEARANCEUA Clear   PHUR 6.0   SPECGRAV 1.010   PROTEINUA 1+*   GLUCUA Negative   KETONESU Negative   BILIRUBINUA Negative   OCCULTUA 2+*   NITRITE Negative   LEUKOCYTESUR Trace*   RBCUA 1   WBCUA 3   BACTERIA Occasional   SQUAMEPITHEL 0   HYALINECASTS 0       Significant Imaging: I have reviewed all pertinent imaging results/findings within the past 24 hours.    Assessment/Plan:       79 year old female with history of DM2, CAD, Stage 1 breast cancer in remission post chemo/rad/lumpectecomy, and hypertension who presents after a pre-syncopal episode     Dehydration  Patient reports decreased PO fluid intake, and previous episode of weakness that improved upon rest. The patient reports was positive for orthostatic hypotension and after receiving 2 L of fluids improved clinically and blood pressure stabilized.      Plan  - hold amlodipine, thiazide diuretic and beta blocker  - monitor vitals Q6, fluids as needed, stable currently  - encourage PO fluid intake       Hypokalemia  Potassium 2.9 on admission, likely 2/2 to dehydration and diuretic use. EKG reassuring, no U waves or T wave flattening.      Plan  - replace K PRN  - hold thiazide for now  - trend K level with daily BMP  - Check Mg level to rule out etiology   -replete PRN       MARIA ALEJANDRA (acute kidney injury) superimposed on CKD stage 4   Patient presented with MARIA ALEJANDRA of 2.8 (baseline 1.6 - 1.8). Most likely due to prerenal etiology 2/2 dehydration. BUN/Cr ration < 20. Urinalysis with 1+ protein. Patient recently stopped metformin due to increased creatinine     Plan  - trend creatinine with renal function panel this evening, BMP daily  - urine electrolyte studies pending  - fluids  PRN, currently stable  - avoid nephrotoxic medications  - possible renal US pending urine electrolyte studies        Chronic iron deficiency anemia   Hb on admission 7.5, with MCV 78 last iron studies in August 2018.     Plan  - daily CBC  - clinically stable and non-symptomatic   - continue home iron supplement   - possible iron studies         Controlled type 2 DM, non complicated   Patient with A1C of 6, glucose of 145 on admission. Was recently on metformin but told to stop by PCP due to kidney function     Plan  - diabetic diet  - monitor glucose Q12          HTN (hypertension), benign  At home patient taking: Amlodipine 5mg, Carvedilol 25mg, Chlorthalidone 25mg     Plan  -hold home pressure meds while inpatient  -d/c chlorthalidone  -will consider decreasing carvedilol on discharge        Coronary artery disease due to lipid rich plaque  Patient with known CAD s/p stenting years ago.     Plan  -continue home aspirin 81 mg   -continue home clopidogrel 75 mg         Hyperlipidemia  -continue rosuvastatin 40 mg            VTE Risk Mitigation (From admission, onward)         Ordered     heparin (porcine) injection 5,000 Units  Every 8 hours      12/26/19 1207     IP VTE HIGH RISK PATIENT  Once      12/26/19 1207                  Linda Mccrary MS4  Department of Hospital Medicine   [facility]

## 2019-12-26 NOTE — ASSESSMENT & PLAN NOTE
At home patient taking: Amlodipine 5mg, Carvedilol 25mg, Chlorthalidone 25mg    Plan  -hold home pressure meds while inpatient  -d/c chlorthalidone  -will consider decreasing carvedilol on discharge

## 2019-12-26 NOTE — ED PROVIDER NOTES
"Source of History:  Patient and daugher    Chief complaint:  near-syncope (daughter found patient "lethargic" sitting with walker; EMS states patient became more awake and alert upon their arrival; GCS 15; ; denies chest pain or SOB)      HPI:  Renata Bergman is a 79 y.o. female presenting with weakness and lethargy that occurred today.  The patient was found sitting on her walker outside of her bathroom, in her bedroom.  The daughter states she was not really answering questions well and did not quite know where she was.  Patient states that she awoke this morning and felt too weak to walk so she pushed herself on her walker to the bathroom, where she urinated.  She then pushed herself out of the bathroom and that is how she was found by her daughter.  Patient denies any problems with urination.  No bowel movements.  She states that she felt a little wobbly yesterday, but not enough to tell her family about.  Recently, her PCP called her and stopped her metformin because of her kidney function. No chest pain or shortness of breath.    ROS: As per HPI and below:  General: No fever.  No chills.  Eyes: No visual changes.  Head: No headache.    Integument: No rashes or lesions.  Chest: No shortness of breath.  Cardiovascular: No chest pain.  Abdomen: No abdominal pain.  No nausea or vomiting.  Urinary: No abnormal urination.  Neurologic: No focal weakness.  No numbness.  Hematologic: No easy bruising.  Endocrine: No excessive thirst or urination.      Review of patient's allergies indicates:   Allergen Reactions    Lisinopril Anaphylaxis       No current facility-administered medications on file prior to encounter.      Current Outpatient Medications on File Prior to Encounter   Medication Sig Dispense Refill    allopurinol (ZYLOPRIM) 300 MG tablet TAKE 1 TABLET BY MOUTH ONCE DAILY 90 tablet 4    amLODIPine (NORVASC) 5 MG tablet TAKE 1 TABLET BY MOUTH DAILY 90 tablet 4    anastrozole (ARIMIDEX) 1 mg Tab " TAKE 1 TABLET(1 MG) BY MOUTH EVERY DAY. STOP LETROZOLE FEMARA 30 tablet 11    aspirin 81 MG Chew Take 81 mg by mouth once daily.        carvedilol (COREG) 25 MG tablet TAKE 1 TABLET BY MOUTH TWICE DAILY 180 tablet 4    chlorthalidone (HYGROTEN) 25 MG Tab TAKE ONE-HALF TABLET BY MOUTH EVERY DAY (Patient taking differently: TAKE ONE-HALF TABLET every other day) 90 tablet 4    clopidogrel (PLAVIX) 75 mg tablet Take 1 tablet (75 mg total) by mouth once daily. 90 tablet 3    cyanocobalamin 1,000 mcg/mL injection Inject 1 mL (1,000 mcg total) into the muscle once a week. Dispense #12 25 gague one inch needles and #12 one ml syringes 12 mL 3    ergocalciferol (ERGOCALCIFEROL) 50,000 unit Cap Take 1 capsule (50,000 Units total) by mouth twice a week. 24 capsule 1    ferrous sulfate 325 (65 FE) MG EC tablet Take 1 tablet (325 mg total) by mouth once daily. 90 tablet 4    fexofenadine (ALLEGRA) 30 MG tablet Take 30 mg by mouth daily as needed.      levetiracetam XR (KEPPRA XR) 500 mg Tb24 24 hr tablet TAKE 2 TABLETS(1000 MG) BY MOUTH EVERY DAY 60 tablet 6    metFORMIN (GLUCOPHAGE) 850 MG tablet TAKE 1 TABLET BY MOUTH TWICE DAILY 180 tablet 1    nitroGLYCERIN (NITROSTAT) 0.4 MG SL tablet PLACE 1 TABLET UNDER THE TONGUE EVERY 5 MINUTES AS NEEDED FOR CHEST PAIN. 450 tablet 0    omeprazole (PRILOSEC) 20 MG capsule TAKE 2 CAPSULES BY MOUTH EVERY  capsule 0    potassium chloride (KLOR-CON) 10 MEQ TbSR TAKE 1 TABLET BY MOUTH EVERY DAY 90 tablet 0    rosuvastatin (CRESTOR) 40 MG Tab TAKE 1 TABLET BY MOUTH EVERY DAY 90 tablet 4       PMH:  As per HPI and below:  Past Medical History:   Diagnosis Date    Breast cancer 2016    DCIS and IDC, stage I    Cataract     Coronary artery disease 9/4/2012    Diabetes mellitus due to abnormal insulin 9/4/2012    Diabetes mellitus type II     Genetic testing     brca2 positive     GERD (gastroesophageal reflux disease) 9/4/2012    Gout, arthritis 9/4/2012     Hyperlipidemia 2012    Hypertension     Obesity     Primary osteoarthritis of both knees 2012    Renal manifestation of secondary diabetes mellitus     Type 2 diabetes with peripheral circulatory disorder, controlled      Past Surgical History:   Procedure Laterality Date    BREAST BIOPSY      BREAST LUMPECTOMY Left 2016    DCIS and IDC, s/p lumpectomy    COLONOSCOPY N/A 10/8/2018    Procedure: COLONOSCOPY;  Surgeon: Marcio Louie MD;  Location: [facility] (4TH FLR);  Service: Endoscopy;  Laterality: N/A;    CORONARY ANGIOPLASTY      ESOPHAGOGASTRODUODENOSCOPY N/A 10/8/2018    Procedure: ESOPHAGOGASTRODUODENOSCOPY (EGD);  Surgeon: Marcio Louie MD;  Location: [facility] ENDO (4TH FLR);  Service: Endoscopy;  Laterality: N/A;  combined egd/colon order/Plavix/OK to hold med 5 days prior to procedures per Dr Puente/see telephone encounter dated 18/svn    HYSTERECTOMY      JOINT REPLACEMENT      left and right k nee    KNEE SURGERY      TOTAL ABDOMINAL HYSTERECTOMY W/ BILATERAL SALPINGOOPHORECTOMY         Social History     Socioeconomic History    Marital status:      Spouse name: Not on file    Number of children: Not on file    Years of education: Not on file    Highest education level: Not on file   Occupational History    Not on file   Social Needs    Financial resource strain: Not on file    Food insecurity:     Worry: Not on file     Inability: Not on file    Transportation needs:     Medical: Not on file     Non-medical: Not on file   Tobacco Use    Smoking status: Former Smoker     Packs/day: 1.00     Types: Cigarettes     Last attempt to quit: 1962     Years since quittin.4    Smokeless tobacco: Never Used   Substance and Sexual Activity    Alcohol use: No    Drug use: No    Sexual activity: Never   Lifestyle    Physical activity:     Days per week: Not on file     Minutes per session: Not on file    Stress: Not on file   Relationships     Social connections:     Talks on phone: Not on file     Gets together: Not on file     Attends [employer] witness service: Not on file     Active member of club or organization: Not on file     Attends meetings of clubs or organizations: Not on file     Relationship status: Not on file   Other Topics Concern    Not on file   Social History Narrative    Not on file       Family History   Problem Relation Age of Onset    Cancer Mother 55        colon    Diabetes Mother     Hypertension Mother     Breast cancer Mother 45    Ovarian cancer Mother     Hypertension Maternal Aunt     Hyperlipidemia Maternal Aunt     Breast cancer Maternal Aunt     Hypertension Maternal Uncle     Heart disease Father     Breast cancer Daughter 45    Breast cancer Maternal Aunt     Diabetes Son     Stroke Son     Glaucoma Neg Hx     Heart attack Neg Hx     Heart failure Neg Hx        Physical Exam:    Vitals:    12/26/19 0847 12/26/19 0851 12/26/19 0854 12/26/19 0931   BP: (!) 76/48 (!) 120/59  (!) 129/58   BP Location: Right arm      Patient Position: Standing      Pulse: 78  65 68   Resp:   (!) 21 11   Temp:       TempSrc:       SpO2:   100% 98%   Weight:         Appearance: No acute distress.  Skin: No rashes seen.  Good turgor.  No abrasions.  No ecchymoses.  Eyes: No conjunctival injection.  ENT: Oropharynx clear.    Chest: Clear to auscultation bilaterally.  Good air movement.  No wheezes.  No rhonchi.  Cardiovascular: Regular rate and rhythm.  No murmurs. No gallops. No rubs.  Abdomen: Soft.  Not distended.  Nontender.  No guarding.  No rebound.  Musculoskeletal: Good range of motion all joints.  No deformities.  Neck supple.  No meningismus.  Neurologic: Motor intact.  Sensation intact.  Cerebellar intact.  Cranial nerves intact.  Mental Status:  Alert and oriented x 3.  Appropriate, conversant.      Initial Impression:  Weakness, syncope.  Patient has felt weak all day.  EMS notes that she was weaker when she stood up to  get on the EMS stretcherGloria  Alonzo to do a syncope workup, give fluids and reassess.  Given her age and comorbidities I anticipate observation.    Laboratory Studies:  Labs Reviewed   CBC W/ AUTO DIFFERENTIAL - Abnormal; Notable for the following components:       Result Value    RBC 3.39 (*)     Hemoglobin 7.5 (*)     Hematocrit 26.5 (*)     Mean Corpuscular Volume 78 (*)     Mean Corpuscular Hemoglobin 22.1 (*)     Mean Corpuscular Hemoglobin Conc 28.3 (*)     RDW 19.6 (*)     Platelets 398 (*)     All other components within normal limits   COMPREHENSIVE METABOLIC PANEL - Abnormal; Notable for the following components:    Potassium 2.9 (*)     Glucose 145 (*)     Creatinine 2.8 (*)     eGFR if  18.1 (*)     eGFR if non  15.7 (*)     All other components within normal limits   TROPONIN I - Abnormal; Notable for the following components:    Troponin I 0.033 (*)     All other components within normal limits   URINALYSIS, REFLEX TO URINE CULTURE - Abnormal; Notable for the following components:    Protein, UA 1+ (*)     Occult Blood UA 2+ (*)     Leukocytes, UA Trace (*)     All other components within normal limits    Narrative:     Preferred Collection Type->Urine, Catheterized   URINALYSIS MICROSCOPIC    Narrative:     Preferred Collection Type->Urine, Catheterized       EKG (independently interpreted by me):  Normal sinus rhythm, first-degree AV block, similar to previous EKG with no acute ischemia.    Chart reviewed.     Imaging Results    None         Medications Given:  Medications   potassium bicarbonate disintegrating tablet 50 mEq (has no administration in time range)   sodium chloride 0.9% bolus 1,000 mL (1,000 mLs Intravenous New Bag 12/26/19 0825)       Discussed with: Hospital Medicine    ED Course:  ED Course as of Dec 26 0956   Thu Dec 26, 2019   0923 2.3 five days ago   Creatinine(!): 2.8 [DC]   0923 Potassium(!): 2.9 [DC]   0923 Troponin I(!): 0.033 [DC]   0923 8.4  five days ago   Hemoglobin(!): 7.5 [DC]   0924 WBC, UA: 3 [DC]   0924 RBC, UA: 1 [DC]   0924 When standing only   BP(!): 76/48 [DC]      ED Course User Index  [DC] Tom Flynn MD           MDM:    79 y.o. female with syncope, ongoing weakness when standing.  Likely dehydrated.  Ongoing MARIA ALEJANDRA, thought to be due to metformin, but worse today despite stopping metformin.  Will obs for fluids, further evaluation.  Troponin near baseline.  Will replace potassium.    Diagnostic Impression:    1. Dehydration    2. Syncope    3. MARIA ALEJANDRA (acute kidney injury)    4. Hypokalemia               Tom Flynn MD  12/26/19 0977

## 2019-12-26 NOTE — ED NOTES
Pt assisted to a standing position and stated that she felt DIZZY and RN assisted her bad to bed and repositioned for comfort with blankets tucked in    [employer]

## 2019-12-26 NOTE — ED NOTES
Telemetry Verification   Patient placed on Telemetry Box  Verified with War Room  Box #    Monitor Tech nash   Rate 89   Rhythm NSR

## 2019-12-26 NOTE — ASSESSMENT & PLAN NOTE
Patient with A1C of 6, glucose of 145 on admission. Was recently on metformin but told to stop by PCP due to kidney function    Plan  -diabetic diet  -monitor glucose BID

## 2019-12-26 NOTE — ED TRIAGE NOTES
"Renata Bergman, a 79 y.o. female presents to the ED w/ complaint of near syncopal episode while going to the bathroom and generalized weakness/lethargic.  Pt denies CP, SOB and dysuria.        Triage note:  Chief Complaint   Patient presents with    near-syncope     daughter found patient "lethargic" sitting with walker; EMS states patient became more awake and alert upon their arrival; GCS 15; ; denies chest pain or SOB     Review of patient's allergies indicates:   Allergen Reactions    Lisinopril Anaphylaxis     Past Medical History:   Diagnosis Date    Breast cancer 2016    DCIS and IDC, stage I    Cataract     Coronary artery disease 9/4/2012    Diabetes mellitus due to abnormal insulin 9/4/2012    Diabetes mellitus type II     Genetic testing     brca2 positive     GERD (gastroesophageal reflux disease) 9/4/2012    Gout, arthritis 9/4/2012    Hyperlipidemia 9/4/2012    Hypertension     Obesity     Primary osteoarthritis of both knees 9/4/2012    Renal manifestation of secondary diabetes mellitus     Type 2 diabetes with peripheral circulatory disorder, controlled        "

## 2019-12-26 NOTE — SUBJECTIVE & OBJECTIVE
Past Medical History:   Diagnosis Date    Breast cancer 2016    DCIS and IDC, stage I    Cataract     Coronary artery disease 9/4/2012    Diabetes mellitus due to abnormal insulin 9/4/2012    Diabetes mellitus type II     Genetic testing     brca2 positive     GERD (gastroesophageal reflux disease) 9/4/2012    Gout, arthritis 9/4/2012    Hyperlipidemia 9/4/2012    Hypertension     Obesity     Primary osteoarthritis of both knees 9/4/2012    Renal manifestation of secondary diabetes mellitus     Type 2 diabetes with peripheral circulatory disorder, controlled        Past Surgical History:   Procedure Laterality Date    BREAST BIOPSY      BREAST LUMPECTOMY Left 08/01/2016    DCIS and IDC, s/p lumpectomy    COLONOSCOPY N/A 10/8/2018    Procedure: COLONOSCOPY;  Surgeon: Marcio Louie MD;  Location: [facility] ENDO (4TH FLR);  Service: Endoscopy;  Laterality: N/A;    CORONARY ANGIOPLASTY      ESOPHAGOGASTRODUODENOSCOPY N/A 10/8/2018    Procedure: ESOPHAGOGASTRODUODENOSCOPY (EGD);  Surgeon: Marcio Louie MD;  Location: [facility] (4TH FLR);  Service: Endoscopy;  Laterality: N/A;  combined egd/colon order/Plavix/OK to hold med 5 days prior to procedures per Dr Puente/see telephone encounter dated 8/22/18/svn    HYSTERECTOMY      JOINT REPLACEMENT      left and right k nee    KNEE SURGERY      TOTAL ABDOMINAL HYSTERECTOMY W/ BILATERAL SALPINGOOPHORECTOMY         Review of patient's allergies indicates:   Allergen Reactions    Lisinopril Anaphylaxis       No current facility-administered medications on file prior to encounter.      Current Outpatient Medications on File Prior to Encounter   Medication Sig    clopidogrel (PLAVIX) 75 mg tablet Take 1 tablet (75 mg total) by mouth once daily.    allopurinol (ZYLOPRIM) 300 MG tablet TAKE 1 TABLET BY MOUTH ONCE DAILY    amLODIPine (NORVASC) 5 MG tablet TAKE 1 TABLET BY MOUTH DAILY    anastrozole (ARIMIDEX) 1 mg Tab TAKE 1 TABLET(1 MG) BY MOUTH  EVERY DAY. STOP LETROZOLE FEMARA    aspirin 81 MG Chew Take 81 mg by mouth once daily.      carvedilol (COREG) 25 MG tablet TAKE 1 TABLET BY MOUTH TWICE DAILY    chlorthalidone (HYGROTEN) 25 MG Tab TAKE ONE-HALF TABLET BY MOUTH EVERY DAY (Patient taking differently: TAKE ONE-HALF TABLET every other day)    cyanocobalamin 1,000 mcg/mL injection Inject 1 mL (1,000 mcg total) into the muscle once a week. Dispense #12 25 gague one inch needles and #12 one ml syringes    ergocalciferol (ERGOCALCIFEROL) 50,000 unit Cap Take 1 capsule (50,000 Units total) by mouth twice a week.    ferrous sulfate 325 (65 FE) MG EC tablet Take 1 tablet (325 mg total) by mouth once daily.    fexofenadine (ALLEGRA) 30 MG tablet Take 30 mg by mouth daily as needed.    levetiracetam XR (KEPPRA XR) 500 mg Tb24 24 hr tablet TAKE 2 TABLETS(1000 MG) BY MOUTH EVERY DAY    metFORMIN (GLUCOPHAGE) 850 MG tablet TAKE 1 TABLET BY MOUTH TWICE DAILY    nitroGLYCERIN (NITROSTAT) 0.4 MG SL tablet PLACE 1 TABLET UNDER THE TONGUE EVERY 5 MINUTES AS NEEDED FOR CHEST PAIN.    omeprazole (PRILOSEC) 20 MG capsule TAKE 2 CAPSULES BY MOUTH EVERY DAY    potassium chloride (KLOR-CON) 10 MEQ TbSR TAKE 1 TABLET BY MOUTH EVERY DAY    rosuvastatin (CRESTOR) 40 MG Tab TAKE 1 TABLET BY MOUTH EVERY DAY     Family History     Problem Relation (Age of Onset)    Breast cancer Mother (45), Maternal Aunt, Daughter (45), Maternal Aunt    Cancer Mother (55)    Diabetes Mother, Son    Heart disease Father    Hyperlipidemia Maternal Aunt    Hypertension Mother, Maternal Aunt, Maternal Uncle    Ovarian cancer Mother    Stroke Son        Tobacco Use    Smoking status: Former Smoker     Packs/day: 1.00     Types: Cigarettes     Last attempt to quit: 1962     Years since quittin.4    Smokeless tobacco: Never Used   Substance and Sexual Activity    Alcohol use: No    Drug use: No    Sexual activity: Never     Review of Systems   Constitutional: Positive for  activity change. Negative for chills and fever.   HENT: Negative for ear pain and sore throat.    Eyes: Negative for pain and discharge.   Respiratory: Negative for cough and shortness of breath.    Cardiovascular: Negative for chest pain and leg swelling.   Gastrointestinal: Negative for abdominal distention and abdominal pain.   Genitourinary: Negative for dysuria and hematuria.   Musculoskeletal: Negative for joint swelling and myalgias.   Skin: Negative for rash and wound.   Neurological: Positive for light-headedness. Negative for facial asymmetry.   Psychiatric/Behavioral: Negative for agitation and confusion.     Objective:     Vital Signs (Most Recent):  Temp: 97.8 °F (36.6 °C) (12/26/19 1351)  Pulse: 69 (12/26/19 1546)  Resp: 16 (12/26/19 1351)  BP: 135/61 (12/26/19 1351)  SpO2: 99 % (12/26/19 1351) Vital Signs (24h Range):  Temp:  [97.8 °F (36.6 °C)-97.9 °F (36.6 °C)] 97.8 °F (36.6 °C)  Pulse:  [62-82] 69  Resp:  [11-21] 16  SpO2:  [95 %-100 %] 99 %  BP: ()/(48-88) 135/61     Weight: 93 kg (205 lb)  Body mass index is 34.11 kg/m².    Physical Exam   Constitutional: She is oriented to person, place, and time. She appears well-developed and well-nourished.   HENT:   Head: Normocephalic and atraumatic.   Eyes: Pupils are equal, round, and reactive to light. EOM are normal.   Neck: Normal range of motion. Neck supple.   Cardiovascular: Normal rate and regular rhythm.   Pulmonary/Chest: Effort normal and breath sounds normal.   Abdominal: Soft. Bowel sounds are normal.   Musculoskeletal: Normal range of motion.   Neurological: She is alert and oriented to person, place, and time.   Skin: Skin is warm and dry.   Psychiatric: She has a normal mood and affect.         CRANIAL NERVES     CN III, IV, VI   Pupils are equal, round, and reactive to light.  Extraocular motions are normal.        Significant Labs: All pertinent labs within the past 24 hours have been reviewed.    Significant Imaging: I have  reviewed all pertinent imaging results/findings within the past 24 hours.

## 2019-12-26 NOTE — H&P
"[facility] Hospital Medicine  History & Physical    Patient Name: Renata Bergman  MRN: [id number]  Admission Date: 12/26/2019  Attending Physician: Lynn Powers MD   Primary Care Provider: Ethel Berger MD    [facility] Medicine Team: [facility] MED 5 Debora Ledesma MD     Patient information was obtained from patient and ER records.     Subjective:     Principal Problem:<principal problem not specified>    Chief Complaint:   Chief Complaint   Patient presents with    near-syncope     daughter found patient "lethargic" sitting with walker; EMS states patient became more awake and alert upon their arrival; GCS 15; ; denies chest pain or SOB        HPI: 79 y.o. female presenting with weakness and lethargy that occurred today.  The patient was found disoriented sitting on her walker in her bedroom.   She states she felt too weak to walk. She denies states that something similar occurred last week to a lesser extent, and that it resolved after sitting. Patient states recently she has had poor PO intake, drinking less than a bottle of water some days. She denies any recent vomiting or diarrhea. She states that she felt a little wobbly yesterday, but not enough to tell her family about. Recently, her PCP called her and stopped her metformin because of her kidney function. In the ED the patient presented with blood pressure 70s/40s, was positive for orthostatic blood pressure changes and had an MARIA ALEJANDRA 2.8 (baseline 1.8). She received 2 liter boluses and was admitted for presyncope and MARIA ALEJANDRA.        Past Medical History:   Diagnosis Date    Breast cancer 2016    DCIS and IDC, stage I    Cataract     Coronary artery disease 9/4/2012    Diabetes mellitus due to abnormal insulin 9/4/2012    Diabetes mellitus type II     Genetic testing     brca2 positive     GERD (gastroesophageal reflux disease) 9/4/2012    Gout, arthritis 9/4/2012    Hyperlipidemia 9/4/2012    Hypertension     " Obesity     Primary osteoarthritis of both knees 9/4/2012    Renal manifestation of secondary diabetes mellitus     Type 2 diabetes with peripheral circulatory disorder, controlled        Past Surgical History:   Procedure Laterality Date    BREAST BIOPSY      BREAST LUMPECTOMY Left 08/01/2016    DCIS and IDC, s/p lumpectomy    COLONOSCOPY N/A 10/8/2018    Procedure: COLONOSCOPY;  Surgeon: Marcio Louie MD;  Location: [facility] (4TH FLR);  Service: Endoscopy;  Laterality: N/A;    CORONARY ANGIOPLASTY      ESOPHAGOGASTRODUODENOSCOPY N/A 10/8/2018    Procedure: ESOPHAGOGASTRODUODENOSCOPY (EGD);  Surgeon: Marcio Louie MD;  Location: [facility] (4TH FLR);  Service: Endoscopy;  Laterality: N/A;  combined egd/colon order/Plavix/OK to hold med 5 days prior to procedures per Dr Puente/see telephone encounter dated 8/22/18/svn    HYSTERECTOMY      JOINT REPLACEMENT      left and right k nee    KNEE SURGERY      TOTAL ABDOMINAL HYSTERECTOMY W/ BILATERAL SALPINGOOPHORECTOMY         Review of patient's allergies indicates:   Allergen Reactions    Lisinopril Anaphylaxis       No current facility-administered medications on file prior to encounter.      Current Outpatient Medications on File Prior to Encounter   Medication Sig    clopidogrel (PLAVIX) 75 mg tablet Take 1 tablet (75 mg total) by mouth once daily.    allopurinol (ZYLOPRIM) 300 MG tablet TAKE 1 TABLET BY MOUTH ONCE DAILY    amLODIPine (NORVASC) 5 MG tablet TAKE 1 TABLET BY MOUTH DAILY    anastrozole (ARIMIDEX) 1 mg Tab TAKE 1 TABLET(1 MG) BY MOUTH EVERY DAY. STOP LETROZOLE FEMARA    aspirin 81 MG Chew Take 81 mg by mouth once daily.      carvedilol (COREG) 25 MG tablet TAKE 1 TABLET BY MOUTH TWICE DAILY    chlorthalidone (HYGROTEN) 25 MG Tab TAKE ONE-HALF TABLET BY MOUTH EVERY DAY (Patient taking differently: TAKE ONE-HALF TABLET every other day)    cyanocobalamin 1,000 mcg/mL injection Inject 1 mL (1,000 mcg total) into the muscle  once a week. Dispense #12 25 gague one inch needles and #12 one ml syringes    ergocalciferol (ERGOCALCIFEROL) 50,000 unit Cap Take 1 capsule (50,000 Units total) by mouth twice a week.    ferrous sulfate 325 (65 FE) MG EC tablet Take 1 tablet (325 mg total) by mouth once daily.    fexofenadine (ALLEGRA) 30 MG tablet Take 30 mg by mouth daily as needed.    levetiracetam XR (KEPPRA XR) 500 mg Tb24 24 hr tablet TAKE 2 TABLETS(1000 MG) BY MOUTH EVERY DAY    metFORMIN (GLUCOPHAGE) 850 MG tablet TAKE 1 TABLET BY MOUTH TWICE DAILY    nitroGLYCERIN (NITROSTAT) 0.4 MG SL tablet PLACE 1 TABLET UNDER THE TONGUE EVERY 5 MINUTES AS NEEDED FOR CHEST PAIN.    omeprazole (PRILOSEC) 20 MG capsule TAKE 2 CAPSULES BY MOUTH EVERY DAY    potassium chloride (KLOR-CON) 10 MEQ TbSR TAKE 1 TABLET BY MOUTH EVERY DAY    rosuvastatin (CRESTOR) 40 MG Tab TAKE 1 TABLET BY MOUTH EVERY DAY     Family History     Problem Relation (Age of Onset)    Breast cancer Mother (45), Maternal Aunt, Daughter (45), Maternal Aunt    Cancer Mother (55)    Diabetes Mother, Son    Heart disease Father    Hyperlipidemia Maternal Aunt    Hypertension Mother, Maternal Aunt, Maternal Uncle    Ovarian cancer Mother    Stroke Son        Tobacco Use    Smoking status: Former Smoker     Packs/day: 1.00     Types: Cigarettes     Last attempt to quit: 1962     Years since quittin.4    Smokeless tobacco: Never Used   Substance and Sexual Activity    Alcohol use: No    Drug use: No    Sexual activity: Never     Review of Systems   Constitutional: Positive for activity change. Negative for chills and fever.   HENT: Negative for ear pain and sore throat.    Eyes: Negative for pain and discharge.   Respiratory: Negative for cough and shortness of breath.    Cardiovascular: Negative for chest pain and leg swelling.   Gastrointestinal: Negative for abdominal distention and abdominal pain.   Genitourinary: Negative for dysuria and hematuria.    Musculoskeletal: Negative for joint swelling and myalgias.   Skin: Negative for rash and wound.   Neurological: Positive for light-headedness. Negative for facial asymmetry.   Psychiatric/Behavioral: Negative for agitation and confusion.     Objective:     Vital Signs (Most Recent):  Temp: 97.8 °F (36.6 °C) (12/26/19 1351)  Pulse: 69 (12/26/19 1546)  Resp: 16 (12/26/19 1351)  BP: 135/61 (12/26/19 1351)  SpO2: 99 % (12/26/19 1351) Vital Signs (24h Range):  Temp:  [97.8 °F (36.6 °C)-97.9 °F (36.6 °C)] 97.8 °F (36.6 °C)  Pulse:  [62-82] 69  Resp:  [11-21] 16  SpO2:  [95 %-100 %] 99 %  BP: ()/(48-88) 135/61     Weight: 93 kg (205 lb)  Body mass index is 34.11 kg/m².    Physical Exam   Constitutional: She is oriented to person, place, and time. She appears well-developed and well-nourished.   HENT:   Head: Normocephalic and atraumatic.   Eyes: Pupils are equal, round, and reactive to light. EOM are normal.   Neck: Normal range of motion. Neck supple.   Cardiovascular: Normal rate and regular rhythm.   Pulmonary/Chest: Effort normal and breath sounds normal.   Abdominal: Soft. Bowel sounds are normal.   Musculoskeletal: Normal range of motion.   Neurological: She is alert and oriented to person, place, and time.   Skin: Skin is warm and dry.   Psychiatric: She has a normal mood and affect.         CRANIAL NERVES     CN III, IV, VI   Pupils are equal, round, and reactive to light.  Extraocular motions are normal.        Significant Labs: All pertinent labs within the past 24 hours have been reviewed.    Significant Imaging: I have reviewed all pertinent imaging results/findings within the past 24 hours.    Assessment/Plan:     MARIA ALEJANDRA (acute kidney injury)  See chronic renal disease. Cr 2.8 (baseline ~1.8)    Plan  -BMP BID  -urine electrolyte studies pending      Chronic renal disease, stage 4, severely decreased glomerular filtration rate (GFR) between 15-29 mL/min/1.73 square meter  Patient presented with MARIA AELJANDRA of 2.8  (baseline 1.8). Etiology likely pre renal 2/2 dehydration, however BUN/Cr ration is less than 20. Urinalysis with 1+ protein. Patient was recently on metformin, d/cd due to renal function.     Plan  -urine electrolyte studies pending  -BID BMP to monitor renal function  -may consider renal US          Dehydration  Patient endorses prior episodes of weakness that resolved upon sitting down. The patient reports poor oral intake and was positive for orthostatic hypotension on testing in the ED. She received 2 L of fluid and her blood pressure recovered greatly and she reported feeling well.    Plan  -monitor vitals q4h, fluids prn  -encourage oral intake        Hypokalemia  Potassium 2.9 on admission, likely 2/2 diuretic use    Plan  -holding diuretic  -replete PRN      Malignant neoplasm of upper-outer quadrant of left female breast  Patient was diagnosed with DCIS and IDC stage 1 breast cancer in 2016, she was treated with chemo/radiation and lumpectomy. In remission with no current complications. Currently on anastrazole     Plan  -hold anastrazole while inpatient      Controlled type 2 DM with peripheral circulatory disorder  Patient with A1C of 6, glucose of 145 on admission. Was recently on metformin but told to stop by PCP due to kidney function    Plan  -diabetic diet  -monitor glucose BID      GERD (gastroesophageal reflux disease)  -Continue home pantoprazole 40mg daily      Coronary artery disease due to lipid rich plaque  Patient with known CAD s/p stenting years ago.    Plan  -continue home aspirin  -continue home plavix      Hyperlipidemia  -continue home statin      HTN (hypertension), benign  At home patient taking: Amlodipine 5mg, Carvedilol 25mg, Chlorthalidone 25mg    Plan  -hold home pressure meds while inpatient  -d/c chlorthalidone  -will consider decreasing carvedilol on discharge      VTE Risk Mitigation (From admission, onward)         Ordered     heparin (porcine) injection 5,000 Units  Every  8 hours      12/26/19 1207     IP VTE HIGH RISK PATIENT  Once      12/26/19 1207                   Debora Ledesma MD  Department of Hospital Medicine   [facility]

## 2019-12-26 NOTE — HPI
79 y.o. female presenting with weakness and lethargy that occurred today.  The patient was found disoriented sitting on her walker in her bedroom.  She states she felt too weak to walk. She denies states that something similar occurred last week to a lesser extent, and that it resolved after sitting. Patient states recently she has had poor PO intake, drinking less than a bottle of water some days. She denies any recent vomiting or diarrhea. She states that she felt a little wobbly yesterday, but not enough to tell her family about. Recently, her PCP called her and stopped her metformin because of her kidney function. In the ED the patient presented with blood pressure 70s/40s, was positive for orthostatic blood pressure changes and had an MARIA ALEJANDRA 2.8 (baseline 1.8). She received 2 liter boluses and was admitted for presyncope and MARIA ALEJANDRA.

## 2019-12-26 NOTE — ASSESSMENT & PLAN NOTE
Patient presented with MARIA ALEJANDRA of 2.8 (baseline 1.8). Etiology likely pre renal 2/2 dehydration, however BUN/Cr ration is less than 20. Urinalysis with 1+ protein. Patient was recently on metformin, d/cd due to renal function.     Plan  -urine electrolyte studies pending  -BID BMP to monitor renal function  -may consider renal US

## 2019-12-27 VITALS
RESPIRATION RATE: 16 BRPM | DIASTOLIC BLOOD PRESSURE: 56 MMHG | BODY MASS INDEX: 34.11 KG/M2 | SYSTOLIC BLOOD PRESSURE: 115 MMHG | WEIGHT: 205 LBS | OXYGEN SATURATION: 95 % | HEART RATE: 77 BPM | TEMPERATURE: 98 F

## 2019-12-27 LAB
ABO + RH BLD: NORMAL
ANION GAP SERPL CALC-SCNC: 5 MMOL/L (ref 8–16)
BASOPHILS # BLD AUTO: 0.02 K/UL (ref 0–0.2)
BASOPHILS # BLD AUTO: 0.03 K/UL (ref 0–0.2)
BASOPHILS NFR BLD: 0.4 % (ref 0–1.9)
BASOPHILS NFR BLD: 0.5 % (ref 0–1.9)
BLD GP AB SCN CELLS X3 SERPL QL: NORMAL
BLD PROD TYP BPU: NORMAL
BLOOD UNIT EXPIRATION DATE: NORMAL
BLOOD UNIT TYPE CODE: 5100
BLOOD UNIT TYPE: NORMAL
BUN SERPL-MCNC: 15 MG/DL (ref 8–23)
CALCIUM SERPL-MCNC: 9.4 MG/DL (ref 8.7–10.5)
CHLORIDE SERPL-SCNC: 109 MMOL/L (ref 95–110)
CO2 SERPL-SCNC: 27 MMOL/L (ref 23–29)
CODING SYSTEM: NORMAL
CREAT SERPL-MCNC: 2.4 MG/DL (ref 0.5–1.4)
DIFFERENTIAL METHOD: ABNORMAL
DIFFERENTIAL METHOD: ABNORMAL
DISPENSE STATUS: NORMAL
EOSINOPHIL # BLD AUTO: 0.3 K/UL (ref 0–0.5)
EOSINOPHIL # BLD AUTO: 0.3 K/UL (ref 0–0.5)
EOSINOPHIL NFR BLD: 6.1 % (ref 0–8)
EOSINOPHIL NFR BLD: 6.2 % (ref 0–8)
ERYTHROCYTE [DISTWIDTH] IN BLOOD BY AUTOMATED COUNT: 19.8 % (ref 11.5–14.5)
ERYTHROCYTE [DISTWIDTH] IN BLOOD BY AUTOMATED COUNT: 19.9 % (ref 11.5–14.5)
EST. GFR  (AFRICAN AMERICAN): 21.8 ML/MIN/1.73 M^2
EST. GFR  (NON AFRICAN AMERICAN): 18.9 ML/MIN/1.73 M^2
GLUCOSE SERPL-MCNC: 125 MG/DL (ref 70–110)
HCT VFR BLD AUTO: 23.3 % (ref 37–48.5)
HCT VFR BLD AUTO: 23.7 % (ref 37–48.5)
HGB BLD-MCNC: 6.8 G/DL (ref 12–16)
HGB BLD-MCNC: 6.9 G/DL (ref 12–16)
IMM GRANULOCYTES # BLD AUTO: 0.01 K/UL (ref 0–0.04)
IMM GRANULOCYTES # BLD AUTO: 0.02 K/UL (ref 0–0.04)
IMM GRANULOCYTES NFR BLD AUTO: 0.2 % (ref 0–0.5)
IMM GRANULOCYTES NFR BLD AUTO: 0.4 % (ref 0–0.5)
LYMPHOCYTES # BLD AUTO: 1.5 K/UL (ref 1–4.8)
LYMPHOCYTES # BLD AUTO: 1.7 K/UL (ref 1–4.8)
LYMPHOCYTES NFR BLD: 28 % (ref 18–48)
LYMPHOCYTES NFR BLD: 30.9 % (ref 18–48)
MCH RBC QN AUTO: 22.9 PG (ref 27–31)
MCH RBC QN AUTO: 22.9 PG (ref 27–31)
MCHC RBC AUTO-ENTMCNC: 29.1 G/DL (ref 32–36)
MCHC RBC AUTO-ENTMCNC: 29.2 G/DL (ref 32–36)
MCV RBC AUTO: 79 FL (ref 82–98)
MCV RBC AUTO: 79 FL (ref 82–98)
MONOCYTES # BLD AUTO: 0.9 K/UL (ref 0.3–1)
MONOCYTES # BLD AUTO: 1.1 K/UL (ref 0.3–1)
MONOCYTES NFR BLD: 16.6 % (ref 4–15)
MONOCYTES NFR BLD: 19.1 % (ref 4–15)
NEUTROPHILS # BLD AUTO: 2.5 K/UL (ref 1.8–7.7)
NEUTROPHILS # BLD AUTO: 2.5 K/UL (ref 1.8–7.7)
NEUTROPHILS NFR BLD: 45.7 % (ref 38–73)
NEUTROPHILS NFR BLD: 45.9 % (ref 38–73)
NRBC BLD-RTO: 0 /100 WBC
NRBC BLD-RTO: 0 /100 WBC
PLATELET # BLD AUTO: 338 K/UL (ref 150–350)
PLATELET # BLD AUTO: 345 K/UL (ref 150–350)
PMV BLD AUTO: 10.2 FL (ref 9.2–12.9)
PMV BLD AUTO: 9.8 FL (ref 9.2–12.9)
POCT GLUCOSE: 120 MG/DL (ref 70–110)
POCT GLUCOSE: 128 MG/DL (ref 70–110)
POTASSIUM SERPL-SCNC: 3.7 MMOL/L (ref 3.5–5.1)
RBC # BLD AUTO: 2.97 M/UL (ref 4–5.4)
RBC # BLD AUTO: 3.01 M/UL (ref 4–5.4)
SODIUM SERPL-SCNC: 141 MMOL/L (ref 136–145)
TRANS ERYTHROCYTES VOL PATIENT: NORMAL ML
WBC # BLD AUTO: 5.5 K/UL (ref 3.9–12.7)
WBC # BLD AUTO: 5.53 K/UL (ref 3.9–12.7)

## 2019-12-27 PROCEDURE — G0378 HOSPITAL OBSERVATION PER HR: HCPCS

## 2019-12-27 PROCEDURE — 86920 COMPATIBILITY TEST SPIN: CPT

## 2019-12-27 PROCEDURE — 63600175 PHARM REV CODE 636 W HCPCS: Performed by: STUDENT IN AN ORGANIZED HEALTH CARE EDUCATION/TRAINING PROGRAM

## 2019-12-27 PROCEDURE — 86901 BLOOD TYPING SEROLOGIC RH(D): CPT

## 2019-12-27 PROCEDURE — 25000003 PHARM REV CODE 250: Performed by: HOSPITALIST

## 2019-12-27 PROCEDURE — 99217 PR OBSERVATION CARE DISCHARGE: ICD-10-PCS | Mod: GC,,, | Performed by: HOSPITALIST

## 2019-12-27 PROCEDURE — 36415 COLL VENOUS BLD VENIPUNCTURE: CPT

## 2019-12-27 PROCEDURE — 94761 N-INVAS EAR/PLS OXIMETRY MLT: CPT

## 2019-12-27 PROCEDURE — 80048 BASIC METABOLIC PNL TOTAL CA: CPT

## 2019-12-27 PROCEDURE — 82962 GLUCOSE BLOOD TEST: CPT

## 2019-12-27 PROCEDURE — 85025 COMPLETE CBC W/AUTO DIFF WBC: CPT

## 2019-12-27 PROCEDURE — 25000003 PHARM REV CODE 250: Performed by: STUDENT IN AN ORGANIZED HEALTH CARE EDUCATION/TRAINING PROGRAM

## 2019-12-27 PROCEDURE — 99217 PR OBSERVATION CARE DISCHARGE: CPT | Mod: GC,,, | Performed by: HOSPITALIST

## 2019-12-27 PROCEDURE — 96372 THER/PROPH/DIAG INJ SC/IM: CPT | Mod: 59 | Performed by: EMERGENCY MEDICINE

## 2019-12-27 PROCEDURE — P9021 RED BLOOD CELLS UNIT: HCPCS

## 2019-12-27 PROCEDURE — 36430 TRANSFUSION BLD/BLD COMPNT: CPT

## 2019-12-27 RX ORDER — ACETAMINOPHEN 325 MG/1
650 TABLET ORAL EVERY 6 HOURS PRN
Status: DISCONTINUED | OUTPATIENT
Start: 2019-12-27 | End: 2019-12-27 | Stop reason: HOSPADM

## 2019-12-27 RX ORDER — HYDROCODONE BITARTRATE AND ACETAMINOPHEN 500; 5 MG/1; MG/1
TABLET ORAL
Status: DISCONTINUED | OUTPATIENT
Start: 2019-12-27 | End: 2019-12-27 | Stop reason: HOSPADM

## 2019-12-27 RX ADMIN — ACETAMINOPHEN 650 MG: 325 TABLET ORAL at 04:12

## 2019-12-27 RX ADMIN — ASPIRIN 81 MG CHEWABLE TABLET 81 MG: 81 TABLET CHEWABLE at 08:12

## 2019-12-27 RX ADMIN — LEVETIRACETAM 500 MG: 500 TABLET, FILM COATED ORAL at 08:12

## 2019-12-27 RX ADMIN — PANTOPRAZOLE SODIUM 40 MG: 40 TABLET, DELAYED RELEASE ORAL at 08:12

## 2019-12-27 RX ADMIN — CLOPIDOGREL BISULFATE 75 MG: 75 TABLET ORAL at 08:12

## 2019-12-27 RX ADMIN — HEPARIN SODIUM 5000 UNITS: 5000 INJECTION, SOLUTION INTRAVENOUS; SUBCUTANEOUS at 05:12

## 2019-12-27 RX ADMIN — ROSUVASTATIN CALCIUM 40 MG: 10 TABLET, FILM COATED ORAL at 08:12

## 2019-12-27 RX ADMIN — FERROUS SULFATE TAB EC 325 MG (65 MG FE EQUIVALENT) 325 MG: 325 (65 FE) TABLET DELAYED RESPONSE at 08:12

## 2019-12-27 RX ADMIN — HEPARIN SODIUM 5000 UNITS: 5000 INJECTION, SOLUTION INTRAVENOUS; SUBCUTANEOUS at 06:12

## 2019-12-27 NOTE — PT/OT/SLP PROGRESS
Physical Therapy      Patient Name:  Renata Bergman   MRN:  [id number]    Patient not seen today secondary to (hold per nursing due to pt. with low H&H and requiring blood). Will follow-up over weekend.    Tom Duron, PT   12/27/2019

## 2019-12-27 NOTE — PT/OT/SLP PROGRESS
Occupational Therapy      Patient Name:  Renata Bergman   MRN:  [id number]    OT orders received and patient's chart reviewed. Therapist attempted evaluation in a.m. and nurse reported low H&H levels. Therapist returned in afternoon and patient receiving blood and RN requested therapist come back later. Therapist unable to return for 3rd attempt and will follow up as able for evaluation on 12/28/2019.     Jennifer Mills, OT  12/27/2019

## 2019-12-27 NOTE — PLAN OF CARE
Safety measures maintained. VSS on RA. Pt denies any pain. Bed in lowest position, call light within reach, side rails up x2. Pt has no complaints at this time. Will continue to monitor.

## 2019-12-27 NOTE — PLAN OF CARE
Plan of care and discharge instructions reviewed with patient and daughter.  No falls, pain managed and no signs of infection.  Patient tolerated blood transfusion well.  IV discontinued.

## 2019-12-27 NOTE — PLAN OF CARE
12/27/19 [facility]   Discharge Assessment   Assessment Type Discharge Planning Assessment   Confirmed/corrected address and phone number on facesheet? Yes   Assessment information obtained from? Patient   Expected Length of Stay (days)   (1)   Communicated expected length of stay with patient/caregiver yes   Prior to hospitilization cognitive status: Alert/Oriented   Prior to hospitalization functional status: Assistive Equipment;Independent   Current cognitive status: Alert/Oriented   Current Functional Status: Assistive Equipment;Independent   Facility Arrived From: Home   Lives With child(brendan), adult  (Lives with daughter Agnes)   Able to Return to Prior Arrangements yes   Is patient able to care for self after discharge? Yes   Who are your caregiver(s) and their phone number(s)?   (Agnes Bergman (Daughter) [phone])   Patient's perception of discharge disposition home or selfcare   Readmission Within the Last 30 Days no previous admission in last 30 days   Patient currently being followed by outpatient case management? No   Patient currently receives any other outside agency services? No   Equipment Currently Used at Home walker, rolling   Do you have any problems affording any of your prescribed medications? No   Is the patient taking medications as prescribed? yes   Does the patient have transportation home? Yes   Transportation Anticipated family or friend will provide   Does the patient receive services at the [facility]? No   Discharge Plan A Home with family   Discharge Plan B Home with family   DME Needed Upon Discharge  none   Patient/Family in Agreement with Plan yes

## 2019-12-27 NOTE — MEDICAL/APP STUDENT
[facility] Hospital Medicine  Progress Note    Patient Name: Renata Bergman  MRN: [id number]  Patient Class: OP- Observation   Admission Date: 12/26/2019  Length of Stay: 0 days  Attending Physician: Lynn Powers MD  Primary Care Provider: Ethel Berger MD    [facility] Medicine Team: [facility] MED 5 Linda Mccrary    Subjective:     Principal Problem: Orthostatic hypotension     HPI:      79 year old female with history of DM2, CAD, Stage 1 breast cancer in remission post chemo/rad/lumpectecomy, and hypertension who presents after a pre-syncopal episode      At 6 am this morning patient went to bathroom to urinate, upon getting up and taking a few steps she felt weak and light headed. She proceeded to sit down on her walker and call her daughter. Patient denies any LOC, palpitations, post-ictal state and bowel/bladder incontinence. Daughter arrived and helped the pt to her bedroom to lay down. Daughter stated pt was weak, unable to stand on her own and drowsy, but denies any alteration in mental status. Patient states she had a similar episode 2 days ago that was less severe, in which she felt week in the legs while standing for a prolonged period of time, no LOC.      Pt was told last Thursday 12/19, to stop her metformin due to an increase in creatinine. Pt denies any chest pain, fevers, N/V, SOB, cough, urinary symptoms or change in bowel habits. She denies any urinary frequency or dysuria.       Interval History: NAEON, patient able to ambulate around the room with no pre-syncopal symptoms. She is eating and drinking well, no N/V. No bowel or bladder issues. She denies any new chest pain, palpitations or SOB    Review of Systems   Constitutional: Negative for activity change, appetite change, chills, diaphoresis and fatigue.   HENT: Negative for congestion, ear pain and postnasal drip.    Eyes: Negative.    Respiratory: Negative for apnea, chest tightness and shortness of  breath.    Cardiovascular: Negative for chest pain, palpitations and leg swelling.   Gastrointestinal: Negative for abdominal distention, abdominal pain, constipation and diarrhea.   Endocrine: Negative for polydipsia and polyuria.   Genitourinary: Negative for dysuria, frequency and urgency.   Skin: Negative for color change and pallor.   Neurological: Positive for dizziness, weakness and light-headedness. Negative for seizures and speech difficulty.   Hematological: Negative.    Psychiatric/Behavioral: Negative.      Objective:     Vital Signs (Most Recent):  Temp: 97.8 °F (36.6 °C) (12/27/19 1229)  Pulse: 69 (12/27/19 1229)  Resp: 16 (12/27/19 1229)  BP: 112/65 (12/27/19 1229)  SpO2: 100 % (12/27/19 1229) Vital Signs (24h Range):  Temp:  [97.4 °F (36.3 °C)-98.4 °F (36.9 °C)] 97.8 °F (36.6 °C)  Pulse:  [64-86] 69  Resp:  [16-20] 16  SpO2:  [97 %-100 %] 100 %  BP: (101-138)/(44-72) 112/65     Weight: 93 kg (205 lb)  Body mass index is 34.11 kg/m².    Intake/Output Summary (Last 24 hours) at 12/27/2019 1411  Last data filed at 12/27/2019 1300  Gross per 24 hour   Intake 830 ml   Output 250 ml   Net 580 ml      Physical Exam  Constitutional: She appears well-developed and well-nourished.   HENT:   Head: Normocephalic and atraumatic.   Eyes: EOM are normal.   Neck: Normal range of motion. Neck supple.   Cardiovascular: Normal rate, regular rhythm, normal heart sounds and intact distal pulses.   Pulmonary/Chest: Effort normal and breath sounds normal. No respiratory distress.   Abdominal: Soft. Bowel sounds are normal. She exhibits no distension. There is no tenderness.   Musculoskeletal: Normal range of motion. She exhibits no edema.   Neurological: She is alert. No cranial nerve deficit.   Skin: Skin is warm and dry. Mucous membranes dry    Psychiatric: She has a normal mood and affect.       Significant Labs:   CBC:   Recent Labs   Lab 12/26/19  0805 12/27/19  0527 12/27/19  0737   WBC 6.70 5.50 5.53   HGB 7.5* 6.8*  6.9*   HCT 26.5* 23.3* 23.7*   * 338 345     CMP:   Recent Labs   Lab 12/26/19  0805 12/26/19  1512 12/27/19  0526    143 141   K 2.9* 2.9* 3.7    105 109   CO2 28 30* 27   * 107 125*   BUN 18 16 15   CREATININE 2.8* 2.4* 2.4*   CALCIUM 10.0 9.4 9.4   PROT 7.2  --   --    ALBUMIN 3.5  --   --    BILITOT 0.6  --   --    ALKPHOS 65  --   --    AST 24  --   --    ALT 21  --   --    ANIONGAP 11 8 5*   EGFRNONAA 15.7* 18.9* 18.9*       Significant Imaging: I have reviewed all pertinent imaging results/findings within the past 24 hours.    Assessment/Plan:    79 year old female with history of DM2, CAD, Stage 1 breast cancer in remission post chemo/rad/lumpectecomy, and hypertension who presents after a pre-syncopal episode      Dehydration  Patient reports decreased PO fluid intake, and previous episode of weakness that improved upon rest. The patient reports was positive for orthostatic hypotension and after receiving 2 L of fluids improved clinically and blood pressure stabilized.       Plan  - hold amlodipine, thiazide diuretic and beta blocker  - f/u with PCP upon discharge to adjust htn medications  - monitor vitals Q6, fluids as needed, stable currently  - encourage PO fluid intake        Hypokalemia  Potassium 2.9 on admission, likely 2/2 to dehydration and diuretic use. EKG reassuring, no U waves or T wave flattening. Recent potassium 3.7     Plan  - K 3.7, stable   - hold thiazide for now        MARIA ALEJANDRA (acute kidney injury) superimposed on CKD stage 4   Patient presented with MARIA ALEJANDRA of 2.8 (baseline 1.6 - 1.8). Most likely due to prerenal etiology 2/2 dehydration. BUN/Cr ration < 20. Urinalysis with 1+ protein. Patient recently stopped metformin due to increased creatinine. Recent creatinine 2.4     Plan  - trend creatinine, BMP daily  - fluids PRN, currently stable  - avoid nephrotoxic medications  - no need for renal US           Chronic iron deficiency anemia   Hb on admission 7.5, with  MCV 78 last iron studies in August 2018. Hb this morning 6.8.      Plan  - daily CBC  - transfuse 1 unite pRBC  - clinically stable and non-symptomatic   - continue home iron supplement   - possible iron studies         Controlled type 2 DM, non complicated   Patient with A1C of 6, glucose of 145 on admission. Was recently on metformin but told to stop by PCP due to kidney function     Plan  - diabetic diet  - monitor glucose Q12           HTN (hypertension), benign  At home patient taking: Amlodipine 5mg, Carvedilol 25mg, Chlorthalidone 25mg     Plan  -hold home pressure meds while inpatient  -d/c chlorthalidone  -will consider decreasing carvedilol on discharge        Coronary artery disease due to lipid rich plaque  Patient with known CAD s/p stenting years ago.     Plan  -continue home aspirin 81 mg   -continue home clopidogrel 75 mg         Hyperlipidemia  -continue rosuvastatin 40 mg         VTE Risk Mitigation (From admission, onward)         Ordered     heparin (porcine) injection 5,000 Units  Every 8 hours      12/26/19 1207     IP VTE HIGH RISK PATIENT  Once      12/26/19 1207                   Linda Mccrary MS4  Department of Hospital Medicine   [facility]

## 2019-12-28 NOTE — HOSPITAL COURSE
Patient was admitted due to weakness and MARIA ALEJANDRA in the setting of poor PO intake. She was given fluids and had urine studies done, but her kidney function improved with NaCl administration. Her Hgb was below 7 so she was transfused 1 unit of PRBC and discharged home with encouragement to maintain adequate po fluid intake.

## 2019-12-28 NOTE — DISCHARGE SUMMARY
[facility] Hospital Medicine  Discharge Summary      Patient Name: Renata Bergman  MRN: [id number]  Admission Date: 12/26/2019  Hospital Length of Stay: 0 days  Discharge Date and Time: 12/27/2019  6:52 PM  Attending Physician: Rosie att. providers found   Discharging Provider: Debora Ledesma MD  Primary Care Provider: Ethel Berger MD  [facility] Medicine Team: [facility] 5 Debora Ledesma MD    HPI:   79 y.o. female presenting with weakness and lethargy that occurred today.  The patient was found disoriented sitting on her walker in her bedroom.   She states she felt too weak to walk. She denies states that something similar occurred last week to a lesser extent, and that it resolved after sitting. Patient states recently she has had poor PO intake, drinking less than a bottle of water some days. She denies any recent vomiting or diarrhea. She states that she felt a little wobbly yesterday, but not enough to tell her family about. Recently, her PCP called her and stopped her metformin because of her kidney function. In the ED the patient presented with blood pressure 70s/40s, was positive for orthostatic blood pressure changes and had an MARIA ALEJANDRA 2.8 (baseline 1.8). She received 2 liter boluses and was admitted for presyncope and MARIA ALEJANDRA.        * No surgery found *      Hospital Course:   Patient was admitted due to weakness and MARIA ALEJANDRA in the setting of poor PO intake. She was given fluids and had urine studies done, but her kidney function improved with NaCl administration. Her Hgb was below 7 so she was transfused 1 unit of PRBC and discharged home with encouragement to maintain adequate po fluid intake.     Consults:      Physical Exam  Constitutional: She appears well-developed and well-nourished.   HENT:   Head: Normocephalic and atraumatic.   Eyes: EOM are normal.   Neck: Normal range of motion. Neck supple.   Cardiovascular: Normal rate, regular rhythm, normal heart sounds and intact distal  pulses.   Pulmonary/Chest: Effort normal and breath sounds normal. No respiratory distress.   Abdominal: Soft. Bowel sounds are normal. She exhibits no distension. There is no tenderness.   Musculoskeletal: Normal range of motion. She exhibits no edema.   Neurological: She is alert. No cranial nerve deficit.   Skin: Skin is warm and dry. Mucous membranes dry    Psychiatric: She has a normal mood and affect.           * MARIA ALEJANDRA (acute kidney injury)  See chronic renal disease. Cr 2.8 (baseline ~1.8)    Plan  -BMP BID  -urine electrolyte studies pending      Chronic renal disease, stage 4, severely decreased glomerular filtration rate (GFR) between 15-29 mL/min/1.73 square meter  Patient presented with MARIA ALEJANDRA of 2.8 (baseline 1.8). Etiology likely pre renal 2/2 dehydration, however BUN/Cr ration is less than 20. Urinalysis with 1+ protein. Patient was recently on metformin, d/cd due to renal function.     Plan  -urine electrolyte studies pending  -BID BMP to monitor renal function  -may consider renal US          Dehydration  Patient endorses prior episodes of weakness that resolved upon sitting down. The patient reports poor oral intake and was positive for orthostatic hypotension on testing in the ED. She received 2 L of fluid and her blood pressure recovered greatly and she reported feeling well.    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 In remission with no current complications. Currently on anastrazole     Plan  -hold anastrazole while inpatient      Controlled type 2 DM with peripheral circulatory disorder  Patient with A1C of 6, glucose of 145 on admission. Was  "recently on metformin but told to stop by PCP due to kidney function    Plan  -diabetic diet  -monitor glucose BID      GERD (gastroesophageal reflux disease)  -Continue home pantoprazole 40mg daily      Coronary artery disease due to lipid rich plaque  Patient with known CAD s/p stenting years ago.    Plan  -continue home aspirin  -continue home plavix      Hyperlipidemia  -continue home statin      HTN (hypertension), benign  At home patient taking: Amlodipine 5mg, Carvedilol 25mg, Chlorthalidone 25mg    Plan  -hold home pressure meds while inpatient  -d/c chlorthalidone  -will consider decreasing carvedilol on discharge        Final Active Diagnoses:    Diagnosis Date Noted POA    PRINCIPAL PROBLEM:  MARIA ALEJANDRA (acute kidney injury) [N17.9] 12/26/2019 Yes    Dehydration [E86.0] 12/26/2019 Yes    Chronic renal disease, stage 4, severely decreased glomerular filtration rate (GFR) between 15-29 mL/min/1.73 square meter [N18.4] 12/26/2019 Yes    Iron deficiency anemia [D50.9] 10/08/2018 Yes    Hypokalemia [E87.6] 11/19/2016 Yes    Malignant neoplasm of upper-outer quadrant of left female breast [C50.412] 08/18/2016 Yes    Controlled type 2 DM with peripheral circulatory disorder [E11.51] 10/14/2013 Yes     Chronic    HTN (hypertension), benign [I10] 09/04/2012 Yes     Chronic    Hyperlipidemia [E78.5] 09/04/2012 Yes     Chronic    Coronary artery disease due to lipid rich plaque [I25.10, I25.83] 09/04/2012 Yes     Chronic    GERD (gastroesophageal reflux disease) [K21.9] 09/04/2012 Yes      Problems Resolved During this Admission:       Discharged Condition: fair    Disposition: Home or Self Care    Follow Up:    Patient Instructions:      WALKER FOR HOME USE     Order Specific Question Answer Comments   Type of Walker: Adult (5'4"-6'6")    With wheels? Yes    Height: 165    Weight: 93 kg (205 lb)    Length of need (1-99 months): 99    Does patient have medical equipment at home? walker, rolling    Please check " all that apply: Patient's condition impairs ambulation.        Significant Diagnostic Studies: CBCs    Pending Diagnostic Studies:     None         Medications:  Reconciled Home Medications:      Medication List      CONTINUE taking these medications    allopurinol 300 MG tablet  Commonly known as:  ZYLOPRIM  TAKE 1 TABLET BY MOUTH ONCE DAILY     anastrozole 1 mg Tab  Commonly known as:  ARIMIDEX  TAKE 1 TABLET(1 MG) BY MOUTH EVERY DAY. STOP LETROZOLE FEMARA     aspirin 81 MG Chew  Take 81 mg by mouth once daily.     clopidogrel 75 mg tablet  Commonly known as:  PLAVIX  Take 1 tablet (75 mg total) by mouth once daily.     cyanocobalamin 1,000 mcg/mL injection  Inject 1 mL (1,000 mcg total) into the muscle once a week. Dispense #12 25 gague one inch needles and #12 one ml syringes     ergocalciferol 50,000 unit Cap  Commonly known as:  ERGOCALCIFEROL  Take 1 capsule (50,000 Units total) by mouth twice a week.     ferrous sulfate 325 (65 FE) MG EC tablet  Take 1 tablet (325 mg total) by mouth once daily.     fexofenadine 30 MG tablet  Commonly known as:  ALLEGRA  Take 30 mg by mouth daily as needed.     levetiracetam  mg Tb24 24 hr tablet  Commonly known as:  KEPPRA XR  TAKE 2 TABLETS(1000 MG) BY MOUTH EVERY DAY     nitroGLYCERIN 0.4 MG SL tablet  Commonly known as:  NITROSTAT  PLACE 1 TABLET UNDER THE TONGUE EVERY 5 MINUTES AS NEEDED FOR CHEST PAIN.     omeprazole 20 MG capsule  Commonly known as:  PRILOSEC  TAKE 2 CAPSULES BY MOUTH EVERY DAY     rosuvastatin 40 MG Tab  Commonly known as:  CRESTOR  TAKE 1 TABLET BY MOUTH EVERY DAY        STOP taking these medications    amLODIPine 5 MG tablet  Commonly known as:  NORVASC     carvedilol 25 MG tablet  Commonly known as:  COREG     chlorthalidone 25 MG Tab  Commonly known as:  HYGROTEN     metFORMIN 850 MG tablet  Commonly known as:  GLUCOPHAGE     potassium chloride 10 MEQ Tbsr  Commonly known as:  KLOR-CON            Indwelling Lines/Drains at time of discharge:    Lines/Drains/Airways     None                 Time spent on the discharge of patient: 30 minutes  Patient was seen and examined on the date of discharge and determined to be suitable for discharge.         Debora Ledesma MD  Department of Hospital Medicine  [facility]

## 2019-12-30 NOTE — PLAN OF CARE
Patient discharged home to care of self.     12/30/19 1338   Final Note   Assessment Type Final Discharge Note   Anticipated Discharge Disposition Home   What phone number can be called within the next 1-3 days to see how you are doing after discharge?   ([phone])   Hospital Follow Up  Appt(s) scheduled? Yes   Discharge plans and expectations educations in teach back method with documentation complete? Yes   Right Care Referral Info   Post Acute Recommendation No Care

## 2020-01-09 ENCOUNTER — OFFICE VISIT (OUTPATIENT)
Dept: INTERNAL MEDICINE | Facility: CLINIC | Age: 80
End: 2020-01-09
Payer: MEDICARE

## 2020-01-09 ENCOUNTER — LAB VISIT (OUTPATIENT)
Dept: LAB | Facility: HOSPITAL | Age: 80
End: 2020-01-09
Attending: INTERNAL MEDICINE
Payer: MEDICARE

## 2020-01-09 VITALS
BODY MASS INDEX: 34.27 KG/M2 | SYSTOLIC BLOOD PRESSURE: 120 MMHG | TEMPERATURE: 98 F | DIASTOLIC BLOOD PRESSURE: 70 MMHG | HEART RATE: 95 BPM | OXYGEN SATURATION: 98 % | HEIGHT: 65 IN | WEIGHT: 205.69 LBS

## 2020-01-09 DIAGNOSIS — I10 HTN (HYPERTENSION), BENIGN: Chronic | ICD-10-CM

## 2020-01-09 DIAGNOSIS — I10 ESSENTIAL HYPERTENSION: ICD-10-CM

## 2020-01-09 DIAGNOSIS — E78.2 MIXED HYPERLIPIDEMIA: Chronic | ICD-10-CM

## 2020-01-09 DIAGNOSIS — N18.30 CHRONIC RENAL DISEASE, STAGE 3, MODERATELY DECREASED GLOMERULAR FILTRATION RATE BETWEEN 30-59 ML/MIN/1.73 SQUARE METER: Chronic | ICD-10-CM

## 2020-01-09 DIAGNOSIS — G40.409 TONIC-CLONIC EPILEPTIC SEIZURES: ICD-10-CM

## 2020-01-09 DIAGNOSIS — D64.9 ANEMIA, UNSPECIFIED TYPE: ICD-10-CM

## 2020-01-09 DIAGNOSIS — Z17.0 MALIGNANT NEOPLASM OF UPPER-OUTER QUADRANT OF LEFT BREAST IN FEMALE, ESTROGEN RECEPTOR POSITIVE: ICD-10-CM

## 2020-01-09 DIAGNOSIS — M10.9 GOUT, ARTHRITIS: ICD-10-CM

## 2020-01-09 DIAGNOSIS — D50.9 IRON DEFICIENCY ANEMIA, UNSPECIFIED IRON DEFICIENCY ANEMIA TYPE: ICD-10-CM

## 2020-01-09 DIAGNOSIS — I25.10 CORONARY ARTERY DISEASE DUE TO LIPID RICH PLAQUE: Chronic | ICD-10-CM

## 2020-01-09 DIAGNOSIS — K21.9 GASTROESOPHAGEAL REFLUX DISEASE, ESOPHAGITIS PRESENCE NOT SPECIFIED: ICD-10-CM

## 2020-01-09 DIAGNOSIS — D64.9 ANEMIA, UNSPECIFIED TYPE: Primary | ICD-10-CM

## 2020-01-09 DIAGNOSIS — C50.412 MALIGNANT NEOPLASM OF UPPER-OUTER QUADRANT OF LEFT BREAST IN FEMALE, ESTROGEN RECEPTOR POSITIVE: ICD-10-CM

## 2020-01-09 DIAGNOSIS — I25.83 CORONARY ARTERY DISEASE DUE TO LIPID RICH PLAQUE: Chronic | ICD-10-CM

## 2020-01-09 DIAGNOSIS — E11.51 CONTROLLED TYPE 2 DM WITH PERIPHERAL CIRCULATORY DISORDER: Chronic | ICD-10-CM

## 2020-01-09 DIAGNOSIS — E87.6 HYPOKALEMIA: ICD-10-CM

## 2020-01-09 LAB
ANION GAP SERPL CALC-SCNC: 10 MMOL/L (ref 8–16)
ANISOCYTOSIS BLD QL SMEAR: SLIGHT
BASOPHILS # BLD AUTO: 0.02 K/UL (ref 0–0.2)
BASOPHILS NFR BLD: 0.3 % (ref 0–1.9)
BUN SERPL-MCNC: 8 MG/DL (ref 8–23)
CALCIUM SERPL-MCNC: 9.6 MG/DL (ref 8.7–10.5)
CHLORIDE SERPL-SCNC: 104 MMOL/L (ref 95–110)
CO2 SERPL-SCNC: 32 MMOL/L (ref 23–29)
CREAT SERPL-MCNC: 1.6 MG/DL (ref 0.5–1.4)
DIFFERENTIAL METHOD: ABNORMAL
EOSINOPHIL # BLD AUTO: 0.4 K/UL (ref 0–0.5)
EOSINOPHIL NFR BLD: 5.4 % (ref 0–8)
ERYTHROCYTE [DISTWIDTH] IN BLOOD BY AUTOMATED COUNT: 26.1 % (ref 11.5–14.5)
EST. GFR  (AFRICAN AMERICAN): 36 ML/MIN/1.73 M^2
EST. GFR  (NON AFRICAN AMERICAN): 31 ML/MIN/1.73 M^2
FERRITIN SERPL-MCNC: 52 NG/ML (ref 20–300)
GLUCOSE SERPL-MCNC: 108 MG/DL (ref 70–110)
HCT VFR BLD AUTO: 31.5 % (ref 37–48.5)
HGB BLD-MCNC: 9.7 G/DL (ref 12–16)
IRON SERPL-MCNC: 50 UG/DL (ref 30–160)
LYMPHOCYTES # BLD AUTO: 2.2 K/UL (ref 1–4.8)
LYMPHOCYTES NFR BLD: 28.5 % (ref 18–48)
MAGNESIUM SERPL-MCNC: 1.7 MG/DL (ref 1.6–2.6)
MCH RBC QN AUTO: 25.7 PG (ref 27–31)
MCHC RBC AUTO-ENTMCNC: 30.8 G/DL (ref 32–36)
MCV RBC AUTO: 83 FL (ref 82–98)
MONOCYTES # BLD AUTO: 1.1 K/UL (ref 0.3–1)
MONOCYTES NFR BLD: 14.4 % (ref 4–15)
NEUTROPHILS # BLD AUTO: 3.9 K/UL (ref 1.8–7.7)
NEUTROPHILS NFR BLD: 51.4 % (ref 38–73)
OVALOCYTES BLD QL SMEAR: ABNORMAL
PLATELET # BLD AUTO: 311 K/UL (ref 150–350)
PMV BLD AUTO: 9.8 FL (ref 9.2–12.9)
POIKILOCYTOSIS BLD QL SMEAR: SLIGHT
POTASSIUM SERPL-SCNC: 3.4 MMOL/L (ref 3.5–5.1)
RBC # BLD AUTO: 3.78 M/UL (ref 4–5.4)
RETICS/RBC NFR AUTO: 2.6 % (ref 0.5–2.5)
SATURATED IRON: 12 % (ref 20–50)
SODIUM SERPL-SCNC: 146 MMOL/L (ref 136–145)
TOTAL IRON BINDING CAPACITY: 406 UG/DL (ref 250–450)
TRANSFERRIN SERPL-MCNC: 274 MG/DL (ref 200–375)
WBC # BLD AUTO: 7.66 K/UL (ref 3.9–12.7)

## 2020-01-09 PROCEDURE — 3078F DIAST BP <80 MM HG: CPT | Mod: CPTII,S$GLB,, | Performed by: INTERNAL MEDICINE

## 2020-01-09 PROCEDURE — 1101F PR PT FALLS ASSESS DOC 0-1 FALLS W/OUT INJ PAST YR: ICD-10-PCS | Mod: CPTII,S$GLB,, | Performed by: INTERNAL MEDICINE

## 2020-01-09 PROCEDURE — 82728 ASSAY OF FERRITIN: CPT

## 2020-01-09 PROCEDURE — 83735 ASSAY OF MAGNESIUM: CPT

## 2020-01-09 PROCEDURE — 80048 BASIC METABOLIC PNL TOTAL CA: CPT

## 2020-01-09 PROCEDURE — 3074F SYST BP LT 130 MM HG: CPT | Mod: CPTII,S$GLB,, | Performed by: INTERNAL MEDICINE

## 2020-01-09 PROCEDURE — 99214 OFFICE O/P EST MOD 30 MIN: CPT | Mod: S$GLB,,, | Performed by: INTERNAL MEDICINE

## 2020-01-09 PROCEDURE — 1126F PR PAIN SEVERITY QUANTIFIED, NO PAIN PRESENT: ICD-10-PCS | Mod: S$GLB,,, | Performed by: INTERNAL MEDICINE

## 2020-01-09 PROCEDURE — 99499 RISK ADDL DX/OHS AUDIT: ICD-10-PCS | Mod: S$GLB,,, | Performed by: INTERNAL MEDICINE

## 2020-01-09 PROCEDURE — 83540 ASSAY OF IRON: CPT

## 2020-01-09 PROCEDURE — 1159F PR MEDICATION LIST DOCUMENTED IN MEDICAL RECORD: ICD-10-PCS | Mod: S$GLB,,, | Performed by: INTERNAL MEDICINE

## 2020-01-09 PROCEDURE — 99999 PR PBB SHADOW E&M-EST. PATIENT-LVL III: CPT | Mod: PBBFAC,,, | Performed by: INTERNAL MEDICINE

## 2020-01-09 PROCEDURE — 36415 COLL VENOUS BLD VENIPUNCTURE: CPT

## 2020-01-09 PROCEDURE — 99499 UNLISTED E&M SERVICE: CPT | Mod: S$GLB,,, | Performed by: INTERNAL MEDICINE

## 2020-01-09 PROCEDURE — 1159F MED LIST DOCD IN RCRD: CPT | Mod: S$GLB,,, | Performed by: INTERNAL MEDICINE

## 2020-01-09 PROCEDURE — 3074F PR MOST RECENT SYSTOLIC BLOOD PRESSURE < 130 MM HG: ICD-10-PCS | Mod: CPTII,S$GLB,, | Performed by: INTERNAL MEDICINE

## 2020-01-09 PROCEDURE — 1126F AMNT PAIN NOTED NONE PRSNT: CPT | Mod: S$GLB,,, | Performed by: INTERNAL MEDICINE

## 2020-01-09 PROCEDURE — 3078F PR MOST RECENT DIASTOLIC BLOOD PRESSURE < 80 MM HG: ICD-10-PCS | Mod: CPTII,S$GLB,, | Performed by: INTERNAL MEDICINE

## 2020-01-09 PROCEDURE — 1101F PT FALLS ASSESS-DOCD LE1/YR: CPT | Mod: CPTII,S$GLB,, | Performed by: INTERNAL MEDICINE

## 2020-01-09 PROCEDURE — 99999 PR PBB SHADOW E&M-EST. PATIENT-LVL III: ICD-10-PCS | Mod: PBBFAC,,, | Performed by: INTERNAL MEDICINE

## 2020-01-09 PROCEDURE — 99214 PR OFFICE/OUTPT VISIT, EST, LEVL IV, 30-39 MIN: ICD-10-PCS | Mod: S$GLB,,, | Performed by: INTERNAL MEDICINE

## 2020-01-09 PROCEDURE — 85025 COMPLETE CBC W/AUTO DIFF WBC: CPT

## 2020-01-09 PROCEDURE — 85045 AUTOMATED RETICULOCYTE COUNT: CPT

## 2020-01-09 NOTE — PROGRESS NOTES
CHIEF COMPLAINT: follow up NSTEMI, breast cancer, diabetes, hypertension, hyperlipidemia.     HISTORY OF PRESENT ILLNESS: 78-year-old woman who present for follow up of above.     She was hospitalized 12/26/19 to 12/27/19 due to dehydration.  She was given fluids. She was anemic and was given 1 unit PRBC in the hospital as well. SHe had been dizzy, lightheaded and off balance for a month prior admit.  She feels fine now.  NO dizziness, lightheadedness or feeling off balance. Chlorthalidone, coreg, amlodipine, metformin and potassium supplement were stopped in the hospital.  She feels much better. No melana or blood in her stool.  She had an EGD 10/2018 and had angioectasias that were cauterized.     She was taking ferrous sulfate 325 mg once daily prior hospitalization and continues to take it.      No chest pain since back on aspirin and plavix. She saw Dr Puente on 7/23/18.  Cardica PET stress was normal on 7/25/18.     She is taking Keppra  mg 2 in the afternoon.  Gait is better with taking the Keppra in the late afternoon.  No falls.  No seizures.        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 She  takes aspirin 81 mg daily and plavix 75 mg daily.  No chest pain or shortness of breath.  She is currently off all blood pressure medications:  Coreg 25 mg twice daily, Norvasc 5 mg daily and chlorthaladone 25 mg 1/2 tablet every other daily for her hypertension.   She completed cardiac rehab.     She has completed 5 cycles of CMF for her breast cancer. She completed radiation the end of April 6, 2017.  She took Femara  4/2017 - 4/2018. She had irritation of the lips on Femara. She is taking anastrozole 1 mg daily. Saw Stanley on 8/5/19.     She graduated from the healthy back program. This program is on hold due to breast cancer. Back is doing well. She is doing stretches every other day. She is off tylenol in the evening.  She has occasional  right sided back pain. She will start water aerobics in a week.      She continues to take allopurinol 300 mg daily for her gout. She has not had any gouty flares. She has occasional left elbow pain.      Her blood sugars have been normal - . She is off metformin 850 mg twice daily. She checks blood sugars once daily.  She denies any polydipsia or polyuria. She continues to take aspirin for her coronary artery disease. Sinuses have been doing well. She has not had to take Allegra lately. She denies any nausea, vomiting, diarrhea. She has some constipation - once a week.  She took a dulcolax.      Reflux is well controlled on omeprazole 20 mg two tablets daily. She continues to take Crestor 40 mg daily for her hyperlipidemia. She denies any joint pain or muscle pain from the Crestor. Knee is doing well. She is not having to take Tramadol     She is taking vitamin D 50,000 units twice weekly for vitamin D deficiency     Her daughter who is 46 has stage 2 breast cancer. She has had a lumpectomy and chemo and radiation. Her daughter is doing well. She has finished her treatment. Her daughter might be BRCA positive. Mrs Bergman feels that she is coping well with her diagnosis of breast cancer. No anxiety, depression or insomnia.      PAST MEDICAL HISTORY:   1. Hypertension.   2. Diabetes mellitus   3. Hyperlipidemia.   4. Reflux.   5. Gout.   6. Coronary artery disease, status post MI in 2004 with stenting at that time, and then a right coronary artery stent placed in 2006. Had a negative stress test March 2008. [facility] 8/2012 - patent stents and non obstructive cad  7. Osteoarthritis of the right knee. S/P right knee replacement   8. Status post left knee replacement.   9. Status post LISA/BSO secondary to menstrual disorder or polyps after tubal ligation.   10. Left breast cancer and BRCA2 positive    MEDICATIONS and ALLERGIES: Updated on epic.       Family History  Mother had breast cancer in her early 50's then had  "colon cancer in her 60's. She  of uterine cancer  2 Maternal aunts with breast cancer  Daughter with breast cancer at age 45 - BRCA positive  Father  of a gunshot wound  She is an only child    PHYSICAL EXAMINATION:     /70 (BP Location: Right arm, Patient Position: Sitting, BP Method: Large (Manual))   Pulse 95   Temp 98.2 °F (36.8 °C) (Oral)   Ht 5' 5" (1.651 m)   Wt 93.3 kg (205 lb 11 oz)   LMP  (LMP Unknown)   SpO2 98%   BMI 34.23 kg/m²      General: Alert, oriented. No apparent distress. Affect within normal   limits.   Conjunctivae anicteric. Tympanic membranes clear. Oropharynx clear. Irritation of the lips  Neck supple.   Respiratory effort normal. Lungs clear to auscultation.   Heart: Regular rate and rhythm without murmurs, gallops or rubs.   No lower extremity edema.            ASSESESSMENT  1. Acute renal failure due to dehydration - resolved with hydration and stopping chlorthalidone  2. Iron deficiency anemia - on oral iron.  Check CBC and ferritin.  May need IV iron.  3. Angioectasias of duodenal bulb, gastric body, On omeprazole 40 mg daily. Will contact Dr Louie. Will likely need another EGD. On aspirin and plavix - last time stopped she was in the ED with angina.   4.  CAD with NSTEMI 2017- s/p stenting 2017 - On aspirin and plavix. Last time she stopped these medicaitons for port removal, she had unstable angina and was in the ED. May need to consider restarting coreg at a low dose.   5. Left breast cancer with BRCA2 positive -Finished chemo and  radiation. On anastrazole-saw Dr Guevara 19 and [facility] 19  6. Seizure -stable on Keppra XR   7. Hypertension -currently off meds.  May consider restarting coreg  8. Diabetes - controlled. Off metformin.   9. Gout -controlled on allopurinol    10. Hyperlipidemia - on Crestor 40 mg daily. Controlled   11. Obesity - working at diet, exercise and weight loss.   12. Osteoarthritis of the right knee, status post knee replacement - " doing well.  13. GERD - controlled on meds  14. Hypokalemia -  check potassium level     Screening - mammogram done 8/10. Colonoscopy 4/23/12 - normal, and 5/25/15 - 3 small polyps (one adenoma).  Colonoscopy 10/8/18 - one polyp - due 2023. EGD revealed A single non-bleeding angioectasia in the duodenum. Treated with argon plasma coagulation  (APC).  A few bleeding angioectasias in the stomach. Treated with argon plasma coagulation (APC).     Bone density was normal November 2008.    Prevnar 12/15  Tdap 5/16  Influenza 2018     I'll see her back 3 weeks,  sooner if problems arise.

## 2020-01-09 NOTE — Clinical Note
Bismark,Saw pt today. She was hospitalized for dehydration 12/26/19 and has significant iron deficiency anemia from chronic GI bleeding from angioectasias of the stomach and duodenal bulb.  SHe is already taking oral iron daily.  Her ferritin level prior transfusion was 8. Ferritin level today after transfusion was 52.  Hemoglobin has gone from 6.8 to 9.7 today.  Do you think that she needs IV iron, or was the 1 unit PRBC enough of an iron load?Ethel

## 2020-01-09 NOTE — Clinical Note
Marcio, Saw Mrs Bergman today.  She has worsened iron deficiency anemia.  She was recently hospitalized for dehydration and required 1 unit PRBC.  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  SHe has significant CAD and is on aspirin and plavix -when stopped has angina.  She needs a  repeat EGD - do you want to see her in clinic or just proceed straight to EGD?Genna

## 2020-01-10 ENCOUNTER — TELEPHONE (OUTPATIENT)
Dept: INTERNAL MEDICINE | Facility: CLINIC | Age: 80
End: 2020-01-10

## 2020-01-10 DIAGNOSIS — E13.9 DIABETES MELLITUS DUE TO ABNORMAL INSULIN: Primary | ICD-10-CM

## 2020-01-10 DIAGNOSIS — D50.0 IRON DEFICIENCY ANEMIA DUE TO CHRONIC BLOOD LOSS: Primary | ICD-10-CM

## 2020-01-10 DIAGNOSIS — K92.2 GASTROINTESTINAL HEMORRHAGE, UNSPECIFIED GASTROINTESTINAL HEMORRHAGE TYPE: Primary | ICD-10-CM

## 2020-01-10 RX ORDER — SODIUM CHLORIDE 0.9 % (FLUSH) 0.9 %
10 SYRINGE (ML) INJECTION
Status: CANCELLED | OUTPATIENT
Start: 2020-01-22

## 2020-01-10 RX ORDER — LANCETS
EACH MISCELLANEOUS
Qty: 100 EACH | Refills: 0 | Status: SHIPPED | OUTPATIENT
Start: 2020-01-10 | End: 2020-01-19 | Stop reason: SDUPTHER

## 2020-01-10 RX ORDER — INSULIN PUMP SYRINGE, 3 ML
EACH MISCELLANEOUS
Qty: 1 EACH | Refills: 0 | Status: SHIPPED | OUTPATIENT
Start: 2020-01-10 | End: 2020-01-19 | Stop reason: SDUPTHER

## 2020-01-10 RX ORDER — HEPARIN 100 UNIT/ML
500 SYRINGE INTRAVENOUS
Status: CANCELLED | OUTPATIENT
Start: 2020-01-13

## 2020-01-10 RX ORDER — HEPARIN 100 UNIT/ML
500 SYRINGE INTRAVENOUS
Status: CANCELLED | OUTPATIENT
Start: 2020-01-22

## 2020-01-10 RX ORDER — POTASSIUM CHLORIDE 750 MG/1
10 TABLET, EXTENDED RELEASE ORAL DAILY
Refills: 0
Start: 2020-01-10 | End: 2020-02-10

## 2020-01-10 RX ORDER — SODIUM CHLORIDE 0.9 % (FLUSH) 0.9 %
10 SYRINGE (ML) INJECTION
Status: CANCELLED | OUTPATIENT
Start: 2020-01-13

## 2020-01-10 NOTE — TELEPHONE ENCOUNTER
----- Message from Ethel Berger MD sent at 1/9/2020 10:23 PM CST -----  Please notify pt that her blood counts are continuing to improve -now 9.7.  Keep taking iron daily  DR ORR has sent a message to Dr higuera to see you should be scoped again.  DR ORR has also sent a message to Dr shea regarding IV iron  SF

## 2020-01-10 NOTE — TELEPHONE ENCOUNTER
Also let her know that her potassium level is low and she is to restart her potassium supplement. She also needs to push more fluids    Glucometer sent to pharmacy

## 2020-01-10 NOTE — TELEPHONE ENCOUNTER
Spoke with pt, notified of results and message below. Pt verbalized understanding  She says she need a RX for her glucometer . Please advise

## 2020-01-11 NOTE — TELEPHONE ENCOUNTER
PLease notify pt  Dr Guevara wants her to have IV iron, she should be hearing from his office  Dr Louie wants her to have an EGD - needs to call [phone] to schedule.

## 2020-01-13 ENCOUNTER — TELEPHONE (OUTPATIENT)
Dept: ENDOSCOPY | Facility: HOSPITAL | Age: 80
End: 2020-01-13

## 2020-01-13 ENCOUNTER — TELEPHONE (OUTPATIENT)
Dept: CARDIOLOGY | Facility: CLINIC | Age: 80
End: 2020-01-13

## 2020-01-13 DIAGNOSIS — I10 HTN (HYPERTENSION), BENIGN: ICD-10-CM

## 2020-01-13 DIAGNOSIS — E78.2 MIXED HYPERLIPIDEMIA: ICD-10-CM

## 2020-01-13 DIAGNOSIS — I25.83 CORONARY ARTERY DISEASE DUE TO LIPID RICH PLAQUE: Primary | ICD-10-CM

## 2020-01-13 DIAGNOSIS — E08.41 DIABETES MELLITUS DUE TO UNDERLYING CONDITION WITH DIABETIC MONONEUROPATHY: ICD-10-CM

## 2020-01-13 DIAGNOSIS — E66.01 MORBID OBESITY WITH BMI OF 40.0-44.9, ADULT: ICD-10-CM

## 2020-01-13 DIAGNOSIS — I25.10 CORONARY ARTERY DISEASE DUE TO LIPID RICH PLAQUE: Primary | ICD-10-CM

## 2020-01-13 NOTE — TELEPHONE ENCOUNTER
Patient has an order for an EGD.  Is it ok to hold Plavix 5 days prior to procedure?  Please advise.  Thank you.    Amrita

## 2020-01-13 NOTE — TELEPHONE ENCOUNTER
Liz Brody MA routed this conversation to Me    Jaycee Puente MD   to Me  Liz Brody MA            1/13/20 8:09 AM    Yes it is and please restart ASAP              1/13/20 7:55 AM   You routed this conversation to Jaycee Puente MD    Me           1/13/20 7:55 AM   Note      Patient has an order for an EGD.  Is it ok to hold Plavix 5 days prior to procedure?  Please advise.  Thank you.     Amrita

## 2020-01-14 NOTE — TELEPHONE ENCOUNTER
Patient wanted procedure to be done by you and that was the first available for any provider.  There was a cancellation for next week that was offered to her, but she declined because she has ride issues.

## 2020-01-17 ENCOUNTER — TELEPHONE (OUTPATIENT)
Dept: INTERNAL MEDICINE | Facility: CLINIC | Age: 80
End: 2020-01-17

## 2020-01-17 DIAGNOSIS — E13.9 DIABETES MELLITUS DUE TO ABNORMAL INSULIN: ICD-10-CM

## 2020-01-17 NOTE — TELEPHONE ENCOUNTER
----- Message from Laurence Colon sent at 1/17/2020 11:48 AM CST -----  Contact: [facility] [phone]  Type: Rx    Name of medication(s): new meter, strips, lancets     Is this a refill? New rx? New     Pharmacy Name, Phone, & Location:[facility] [phone]  [phone]    Comments:please advise, thanks

## 2020-01-19 RX ORDER — LANCETS
EACH MISCELLANEOUS
Qty: 100 EACH | Refills: 0 | Status: SHIPPED | OUTPATIENT
Start: 2020-01-19

## 2020-01-19 RX ORDER — INSULIN PUMP SYRINGE, 3 ML
EACH MISCELLANEOUS
Qty: 1 EACH | Refills: 0 | Status: SHIPPED | OUTPATIENT
Start: 2020-01-19

## 2020-01-20 ENCOUNTER — TELEPHONE (OUTPATIENT)
Dept: HEMATOLOGY/ONCOLOGY | Facility: CLINIC | Age: 80
End: 2020-01-20

## 2020-01-20 DIAGNOSIS — D50.0 IRON DEFICIENCY ANEMIA DUE TO CHRONIC BLOOD LOSS: Primary | ICD-10-CM

## 2020-01-20 NOTE — TELEPHONE ENCOUNTER
----- Message from Riana Scott RN sent at 1/20/2020  7:34 AM CST -----  Patient needs to be arranged for iron injectafer infusion next available, then an additional infusion the following week  We plan to repeat lab values 2 weeks post infusion.    Thanks.      ----- Message -----  From: Ethel Berger MD  Sent: 1/19/2020   2:13 PM CST  To: Ismael Guevara wants to set her up for IV iron. Please assist in setting up  SF  ----- Message -----  From: Bismark Guevara MD  Sent: 1/10/2020   8:04 AM CST  To: Ethel Berger MD    Then I will get her set up for some IV - thanks!  ----- Message -----  From: Ethel Berger MD  Sent: 1/10/2020   8:03 AM CST  To: Bismark Guevara MD    Has been on oral iron therapy  SF  ----- Message -----  From: Bismark Guevara MD  Sent: 1/10/2020   7:49 AM CST  To: Ethel Berger MD    Would try oral iron replacement 1st if that does not work then IV iron is appropriate.  ----- Message -----  From: Ethel Berger MD  Sent: 1/9/2020  10:04 PM CST  To: MD Bismark Pimentel,  Saw pt today. She was hospitalized for dehydration 12/26/19 and has significant iron deficiency anemia from chronic GI bleeding from angioectasias of the stomach and duodenal bulb.  SHe is already taking oral iron daily.  Her ferritin level prior transfusion was 8. Ferritin level today after transfusion was 52.  Hemoglobin has gone from 6.8 to 9.7 today.    Do you think that she needs IV iron, or was the 1 unit PRBC enough of an iron load?  Ethel

## 2020-01-21 ENCOUNTER — INFUSION (OUTPATIENT)
Dept: INFUSION THERAPY | Facility: HOSPITAL | Age: 80
End: 2020-01-21
Attending: INTERNAL MEDICINE
Payer: MEDICARE

## 2020-01-21 VITALS — TEMPERATURE: 98 F | DIASTOLIC BLOOD PRESSURE: 66 MMHG | HEART RATE: 76 BPM | SYSTOLIC BLOOD PRESSURE: 140 MMHG

## 2020-01-21 DIAGNOSIS — D50.0 IRON DEFICIENCY ANEMIA DUE TO CHRONIC BLOOD LOSS: Primary | ICD-10-CM

## 2020-01-21 PROCEDURE — 63600175 PHARM REV CODE 636 W HCPCS: Performed by: INTERNAL MEDICINE

## 2020-01-21 PROCEDURE — 96409 CHEMO IV PUSH SNGL DRUG: CPT

## 2020-01-21 RX ORDER — SODIUM CHLORIDE 0.9 % (FLUSH) 0.9 %
10 SYRINGE (ML) INJECTION
Status: DISCONTINUED | OUTPATIENT
Start: 2020-01-21 | End: 2020-01-21 | Stop reason: HOSPADM

## 2020-01-21 RX ORDER — HEPARIN 100 UNIT/ML
500 SYRINGE INTRAVENOUS
Status: DISCONTINUED | OUTPATIENT
Start: 2020-01-21 | End: 2020-01-21 | Stop reason: HOSPADM

## 2020-01-21 RX ADMIN — FERRIC CARBOXYMALTOSE INJECTION 750 MG: 50 INJECTION, SOLUTION INTRAVENOUS at 04:01

## 2020-01-21 RX ADMIN — SODIUM CHLORIDE: 0.9 INJECTION, SOLUTION INTRAVENOUS at 05:01

## 2020-01-21 NOTE — PLAN OF CARE
Pt received first dose of Injectafer; tolerated well. VSS and NAD. Pt instructed to call MD with any concerns. Pt discharged home independently.

## 2020-01-28 ENCOUNTER — INFUSION (OUTPATIENT)
Dept: INFUSION THERAPY | Facility: HOSPITAL | Age: 80
End: 2020-01-28
Attending: INTERNAL MEDICINE
Payer: MEDICARE

## 2020-01-28 VITALS
OXYGEN SATURATION: 100 % | TEMPERATURE: 98 F | RESPIRATION RATE: 18 BRPM | DIASTOLIC BLOOD PRESSURE: 67 MMHG | HEART RATE: 93 BPM | SYSTOLIC BLOOD PRESSURE: 141 MMHG

## 2020-01-28 DIAGNOSIS — D50.0 IRON DEFICIENCY ANEMIA DUE TO CHRONIC BLOOD LOSS: Primary | ICD-10-CM

## 2020-01-28 PROCEDURE — 63600175 PHARM REV CODE 636 W HCPCS: Performed by: INTERNAL MEDICINE

## 2020-01-28 PROCEDURE — 96374 THER/PROPH/DIAG INJ IV PUSH: CPT

## 2020-01-28 RX ORDER — HEPARIN 100 UNIT/ML
500 SYRINGE INTRAVENOUS
Status: DISCONTINUED | OUTPATIENT
Start: 2020-01-28 | End: 2020-01-28 | Stop reason: HOSPADM

## 2020-01-28 RX ORDER — SODIUM CHLORIDE 0.9 % (FLUSH) 0.9 %
10 SYRINGE (ML) INJECTION
Status: DISCONTINUED | OUTPATIENT
Start: 2020-01-28 | End: 2020-01-28 | Stop reason: HOSPADM

## 2020-01-28 RX ADMIN — SODIUM CHLORIDE: 9 INJECTION, SOLUTION INTRAVENOUS at 05:01

## 2020-01-28 RX ADMIN — FERRIC CARBOXYMALTOSE INJECTION 750 MG: 50 INJECTION, SOLUTION INTRAVENOUS at 05:01

## 2020-01-29 NOTE — PLAN OF CARE
Pt ambulatory to clinic with daughter for 2nd injectafer injection. Pleasant and talkative. PIV started without difficulty. Injectafer infused without difficulty. Pt observed for 30 minutes after. No signs of adverse reaction or allergic reaction. Ambulatory from clinic with daughter.

## 2020-01-31 ENCOUNTER — HOSPITAL ENCOUNTER (OUTPATIENT)
Facility: HOSPITAL | Age: 80
Discharge: HOME | End: 2020-01-31
Attending: INTERNAL MEDICINE | Admitting: INTERNAL MEDICINE
Payer: MEDICARE

## 2020-01-31 ENCOUNTER — ANESTHESIA (OUTPATIENT)
Dept: ENDOSCOPY | Facility: HOSPITAL | Age: 80
End: 2020-01-31
Payer: MEDICARE

## 2020-01-31 ENCOUNTER — ANESTHESIA EVENT (OUTPATIENT)
Dept: ENDOSCOPY | Facility: HOSPITAL | Age: 80
End: 2020-01-31
Payer: MEDICARE

## 2020-01-31 VITALS
BODY MASS INDEX: 34.32 KG/M2 | HEART RATE: 77 BPM | RESPIRATION RATE: 18 BRPM | HEIGHT: 65 IN | SYSTOLIC BLOOD PRESSURE: 179 MMHG | OXYGEN SATURATION: 98 % | DIASTOLIC BLOOD PRESSURE: 80 MMHG | TEMPERATURE: 98 F | WEIGHT: 206 LBS

## 2020-01-31 DIAGNOSIS — K92.2 GASTROINTESTINAL HEMORRHAGE, UNSPECIFIED GASTROINTESTINAL HEMORRHAGE TYPE: ICD-10-CM

## 2020-01-31 LAB — POCT GLUCOSE: 93 MG/DL (ref 70–110)

## 2020-01-31 PROCEDURE — 25000003 PHARM REV CODE 250: Performed by: NURSE ANESTHETIST, CERTIFIED REGISTERED

## 2020-01-31 PROCEDURE — 43255 EGD CONTROL BLEEDING ANY: CPT | Performed by: INTERNAL MEDICINE

## 2020-01-31 PROCEDURE — 88305 TISSUE EXAM BY PATHOLOGIST: ICD-10-PCS | Mod: 26,,, | Performed by: PATHOLOGY

## 2020-01-31 PROCEDURE — 88305 TISSUE EXAM BY PATHOLOGIST: CPT | Performed by: PATHOLOGY

## 2020-01-31 PROCEDURE — 37000008 HC ANESTHESIA 1ST 15 MINUTES: Performed by: INTERNAL MEDICINE

## 2020-01-31 PROCEDURE — 43251 EGD REMOVE LESION SNARE: CPT | Performed by: INTERNAL MEDICINE

## 2020-01-31 PROCEDURE — 43255 EGD CONTROL BLEEDING ANY: CPT | Mod: 59,,, | Performed by: INTERNAL MEDICINE

## 2020-01-31 PROCEDURE — 43255 PR EGD, FLEX, W/CTRL BLEED, ANY METHOD: ICD-10-PCS | Mod: 59,,, | Performed by: INTERNAL MEDICINE

## 2020-01-31 PROCEDURE — 37000009 HC ANESTHESIA EA ADD 15 MINS: Performed by: INTERNAL MEDICINE

## 2020-01-31 PROCEDURE — 43251 PR EGD, FLEX, W/REMOVAL, TUMOR/POLYP/LESION(S), SNARE: ICD-10-PCS | Mod: ,,, | Performed by: INTERNAL MEDICINE

## 2020-01-31 PROCEDURE — 63600175 PHARM REV CODE 636 W HCPCS: Performed by: NURSE ANESTHETIST, CERTIFIED REGISTERED

## 2020-01-31 PROCEDURE — E9220 PRA ENDO ANESTHESIA: ICD-10-PCS | Mod: ,,, | Performed by: NURSE ANESTHETIST, CERTIFIED REGISTERED

## 2020-01-31 PROCEDURE — E9220 PRA ENDO ANESTHESIA: HCPCS | Mod: ,,, | Performed by: NURSE ANESTHETIST, CERTIFIED REGISTERED

## 2020-01-31 PROCEDURE — 82962 GLUCOSE BLOOD TEST: CPT | Performed by: INTERNAL MEDICINE

## 2020-01-31 PROCEDURE — 63600175 PHARM REV CODE 636 W HCPCS: Performed by: INTERNAL MEDICINE

## 2020-01-31 PROCEDURE — 27201089 HC SNARE, DISP (ANY): Performed by: INTERNAL MEDICINE

## 2020-01-31 PROCEDURE — 88305 TISSUE EXAM BY PATHOLOGIST: CPT | Mod: 26,,, | Performed by: PATHOLOGY

## 2020-01-31 PROCEDURE — 43251 EGD REMOVE LESION SNARE: CPT | Mod: ,,, | Performed by: INTERNAL MEDICINE

## 2020-01-31 RX ORDER — PROPOFOL 10 MG/ML
VIAL (ML) INTRAVENOUS CONTINUOUS PRN
Status: DISCONTINUED | OUTPATIENT
Start: 2020-01-31 | End: 2020-01-31

## 2020-01-31 RX ORDER — SODIUM CHLORIDE 9 MG/ML
INJECTION, SOLUTION INTRAVENOUS CONTINUOUS
Status: DISCONTINUED | OUTPATIENT
Start: 2020-01-31 | End: 2020-01-31 | Stop reason: HOSPADM

## 2020-01-31 RX ORDER — LIDOCAINE HCL/PF 100 MG/5ML
SYRINGE (ML) INTRAVENOUS
Status: DISCONTINUED | OUTPATIENT
Start: 2020-01-31 | End: 2020-01-31

## 2020-01-31 RX ORDER — GLYCOPYRROLATE 0.2 MG/ML
INJECTION INTRAMUSCULAR; INTRAVENOUS
Status: DISCONTINUED | OUTPATIENT
Start: 2020-01-31 | End: 2020-01-31

## 2020-01-31 RX ORDER — SODIUM CHLORIDE 0.9 % (FLUSH) 0.9 %
10 SYRINGE (ML) INJECTION
Status: DISCONTINUED | OUTPATIENT
Start: 2020-01-31 | End: 2020-01-31 | Stop reason: HOSPADM

## 2020-01-31 RX ORDER — PROPOFOL 10 MG/ML
VIAL (ML) INTRAVENOUS
Status: DISCONTINUED | OUTPATIENT
Start: 2020-01-31 | End: 2020-01-31

## 2020-01-31 RX ADMIN — PROPOFOL 125 MCG/KG/MIN: 10 INJECTION, EMULSION INTRAVENOUS at 12:01

## 2020-01-31 RX ADMIN — SODIUM CHLORIDE: 0.9 INJECTION, SOLUTION INTRAVENOUS at 11:01

## 2020-01-31 RX ADMIN — Medication 100 MG: at 12:01

## 2020-01-31 RX ADMIN — GLYCOPYRROLATE 0.1 MG: 0.2 INJECTION, SOLUTION INTRAMUSCULAR; INTRAVENOUS at 12:01

## 2020-01-31 RX ADMIN — PROPOFOL 40 MG: 10 INJECTION, EMULSION INTRAVENOUS at 12:01

## 2020-01-31 NOTE — DISCHARGE INSTRUCTIONS

## 2020-01-31 NOTE — TRANSFER OF CARE
"Anesthesia Transfer of Care Note    Patient: Renata Bergman    Procedure(s) Performed: Procedure(s) (LRB):  ESOPHAGOGASTRODUODENOSCOPY (EGD) (N/A)    Patient location: GI    Anesthesia Type: general    Transport from OR: Transported from OR on room air with adequate spontaneous ventilation    Post pain: adequate analgesia    Post assessment: no apparent anesthetic complications and tolerated procedure well    Post vital signs: stable    Level of consciousness: awake and alert    Nausea/Vomiting: no nausea/vomiting    Complications: none    Transfer of care protocol was followed      Last vitals:   Visit Vitals  /64   Pulse 87   Temp 36.4 °C (97.5 °F)   Resp 16   Ht 5' 5" (1.651 m)   Wt 93.4 kg (206 lb)   LMP  (LMP Unknown)   SpO2 100%   Breastfeeding? No   BMI 34.28 kg/m²     "

## 2020-01-31 NOTE — ANESTHESIA POSTPROCEDURE EVALUATION
Anesthesia Post Evaluation    Patient: Renata Bergman    Procedure(s) Performed: Procedure(s) (LRB):  ESOPHAGOGASTRODUODENOSCOPY (EGD) (N/A)    Final Anesthesia Type: general    Patient location during evaluation: GI PACU  Patient participation: Yes- Able to Participate  Level of consciousness: awake and alert and oriented  Post-procedure vital signs: reviewed and stable  Pain management: adequate  Airway patency: patent    PONV status at discharge: No PONV  Anesthetic complications: no      Cardiovascular status: blood pressure returned to baseline and hemodynamically stable  Respiratory status: unassisted, spontaneous ventilation and room air  Hydration status: euvolemic  Follow-up not needed.          Vitals Value Taken Time   /80 1/31/2020  1:45 PM   Temp 36.4 °C (97.5 °F) 1/31/2020  1:10 PM   Pulse 77 1/31/2020  1:45 PM   Resp 18 1/31/2020  1:45 PM   SpO2 98 % 1/31/2020  1:45 PM         Event Time     Out of Recovery 13:47:06          Pain/Perri Score: Perri Score: 10 (1/31/2020  1:46 PM)

## 2020-01-31 NOTE — ANESTHESIA PREPROCEDURE EVALUATION
"                                                                                                             01/31/2020  Renata Bergman is a 79 y.o., female.  Past Medical History:   Diagnosis Date    Breast cancer 2016    DCIS and IDC, stage I    Cataract     Coronary artery disease 9/4/2012    Diabetes mellitus due to abnormal insulin 9/4/2012    Diabetes mellitus type II     Genetic testing     brca2 positive     GERD (gastroesophageal reflux disease) 9/4/2012    Gout, arthritis 9/4/2012    Hyperlipidemia 9/4/2012    Hypertension     Obesity     Primary osteoarthritis of both knees 9/4/2012    Renal manifestation of secondary diabetes mellitus     Seizure     states "one time during chemo in 2018"    Type 2 diabetes with peripheral circulatory disorder, controlled        Past Surgical History:   Procedure Laterality Date    BREAST BIOPSY      BREAST LUMPECTOMY Left 08/01/2016    DCIS and IDC, s/p lumpectomy    COLONOSCOPY N/A 10/8/2018    Procedure: COLONOSCOPY;  Surgeon: Marcio Louie MD;  Location: [facility] ([address] [place] [address]);  Service: Endoscopy;  Laterality: N/A;    CORONARY ANGIOPLASTY      ESOPHAGOGASTRODUODENOSCOPY N/A 10/8/2018    Procedure: ESOPHAGOGASTRODUODENOSCOPY (EGD);  Surgeon: Marcio Louie MD;  Location: [facility] ([address] [place] [address]);  Service: Endoscopy;  Laterality: N/A;  combined egd/colon order/Plavix/OK to hold med 5 days prior to procedures per Dr Puente/see telephone encounter dated 8/22/18/svn    HYSTERECTOMY      JOINT REPLACEMENT      left and right k nee    KNEE SURGERY      TOTAL ABDOMINAL HYSTERECTOMY W/ BILATERAL SALPINGOOPHORECTOMY       Review of patient's allergies indicates:   Allergen Reactions    Lisinopril Anaphylaxis       Pre-op Assessment    I have reviewed the Patient Summary Reports.      I have reviewed the Medications.     Review of Systems  Anesthesia Hx:  No problems with previous Anesthesia  History of prior surgery of interest to airway " management or planning:  Denies Personal Hx of Anesthesia complications.   Cardiovascular:   Hypertension Past MI CAD  CABG/stent  stentsx4   Pulmonary:   Denies Asthma.  Denies Shortness of breath.    Renal/:   Chronic Renal Disease, CRI    Hepatic/GI:   GERD    Musculoskeletal:   Arthritis     Neurological:   Seizures    Endocrine:   Diabetes    7/18 Myocardial Perfusion PET Stress  Test    CONCLUSIONS: NORMAL MYOCARDIAL PERFUSION PET STRESS TEST  1. The perfusion scan is free of evidence for myocardial ischemia or injury.   2. Resting wall motion is physiologic. Stress wall motion is physiologic.   3. LV function is normal at rest and stress.  (normal is >= 51%)  4. The ventricular volumes are normal at rest and stress.   5. The extracardiac distribution of radioactivity is normal.   6. When compared to the previous study from 6/27/16, there are no significant changes.     Physical Exam  General:  Well nourished    Airway/Jaw/Neck:  Airway Findings: Mouth Opening: Normal Tongue: Normal  General Airway Assessment: Adult  Mallampati: II  TM Distance: Normal, at least 6 cm  Jaw/Neck Findings:  Neck ROM: Normal ROM  Neck Findings:      Dental:  Dental Findings: Upper Dentures   Chest/Lungs:  Chest/Lungs Clear    Heart/Vascular:  Heart Findings: Rate: Normal  Rhythm: Regular Rhythm        Mental Status:  Mental Status Findings:  Cooperative         Anesthesia Plan  Type of Anesthesia, risks & benefits discussed:  Anesthesia Type:  general  Patient's Preference:   Intra-op Monitoring Plan: standard ASA monitors  Intra-op Monitoring Plan Comments:   Post Op Pain Control Plan: per primary service following discharge from PACU and multimodal analgesia  Post Op Pain Control Plan Comments:   Induction:   IV  Beta Blocker:  Patient is on a Beta-Blocker and has received one dose within the past 24 hours (No further documentation required).       Informed Consent: Patient understands risks and agrees with Anesthesia plan.   Questions answered. Anesthesia consent signed with patient.  ASA Score: 3     Day of Surgery Review of History & Physical:    H&P update referred to the provider.         Ready For Surgery From Anesthesia Perspective.

## 2020-01-31 NOTE — [employer] PATIENT INSTRUCTIONS
Discharge Summary/Instructions after an Endoscopic Procedure  Patient Name: Renata Bergman  Patient MRN: [id number]  Patient YOB: 1940  Friday, January 31, 2020  Marcio Louie MD  RESTRICTIONS:  During your procedure today, you received medications for sedation.  These   medications may affect your judgment, balance and coordination.  Therefore,   for 24 hours, you have the following restrictions:   - DO NOT drive a car, operate machinery, make legal/financial decisions,   sign important papers or drink alcohol.    ACTIVITY:  Today: no heavy lifting, straining or running due to procedural   sedation/anesthesia.  The following day: return to full activity including work.  DIET:  Eat and drink normally unless instructed otherwise.     TREATMENT FOR COMMON SIDE EFFECTS:  - Mild abdominal pain, nausea, belching, bloating or excessive gas:  rest,   eat lightly and use a heating pad.  - Sore Throat: treat with throat lozenges and/or gargle with warm salt   water.  - Because air was used during the procedure, expelling large amounts of air   from your rectum or belching is normal.  - If a bowel prep was taken, you may not have a bowel movement for 1-3 days.    This is normal.  SYMPTOMS TO WATCH FOR AND REPORT TO YOUR PHYSICIAN:  1. Abdominal pain or bloating, other than gas cramps.  2. Chest pain.  3. Back pain.  4. Signs of infection such as: chills or fever occurring within 24 hours   after the procedure.  5. Rectal bleeding, which would show as bright red, maroon, or black stools.   (A tablespoon of blood from the rectum is not serious, especially if   hemorrhoids are present.)  6. Vomiting.  7. Weakness or dizziness.  GO DIRECTLY TO THE NEAREST EMERGENCY ROOM IF YOU HAVE ANY OF THE FOLLOWING:      Difficulty breathing              Chills and/or fever over 101 F   Persistent vomiting and/or vomiting blood   Severe abdominal pain   Severe chest pain   Black, tarry stools   Bleeding- more than one  tablespoon   Any other symptom or condition that you feel may need urgent attention  Your doctor recommends these additional instructions:  If any biopsies were taken, your doctors clinic will contact you in 1 to 2   weeks with any results.  - Discharge patient to home.   - Await pathology results.   - Telephone endoscopist for pathology results in 2 weeks.   - Repeat upper endoscopy in 3 - 5 years for surveillance based on pathology   results.   - The findings and recommendations were discussed with the patient.   - Consider avoiding all non-steroidal anti-inflammatory drugs (ibuprofen,   naproxen, etc.), unless needed for cardiovascular protection.  Recommend   you discuss with your prescribing doctor (of your aspirin and Plavix) to   see if cardiovascular benefits of your aspirin and Plavix outweigh the   risks of GI bleeding.  - Use Prilosec (omeprazole) 40 mg by mouth once daily.   - Resume aspirin today and Plavix (clopidogrel) tomorrow at prior doses.   - Await pathology results.   - Telephone referring physician for study results.   - The findings and recommendations were discussed with the patient.  For questions, problems or results please call your physician - Marcio Louie MD at Work:  [phone].  [facility], EMERGENCY ROOM PHONE NUMBER: [phone]  IF A COMPLICATION OR EMERGENCY SITUATION ARISES AND YOU ARE UNABLE TO REACH   YOUR PHYSICIAN - GO DIRECTLY TO THE EMERGENCY ROOM.  Marcio Loiue MD  1/31/2020 1:07:14 PM  This report has been verified and signed electronically.  [employer]

## 2020-01-31 NOTE — H&P
"[facility]  History & Physical    Subjective:      Chief Complaint/Reason for Admission:     EGD    Renata Bergman is a 79 y.o. female.    Past Medical History:   Diagnosis Date    Breast cancer 2016    DCIS and IDC, stage I    Cataract     Coronary artery disease 9/4/2012    Diabetes mellitus due to abnormal insulin 9/4/2012    Diabetes mellitus type II     Genetic testing     brca2 positive     GERD (gastroesophageal reflux disease) 9/4/2012    Gout, arthritis 9/4/2012    Hyperlipidemia 9/4/2012    Hypertension     Obesity     Primary osteoarthritis of both knees 9/4/2012    Renal manifestation of secondary diabetes mellitus     Seizure     states "one time during chemo in 2018"    Type 2 diabetes with peripheral circulatory disorder, controlled      Past Surgical History:   Procedure Laterality Date    BREAST BIOPSY      BREAST LUMPECTOMY Left 08/01/2016    DCIS and IDC, s/p lumpectomy    COLONOSCOPY N/A 10/8/2018    Procedure: COLONOSCOPY;  Surgeon: Marcio Louie MD;  [facility]  Service: Endoscopy;  Laterality: N/A;    CORONARY ANGIOPLASTY      ESOPHAGOGASTRODUODENOSCOPY N/A 10/8/2018    Procedure: ESOPHAGOGASTRODUODENOSCOPY (EGD);  Surgeon: Marcio Louie MD;  [facility]  Service: Endoscopy;  Laterality: N/A;  combined egd/colon order/Plavix/OK to hold med 5 days prior to procedures per Dr Puente/see telephone encounter dated 8/22/18/svn    HYSTERECTOMY      JOINT REPLACEMENT      left and right k nee    KNEE SURGERY      TOTAL ABDOMINAL HYSTERECTOMY W/ BILATERAL SALPINGOOPHORECTOMY       Family History   Problem Relation Age of Onset    Cancer Mother 55        colon    Diabetes Mother     Hypertension Mother     Breast cancer Mother 45    Ovarian cancer Mother     Hypertension Maternal Aunt     Hyperlipidemia Maternal Aunt     Breast cancer Maternal Aunt     Hypertension Maternal Uncle     Heart " disease Father     Breast cancer Daughter 45    Breast cancer Maternal Aunt     Diabetes Son     Stroke Son     Glaucoma Neg Hx     Heart attack Neg Hx     Heart failure Neg Hx      Social History     Tobacco Use    Smoking status: Former Smoker     Packs/day: 1.00     Types: Cigarettes     Last attempt to quit: 1962     Years since quittin.5    Smokeless tobacco: Never Used   Substance Use Topics    Alcohol use: Yes     Comment: occasionally    Drug use: No       PTA Medications   Medication Sig    allopurinol (ZYLOPRIM) 300 MG tablet TAKE 1 TABLET BY MOUTH ONCE DAILY    anastrozole (ARIMIDEX) 1 mg Tab TAKE 1 TABLET(1 MG) BY MOUTH EVERY DAY. STOP LETROZOLE FEMARA    aspirin 81 MG Chew Take 81 mg by mouth once daily.      cyanocobalamin 1,000 mcg/mL injection Inject 1 mL (1,000 mcg total) into the muscle once a week. Dispense #12 25 gague one inch needles and #12 one ml syringes    ergocalciferol (ERGOCALCIFEROL) 50,000 unit Cap Take 1 capsule (50,000 Units total) by mouth twice a week.    ferrous sulfate 325 (65 FE) MG EC tablet Take 1 tablet (325 mg total) by mouth once daily.    levetiracetam XR (KEPPRA XR) 500 mg Tb24 24 hr tablet TAKE 2 TABLETS(1000 MG) BY MOUTH EVERY DAY    omeprazole (PRILOSEC) 20 MG capsule TAKE 2 CAPSULES BY MOUTH EVERY DAY    potassium chloride (KLOR-CON) 10 MEQ TbSR Take 1 tablet (10 mEq total) by mouth once daily.    rosuvastatin (CRESTOR) 40 MG Tab TAKE 1 TABLET BY MOUTH EVERY DAY    blood sugar diagnostic Strp 1 strip by Misc.(Non-Drug; Combo Route) route once daily.    blood-glucose meter kit Use as instructed    clopidogrel (PLAVIX) 75 mg tablet Take 1 tablet (75 mg total) by mouth once daily.    fexofenadine (ALLEGRA) 30 MG tablet Take 30 mg by mouth daily as needed.    lancets Misc Check blood sugar once daily    nitroGLYCERIN (NITROSTAT) 0.4 MG SL tablet PLACE 1 TABLET UNDER THE TONGUE EVERY 5 MINUTES AS NEEDED FOR CHEST PAIN.     Review of  patient's allergies indicates:   Allergen Reactions    Lisinopril Anaphylaxis        Review of Systems   Constitutional: Negative for chills, fever and weight loss.   Respiratory: Negative for shortness of breath and wheezing.    Cardiovascular: Negative for chest pain.   Gastrointestinal: Negative for abdominal pain.       Objective:      Vital Signs (Most Recent)  Temp: 98.1 °F (36.7 °C) (01/31/20 1127)  Pulse: 85 (01/31/20 1127)  Resp: 16 (01/31/20 1127)  BP: (!) 171/74 (01/31/20 1127)  SpO2: 100 % (01/31/20 1127)    Vital Signs Range (Last 24H):  Temp:  [98.1 °F (36.7 °C)]   Pulse:  [85]   Resp:  [16]   BP: (171)/(74)   SpO2:  [100 %]     Physical Exam   Constitutional: She is oriented to person, place, and time. She appears well-developed and well-nourished.   Cardiovascular: Normal rate.   Pulmonary/Chest: Effort normal.   Abdominal: Soft.   Neurological: She is alert and oriented to person, place, and time.   Psychiatric: She has a normal mood and affect. Her behavior is normal. Judgment and thought content normal.           Assessment:      Active Hospital Problems    Diagnosis  POA    Gastrointestinal hemorrhage [K92.2]  Yes      Resolved Hospital Problems   No resolved problems to display.       Plan:    EGD for iron deficiency anemia.

## 2020-02-06 ENCOUNTER — OFFICE VISIT (OUTPATIENT)
Dept: INTERNAL MEDICINE | Facility: CLINIC | Age: 80
End: 2020-02-06
Payer: MEDICARE

## 2020-02-06 VITALS
WEIGHT: 206.88 LBS | HEART RATE: 82 BPM | DIASTOLIC BLOOD PRESSURE: 86 MMHG | BODY MASS INDEX: 35.32 KG/M2 | OXYGEN SATURATION: 96 % | HEIGHT: 64 IN | SYSTOLIC BLOOD PRESSURE: 142 MMHG

## 2020-02-06 DIAGNOSIS — C50.412 MALIGNANT NEOPLASM OF UPPER-OUTER QUADRANT OF LEFT BREAST IN FEMALE, ESTROGEN RECEPTOR POSITIVE: ICD-10-CM

## 2020-02-06 DIAGNOSIS — I25.83 CORONARY ARTERY DISEASE DUE TO LIPID RICH PLAQUE: Chronic | ICD-10-CM

## 2020-02-06 DIAGNOSIS — N18.4 CHRONIC RENAL DISEASE, STAGE 4, SEVERELY DECREASED GLOMERULAR FILTRATION RATE (GFR) BETWEEN 15-29 ML/MIN/1.73 SQUARE METER: ICD-10-CM

## 2020-02-06 DIAGNOSIS — M10.9 GOUT, ARTHRITIS: ICD-10-CM

## 2020-02-06 DIAGNOSIS — E11.51 CONTROLLED TYPE 2 DM WITH PERIPHERAL CIRCULATORY DISORDER: Chronic | ICD-10-CM

## 2020-02-06 DIAGNOSIS — I25.10 CORONARY ARTERY DISEASE DUE TO LIPID RICH PLAQUE: Chronic | ICD-10-CM

## 2020-02-06 DIAGNOSIS — E78.2 MIXED HYPERLIPIDEMIA: Chronic | ICD-10-CM

## 2020-02-06 DIAGNOSIS — Z17.0 MALIGNANT NEOPLASM OF UPPER-OUTER QUADRANT OF LEFT BREAST IN FEMALE, ESTROGEN RECEPTOR POSITIVE: ICD-10-CM

## 2020-02-06 DIAGNOSIS — E87.6 HYPOKALEMIA: ICD-10-CM

## 2020-02-06 DIAGNOSIS — I10 HTN (HYPERTENSION), BENIGN: Chronic | ICD-10-CM

## 2020-02-06 DIAGNOSIS — D50.0 IRON DEFICIENCY ANEMIA DUE TO CHRONIC BLOOD LOSS: ICD-10-CM

## 2020-02-06 DIAGNOSIS — K21.9 GASTROESOPHAGEAL REFLUX DISEASE, ESOPHAGITIS PRESENCE NOT SPECIFIED: ICD-10-CM

## 2020-02-06 DIAGNOSIS — D64.9 ANEMIA, UNSPECIFIED TYPE: Primary | ICD-10-CM

## 2020-02-06 PROBLEM — N17.9 AKI (ACUTE KIDNEY INJURY): Status: RESOLVED | Noted: 2019-12-26 | Resolved: 2020-02-06

## 2020-02-06 LAB
FINAL PATHOLOGIC DIAGNOSIS: NORMAL
GROSS: NORMAL

## 2020-02-06 PROCEDURE — 1126F PR PAIN SEVERITY QUANTIFIED, NO PAIN PRESENT: ICD-10-PCS | Mod: S$GLB,,, | Performed by: INTERNAL MEDICINE

## 2020-02-06 PROCEDURE — 99999 PR PBB SHADOW E&M-EST. PATIENT-LVL III: CPT | Mod: PBBFAC,,, | Performed by: INTERNAL MEDICINE

## 2020-02-06 PROCEDURE — 1101F PR PT FALLS ASSESS DOC 0-1 FALLS W/OUT INJ PAST YR: ICD-10-PCS | Mod: CPTII,S$GLB,, | Performed by: INTERNAL MEDICINE

## 2020-02-06 PROCEDURE — 1101F PT FALLS ASSESS-DOCD LE1/YR: CPT | Mod: CPTII,S$GLB,, | Performed by: INTERNAL MEDICINE

## 2020-02-06 PROCEDURE — 1126F AMNT PAIN NOTED NONE PRSNT: CPT | Mod: S$GLB,,, | Performed by: INTERNAL MEDICINE

## 2020-02-06 PROCEDURE — 3079F PR MOST RECENT DIASTOLIC BLOOD PRESSURE 80-89 MM HG: ICD-10-PCS | Mod: CPTII,S$GLB,, | Performed by: INTERNAL MEDICINE

## 2020-02-06 PROCEDURE — 3077F SYST BP >= 140 MM HG: CPT | Mod: CPTII,S$GLB,, | Performed by: INTERNAL MEDICINE

## 2020-02-06 PROCEDURE — 99999 PR PBB SHADOW E&M-EST. PATIENT-LVL III: ICD-10-PCS | Mod: PBBFAC,,, | Performed by: INTERNAL MEDICINE

## 2020-02-06 PROCEDURE — 1159F PR MEDICATION LIST DOCUMENTED IN MEDICAL RECORD: ICD-10-PCS | Mod: S$GLB,,, | Performed by: INTERNAL MEDICINE

## 2020-02-06 PROCEDURE — 99499 RISK ADDL DX/OHS AUDIT: ICD-10-PCS | Mod: S$GLB,,, | Performed by: INTERNAL MEDICINE

## 2020-02-06 PROCEDURE — 1159F MED LIST DOCD IN RCRD: CPT | Mod: S$GLB,,, | Performed by: INTERNAL MEDICINE

## 2020-02-06 PROCEDURE — 3077F PR MOST RECENT SYSTOLIC BLOOD PRESSURE >= 140 MM HG: ICD-10-PCS | Mod: CPTII,S$GLB,, | Performed by: INTERNAL MEDICINE

## 2020-02-06 PROCEDURE — 99214 OFFICE O/P EST MOD 30 MIN: CPT | Mod: S$GLB,,, | Performed by: INTERNAL MEDICINE

## 2020-02-06 PROCEDURE — 99499 UNLISTED E&M SERVICE: CPT | Mod: S$GLB,,, | Performed by: INTERNAL MEDICINE

## 2020-02-06 PROCEDURE — 99214 PR OFFICE/OUTPT VISIT, EST, LEVL IV, 30-39 MIN: ICD-10-PCS | Mod: S$GLB,,, | Performed by: INTERNAL MEDICINE

## 2020-02-06 PROCEDURE — 3079F DIAST BP 80-89 MM HG: CPT | Mod: CPTII,S$GLB,, | Performed by: INTERNAL MEDICINE

## 2020-02-06 RX ORDER — CARVEDILOL 25 MG/1
TABLET ORAL
COMMUNITY
Start: 2020-01-12 | End: 2020-02-06

## 2020-02-06 RX ORDER — CARVEDILOL 3.12 MG/1
3.12 TABLET ORAL 2 TIMES DAILY WITH MEALS
Qty: 60 TABLET | Refills: 11 | Status: SHIPPED | OUTPATIENT
Start: 2020-02-06 | End: 2020-04-13 | Stop reason: SDUPTHER

## 2020-02-06 RX ORDER — AMLODIPINE BESYLATE 5 MG/1
TABLET ORAL
COMMUNITY
Start: 2020-01-04 | End: 2020-02-06

## 2020-02-06 NOTE — PROGRESS NOTES
Patient, Renata Bergman (MRN #[id number]), presented with a recorded BMI of 35.51 kg/m^2 and a documented comorbidity(s):  - Diabetes Mellitus Type 2  - Hypertension  - Hyperlipidemia  to which the severe obesity is a contributing factor. This is consistent with the definition of severe obesity (BMI 35.0-39.9) with comorbidity (ICD-10 E66.01, Z68.35). The patient's severe obesity was monitored, evaluated, addressed and/or treated. This addendum to the medical record is made on 02/06/2020.

## 2020-02-06 NOTE — PROGRESS NOTES
CHIEF COMPLAINT: follow up NSTEMI, breast cancer, diabetes, hypertension, hyperlipidemia.     HISTORY OF PRESENT ILLNESS: 79-year-old woman who present for follow up of above.    She has had 2 infusinos of iron (1/21/20 and 1/28/20) and EGD on 1/31/20 - few bleeding angioectasias in the stomach.  Treated with argon plasma coagulation (APC).  TWO gastric polyps. Resected and retrieved. NO nausea, vomiting, constipation, diarrhea    Energy level is better since she has gotten iron infusions.       She is taking ferrous sulfate 325 mg once daily.  She took vitamin B12 injection once a week - has not taken in the last week.       No chest pain since back on aspirin and plavix. She saw Dr Puente on 7/23/18.  Cardica PET stress was normal on 7/25/18.     She is taking Keppra  mg 2 in the afternoon.  Gait is better with taking the Keppra in the late afternoon.  No falls.  No seizures.        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 She  takes aspirin 81 mg daily and plavix 75 mg daily.  No chest pain or shortness of breath.  She is currently off all blood pressure medications:  Coreg 25 mg twice daily, Norvasc 5 mg daily and chlorthaladone 25 mg 1/2 tablet every other daily for her hypertension.   She completed cardiac rehab.     She has completed 5 cycles of CMF for her breast cancer. She completed radiation the end of April 6, 2017.  She took Femara  4/2017 - 4/2018. She had irritation of the lips on Femara. She is taking anastrozole 1 mg daily. Saw Stanley on 8/5/19.     She graduated from the healthy back program. This program is on hold due to breast cancer. Back is doing well. She is doing stretches every other day. She is off tylenol in the evening.  She has occasional right sided back pain. She will start water aerobics in a week.      She continues to take allopurinol 300 mg daily for her gout. She has not had any gouty flares. She has  occasional left elbow pain.      Her blood sugars have been normal - . She is off metformin 850 mg twice daily. She checks blood sugars once daily.  She denies any polydipsia or polyuria. She continues to take aspirin for her coronary artery disease.     Sinuses have been doing well. She has not had to take Allegra lately. She denies any nausea, vomiting, diarrhea. She has some constipation - once a week.  She took a dulcolax.      Reflux is well controlled on omeprazole 20 mg two tablets daily.     She continues to take Crestor 40 mg daily for her hyperlipidemia. She denies any joint pain or muscle pain from the Crestor.     Knee is doing well. She is not having to take Tramadol     She is taking vitamin D 50,000 units twice weekly for vitamin D deficiency     Her daughter who is 46 has stage 2 breast cancer. She has had a lumpectomy and chemo and radiation. Her daughter is doing well. She has finished her treatment. Her daughter might be BRCA positive. Mrs Bergman feels that she is coping well with her diagnosis of breast cancer. No anxiety, depression or insomnia.      PAST MEDICAL HISTORY:   1. Hypertension.   2. Diabetes mellitus   3. Hyperlipidemia.   4. Reflux.   5. Gout.   6. Coronary artery disease, status post MI in  with stenting at that time, and then a right coronary artery stent placed in . Had a negative stress test 2008. [facility] 2012 - patent stents and non obstructive cad  7. Osteoarthritis of the right knee. S/P right knee replacement   8. Status post left knee replacement.   9. Status post LISA/BSO secondary to menstrual disorder or polyps after tubal ligation.   10. Left breast cancer and BRCA2 positive    MEDICATIONS and ALLERGIES: Updated on epic.       Family History  Mother had breast cancer in her early 50's then had colon cancer in her 60's. She  of uterine cancer  2 Maternal aunts with breast cancer  Daughter with breast cancer at age 45 - BRCA positive  Father  of  "a gunshot wound  She is an only child    PHYSICAL EXAMINATION:     BP (!) 142/86   Pulse 82   Ht 5' 4" (1.626 m)   Wt 93.9 kg (206 lb 14.4 oz)   LMP  (LMP Unknown)   SpO2 96%   BMI 35.51 kg/m²     General: Alert, oriented. No apparent distress. Affect within normal   limits.   Conjunctivae anicteric. Tympanic membranes clear. Oropharynx clear. Irritation of the lips  Neck supple.   Respiratory effort normal. Lungs clear to auscultation.   Heart: Regular rate and rhythm without murmurs, gallops or rubs.   No lower extremity edema.            ASSESESSMENT  1. HTN- get back on coreg but at low dose - 3.125 mg twice daily.  2. Iron deficiency anemia - s/p IV iron  3. Angioectasias of duodenal bulb, gastric body, On omeprazole 40 mg daily. S/p EGD 1/31/20 with cautery. Will montior blood counts closely.   4.  CAD with NSTEMI 5/2017- s/p stenting 5/2017 - On aspirin and plavix. Restart coreg at 3.215 mg twice daily   5. Left breast cancer with BRCA2 positive -Finished chemo and  radiation. On anastrazole-saw Dr Guevara 12/5/19 and JENNIE 8/5/19  6. Seizure -stable on Keppra XR   7.  Diabetes - controlled. Off metformin.   9. Gout -controlled on allopurinol    10. Hyperlipidemia - on Crestor 40 mg daily. Controlled   11. Obesity - working at diet, exercise and weight loss.   12. Osteoarthritis of the right knee, status post knee replacement - doing well.  13. GERD - controlled on meds  14. Hypokalemia -  on replacement potassium     Screening - mammogram done 8/19. Colonoscopy 4/23/12 - normal, and 5/25/15 - 3 small polyps (one adenoma).  Colonoscopy 10/8/18 - one polyp - due 2023.     Bone density was normal November 2008.    Prevnar 12/15  Tdap 5/16  Influenza 2018     I'll see her back 2-3 months,  sooner if problems arise.  "

## 2020-02-10 RX ORDER — POTASSIUM CHLORIDE 750 MG/1
TABLET, EXTENDED RELEASE ORAL
Qty: 90 TABLET | Refills: 1 | Status: SHIPPED | OUTPATIENT
Start: 2020-02-10 | End: 2020-08-07

## 2020-02-10 NOTE — PROGRESS NOTES
Refill Authorization Note     is requesting a refill authorization.    Brief assessment and rationale for refill: APPROVE: prr                                         Comments:   Requested Prescriptions   Pending Prescriptions Disp Refills    potassium chloride (KLOR-CON) 10 MEQ TbSR [Pharmacy Med Name: POTASSIUM CL 10MEQ ER TABLETS] 90 tablet 1     Sig: TAKE 1 TABLET BY MOUTH EVERY DAY       Endocrinology:  Minerals - Potassium Supplementation Failed - 2/10/2020  9:18 AM        Failed - K in normal range and within 180 days     Potassium   Date Value Ref Range Status   01/09/2020 3.4 (L) 3.5 - 5.1 mmol/L Final   12/27/2019 3.7 3.5 - 5.1 mmol/L Final   12/26/2019 2.9 (L) 3.5 - 5.1 mmol/L Final              Failed - Cr is 1.3 or below and within 180 days     Creatinine   Date Value Ref Range Status   01/09/2020 1.6 (H) 0.5 - 1.4 mg/dL Final   12/27/2019 2.4 (H) 0.5 - 1.4 mg/dL Final   12/26/2019 2.4 (H) 0.5 - 1.4 mg/dL Final              Passed - Patient is at least 18 years old        Passed - Office visit in past 12 months or future 90 days.     Recent Outpatient Visits            4 days ago Anemia, unspecified type    [facility] - Internal Medicine Ethel Berger MD    1 month ago Anemia, unspecified type    [facility] Internal Medicine Ethel Berger MD    2 months ago Malignant neoplasm of upper-outer quadrant of left breast in female, estrogen receptor positive    [facility] - Hematology Oncology Bismark Guevara MD    5 months ago Coronary artery disease due to lipid rich plaque    [facility] - Cardiology Jaycee Puente MD    6 months ago Diabetes mellitus due to abnormal insulin    [facility] - Internal Medicine Ethel Berger MD          Future Appointments              Tomorrow LAB, HEMONC CANCER [facility]    In 4 weeks Jaycee Puente MD [place] - Cardiology, [facility]    In 4 weeks LAB, [facility] - [facility], [facility]    In 1 month  MD [facility] - Hematology Oncology, [facility]    In 2 months MD [facility] - Internal Medicine, [facility]                Passed - eGFR within 180 days     eGFR if non    Date Value Ref Range Status   01/09/2020 31 (A) >60 mL/min/1.73 m^2 Final     Comment:     Calculation used to obtain the estimated glomerular filtration  rate (eGFR) is the CKD-EPI equation.      12/27/2019 18.9 (A) >60 mL/min/1.73 m^2 Final     Comment:     Calculation used to obtain the estimated glomerular filtration  rate (eGFR) is the CKD-EPI equation.      12/26/2019 18.9 (A) >60 mL/min/1.73 m^2 Final     Comment:     Calculation used to obtain the estimated glomerular filtration  rate (eGFR) is the CKD-EPI equation.        eGFR if    Date Value Ref Range Status   01/09/2020 36 (A) >60 mL/min/1.73 m^2 Final   12/27/2019 21.8 (A) >60 mL/min/1.73 m^2 Final   12/26/2019 21.8 (A) >60 mL/min/1.73 m^2 Final

## 2020-02-11 ENCOUNTER — LAB VISIT (OUTPATIENT)
Dept: LAB | Facility: HOSPITAL | Age: 80
End: 2020-02-11
Attending: INTERNAL MEDICINE
Payer: MEDICARE

## 2020-02-11 ENCOUNTER — TELEPHONE (OUTPATIENT)
Dept: CARDIOLOGY | Facility: CLINIC | Age: 80
End: 2020-02-11

## 2020-02-11 DIAGNOSIS — E66.01 MORBID OBESITY WITH BMI OF 40.0-44.9, ADULT: ICD-10-CM

## 2020-02-11 DIAGNOSIS — I25.83 CORONARY ARTERY DISEASE DUE TO LIPID RICH PLAQUE: ICD-10-CM

## 2020-02-11 DIAGNOSIS — E78.2 MIXED HYPERLIPIDEMIA: ICD-10-CM

## 2020-02-11 DIAGNOSIS — D50.0 IRON DEFICIENCY ANEMIA DUE TO CHRONIC BLOOD LOSS: ICD-10-CM

## 2020-02-11 DIAGNOSIS — I25.10 CORONARY ARTERY DISEASE DUE TO LIPID RICH PLAQUE: ICD-10-CM

## 2020-02-11 DIAGNOSIS — E13.9 DIABETES MELLITUS DUE TO ABNORMAL INSULIN: ICD-10-CM

## 2020-02-11 DIAGNOSIS — I10 HTN (HYPERTENSION), BENIGN: ICD-10-CM

## 2020-02-11 LAB
ALBUMIN SERPL BCP-MCNC: 3.5 G/DL (ref 3.5–5.2)
ALP SERPL-CCNC: 85 U/L (ref 55–135)
ALT SERPL W/O P-5'-P-CCNC: 8 U/L (ref 10–44)
ANION GAP SERPL CALC-SCNC: 9 MMOL/L (ref 8–16)
AST SERPL-CCNC: 14 U/L (ref 10–40)
BASOPHILS # BLD AUTO: 0.06 K/UL (ref 0–0.2)
BASOPHILS NFR BLD: 1 % (ref 0–1.9)
BILIRUB SERPL-MCNC: 0.4 MG/DL (ref 0.1–1)
BUN SERPL-MCNC: 12 MG/DL (ref 8–23)
CALCIUM SERPL-MCNC: 10.7 MG/DL (ref 8.7–10.5)
CHLORIDE SERPL-SCNC: 104 MMOL/L (ref 95–110)
CHOLEST SERPL-MCNC: 174 MG/DL (ref 120–199)
CHOLEST/HDLC SERPL: 2.8 {RATIO} (ref 2–5)
CO2 SERPL-SCNC: 29 MMOL/L (ref 23–29)
CREAT SERPL-MCNC: 1.8 MG/DL (ref 0.5–1.4)
DIFFERENTIAL METHOD: ABNORMAL
EOSINOPHIL # BLD AUTO: 0.4 K/UL (ref 0–0.5)
EOSINOPHIL NFR BLD: 6.6 % (ref 0–8)
ERYTHROCYTE [DISTWIDTH] IN BLOOD BY AUTOMATED COUNT: 23.2 % (ref 11.5–14.5)
EST. GFR  (AFRICAN AMERICAN): 30.9 ML/MIN/1.73 M^2
EST. GFR  (NON AFRICAN AMERICAN): 26.8 ML/MIN/1.73 M^2
ESTIMATED AVG GLUCOSE: 108 MG/DL (ref 68–131)
FERRITIN SERPL-MCNC: 1101 NG/ML (ref 20–300)
GLUCOSE SERPL-MCNC: 115 MG/DL (ref 70–110)
HBA1C MFR BLD HPLC: 5.4 % (ref 4–5.6)
HCT VFR BLD AUTO: 39.3 % (ref 37–48.5)
HDLC SERPL-MCNC: 62 MG/DL (ref 40–75)
HDLC SERPL: 35.6 % (ref 20–50)
HGB BLD-MCNC: 11.9 G/DL (ref 12–16)
IMM GRANULOCYTES # BLD AUTO: 0.01 K/UL (ref 0–0.04)
IMM GRANULOCYTES NFR BLD AUTO: 0.2 % (ref 0–0.5)
LDLC SERPL CALC-MCNC: 94 MG/DL (ref 63–159)
LYMPHOCYTES # BLD AUTO: 1.9 K/UL (ref 1–4.8)
LYMPHOCYTES NFR BLD: 31.4 % (ref 18–48)
MCH RBC QN AUTO: 28.7 PG (ref 27–31)
MCHC RBC AUTO-ENTMCNC: 30.3 G/DL (ref 32–36)
MCV RBC AUTO: 95 FL (ref 82–98)
MONOCYTES # BLD AUTO: 0.9 K/UL (ref 0.3–1)
MONOCYTES NFR BLD: 14.7 % (ref 4–15)
NEUTROPHILS # BLD AUTO: 2.7 K/UL (ref 1.8–7.7)
NEUTROPHILS NFR BLD: 46.1 % (ref 38–73)
NONHDLC SERPL-MCNC: 112 MG/DL
NRBC BLD-RTO: 0 /100 WBC
PLATELET # BLD AUTO: 254 K/UL (ref 150–350)
PMV BLD AUTO: 10.2 FL (ref 9.2–12.9)
POTASSIUM SERPL-SCNC: 3.7 MMOL/L (ref 3.5–5.1)
PROT SERPL-MCNC: 7.4 G/DL (ref 6–8.4)
RBC # BLD AUTO: 4.14 M/UL (ref 4–5.4)
SODIUM SERPL-SCNC: 142 MMOL/L (ref 136–145)
TRIGL SERPL-MCNC: 90 MG/DL (ref 30–150)
TSH SERPL DL<=0.005 MIU/L-ACNC: 1.77 UIU/ML (ref 0.4–4)
WBC # BLD AUTO: 5.92 K/UL (ref 3.9–12.7)

## 2020-02-11 PROCEDURE — 80061 LIPID PANEL: CPT

## 2020-02-11 PROCEDURE — 82728 ASSAY OF FERRITIN: CPT

## 2020-02-11 PROCEDURE — 36415 COLL VENOUS BLD VENIPUNCTURE: CPT

## 2020-02-11 PROCEDURE — 83036 HEMOGLOBIN GLYCOSYLATED A1C: CPT

## 2020-02-11 PROCEDURE — 80053 COMPREHEN METABOLIC PANEL: CPT

## 2020-02-11 PROCEDURE — 85025 COMPLETE CBC W/AUTO DIFF WBC: CPT

## 2020-02-11 PROCEDURE — 84443 ASSAY THYROID STIM HORMONE: CPT

## 2020-02-11 NOTE — TELEPHONE ENCOUNTER
----- Message from Jaycee Puente MD sent at 2/11/2020  1:22 PM CST -----  Please mail patient the blood work results and inform the patient that we will discuss it in detail during the upcoming visit. It looks good.

## 2020-02-12 DIAGNOSIS — G40.409 TONIC-CLONIC EPILEPTIC SEIZURES: ICD-10-CM

## 2020-02-12 RX ORDER — LEVETIRACETAM 500 MG/1
TABLET, EXTENDED RELEASE ORAL
Qty: 180 TABLET | Refills: 4 | Status: SHIPPED | OUTPATIENT
Start: 2020-02-12 | End: 2020-03-05 | Stop reason: SDUPTHER

## 2020-02-12 NOTE — PROGRESS NOTES
Refill Routing Note     Medication(s) are appropriate for refill:    Medication Outside of Protocol    Appointments  past 15m or future 3m with PCP    Date Provider   Last Visit   2/6/2020 Ethel Berger MD   Next Visit   5/4/2020 Ethel Berger MD       Automatic Epic Protocol Generated Data:    Requested Prescriptions   Pending Prescriptions Disp Refills    levetiracetam XR (KEPPRA XR) 500 mg Tb24 24 hr tablet [Pharmacy Med Name: LEVETIRACETAM ER 500MG TABLETS] 180 tablet      Sig: TAKE 2 TABLETS(1000 MG) BY MOUTH EVERY DAY       Anticonvulsants Protocol Passed - 2/12/2020  8:05 AM        Passed - Visit with Authorizing provider in past 9 months or upcoming 90 days        Passed - No active pregnancy on record           Note created:11:10 AM 02/12/2020    
None known

## 2020-02-13 ENCOUNTER — TELEPHONE (OUTPATIENT)
Dept: HEMATOLOGY/ONCOLOGY | Facility: CLINIC | Age: 80
End: 2020-02-13

## 2020-02-13 DIAGNOSIS — D50.0 IRON DEFICIENCY ANEMIA DUE TO CHRONIC BLOOD LOSS: Primary | ICD-10-CM

## 2020-02-13 DIAGNOSIS — E78.49 OTHER HYPERLIPIDEMIA: Chronic | ICD-10-CM

## 2020-02-13 DIAGNOSIS — M25.552 LEFT HIP PAIN: Primary | ICD-10-CM

## 2020-02-13 DIAGNOSIS — Z17.0 MALIGNANT NEOPLASM OF UPPER-OUTER QUADRANT OF LEFT BREAST IN FEMALE, ESTROGEN RECEPTOR POSITIVE: ICD-10-CM

## 2020-02-13 DIAGNOSIS — N18.30 CHRONIC RENAL DISEASE, STAGE 3, MODERATELY DECREASED GLOMERULAR FILTRATION RATE BETWEEN 30-59 ML/MIN/1.73 SQUARE METER: Chronic | ICD-10-CM

## 2020-02-13 DIAGNOSIS — C50.412 MALIGNANT NEOPLASM OF UPPER-OUTER QUADRANT OF LEFT BREAST IN FEMALE, ESTROGEN RECEPTOR POSITIVE: ICD-10-CM

## 2020-02-13 DIAGNOSIS — E11.51 CONTROLLED TYPE 2 DM WITH PERIPHERAL CIRCULATORY DISORDER: Chronic | ICD-10-CM

## 2020-02-13 NOTE — TELEPHONE ENCOUNTER
Spoke with patient to let her know her lab work is looking much better and we will set her up with an appointment to recheck in about 3 months. Pt verbalized understanding of this information and agreeable to this.     ----- Message from Bismark Guevara MD sent at 2/13/2020  8:27 AM CST -----  Let her know that her blood work is much better.  Will recheck CBC in 3 months.

## 2020-02-16 ENCOUNTER — TELEPHONE (OUTPATIENT)
Dept: INTERNAL MEDICINE | Facility: CLINIC | Age: 80
End: 2020-02-16

## 2020-02-16 NOTE — TELEPHONE ENCOUNTER
PLease notify pt  Your blood work reveals that you need to drink more fluids.     Your blood count, blood sugar,, liver function, cholesterol, thyroid function are fine    Blood sugars are doing excellent.     Blood counts are much improved    You have blood work scheduled for 3/9/20 when you see Dr Sousa at [facility].

## 2020-02-26 ENCOUNTER — TELEPHONE (OUTPATIENT)
Dept: GASTROENTEROLOGY | Facility: CLINIC | Age: 80
End: 2020-02-26

## 2020-02-26 NOTE — TELEPHONE ENCOUNTER
----- Message from Marcio Louie MD sent at 2/19/2020  9:13 PM CST -----  Renata is stomach polyps were benign    Stomach, polyps, biopsies:  -FRAGMENTS OF HYPERPLASTIC FOVEOLAR MUCOSA

## 2020-02-26 NOTE — TELEPHONE ENCOUNTER
Ma called pt to release test results   No answer message left on pt voicemail   Requesting a call back

## 2020-02-27 ENCOUNTER — HOSPITAL ENCOUNTER (OUTPATIENT)
Facility: HOSPITAL | Age: 80
Discharge: NURSING, REHAB OR LONG-TERM CARE | End: 2020-03-04
Attending: EMERGENCY MEDICINE | Admitting: EMERGENCY MEDICINE
Payer: MEDICARE

## 2020-02-27 DIAGNOSIS — E87.6 HYPOKALEMIA: ICD-10-CM

## 2020-02-27 DIAGNOSIS — I10 HTN (HYPERTENSION), BENIGN: Chronic | ICD-10-CM

## 2020-02-27 DIAGNOSIS — E11.51 CONTROLLED TYPE 2 DM WITH PERIPHERAL CIRCULATORY DISORDER: Chronic | ICD-10-CM

## 2020-02-27 DIAGNOSIS — M25.561 BILATERAL CHRONIC KNEE PAIN: ICD-10-CM

## 2020-02-27 DIAGNOSIS — G89.29 BILATERAL CHRONIC KNEE PAIN: ICD-10-CM

## 2020-02-27 DIAGNOSIS — M25.571 RIGHT ANKLE PAIN: ICD-10-CM

## 2020-02-27 DIAGNOSIS — R29.6 FREQUENT FALLS: Primary | ICD-10-CM

## 2020-02-27 DIAGNOSIS — M25.562 BILATERAL CHRONIC KNEE PAIN: ICD-10-CM

## 2020-02-27 LAB
ALBUMIN SERPL BCP-MCNC: 3.6 G/DL (ref 3.5–5.2)
ALP SERPL-CCNC: 82 U/L (ref 55–135)
ALT SERPL W/O P-5'-P-CCNC: 9 U/L (ref 10–44)
ANION GAP SERPL CALC-SCNC: 10 MMOL/L (ref 8–16)
AST SERPL-CCNC: 17 U/L (ref 10–40)
BASOPHILS # BLD AUTO: 0.03 K/UL (ref 0–0.2)
BASOPHILS NFR BLD: 0.3 % (ref 0–1.9)
BILIRUB SERPL-MCNC: 0.7 MG/DL (ref 0.1–1)
BUN SERPL-MCNC: 16 MG/DL (ref 8–23)
CALCIUM SERPL-MCNC: 10.7 MG/DL (ref 8.7–10.5)
CHLORIDE SERPL-SCNC: 103 MMOL/L (ref 95–110)
CO2 SERPL-SCNC: 27 MMOL/L (ref 23–29)
CREAT SERPL-MCNC: 1.7 MG/DL (ref 0.5–1.4)
DIFFERENTIAL METHOD: ABNORMAL
EOSINOPHIL # BLD AUTO: 0.1 K/UL (ref 0–0.5)
EOSINOPHIL NFR BLD: 1.2 % (ref 0–8)
ERYTHROCYTE [DISTWIDTH] IN BLOOD BY AUTOMATED COUNT: 19.2 % (ref 11.5–14.5)
EST. GFR  (AFRICAN AMERICAN): 33.1 ML/MIN/1.73 M^2
EST. GFR  (NON AFRICAN AMERICAN): 28.7 ML/MIN/1.73 M^2
GLUCOSE SERPL-MCNC: 123 MG/DL (ref 70–110)
HCT VFR BLD AUTO: 39.3 % (ref 37–48.5)
HGB BLD-MCNC: 12.1 G/DL (ref 12–16)
IMM GRANULOCYTES # BLD AUTO: 0.03 K/UL (ref 0–0.04)
IMM GRANULOCYTES NFR BLD AUTO: 0.3 % (ref 0–0.5)
LYMPHOCYTES # BLD AUTO: 1.8 K/UL (ref 1–4.8)
LYMPHOCYTES NFR BLD: 17.9 % (ref 18–48)
MAGNESIUM SERPL-MCNC: 1.6 MG/DL (ref 1.6–2.6)
MCH RBC QN AUTO: 30 PG (ref 27–31)
MCHC RBC AUTO-ENTMCNC: 30.8 G/DL (ref 32–36)
MCV RBC AUTO: 98 FL (ref 82–98)
MONOCYTES # BLD AUTO: 1.5 K/UL (ref 0.3–1)
MONOCYTES NFR BLD: 14.9 % (ref 4–15)
NEUTROPHILS # BLD AUTO: 6.4 K/UL (ref 1.8–7.7)
NEUTROPHILS NFR BLD: 65.4 % (ref 38–73)
NRBC BLD-RTO: 0 /100 WBC
PLATELET # BLD AUTO: 210 K/UL (ref 150–350)
PMV BLD AUTO: 10.8 FL (ref 9.2–12.9)
POTASSIUM SERPL-SCNC: 3.2 MMOL/L (ref 3.5–5.1)
PROT SERPL-MCNC: 7.9 G/DL (ref 6–8.4)
RBC # BLD AUTO: 4.03 M/UL (ref 4–5.4)
SODIUM SERPL-SCNC: 140 MMOL/L (ref 136–145)
WBC # BLD AUTO: 9.8 K/UL (ref 3.9–12.7)

## 2020-02-27 PROCEDURE — 85025 COMPLETE CBC W/AUTO DIFF WBC: CPT

## 2020-02-27 PROCEDURE — 36000 PLACE NEEDLE IN VEIN: CPT

## 2020-02-27 PROCEDURE — 99285 EMERGENCY DEPT VISIT HI MDM: CPT | Mod: 25

## 2020-02-27 PROCEDURE — 99285 PR EMERGENCY DEPT VISIT,LEVEL V: ICD-10-PCS | Mod: ,,, | Performed by: PHYSICIAN ASSISTANT

## 2020-02-27 PROCEDURE — 82746 ASSAY OF FOLIC ACID SERUM: CPT

## 2020-02-27 PROCEDURE — 82607 VITAMIN B-12: CPT

## 2020-02-27 PROCEDURE — 80053 COMPREHEN METABOLIC PANEL: CPT

## 2020-02-27 PROCEDURE — 83735 ASSAY OF MAGNESIUM: CPT

## 2020-02-27 PROCEDURE — 25000003 PHARM REV CODE 250: Performed by: PHYSICIAN ASSISTANT

## 2020-02-27 PROCEDURE — 99285 EMERGENCY DEPT VISIT HI MDM: CPT | Mod: ,,, | Performed by: PHYSICIAN ASSISTANT

## 2020-02-27 RX ORDER — ACETAMINOPHEN 500 MG
1000 TABLET ORAL
Status: COMPLETED | OUTPATIENT
Start: 2020-02-27 | End: 2020-02-27

## 2020-02-27 RX ADMIN — ACETAMINOPHEN 1000 MG: 500 TABLET ORAL at 09:02

## 2020-02-28 PROBLEM — R29.6 FREQUENT FALLS: Status: ACTIVE | Noted: 2020-02-28

## 2020-02-28 LAB
ANION GAP SERPL CALC-SCNC: 10 MMOL/L (ref 8–16)
BASOPHILS # BLD AUTO: 0.04 K/UL (ref 0–0.2)
BASOPHILS NFR BLD: 0.5 % (ref 0–1.9)
BUN SERPL-MCNC: 17 MG/DL (ref 8–23)
CALCIUM SERPL-MCNC: 10 MG/DL (ref 8.7–10.5)
CHLORIDE SERPL-SCNC: 103 MMOL/L (ref 95–110)
CO2 SERPL-SCNC: 26 MMOL/L (ref 23–29)
CREAT SERPL-MCNC: 1.6 MG/DL (ref 0.5–1.4)
DIFFERENTIAL METHOD: ABNORMAL
EOSINOPHIL # BLD AUTO: 0.4 K/UL (ref 0–0.5)
EOSINOPHIL NFR BLD: 5.6 % (ref 0–8)
ERYTHROCYTE [DISTWIDTH] IN BLOOD BY AUTOMATED COUNT: 19.2 % (ref 11.5–14.5)
EST. GFR  (AFRICAN AMERICAN): 35.6 ML/MIN/1.73 M^2
EST. GFR  (NON AFRICAN AMERICAN): 30.9 ML/MIN/1.73 M^2
FOLATE SERPL-MCNC: 7.9 NG/ML (ref 4–24)
GLUCOSE SERPL-MCNC: 105 MG/DL (ref 70–110)
HCT VFR BLD AUTO: 32.5 % (ref 37–48.5)
HGB BLD-MCNC: 10.6 G/DL (ref 12–16)
IMM GRANULOCYTES # BLD AUTO: 0.01 K/UL (ref 0–0.04)
IMM GRANULOCYTES NFR BLD AUTO: 0.1 % (ref 0–0.5)
LYMPHOCYTES # BLD AUTO: 1.6 K/UL (ref 1–4.8)
LYMPHOCYTES NFR BLD: 20.8 % (ref 18–48)
MCH RBC QN AUTO: 31 PG (ref 27–31)
MCHC RBC AUTO-ENTMCNC: 32.6 G/DL (ref 32–36)
MCV RBC AUTO: 95 FL (ref 82–98)
MONOCYTES # BLD AUTO: 1.3 K/UL (ref 0.3–1)
MONOCYTES NFR BLD: 16.8 % (ref 4–15)
NEUTROPHILS # BLD AUTO: 4.2 K/UL (ref 1.8–7.7)
NEUTROPHILS NFR BLD: 56.2 % (ref 38–73)
NRBC BLD-RTO: 0 /100 WBC
PLATELET # BLD AUTO: 186 K/UL (ref 150–350)
PMV BLD AUTO: 10.7 FL (ref 9.2–12.9)
POCT GLUCOSE: 113 MG/DL (ref 70–110)
POCT GLUCOSE: 118 MG/DL (ref 70–110)
POCT GLUCOSE: 122 MG/DL (ref 70–110)
POCT GLUCOSE: 127 MG/DL (ref 70–110)
POCT GLUCOSE: 95 MG/DL (ref 70–110)
POTASSIUM SERPL-SCNC: 3.1 MMOL/L (ref 3.5–5.1)
RBC # BLD AUTO: 3.42 M/UL (ref 4–5.4)
SODIUM SERPL-SCNC: 139 MMOL/L (ref 136–145)
VIT B12 SERPL-MCNC: 442 PG/ML (ref 210–950)
WBC # BLD AUTO: 7.51 K/UL (ref 3.9–12.7)

## 2020-02-28 PROCEDURE — 97530 THERAPEUTIC ACTIVITIES: CPT

## 2020-02-28 PROCEDURE — 25000003 PHARM REV CODE 250: Performed by: PHYSICIAN ASSISTANT

## 2020-02-28 PROCEDURE — 93005 ELECTROCARDIOGRAM TRACING: CPT

## 2020-02-28 PROCEDURE — 99220 PR INITIAL OBSERVATION CARE,LEVL III: CPT | Mod: ,,, | Performed by: PHYSICIAN ASSISTANT

## 2020-02-28 PROCEDURE — 97166 OT EVAL MOD COMPLEX 45 MIN: CPT

## 2020-02-28 PROCEDURE — 80048 BASIC METABOLIC PNL TOTAL CA: CPT

## 2020-02-28 PROCEDURE — 97116 GAIT TRAINING THERAPY: CPT | Mod: 59

## 2020-02-28 PROCEDURE — 93010 ELECTROCARDIOGRAM REPORT: CPT | Mod: ,,, | Performed by: INTERNAL MEDICINE

## 2020-02-28 PROCEDURE — 93010 EKG 12-LEAD: ICD-10-PCS | Mod: ,,, | Performed by: INTERNAL MEDICINE

## 2020-02-28 PROCEDURE — 99220 PR INITIAL OBSERVATION CARE,LEVL III: ICD-10-PCS | Mod: ,,, | Performed by: PHYSICIAN ASSISTANT

## 2020-02-28 PROCEDURE — 85025 COMPLETE CBC W/AUTO DIFF WBC: CPT

## 2020-02-28 PROCEDURE — 36415 COLL VENOUS BLD VENIPUNCTURE: CPT

## 2020-02-28 PROCEDURE — G0378 HOSPITAL OBSERVATION PER HR: HCPCS

## 2020-02-28 PROCEDURE — 97161 PT EVAL LOW COMPLEX 20 MIN: CPT

## 2020-02-28 RX ORDER — NAPROXEN SODIUM 220 MG/1
81 TABLET, FILM COATED ORAL DAILY
Status: DISCONTINUED | OUTPATIENT
Start: 2020-02-28 | End: 2020-03-04 | Stop reason: HOSPADM

## 2020-02-28 RX ORDER — DEXTROSE MONOHYDRATE 100 MG/ML
12.5 INJECTION, SOLUTION INTRAVENOUS
Status: DISCONTINUED | OUTPATIENT
Start: 2020-02-28 | End: 2020-03-04 | Stop reason: HOSPADM

## 2020-02-28 RX ORDER — POTASSIUM CHLORIDE 20 MEQ/1
20 TABLET, EXTENDED RELEASE ORAL
Status: COMPLETED | OUTPATIENT
Start: 2020-02-28 | End: 2020-02-28

## 2020-02-28 RX ORDER — ERGOCALCIFEROL 1.25 MG/1
50000 CAPSULE ORAL
Status: DISCONTINUED | OUTPATIENT
Start: 2020-02-28 | End: 2020-03-04 | Stop reason: HOSPADM

## 2020-02-28 RX ORDER — IBUPROFEN 200 MG
16 TABLET ORAL
Status: DISCONTINUED | OUTPATIENT
Start: 2020-02-28 | End: 2020-03-04 | Stop reason: HOSPADM

## 2020-02-28 RX ORDER — CLOPIDOGREL BISULFATE 75 MG/1
75 TABLET ORAL DAILY
Status: DISCONTINUED | OUTPATIENT
Start: 2020-02-28 | End: 2020-03-04 | Stop reason: HOSPADM

## 2020-02-28 RX ORDER — LANOLIN ALCOHOL/MO/W.PET/CERES
400 CREAM (GRAM) TOPICAL EVERY 4 HOURS
Status: COMPLETED | OUTPATIENT
Start: 2020-02-28 | End: 2020-02-28

## 2020-02-28 RX ORDER — POTASSIUM CHLORIDE 20 MEQ/1
40 TABLET, EXTENDED RELEASE ORAL EVERY 4 HOURS
Status: COMPLETED | OUTPATIENT
Start: 2020-02-28 | End: 2020-02-28

## 2020-02-28 RX ORDER — DICLOFENAC SODIUM 10 MG/G
2 GEL TOPICAL 2 TIMES DAILY
Status: DISCONTINUED | OUTPATIENT
Start: 2020-02-28 | End: 2020-03-04 | Stop reason: HOSPADM

## 2020-02-28 RX ORDER — GLUCAGON 1 MG
1 KIT INJECTION
Status: DISCONTINUED | OUTPATIENT
Start: 2020-02-28 | End: 2020-03-04 | Stop reason: HOSPADM

## 2020-02-28 RX ORDER — ONDANSETRON 8 MG/1
8 TABLET, ORALLY DISINTEGRATING ORAL EVERY 8 HOURS PRN
Status: DISCONTINUED | OUTPATIENT
Start: 2020-02-28 | End: 2020-03-04 | Stop reason: HOSPADM

## 2020-02-28 RX ORDER — TALC
8 POWDER (GRAM) TOPICAL NIGHTLY PRN
Status: DISCONTINUED | OUTPATIENT
Start: 2020-02-28 | End: 2020-03-04 | Stop reason: HOSPADM

## 2020-02-28 RX ORDER — ANASTROZOLE 1 MG/1
1 TABLET ORAL DAILY
Status: DISCONTINUED | OUTPATIENT
Start: 2020-02-28 | End: 2020-03-04 | Stop reason: HOSPADM

## 2020-02-28 RX ORDER — SODIUM CHLORIDE 0.9 % (FLUSH) 0.9 %
10 SYRINGE (ML) INJECTION
Status: DISCONTINUED | OUTPATIENT
Start: 2020-02-28 | End: 2020-03-04 | Stop reason: HOSPADM

## 2020-02-28 RX ORDER — ROSUVASTATIN CALCIUM 20 MG/1
40 TABLET, COATED ORAL DAILY
Status: DISCONTINUED | OUTPATIENT
Start: 2020-02-28 | End: 2020-03-04 | Stop reason: HOSPADM

## 2020-02-28 RX ORDER — CARVEDILOL 3.12 MG/1
3.12 TABLET ORAL 2 TIMES DAILY WITH MEALS
Status: DISCONTINUED | OUTPATIENT
Start: 2020-02-28 | End: 2020-03-04 | Stop reason: HOSPADM

## 2020-02-28 RX ORDER — BISACODYL 10 MG
10 SUPPOSITORY, RECTAL RECTAL DAILY PRN
Status: DISCONTINUED | OUTPATIENT
Start: 2020-02-28 | End: 2020-03-04 | Stop reason: HOSPADM

## 2020-02-28 RX ORDER — INSULIN ASPART 100 [IU]/ML
0-5 INJECTION, SOLUTION INTRAVENOUS; SUBCUTANEOUS
Status: DISCONTINUED | OUTPATIENT
Start: 2020-02-28 | End: 2020-03-04 | Stop reason: HOSPADM

## 2020-02-28 RX ORDER — IPRATROPIUM BROMIDE AND ALBUTEROL SULFATE 2.5; .5 MG/3ML; MG/3ML
3 SOLUTION RESPIRATORY (INHALATION) EVERY 4 HOURS PRN
Status: DISCONTINUED | OUTPATIENT
Start: 2020-02-28 | End: 2020-03-04 | Stop reason: HOSPADM

## 2020-02-28 RX ORDER — LEVETIRACETAM 500 MG/1
1000 TABLET ORAL 2 TIMES DAILY
Status: DISCONTINUED | OUTPATIENT
Start: 2020-02-28 | End: 2020-03-04 | Stop reason: HOSPADM

## 2020-02-28 RX ORDER — IBUPROFEN 200 MG
24 TABLET ORAL
Status: DISCONTINUED | OUTPATIENT
Start: 2020-02-28 | End: 2020-03-04 | Stop reason: HOSPADM

## 2020-02-28 RX ORDER — ALLOPURINOL 300 MG/1
300 TABLET ORAL DAILY
Status: DISCONTINUED | OUTPATIENT
Start: 2020-02-28 | End: 2020-03-04 | Stop reason: HOSPADM

## 2020-02-28 RX ORDER — ACETAMINOPHEN 325 MG/1
650 TABLET ORAL EVERY 4 HOURS PRN
Status: DISCONTINUED | OUTPATIENT
Start: 2020-02-28 | End: 2020-03-04 | Stop reason: HOSPADM

## 2020-02-28 RX ORDER — DEXTROSE MONOHYDRATE 100 MG/ML
25 INJECTION, SOLUTION INTRAVENOUS
Status: DISCONTINUED | OUTPATIENT
Start: 2020-02-28 | End: 2020-03-04 | Stop reason: HOSPADM

## 2020-02-28 RX ORDER — FERROUS SULFATE 325(65) MG
325 TABLET, DELAYED RELEASE (ENTERIC COATED) ORAL DAILY
Status: DISCONTINUED | OUTPATIENT
Start: 2020-02-28 | End: 2020-03-04 | Stop reason: HOSPADM

## 2020-02-28 RX ORDER — ACETAMINOPHEN 500 MG
1000 TABLET ORAL 3 TIMES DAILY
Status: DISCONTINUED | OUTPATIENT
Start: 2020-02-28 | End: 2020-03-04 | Stop reason: HOSPADM

## 2020-02-28 RX ADMIN — ACETAMINOPHEN 1000 MG: 500 TABLET ORAL at 09:02

## 2020-02-28 RX ADMIN — DICLOFENAC 2 G: 10 GEL TOPICAL at 11:02

## 2020-02-28 RX ADMIN — POTASSIUM CHLORIDE 40 MEQ: 1500 TABLET, EXTENDED RELEASE ORAL at 03:02

## 2020-02-28 RX ADMIN — ACETAMINOPHEN 1000 MG: 500 TABLET ORAL at 03:02

## 2020-02-28 RX ADMIN — ROSUVASTATIN CALCIUM 40 MG: 20 TABLET, FILM COATED ORAL at 08:02

## 2020-02-28 RX ADMIN — CLOPIDOGREL BISULFATE 75 MG: 75 TABLET ORAL at 08:02

## 2020-02-28 RX ADMIN — ASPIRIN 81 MG CHEWABLE TABLET 81 MG: 81 TABLET CHEWABLE at 08:02

## 2020-02-28 RX ADMIN — LEVETIRACETAM 1000 MG: 500 TABLET ORAL at 08:02

## 2020-02-28 RX ADMIN — POTASSIUM CHLORIDE 40 MEQ: 1500 TABLET, EXTENDED RELEASE ORAL at 06:02

## 2020-02-28 RX ADMIN — CARVEDILOL 3.12 MG: 3.12 TABLET, FILM COATED ORAL at 08:02

## 2020-02-28 RX ADMIN — ANASTROZOLE 1 MG: 1 TABLET, COATED ORAL at 08:02

## 2020-02-28 RX ADMIN — POTASSIUM CHLORIDE 20 MEQ: 1500 TABLET, EXTENDED RELEASE ORAL at 03:02

## 2020-02-28 RX ADMIN — FERROUS SULFATE TAB EC 325 MG (65 MG FE EQUIVALENT) 325 MG: 325 (65 FE) TABLET DELAYED RESPONSE at 08:02

## 2020-02-28 RX ADMIN — LEVETIRACETAM 1000 MG: 500 TABLET ORAL at 09:02

## 2020-02-28 RX ADMIN — POTASSIUM CHLORIDE 40 MEQ: 1500 TABLET, EXTENDED RELEASE ORAL at 11:02

## 2020-02-28 RX ADMIN — POTASSIUM CHLORIDE 40 MEQ: 1500 TABLET, EXTENDED RELEASE ORAL at 09:02

## 2020-02-28 RX ADMIN — ALLOPURINOL 300 MG: 300 TABLET ORAL at 08:02

## 2020-02-28 RX ADMIN — CARVEDILOL 3.12 MG: 3.12 TABLET, FILM COATED ORAL at 06:02

## 2020-02-28 RX ADMIN — Medication 400 MG: at 03:02

## 2020-02-28 RX ADMIN — Medication 400 MG: at 11:02

## 2020-02-28 RX ADMIN — Medication 400 MG: at 09:02

## 2020-02-28 NOTE — HPI
Patient is a 79 year old female with a PMHx of HTN, HLD, DM2, CKD, CAD on plavix being admitted to observation for frequent falls. History provided by patient and daughter at bedside. Patient has experienced an estimated 5-7 falls over the past 3 days. Patient's falls have not all been witnessed and unclear if she has sustained head trauma. Patient contributes the falls to bilateral knee pain. She states whenever she tries to stand up the pain is too much and causes her to fall forward. She has history of bilateral knee replacements. 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 Patient denies headache, fever, vision changes, speech changes, numbness, paresthesias, unilateral extremity weakness, chest pain, SOB, abdominal pain, nausea, vomiting, urinary or bowel movement changes.    In the ED, vitals stable. Intake labs without acute abnormality. Cr 1.7 (baseline). XR bilateral knees stable.

## 2020-02-28 NOTE — PLAN OF CARE
Pt evaluated and appropriate goals established. Brittany Kramer, MALCOLM   Problem: Physical Therapy Goal  Goal: Physical Therapy Goal  Description  Goals to be met by: 3/13/2020     Patient will increase functional independence with mobility by performin. Supine to sit with Contact Guard Assistance  2. Sit to supine with MInimal Assistance  3. Sit to stand transfer with Minimal Assistance  4. Bed to chair transfer with Minimal Assistance using Rolling Walker or LRD  5. Gait  x 30 feet with Minimal Assistance using Rolling Walker or LRD.   6. Lower extremity exercise program x15 reps per handout, with assistance as needed     Outcome: Ongoing, Progressing

## 2020-02-28 NOTE — PLAN OF CARE
Safety measures maintained. VSS on RA. Bed in lowest position, call light within reach, side rails up x2. Pt has no complaints at this time. Will continue to monitor.

## 2020-02-28 NOTE — PLAN OF CARE
CM met with patient at the bedside to discuss discharge planning assessment. Pt lives with her mother and has transportation home at discharge. Pt verified PCP and Pharmacy. CM will continue to follow for discharge needs.        02/28/20 0907   Discharge Assessment   Assessment Type Discharge Planning Assessment   Confirmed/corrected address and phone number on facesheet? Yes   Assessment information obtained from? Patient   Expected Length of Stay (days) 2   Communicated expected length of stay with patient/caregiver yes   Prior to hospitilization cognitive status: Alert/Oriented   Prior to hospitalization functional status: Assistive Equipment   Current cognitive status: Alert/Oriented   Current Functional Status: Assistive Equipment   Lives With parent(s)   Able to Return to Prior Arrangements yes   Is patient able to care for self after discharge? Yes   Who are your caregiver(s) and their phone number(s)? Agnes [phone]   Patient's perception of discharge disposition home or selfcare   Readmission Within the Last 30 Days no previous admission in last 30 days   Patient currently being followed by outpatient case management? No   Patient currently receives any other outside agency services? No   Equipment Currently Used at Home rollator;bedside commode;cane, straight;glucometer   Do you have any problems affording any of your prescribed medications? No   Is the patient taking medications as prescribed? yes   Does the patient have transportation home? Yes   Transportation Anticipated family or friend will provide   Does the patient receive services at the [facility]? No   Discharge Plan A Home with family   Discharge Plan B Home Health   DME Needed Upon Discharge  none   Patient/Family in Agreement with Plan yes

## 2020-02-28 NOTE — ED NOTES
Problem: Occupational Therapy Goal  Goal: Occupational Therapy Goal  Goals to be met by: 4/6/18    Patient will increase functional independence with ADLs by performing:    UE Dressing with Contact Guard Assistance.  Grooming while seated with Stand-by Assistance.  Rolling to Bilateral with Contact Guard Assistance.   Supine to sit with Contact Guard Assistance.  Stand pivot transfers with Moderate Assistance.  Upper extremity exercise program x10 reps per handout, with assistance as needed.    Outcome: Ongoing (interventions implemented as appropriate)  Patient will benefit from OT to address functional deficits.       Xray @ bedside.

## 2020-02-28 NOTE — PLAN OF CARE
OT eval and treat. Plan of care established.    Problem: Occupational Therapy Goal  Goal: Occupational Therapy Goal  Description  Goals to be met by: 3/6/2020     Patient will increase functional independence with ADLs by performing:    UE Dressing with [place].  LE Dressing with Supervision.  Grooming while standing at sink with Contact Guard Assistance.  Toileting from bedside commode with Contact Guard Assistance for hygiene and clothing management.   Pt will perform bed mobility with CGA   Pt will perform functional task of household and community distance with MIN A using AD as needed.     Outcome: Ongoing, Progressing

## 2020-02-28 NOTE — ED PROVIDER NOTES
"Encounter Date: 2/27/2020       History     Chief Complaint   Patient presents with    Multiple Complaints     frequent falls not able to stand since yest fell twice, twice last night , twice last night, denies hitting head, bruising to lower legs, on plavix     79 year old female with medical history of obesity, HTN, HLD, T2DM, CKD, CAD on Plavix presenting to the ED with the chief complaint of frequent falls. History provided by patient and daughter at bedside. Patient has experienced an estimated 5-7 falls over the past 3 days. Patient's falls have not all been witnessed and unclear if she has sustained head trauma. Patient contributes the falls to bilateral knee pain. She states whenever she tries to stand up the pain is too much and causes her to fall forward. She has history of bilateral knee replacements. 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 Patient denies headache, fever, vision changes, speech changes, numbness, paresthesias, unilateral extremity weakness, chest pain, SOB, abdominal pain, nausea, vomiting, urinary or bowel movement changes.         Review of patient's allergies indicates:   Allergen Reactions    Lisinopril Anaphylaxis     Past Medical History:   Diagnosis Date    Breast cancer 2016    DCIS and IDC, stage I    Cataract     Coronary artery disease 9/4/2012    Diabetes mellitus due to abnormal insulin 9/4/2012    Diabetes mellitus type II     Genetic testing     brca2 positive     GERD (gastroesophageal reflux disease) 9/4/2012    Gout, arthritis 9/4/2012    Hyperlipidemia 9/4/2012    Hypertension     Obesity     Primary osteoarthritis of both knees 9/4/2012    Renal manifestation of secondary diabetes mellitus     Seizure     states "one time during chemo in 2018"    Type 2 diabetes with peripheral circulatory disorder, controlled      Past Surgical History:   Procedure Laterality Date    " BREAST BIOPSY      BREAST LUMPECTOMY Left 2016    DCIS and IDC, s/p lumpectomy    COLONOSCOPY N/A 10/8/2018    Procedure: COLONOSCOPY;  Surgeon: Marcio Louie MD;  Location: [facility] ENDO (4TH FLR);  Service: Endoscopy;  Laterality: N/A;    CORONARY ANGIOPLASTY      ESOPHAGOGASTRODUODENOSCOPY N/A 10/8/2018    Procedure: ESOPHAGOGASTRODUODENOSCOPY (EGD);  Surgeon: Marcio Louie MD;  Location: [facility] ENDO (4TH FLR);  Service: Endoscopy;  Laterality: N/A;  combined egd/colon order/Plavix/OK to hold med 5 days prior to procedures per Dr Puente/see telephone encounter dated 18/svn    ESOPHAGOGASTRODUODENOSCOPY N/A 2020    Procedure: ESOPHAGOGASTRODUODENOSCOPY (EGD);  Surgeon: Marcio Louie MD;  Location: [facility] WERO (4TH FLR);  Service: Endoscopy;  Laterality: N/A;  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 10/2018    per Dr Albarran to hold Plavix 5 days prior to procedure    HYSTERECTOMY      JOINT REPLACEMENT      left and right k nee    KNEE SURGERY      TOTAL ABDOMINAL HYSTERECTOMY W/ BILATERAL SALPINGOOPHORECTOMY       Family History   Problem Relation Age of Onset    Cancer Mother 55        colon    Diabetes Mother     Hypertension Mother     Breast cancer Mother 45    Ovarian cancer Mother     Hypertension Maternal Aunt     Hyperlipidemia Maternal Aunt     Breast cancer Maternal Aunt     Hypertension Maternal Uncle     Heart disease Father     Breast cancer Daughter 45    Breast cancer Maternal Aunt     Diabetes Son     Stroke Son     Glaucoma Neg Hx     Heart attack Neg Hx     Heart failure Neg Hx      Social History     Tobacco Use    Smoking status: Former Smoker     Packs/day: 1.00     Types: Cigarettes     Last attempt to quit: 1962     Years since quittin.5    Smokeless tobacco: Never Used   Substance Use Topics    Alcohol use: Yes     Comment: occasionally    Drug use: No     Review of Systems   Constitutional: Negative for  chills, diaphoresis and fever.   HENT: Negative for congestion, sore throat and trouble swallowing.    Eyes: Negative for pain.   Respiratory: Negative for shortness of breath.    Cardiovascular: Negative for chest pain.   Gastrointestinal: Negative for abdominal pain, constipation, diarrhea, nausea and vomiting.   Genitourinary: Negative for dysuria.   Musculoskeletal: Positive for arthralgias. Negative for back pain, neck pain and neck stiffness.   Skin: Negative for rash.   Neurological: Negative for weakness.   Hematological: Does not bruise/bleed easily.       Physical Exam     Initial Vitals [02/27/20 1835]   BP Pulse Resp Temp SpO2   (!) 177/81 97 20 98.5 °F (36.9 °C) 100 %      MAP       --         Physical Exam    Constitutional: She appears well-developed and well-nourished. She is not diaphoretic. No distress.   Obese female   HENT:   Head: Normocephalic and atraumatic.   Mouth/Throat: Oropharynx is clear and moist. No oropharyngeal exudate.   Eyes: EOM are normal. Pupils are equal, round, and reactive to light.   Neck: Normal range of motion. Neck supple.   Cardiovascular: Normal rate and regular rhythm.   Pulmonary/Chest: Breath sounds normal. No respiratory distress. She has no wheezes.   Genitourinary: Rectal exam shows guaiac positive stool. Guaiac positive stool. : Acceptable.  Genitourinary Comments: Rectal - Light brown stool on gloved finger. No melena. FOBT positive   Musculoskeletal:   Large body habitus BLE limiting passive ROM. Intact active ROM. Diffuse tenderness to both knees. No pelvic tenderness   Neurological: She is alert and oriented to person, place, and time.   Skin:   Ecchymosis to R medial ankle         ED Course   Procedures  Labs Reviewed   CBC W/ AUTO DIFFERENTIAL - Abnormal; Notable for the following components:       Result Value    Mean Corpuscular Hemoglobin Conc 30.8 (*)     RDW 19.2 (*)     Mono # 1.5 (*)     Lymph% 17.9 (*)     All other components  within normal limits   COMPREHENSIVE METABOLIC PANEL - Abnormal; Notable for the following components:    Potassium 3.2 (*)     Glucose 123 (*)     Creatinine 1.7 (*)     Calcium 10.7 (*)     ALT 9 (*)     eGFR if  33.1 (*)     eGFR if non  28.7 (*)     All other components within normal limits   MAGNESIUM   URINALYSIS, REFLEX TO URINE CULTURE          Imaging Results          CT Head Without Contrast (Final result)  Result time 02/28/20 00:12:29    Final result by Morena Juárez MD (02/28/20 00:12:29)                 Impression:      1. No CT evidence of acute intracranial abnormality. Clinical correlation and further evaluation as warranted.  2. Generalized cerebral volume loss and findings suggestive of age advanced chronic microvascular ischemic change.      Electronically signed by: Morena Juárez MD  Date:    02/28/2020  Time:    00:12             Narrative:    EXAMINATION:  CT HEAD WITHOUT CONTRAST    CLINICAL HISTORY:  Head trauma, minor, GCS>=13, NOC/NEXUS/CCR neg, first study;    TECHNIQUE:  Low dose axial images were obtained through the head.  Coronal and sagittal reformations were also performed. Contrast was not administered.    COMPARISON:  Head CT 11/18/2016, MRI brain 12/21/2016    FINDINGS:  There is no acute intracranial hemorrhage, hydrocephalus, midline shift or mass effect. There is generalized cerebral volume loss compensatory sulcal widening and prominence of the ventricular system.  There is patchy and confluent hypoattenuation involving the deep cerebral and periventricular white matter which is nonspecific but likely reflect sequela of chronic microvascular ischemic change.  This appears increased in conspicuity from prior examination.  The mastoid air cells and paranasal sinuses are clear of acute process. The visualized bones of the calvarium demonstrate no acute osseous abnormality.                               X-Ray Ankle Complete Right (Final result)   Result time 02/28/20 01:07:45    Final result by Carson Nolen MD (02/28/20 01:07:45)                 Impression:      No acute fracture.    Soft tissue swelling.    Degenerative changes as above.      Electronically signed by: Carson Nolen MD  Date:    02/28/2020  Time:    01:07             Narrative:    EXAMINATION:  XR ANKLE COMPLETE 3 VIEW RIGHT    CLINICAL HISTORY:  Pain in right ankle and joints of right foot    TECHNIQUE:  AP, lateral, and oblique images of the right ankle were performed.    COMPARISON:  March 21, 2007.    FINDINGS:  No fracture or dislocation.  Ankle mortise is symmetric.  Degenerative changes at the talonavicular joint with dorsal spurring.  Plantar calcaneal spur.  Marked soft tissue swelling about the lower leg and ankle.                                X-Ray Chest AP Portable (Final result)  Result time 02/28/20 00:05:58    Final result by Carson Nolen MD (02/28/20 00:05:58)                 Impression:      10 cm long curvilinear catheter fragment is seen projecting over the right atrium and right ventricle.  On a single projection, potential intracardiac retained catheter fragment foreign body is not excluded.  PA and lateral chest x-ray or CT of the chest can be obtained to further assess.    This report was flagged in Epic as abnormal.      Electronically signed by: Carson Nolen MD  Date:    02/28/2020  Time:    00:05             Narrative:    EXAMINATION:  XR CHEST AP PORTABLE    CLINICAL HISTORY:  Repeated falls    TECHNIQUE:  Single frontal view of the chest was performed.    COMPARISON:  May 30, 2018.    FINDINGS:  10 cm long curvilinear catheter fragment is seen projecting over the right atrium and right ventricle.    No consolidation, pleural effusion or pneumothorax.    Cardiomediastinal silhouette is unremarkable.                               X-Ray Knee 1 or 2 View Bilateral (Final result)  Result time 02/28/20 02:29:48    Final result by Carson Nolen MD  (02/28/20 02:29:48)                 Impression:      Bilateral total knee arthroplasties, as above.    Increased calcifications in Hoffa's fat pad on the left compared to the 2007 examination may represent some fat necrosis.      Electronically signed by: Carson Nolen MD  Date:    02/28/2020  Time:    02:29             Narrative:    EXAMINATION:  XR KNEE 1 OR 2 VIEW BILATERAL    CLINICAL HISTORY:  bilateral knee pain, frequent falls;    TECHNIQUE:  AP and lateral views of both knees were obtained.    COMPARISON:  Right knee August 13, 2012. left knee May 28, 2007.    FINDINGS:  Bilateral total knee arthroplasties are again seen.  Hardware appears intact and in good position similar in appearance to prior study.  No acute fracture or dislocation identified on either side.  No suprapatellar effusions identified.  Calcifications in Hoffa's fat pad on the right appears similar to the 2012 exam.  On the left side, calcifications in the Hoffa's fat pad appear increased compared to the 2007 exam.                                 Medical Decision Making:   History:   Old Medical Records: I decided to obtain old medical records.  Old Records Summarized: records from clinic visits and records from previous admission(s).  Independently Interpreted Test(s):   I have ordered and independently interpreted EKG Reading(s) - see summary below       <> Summary of EKG Reading(s): NSR 78 bpm. Incomplete RBBB. No STEMI  Clinical Tests:   Lab Tests: Ordered and Reviewed  Radiological Study: Ordered and Reviewed  Medical Tests: Reviewed and Ordered  Other:   I have discussed this case with another health care provider.       <> Summary of the Discussion: Hospital Medicine       APC / Resident Notes:   79 year old female with medical history of obesity, HTN, HLD, T2DM, CKD, CAD on Plavix presenting to the ED c/o frequent falls over the past 3 days. Patient contributes the falls to bilateral knee pain whenever she tries to stand up.  Recently admitted for symptomatic anemia last month and had an EGD showing few bleeding angioectasias in the stomach. DDx includes but not limited to osteoarthritis, fracture, contusion, symptomatic anemia, cardiac arrhythmia, traumatic head injury, concussion. Will check labs, x-rays of musculoskeletal injuries, and CTH.     Work-up shows unremarkable CBC, mild hypokalemia 3.2, Crt at baseline  CXR without consolidation or pleural effusions. 10cm catheter fragment seen, likely 2/2 retained chemotherapy port from 2018  X-ray right ankle without fracture, soft tissue swelling  X-ray B/L knees show intact arthroplasties. No fracture  CTH without acute intracranial processes    No emergent findings seen on ED work-up. Patient's falls likely 2/2 to her chronic knee pain and she is no longer able to bear weight even when using her walker. Do not feel patient is stable for discharge and will place in observation for PT/OT evaluation. Patient expresses understanding and agreeable to the plan. I have discussed the care of this patient with my supervising physician.                             Clinical Impression:       ICD-10-CM ICD-9-CM   1. Frequent falls R29.6 V15.88   2. Right ankle pain M25.571 719.47   3. Bilateral chronic knee pain M25.561 719.46    M25.562 338.29    G89.29    4. Hypokalemia E87.6 276.8         Disposition:   Disposition: Placed in Observation  Condition: Fair     ED Disposition Condition    Observation                           Juan Luis Pinedo PA-C  02/28/20 0248

## 2020-02-28 NOTE — PT/OT/SLP EVAL
"Occupational Therapy   Evaluation    Name: Renata Bergman  MRN: [id number]  Admitting Diagnosis:  Frequent falls      Recommendations:     Discharge Recommendations: nursing facility, skilled  Discharge Equipment Recommendations:  (TBD (progress pending))  Barriers to discharge:  (Increased assistance needed)    Assessment:     Renata Bergman is a 79 y.o. female with a medical diagnosis of Frequent falls.  She presents with performance deficits including weakness, impaired endurance, impaired self care skills, impaired functional mobilty, gait instability, impaired balance, impaired cognition, decreased coordination, decreased upper extremity function, decreased lower extremity function, decreased safety awareness, pain, impaired coordination. Pt presenting with impaired cognition during orientation questions, family entered and reports no baseline cognitive impairments and that this is all new. Pt with increased fatigue, decreased endurance, impaired balance, and increased pain limiting functional mobility at this time. Pt would continue to benefit from acute skilled OT services to increase functional independence. OT to recommend SNF upon D/C to improve quality of life.    Rehab Prognosis: Fair; patient would benefit from acute skilled OT services to address these deficits and reach maximum level of function.       Plan:     Patient to be seen 3 x/week to address the above listed problems via self-care/home management, therapeutic activities, therapeutic exercises  · Plan of Care Expires: 03/28/20  · Plan of Care Reviewed with: patient, family    Subjective   "Why do I keep leaning over like this?"    Chief Complaint: BLE knee pain  Patient/Family Comments/goals: Return to PLOF    Occupational Profile:  Living Environment: Pt is unreliable historian at this time, reports that she lives with mother and mother has to assist her. Niece entered room while therapy present and updated that pt lives with son and " daughter in 1 [address], threshold step to enter, walk-in shower and bath bench and using a BSC. Reports using a SPC and RW.  Previous level of function: Independent ADLs/IADLs  Equipment Used at Home:  walker, rolling, cane, straight, bedside commode, bath bench  Assistance upon Discharge: Assistance from family as needed    Pain/Comfort:  Pain Rating 1: (Numerical value not provided)  Location - Side 1: Bilateral  Location - Orientation 1: generalized  Location 1: knee  Pain Addressed 1: Pre-medicate for activity, Reposition, Distraction, Cessation of Activity  Pain Rating Post-Intervention 1: (Numerical value not provided)    Patients cultural, spiritual, Rastafari conflicts given the current situation: no    Objective:     Communicated with: RN prior to session.  Patient found seated EOB with PureWick, telemetry upon OT entry to room. OT entered room shortly after PT to perform remainder of evaluation as co-treat due to complexity of presentation. Pt agreeable to participate in OT eval at this time.    General Precautions: Standard, fall   Orthopedic Precautions:N/A   Braces: N/A     Occupational Performance:    Bed Mobility:    · Patient completed Rolling/Turning to Right with moderate assistance  · Patient completed Scooting to EOB with moderate assistance  · Patient completed Supine to Sit with moderate assistance  · Patient completed Sit to Supine with total assistance and 2 persons; with increased fatigue    Functional Mobility/Transfers:  · Patient completed Sit <> Stand Transfer from EOB with maximal assistance and of 2 persons with rolling walker and B support at arms; required repeated verbal cueing for proper and safe hand placement; tactile cueing provided with posterior push to initiate full stance; 2 trials performed with rest break inbetween  · Functional Mobility: Pt performed lateral steps to HOB with RW and BUE support, observed increased B knee flexion and forward flexed posture with increased  fatigue.    Activities of Daily Living:  · Grooming: contact guard assistance not assessed this date, however, pt would perform with set-up seated EOB and assistance due to L posterior lean  · Upper Body Dressing: minimum assistance to don second gown as robe; assistance required to manage IV sites  · Lower Body Dressing: total assistance to don socks while seated EOB  · Toileting: total assistance Observed wet pads during stand attempts, pt not aware, bladder incontinent    Cognitive/Visual Perceptual:  Cognitive/Psychosocial Skills:     -       Oriented to: Oriented to self and year, unable to state month and responded located as [place]   -       Follows Commands/attention:Follows one-step commands  Visual/Perceptual:      -Intact pt wears prescribed glasses    Physical Exam:  Balance:    -       EOB seated poor (increased L posterior lean); Poor standing tolerance, unable to maintain full stance  Edema:  Edema observed BLE  Sensation:    -       Intact  Dominant hand:    -       R hand dominant  Upper Extremity Range of Motion:     -       Right Upper Extremity: WFL  -       Left Upper Extremity: WFL  Upper Extremity Strength:    -       Right Upper Extremity: WFL except 4-/5  -       Left Upper Extremity: Deficits: 3+/5   Strength:    -       Right Upper Extremity: 3+/5  -       Left Upper Extremity: 3+/5  Fine Motor Coordination:    -       Intact  Left hand, finger to nose, Right hand, finger to nose, Left hand thumb/finger opposition skills and Right hand thumb/finger opposition skills; required increased verbal cueing for completion of task and increased time.    AMPAC 6 Click ADL:  AMPAC Total Score: 14    Treatment & Education:  OT eval and treat, role reviewed and plan of care established. Pt tolerated session well this date, performing EOB sitting with CGA due to increased L posterior lean. Pt able to feel when she begins to lean but unable to initiate self-correction, requiring verbal and tactile  cues to return to midline. Pt with increased B knee pain and presented with limitations in bearing full weight in BLE at this time. Required verbal and tactile cueing for hand placement using RW, and to obtain full posture with weight-shift for steps. Rest break required in-between stance; Pt with increased forward flex posture and B knee flexed during final stance and required immediate return to sit.  Education:    4 pills found within bed linen when setting pt back up at end of session, nurse notified and stated pills were given to pt this morning for consumption.    Co-eval with PT due to complexity of pt and to maximize functional performance within pain tolerance.    Patient left HOB elevated with call button in reach and niece present    GOALS:   Multidisciplinary Problems     Occupational Therapy Goals        Problem: Occupational Therapy Goal    Goal Priority Disciplines Outcome Interventions   Occupational Therapy Goal     OT, PT/OT Ongoing, Progressing    Description:  Goals to be met by: 3/6/2020     Patient will increase functional independence with ADLs by performing:    UE Dressing with [place].  LE Dressing with Supervision.  Grooming while standing at sink with Contact Guard Assistance.  Toileting from bedside commode with Contact Guard Assistance for hygiene and clothing management.   Pt will perform bed mobility with CGA   Pt will perform functional task of household and community distance with MIN A using AD as needed.                      History:     Past Medical History:   Diagnosis Date    Breast cancer 2016    DCIS and IDC, stage I    Cataract     Coronary artery disease 9/4/2012    Diabetes mellitus due to abnormal insulin 9/4/2012    Diabetes mellitus type II     Genetic testing     brca2 positive     GERD (gastroesophageal reflux disease) 9/4/2012    Gout, arthritis 9/4/2012    Hyperlipidemia 9/4/2012    Hypertension     Obesity     Primary osteoarthritis of both knees  "9/4/2012    Renal manifestation of secondary diabetes mellitus     Seizure     states "one time during chemo in 2018"    Type 2 diabetes with peripheral circulatory disorder, controlled          Past Surgical History:   Procedure Laterality Date    BREAST BIOPSY      BREAST LUMPECTOMY Left 08/01/2016    DCIS and IDC, s/p lumpectomy    COLONOSCOPY N/A 10/8/2018    Procedure: COLONOSCOPY;  Surgeon: Marcio Louie MD;  Location: [facility] (4TH FLR);  Service: Endoscopy;  Laterality: N/A;    CORONARY ANGIOPLASTY      ESOPHAGOGASTRODUODENOSCOPY N/A 10/8/2018    Procedure: ESOPHAGOGASTRODUODENOSCOPY (EGD);  Surgeon: Marcio Louie MD;  Location: [facility] (4TH FLR);  Service: Endoscopy;  Laterality: N/A;  combined egd/colon order/Plavix/OK to hold med 5 days prior to procedures per Dr Puente/see telephone encounter dated 8/22/18/svn    ESOPHAGOGASTRODUODENOSCOPY N/A 1/31/2020    Procedure: ESOPHAGOGASTRODUODENOSCOPY (EGD);  Surgeon: Marcio Louie MD;  Location: [facility] (4TH FLR);  Service: Endoscopy;  Laterality: N/A;  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

## 2020-02-28 NOTE — ASSESSMENT & PLAN NOTE
Patient presents with several falls over the last several days and is unable to safely ambulate. She has had bilateral knee replacements.  - XR knees bilateral stable  - baseline tenderness  - PT/OT, likely SNF  - fall precautions

## 2020-02-28 NOTE — NURSING
Pt arrived from ED to room 728 via stretcher. Pt oriented to room, safety, and fall precautions discussed. Pt has no complaints at this time. Will continue to monitor.

## 2020-02-28 NOTE — PT/OT/SLP EVAL
Physical Therapy Evaluation    Patient Name:  Renata Bergman   MRN:  [id number]    Recommendations:     Discharge Recommendations:  nursing facility, skilled   Discharge Equipment Recommendations: none   Barriers to discharge: Increased need for skilled assistance at current level of function    Assessment:     Renata Bergman is a 79 y.o. female admitted with a medical diagnosis of Frequent falls.  She presents with the following impairments/functional limitations:  weakness, impaired functional mobilty, decreased safety awareness, impaired self care skills, decreased lower extremity function, impaired cognition, gait instability, impaired endurance, pain . Pt pleasant and willing to participate in therapy evaluation. Pt performed bed mobility with mod A - total A of 2 persons. Pt was max A of 2 persons for STS transfers, standing balance, and side side steps at EOB. Pt performed 4 side steps with RW. Pt would continue to benefit from skilled therapy services at a skilled nursing facility following d/c.    Rehab Prognosis: Good; patient would benefit from acute skilled PT services to address these deficits and reach maximum level of function.    Recent Surgery: * No surgery found *      Plan:     During this hospitalization, patient to be seen 3 x/week to address the identified rehab impairments via gait training, therapeutic activities, therapeutic exercises, neuromuscular re-education and progress toward the following goals:    · Plan of Care Expires:  03/29/20    Subjective     Chief Complaint: Pain in B knees, mostly R  Patient/Family Comments/goals: Return to PLOF  Pain/Comfort:  Pain Rating 1: other (see comments)(did not rate)  Location - Side 1: Bilateral(worse on R )  Location - Orientation 1: generalized  Location 1: knee(with movement)  Pain Addressed 1: Pre-medicate for activity, Nurse notified, Distraction, Cessation of Activity  Pain Rating Post-Intervention 1: 0/10    Patients cultural,  spiritual, Hindu conflicts given the current situation:      Living Environment:  Pt lives with son and daughter (pt seems confused and thinks she lives with her son and mom. Niece present says she lives with her son and daughter) in a H with [facility]. Pt has a walk-in shower.  Prior to admission, patients level of function was mod I for ambulation with RW. Pt reports needing assistance with cooking and cleaning.  Equipment used at home: walker, rolling, cane, straight, bath bench, bedside commode.  DME owned (not currently used): none.  Upon discharge, patient will have assistance from son and daughter.    Objective:     Communicated with nurse prior to session.  Patient found HOB elevated with telemetry, PureWick  upon PT entry to room.    General Precautions: Standard, fall   Orthopedic Precautions:N/A   Braces: N/A     Exams:  · Cognitive Exam:  Patient is oriented to Person, Place and Situation  · Gross Motor Coordination:  delayed response and difficulty with aiming for movements  · Sensation:    · -       Intact  light/touch BLE  · RLE ROM: WFL  · RLE Strength: WFL except hip flexor 1+/5  · LLE ROM: WFL  · LLE Strength: WFL except hip flexor 1+/5    Functional Mobility:  · Bed Mobility:     · Supine to Sit: moderate assistance  · Sit to Supine: total assistance and of 2 persons  · Scooting to EOB: max A  · Transfers:     · Sit to Stand:  maximal assistance and of 2 persons with rolling walker; 2 trials from EOB  · Gait: Pt performed 4 small side steps to the R at EOB with max A of 2 persons and RW. Pt required maximum verbal and tactile cueing for posture, foot placement, and RW negotiation. Pt limited by pain and fatigue.  · Balance: Pt required max A of 2 persons for static and dynamic standing with RW and maximum verbal and tactile cues.      Therapeutic Activities and Exercises:   Pt stood at EOB with max A of 2 persons and RW for 2 trials, 1st trial for 45 seconds, 2nd trial for 30 seconds. Pt  "limited by pain and fatigue. Pt's knees started buckling toward end of gait trial, unable to self-correct, required blocking.    Pt educated on PT role/POC, benefits of OOB activity, recommendation for d/c to SNF. Pt verbalized understanding.    AM-PAC 6 CLICK MOBILITY  Total Score:10     Patient left HOB elevated with all lines intact, call button in reach, nurse notified and niece present.    GOALS:   Multidisciplinary Problems     Physical Therapy Goals        Problem: Physical Therapy Goal    Goal Priority Disciplines Outcome Goal Variances Interventions   Physical Therapy Goal     PT, PT/OT Ongoing, Progressing     Description:  Goals to be met by: 3/13/2020     Patient will increase functional independence with mobility by performin. Supine to sit with Contact Guard Assistance  2. Sit to supine with MInimal Assistance  3. Sit to stand transfer with Minimal Assistance  4. Bed to chair transfer with Minimal Assistance using Rolling Walker or LRD  5. Gait  x 30 feet with Minimal Assistance using Rolling Walker or LRD.   6. Lower extremity exercise program x15 reps per handout, with assistance as needed                      History:     Past Medical History:   Diagnosis Date    Breast cancer     DCIS and IDC, stage I    Cataract     Coronary artery disease 2012    Diabetes mellitus due to abnormal insulin 2012    Diabetes mellitus type II     Genetic testing     brca2 positive     GERD (gastroesophageal reflux disease) 2012    Gout, arthritis 2012    Hyperlipidemia 2012    Hypertension     Obesity     Primary osteoarthritis of both knees 2012    Renal manifestation of secondary diabetes mellitus     Seizure     states "one time during chemo in 2018"    Type 2 diabetes with peripheral circulatory disorder, controlled        Past Surgical History:   Procedure Laterality Date    BREAST BIOPSY      BREAST LUMPECTOMY Left 2016    DCIS and IDC, s/p lumpectomy    " COLONOSCOPY N/A 10/8/2018    Procedure: COLONOSCOPY;  Surgeon: Marcio Louie MD;  Location: [facility] (4TH FLR);  Service: Endoscopy;  Laterality: N/A;    CORONARY ANGIOPLASTY      ESOPHAGOGASTRODUODENOSCOPY N/A 10/8/2018    Procedure: ESOPHAGOGASTRODUODENOSCOPY (EGD);  Surgeon: Marcio Louie MD;  [facility]  Service: Endoscopy;  Laterality: N/A;  combined egd/colon order/Plavix/OK to hold med 5 days prior to procedures per Dr Puente/see telephone encounter dated 8/22/18/svn    ESOPHAGOGASTRODUODENOSCOPY N/A 1/31/2020    Procedure: ESOPHAGOGASTRODUODENOSCOPY (EGD);  Surgeon: Marcio Louie MD;  [facility]  Service: Endoscopy;  Laterality: N/A;  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

## 2020-02-28 NOTE — SUBJECTIVE & OBJECTIVE
"Past Medical History:   Diagnosis Date    Breast cancer 2016    DCIS and IDC, stage I    Cataract     Coronary artery disease 9/4/2012    Diabetes mellitus due to abnormal insulin 9/4/2012    Diabetes mellitus type II     Genetic testing     brca2 positive     GERD (gastroesophageal reflux disease) 9/4/2012    Gout, arthritis 9/4/2012    Hyperlipidemia 9/4/2012    Hypertension     Obesity     Primary osteoarthritis of both knees 9/4/2012    Renal manifestation of secondary diabetes mellitus     Seizure     states "one time during chemo in 2018"    Type 2 diabetes with peripheral circulatory disorder, controlled        Past Surgical History:   Procedure Laterality Date    BREAST BIOPSY      BREAST LUMPECTOMY Left 08/01/2016    DCIS and IDC, s/p lumpectomy    COLONOSCOPY N/A 10/8/2018    Procedure: COLONOSCOPY;  Surgeon: Marcio Louie MD;  Location: [facility] ([address] [place] [address]);  Service: Endoscopy;  Laterality: N/A;    CORONARY ANGIOPLASTY      ESOPHAGOGASTRODUODENOSCOPY N/A 10/8/2018    Procedure: ESOPHAGOGASTRODUODENOSCOPY (EGD);  Surgeon: Marcio Louie MD;  Location: [facility] ([address] [place] [address]);  Service: Endoscopy;  Laterality: N/A;  combined egd/colon order/Plavix/OK to hold med 5 days prior to procedures per Dr Puente/see telephone encounter dated 8/22/18/tanya    ESOPHAGOGASTRODUODENOSCOPY N/A 1/31/2020    Procedure: ESOPHAGOGASTRODUODENOSCOPY (EGD);  Surgeon: Marcio Louie MD;  Location: [facility] ([address] [place] [address]);  Service: Endoscopy;  Laterality: N/A;  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 "      Current Outpatient Medications on File Prior to Encounter   Medication Sig    allopurinol (ZYLOPRIM) 300 MG tablet TAKE 1 TABLET BY MOUTH ONCE DAILY    anastrozole (ARIMIDEX) 1 mg Tab TAKE 1 TABLET(1 MG) BY MOUTH EVERY DAY. STOP LETROZOLE FEMARA    aspirin 81 MG Chew Take 81 mg by mouth once daily.      blood sugar diagnostic Strp 1 strip by Misc.(Non-Drug; Combo Route) route once daily.    blood-glucose meter kit Use as instructed    carvediloL (COREG) 3.125 MG tablet Take 1 tablet (3.125 mg total) by mouth 2 (two) times daily with meals.    clopidogrel (PLAVIX) 75 mg tablet Take 1 tablet (75 mg total) by mouth once daily.    cyanocobalamin 1,000 mcg/mL injection Inject 1 mL (1,000 mcg total) into the muscle once a week. Dispense #12 25 gague one inch needles and #12 one ml syringes    ergocalciferol (ERGOCALCIFEROL) 50,000 unit Cap Take 1 capsule (50,000 Units total) by mouth twice a week.    ferrous sulfate 325 (65 FE) MG EC tablet Take 1 tablet (325 mg total) by mouth once daily.    fexofenadine (ALLEGRA) 30 MG tablet Take 30 mg by mouth daily as needed.    lancets Misc Check blood sugar once daily    levetiracetam XR (KEPPRA XR) 500 mg Tb24 24 hr tablet TAKE 2 TABLETS(1000 MG) BY MOUTH EVERY DAY    nitroGLYCERIN (NITROSTAT) 0.4 MG SL tablet PLACE 1 TABLET UNDER THE TONGUE EVERY 5 MINUTES AS NEEDED FOR CHEST PAIN.    omeprazole (PRILOSEC) 20 MG capsule TAKE 2 CAPSULES BY MOUTH EVERY DAY    potassium chloride (KLOR-CON) 10 MEQ TbSR TAKE 1 TABLET BY MOUTH EVERY DAY    rosuvastatin (CRESTOR) 40 MG Tab TAKE 1 TABLET BY MOUTH EVERY DAY     Family History     Problem Relation (Age of Onset)    Breast cancer Mother (45), Maternal Aunt, Daughter (45), Maternal Aunt    Cancer Mother (55)    Diabetes Mother, Son    Heart disease Father    Hyperlipidemia Maternal Aunt    Hypertension Mother, Maternal Aunt, Maternal Uncle    Ovarian cancer Mother    Stroke Son        Tobacco Use    Smoking status:  Former Smoker     Packs/day: 1.00     Types: Cigarettes     Last attempt to quit: 1962     Years since quittin.5    Smokeless tobacco: Never Used   Substance and Sexual Activity    Alcohol use: Yes     Comment: occasionally    Drug use: No    Sexual activity: Never     Review of Systems   Constitutional: Positive for fatigue. Negative for chills and fever.   HENT: Negative for trouble swallowing.    Eyes: Negative for photophobia and visual disturbance.   Respiratory: Negative for chest tightness, shortness of breath and wheezing.    Cardiovascular: Negative for chest pain, palpitations and leg swelling.   Gastrointestinal: Negative for abdominal pain, nausea and vomiting.   Genitourinary: Negative for dysuria, hematuria and urgency.   Musculoskeletal: Positive for arthralgias, gait problem and joint swelling.   Skin: Negative for rash and wound.   Neurological: Positive for weakness. Negative for dizziness, syncope and light-headedness.   Hematological: Bruises/bleeds easily.   Psychiatric/Behavioral: Negative for agitation and confusion. The patient is not nervous/anxious.      Objective:     Vital Signs (Most Recent):  Temp: 98.2 °F (36.8 °C) (20)  Pulse: 77 (20)  Resp: 16 (20)  BP: (!) 163/69 (20)  SpO2: 95 % (20) Vital Signs (24h Range):  Temp:  [98.2 °F (36.8 °C)-98.5 °F (36.9 °C)] 98.2 °F (36.8 °C)  Pulse:  [77-97] 77  Resp:  [16-20] 16  SpO2:  [95 %-100 %] 95 %  BP: (140-177)/(69-94) 163/69     Weight: 95.6 kg (210 lb 12.2 oz)  Body mass index is 35.07 kg/m².    Physical Exam   Constitutional: She is oriented to person, place, and time. She appears well-developed and well-nourished. No distress.   Obese   HENT:   Head: Normocephalic and atraumatic.   Mouth/Throat: No oropharyngeal exudate.   Eyes: Pupils are equal, round, and reactive to light. Conjunctivae and EOM are normal.   Neck: Normal range of motion. Neck supple.   Cardiovascular:  Normal rate, regular rhythm, normal heart sounds and intact distal pulses.   Pulmonary/Chest: Effort normal and breath sounds normal. No respiratory distress. She has no wheezes.   Abdominal: Soft. Bowel sounds are normal. She exhibits no distension. There is no tenderness.   Musculoskeletal: Normal range of motion. She exhibits no edema or tenderness.        Right hip: She exhibits decreased strength.        Left hip: She exhibits decreased strength.   Strength bilateral hips 2/5   Lymphadenopathy:     She has no cervical adenopathy.   Neurological: She is alert and oriented to person, place, and time. No cranial nerve deficit or sensory deficit. Coordination normal.   Skin: Skin is warm and dry. Capillary refill takes less than 2 seconds. No rash noted.   brusing noted to shins   Psychiatric: She has a normal mood and affect. Her behavior is normal. Judgment and thought content normal.   Nursing note and vitals reviewed.        CRANIAL NERVES     CN III, IV, VI   Pupils are equal, round, and reactive to light.  Extraocular motions are normal.        Significant Labs:   BMP:   Recent Labs   Lab 02/27/20  2138   *      K 3.2*      CO2 27   BUN 16   CREATININE 1.7*   CALCIUM 10.7*   MG 1.6     CBC:   Recent Labs   Lab 02/27/20  2138   WBC 9.80   HGB 12.1   HCT 39.3        All pertinent labs within the past 24 hours have been reviewed.    Significant Imaging: I have reviewed all pertinent imaging results/findings within the past 24 hours.

## 2020-02-28 NOTE — H&P
[facility] Hospital Medicine  History & Physical    Patient Name: Renata Bergman  MRN: [id number]  Admission Date: 2/27/2020  Attending Physician: HELENA Watson MD   Primary Care Provider: Ethel Berger MD    [facility] Medicine Team: [facility] MED F oSnal Rivas PA-C     Patient information was obtained from patient, past medical records and ER records.     Subjective:     Principal Problem:Frequent falls    Chief Complaint:   Chief Complaint   Patient presents with    Multiple Complaints     frequent falls not able to stand since yest fell twice, twice last night , twice last night, denies hitting head, bruising to lower legs, on plavix        HPI: Patient is a 79 year old female with a PMHx of HTN, HLD, DM2, CKD, CAD on plavix being admitted to observation for frequent falls. History provided by patient and daughter at bedside. Patient has experienced an estimated 5-7 falls over the past 3 days. Patient's falls have not all been witnessed and unclear if she has sustained head trauma. Patient contributes the falls to bilateral knee pain. She states whenever she tries to stand up the pain is too much and causes her to fall forward. She has history of bilateral knee replacements. 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 Patient denies headache, fever, vision changes, speech changes, numbness, paresthesias, unilateral extremity weakness, chest pain, SOB, abdominal pain, nausea, vomiting, urinary or bowel movement changes.    In the ED, vitals stable. Intake labs without acute abnormality. Cr 1.7 (baseline). XR bilateral knees stable.    Past Medical History:   Diagnosis Date    Breast cancer 2016    DCIS and IDC, stage I    Cataract     Coronary artery disease 9/4/2012    Diabetes mellitus due to abnormal insulin 9/4/2012    Diabetes mellitus type II     Genetic testing     brca2 positive     GERD  "(gastroesophageal reflux disease) 9/4/2012    Gout, arthritis 9/4/2012    Hyperlipidemia 9/4/2012    Hypertension     Obesity     Primary osteoarthritis of both knees 9/4/2012    Renal manifestation of secondary diabetes mellitus     Seizure     states "one time during chemo in 2018"    Type 2 diabetes with peripheral circulatory disorder, controlled        Past Surgical History:   Procedure Laterality Date    BREAST BIOPSY      BREAST LUMPECTOMY Left 08/01/2016    DCIS and IDC, s/p lumpectomy    COLONOSCOPY N/A 10/8/2018    Procedure: COLONOSCOPY;  Surgeon: Marcio Louie MD;  [facility]  Service: Endoscopy;  Laterality: N/A;    CORONARY ANGIOPLASTY      ESOPHAGOGASTRODUODENOSCOPY N/A 10/8/2018    Procedure: ESOPHAGOGASTRODUODENOSCOPY (EGD);  Surgeon: Marcio Louie MD;  [facility]  Service: Endoscopy;  Laterality: N/A;  combined egd/colon order/Plavix/OK to hold med 5 days prior to procedures per Dr Puente/see telephone encounter dated 8/22/18/tanya    ESOPHAGOGASTRODUODENOSCOPY N/A 1/31/2020    Procedure: ESOPHAGOGASTRODUODENOSCOPY (EGD);  Surgeon: Marcio Louie MD;  [facility]  Service: Endoscopy;  Laterality: N/A;  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      Current Outpatient Medications on File Prior to Encounter   Medication Sig    allopurinol (ZYLOPRIM) 300 MG tablet TAKE 1 TABLET BY MOUTH ONCE DAILY    anastrozole (ARIMIDEX) 1 mg Tab TAKE 1 TABLET(1 MG) BY MOUTH EVERY DAY. STOP LETROZOLE FEMARA    aspirin 81 MG " Chew Take 81 mg by mouth once daily.      blood sugar diagnostic Strp 1 strip by Misc.(Non-Drug; Combo Route) route once daily.    blood-glucose meter kit Use as instructed    carvediloL (COREG) 3.125 MG tablet Take 1 tablet (3.125 mg total) by mouth 2 (two) times daily with meals.    clopidogrel (PLAVIX) 75 mg tablet Take 1 tablet (75 mg total) by mouth once daily.    cyanocobalamin 1,000 mcg/mL injection Inject 1 mL (1,000 mcg total) into the muscle once a week. Dispense #12 25 gague one inch needles and #12 one ml syringes    ergocalciferol (ERGOCALCIFEROL) 50,000 unit Cap Take 1 capsule (50,000 Units total) by mouth twice a week.    ferrous sulfate 325 (65 FE) MG EC tablet Take 1 tablet (325 mg total) by mouth once daily.    fexofenadine (ALLEGRA) 30 MG tablet Take 30 mg by mouth daily as needed.    lancets Misc Check blood sugar once daily    levetiracetam XR (KEPPRA XR) 500 mg Tb24 24 hr tablet TAKE 2 TABLETS(1000 MG) BY MOUTH EVERY DAY    nitroGLYCERIN (NITROSTAT) 0.4 MG SL tablet PLACE 1 TABLET UNDER THE TONGUE EVERY 5 MINUTES AS NEEDED FOR CHEST PAIN.    omeprazole (PRILOSEC) 20 MG capsule TAKE 2 CAPSULES BY MOUTH EVERY DAY    potassium chloride (KLOR-CON) 10 MEQ TbSR TAKE 1 TABLET BY MOUTH EVERY DAY    rosuvastatin (CRESTOR) 40 MG Tab TAKE 1 TABLET BY MOUTH EVERY DAY     Family History     Problem Relation (Age of Onset)    Breast cancer Mother (45), Maternal Aunt, Daughter (45), Maternal Aunt    Cancer Mother (55)    Diabetes Mother, Son    Heart disease Father    Hyperlipidemia Maternal Aunt    Hypertension Mother, Maternal Aunt, Maternal Uncle    Ovarian cancer Mother    Stroke Son        Tobacco Use    Smoking status: Former Smoker     Packs/day: 1.00     Types: Cigarettes     Last attempt to quit: 1962     Years since quittin.5    Smokeless tobacco: Never Used   Substance and Sexual Activity    Alcohol use: Yes     Comment: occasionally    Drug use: No    Sexual activity:  Never     Review of Systems   Constitutional: Positive for fatigue. Negative for chills and fever.   HENT: Negative for trouble swallowing.    Eyes: Negative for photophobia and visual disturbance.   Respiratory: Negative for chest tightness, shortness of breath and wheezing.    Cardiovascular: Negative for chest pain, palpitations and leg swelling.   Gastrointestinal: Negative for abdominal pain, nausea and vomiting.   Genitourinary: Negative for dysuria, hematuria and urgency.   Musculoskeletal: Positive for arthralgias, gait problem and joint swelling.   Skin: Negative for rash and wound.   Neurological: Positive for weakness. Negative for dizziness, syncope and light-headedness.   Hematological: Bruises/bleeds easily.   Psychiatric/Behavioral: Negative for agitation and confusion. The patient is not nervous/anxious.      Objective:     Vital Signs (Most Recent):  Temp: 98.2 °F (36.8 °C) (02/28/20 0357)  Pulse: 77 (02/28/20 0357)  Resp: 16 (02/28/20 0357)  BP: (!) 163/69 (02/28/20 0357)  SpO2: 95 % (02/28/20 0357) Vital Signs (24h Range):  Temp:  [98.2 °F (36.8 °C)-98.5 °F (36.9 °C)] 98.2 °F (36.8 °C)  Pulse:  [77-97] 77  Resp:  [16-20] 16  SpO2:  [95 %-100 %] 95 %  BP: (140-177)/(69-94) 163/69     Weight: 95.6 kg (210 lb 12.2 oz)  Body mass index is 35.07 kg/m².    Physical Exam   Constitutional: She is oriented to person, place, and time. She appears well-developed and well-nourished. No distress.   Obese   HENT:   Head: Normocephalic and atraumatic.   Mouth/Throat: No oropharyngeal exudate.   Eyes: Pupils are equal, round, and reactive to light. Conjunctivae and EOM are normal.   Neck: Normal range of motion. Neck supple.   Cardiovascular: Normal rate, regular rhythm, normal heart sounds and intact distal pulses.   Pulmonary/Chest: Effort normal and breath sounds normal. No respiratory distress. She has no wheezes.   Abdominal: Soft. Bowel sounds are normal. She exhibits no distension. There is no tenderness.    Musculoskeletal: Normal range of motion. She exhibits no edema or tenderness.        Right hip: She exhibits decreased strength.        Left hip: She exhibits decreased strength.   Strength bilateral hips 2/5   Lymphadenopathy:     She has no cervical adenopathy.   Neurological: She is alert and oriented to person, place, and time. No cranial nerve deficit or sensory deficit. Coordination normal.   Skin: Skin is warm and dry. Capillary refill takes less than 2 seconds. No rash noted.   brusing noted to shins   Psychiatric: She has a normal mood and affect. Her behavior is normal. Judgment and thought content normal.   Nursing note and vitals reviewed.        CRANIAL NERVES     CN III, IV, VI   Pupils are equal, round, and reactive to light.  Extraocular motions are normal.        Significant Labs:   BMP:   Recent Labs   Lab 02/27/20  2138   *      K 3.2*      CO2 27   BUN 16   CREATININE 1.7*   CALCIUM 10.7*   MG 1.6     CBC:   Recent Labs   Lab 02/27/20  2138   WBC 9.80   HGB 12.1   HCT 39.3        All pertinent labs within the past 24 hours have been reviewed.    Significant Imaging: I have reviewed all pertinent imaging results/findings within the past 24 hours.    Assessment/Plan:     * Frequent falls  Patient presents with several falls over the last several days and is unable to safely ambulate. She has had bilateral knee replacements.  - XR knees bilateral stable  - baseline tenderness  - PT/OT, likely SNF  - fall precautions    Chronic renal disease, stage 4, severely decreased glomerular filtration rate (GFR) between 15-29 mL/min/1.73 square meter  - stable  - daily BMP    Controlled type 2 DM with peripheral circulatory disorder  - low dose SSI, ACHS  - cardiac diabetic diet    Coronary artery disease due to lipid rich plaque  HLD    - continue asa, plavix, and rosuvastatin    HTN (hypertension), benign  - continue carvedilol      VTE Risk Mitigation (From admission, onward)          Ordered     IP VTE LOW RISK PATIENT  Once      02/28/20 0356     Place RAJANI hose  Until discontinued      02/28/20 0303                   Sonal Rivas PA-C  Department of Hospital Medicine   [facility]

## 2020-02-28 NOTE — PLAN OF CARE
02/28/20 1545   Post-Acute Status   Post-Acute Authorization Placement   Post-Acute Placement Status Referrals Sent     SW met with Pt family at bedside. Discussed therapy recs for and provided list. Family to review and give choices asap.    Referrals sent.    Kajal Perez LMSW  [facility]  [id number]

## 2020-02-28 NOTE — ED NOTES
Pt presents to ED after c/o multiple falls yesterday. Pt states that she ambulates using cane and walker at home and usually has no trouble, but has been unable to bare weight to lower extremities x1 day. Pt denies dizziness or change in neuro status.

## 2020-02-29 LAB
ANION GAP SERPL CALC-SCNC: 6 MMOL/L (ref 8–16)
ANISOCYTOSIS BLD QL SMEAR: SLIGHT
BASOPHILS # BLD AUTO: 0.02 K/UL (ref 0–0.2)
BASOPHILS NFR BLD: 0.3 % (ref 0–1.9)
BUN SERPL-MCNC: 16 MG/DL (ref 8–23)
CALCIUM SERPL-MCNC: 10.1 MG/DL (ref 8.7–10.5)
CHLORIDE SERPL-SCNC: 109 MMOL/L (ref 95–110)
CO2 SERPL-SCNC: 24 MMOL/L (ref 23–29)
CREAT SERPL-MCNC: 1.5 MG/DL (ref 0.5–1.4)
DIFFERENTIAL METHOD: ABNORMAL
EOSINOPHIL # BLD AUTO: 0.7 K/UL (ref 0–0.5)
EOSINOPHIL NFR BLD: 10.5 % (ref 0–8)
ERYTHROCYTE [DISTWIDTH] IN BLOOD BY AUTOMATED COUNT: 18.6 % (ref 11.5–14.5)
EST. GFR  (AFRICAN AMERICAN): 38.5 ML/MIN/1.73 M^2
EST. GFR  (NON AFRICAN AMERICAN): 33.4 ML/MIN/1.73 M^2
GLUCOSE SERPL-MCNC: 124 MG/DL (ref 70–110)
HCT VFR BLD AUTO: 33.7 % (ref 37–48.5)
HGB BLD-MCNC: 10.2 G/DL (ref 12–16)
IMM GRANULOCYTES # BLD AUTO: 0.01 K/UL (ref 0–0.04)
IMM GRANULOCYTES NFR BLD AUTO: 0.1 % (ref 0–0.5)
LYMPHOCYTES # BLD AUTO: 1.3 K/UL (ref 1–4.8)
LYMPHOCYTES NFR BLD: 19.5 % (ref 18–48)
MCH RBC QN AUTO: 30.3 PG (ref 27–31)
MCHC RBC AUTO-ENTMCNC: 30.3 G/DL (ref 32–36)
MCV RBC AUTO: 100 FL (ref 82–98)
MONOCYTES # BLD AUTO: 0.9 K/UL (ref 0.3–1)
MONOCYTES NFR BLD: 13.3 % (ref 4–15)
NEUTROPHILS # BLD AUTO: 3.9 K/UL (ref 1.8–7.7)
NEUTROPHILS NFR BLD: 56.3 % (ref 38–73)
NRBC BLD-RTO: 0 /100 WBC
PLATELET # BLD AUTO: 199 K/UL (ref 150–350)
PMV BLD AUTO: 10.3 FL (ref 9.2–12.9)
POCT GLUCOSE: 107 MG/DL (ref 70–110)
POCT GLUCOSE: 121 MG/DL (ref 70–110)
POCT GLUCOSE: 125 MG/DL (ref 70–110)
POCT GLUCOSE: 149 MG/DL (ref 70–110)
POIKILOCYTOSIS BLD QL SMEAR: SLIGHT
POTASSIUM SERPL-SCNC: 5.2 MMOL/L (ref 3.5–5.1)
RBC # BLD AUTO: 3.37 M/UL (ref 4–5.4)
SODIUM SERPL-SCNC: 139 MMOL/L (ref 136–145)
TARGETS BLD QL SMEAR: ABNORMAL
WBC # BLD AUTO: 6.86 K/UL (ref 3.9–12.7)

## 2020-02-29 PROCEDURE — 30200315 PPD INTRADERMAL TEST REV CODE 302: Performed by: INTERNAL MEDICINE

## 2020-02-29 PROCEDURE — 99225 PR SUBSEQUENT OBSERVATION CARE,LEVEL II: CPT | Mod: ,,, | Performed by: PHYSICIAN ASSISTANT

## 2020-02-29 PROCEDURE — 36415 COLL VENOUS BLD VENIPUNCTURE: CPT

## 2020-02-29 PROCEDURE — 86580 TB INTRADERMAL TEST: CPT | Performed by: INTERNAL MEDICINE

## 2020-02-29 PROCEDURE — 80048 BASIC METABOLIC PNL TOTAL CA: CPT

## 2020-02-29 PROCEDURE — 99225 PR SUBSEQUENT OBSERVATION CARE,LEVEL II: ICD-10-PCS | Mod: ,,, | Performed by: PHYSICIAN ASSISTANT

## 2020-02-29 PROCEDURE — 85025 COMPLETE CBC W/AUTO DIFF WBC: CPT

## 2020-02-29 PROCEDURE — 94761 N-INVAS EAR/PLS OXIMETRY MLT: CPT

## 2020-02-29 PROCEDURE — 25000003 PHARM REV CODE 250: Performed by: PHYSICIAN ASSISTANT

## 2020-02-29 PROCEDURE — G0378 HOSPITAL OBSERVATION PER HR: HCPCS

## 2020-02-29 RX ORDER — LANOLIN ALCOHOL/MO/W.PET/CERES
1000 CREAM (GRAM) TOPICAL DAILY
Status: DISCONTINUED | OUTPATIENT
Start: 2020-02-29 | End: 2020-03-04 | Stop reason: HOSPADM

## 2020-02-29 RX ADMIN — ALLOPURINOL 300 MG: 300 TABLET ORAL at 08:02

## 2020-02-29 RX ADMIN — CARVEDILOL 3.12 MG: 3.12 TABLET, FILM COATED ORAL at 05:02

## 2020-02-29 RX ADMIN — ANASTROZOLE 1 MG: 1 TABLET, COATED ORAL at 08:02

## 2020-02-29 RX ADMIN — CARVEDILOL 3.12 MG: 3.12 TABLET, FILM COATED ORAL at 08:02

## 2020-02-29 RX ADMIN — ACETAMINOPHEN 1000 MG: 500 TABLET ORAL at 08:02

## 2020-02-29 RX ADMIN — ROSUVASTATIN CALCIUM 40 MG: 20 TABLET, FILM COATED ORAL at 08:02

## 2020-02-29 RX ADMIN — LEVETIRACETAM 1000 MG: 500 TABLET ORAL at 08:02

## 2020-02-29 RX ADMIN — CYANOCOBALAMIN TAB 1000 MCG 1000 MCG: 1000 TAB at 09:02

## 2020-02-29 RX ADMIN — ACETAMINOPHEN 1000 MG: 500 TABLET ORAL at 02:02

## 2020-02-29 RX ADMIN — ASPIRIN 81 MG CHEWABLE TABLET 81 MG: 81 TABLET CHEWABLE at 08:02

## 2020-02-29 RX ADMIN — CLOPIDOGREL BISULFATE 75 MG: 75 TABLET ORAL at 08:02

## 2020-02-29 RX ADMIN — FERROUS SULFATE TAB EC 325 MG (65 MG FE EQUIVALENT) 325 MG: 325 (65 FE) TABLET DELAYED RESPONSE at 08:02

## 2020-02-29 RX ADMIN — DICLOFENAC 2 G: 10 GEL TOPICAL at 08:02

## 2020-02-29 RX ADMIN — DICLOFENAC 2 G: 10 GEL TOPICAL at 11:02

## 2020-02-29 RX ADMIN — TUBERCULIN PURIFIED PROTEIN DERIVATIVE 5 UNITS: 5 INJECTION, SOLUTION INTRADERMAL at 04:02

## 2020-02-29 NOTE — ASSESSMENT & PLAN NOTE
Patient presents with several falls over the last several days and is unable to safely ambulate. She has had bilateral knee replacements.  - XR knees bilateral stable  - baseline tenderness  - started on diclofenac gel with significant improvement in BL knee pain  - educated patient on importance of weight loss  - PT/OT- SNF  - will need outpatient follow up with ortho to knee evaluations  - fall precautions

## 2020-02-29 NOTE — SUBJECTIVE & OBJECTIVE
Interval History: Patient without complaints. Reports significant improvement in knee pain since starting diclofenac gel. Discharge pending SNF placement.     Review of Systems   Constitutional: Negative for fatigue and fever.   HENT: Negative for congestion and sinus pain.    Respiratory: Negative for cough and shortness of breath.    Cardiovascular: Negative for chest pain and palpitations.   Gastrointestinal: Negative for abdominal pain and nausea.   Genitourinary: Negative for dysuria and hematuria.   Musculoskeletal: Positive for arthralgias (bilateral knees), gait problem and joint swelling.   Neurological: Positive for weakness. Negative for light-headedness and numbness.   Psychiatric/Behavioral: Negative for agitation and behavioral problems.     Objective:     Vital Signs (Most Recent):  Temp: 98.4 °F (36.9 °C) (02/29/20 1204)  Pulse: 92 (02/29/20 1204)  Resp: 18 (02/29/20 1204)  BP: 137/63 (02/29/20 1204)  SpO2: 97 % (02/29/20 1204) Vital Signs (24h Range):  Temp:  [97.1 °F (36.2 °C)-98.6 °F (37 °C)] 98.4 °F (36.9 °C)  Pulse:  [78-92] 92  Resp:  [16-20] 18  SpO2:  [94 %-98 %] 97 %  BP: (126-147)/(58-68) 137/63     Weight: 95.6 kg (210 lb 12.2 oz)  Body mass index is 35.07 kg/m².  No intake or output data in the 24 hours ending 02/29/20 1336   Physical Exam   Constitutional: She is oriented to person, place, and time. She appears well-developed and well-nourished.   HENT:   Head: Normocephalic and atraumatic.   Eyes: Pupils are equal, round, and reactive to light. EOM are normal.   Neck: Normal range of motion. Neck supple.   Cardiovascular: Normal rate and regular rhythm.   Pulmonary/Chest: Effort normal and breath sounds normal.   Abdominal: Soft. Bowel sounds are normal.   Musculoskeletal: She exhibits tenderness (BL knees). She exhibits no edema.   ROM LE limited by knee pain   Neurological: She is alert and oriented to person, place, and time.   Skin: Skin is warm and dry. Capillary refill takes less  than 2 seconds.   Psychiatric: She has a normal mood and affect. Her behavior is normal.   Nursing note and vitals reviewed.      Significant Labs:   CBC:   Recent Labs   Lab 02/27/20 2138 02/28/20 0520 02/29/20 0415   WBC 9.80 7.51 6.86   HGB 12.1 10.6* 10.2*   HCT 39.3 32.5* 33.7*    186 199     CMP:   Recent Labs   Lab 02/27/20 2138 02/28/20 0520 02/29/20 0415    139 139   K 3.2* 3.1* 5.2*    103 109   CO2 27 26 24   * 105 124*   BUN 16 17 16   CREATININE 1.7* 1.6* 1.5*   CALCIUM 10.7* 10.0 10.1   PROT 7.9  --   --    ALBUMIN 3.6  --   --    BILITOT 0.7  --   --    ALKPHOS 82  --   --    AST 17  --   --    ALT 9*  --   --    ANIONGAP 10 10 6*   EGFRNONAA 28.7* 30.9* 33.4*     Magnesium:   Recent Labs   Lab 02/27/20 2138   MG 1.6     POCT Glucose:   Recent Labs   Lab 02/28/20 2114 02/29/20  0818 02/29/20  1123   POCTGLUCOSE 122* 125* 121*       Significant Imaging: I have reviewed all pertinent imaging results/findings within the past 24 hours.

## 2020-02-29 NOTE — HOSPITAL COURSE
Mrs. Hussein was admitted to observation for frequent falls in setting of severe bilateral knee pain and debility. Started on diclofenac gel with improvement in knee pain. PT/OT recommending SNF. Educated on importance of weight loss. Patient with continued weakness due to debility during stay requiring assistance from nursing. Creatinine at baseline. Patient discharged to SNF. Patient to follow up with PCP in 1 week, and follow up with orthopedic surgeon within next month. Patient verbalized understanding. All questions answered.

## 2020-02-29 NOTE — PROGRESS NOTES
[facility] Hospital Medicine  Progress Note    Patient Name: Renata Bergman  MRN: [id number]  Patient Class: OP- Observation   Admission Date: 2/27/2020  Length of Stay: 0 days  Attending Physician: HELENA Watson MD  Primary Care Provider: Ethel Berger MD    [facility] Medicine Team: [facility] MED F Judy Smith PA-C    Subjective:     Principal Problem:Frequent falls        HPI:  Patient is a 79 year old female with a PMHx of HTN, HLD, DM2, CKD, CAD on plavix being admitted to observation for frequent falls. History provided by patient and daughter at bedside. Patient has experienced an estimated 5-7 falls over the past 3 days. Patient's falls have not all been witnessed and unclear if she has sustained head trauma. Patient contributes the falls to bilateral knee pain. She states whenever she tries to stand up the pain is too much and causes her to fall forward. She has history of bilateral knee replacements. 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 Patient denies headache, fever, vision changes, speech changes, numbness, paresthesias, unilateral extremity weakness, chest pain, SOB, abdominal pain, nausea, vomiting, urinary or bowel movement changes.    In the ED, vitals stable. Intake labs without acute abnormality. Cr 1.7 (baseline). XR bilateral knees stable.    Overview/Hospital Course:  Mrs. Hussein was admitted to observation for frequent falls in setting of severe bilateral knee pain. Started on diclofenac gel with improvement in knee pain. PT/OT recommending SNF. Educated on importance of weight loss. Discharge early next week pending SNF placement.     Interval History: Patient without complaints. Reports significant improvement in knee pain since starting diclofenac gel. Discharge pending SNF placement.     Review of Systems   Constitutional: Negative for fatigue and fever.   HENT: Negative for  congestion and sinus pain.    Respiratory: Negative for cough and shortness of breath.    Cardiovascular: Negative for chest pain and palpitations.   Gastrointestinal: Negative for abdominal pain and nausea.   Genitourinary: Negative for dysuria and hematuria.   Musculoskeletal: Positive for arthralgias (bilateral knees), gait problem and joint swelling.   Neurological: Positive for weakness. Negative for light-headedness and numbness.   Psychiatric/Behavioral: Negative for agitation and behavioral problems.     Objective:     Vital Signs (Most Recent):  Temp: 98.4 °F (36.9 °C) (02/29/20 1204)  Pulse: 92 (02/29/20 1204)  Resp: 18 (02/29/20 1204)  BP: 137/63 (02/29/20 1204)  SpO2: 97 % (02/29/20 1204) Vital Signs (24h Range):  Temp:  [97.1 °F (36.2 °C)-98.6 °F (37 °C)] 98.4 °F (36.9 °C)  Pulse:  [78-92] 92  Resp:  [16-20] 18  SpO2:  [94 %-98 %] 97 %  BP: (126-147)/(58-68) 137/63     Weight: 95.6 kg (210 lb 12.2 oz)  Body mass index is 35.07 kg/m².  No intake or output data in the 24 hours ending 02/29/20 1336   Physical Exam   Constitutional: She is oriented to person, place, and time. She appears well-developed and well-nourished.   HENT:   Head: Normocephalic and atraumatic.   Eyes: Pupils are equal, round, and reactive to light. EOM are normal.   Neck: Normal range of motion. Neck supple.   Cardiovascular: Normal rate and regular rhythm.   Pulmonary/Chest: Effort normal and breath sounds normal.   Abdominal: Soft. Bowel sounds are normal.   Musculoskeletal: She exhibits tenderness (BL knees). She exhibits no edema.   ROM LE limited by knee pain   Neurological: She is alert and oriented to person, place, and time.   Skin: Skin is warm and dry. Capillary refill takes less than 2 seconds.   Psychiatric: She has a normal mood and affect. Her behavior is normal.   Nursing note and vitals reviewed.      Significant Labs:   CBC:   Recent Labs   Lab 02/27/20  2138 02/28/20  0520 02/29/20  0415   WBC 9.80 7.51 6.86   HGB  12.1 10.6* 10.2*   HCT 39.3 32.5* 33.7*    186 199     CMP:   Recent Labs   Lab 02/27/20 2138 02/28/20  0520 02/29/20  0415    139 139   K 3.2* 3.1* 5.2*    103 109   CO2 27 26 24   * 105 124*   BUN 16 17 16   CREATININE 1.7* 1.6* 1.5*   CALCIUM 10.7* 10.0 10.1   PROT 7.9  --   --    ALBUMIN 3.6  --   --    BILITOT 0.7  --   --    ALKPHOS 82  --   --    AST 17  --   --    ALT 9*  --   --    ANIONGAP 10 10 6*   EGFRNONAA 28.7* 30.9* 33.4*     Magnesium:   Recent Labs   Lab 02/27/20 2138   MG 1.6     POCT Glucose:   Recent Labs   Lab 02/28/20 2114 02/29/20  0818 02/29/20  1123   POCTGLUCOSE 122* 125* 121*       Significant Imaging: I have reviewed all pertinent imaging results/findings within the past 24 hours.      Assessment/Plan:      * Frequent falls  Patient presents with several falls over the last several days and is unable to safely ambulate. She has had bilateral knee replacements.  - XR knees bilateral stable  - baseline tenderness  - started on diclofenac gel with significant improvement in BL knee pain  - educated patient on importance of weight loss  - PT/OT- SNF  - will need outpatient follow up with ortho to knee evaluations  - fall precautions    Chronic renal disease, stage 4, severely decreased glomerular filtration rate (GFR) between 15-29 mL/min/1.73 square meter  - stable  - daily BMP    Controlled type 2 DM with peripheral circulatory disorder  - low dose SSI, ACHS  - cardiac diabetic diet    Coronary artery disease due to lipid rich plaque  HLD    - continue asa, plavix, and rosuvastatin    HTN (hypertension), benign  - continue carvedilol      VTE Risk Mitigation (From admission, onward)         Ordered     IP VTE LOW RISK PATIENT  Once      02/28/20 0356     Place RAJANI hose  Until discontinued      02/28/20 0303                      Judy Smith PA-C  Department of Hospital Medicine   [facility]

## 2020-03-01 LAB
ANION GAP SERPL CALC-SCNC: 7 MMOL/L (ref 8–16)
BASOPHILS # BLD AUTO: 0.02 K/UL (ref 0–0.2)
BASOPHILS NFR BLD: 0.4 % (ref 0–1.9)
BUN SERPL-MCNC: 19 MG/DL (ref 8–23)
CALCIUM SERPL-MCNC: 10 MG/DL (ref 8.7–10.5)
CHLORIDE SERPL-SCNC: 107 MMOL/L (ref 95–110)
CO2 SERPL-SCNC: 26 MMOL/L (ref 23–29)
CREAT SERPL-MCNC: 1.6 MG/DL (ref 0.5–1.4)
DIFFERENTIAL METHOD: ABNORMAL
EOSINOPHIL # BLD AUTO: 0.7 K/UL (ref 0–0.5)
EOSINOPHIL NFR BLD: 14.5 % (ref 0–8)
ERYTHROCYTE [DISTWIDTH] IN BLOOD BY AUTOMATED COUNT: 18.5 % (ref 11.5–14.5)
EST. GFR  (AFRICAN AMERICAN): 35.6 ML/MIN/1.73 M^2
EST. GFR  (NON AFRICAN AMERICAN): 30.9 ML/MIN/1.73 M^2
GLUCOSE SERPL-MCNC: 105 MG/DL (ref 70–110)
HCT VFR BLD AUTO: 31.4 % (ref 37–48.5)
HGB BLD-MCNC: 9.5 G/DL (ref 12–16)
IMM GRANULOCYTES # BLD AUTO: 0.01 K/UL (ref 0–0.04)
IMM GRANULOCYTES NFR BLD AUTO: 0.2 % (ref 0–0.5)
LYMPHOCYTES # BLD AUTO: 1.3 K/UL (ref 1–4.8)
LYMPHOCYTES NFR BLD: 25.9 % (ref 18–48)
MCH RBC QN AUTO: 30.1 PG (ref 27–31)
MCHC RBC AUTO-ENTMCNC: 30.3 G/DL (ref 32–36)
MCV RBC AUTO: 99 FL (ref 82–98)
MONOCYTES # BLD AUTO: 0.8 K/UL (ref 0.3–1)
MONOCYTES NFR BLD: 16.3 % (ref 4–15)
NEUTROPHILS # BLD AUTO: 2.2 K/UL (ref 1.8–7.7)
NEUTROPHILS NFR BLD: 42.7 % (ref 38–73)
NRBC BLD-RTO: 0 /100 WBC
PLATELET # BLD AUTO: 224 K/UL (ref 150–350)
PMV BLD AUTO: 11 FL (ref 9.2–12.9)
POCT GLUCOSE: 100 MG/DL (ref 70–110)
POCT GLUCOSE: 111 MG/DL (ref 70–110)
POCT GLUCOSE: 117 MG/DL (ref 70–110)
POCT GLUCOSE: 119 MG/DL (ref 70–110)
POTASSIUM SERPL-SCNC: 4.6 MMOL/L (ref 3.5–5.1)
RBC # BLD AUTO: 3.16 M/UL (ref 4–5.4)
SODIUM SERPL-SCNC: 140 MMOL/L (ref 136–145)
WBC # BLD AUTO: 5.09 K/UL (ref 3.9–12.7)

## 2020-03-01 PROCEDURE — 80048 BASIC METABOLIC PNL TOTAL CA: CPT

## 2020-03-01 PROCEDURE — 36415 COLL VENOUS BLD VENIPUNCTURE: CPT

## 2020-03-01 PROCEDURE — G0378 HOSPITAL OBSERVATION PER HR: HCPCS

## 2020-03-01 PROCEDURE — 85025 COMPLETE CBC W/AUTO DIFF WBC: CPT

## 2020-03-01 PROCEDURE — 99225 PR SUBSEQUENT OBSERVATION CARE,LEVEL II: CPT | Mod: ,,, | Performed by: PHYSICIAN ASSISTANT

## 2020-03-01 PROCEDURE — 99225 PR SUBSEQUENT OBSERVATION CARE,LEVEL II: ICD-10-PCS | Mod: ,,, | Performed by: PHYSICIAN ASSISTANT

## 2020-03-01 PROCEDURE — 25000003 PHARM REV CODE 250: Performed by: PHYSICIAN ASSISTANT

## 2020-03-01 RX ADMIN — ACETAMINOPHEN 1000 MG: 500 TABLET ORAL at 09:03

## 2020-03-01 RX ADMIN — ASPIRIN 81 MG CHEWABLE TABLET 81 MG: 81 TABLET CHEWABLE at 09:03

## 2020-03-01 RX ADMIN — CYANOCOBALAMIN TAB 1000 MCG 1000 MCG: 1000 TAB at 09:03

## 2020-03-01 RX ADMIN — ALLOPURINOL 300 MG: 300 TABLET ORAL at 09:03

## 2020-03-01 RX ADMIN — ANASTROZOLE 1 MG: 1 TABLET, COATED ORAL at 09:03

## 2020-03-01 RX ADMIN — CARVEDILOL 3.12 MG: 3.12 TABLET, FILM COATED ORAL at 09:03

## 2020-03-01 RX ADMIN — LEVETIRACETAM 1000 MG: 500 TABLET ORAL at 09:03

## 2020-03-01 RX ADMIN — CLOPIDOGREL BISULFATE 75 MG: 75 TABLET ORAL at 09:03

## 2020-03-01 RX ADMIN — ACETAMINOPHEN 1000 MG: 500 TABLET ORAL at 10:03

## 2020-03-01 RX ADMIN — DICLOFENAC 2 G: 10 GEL TOPICAL at 09:03

## 2020-03-01 RX ADMIN — ROSUVASTATIN CALCIUM 40 MG: 20 TABLET, FILM COATED ORAL at 09:03

## 2020-03-01 RX ADMIN — LEVETIRACETAM 1000 MG: 500 TABLET ORAL at 10:03

## 2020-03-01 RX ADMIN — CARVEDILOL 3.12 MG: 3.12 TABLET, FILM COATED ORAL at 05:03

## 2020-03-01 RX ADMIN — FERROUS SULFATE TAB EC 325 MG (65 MG FE EQUIVALENT) 325 MG: 325 (65 FE) TABLET DELAYED RESPONSE at 09:03

## 2020-03-01 RX ADMIN — DICLOFENAC 2 G: 10 GEL TOPICAL at 10:03

## 2020-03-01 NOTE — PROGRESS NOTES
[facility] Hospital Medicine  Progress Note    Patient Name: Renata Bergman  MRN: [id number]  Patient Class: OP- Observation   Admission Date: 2/27/2020  Length of Stay: 0 days  Attending Physician: HELENA Watson MD  Primary Care Provider: Ethel Berger MD    [facility] Medicine Team: [facility] MED F Judy Smith PA-C    Subjective:     Principal Problem:Frequent falls        HPI:  Patient is a 79 year old female with a PMHx of HTN, HLD, DM2, CKD, CAD on plavix being admitted to observation for frequent falls. History provided by patient and daughter at bedside. Patient has experienced an estimated 5-7 falls over the past 3 days. Patient's falls have not all been witnessed and unclear if she has sustained head trauma. Patient contributes the falls to bilateral knee pain. She states whenever she tries to stand up the pain is too much and causes her to fall forward. She has history of bilateral knee replacements. 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 Patient denies headache, fever, vision changes, speech changes, numbness, paresthesias, unilateral extremity weakness, chest pain, SOB, abdominal pain, nausea, vomiting, urinary or bowel movement changes.    In the ED, vitals stable. Intake labs without acute abnormality. Cr 1.7 (baseline). XR bilateral knees stable.    Overview/Hospital Course:  Mrs. Hussein was admitted to observation for frequent falls in setting of severe bilateral knee pain. Started on diclofenac gel with improvement in knee pain. PT/OT recommending SNF. Educated on importance of weight loss. Discharge early next week pending SNF placement.     Interval History: Patient without complaints, NAEO. Patient able to ambulate to bathroom with assitance which is improvement from admit. Discharge pending SNF placement.     Review of Systems   Constitutional: Negative for fatigue and fever.    HENT: Negative for congestion and sinus pain.    Respiratory: Negative for cough and shortness of breath.    Cardiovascular: Negative for chest pain and palpitations.   Gastrointestinal: Negative for abdominal pain and nausea.   Genitourinary: Negative for dysuria and hematuria.   Musculoskeletal: Positive for arthralgias (bilateral knees), gait problem and joint swelling.   Neurological: Positive for weakness (2/2 pain). Negative for light-headedness and numbness.   Psychiatric/Behavioral: Negative for agitation and behavioral problems.     Objective:     Vital Signs (Most Recent):  Temp: 96.9 °F (36.1 °C) (03/01/20 0724)  Pulse: 84 (03/01/20 0724)  Resp: 18 (03/01/20 0724)  BP: (!) 177/77 (03/01/20 0724)  SpO2: 97 % (03/01/20 0724) Vital Signs (24h Range):  Temp:  [96.9 °F (36.1 °C)-98.5 °F (36.9 °C)] 96.9 °F (36.1 °C)  Pulse:  [78-95] 84  Resp:  [18-20] 18  SpO2:  [96 %-100 %] 97 %  BP: (115-177)/(52-77) 177/77     Weight: 95.6 kg (210 lb 12.2 oz)  Body mass index is 35.07 kg/m².    Intake/Output Summary (Last 24 hours) at 3/1/2020 1003  Last data filed at 3/1/2020 0600  Gross per 24 hour   Intake 1430 ml   Output --   Net 1430 ml      Physical Exam   Constitutional: She is oriented to person, place, and time. She appears well-developed and well-nourished.   HENT:   Head: Normocephalic and atraumatic.   Eyes: Pupils are equal, round, and reactive to light. EOM are normal.   Neck: Normal range of motion. Neck supple.   Cardiovascular: Normal rate and regular rhythm.   Pulmonary/Chest: Effort normal and breath sounds normal.   Abdominal: Soft. Bowel sounds are normal.   Musculoskeletal: She exhibits tenderness (BL knees). She exhibits no edema.   ROM LE limited by knee pain   Neurological: She is alert and oriented to person, place, and time.   Skin: Skin is warm and dry. Capillary refill takes less than 2 seconds.   Psychiatric: She has a normal mood and affect. Her behavior is normal.   Nursing note and vitals  reviewed.      Significant Labs:   CBC:   Recent Labs   Lab 02/29/20  0415 03/01/20  0311   WBC 6.86 5.09   HGB 10.2* 9.5*   HCT 33.7* 31.4*    224     CMP:   Recent Labs   Lab 02/29/20  0415 03/01/20  0311    140   K 5.2* 4.6    107   CO2 24 26   * 105   BUN 16 19   CREATININE 1.5* 1.6*   CALCIUM 10.1 10.0   ANIONGAP 6* 7*   EGFRNONAA 33.4* 30.9*     Magnesium: No results for input(s): MG in the last 48 hours.  POCT Glucose:   Recent Labs   Lab 02/29/20  1647 02/29/20 2022 03/01/20  0826   POCTGLUCOSE 107 149* 100       Significant Imaging: I have reviewed all pertinent imaging results/findings within the past 24 hours.      Assessment/Plan:      * Frequent falls  Patient presents with several falls over the last several days and is unable to safely ambulate. She has had bilateral knee replacements.  - XR knees bilateral stable  - baseline tenderness  - started on diclofenac gel with significant improvement in BL knee pain  - educated patient on importance of weight loss  - PT/OT- SNF  - will need outpatient follow up with ortho to knee evaluations  - fall precautions    Chronic renal disease, stage 4, severely decreased glomerular filtration rate (GFR) between 15-29 mL/min/1.73 square meter  - stable  - daily BMP    Controlled type 2 DM with peripheral circulatory disorder  - low dose SSI, ACHS  - cardiac diabetic diet    Coronary artery disease due to lipid rich plaque  HLD    - continue asa, plavix, and rosuvastatin    HTN (hypertension), benign  - continue carvedilol      VTE Risk Mitigation (From admission, onward)         Ordered     IP VTE LOW RISK PATIENT  Once      02/28/20 0356     Place RAJANI hose  Until discontinued      02/28/20 0308                      Judy Smith PA-C  Department of Hospital Medicine   [facility]

## 2020-03-01 NOTE — SUBJECTIVE & OBJECTIVE
Interval History: Patient without complaints, NAEO. Patient able to ambulate to bathroom with assitance which is improvement from admit. Discharge pending SNF placement.     Review of Systems   Constitutional: Negative for fatigue and fever.   HENT: Negative for congestion and sinus pain.    Respiratory: Negative for cough and shortness of breath.    Cardiovascular: Negative for chest pain and palpitations.   Gastrointestinal: Negative for abdominal pain and nausea.   Genitourinary: Negative for dysuria and hematuria.   Musculoskeletal: Positive for arthralgias (bilateral knees), gait problem and joint swelling.   Neurological: Positive for weakness (2/2 pain). Negative for light-headedness and numbness.   Psychiatric/Behavioral: Negative for agitation and behavioral problems.     Objective:     Vital Signs (Most Recent):  Temp: 96.9 °F (36.1 °C) (03/01/20 0724)  Pulse: 84 (03/01/20 0724)  Resp: 18 (03/01/20 0724)  BP: (!) 177/77 (03/01/20 0724)  SpO2: 97 % (03/01/20 0724) Vital Signs (24h Range):  Temp:  [96.9 °F (36.1 °C)-98.5 °F (36.9 °C)] 96.9 °F (36.1 °C)  Pulse:  [78-95] 84  Resp:  [18-20] 18  SpO2:  [96 %-100 %] 97 %  BP: (115-177)/(52-77) 177/77     Weight: 95.6 kg (210 lb 12.2 oz)  Body mass index is 35.07 kg/m².    Intake/Output Summary (Last 24 hours) at 3/1/2020 1003  Last data filed at 3/1/2020 0600  Gross per 24 hour   Intake 1430 ml   Output --   Net 1430 ml      Physical Exam   Constitutional: She is oriented to person, place, and time. She appears well-developed and well-nourished.   HENT:   Head: Normocephalic and atraumatic.   Eyes: Pupils are equal, round, and reactive to light. EOM are normal.   Neck: Normal range of motion. Neck supple.   Cardiovascular: Normal rate and regular rhythm.   Pulmonary/Chest: Effort normal and breath sounds normal.   Abdominal: Soft. Bowel sounds are normal.   Musculoskeletal: She exhibits tenderness (BL knees). She exhibits no edema.   ROM LE limited by knee pain    Neurological: She is alert and oriented to person, place, and time.   Skin: Skin is warm and dry. Capillary refill takes less than 2 seconds.   Psychiatric: She has a normal mood and affect. Her behavior is normal.   Nursing note and vitals reviewed.      Significant Labs:   CBC:   Recent Labs   Lab 02/29/20 0415 03/01/20 0311   WBC 6.86 5.09   HGB 10.2* 9.5*   HCT 33.7* 31.4*    224     CMP:   Recent Labs   Lab 02/29/20 0415 03/01/20 0311    140   K 5.2* 4.6    107   CO2 24 26   * 105   BUN 16 19   CREATININE 1.5* 1.6*   CALCIUM 10.1 10.0   ANIONGAP 6* 7*   EGFRNONAA 33.4* 30.9*     Magnesium: No results for input(s): MG in the last 48 hours.  POCT Glucose:   Recent Labs   Lab 02/29/20  1647 02/29/20  2022 03/01/20  0826   POCTGLUCOSE 107 149* 100       Significant Imaging: I have reviewed all pertinent imaging results/findings within the past 24 hours.

## 2020-03-02 LAB
ANION GAP SERPL CALC-SCNC: 9 MMOL/L (ref 8–16)
BASOPHILS # BLD AUTO: 0.03 K/UL (ref 0–0.2)
BASOPHILS NFR BLD: 0.6 % (ref 0–1.9)
BUN SERPL-MCNC: 22 MG/DL (ref 8–23)
CALCIUM SERPL-MCNC: 10.5 MG/DL (ref 8.7–10.5)
CHLORIDE SERPL-SCNC: 105 MMOL/L (ref 95–110)
CO2 SERPL-SCNC: 24 MMOL/L (ref 23–29)
CREAT SERPL-MCNC: 1.6 MG/DL (ref 0.5–1.4)
DIFFERENTIAL METHOD: ABNORMAL
EOSINOPHIL # BLD AUTO: 0.6 K/UL (ref 0–0.5)
EOSINOPHIL NFR BLD: 11.8 % (ref 0–8)
ERYTHROCYTE [DISTWIDTH] IN BLOOD BY AUTOMATED COUNT: 17.9 % (ref 11.5–14.5)
EST. GFR  (AFRICAN AMERICAN): 35.6 ML/MIN/1.73 M^2
EST. GFR  (NON AFRICAN AMERICAN): 30.9 ML/MIN/1.73 M^2
GLUCOSE SERPL-MCNC: 105 MG/DL (ref 70–110)
HCT VFR BLD AUTO: 38.2 % (ref 37–48.5)
HGB BLD-MCNC: 11.4 G/DL (ref 12–16)
IMM GRANULOCYTES # BLD AUTO: 0.02 K/UL (ref 0–0.04)
IMM GRANULOCYTES NFR BLD AUTO: 0.4 % (ref 0–0.5)
LYMPHOCYTES # BLD AUTO: 1.1 K/UL (ref 1–4.8)
LYMPHOCYTES NFR BLD: 20.8 % (ref 18–48)
MCH RBC QN AUTO: 29.7 PG (ref 27–31)
MCHC RBC AUTO-ENTMCNC: 29.8 G/DL (ref 32–36)
MCV RBC AUTO: 100 FL (ref 82–98)
MONOCYTES # BLD AUTO: 0.7 K/UL (ref 0.3–1)
MONOCYTES NFR BLD: 12.7 % (ref 4–15)
NEUTROPHILS # BLD AUTO: 2.9 K/UL (ref 1.8–7.7)
NEUTROPHILS NFR BLD: 53.7 % (ref 38–73)
NRBC BLD-RTO: 0 /100 WBC
PLATELET # BLD AUTO: 241 K/UL (ref 150–350)
PMV BLD AUTO: 10.2 FL (ref 9.2–12.9)
POCT GLUCOSE: 114 MG/DL (ref 70–110)
POCT GLUCOSE: 126 MG/DL (ref 70–110)
POCT GLUCOSE: 135 MG/DL (ref 70–110)
POCT GLUCOSE: 155 MG/DL (ref 70–110)
POTASSIUM SERPL-SCNC: 4.2 MMOL/L (ref 3.5–5.1)
RBC # BLD AUTO: 3.84 M/UL (ref 4–5.4)
SODIUM SERPL-SCNC: 138 MMOL/L (ref 136–145)
WBC # BLD AUTO: 5.34 K/UL (ref 3.9–12.7)

## 2020-03-02 PROCEDURE — 97110 THERAPEUTIC EXERCISES: CPT

## 2020-03-02 PROCEDURE — 25000003 PHARM REV CODE 250: Performed by: PHYSICIAN ASSISTANT

## 2020-03-02 PROCEDURE — 97530 THERAPEUTIC ACTIVITIES: CPT

## 2020-03-02 PROCEDURE — 85025 COMPLETE CBC W/AUTO DIFF WBC: CPT

## 2020-03-02 PROCEDURE — 99225 PR SUBSEQUENT OBSERVATION CARE,LEVEL II: ICD-10-PCS | Mod: ,,, | Performed by: PHYSICIAN ASSISTANT

## 2020-03-02 PROCEDURE — 97116 GAIT TRAINING THERAPY: CPT | Mod: 59

## 2020-03-02 PROCEDURE — 80048 BASIC METABOLIC PNL TOTAL CA: CPT

## 2020-03-02 PROCEDURE — G0378 HOSPITAL OBSERVATION PER HR: HCPCS

## 2020-03-02 PROCEDURE — 82962 GLUCOSE BLOOD TEST: CPT

## 2020-03-02 PROCEDURE — 36415 COLL VENOUS BLD VENIPUNCTURE: CPT

## 2020-03-02 PROCEDURE — 99225 PR SUBSEQUENT OBSERVATION CARE,LEVEL II: CPT | Mod: ,,, | Performed by: PHYSICIAN ASSISTANT

## 2020-03-02 RX ORDER — DICLOFENAC SODIUM 10 MG/G
2 GEL TOPICAL 4 TIMES DAILY PRN
Start: 2020-03-02 | End: 2022-04-26

## 2020-03-02 RX ADMIN — ALLOPURINOL 300 MG: 300 TABLET ORAL at 09:03

## 2020-03-02 RX ADMIN — LEVETIRACETAM 1000 MG: 500 TABLET ORAL at 09:03

## 2020-03-02 RX ADMIN — CLOPIDOGREL BISULFATE 75 MG: 75 TABLET ORAL at 09:03

## 2020-03-02 RX ADMIN — FERROUS SULFATE TAB EC 325 MG (65 MG FE EQUIVALENT) 325 MG: 325 (65 FE) TABLET DELAYED RESPONSE at 09:03

## 2020-03-02 RX ADMIN — CYANOCOBALAMIN TAB 1000 MCG 1000 MCG: 1000 TAB at 09:03

## 2020-03-02 RX ADMIN — ACETAMINOPHEN 1000 MG: 500 TABLET ORAL at 09:03

## 2020-03-02 RX ADMIN — ACETAMINOPHEN 1000 MG: 500 TABLET ORAL at 11:03

## 2020-03-02 RX ADMIN — ROSUVASTATIN CALCIUM 40 MG: 20 TABLET, FILM COATED ORAL at 09:03

## 2020-03-02 RX ADMIN — DICLOFENAC 2 G: 10 GEL TOPICAL at 09:03

## 2020-03-02 RX ADMIN — CARVEDILOL 3.12 MG: 3.12 TABLET, FILM COATED ORAL at 08:03

## 2020-03-02 RX ADMIN — LEVETIRACETAM 1000 MG: 500 TABLET ORAL at 11:03

## 2020-03-02 RX ADMIN — ANASTROZOLE 1 MG: 1 TABLET, COATED ORAL at 09:03

## 2020-03-02 RX ADMIN — ACETAMINOPHEN 1000 MG: 500 TABLET ORAL at 04:03

## 2020-03-02 RX ADMIN — DICLOFENAC 2 G: 10 GEL TOPICAL at 11:03

## 2020-03-02 RX ADMIN — ASPIRIN 81 MG CHEWABLE TABLET 81 MG: 81 TABLET CHEWABLE at 09:03

## 2020-03-02 RX ADMIN — CARVEDILOL 3.12 MG: 3.12 TABLET, FILM COATED ORAL at 04:03

## 2020-03-02 NOTE — PLAN OF CARE
03/02/20 1134   Post-Acute Status   Post-Acute Authorization Placement   Post-Acute Placement Status Pending Post-Acute Clinical Review   Discharge Plan   Discharge Plan A Skilled Nursing Facility   Discharge Plan B [facility]      spoke to Rosenda at [facility]. Rosenda reported the case is under review.     Kajal Perez LMSW  [facility]  [id number]

## 2020-03-02 NOTE — PLAN OF CARE
Problem: Physical Therapy Goal  Goal: Physical Therapy Goal  Description  Goals to be met by: 3/13/2020     Patient will increase functional independence with mobility by performin. Supine to sit with Contact Guard Assistance - Met  2. Sit to supine with MInimal Assistance - Met       Revised: Sit to supine with Stand by Assistance - Not Met  3. Sit to stand transfer with Minimal Assistance - Met        Revised: Sit to stand transfer with Stand by Assistance - Not Met  4. Bed to chair transfer with Minimal Assistance using Rolling Walker or LRD - Met        Revised: Bed to chair transfer with Stand by Assistance using Rolling Walker or LRD - Not Met  5. Gait  x 30 feet with Minimal Assistance using Rolling Walker or LRD.   6. Lower extremity exercise program x15 reps per handout, with assistance as needed      Outcome: Ongoing, Progressing     Goals revised

## 2020-03-02 NOTE — NURSING
Patient too weak to get up with one person assistance.  PCT witnessed patient being assisted by son to ambulate to toilet.  Patient too weak to make it, PCT assisted with letting patient to floor.  No injuries observed or reported.  Patient high fall risk, bed alarm on at all times.  Will order bedside commode.

## 2020-03-02 NOTE — PT/OT/SLP PROGRESS
Physical Therapy Treatment    Patient Name:  Renata Bergman   MRN:  [id number]    Recommendations:     Discharge Recommendations:  nursing facility, skilled   Discharge Equipment Recommendations: (TBD)   Barriers to discharge: None    Assessment:     Renata Bergman is a 79 y.o. female admitted with a medical diagnosis of Frequent falls.  She presents with the following impairments/functional limitations:  weakness, impaired endurance, impaired self care skills, impaired functional mobilty, gait instability, impaired balance, decreased lower extremity function Pt. cooperative and tolerated treatment well. Pt. progressing with mobility.    Rehab Prognosis: Fair; patient would benefit from acute skilled PT services to address these deficits and reach maximum level of function.    Recent Surgery: * No surgery found *      Plan:     During this hospitalization, patient to be seen 3 x/week to address the identified rehab impairments via gait training, therapeutic activities, therapeutic exercises and progress toward the following goals:    · Plan of Care Expires:  03/29/20    Subjective     Chief Complaint: LE weakness  Patient/Family Comments/goals: to get stronger  Pain/Comfort:  · Pain Rating 1: 0/10  · Pain Rating Post-Intervention 1: 0/10      Objective:     Communicated with nursing prior to session.  Patient found supine with peripheral IV upon PT entry to room.     General Precautions: Standard, fall   Orthopedic Precautions:N/A   Braces:       Functional Mobility:  · Bed Mobility:     · Rolling Right: stand by assistance  · Scooting: stand by assistance  · Supine to Sit: stand by assistance  · Sit to Supine: minimum assistance  · Transfers:     · Sit to Stand:  contact guard assistance with rolling walker  · Bed to Chair: contact guard assistance with  rolling walker  using  Stand Pivot  · Gait: 20' with RW and CGA x2 trials with rolling bedside chair follow. Pt. with seated rest break between gait trials.  Pt. amb. with decreased step length/jarrett and increased ALYSSA  · Balance: fair      AM-PAC 6 CLICK MOBILITY  Turning over in bed (including adjusting bedclothes, sheets and blankets)?: 3  Sitting down on and standing up from a chair with arms (e.g., wheelchair, bedside commode, etc.): 3  Moving from lying on back to sitting on the side of the bed?: 3  Moving to and from a bed to a chair (including a wheelchair)?: 3  Need to walk in hospital room?: 3  Climbing 3-5 steps with a railing?: 1  Basic Mobility Total Score: 16       Therapeutic Activities and Exercises:   Discussed pt.'s progress, goals, and POC.    Patient left supine with all lines intact and call button in reach..    GOALS:   Multidisciplinary Problems     Physical Therapy Goals        Problem: Physical Therapy Goal    Goal Priority Disciplines Outcome Goal Variances Interventions   Physical Therapy Goal     PT, PT/OT Ongoing, Progressing     Description:  Goals to be met by: 3/13/2020     Patient will increase functional independence with mobility by performin. Supine to sit with Contact Guard Assistance - Met  2. Sit to supine with MInimal Assistance - Met       Revised: Sit to supine with Stand by Assistance - Not Met  3. Sit to stand transfer with Minimal Assistance - Met        Revised: Sit to stand transfer with Stand by Assistance - Not Met  4. Bed to chair transfer with Minimal Assistance using Rolling Walker or LRD - Met        Revised: Bed to chair transfer with Stand by Assistance using Rolling Walker or LRD - Not Met  5. Gait  x 30 feet with Minimal Assistance using Rolling Walker or LRD.   6. Lower extremity exercise program x15 reps per handout, with assistance as needed                       Time Tracking:     PT Received On: 20  PT Start Time: 1520     PT Stop Time: 1543  PT Total Time (min): 23 min     Billable Minutes: Gait Training 23    Treatment Type: Treatment  PT/PTA: PT     PTA Visit Number: 0     Tom Duron  PT  03/02/2020

## 2020-03-02 NOTE — NURSING
S/P pt fall, pt reeducated regarding fall precautions. Pt verbalized understanding. Call light easily within pt reach, bed alarm initiated, and door to room remains open.

## 2020-03-02 NOTE — PT/OT/SLP PROGRESS
"Occupational Therapy   Treatment    Name: Renata Bergman  MRN: [id number]  Admitting Diagnosis:  Frequent falls       Recommendations:     Discharge Recommendations: nursing facility, skilled  Discharge Equipment Recommendations:  (TBD)  Barriers to discharge:  (Decreased functional performance )    Assessment:     Renata Bergman is a 79 y.o. female with a medical diagnosis of Frequent falls.  Performance deficits affecting function are weakness, impaired endurance, impaired self care skills, decreased safety awareness, gait instability, impaired functional mobilty, impaired balance, decreased lower extremity function, impaired cognition.     Prior to entry, nurse reported that pt had fallen while ambulating to toilet w/ assistance from son and w/o AD. Upon entry, pt in long sitting position on bathroom floor w/ son present. Pt reported "her legs got weak" while ambulating to bathroom w/ son. Pt denied pain and hitting her head. Pt required total A from 3 persons to lift from floor to stand; Mod A x 2 persons for transferring to toilet using step transfer technique. Pt able to perform patricia care while seated on toilet and require mod A x 2 persons to perform sit>stand from toilet w/o AD; RW was then positioned in front and pt was able to ambulate from toilet to recliner w/ min A from 1 person using close, physical contact for safety. Pt and son thoroughly educated on importance of requesting assistance from staff to prevent future falls; nursing delivered BSC to pt's room during session; pt was, again, educated on importance of requesting help to use BSC even though it is closer to her bed than the toilet. Pt able to follow BUE AROM exercises with demonstration and TCs for redirection for remainder of tx session. Pt tolerated tx session well despite incident prior to entry, and will continue to benefit from fall prevention education and balance/strength/endurance training to maximize functional capacity and " occupational performance.     Rehab Prognosis:  Good; patient would benefit from acute skilled OT services to address these deficits and reach maximum level of function.       Plan:     Patient to be seen 3 x/week to address the above listed problems via self-care/home management, therapeutic activities, therapeutic exercises  · Plan of Care Expires: 03/28/20  · Plan of Care Reviewed with: patient, son    Subjective     Pain/Comfort:  · Pain Rating 1: 0/10  · Pain Rating Post-Intervention 1: 0/10    Objective:     Communicated with: nurse prior to session.  Patient found sitting unsupported on bathroom floor post fall w/ son present and  with telemetry upon OT entry to room.    General Precautions: Standard, fall   Orthopedic Precautions:N/A   Braces: N/A     Occupational Performance:     Bed Mobility:    · Not performed; pt in long sitting position on floor post fall    Functional Mobility/Transfers:  · Patient completed Sit <> Stand Transfer with total assistance x 3 persons for standing from long sitting position w/o AD; moderate assistance x 2 persons for standing from toilet w/o AD  · Patient completed Toilet Transfer Step Transfer technique with moderate assistance x 2 persons with no AD  · Functional Mobility: Pt able to ambulate from toilet to recliner chair w/ min A and w/ use of RW and close, physical contact for safety. Pt noted w/ decreased gait speed but no LOB noted.     Activities of Daily Living:  · Toileting: moderate assistance ; pt able to perform front patricia care while seated on toilet; A from 1 person for managing gown while standing w/ 2 persons and w/o AD      [facility] 6 Click ADL: 14    Treatment & Education:  -Extensive educated provided on importance of requesting assistance from staff to prevent future falls.   -Pt and son able to verbalize understanding for fall prevention strategies.   -Pt performed BUE AROM exercises w/ focus on biceps and deltoids w/ use of red theraband to increase UB  strength for indep in ADLs, functional transfer, and functional mobility.  -Pt performed seated pushups w/ mod A to increase strength in triceps for indep when performing sit>stands.    Patient left up in chair with call button in reachEducation:      GOALS:   Multidisciplinary Problems     Occupational Therapy Goals        Problem: Occupational Therapy Goal    Goal Priority Disciplines Outcome Interventions   Occupational Therapy Goal     OT, PT/OT Ongoing, Progressing    Description:  Goals to be met by: 3/6/2020     Patient will increase functional independence with ADLs by performing:    UE Dressing with [place].  LE Dressing with Supervision.  Grooming while standing at sink with Contact Guard Assistance.  Toileting from bedside commode with Contact Guard Assistance for hygiene and clothing management.   Pt will perform bed mobility with CGA   Pt will perform functional task of household and community distance with MIN A using AD as needed.                      Time Tracking:     OT Date of Treatment: 03/02/20  OT Start Time: 1038  OT Stop Time: 1103  OT Total Time (min): 25 min    Billable Minutes:Therapeutic Activity 13  Therapeutic Exercise 12    SONAL Robison  3/2/2020     I certify that I was present in the room directing the student in service delivery and guiding them using my skilled judgment. As the co-signing therapist I have reviewed the students documentation and am responsible for the treatment, assessment, and plan.

## 2020-03-02 NOTE — PLAN OF CARE
[facility]     Department of Hospital Medicine     [address] [place] [address]     [phone] after hours  [phone] fax       NURSING HOME ORDERS    03/04/2020    Admit to Nursing Home:      Skilled Bed                                                Diagnoses:  Active Hospital Problems    Diagnosis  POA    *Frequent falls [R29.6]  Not Applicable    Chronic renal disease, stage 4, severely decreased glomerular filtration rate (GFR) between 15-29 mL/min/1.73 square meter [N18.4]  Yes    Controlled type 2 DM with peripheral circulatory disorder [E11.51]  Yes     Chronic    Coronary artery disease due to lipid rich plaque [I25.10, I25.83]  Yes     Chronic    HTN (hypertension), benign [I10]  Yes     Chronic    Hyperlipidemia [E78.5]  Yes     Chronic      Resolved Hospital Problems   No resolved problems to display.       Patient is homebound due to:  Frequent falls    Allergies:  Review of patient's allergies indicates:   Allergen Reactions    Lisinopril Anaphylaxis       Vitals:       Every shift (Skilled Nursing patients)    Diet:  Diabetic cardiac diet   Supplement:  1 can every three times a day with meals                         Type:  House       Acitivities:    - Up in a chair each morning as tolerated   - Ambulate with assistance to bathroom   - Scheduled walks once each shift (every 8 hours)   - May use walker, cane, or self-propelled wheelchair      LABS:  Per facility protocol      Nursing Precautions:     - Aspiration precautions:            -  Upright 90 degrees befor during and after meals    - Fall precautions per nursing home protocol   - Decubitus precautions:        -  for positioning   - Pressure reducing foam mattress   - Turn patient every two hours. Use wedge pillows to anchor patient    CONSULTS:    Physical Therapy to evaluate and treat     Occupational Therapy to evaluate and treat     Nutrition to evaluate and recommend  diet       MISCELLANEOUS CARE:              Routine Skin for Bedridden Patients:  Apply moisture barrier cream to all    skin folds and wet areas in perineal area daily and after baths and                           all bowel movements.      DIABETES CARE:      Check blood sugar:       Fingerstick blood sugar AC and HS      Report CBG < 60 or > 400 to physician.                                          Insulin Sliding Scale          Glucose  Novolog Insulin Subcutaneous        0 - 60   Orange juice or glucose tablet, hold insulin      No insulin   201-250  2 units   251-300  4 units   301-350  6 units   351-400  8 units   >400   10 units then call physician      Medications: Discontinue all previous medication orders, if any. See new list below.      Renata Bergman   Home Medication Instructions GILBERTO:[id number]    Printed on:03/02/20 1915   Medication Information                      allopurinol (ZYLOPRIM) 300 MG tablet  TAKE 1 TABLET BY MOUTH ONCE DAILY             anastrozole (ARIMIDEX) 1 mg Tab  TAKE 1 TABLET(1 MG) BY MOUTH EVERY DAY. STOP LETROZOLE FEMARA             aspirin 81 MG Chew  Take 81 mg by mouth once daily.               blood sugar diagnostic Strp  1 strip by Misc.(Non-Drug; Combo Route) route once daily.             blood-glucose meter kit  Use as instructed             carvediloL (COREG) 3.125 MG tablet  Take 1 tablet (3.125 mg total) by mouth 2 (two) times daily with meals.             clopidogrel (PLAVIX) 75 mg tablet  Take 1 tablet (75 mg total) by mouth once daily.             cyanocobalamin 1,000 mcg/mL injection  Inject 1 mL (1,000 mcg total) into the muscle once a week. Dispense #12 25 gague one inch needles and #12 one ml syringes. Administer every Monday             diclofenac sodium (VOLTAREN) 1 % Gel  Apply 2 g topically 4 (four) times daily as needed. Apply to bilateral knees as needed for pain             ergocalciferol (ERGOCALCIFEROL) 50,000 unit Cap  Take 1 capsule  (50,000 Units total) by mouth twice a week. Every Monday and Thursday.             ferrous sulfate 325 (65 FE) MG EC tablet  Take 1 tablet (325 mg total) by mouth once daily.             fexofenadine (ALLEGRA) 30 MG tablet  Take 30 mg by mouth daily as needed.             lancets Misc  Check blood sugar once daily             levetiracetam XR (KEPPRA XR) 500 mg Tb24 24 hr tablet  TAKE 2 TABLETS(1000 MG) BY MOUTH EVERY DAY             nitroGLYCERIN (NITROSTAT) 0.4 MG SL tablet  PLACE 1 TABLET UNDER THE TONGUE EVERY 5 MINUTES AS NEEDED FOR CHEST PAIN.             omeprazole (PRILOSEC) 20 MG capsule  TAKE 2 CAPSULES BY MOUTH EVERY DAY             potassium chloride (KLOR-CON) 10 MEQ TbSR  TAKE 1 TABLET BY MOUTH EVERY DAY             rosuvastatin (CRESTOR) 40 MG Tab  TAKE 1 TABLET BY MOUTH EVERY DAY                       ____  _____________________________  Judy Smith PA-C  03/02/2020

## 2020-03-02 NOTE — PLAN OF CARE
Problem: Occupational Therapy Goal  Goal: Occupational Therapy Goal  Description  Goals to be met by: 3/6/2020     Patient will increase functional independence with ADLs by performing:    UE Dressing with [place].  LE Dressing with Supervision.  Grooming while standing at sink with Contact Guard Assistance.  Toileting from bedside commode with Contact Guard Assistance for hygiene and clothing management.   Pt will perform bed mobility with CGA   Pt will perform functional task of household and community distance with MIN A using AD as needed.     Outcome: Ongoing, Progressing  I certify that I was present in the room directing the student in service delivery and guiding them using my skilled judgment. As the co-signing therapist I have reviewed the students documentation and am responsible for the treatment, assessment, and plan.

## 2020-03-02 NOTE — NURSING
Notified SERAFIN Pruitt of event documented by Rosa Coello. PA verbalized understanding of event and no new orders noted.

## 2020-03-02 NOTE — NURSING
Walked past the room noticed pt not in bed.  Went to look for pt and found pt on the floor.  Pt stated that she tripped and slid down to the floor using the wall.  No new pain.  Pt denies hitting anything and claims to have slid to a sitting position.  With the assistance of 2 other nurse we got pt to stand back up and placed in bed.  Bed alarm set.

## 2020-03-03 LAB
ANION GAP SERPL CALC-SCNC: 8 MMOL/L (ref 8–16)
BASOPHILS # BLD AUTO: 0.06 K/UL (ref 0–0.2)
BASOPHILS NFR BLD: 0.9 % (ref 0–1.9)
BUN SERPL-MCNC: 21 MG/DL (ref 8–23)
CALCIUM SERPL-MCNC: 10.3 MG/DL (ref 8.7–10.5)
CHLORIDE SERPL-SCNC: 107 MMOL/L (ref 95–110)
CO2 SERPL-SCNC: 24 MMOL/L (ref 23–29)
CREAT SERPL-MCNC: 1.6 MG/DL (ref 0.5–1.4)
DIFFERENTIAL METHOD: ABNORMAL
EOSINOPHIL # BLD AUTO: 0.8 K/UL (ref 0–0.5)
EOSINOPHIL NFR BLD: 11.9 % (ref 0–8)
ERYTHROCYTE [DISTWIDTH] IN BLOOD BY AUTOMATED COUNT: 17.4 % (ref 11.5–14.5)
EST. GFR  (AFRICAN AMERICAN): 35.6 ML/MIN/1.73 M^2
EST. GFR  (NON AFRICAN AMERICAN): 30.9 ML/MIN/1.73 M^2
GLUCOSE SERPL-MCNC: 105 MG/DL (ref 70–110)
HCT VFR BLD AUTO: 33.7 % (ref 37–48.5)
HGB BLD-MCNC: 10.9 G/DL (ref 12–16)
IMM GRANULOCYTES # BLD AUTO: 0.06 K/UL (ref 0–0.04)
IMM GRANULOCYTES NFR BLD AUTO: 0.9 % (ref 0–0.5)
LYMPHOCYTES # BLD AUTO: 2 K/UL (ref 1–4.8)
LYMPHOCYTES NFR BLD: 29.4 % (ref 18–48)
MCH RBC QN AUTO: 30.1 PG (ref 27–31)
MCHC RBC AUTO-ENTMCNC: 32.3 G/DL (ref 32–36)
MCV RBC AUTO: 93 FL (ref 82–98)
MONOCYTES # BLD AUTO: 0.9 K/UL (ref 0.3–1)
MONOCYTES NFR BLD: 13.6 % (ref 4–15)
NEUTROPHILS # BLD AUTO: 2.9 K/UL (ref 1.8–7.7)
NEUTROPHILS NFR BLD: 43.3 % (ref 38–73)
NRBC BLD-RTO: 0 /100 WBC
PLATELET # BLD AUTO: 266 K/UL (ref 150–350)
PMV BLD AUTO: 10.6 FL (ref 9.2–12.9)
POCT GLUCOSE: 144 MG/DL (ref 70–110)
POCT GLUCOSE: 149 MG/DL (ref 70–110)
POCT GLUCOSE: 151 MG/DL (ref 70–110)
POCT GLUCOSE: 162 MG/DL (ref 70–110)
POTASSIUM SERPL-SCNC: 5.1 MMOL/L (ref 3.5–5.1)
RBC # BLD AUTO: 3.62 M/UL (ref 4–5.4)
SODIUM SERPL-SCNC: 139 MMOL/L (ref 136–145)
TB INDURATION 48 - 72 HR READ: 0 MM
WBC # BLD AUTO: 6.71 K/UL (ref 3.9–12.7)

## 2020-03-03 PROCEDURE — 25000003 PHARM REV CODE 250: Performed by: PHYSICIAN ASSISTANT

## 2020-03-03 PROCEDURE — 99225 PR SUBSEQUENT OBSERVATION CARE,LEVEL II: ICD-10-PCS | Mod: ,,, | Performed by: PHYSICIAN ASSISTANT

## 2020-03-03 PROCEDURE — G0378 HOSPITAL OBSERVATION PER HR: HCPCS

## 2020-03-03 PROCEDURE — 99225 PR SUBSEQUENT OBSERVATION CARE,LEVEL II: CPT | Mod: ,,, | Performed by: PHYSICIAN ASSISTANT

## 2020-03-03 PROCEDURE — 80048 BASIC METABOLIC PNL TOTAL CA: CPT

## 2020-03-03 PROCEDURE — 36415 COLL VENOUS BLD VENIPUNCTURE: CPT

## 2020-03-03 PROCEDURE — 85025 COMPLETE CBC W/AUTO DIFF WBC: CPT

## 2020-03-03 RX ADMIN — ACETAMINOPHEN 1000 MG: 500 TABLET ORAL at 03:03

## 2020-03-03 RX ADMIN — CYANOCOBALAMIN TAB 1000 MCG 1000 MCG: 1000 TAB at 09:03

## 2020-03-03 RX ADMIN — DICLOFENAC 2 G: 10 GEL TOPICAL at 10:03

## 2020-03-03 RX ADMIN — ANASTROZOLE 1 MG: 1 TABLET, COATED ORAL at 09:03

## 2020-03-03 RX ADMIN — ALLOPURINOL 300 MG: 300 TABLET ORAL at 09:03

## 2020-03-03 RX ADMIN — ACETAMINOPHEN 1000 MG: 500 TABLET ORAL at 09:03

## 2020-03-03 RX ADMIN — CARVEDILOL 3.12 MG: 3.12 TABLET, FILM COATED ORAL at 05:03

## 2020-03-03 RX ADMIN — ROSUVASTATIN CALCIUM 40 MG: 20 TABLET, FILM COATED ORAL at 09:03

## 2020-03-03 RX ADMIN — DICLOFENAC 2 G: 10 GEL TOPICAL at 01:03

## 2020-03-03 RX ADMIN — CLOPIDOGREL BISULFATE 75 MG: 75 TABLET ORAL at 09:03

## 2020-03-03 RX ADMIN — LEVETIRACETAM 1000 MG: 500 TABLET ORAL at 10:03

## 2020-03-03 RX ADMIN — ASPIRIN 81 MG CHEWABLE TABLET 81 MG: 81 TABLET CHEWABLE at 09:03

## 2020-03-03 RX ADMIN — LEVETIRACETAM 1000 MG: 500 TABLET ORAL at 09:03

## 2020-03-03 RX ADMIN — CARVEDILOL 3.12 MG: 3.12 TABLET, FILM COATED ORAL at 08:03

## 2020-03-03 RX ADMIN — FERROUS SULFATE TAB EC 325 MG (65 MG FE EQUIVALENT) 325 MG: 325 (65 FE) TABLET DELAYED RESPONSE at 09:03

## 2020-03-03 NOTE — ASSESSMENT & PLAN NOTE
Patient presents with several falls over the last several days and is unable to safely ambulate. She has had bilateral knee replacements with severe pain.   - XR knees bilateral stable  - baseline tenderness  - started on diclofenac gel with significant improvement in BL knee pain  - remains fall risk with multiple to ground assistance episodes and debility   - educated patient on importance of weight loss  - PT/OT- SNF  - will need outpatient follow up with ortho to knee evaluations  - fall precautions

## 2020-03-03 NOTE — NURSING
Pt son called for assistance, pt had fallen. This nurse found pt sitting up on floor. Pt stated that she had walked back from the bathroom with her son's assistance and her legs gave out. Pt denied any pain, denied hitting anything on way down. Son stated that he assisted her to the floor. Pt lifted off floor and placed in bed with 3 assist. Bed alarm was set.  Nela CABA had assisted pt to the bathroom because she had refused to use the bedside commode. Nela had handed her call light cord and instructed pt to pull cord when done. Pt and son were reeducated on the need for medical personal to be present when pt needs to get out of bed. pt and son also educated on the need to use bedside commode. Judy BETANCOURT was notified.

## 2020-03-03 NOTE — PROGRESS NOTES
[facility] Hospital Medicine  Progress Note    Patient Name: Renata Bergman  MRN: [id number]  Patient Class: OP- Observation   Admission Date: 2/27/2020  Length of Stay: 0 days  Attending Physician: HELENA Watson MD  Primary Care Provider: Ethel Berger MD    [facility] Medicine Team: [facility] MED F Judy Smith PA-C    Subjective:     Principal Problem:Frequent falls        HPI:  Patient is a 79 year old female with a PMHx of HTN, HLD, DM2, CKD, CAD on plavix being admitted to observation for frequent falls. History provided by patient and daughter at bedside. Patient has experienced an estimated 5-7 falls over the past 3 days. Patient's falls have not all been witnessed and unclear if she has sustained head trauma. Patient contributes the falls to bilateral knee pain. She states whenever she tries to stand up the pain is too much and causes her to fall forward. She has history of bilateral knee replacements. 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 Patient denies headache, fever, vision changes, speech changes, numbness, paresthesias, unilateral extremity weakness, chest pain, SOB, abdominal pain, nausea, vomiting, urinary or bowel movement changes.    In the ED, vitals stable. Intake labs without acute abnormality. Cr 1.7 (baseline). XR bilateral knees stable.    Overview/Hospital Course:  Mrs. Hussein was admitted to observation for frequent falls in setting of severe bilateral knee pain. Started on diclofenac gel with improvement in knee pain. PT/OT recommending SNF. Educated on importance of weight loss. Discharge early next week pending SNF placement.     Interval History: NAEO. Patient without complaints. Patient continues to be high fall risk with two assisted ground episodes without trauma, LOC. Medically stable. Discharge pending placement.     Review of Systems   Constitutional: Negative for  fatigue and fever.   HENT: Negative for congestion and sinus pain.    Respiratory: Negative for cough and shortness of breath.    Cardiovascular: Negative for chest pain and palpitations.   Gastrointestinal: Negative for abdominal pain and nausea.   Genitourinary: Negative for dysuria and hematuria.   Musculoskeletal: Positive for arthralgias (bilateral knees), gait problem and joint swelling.   Neurological: Positive for weakness (2/2 pain). Negative for light-headedness and numbness.   Psychiatric/Behavioral: Negative for agitation and behavioral problems.     Objective:     Vital Signs (Most Recent):  Temp: 97.7 °F (36.5 °C) (03/02/20 1557)  Pulse: 89 (03/02/20 1557)  Resp: 20 (03/02/20 1557)  BP: (!) 140/65 (03/02/20 1557)  SpO2: 97 % (03/02/20 1557) Vital Signs (24h Range):  Temp:  [97.5 °F (36.4 °C)-98.3 °F (36.8 °C)] 97.7 °F (36.5 °C)  Pulse:  [78-95] 89  Resp:  [16-20] 20  SpO2:  [95 %-100 %] 97 %  BP: (133-158)/(57-72) 140/65     Weight: 95.6 kg (210 lb 12.2 oz)  Body mass index is 35.07 kg/m².    Intake/Output Summary (Last 24 hours) at 3/2/2020 1832  Last data filed at 3/2/2020 0700  Gross per 24 hour   Intake 150 ml   Output 200 ml   Net -50 ml      Physical Exam   Constitutional: She is oriented to person, place, and time. She appears well-developed and well-nourished.   HENT:   Head: Normocephalic and atraumatic.   Eyes: Pupils are equal, round, and reactive to light. EOM are normal.   Neck: Normal range of motion. Neck supple.   Cardiovascular: Normal rate and regular rhythm.   Pulmonary/Chest: Effort normal and breath sounds normal.   Abdominal: Soft. Bowel sounds are normal.   Musculoskeletal: She exhibits tenderness (BL knees). She exhibits no edema.   ROM LE limited by knee pain   Neurological: She is alert and oriented to person, place, and time.   Skin: Skin is warm and dry. Capillary refill takes less than 2 seconds.   Psychiatric: She has a normal mood and affect. Her behavior is normal.    Nursing note and vitals reviewed.      Significant Labs:   CBC:   Recent Labs   Lab 03/01/20  0311 03/02/20  0435   WBC 5.09 5.34   HGB 9.5* 11.4*   HCT 31.4* 38.2    241     CMP:   Recent Labs   Lab 03/01/20  0311 03/02/20  0435    138   K 4.6 4.2    105   CO2 26 24    105   BUN 19 22   CREATININE 1.6* 1.6*   CALCIUM 10.0 10.5   ANIONGAP 7* 9   EGFRNONAA 30.9* 30.9*     POCT Glucose:   Recent Labs   Lab 03/02/20  0804 03/02/20  1158 03/02/20  1633   POCTGLUCOSE 155* 126* 135*       Significant Imaging: I have reviewed all pertinent imaging results/findings within the past 24 hours.      Assessment/Plan:      * Frequent falls  Patient presents with several falls over the last several days and is unable to safely ambulate. She has had bilateral knee replacements.   - XR knees bilateral stable  - baseline tenderness  - started on diclofenac gel with significant improvement in BL knee pain  - remains fall risk with multiple to ground assistance episodes  - educated patient on importance of weight loss  - PT/OT- SNF  - will need outpatient follow up with ortho to knee evaluations  - fall precautions    Chronic renal disease, stage 4, severely decreased glomerular filtration rate (GFR) between 15-29 mL/min/1.73 square meter  - stable  - daily BMP    Controlled type 2 DM with peripheral circulatory disorder  - low dose SSI, ACHS  - cardiac diabetic diet  -   Lab Results   Component Value Date    HGBA1C 5.4 02/11/2020         Coronary artery disease due to lipid rich plaque  HLD    - continue asa, plavix, and rosuvastatin    HTN (hypertension), benign  - continue carvedilol      VTE Risk Mitigation (From admission, onward)         Ordered     IP VTE LOW RISK PATIENT  Once      02/28/20 0356     Place RAJANI hose  Until discontinued      02/28/20 0300                      Judy Smith PA-C  Department of Hospital Medicine   [facility]

## 2020-03-03 NOTE — PLAN OF CARE
NADN this shift.  Bed alarm set the rest of this shift.  No complaints.  Will continue to monitor.

## 2020-03-03 NOTE — PLAN OF CARE
SUMIT spoke with Katlyn who is currently reviewing the patient and will give a decision today on acceptance.

## 2020-03-03 NOTE — PLAN OF CARE
03/03/20 1044   Post-Acute Status   Post-Acute Authorization Placement   Post-Acute Placement Status Referrals Sent   Discharge Plan   Discharge Plan A Skilled Nursing Facility   Discharge Plan B Home Health     Kajal Perez LMSW  [facility]  [id number]

## 2020-03-03 NOTE — NURSING
Patient had no IV access this am.  No documentation of removal.  Have not attempted new IV at present.

## 2020-03-03 NOTE — ASSESSMENT & PLAN NOTE
Patient presents with several falls over the last several days and is unable to safely ambulate. She has had bilateral knee replacements.   - XR knees bilateral stable  - baseline tenderness  - started on diclofenac gel with significant improvement in BL knee pain  - remains fall risk with multiple to ground assistance episodes  - educated patient on importance of weight loss  - PT/OT- SNF  - will need outpatient follow up with ortho to knee evaluations  - fall precautions

## 2020-03-03 NOTE — NURSING
Review of patient assistance by staff with daughter, present at bedside.  Have reinforced with patient and family members in room.  Patient only to get up with staff present and to use the bedside commode to decrease risk of falls.  Bed alarm on.

## 2020-03-03 NOTE — ASSESSMENT & PLAN NOTE
- low dose SSI, ACHS  - cardiac diabetic diet  -   Lab Results   Component Value Date    HGBA1C 5.4 02/11/2020

## 2020-03-03 NOTE — SUBJECTIVE & OBJECTIVE
Interval History: Patient without complaints. NAEO. Awaiting SNF placement.     Review of Systems   Constitutional: Negative for fatigue and fever.   HENT: Negative for congestion and sinus pain.    Respiratory: Negative for cough and shortness of breath.    Cardiovascular: Negative for chest pain and palpitations.   Gastrointestinal: Negative for abdominal pain and nausea.   Genitourinary: Negative for dysuria and hematuria.   Musculoskeletal: Positive for arthralgias (bilateral knees), gait problem and joint swelling.   Neurological: Positive for weakness (2/2 pain). Negative for light-headedness and numbness.   Psychiatric/Behavioral: Negative for agitation and behavioral problems.     Objective:     Vital Signs (Most Recent):  Temp: 98.7 °F (37.1 °C) (03/03/20 1030)  Pulse: 92 (03/03/20 1030)  Resp: 18 (03/03/20 1030)  BP: 128/67 (03/03/20 1030)  SpO2: 96 % (03/03/20 1030) Vital Signs (24h Range):  Temp:  [96.6 °F (35.9 °C)-98.7 °F (37.1 °C)] 98.7 °F (37.1 °C)  Pulse:  [77-92] 92  Resp:  [16-20] 18  SpO2:  [94 %-99 %] 96 %  BP: (122-140)/(60-73) 128/67     Weight: 95.6 kg (210 lb 12.2 oz)  Body mass index is 35.07 kg/m².    Intake/Output Summary (Last 24 hours) at 3/3/2020 1250  Last data filed at 3/3/2020 1000  Gross per 24 hour   Intake --   Output 700 ml   Net -700 ml      Physical Exam   Constitutional: She is oriented to person, place, and time. She appears well-developed and well-nourished.   HENT:   Head: Normocephalic and atraumatic.   Eyes: Pupils are equal, round, and reactive to light. EOM are normal.   Neck: Normal range of motion. Neck supple.   Cardiovascular: Normal rate and regular rhythm.   Pulmonary/Chest: Effort normal and breath sounds normal.   Abdominal: Soft. Bowel sounds are normal.   Musculoskeletal: She exhibits tenderness (BL knees). She exhibits no edema.   ROM LE limited by knee pain   Neurological: She is alert and oriented to person, place, and time.   Skin: Skin is warm and dry.  Capillary refill takes less than 2 seconds.   Psychiatric: She has a normal mood and affect. Her behavior is normal.   Nursing note and vitals reviewed.      Significant Labs:   CBC:   Recent Labs   Lab 03/02/20  0435 03/03/20  0614   WBC 5.34 6.71   HGB 11.4* 10.9*   HCT 38.2 33.7*    266     CMP:   Recent Labs   Lab 03/02/20 0435 03/03/20  0615    139   K 4.2 5.1    107   CO2 24 24    105   BUN 22 21   CREATININE 1.6* 1.6*   CALCIUM 10.5 10.3   ANIONGAP 9 8   EGFRNONAA 30.9* 30.9*       Significant Imaging: I have reviewed all pertinent imaging results/findings within the past 24 hours.

## 2020-03-03 NOTE — PROGRESS NOTES
[facility] Hospital Medicine  Progress Note    Patient Name: Renata Bergman  MRN: [id number]  Patient Class: OP- Observation   Admission Date: 2/27/2020  Length of Stay: 0 days  Attending Physician: HELENA Watson MD  Primary Care Provider: Ethel Berger MD    [facility] Medicine Team: [facility] F Judy Smith PA-C    Subjective:     Principal Problem:Frequent falls        HPI:  Patient is a 79 year old female with a PMHx of HTN, HLD, DM2, CKD, CAD on plavix being admitted to observation for frequent falls. History provided by patient and daughter at bedside. Patient has experienced an estimated 5-7 falls over the past 3 days. Patient's falls have not all been witnessed and unclear if she has sustained head trauma. Patient contributes the falls to bilateral knee pain. She states whenever she tries to stand up the pain is too much and causes her to fall forward. She has history of bilateral knee replacements. 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 Patient denies headache, fever, vision changes, speech changes, numbness, paresthesias, unilateral extremity weakness, chest pain, SOB, abdominal pain, nausea, vomiting, urinary or bowel movement changes.    In the ED, vitals stable. Intake labs without acute abnormality. Cr 1.7 (baseline). XR bilateral knees stable.    Overview/Hospital Course:  Mrs. Hussein was admitted to observation for frequent falls in setting of severe bilateral knee pain and debility. Started on diclofenac gel with improvement in knee pain. PT/OT recommending SNF. Educated on importance of weight loss. Discharge early next week pending SNF placement.     Interval History: Patient without complaints. NAEO. Awaiting SNF placement.     Review of Systems   Constitutional: Negative for fatigue and fever.   HENT: Negative for congestion and sinus pain.    Respiratory: Negative for cough and  shortness of breath.    Cardiovascular: Negative for chest pain and palpitations.   Gastrointestinal: Negative for abdominal pain and nausea.   Genitourinary: Negative for dysuria and hematuria.   Musculoskeletal: Positive for arthralgias (bilateral knees), gait problem and joint swelling.   Neurological: Positive for weakness (2/2 pain). Negative for light-headedness and numbness.   Psychiatric/Behavioral: Negative for agitation and behavioral problems.     Objective:     Vital Signs (Most Recent):  Temp: 98.7 °F (37.1 °C) (03/03/20 1030)  Pulse: 92 (03/03/20 1030)  Resp: 18 (03/03/20 1030)  BP: 128/67 (03/03/20 1030)  SpO2: 96 % (03/03/20 1030) Vital Signs (24h Range):  Temp:  [96.6 °F (35.9 °C)-98.7 °F (37.1 °C)] 98.7 °F (37.1 °C)  Pulse:  [77-92] 92  Resp:  [16-20] 18  SpO2:  [94 %-99 %] 96 %  BP: (122-140)/(60-73) 128/67     Weight: 95.6 kg (210 lb 12.2 oz)  Body mass index is 35.07 kg/m².    Intake/Output Summary (Last 24 hours) at 3/3/2020 1250  Last data filed at 3/3/2020 1000  Gross per 24 hour   Intake --   Output 700 ml   Net -700 ml      Physical Exam   Constitutional: She is oriented to person, place, and time. She appears well-developed and well-nourished.   HENT:   Head: Normocephalic and atraumatic.   Eyes: Pupils are equal, round, and reactive to light. EOM are normal.   Neck: Normal range of motion. Neck supple.   Cardiovascular: Normal rate and regular rhythm.   Pulmonary/Chest: Effort normal and breath sounds normal.   Abdominal: Soft. Bowel sounds are normal.   Musculoskeletal: She exhibits tenderness (BL knees). She exhibits no edema.   ROM LE limited by knee pain   Neurological: She is alert and oriented to person, place, and time.   Skin: Skin is warm and dry. Capillary refill takes less than 2 seconds.   Psychiatric: She has a normal mood and affect. Her behavior is normal.   Nursing note and vitals reviewed.      Significant Labs:   CBC:   Recent Labs   Lab 03/02/20  0435 03/03/20  0614    WBC 5.34 6.71   HGB 11.4* 10.9*   HCT 38.2 33.7*    266     CMP:   Recent Labs   Lab 03/02/20  0435 03/03/20  0615    139   K 4.2 5.1    107   CO2 24 24    105   BUN 22 21   CREATININE 1.6* 1.6*   CALCIUM 10.5 10.3   ANIONGAP 9 8   EGFRNONAA 30.9* 30.9*       Significant Imaging: I have reviewed all pertinent imaging results/findings within the past 24 hours.      Assessment/Plan:      * Frequent falls  Patient presents with several falls over the last several days and is unable to safely ambulate. She has had bilateral knee replacements with severe pain.   - XR knees bilateral stable  - baseline tenderness  - started on diclofenac gel with significant improvement in BL knee pain  - remains fall risk with multiple to ground assistance episodes and debility   - educated patient on importance of weight loss  - PT/OT- SNF  - will need outpatient follow up with ortho to knee evaluations  - fall precautions    Chronic renal disease, stage 4, severely decreased glomerular filtration rate (GFR) between 15-29 mL/min/1.73 square meter  - stable  - daily BMP    Controlled type 2 DM with peripheral circulatory disorder  - low dose SSI, ACHS  - cardiac diabetic diet  -   Lab Results   Component Value Date    HGBA1C 5.4 02/11/2020         Coronary artery disease due to lipid rich plaque  HLD    - continue asa, plavix, and rosuvastatin    HTN (hypertension), benign  - continue carvedilol      VTE Risk Mitigation (From admission, onward)         Ordered     IP VTE LOW RISK PATIENT  Once      02/28/20 0356     Place RAJANI hose  Until discontinued      02/28/20 3792                      Judy Smith PA-C  Department of Hospital Medicine   [facility]

## 2020-03-03 NOTE — SUBJECTIVE & OBJECTIVE
Interval History: NAEO. Patient without complaints. Patient continues to be high fall risk with two assisted ground episodes without trauma, LOC. Medically stable. Discharge pending placement.     Review of Systems   Constitutional: Negative for fatigue and fever.   HENT: Negative for congestion and sinus pain.    Respiratory: Negative for cough and shortness of breath.    Cardiovascular: Negative for chest pain and palpitations.   Gastrointestinal: Negative for abdominal pain and nausea.   Genitourinary: Negative for dysuria and hematuria.   Musculoskeletal: Positive for arthralgias (bilateral knees), gait problem and joint swelling.   Neurological: Positive for weakness (2/2 pain). Negative for light-headedness and numbness.   Psychiatric/Behavioral: Negative for agitation and behavioral problems.     Objective:     Vital Signs (Most Recent):  Temp: 97.7 °F (36.5 °C) (03/02/20 1557)  Pulse: 89 (03/02/20 1557)  Resp: 20 (03/02/20 1557)  BP: (!) 140/65 (03/02/20 1557)  SpO2: 97 % (03/02/20 1557) Vital Signs (24h Range):  Temp:  [97.5 °F (36.4 °C)-98.3 °F (36.8 °C)] 97.7 °F (36.5 °C)  Pulse:  [78-95] 89  Resp:  [16-20] 20  SpO2:  [95 %-100 %] 97 %  BP: (133-158)/(57-72) 140/65     Weight: 95.6 kg (210 lb 12.2 oz)  Body mass index is 35.07 kg/m².    Intake/Output Summary (Last 24 hours) at 3/2/2020 1832  Last data filed at 3/2/2020 0700  Gross per 24 hour   Intake 150 ml   Output 200 ml   Net -50 ml      Physical Exam   Constitutional: She is oriented to person, place, and time. She appears well-developed and well-nourished.   HENT:   Head: Normocephalic and atraumatic.   Eyes: Pupils are equal, round, and reactive to light. EOM are normal.   Neck: Normal range of motion. Neck supple.   Cardiovascular: Normal rate and regular rhythm.   Pulmonary/Chest: Effort normal and breath sounds normal.   Abdominal: Soft. Bowel sounds are normal.   Musculoskeletal: She exhibits tenderness (BL knees). She exhibits no edema.   ROM  LE limited by knee pain   Neurological: She is alert and oriented to person, place, and time.   Skin: Skin is warm and dry. Capillary refill takes less than 2 seconds.   Psychiatric: She has a normal mood and affect. Her behavior is normal.   Nursing note and vitals reviewed.      Significant Labs:   CBC:   Recent Labs   Lab 03/01/20 0311 03/02/20  0435   WBC 5.09 5.34   HGB 9.5* 11.4*   HCT 31.4* 38.2    241     CMP:   Recent Labs   Lab 03/01/20  0311 03/02/20  0435    138   K 4.6 4.2    105   CO2 26 24    105   BUN 19 22   CREATININE 1.6* 1.6*   CALCIUM 10.0 10.5   ANIONGAP 7* 9   EGFRNONAA 30.9* 30.9*     POCT Glucose:   Recent Labs   Lab 03/02/20  0804 03/02/20  1158 03/02/20  1633   POCTGLUCOSE 155* 126* 135*       Significant Imaging: I have reviewed all pertinent imaging results/findings within the past 24 hours.

## 2020-03-03 NOTE — PLAN OF CARE
Patient high risk for falls, two assisted to ground episodes in last 24 hours.  Two person plus assistance.  Bedside commode placed in room.  Patient denies pain, no signs of infection.   Bed alarm on.  Family advised not to assist patient without staff present.  Monitor for safety concerns.

## 2020-03-03 NOTE — PLAN OF CARE
Patient continues to have difficulty with ambulation with only one person assist.  Sudden weakness overcomes her and she slides to ground.  Have repeatedly asked patient and family to call for staff assist.  Bed alarm on, family informed of need for assistance.  Reinforced with each patient check.   No unmanaged pain or signs of infection.  Glucose levels within desired range.

## 2020-03-03 NOTE — NURSING
Assumed care of pt from HADLEY Gordon RN.  Will continue to monitor pt for any needs the remainder of this shift.

## 2020-03-04 VITALS
HEIGHT: 65 IN | RESPIRATION RATE: 18 BRPM | OXYGEN SATURATION: 98 % | WEIGHT: 210.75 LBS | SYSTOLIC BLOOD PRESSURE: 134 MMHG | DIASTOLIC BLOOD PRESSURE: 61 MMHG | HEART RATE: 91 BPM | BODY MASS INDEX: 35.11 KG/M2 | TEMPERATURE: 98 F

## 2020-03-04 LAB
ANION GAP SERPL CALC-SCNC: 8 MMOL/L (ref 8–16)
BASOPHILS # BLD AUTO: 0.03 K/UL (ref 0–0.2)
BASOPHILS NFR BLD: 0.4 % (ref 0–1.9)
BUN SERPL-MCNC: 24 MG/DL (ref 8–23)
CALCIUM SERPL-MCNC: 10.2 MG/DL (ref 8.7–10.5)
CHLORIDE SERPL-SCNC: 106 MMOL/L (ref 95–110)
CO2 SERPL-SCNC: 28 MMOL/L (ref 23–29)
CREAT SERPL-MCNC: 1.8 MG/DL (ref 0.5–1.4)
DIFFERENTIAL METHOD: ABNORMAL
EOSINOPHIL # BLD AUTO: 0.7 K/UL (ref 0–0.5)
EOSINOPHIL NFR BLD: 9.9 % (ref 0–8)
ERYTHROCYTE [DISTWIDTH] IN BLOOD BY AUTOMATED COUNT: 18 % (ref 11.5–14.5)
EST. GFR  (AFRICAN AMERICAN): 30.9 ML/MIN/1.73 M^2
EST. GFR  (NON AFRICAN AMERICAN): 26.8 ML/MIN/1.73 M^2
GLUCOSE SERPL-MCNC: 97 MG/DL (ref 70–110)
HCT VFR BLD AUTO: 34 % (ref 37–48.5)
HGB BLD-MCNC: 10.3 G/DL (ref 12–16)
IMM GRANULOCYTES # BLD AUTO: 0.03 K/UL (ref 0–0.04)
IMM GRANULOCYTES NFR BLD AUTO: 0.4 % (ref 0–0.5)
LYMPHOCYTES # BLD AUTO: 1.8 K/UL (ref 1–4.8)
LYMPHOCYTES NFR BLD: 27.4 % (ref 18–48)
MCH RBC QN AUTO: 30.3 PG (ref 27–31)
MCHC RBC AUTO-ENTMCNC: 30.3 G/DL (ref 32–36)
MCV RBC AUTO: 100 FL (ref 82–98)
MONOCYTES # BLD AUTO: 1 K/UL (ref 0.3–1)
MONOCYTES NFR BLD: 14.9 % (ref 4–15)
NEUTROPHILS # BLD AUTO: 3.1 K/UL (ref 1.8–7.7)
NEUTROPHILS NFR BLD: 47 % (ref 38–73)
NRBC BLD-RTO: 0 /100 WBC
PLATELET # BLD AUTO: 297 K/UL (ref 150–350)
PMV BLD AUTO: 10.4 FL (ref 9.2–12.9)
POCT GLUCOSE: 106 MG/DL (ref 70–110)
POCT GLUCOSE: 115 MG/DL (ref 70–110)
POTASSIUM SERPL-SCNC: 4.7 MMOL/L (ref 3.5–5.1)
RBC # BLD AUTO: 3.4 M/UL (ref 4–5.4)
SODIUM SERPL-SCNC: 142 MMOL/L (ref 136–145)
WBC # BLD AUTO: 6.69 K/UL (ref 3.9–12.7)

## 2020-03-04 PROCEDURE — 85025 COMPLETE CBC W/AUTO DIFF WBC: CPT

## 2020-03-04 PROCEDURE — 25000003 PHARM REV CODE 250: Performed by: PHYSICIAN ASSISTANT

## 2020-03-04 PROCEDURE — 36415 COLL VENOUS BLD VENIPUNCTURE: CPT

## 2020-03-04 PROCEDURE — 99217 PR OBSERVATION CARE DISCHARGE: CPT | Mod: ,,, | Performed by: PHYSICIAN ASSISTANT

## 2020-03-04 PROCEDURE — 80048 BASIC METABOLIC PNL TOTAL CA: CPT

## 2020-03-04 PROCEDURE — 99217 PR OBSERVATION CARE DISCHARGE: ICD-10-PCS | Mod: ,,, | Performed by: PHYSICIAN ASSISTANT

## 2020-03-04 PROCEDURE — G0378 HOSPITAL OBSERVATION PER HR: HCPCS

## 2020-03-04 PROCEDURE — 94761 N-INVAS EAR/PLS OXIMETRY MLT: CPT

## 2020-03-04 RX ADMIN — ASPIRIN 81 MG CHEWABLE TABLET 81 MG: 81 TABLET CHEWABLE at 09:03

## 2020-03-04 RX ADMIN — LEVETIRACETAM 1000 MG: 500 TABLET ORAL at 09:03

## 2020-03-04 RX ADMIN — CYANOCOBALAMIN TAB 1000 MCG 1000 MCG: 1000 TAB at 09:03

## 2020-03-04 RX ADMIN — CLOPIDOGREL BISULFATE 75 MG: 75 TABLET ORAL at 09:03

## 2020-03-04 RX ADMIN — ALLOPURINOL 300 MG: 300 TABLET ORAL at 09:03

## 2020-03-04 RX ADMIN — ROSUVASTATIN CALCIUM 40 MG: 20 TABLET, FILM COATED ORAL at 09:03

## 2020-03-04 RX ADMIN — CARVEDILOL 3.12 MG: 3.12 TABLET, FILM COATED ORAL at 09:03

## 2020-03-04 RX ADMIN — ACETAMINOPHEN 1000 MG: 500 TABLET ORAL at 09:03

## 2020-03-04 RX ADMIN — ANASTROZOLE 1 MG: 1 TABLET, COATED ORAL at 09:03

## 2020-03-04 NOTE — ASSESSMENT & PLAN NOTE
- low dose SSI, ACHS  - cardiac diabetic diet  - no insuline requirements during stay  Lab Results   Component Value Date    HGBA1C 5.4 02/11/2020

## 2020-03-04 NOTE — DISCHARGE SUMMARY
[facility] Hospital Medicine  Discharge Summary      Patient Name: Renata Bergman  MRN: [id number]  Admission Date: 2/27/2020  Hospital Length of Stay: 0 days  Discharge Date and Time: 3/4/2020  3:06 PM  Attending Physician: No att. providers found   Discharging Provider: Judy Smith PA-C  Primary Care Provider: Ethel Berger MD  [facility] Medicine Team: [facility] MED F Judy Smith PA-C    HPI:   Patient is a 79 year old female with a PMHx of HTN, HLD, DM2, CKD, CAD on plavix being admitted to observation for frequent falls. History provided by patient and daughter at bedside. Patient has experienced an estimated 5-7 falls over the past 3 days. Patient's falls have not all been witnessed and unclear if she has sustained head trauma. Patient contributes the falls to bilateral knee pain. She states whenever she tries to stand up the pain is too much and causes her to fall forward. She has history of bilateral knee replacements. 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 Patient denies headache, fever, vision changes, speech changes, numbness, paresthesias, unilateral extremity weakness, chest pain, SOB, abdominal pain, nausea, vomiting, urinary or bowel movement changes.    In the ED, vitals stable. Intake labs without acute abnormality. Cr 1.7 (baseline). XR bilateral knees stable.    * No surgery found *      Hospital Course:   Mrs. Hussein was admitted to observation for frequent falls in setting of severe bilateral knee pain and debility. Started on diclofenac gel with improvement in knee pain. PT/OT recommending SNF. Educated on importance of weight loss. Patient with continued weakness due to debility during stay requiring assistance from nursing. Creatinine at baseline. Patient discharged to SNF. Patient to follow up with PCP in 1 week, and follow up with orthopedic surgeon within next month.  Patient verbalized understanding. All questions answered.     Consults:     * Frequent falls  Patient presents with several falls over the last several days and is unable to safely ambulate. She has had bilateral knee replacements with severe pain.   - XR knees bilateral stable  - baseline tenderness  - started on diclofenac gel with significant improvement in BL knee pain  - remains fall risk with multiple to ground assistance episodes and debility   - educated patient on importance of weight loss  - PT/OT- SNF  - will need outpatient follow up with ortho to knee evaluations  - discharged to SNF  - PCP in 1 week    Controlled type 2 DM with peripheral circulatory disorder  - low dose SSI, ACHS  - cardiac diabetic diet  - no insuline requirements during stay  Lab Results   Component Value Date    HGBA1C 5.4 02/11/2020           Final Active Diagnoses:    Diagnosis Date Noted POA    PRINCIPAL PROBLEM:  Frequent falls [R29.6] 02/28/2020 Not Applicable    Chronic renal disease, stage 4, severely decreased glomerular filtration rate (GFR) between 15-29 mL/min/1.73 square meter [N18.4] 12/26/2019 Yes    Controlled type 2 DM with peripheral circulatory disorder [E11.51] 10/14/2013 Yes     Chronic    Coronary artery disease due to lipid rich plaque [I25.10, I25.83] 09/04/2012 Yes     Chronic    HTN (hypertension), benign [I10] 09/04/2012 Yes     Chronic    Hyperlipidemia [E78.5] 09/04/2012 Yes     Chronic      Problems Resolved During this Admission:       Discharged Condition: good    Disposition: Skilled Nursing Facility    Follow Up:  Follow-up Information     Ethel Berger MD In 1 week.    Specialty:  Internal Medicine  Contact information:  [address]  [place] [address] [phone]                 Patient Instructions:      Diet diabetic     Diet Cardiac     Notify your health care provider if you experience any of the following:  temperature >100.4     Notify your health care provider if you  experience any of the following:  severe uncontrolled pain     Notify your health care provider if you experience any of the following:  persistent dizziness, light-headedness, or visual disturbances     Notify your health care provider if you experience any of the following:  increased confusion or weakness     Activity as tolerated       Significant Diagnostic Studies: Labs:   CMP   Recent Labs   Lab 03/03/20  0615 03/04/20  0358    142   K 5.1 4.7    106   CO2 24 28    97   BUN 21 24*   CREATININE 1.6* 1.8*   CALCIUM 10.3 10.2   ANIONGAP 8 8   ESTGFRAFRICA 35.6* 30.9*   EGFRNONAA 30.9* 26.8*   , CBC   Recent Labs   Lab 03/03/20  0614 03/04/20  0358   WBC 6.71 6.69   HGB 10.9* 10.3*   HCT 33.7* 34.0*    297    and A1C:   Recent Labs   Lab 09/07/19  0836 12/21/19  0906 02/11/20  0953   HGBA1C 6.3* 6.0* 5.4       Pending Diagnostic Studies:     None         Medications:  Reconciled Home Medications:      Medication List      START taking these medications    diclofenac sodium 1 % Gel  Commonly known as:  VOLTAREN  Apply 2 g topically 4 (four) times daily as needed. Apply to bilateral knees as needed for pain        CONTINUE taking these medications    allopurinoL 300 MG tablet  Commonly known as:  ZYLOPRIM  TAKE 1 TABLET BY MOUTH ONCE DAILY     anastrozole 1 mg Tab  Commonly known as:  ARIMIDEX  TAKE 1 TABLET(1 MG) BY MOUTH EVERY DAY. STOP LETROZOLE FEMARA     aspirin 81 MG Chew  Take 81 mg by mouth once daily.     blood sugar diagnostic Strp  1 strip by Misc.(Non-Drug; Combo Route) route once daily.     blood-glucose meter kit  Use as instructed     carvediloL 3.125 MG tablet  Commonly known as:  COREG  Take 1 tablet (3.125 mg total) by mouth 2 (two) times daily with meals.     clopidogreL 75 mg tablet  Commonly known as:  PLAVIX  Take 1 tablet (75 mg total) by mouth once daily.     cyanocobalamin 1,000 mcg/mL injection  Inject 1 mL (1,000 mcg total) into the muscle once a week. Dispense  #12 25 gague one inch needles and #12 one ml syringes     ergocalciferol 50,000 unit Cap  Commonly known as:  ERGOCALCIFEROL  Take 1 capsule (50,000 Units total) by mouth twice a week.     ferrous sulfate 325 (65 FE) MG EC tablet  Take 1 tablet (325 mg total) by mouth once daily.     fexofenadine 30 MG tablet  Commonly known as:  ALLEGRA  Take 30 mg by mouth daily as needed.     lancets Misc  Check blood sugar once daily     levetiracetam  mg Tb24 24 hr tablet  Commonly known as:  KEPPRA XR  TAKE 2 TABLETS(1000 MG) BY MOUTH EVERY DAY     nitroGLYCERIN 0.4 MG SL tablet  Commonly known as:  NITROSTAT  PLACE 1 TABLET UNDER THE TONGUE EVERY 5 MINUTES AS NEEDED FOR CHEST PAIN.     omeprazole 20 MG capsule  Commonly known as:  PRILOSEC  TAKE 2 CAPSULES BY MOUTH EVERY DAY     potassium chloride 10 MEQ Tbsr  Commonly known as:  KLOR-CON  TAKE 1 TABLET BY MOUTH EVERY DAY     rosuvastatin 40 MG Tab  Commonly known as:  CRESTOR  TAKE 1 TABLET BY MOUTH EVERY DAY            Indwelling Lines/Drains at time of discharge:   Lines/Drains/Airways     Drain            Female External Urinary Catheter 03/02/20 0400 2 days                Time spent on the discharge of patient: 36 minutes  Patient was seen and examined on the date of discharge and determined to be suitable for discharge.         Judy Smith PA-C  Department of Hospital Medicine  [facility]

## 2020-03-04 NOTE — PROGRESS NOTES
[facility]

## 2020-03-04 NOTE — ASSESSMENT & PLAN NOTE
Patient presents with several falls over the last several days and is unable to safely ambulate. She has had bilateral knee replacements with severe pain.   - XR knees bilateral stable  - baseline tenderness  - started on diclofenac gel with significant improvement in BL knee pain  - remains fall risk with multiple to ground assistance episodes and debility   - educated patient on importance of weight loss  - PT/OT- SNF  - will need outpatient follow up with ortho to knee evaluations  - discharged to SNF  - PCP in 1 week

## 2020-03-04 NOTE — PLAN OF CARE
Problem: Physical Therapy Goal  Goal: Physical Therapy Goal  Description  Goals to be met by: 3/13/2020     Patient will increase functional independence with mobility by performin. Supine to sit with Contact Guard Assistance - Met  2. Sit to supine with MInimal Assistance - Met       Revised: Sit to supine with Stand by Assistance - Not Met  3. Sit to stand transfer with Minimal Assistance - Met        Revised: Sit to stand transfer with Stand by Assistance - Not Met  4. Bed to chair transfer with Minimal Assistance using Rolling Walker or LRD - Met        Revised: Bed to chair transfer with Stand by Assistance using Rolling Walker or LRD - Not Met  5. Gait  x 30 feet with Minimal Assistance using Rolling Walker or LRD.   6. Lower extremity exercise program x15 reps per handout, with assistance as needed      Outcome: Unable to Meet, Plan Revised

## 2020-03-04 NOTE — PLAN OF CARE
03/04/20 1320   Final Note   Assessment Type Final Discharge Note   Anticipated Discharge Disposition SNF     Patient is discharging to [facility].     SW arranged wheelchair transport via [facility]. Requested  time is 2:00 PM.  Requested  time does not guarantee arrival time.      Nurse can call report to [phone] bed P204.    Nurse has been informed of above.    Kajal Perez LMSW  [facility]  [phone]

## 2020-03-04 NOTE — PT/OT/SLP PROGRESS
Occupational Therapy      Patient Name:  Renata Bergman   MRN:  [id number]    Patient not seen today secondary to Other (Comment)(Pt was discharging, if Pt is delayed then she will be seen tomorrow). Will follow-up 3/9/2020.    Aydin Monroe, OT  3/4/2020

## 2020-03-04 NOTE — PLAN OF CARE
"   03/04/20 1201   Post-Acute Status   Post-Acute Authorization Placement   Discharge Delays (!) Patient and Family Barriers   Discharge Plan   Discharge Plan A Skilled Nursing Facility     ISSAC spoke to Katlyn at [facility] who stated she is still waiting on patient's son to fill out paperwork.     ISSAC spoke to son Viraj at [phone] who reported he is "Pulling up at facility now." (12:02PM)    ISSAC will follow up with Katlyn at [facility].     Kajal Perez, DARRON  [facility]  [phone]      "

## 2020-03-04 NOTE — PT/OT/SLP DISCHARGE
Physical Therapy Discharge Summary    Name: Renata Bergman  MRN: [id number]   Principal Problem: Frequent falls     Patient Discharged from acute Physical Therapy on 3/4/2020.  Please refer to prior PT noted date on 3/2/2020 for functional status.     Assessment:     Patient was discharged unexpectedly.  Information required to complete an accurate discharge summary is unknown.  Refer to therapy initial evaluation and last progress note for initial and most recent functional status and goal achievement.  Recommendations made may be found in medical record.    Objective:     GOALS:   Multidisciplinary Problems     Physical Therapy Goals        Problem: Physical Therapy Goal    Goal Priority Disciplines Outcome Goal Variances Interventions   Physical Therapy Goal     PT, PT/OT Unable to Meet, Plan Revised     Description:  Goals to be met by: 3/13/2020     Patient will increase functional independence with mobility by performin. Supine to sit with Contact Guard Assistance - Met  2. Sit to supine with MInimal Assistance - Met       Revised: Sit to supine with Stand by Assistance - Not Met  3. Sit to stand transfer with Minimal Assistance - Met        Revised: Sit to stand transfer with Stand by Assistance - Not Met  4. Bed to chair transfer with Minimal Assistance using Rolling Walker or LRD - Met        Revised: Bed to chair transfer with Stand by Assistance using Rolling Walker or LRD - Not Met  5. Gait  x 30 feet with Minimal Assistance using Rolling Walker or LRD.   6. Lower extremity exercise program x15 reps per handout, with assistance as needed                       Reasons for Discontinuation of Therapy Services  Transfer to alternate level of care.      Plan:     Patient Discharged to: Skilled Nursing Facility.    Tom Duron, PT  3/4/2020

## 2020-03-04 NOTE — PROGRESS NOTES
Pt transported via stretcher to [facility] via WC accompanied by daughter. Pt stable. VSS/

## 2020-03-05 ENCOUNTER — TELEPHONE (OUTPATIENT)
Dept: INTERNAL MEDICINE | Facility: CLINIC | Age: 80
End: 2020-03-05

## 2020-03-05 DIAGNOSIS — G40.409 TONIC-CLONIC EPILEPTIC SEIZURES: ICD-10-CM

## 2020-03-05 RX ORDER — LEVETIRACETAM 500 MG/1
TABLET, EXTENDED RELEASE ORAL
Qty: 180 TABLET | Refills: 4 | Status: SHIPPED | OUTPATIENT
Start: 2020-03-05 | End: 2020-04-13

## 2020-03-06 NOTE — TELEPHONE ENCOUNTER
Please notify pt  Got a notification from [employer] that your Keppra XR is on back order.     I have sent a prescription to the [facility] Lets see if they have the medication.

## 2020-03-06 NOTE — PROGRESS NOTES
[facility]                                 Skilled Nursing Facility                   Progress Note     Admit Date: 3/5/20  BROOKE   Principal Problem:  Debility R/T frequent falls due to bilateral knee pain  HPI obtained from patient interview and chart review     Visit Date: 3/9/2020    Chief Complaint: Establish Care/ Initial Visit s/p hospitalization for frequent falls related to bilateral knee pain    HPI:   Ms. Bergman is a 79 year old female with a PMHx of HTN, HLD, DM2, CKD (baseline CR 1.7), CAD on plavix.  She was admitted after multiple falls at home over 3 day period related to bilateral knee pain. Also diagnosis symptomatic anemia for which she had an EGD about a month prior to admit which showed a few bleeding angioectasias in the stomach.  Patient was started on Voltaren gel for knee pain, documented improvement.  She was also educated on importance of weight loss to decrease knee issues.  It was recommended for patient to transfer to skilled nursing for therapy due to debility, also follow up with orthopedic surgeon in about 4 weeks.  Patient reporting constipation during initial visit, she is not currently on any medications for BM regulation.    Patient will be treated at [facility] with PT and OT to improve functional status and ability to perform ADLs.     Past Medical History: Patient has a past medical history of Breast cancer (2016), Cataract, Coronary artery disease (9/4/2012), Diabetes mellitus due to abnormal insulin (9/4/2012), Diabetes mellitus type II, Genetic testing, GERD (gastroesophageal reflux disease) (9/4/2012), Gout, arthritis (9/4/2012), Hyperlipidemia (9/4/2012), Hypertension, Obesity, Primary osteoarthritis of both knees (9/4/2012), Renal manifestation of secondary diabetes mellitus, Seizure, and Type 2 diabetes with peripheral circulatory disorder, controlled.    Past Surgical History:  Patient has a past surgical history that includes Joint replacement; Knee surgery; Total abdominal hysterectomy w/ bilateral salpingoophorectomy; Coronary angioplasty; Hysterectomy; Breast lumpectomy (Left, 08/01/2016); Esophagogastroduodenoscopy (N/A, 10/8/2018); Colonoscopy (N/A, 10/8/2018); Breast biopsy; and Esophagogastroduodenoscopy (N/A, 1/31/2020).    Social History: Patient reports that she quit smoking about 57 years ago. Her smoking use included cigarettes. She smoked 1.00 pack per day. She has never used smokeless tobacco. She reports that she drinks alcohol. She reports that she does not use drugs.    Family History: family history includes Breast cancer in her maternal aunt and maternal aunt; Breast cancer (age of onset: 45) in her daughter and mother; Cancer (age of onset: 55) in her mother; Diabetes in her mother and son; Heart disease in her father; Hyperlipidemia in her maternal aunt; Hypertension in her maternal aunt, maternal uncle, and mother; Ovarian cancer in her mother; Stroke in her son.    Allergies: Patient is allergic to lisinopril.    ROS  Constitutional: Negative for fever or fatigue.   Eyes: Negative for blurred vision, double vision and discharge.   Respiratory: Negative for cough, shortness of breath and wheezing.    Cardiovascular: Negative for chest pain, palpitations, and leg swelling.   Gastrointestinal: Negative for abdominal pain, + constipation, -diarrhea, nausea and vomiting.   Genitourinary: Negative for dysuria, frequency and urgency.   Musculoskeletal:  + generalized weakness. Negative for back pain and myalgias.   Skin: Negative for itching and rash.   Neurological: Negative for dizziness, speech change, and headaches.   Psychiatric/Behavioral: Negative for depression. The patient is not nervous/anxious.      PEx  /60, HR 96, O2 sats 97% on room air    Blood sugar ranging      Constitutional: Patient appears well-developed and in no distress   Head:  Normocephalic and atraumatic.   Eyes: Pupils are equal, round, and reactive to light.   Neck: Normal range of motion. Neck supple.   Cardiovascular: Normal rate, regular rhythm and normal heart sounds.    Pulmonary/Chest: Effort normal and breath sounds are clear  Abdominal: Soft. Bowel sounds are normal, +constipation.   Musculoskeletal: Normal range of motion.   Neurological: Alert and oriented to person, place, and time.   Psychiatric: Normal mood and affect. Behavior is normal.   Skin: Skin is warm and dry. Full skin assessment completed during visit, no concerns noted      Assessment and Plan:    Recent history of Frequent falls R/T bilateral knee pain, ongoing  Remote history of osteoarthritis, chronic, ongoing  Vitamin-D deficiency, chronic, stable  -s/p history of bilateral knee replacements  Debility r/t knee pain causing frequent falls at home, ongoing  -most recent vitamin-D level 46.6 on admit to facility  - XR knees bilaterally negative> started on Voltaren gel with improvement in pain  - Continue with PT/OT for gait training and strengthening and restoration of ADL's   - Encourage mobility, OOB in chair, and early ambulation as appropriate  - Fall precautions   - Monitor for bowel and bladder dysfunction  - Monitor for and prevent skin breakdown and pressure ulcers  - continue ergocalciferol Q Monday/Wednesday for vitamin-D deficiency  - continue Voltaren gel p.r.n. bilateral knees  - continue no added salt/no concentrated sugar regular diet with Glucerna t.i.d.  - patient needs to follow up with Orthopedics    Constipation, new  -3/9 initiate Dulcolax 5 mg p.o. x1 dose, initiate senna-Colace p.o. b.i.d., initiate MiraLax 17 g p.o. q.d. p.r.n. constipation     Controlled type 2 DM without insulin dependence with peripheral circulatory disorder, chronic, stable  Chronic renal disease, stage 4, severely decreased glomerular filtration rate (GFR) between 15-29 mL/min, chronic, stable  -most recent  hemoglobin A1c 5.9 in 03/2020  -most recent CR 1.39 on 03/6/2020- admit to facility  - low dose SSI, ACHS ,Accu-Chek  - cardiac diabetic diet    HTN (hypertension) with HLD, chronic, stable  Remote history of coronary artery disease, chronic, stable  - continue carvedilol, ASA, Plavix, rosuvastatin, nitro p.r.n., KCl 10 mEq p.o. Q.d.    Iron deficiency anemia due to chronic blood loss, chronic, stable  GERD, chronic, stable  B12 deficiency, chronic, stable  -s/p recent EGD in 01/2020  -most recent H&H 11/33 on 03/2020-admit to facility  -most recent B12 level 983 on admit to facility  -patient followed by Heme-Onc as outpatient  -continue ferrous sulfate q.d., Prilosec, continue B12 injections q.week    Gout, chronic, stable  -continue allopurinol    Allergic rhinitis, chronic, stable  -continue Allegra q.d.    Seizure disorder, chronic, stable  -continue Keppra    Remote history of breast CA, chronic  -followed by Heme-Onc as outpatient  -continue Arimidex        Future Appointments   Date Time Provider Department Center   3/9/2020  1:30 PM Jaycee Puente MD [facility] CARDIO [place]   3/9/2020  2:30 PM LAB, [place] METH LAB [place]   4/8/2020 11:45 AM Bismark Guevara MD [facility] HEM ONC [facility]   5/4/2020 11:00 AM Ethel Berger MD [facility] IM [address] PCW   5/13/2020  9:50 AM LAB, HEMONC CANCER BLDG [facility] LAB HO [facility]         I certify that SNF services are required to be given on an inpatient basis because Renata Bergman needs for skilled nursing care and/or skilled rehabilitation are required on a daily basis and such services can only practically be provided in a skilled nursing facility setting and are for an ongoing condition for which she received inpatient care in the hospital.     Total time of the visit 112 minutes  Non physical exam/ non charting time:  81 minutes   Start Time:  8:00 a.m.  Stop Time:  9:52 a.m.  Description of non physical exam/non charting time: counseling patient on  clinical conditions and therapies provided regarding pain control, therapy plan of care, management of chronic conditions.  Extensive chart review completed including all consultation notes.  All pertinent laboratory and radiographical images reviewed.        Brittany Coulter NP          Patient note was created using MModal Dictation.  Any errors in syntax or even information may not have been identified and edited on initial review prior to signing this note.

## 2020-03-07 ENCOUNTER — PATIENT OUTREACH (OUTPATIENT)
Dept: ADMINISTRATIVE | Facility: OTHER | Age: 80
End: 2020-03-07

## 2020-03-09 ENCOUNTER — DOCUMENTATION ONLY (OUTPATIENT)
Dept: HEPATOLOGY | Facility: HOSPITAL | Age: 80
End: 2020-03-09

## 2020-03-09 ENCOUNTER — TELEPHONE (OUTPATIENT)
Dept: CARDIOLOGY | Facility: CLINIC | Age: 80
End: 2020-03-09

## 2020-03-16 ENCOUNTER — DOCUMENTATION ONLY (OUTPATIENT)
Dept: HEPATOLOGY | Facility: HOSPITAL | Age: 80
End: 2020-03-16

## 2020-03-16 NOTE — PROGRESS NOTES
[facility]                                 Skilled Nursing Facility                   Progress Note     Admit Date: 3/5/20  [facility]OOKE   Principal Problem:  Debility R/T frequent falls due to bilateral knee pain  HPI obtained from patient interview and chart review     Visit Date: 3/16/2020    Chief Complaint: BP/HR monitoring, PT/OT progression, re evaluation of constipation, keppra review    HPI:   Ms. Bergman is a 79 year old female with a PMHx of HTN, HLD, DM2, CKD (baseline CR 1.7), CAD on plavix.  She was admitted after multiple falls at home over 3 day period related to bilateral knee pain. Also diagnosis symptomatic anemia for which she had an EGD about a month prior to admit which showed a few bleeding angioectasias in the stomach.  Patient was started on Voltaren gel for knee pain, documented improvement.  She was also educated on importance of weight loss to decrease knee issues.  It was recommended for patient to transfer to skilled nursing for therapy due to debility, also follow up with orthopedic surgeon in about 4 weeks.  Patient reporting constipation during initial visit, she is not currently on any medications for BM regulation.    Patient will be treated at [facility] with PT and OT to improve functional status and ability to perform ADLs.     Interval History: /71, HR 79, O2 sats 97% on RA, stable on current regimen. BS ranging , continue to monitor. Keppra level reviewed today, resulted as 25.8, stable on current dose. Patient had complaints of constipation during initial visit on 3/9, improved today with medication addition for BM regulation. No complaints from patient. No concerns per nursing. Patient medically stable. Progressing slowly with PT/OT. Will continue to monitor and treat all chronic conditions.     Past Medical History: Patient has a past medical history of Breast cancer (2016), Cataract,  Coronary artery disease (9/4/2012), Diabetes mellitus due to abnormal insulin (9/4/2012), Diabetes mellitus type II, Genetic testing, GERD (gastroesophageal reflux disease) (9/4/2012), Gout, arthritis (9/4/2012), Hyperlipidemia (9/4/2012), Hypertension, Obesity, Primary osteoarthritis of both knees (9/4/2012), Renal manifestation of secondary diabetes mellitus, Seizure, and Type 2 diabetes with peripheral circulatory disorder, controlled.    Past Surgical History: Patient has a past surgical history that includes Joint replacement; Knee surgery; Total abdominal hysterectomy w/ bilateral salpingoophorectomy; Coronary angioplasty; Hysterectomy; Breast lumpectomy (Left, 08/01/2016); Esophagogastroduodenoscopy (N/A, 10/8/2018); Colonoscopy (N/A, 10/8/2018); Breast biopsy; and Esophagogastroduodenoscopy (N/A, 1/31/2020).    Social History: Patient reports that she quit smoking about 57 years ago. Her smoking use included cigarettes. She smoked 1.00 pack per day. She has never used smokeless tobacco. She reports that she drinks alcohol. She reports that she does not use drugs.    Family History: family history includes Breast cancer in her maternal aunt and maternal aunt; Breast cancer (age of onset: 45) in her daughter and mother; Cancer (age of onset: 55) in her mother; Diabetes in her mother and son; Heart disease in her father; Hyperlipidemia in her maternal aunt; Hypertension in her maternal aunt, maternal uncle, and mother; Ovarian cancer in her mother; Stroke in her son.    Allergies: Patient is allergic to lisinopril.    ROS  Constitutional: Negative for fever or fatigue.   Eyes: Negative for blurred vision, double vision and discharge.   Respiratory: Negative for cough, shortness of breath and wheezing.    Cardiovascular: Negative for chest pain, palpitations, and leg swelling.   Gastrointestinal: Negative for abdominal pain, + constipation, improving, -diarrhea, nausea and vomiting.   Genitourinary: Negative for  dysuria, frequency and urgency.   Musculoskeletal:  + generalized weakness. Negative for back pain and myalgias.   Skin: Negative for itching and rash.   Neurological: Negative for dizziness, speech change, and headaches.   Psychiatric/Behavioral: Negative for depression. The patient is not nervous/anxious.      PEx     Constitutional: Patient appears well-developed and in no distress   Head: Normocephalic and atraumatic.   Eyes: Pupils are equal, round, and reactive to light.   Neck: Normal range of motion. Neck supple.   Cardiovascular: Normal rate, regular rhythm and normal heart sounds.    Pulmonary/Chest: Effort normal and breath sounds are clear  Abdominal: Soft. Bowel sounds are normal, +constipation, improving  Musculoskeletal: Normal range of motion.   Neurological: Alert and oriented to person, place, and time.   Psychiatric: Normal mood and affect. Behavior is normal.   Skin: Skin is warm and dry.       Assessment and Plan:    Recent history of Frequent falls R/T bilateral knee pain, ongoing  Remote history of osteoarthritis, chronic, ongoing  Vitamin-D deficiency, chronic, stable  -s/p history of bilateral knee replacements  Debility r/t knee pain causing frequent falls at home, ongoing  -most recent vitamin-D level 46.6 on admit to facility  - XR knees bilaterally negative> started on Voltaren gel with improvement in pain  - Continue with PT/OT for gait training and strengthening and restoration of ADL's   - Encourage mobility, OOB in chair, and early ambulation as appropriate  - Fall precautions   - Monitor for bowel and bladder dysfunction  - Monitor for and prevent skin breakdown and pressure ulcers  - continue ergocalciferol Q Monday/Wednesday for vitamin-D deficiency  - continue Voltaren gel p.r.n. bilateral knees  - continue no added salt/no concentrated sugar regular diet with Glucerna t.i.d.  - patient needs to follow up with Orthopedics  -3/16 continue current regimen, PT/OT    Constipation,  improved  -3/9 initiate Dulcolax 5 mg p.o. x1 dose, initiate senna-Colace p.o. b.i.d., initiate MiraLax 17 g p.o. q.d. p.r.n. Constipation  -3/16 continue current regimen     Controlled type 2 DM without insulin dependence with peripheral circulatory disorder, chronic, stable  Chronic renal disease, stage 4, severely decreased glomerular filtration rate (GFR) between 15-29 mL/min, chronic, stable  -most recent hemoglobin A1c 5.9 in 03/2020  -most recent CR 1.39 on 03/6/2020- admit to facility  - low dose SSI, ACHS ,Accu-Chek  - cardiac diabetic diet  -3/16 continue current regimen    HTN (hypertension) with HLD, chronic, stable  Remote history of coronary artery disease, chronic, stable  -3/16 continue carvedilol, ASA, Plavix, rosuvastatin, nitro p.r.n., KCl 10 mEq p.o. Q.d.    Seizure disorder, chronic, stable  -continue Keppra  -3/16 Keppra level 25.8, stable on current dose    Continued     Iron deficiency anemia due to chronic blood loss, chronic, stable  GERD, chronic, stable  B12 deficiency, chronic, stable  -s/p recent EGD in 01/2020  -most recent H&H 11/33 on 03/2020-admit to facility  -most recent B12 level 983 on admit to facility  -patient followed by Heme-Onc as outpatient  -continue ferrous sulfate q.d., Prilosec, continue B12 injections q.week    Gout, chronic, stable  -continue allopurinol    Allergic rhinitis, chronic, stable  -continue Allegra q.d.    Remote history of breast CA, chronic  -followed by Heme-Onc as outpatient  -continue Arimidex        Future Appointments   Date Time Provider Department Center   4/8/2020 11:45 AM Bismark Guevara MD [facility] HEM ONC [facility]   5/4/2020 11:00 AM Ethel Berger MD [facility] IM [facility]   5/13/2020  9:50 AM LAB, HEMONC CANCER BLDG [facility] LAB HO [facility] Coulter NP          Patient note was created using MModal Dictation.  Any errors in syntax or even information may not have been identified and edited on initial review prior to  signing this note.

## 2020-03-21 PROCEDURE — G0180 MD CERTIFICATION HHA PATIENT: HCPCS | Mod: ,,, | Performed by: INTERNAL MEDICINE

## 2020-03-21 PROCEDURE — G0180 PR HOME HEALTH MD CERTIFICATION: ICD-10-PCS | Mod: ,,, | Performed by: INTERNAL MEDICINE

## 2020-03-23 ENCOUNTER — DOCUMENTATION ONLY (OUTPATIENT)
Dept: HEPATOLOGY | Facility: HOSPITAL | Age: 80
End: 2020-03-23

## 2020-03-27 ENCOUNTER — TELEPHONE (OUTPATIENT)
Dept: HEMATOLOGY/ONCOLOGY | Facility: CLINIC | Age: 80
End: 2020-03-27

## 2020-03-27 NOTE — TELEPHONE ENCOUNTER
Called pt to reschedule appt w/ Dr. Guevara due to COVID-19 per DR. Guevara's request. Pt can be rescheduled in 2 months. Left detailed message for pt to return call to get scheduled.

## 2020-04-06 ENCOUNTER — EXTERNAL HOME HEALTH (OUTPATIENT)
Dept: HOME HEALTH SERVICES | Facility: HOSPITAL | Age: 80
End: 2020-04-06
Payer: MEDICARE

## 2020-04-06 RX ORDER — ERGOCALCIFEROL 1.25 MG/1
50000 CAPSULE ORAL
Qty: 24 CAPSULE | Refills: 1 | Status: SHIPPED | OUTPATIENT
Start: 2020-04-06 | End: 2020-10-27 | Stop reason: SDUPTHER

## 2020-04-13 DIAGNOSIS — G40.409 TONIC-CLONIC EPILEPTIC SEIZURES: ICD-10-CM

## 2020-04-13 RX ORDER — CARVEDILOL 3.12 MG/1
3.12 TABLET ORAL 2 TIMES DAILY WITH MEALS
Qty: 180 TABLET | Refills: 4 | Status: SHIPPED | OUTPATIENT
Start: 2020-04-13 | End: 2021-07-08

## 2020-04-13 RX ORDER — CARVEDILOL 25 MG/1
TABLET ORAL
Qty: 180 TABLET | Refills: 4 | OUTPATIENT
Start: 2020-04-13

## 2020-04-13 RX ORDER — LEVETIRACETAM 500 MG/1
TABLET, EXTENDED RELEASE ORAL
Qty: 180 TABLET | Refills: 4 | Status: ON HOLD | OUTPATIENT
Start: 2020-04-13 | End: 2021-04-21

## 2020-04-13 NOTE — PROGRESS NOTES
Refill Authorization Note     is requesting a refill authorization.    Brief assessment and rationale for refill: REVIEW: recent ED visit/med no longer on med list/          Medication Therapy Plan: Per EPIC data 25mg d/c 12/27/19-[place], 3.125 mg on med list;  2/26/20(HTN- get back on coreg but at low dose - 3.125 mg twice daily-Ab); Recent ED visit(2/27/20-frequent falls), review    Medication reconciliation completed: No   Pharmacist Review Requested: Yes                     Comments:   Refill Center Care Gap Closure protocols temporarily suspended.   Requested Prescriptions   Pending Prescriptions Disp Refills    carvediloL (COREG) 25 MG tablet [Pharmacy Med Name: CARVEDILOL 25MG TABLETS] 180 tablet 4     Sig: TAKE 1 TABLET BY MOUTH TWICE DAILY       Cardiovascular:  Beta Blockers Passed - 4/8/2020  5:10 AM        Passed - Patient is at least 18 years old        Passed - Last BP in normal range within 360 days.     BP Readings from Last 3 Encounters:   03/04/20 134/61   02/06/20 (!) 142/86   01/31/20 (!) 179/80              Passed - Last Heart Rate in normal range within 360 days.     Pulse Readings from Last 3 Encounters:   03/04/20 91   02/06/20 82   01/31/20 77             Passed - Office visit in past 12 months or future 90 days.     Recent Outpatient Visits            2 months ago Anemia, unspecified type    [facility] Ethel Berger MD    3 months ago Anemia, unspecified type    [facility] Ethel Berger MD    4 months ago Malignant neoplasm of upper-outer quadrant of left breast in female, estrogen receptor positive    [place] - Hematology Oncology Bismark Guevara MD    7 months ago Coronary artery disease due to lipid rich plaque    [place] - Cardiology Jaycee Puente MD    8 months ago Diabetes mellitus due to abnormal insulin    [facility] - Internal Medicine Ethel Berger MD          Future Appointments              In 3 weeks Ethel  MD [facility] - Internal Medicine, [facility]    In 1 month LAB, HEMONC CANCER BLDG [facility], [facility] Caputo    In 2 months MD [facility] - Hematology Oncology, [facility]                Powered by [employer] - 4/8/2020  5:11 AM        The requested medication is not on the active medication list.         Appointments  past 12m or future 3m with PCP    Date Provider   Last Visit   2/6/2020 Ethel Berger MD   Next Visit   5/4/2020 Ethel Berger MD   .  ED visits in past 90 days: 0       Note composed:12:45 PM 04/13/2020

## 2020-04-14 NOTE — PROGRESS NOTES
Refill Routing Note     Medication(s) are appropriate for refill:    Medication Outside of Protocol    Appointments  past 15m or future 3m with PCP    Date Provider   Last Visit   2/6/2020 Ethel Berger MD   Next Visit   5/4/2020 Ethel Berger MD       Automatic Epic Protocol Generated Data:    Requested Prescriptions   Pending Prescriptions Disp Refills    levetiracetam XR (KEPPRA XR) 500 mg Tb24 24 hr tablet [Pharmacy Med Name: LEVETIRACETAM ER 500MG TABLETS] 60 tablet      Sig: TAKE 2 TABLETS(1000 MG) BY MOUTH EVERY DAY       Anticonvulsants Protocol Passed - 4/13/2020  7:49 PM        Passed - Visit with Authorizing provider in past 9 months or upcoming 90 days        Passed - No active pregnancy on record           Note created:8:59 PM 04/13/2020

## 2020-04-14 NOTE — TELEPHONE ENCOUNTER
I have reviewed the assessment below. Pt restarted on carvedilol at 3.125 BID by PCP 2/6/20. Quick dc 25 mg refill request.      Last Prescribed Info:   Outpatient Medication Detail      Disp Refills Start End    carvediloL (COREG) 3.125 MG tablet 60 tablet 11 2/6/2020 2/5/2021    Sig - Route: Take 1 tablet (3.125 mg total) by mouth 2 (two) times daily with meals. - Oral    Sent to pharmacy as: carvediloL (COREG) 3.125 MG tablet    E-Prescribing Status: Receipt confirmed by pharmacy (2/6/2020  1:34 PM CST)    Ordering Encounter Report     Associated Reports   View Encounter

## 2020-04-28 RX ORDER — OMEPRAZOLE 20 MG/1
CAPSULE, DELAYED RELEASE ORAL
Qty: 180 CAPSULE | Refills: 4 | Status: SHIPPED | OUTPATIENT
Start: 2020-04-28 | End: 2021-06-07

## 2020-04-28 NOTE — PROGRESS NOTES
Refill Routing Note   Medication(s) are not appropriate for processing by [facility]:       Patient has been seen in the Emergency Room/Department since the last PCP visit     Medication-related problems identified: Drug-drug interaction    Medication Therapy Plan: Pt recently visited ED 2/20 for frequent falls, was admitted and discharged to SNF subsequently; Also, omeprazole use may reduce effectiveness of Plavix; Pt on both since 2016; No change to therapy recommended at this time; Defer to you in setting of recent hospitalization    Medication reconciliation completed: No      Automatic Epic Protocol Generated Data:    Requested Prescriptions   Refused Prescriptions Disp Refills    omeprazole (PRILOSEC) 20 MG capsule [Pharmacy Med Name: OMEPRAZOLE 20MG CAPSULES] 180 capsule 0     Sig: TAKE 2 CAPSULES BY MOUTH EVERY DAY       Gastroenterology: Proton Pump Inhibitors Failed - 4/23/2020  5:13 AM        Failed - Plavix is not on active medication list        Passed - Patient is at least 18 years old        Passed - Osteoporosis is not on problem list        Passed - Office visit in past 6 months or future 90 days.     Recent Outpatient Visits            2 months ago Anemia, unspecified type    [facility] Ethel Berger MD    3 months ago Anemia, unspecified type    [facility] - Internal Medicine Ethel Berger MD    4 months ago Malignant neoplasm of upper-outer quadrant of left breast in female, estrogen receptor positive    [place] - Hematology Oncology Bismark Guevara MD    7 months ago Coronary artery disease due to lipid rich plaque    [place] - Cardiology Jaycee Puente MD    8 months ago Diabetes mellitus due to abnormal insulin    [facility] Medicine Ethel Berger MD          Future Appointments              In 6 days MD [facility] - Internal Medicine, [facility]    In 2 weeks LAB, HEMONC CANCER BLDG [facility]    In 2 months MD [facility] - Hematology Oncology, [facility]                Passed - GERD is on problem list           Appointments  past 12m or future 3m with PCP    Date Provider   Last Visit   2/6/2020 Ethel Berger MD   Next Visit   5/4/2020 Ethel Berger MD   ED visits in past 90 days: 0     Note composed:1:57 PM 04/28/2020

## 2020-05-04 ENCOUNTER — OFFICE VISIT (OUTPATIENT)
Dept: INTERNAL MEDICINE | Facility: CLINIC | Age: 80
End: 2020-05-04
Payer: MEDICARE

## 2020-05-04 ENCOUNTER — LAB VISIT (OUTPATIENT)
Dept: LAB | Facility: HOSPITAL | Age: 80
End: 2020-05-04
Attending: INTERNAL MEDICINE
Payer: MEDICARE

## 2020-05-04 VITALS
DIASTOLIC BLOOD PRESSURE: 86 MMHG | HEIGHT: 63 IN | WEIGHT: 204.13 LBS | HEART RATE: 67 BPM | OXYGEN SATURATION: 99 % | SYSTOLIC BLOOD PRESSURE: 138 MMHG | BODY MASS INDEX: 36.17 KG/M2

## 2020-05-04 DIAGNOSIS — C50.412 MALIGNANT NEOPLASM OF UPPER-OUTER QUADRANT OF LEFT BREAST IN FEMALE, ESTROGEN RECEPTOR POSITIVE: ICD-10-CM

## 2020-05-04 DIAGNOSIS — I25.83 CORONARY ARTERY DISEASE DUE TO LIPID RICH PLAQUE: Chronic | ICD-10-CM

## 2020-05-04 DIAGNOSIS — E11.51 CONTROLLED TYPE 2 DM WITH PERIPHERAL CIRCULATORY DISORDER: Chronic | ICD-10-CM

## 2020-05-04 DIAGNOSIS — I25.10 CORONARY ARTERY DISEASE DUE TO LIPID RICH PLAQUE: Chronic | ICD-10-CM

## 2020-05-04 DIAGNOSIS — K21.9 GASTROESOPHAGEAL REFLUX DISEASE, ESOPHAGITIS PRESENCE NOT SPECIFIED: Primary | ICD-10-CM

## 2020-05-04 DIAGNOSIS — G40.409 TONIC-CLONIC EPILEPTIC SEIZURES: ICD-10-CM

## 2020-05-04 DIAGNOSIS — I10 HTN (HYPERTENSION), BENIGN: Chronic | ICD-10-CM

## 2020-05-04 DIAGNOSIS — E78.49 OTHER HYPERLIPIDEMIA: Chronic | ICD-10-CM

## 2020-05-04 DIAGNOSIS — N18.30 CHRONIC RENAL DISEASE, STAGE 3, MODERATELY DECREASED GLOMERULAR FILTRATION RATE BETWEEN 30-59 ML/MIN/1.73 SQUARE METER: Chronic | ICD-10-CM

## 2020-05-04 DIAGNOSIS — D50.0 IRON DEFICIENCY ANEMIA DUE TO CHRONIC BLOOD LOSS: ICD-10-CM

## 2020-05-04 DIAGNOSIS — Z17.0 MALIGNANT NEOPLASM OF UPPER-OUTER QUADRANT OF LEFT BREAST IN FEMALE, ESTROGEN RECEPTOR POSITIVE: ICD-10-CM

## 2020-05-04 LAB
ALBUMIN SERPL BCP-MCNC: 3.9 G/DL (ref 3.5–5.2)
ALP SERPL-CCNC: 86 U/L (ref 55–135)
ALT SERPL W/O P-5'-P-CCNC: 9 U/L (ref 10–44)
ANION GAP SERPL CALC-SCNC: 10 MMOL/L (ref 8–16)
AST SERPL-CCNC: 15 U/L (ref 10–40)
BASOPHILS # BLD AUTO: 0.03 K/UL (ref 0–0.2)
BASOPHILS NFR BLD: 0.6 % (ref 0–1.9)
BILIRUB SERPL-MCNC: 0.6 MG/DL (ref 0.1–1)
BUN SERPL-MCNC: 16 MG/DL (ref 8–23)
CALCIUM SERPL-MCNC: 11 MG/DL (ref 8.7–10.5)
CHLORIDE SERPL-SCNC: 108 MMOL/L (ref 95–110)
CHOLEST SERPL-MCNC: 172 MG/DL (ref 120–199)
CHOLEST/HDLC SERPL: 3 {RATIO} (ref 2–5)
CO2 SERPL-SCNC: 26 MMOL/L (ref 23–29)
CREAT SERPL-MCNC: 1.5 MG/DL (ref 0.5–1.4)
DIFFERENTIAL METHOD: ABNORMAL
EOSINOPHIL # BLD AUTO: 0.3 K/UL (ref 0–0.5)
EOSINOPHIL NFR BLD: 5 % (ref 0–8)
ERYTHROCYTE [DISTWIDTH] IN BLOOD BY AUTOMATED COUNT: 13 % (ref 11.5–14.5)
EST. GFR  (AFRICAN AMERICAN): 38.5 ML/MIN/1.73 M^2
EST. GFR  (NON AFRICAN AMERICAN): 33.4 ML/MIN/1.73 M^2
ESTIMATED AVG GLUCOSE: 120 MG/DL (ref 68–131)
FERRITIN SERPL-MCNC: 300 NG/ML (ref 20–300)
GLUCOSE SERPL-MCNC: 99 MG/DL (ref 70–110)
HBA1C MFR BLD HPLC: 5.8 % (ref 4–5.6)
HCT VFR BLD AUTO: 43.5 % (ref 37–48.5)
HDLC SERPL-MCNC: 57 MG/DL (ref 40–75)
HDLC SERPL: 33.1 % (ref 20–50)
HGB BLD-MCNC: 13.5 G/DL (ref 12–16)
IMM GRANULOCYTES # BLD AUTO: 0.01 K/UL (ref 0–0.04)
IMM GRANULOCYTES NFR BLD AUTO: 0.2 % (ref 0–0.5)
LDLC SERPL CALC-MCNC: 95 MG/DL (ref 63–159)
LYMPHOCYTES # BLD AUTO: 1.7 K/UL (ref 1–4.8)
LYMPHOCYTES NFR BLD: 34.6 % (ref 18–48)
MCH RBC QN AUTO: 31 PG (ref 27–31)
MCHC RBC AUTO-ENTMCNC: 31 G/DL (ref 32–36)
MCV RBC AUTO: 100 FL (ref 82–98)
MONOCYTES # BLD AUTO: 0.5 K/UL (ref 0.3–1)
MONOCYTES NFR BLD: 10.7 % (ref 4–15)
NEUTROPHILS # BLD AUTO: 2.4 K/UL (ref 1.8–7.7)
NEUTROPHILS NFR BLD: 48.9 % (ref 38–73)
NONHDLC SERPL-MCNC: 115 MG/DL
NRBC BLD-RTO: 0 /100 WBC
PLATELET # BLD AUTO: 248 K/UL (ref 150–350)
PMV BLD AUTO: 10.8 FL (ref 9.2–12.9)
POTASSIUM SERPL-SCNC: 4.1 MMOL/L (ref 3.5–5.1)
PROT SERPL-MCNC: 7.6 G/DL (ref 6–8.4)
RBC # BLD AUTO: 4.35 M/UL (ref 4–5.4)
SODIUM SERPL-SCNC: 144 MMOL/L (ref 136–145)
TRIGL SERPL-MCNC: 100 MG/DL (ref 30–150)
TSH SERPL DL<=0.005 MIU/L-ACNC: 2.13 UIU/ML (ref 0.4–4)
WBC # BLD AUTO: 4.97 K/UL (ref 3.9–12.7)

## 2020-05-04 PROCEDURE — 1101F PT FALLS ASSESS-DOCD LE1/YR: CPT | Mod: CPTII,S$GLB,, | Performed by: INTERNAL MEDICINE

## 2020-05-04 PROCEDURE — 3075F SYST BP GE 130 - 139MM HG: CPT | Mod: CPTII,S$GLB,, | Performed by: INTERNAL MEDICINE

## 2020-05-04 PROCEDURE — 1159F MED LIST DOCD IN RCRD: CPT | Mod: S$GLB,,, | Performed by: INTERNAL MEDICINE

## 2020-05-04 PROCEDURE — 1126F AMNT PAIN NOTED NONE PRSNT: CPT | Mod: S$GLB,,, | Performed by: INTERNAL MEDICINE

## 2020-05-04 PROCEDURE — 36415 COLL VENOUS BLD VENIPUNCTURE: CPT

## 2020-05-04 PROCEDURE — 3079F DIAST BP 80-89 MM HG: CPT | Mod: CPTII,S$GLB,, | Performed by: INTERNAL MEDICINE

## 2020-05-04 PROCEDURE — 1126F PR PAIN SEVERITY QUANTIFIED, NO PAIN PRESENT: ICD-10-PCS | Mod: S$GLB,,, | Performed by: INTERNAL MEDICINE

## 2020-05-04 PROCEDURE — 85025 COMPLETE CBC W/AUTO DIFF WBC: CPT

## 2020-05-04 PROCEDURE — 3075F PR MOST RECENT SYSTOLIC BLOOD PRESS GE 130-139MM HG: ICD-10-PCS | Mod: CPTII,S$GLB,, | Performed by: INTERNAL MEDICINE

## 2020-05-04 PROCEDURE — 99499 UNLISTED E&M SERVICE: CPT | Mod: S$GLB,,, | Performed by: INTERNAL MEDICINE

## 2020-05-04 PROCEDURE — 80053 COMPREHEN METABOLIC PANEL: CPT

## 2020-05-04 PROCEDURE — 99214 OFFICE O/P EST MOD 30 MIN: CPT | Mod: S$GLB,,, | Performed by: INTERNAL MEDICINE

## 2020-05-04 PROCEDURE — 82728 ASSAY OF FERRITIN: CPT

## 2020-05-04 PROCEDURE — 1101F PR PT FALLS ASSESS DOC 0-1 FALLS W/OUT INJ PAST YR: ICD-10-PCS | Mod: CPTII,S$GLB,, | Performed by: INTERNAL MEDICINE

## 2020-05-04 PROCEDURE — 3079F PR MOST RECENT DIASTOLIC BLOOD PRESSURE 80-89 MM HG: ICD-10-PCS | Mod: CPTII,S$GLB,, | Performed by: INTERNAL MEDICINE

## 2020-05-04 PROCEDURE — 99214 PR OFFICE/OUTPT VISIT, EST, LEVL IV, 30-39 MIN: ICD-10-PCS | Mod: S$GLB,,, | Performed by: INTERNAL MEDICINE

## 2020-05-04 PROCEDURE — 99999 PR PBB SHADOW E&M-EST. PATIENT-LVL IV: ICD-10-PCS | Mod: PBBFAC,,, | Performed by: INTERNAL MEDICINE

## 2020-05-04 PROCEDURE — 99999 PR PBB SHADOW E&M-EST. PATIENT-LVL IV: CPT | Mod: PBBFAC,,, | Performed by: INTERNAL MEDICINE

## 2020-05-04 PROCEDURE — 99499 RISK ADDL DX/OHS AUDIT: ICD-10-PCS | Mod: S$GLB,,, | Performed by: INTERNAL MEDICINE

## 2020-05-04 PROCEDURE — 83036 HEMOGLOBIN GLYCOSYLATED A1C: CPT

## 2020-05-04 PROCEDURE — 80061 LIPID PANEL: CPT

## 2020-05-04 PROCEDURE — 84443 ASSAY THYROID STIM HORMONE: CPT

## 2020-05-04 PROCEDURE — 1159F PR MEDICATION LIST DOCUMENTED IN MEDICAL RECORD: ICD-10-PCS | Mod: S$GLB,,, | Performed by: INTERNAL MEDICINE

## 2020-05-04 NOTE — PROGRESS NOTES
CHIEF COMPLAINT: follow up NSTEMI, breast cancer, diabetes, hypertension, hyperlipidemia.     HISTORY OF PRESENT ILLNESS: 79-year-old woman who present for follow up of above.    She was hosptialized from 2/27/20 to 3/4/20 due to 5-7 falls over 3 days. She had severe knee pain upon standing.  She was given diclofenac gel which helped her knees.  She was dicharged to skilled nursing at [facility] for 8 days.  She got stronger at skilled nursing. She is now at home.  Her knees are not bothering her now. No falls.      She has had 2 infusinos of iron (1/21/20 and 1/28/20) and EGD on 1/31/20 - few bleeding angioectasias in the stomach.  Treated with argon plasma coagulation (APC).  TWO gastric polyps. Resected and retrieved. NO nausea, vomiting, constipation, diarrhea. She is taking ferrous sulfate 325 mg once daily.  She took vitamin B12 injection several times.     Energy level is better since she has gotten iron infusions.        No chest pain since back on aspirin and plavix. She saw Dr Puente on 7/23/18.  Cardica PET stress was normal on 7/25/18.     She is taking Keppra  mg 2 in the afternoon.  Gait is better with taking the Keppra in the late afternoon.  No falls.  No seizures.        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 She  takes aspirin 81 mg daily and plavix 75 mg daily and coreg 3.125 mg twice daily.   No chest pain or shortness of breath.  She is currently off the following blood pressure medications:  Coreg 25 mg twice daily, Norvasc 5 mg daily and chlorthaladone 25 mg 1/2 tablet every other daily for her hypertension.   She completed cardiac rehab.     She has completed 5 cycles of CMF for her breast cancer. She completed radiation the end of April 6, 2017.  She took Femara  4/2017 - 4/2018. She had irritation of the lips on Femara. She is taking anastrozole 1 mg daily. Saw Stanley on 8/5/19.     She graduated from  the healthy back program. This program is on hold due to breast cancer. Back is doing well. She is doing stretches every other day. She is off tylenol in the evening.  She has occasional right sided back pain.      She continues to take allopurinol 300 mg daily for her gout. She has not had any gouty flares. She has occasional left elbow pain.      Her blood sugars have been normal - . She is off metformin 850 mg twice daily. She checks blood sugars once daily.  She denies any polydipsia or polyuria. She continues to take aspirin for her coronary artery disease.      Sinuses have been doing well. She has not had to take Allegra lately. She denies any nausea, vomiting, diarrhea. She has some constipation - once a week.  She took a dulcolax.      Reflux is well controlled on omeprazole 20 mg two tablets daily.      She continues to take Crestor 40 mg daily for her hyperlipidemia. She denies any joint pain or muscle pain from the Crestor.      Knee is doing well. She is not having to take Tramadol     She is taking vitamin D 50,000 units twice weekly for vitamin D deficiency     Her daughter who is 46 has stage 2 breast cancer. She has had a lumpectomy and chemo and radiation. Her daughter is doing well. She has finished her treatment. Her daughter might be BRCA positive. Mrs Bergman feels that she is coping well with her diagnosis of breast cancer. No anxiety, depression or insomnia.      PAST MEDICAL HISTORY:   1. Hypertension.   2. Diabetes mellitus   3. Hyperlipidemia.   4. Reflux.   5. Gout.   6. Coronary artery disease, status post MI in 2004 with stenting at that time, and then a right coronary artery stent placed in 2006. Had a negative stress test March 2008. [facility] 8/2012 - patent stents and non obstructive cad  7. Osteoarthritis of the right knee. S/P right knee replacement   8. Status post left knee replacement.   9. Status post LISA/BSO secondary to menstrual disorder or polyps after tubal ligation.  "  10. Left breast cancer and BRCA2 positive    MEDICATIONS and ALLERGIES: Updated on epic.       Family History  Mother had breast cancer in her early 50's then had colon cancer in her 60's. She  of uterine cancer  2 Maternal aunts with breast cancer  Daughter with breast cancer at age 45 - BRCA positive  Father  of a gunshot wound  She is an only child    PHYSICAL EXAMINATION:   /86 (BP Location: Right arm, Patient Position: Sitting, BP Method: Large (Manual))   Pulse 67   Ht 5' 3" (1.6 m)   Wt 92.6 kg (204 lb 2.3 oz)   LMP  (LMP Unknown)   SpO2 99%   BMI 36.16 kg/m²       General: Alert, oriented. No apparent distress. Affect within normal   limits.   Conjunctivae anicteric. Tympanic membranes clear. Oropharynx clear. Irritation of the lips  Neck supple.   Respiratory effort normal. Lungs clear to auscultation.   Heart: Regular rate and rhythm without murmurs, gallops or rubs.   No lower extremity edema.            ASSESESSMENT  1. HTN- on coreg 3.125 mg twice daily.  2. Iron deficiency anemia - s/p IV iron cbc today  3. Angioectasias of duodenal bulb, gastric body, On omeprazole 40 mg daily. S/p EGD 20 with cautery. Will montior blood counts closely.   4.  CAD with NSTEMI 2017- s/p stenting 2017 - On aspirin and plavix. on coreg at 3.215 mg twice daily   5. Left breast cancer with BRCA2 positive -Finished chemo and  radiation. On anastrazole-saw Dr Guevara 19 and JENNIE 19  6. Seizure -stable on Keppra XR   7.  Diabetes - controlled. Off metformin.   9. Gout -controlled on allopurinol    10. Hyperlipidemia - on Crestor 40 mg daily. Controlled   11. Obesity - working at diet, exercise and weight loss.   12. Osteoarthritis of the right knee, status post knee replacement - doing well.  13. GERD - controlled on meds  14. Hypokalemia -  on replacement potassium     Screening - mammogram done . Colonoscopy 12 - normal, and 5/25/15 - 3 small polyps (one adenoma).  Colonoscopy " 10/8/18 - one polyp - due 2023.     Bone density was normal November 2008.    Prevnar 12/15  Tdap 5/16  Influenza 2018     I'll see her back 2-3 months,  sooner if problems arise.

## 2020-05-05 ENCOUNTER — TELEPHONE (OUTPATIENT)
Dept: HEMATOLOGY/ONCOLOGY | Facility: CLINIC | Age: 80
End: 2020-05-05

## 2020-05-05 NOTE — TELEPHONE ENCOUNTER
Call made to patient to discuss her calcium level. No answer. VML informing patient her calcium level was slightly high today and if she is taking any calcium supplements we would like her to stop and we will need to get her labs rechecked in about a week to see how her calcium level is then.     ----- Message from Patrice Aragon MD sent at 5/5/2020 10:45 AM CDT -----  Calcium is 11 mg/dl.  Can you please find out if she is on supplements? If she is, she needs to get off of them, and repeat the CMP in a week

## 2020-06-29 ENCOUNTER — OFFICE VISIT (OUTPATIENT)
Dept: HEMATOLOGY/ONCOLOGY | Facility: CLINIC | Age: 80
End: 2020-06-29
Payer: MEDICARE

## 2020-06-29 VITALS
BODY MASS INDEX: 36.25 KG/M2 | WEIGHT: 204.56 LBS | HEART RATE: 81 BPM | HEIGHT: 63 IN | SYSTOLIC BLOOD PRESSURE: 174 MMHG | TEMPERATURE: 99 F | RESPIRATION RATE: 16 BRPM | DIASTOLIC BLOOD PRESSURE: 84 MMHG | OXYGEN SATURATION: 95 %

## 2020-06-29 DIAGNOSIS — C50.412 MALIGNANT NEOPLASM OF UPPER-OUTER QUADRANT OF LEFT BREAST IN FEMALE, ESTROGEN RECEPTOR POSITIVE: Primary | ICD-10-CM

## 2020-06-29 DIAGNOSIS — Z17.0 MALIGNANT NEOPLASM OF UPPER-OUTER QUADRANT OF LEFT BREAST IN FEMALE, ESTROGEN RECEPTOR POSITIVE: Primary | ICD-10-CM

## 2020-06-29 DIAGNOSIS — Z12.31 OTHER SCREENING MAMMOGRAM: ICD-10-CM

## 2020-06-29 PROCEDURE — 99213 OFFICE O/P EST LOW 20 MIN: CPT | Mod: S$GLB,,, | Performed by: INTERNAL MEDICINE

## 2020-06-29 PROCEDURE — 1101F PT FALLS ASSESS-DOCD LE1/YR: CPT | Mod: CPTII,S$GLB,, | Performed by: INTERNAL MEDICINE

## 2020-06-29 PROCEDURE — 3079F PR MOST RECENT DIASTOLIC BLOOD PRESSURE 80-89 MM HG: ICD-10-PCS | Mod: CPTII,S$GLB,, | Performed by: INTERNAL MEDICINE

## 2020-06-29 PROCEDURE — 1159F MED LIST DOCD IN RCRD: CPT | Mod: S$GLB,,, | Performed by: INTERNAL MEDICINE

## 2020-06-29 PROCEDURE — 99999 PR PBB SHADOW E&M-EST. PATIENT-LVL V: CPT | Mod: PBBFAC,,, | Performed by: INTERNAL MEDICINE

## 2020-06-29 PROCEDURE — 1101F PR PT FALLS ASSESS DOC 0-1 FALLS W/OUT INJ PAST YR: ICD-10-PCS | Mod: CPTII,S$GLB,, | Performed by: INTERNAL MEDICINE

## 2020-06-29 PROCEDURE — 99999 PR PBB SHADOW E&M-EST. PATIENT-LVL V: ICD-10-PCS | Mod: PBBFAC,,, | Performed by: INTERNAL MEDICINE

## 2020-06-29 PROCEDURE — 3077F SYST BP >= 140 MM HG: CPT | Mod: CPTII,S$GLB,, | Performed by: INTERNAL MEDICINE

## 2020-06-29 PROCEDURE — 99213 PR OFFICE/OUTPT VISIT, EST, LEVL III, 20-29 MIN: ICD-10-PCS | Mod: S$GLB,,, | Performed by: INTERNAL MEDICINE

## 2020-06-29 PROCEDURE — 1126F PR PAIN SEVERITY QUANTIFIED, NO PAIN PRESENT: ICD-10-PCS | Mod: S$GLB,,, | Performed by: INTERNAL MEDICINE

## 2020-06-29 PROCEDURE — 3079F DIAST BP 80-89 MM HG: CPT | Mod: CPTII,S$GLB,, | Performed by: INTERNAL MEDICINE

## 2020-06-29 PROCEDURE — 1126F AMNT PAIN NOTED NONE PRSNT: CPT | Mod: S$GLB,,, | Performed by: INTERNAL MEDICINE

## 2020-06-29 PROCEDURE — 3077F PR MOST RECENT SYSTOLIC BLOOD PRESSURE >= 140 MM HG: ICD-10-PCS | Mod: CPTII,S$GLB,, | Performed by: INTERNAL MEDICINE

## 2020-06-29 PROCEDURE — 1159F PR MEDICATION LIST DOCUMENTED IN MEDICAL RECORD: ICD-10-PCS | Mod: S$GLB,,, | Performed by: INTERNAL MEDICINE

## 2020-06-29 NOTE — PROGRESS NOTES
Yes mass  Subjective:       Patient ID: Renata Bergman is a 79 y.o. female.    Chief Complaint: No chief complaint on file.    HPI Mrs. Hussein is a 79-year-old female with stage I left breast cancer.  She is currently on letrozole endocrine therapy.  Her other medical issues include diabetes, seizures and coronary artery disease.    She is feeling well, without pain or shortness of breath.      Breast history: Screening mammogram on May 5, 2016 showed an irregular 22 mm mass with abnormal calcifications in the left breast 2:00 position. Follow-up ultrasound July 13 she noted a regular solid 17 mm mass.    On July 21 needle biopsy showed grade 2 infiltrating ductal carcinoma strongly ER GA positive (90%) and HER-2 negative.    On August 1, 2016 lumpectomy and sentinel lymph node biopsy were performed. That showed a 1.8 x 1.6 x 1.0 infiltrating carcinoma intermediate grade (histologic grade 3, nuclear grade 2, mitotic index 2.   The sentinel lymph node was negative and margins were negative. Final pathological stage TIc N0 stage IA.  Oncotype was 31 indicating a 21% risk of recurrence with tamoxifen alone.    She  had 3 weeks of weekly Taxol and then because of insurance issues she was changed to CMF and had 5 cycles of that.     Chemotherapy was completed in January 2017.    She completed radiation in April 2017 and began letrozole endocrine therapy that month.    Review of Systems   Constitutional: Positive for fatigue. Negative for activity change, appetite change, fever and unexpected weight change.   HENT: Negative for trouble swallowing.    Respiratory: Negative for cough and shortness of breath.    Gastrointestinal: Negative for abdominal pain, constipation, diarrhea and nausea.   Musculoskeletal: Negative for back pain.   Neurological: Negative for headaches.   Psychiatric/Behavioral: Negative for dysphoric mood. The patient is not nervous/anxious.        Objective:      Physical Exam  Vitals signs  reviewed.   Constitutional:       Appearance: She is well-developed.   HENT:      Mouth/Throat:      Pharynx: No oropharyngeal exudate.   Eyes:      General: No scleral icterus.  Cardiovascular:      Rate and Rhythm: Regular rhythm.      Heart sounds: Normal heart sounds.   Pulmonary:      Effort: Pulmonary effort is normal.      Breath sounds: Normal breath sounds. No wheezing or rales.   Chest:      Breasts:         Right: No mass, nipple discharge or skin change.         Left: No mass, nipple discharge or skin change.       Abdominal:      Palpations: Abdomen is soft. There is no mass.      Tenderness: There is no abdominal tenderness.   Lymphadenopathy:      Cervical: No cervical adenopathy.      Upper Body:      Right upper body: No supraclavicular adenopathy.      Left upper body: No supraclavicular adenopathy.   Neurological:      Mental Status: She is alert and oriented to person, place, and time.      Cranial Nerves: No cranial nerve deficit.   Psychiatric:         Behavior: Behavior normal.         Thought Content: Thought content normal.         Assessment:       1. Malignant neoplasm of upper-outer quadrant of left breast in female, estrogen receptor positive        Plan:     Return to clinic in 6 months  Continue anastrozole.  She will be due for mammograms in August.

## 2020-07-20 ENCOUNTER — PATIENT OUTREACH (OUTPATIENT)
Dept: ADMINISTRATIVE | Facility: HOSPITAL | Age: 80
End: 2020-07-20

## 2020-07-20 DIAGNOSIS — M81.0 OSTEOPOROSIS, UNSPECIFIED OSTEOPOROSIS TYPE, UNSPECIFIED PATHOLOGICAL FRACTURE PRESENCE: Primary | ICD-10-CM

## 2020-08-03 ENCOUNTER — OFFICE VISIT (OUTPATIENT)
Dept: INTERNAL MEDICINE | Facility: CLINIC | Age: 80
End: 2020-08-03
Payer: MEDICARE

## 2020-08-03 VITALS
SYSTOLIC BLOOD PRESSURE: 110 MMHG | HEART RATE: 77 BPM | BODY MASS INDEX: 36.58 KG/M2 | HEIGHT: 63 IN | WEIGHT: 206.44 LBS | DIASTOLIC BLOOD PRESSURE: 82 MMHG | OXYGEN SATURATION: 98 %

## 2020-08-03 DIAGNOSIS — I10 HTN (HYPERTENSION), BENIGN: Chronic | ICD-10-CM

## 2020-08-03 DIAGNOSIS — Z17.0 MALIGNANT NEOPLASM OF UPPER-OUTER QUADRANT OF LEFT BREAST IN FEMALE, ESTROGEN RECEPTOR POSITIVE: ICD-10-CM

## 2020-08-03 DIAGNOSIS — I25.83 CORONARY ARTERY DISEASE DUE TO LIPID RICH PLAQUE: Chronic | ICD-10-CM

## 2020-08-03 DIAGNOSIS — C50.412 MALIGNANT NEOPLASM OF UPPER-OUTER QUADRANT OF LEFT BREAST IN FEMALE, ESTROGEN RECEPTOR POSITIVE: ICD-10-CM

## 2020-08-03 DIAGNOSIS — M10.9 GOUT, ARTHRITIS: ICD-10-CM

## 2020-08-03 DIAGNOSIS — E11.51 CONTROLLED TYPE 2 DM WITH PERIPHERAL CIRCULATORY DISORDER: Chronic | ICD-10-CM

## 2020-08-03 DIAGNOSIS — N18.30 CHRONIC RENAL DISEASE, STAGE 3, MODERATELY DECREASED GLOMERULAR FILTRATION RATE BETWEEN 30-59 ML/MIN/1.73 SQUARE METER: Chronic | ICD-10-CM

## 2020-08-03 DIAGNOSIS — D50.0 IRON DEFICIENCY ANEMIA DUE TO CHRONIC BLOOD LOSS: Primary | ICD-10-CM

## 2020-08-03 DIAGNOSIS — I25.10 CORONARY ARTERY DISEASE DUE TO LIPID RICH PLAQUE: Chronic | ICD-10-CM

## 2020-08-03 DIAGNOSIS — K21.9 GASTROESOPHAGEAL REFLUX DISEASE, ESOPHAGITIS PRESENCE NOT SPECIFIED: ICD-10-CM

## 2020-08-03 DIAGNOSIS — E78.49 OTHER HYPERLIPIDEMIA: Chronic | ICD-10-CM

## 2020-08-03 PROBLEM — E86.0 DEHYDRATION: Status: RESOLVED | Noted: 2019-12-26 | Resolved: 2020-08-03

## 2020-08-03 PROCEDURE — 99499 UNLISTED E&M SERVICE: CPT | Mod: S$GLB,,, | Performed by: INTERNAL MEDICINE

## 2020-08-03 PROCEDURE — 1101F PT FALLS ASSESS-DOCD LE1/YR: CPT | Mod: CPTII,S$GLB,, | Performed by: INTERNAL MEDICINE

## 2020-08-03 PROCEDURE — 99214 PR OFFICE/OUTPT VISIT, EST, LEVL IV, 30-39 MIN: ICD-10-PCS | Mod: S$GLB,,, | Performed by: INTERNAL MEDICINE

## 2020-08-03 PROCEDURE — 99999 PR PBB SHADOW E&M-EST. PATIENT-LVL I: CPT | Mod: PBBFAC,,, | Performed by: INTERNAL MEDICINE

## 2020-08-03 PROCEDURE — 1101F PR PT FALLS ASSESS DOC 0-1 FALLS W/OUT INJ PAST YR: ICD-10-PCS | Mod: CPTII,S$GLB,, | Performed by: INTERNAL MEDICINE

## 2020-08-03 PROCEDURE — 1159F MED LIST DOCD IN RCRD: CPT | Mod: S$GLB,,, | Performed by: INTERNAL MEDICINE

## 2020-08-03 PROCEDURE — 99214 OFFICE O/P EST MOD 30 MIN: CPT | Mod: S$GLB,,, | Performed by: INTERNAL MEDICINE

## 2020-08-03 PROCEDURE — 99499 RISK ADDL DX/OHS AUDIT: ICD-10-PCS | Mod: S$GLB,,, | Performed by: INTERNAL MEDICINE

## 2020-08-03 PROCEDURE — 1159F PR MEDICATION LIST DOCUMENTED IN MEDICAL RECORD: ICD-10-PCS | Mod: S$GLB,,, | Performed by: INTERNAL MEDICINE

## 2020-08-03 PROCEDURE — 99999 PR PBB SHADOW E&M-EST. PATIENT-LVL I: ICD-10-PCS | Mod: PBBFAC,,, | Performed by: INTERNAL MEDICINE

## 2020-08-03 NOTE — PROGRESS NOTES
CHIEF COMPLAINT: follow up NSTEMI, breast cancer, diabetes, hypertension, hyperlipidemia.     HISTORY OF PRESENT ILLNESS: 79-year-old woman who present for follow up of above.     Knees are doing fine. No falls. Energy level is good     She has had 2 infusinos of iron (1/21/20 and 1/28/20) and EGD on 1/31/20 - few bleeding angioectasias in the stomach.  Treated with argon plasma coagulation (APC).  TWO gastric polyps. Resected and retrieved. NO nausea, vomiting, constipation, diarrhea. She is taking ferrous sulfate 325 mg once daily.  She took vitamin B12 injection several times.    .    No chest pain since back on aspirin and plavix. She saw Dr Puente on 7/23/18.  Cardica PET stress was normal on 7/25/18.     She is taking Keppra  mg 2 in the afternoon.  Gait is better with taking the Keppra in the late afternoon.  No falls.  No seizures.        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 She  takes aspirin 81 mg daily and plavix 75 mg daily and coreg 3.125 mg twice daily.   No chest pain or shortness of breath.  She is currently off the following blood pressure medications:  Coreg 25 mg twice daily, Norvasc 5 mg daily and chlorthaladone 25 mg 1/2 tablet every other daily for her hypertension.   She completed cardiac rehab.     She has completed 5 cycles of CMF for her breast cancer. She completed radiation the end of April 6, 2017.  She took Femara  4/2017 - 4/2018. She had irritation of the lips on Femara. She is taking anastrozole 1 mg daily. Saw Ismael 6/29/20 and JENNIE on 8/5/19.     She graduated from the healthy back program. This program is on hold due to breast cancer. Back is doing well. She is doing stretches every other day. She is off tylenol in the evening.  She has occasional right sided back pain.      She continues to take allopurinol 300 mg daily for her gout. She has not had any gouty flares. She has occasional left elbow pain.      Her  blood sugars have been normal - . She is off metformin 850 mg twice daily. She checks blood sugars once daily.  She denies any polydipsia or polyuria. She continues to take aspirin for her coronary artery disease.      Sinuses have been doing well. She has not had to take Allegra lately. She denies any nausea, vomiting, diarrhea. She has some constipation - once a week.  She took a dulcolax.      Reflux is well controlled on omeprazole 20 mg two tablets daily.      She continues to take Crestor 40 mg daily for her hyperlipidemia. She denies any joint pain or muscle pain from the Crestor.      She is taking vitamin D 50,000 units twice weekly for vitamin D deficiency     Her daughter who is 46 has stage 2 breast cancer. She has had a lumpectomy and chemo and radiation. Her daughter is doing well. She has finished her treatment. Her daughter might be BRCA positive. Mrs Bergman feels that she is coping well with her diagnosis of breast cancer. No anxiety, depression or insomnia.      PAST MEDICAL HISTORY:   1. Hypertension.   2. Diabetes mellitus   3. Hyperlipidemia.   4. Reflux.   5. Gout.   6. Coronary artery disease, status post MI in  with stenting at that time, and then a right coronary artery stent placed in . Had a negative stress test 2008. [facility] 2012 - patent stents and non obstructive cad  7. Osteoarthritis of the right knee. S/P right knee replacement   8. Status post left knee replacement.   9. Status post LISA/BSO secondary to menstrual disorder or polyps after tubal ligation.   10. Left breast cancer and BRCA2 positive    MEDICATIONS and ALLERGIES: Updated on epic.       Family History  Mother had breast cancer in her early 50's then had colon cancer in her 60's. She  of uterine cancer  2 Maternal aunts with breast cancer  Daughter with breast cancer at age 45 - BRCA positive  Father  of a gunshot wound  She is an only child    PHYSICAL EXAMINATION:       /82   Pulse 77   " Ht 5' 3" (1.6 m)   Wt 93.6 kg (206 lb 7.4 oz)   LMP  (LMP Unknown)   SpO2 98%   BMI 36.57 kg/m²     General: Alert, oriented. No apparent distress. Affect within normal   limits.   Conjunctivae anicteric. Tympanic membranes clear. Oropharynx clear. Irritation of the lips  Neck supple.   Respiratory effort normal. Lungs clear to auscultation.   Heart: Regular rate and rhythm without murmurs, gallops or rubs.   No lower extremity edema.      Protective Sensation (w/ 10 gram monofilament):  Right: Intact  Left: Intact    Visual Inspection:  Normal -  Bilateral    Pedal Pulses:   Right: Present  Left: Present    Posterior tibialis:   Right:Present  Left: Present              ASSESESSMENT  1. HTN- on coreg 3.125 mg twice daily.  2. Iron deficiency anemia - s/p IV iron. cbc today  3. Angioectasias of duodenal bulb, gastric body, On omeprazole 40 mg daily. S/p EGD 1/31/20 with cautery. Will montior blood counts closely.   4.  CAD with NSTEMI 5/2017- s/p stenting 5/2017 - On aspirin and plavix. on coreg at 3.215 mg twice daily   5. Left breast cancer with BRCA2 positive -Finished chemo and  radiation. On anastrazole-saw Dr Guevara 12/5/19 and JENNIE 8/5/19  6. Seizure -stable on Keppra XR   7.  Diabetes - controlled. Off metformin.   9. Gout -controlled on allopurinol    10. Hyperlipidemia - on Crestor 40 mg daily. Controlled   11. Obesity - working at diet, exercise and weight loss.   12. Osteoarthritis of the right knee, status post knee replacement - doing well.  13. GERD - controlled on meds  14. Hypokalemia -  on replacement potassium     Screening - mammogram done 8/19 - scheduled. Colonoscopy 4/23/12 - normal, and 5/25/15 - 3 small polyps (one adenoma).  Colonoscopy 10/8/18 - one polyp - due 2023.     Bone density was normal November 2008.    Prevnar 12/15  Tdap 5/16  Influenza 2018     I'll see her back 3 months,  sooner if problems arise.  "

## 2020-08-06 ENCOUNTER — HOSPITAL ENCOUNTER (OUTPATIENT)
Dept: RADIOLOGY | Facility: HOSPITAL | Age: 80
Discharge: HOME | End: 2020-08-06
Attending: INTERNAL MEDICINE
Payer: MEDICARE

## 2020-08-06 DIAGNOSIS — Z12.31 OTHER SCREENING MAMMOGRAM: ICD-10-CM

## 2020-08-06 PROCEDURE — 77067 MAMMO DIGITAL SCREENING BILAT WITH TOMOSYNTHESIS_CAD: ICD-10-PCS | Mod: 26,,, | Performed by: RADIOLOGY

## 2020-08-06 PROCEDURE — 77063 BREAST TOMOSYNTHESIS BI: CPT | Mod: 26,,, | Performed by: RADIOLOGY

## 2020-08-06 PROCEDURE — 77067 SCR MAMMO BI INCL CAD: CPT | Mod: 26,,, | Performed by: RADIOLOGY

## 2020-08-06 PROCEDURE — 77063 MAMMO DIGITAL SCREENING BILAT WITH TOMOSYNTHESIS_CAD: ICD-10-PCS | Mod: 26,,, | Performed by: RADIOLOGY

## 2020-08-06 PROCEDURE — 77067 SCR MAMMO BI INCL CAD: CPT | Mod: TC,PO

## 2020-08-12 ENCOUNTER — PATIENT OUTREACH (OUTPATIENT)
Dept: ADMINISTRATIVE | Facility: OTHER | Age: 80
End: 2020-08-12

## 2020-08-12 NOTE — PROGRESS NOTES
Care Everywhere: updated  Immunization: updated  Health Maintenance: updated  Media Review:   Legacy Review:   Order placed:   Upcoming appts:optometry 9/14/2020

## 2020-08-14 ENCOUNTER — OFFICE VISIT (OUTPATIENT)
Dept: CARDIOLOGY | Facility: CLINIC | Age: 80
End: 2020-08-14
Payer: MEDICARE

## 2020-08-14 VITALS
BODY MASS INDEX: 35 KG/M2 | WEIGHT: 205 LBS | DIASTOLIC BLOOD PRESSURE: 76 MMHG | HEIGHT: 64 IN | SYSTOLIC BLOOD PRESSURE: 163 MMHG | HEART RATE: 75 BPM

## 2020-08-14 DIAGNOSIS — E78.2 MIXED HYPERLIPIDEMIA: ICD-10-CM

## 2020-08-14 DIAGNOSIS — C50.412 MALIGNANT NEOPLASM OF UPPER-OUTER QUADRANT OF LEFT FEMALE BREAST, UNSPECIFIED ESTROGEN RECEPTOR STATUS: ICD-10-CM

## 2020-08-14 DIAGNOSIS — I25.2 OLD MI (MYOCARDIAL INFARCTION): ICD-10-CM

## 2020-08-14 DIAGNOSIS — I65.23 BILATERAL CAROTID ARTERY STENOSIS: ICD-10-CM

## 2020-08-14 DIAGNOSIS — I25.10 CORONARY ARTERY DISEASE DUE TO LIPID RICH PLAQUE: Primary | ICD-10-CM

## 2020-08-14 DIAGNOSIS — R09.89 PROMINENT AORTA: ICD-10-CM

## 2020-08-14 DIAGNOSIS — I25.83 CORONARY ARTERY DISEASE DUE TO LIPID RICH PLAQUE: Primary | ICD-10-CM

## 2020-08-14 DIAGNOSIS — N18.30 CHRONIC RENAL DISEASE, STAGE 3, MODERATELY DECREASED GLOMERULAR FILTRATION RATE BETWEEN 30-59 ML/MIN/1.73 SQUARE METER: ICD-10-CM

## 2020-08-14 DIAGNOSIS — Z95.5 S/P CORONARY ARTERY STENT PLACEMENT: ICD-10-CM

## 2020-08-14 DIAGNOSIS — I45.10 RBBB: ICD-10-CM

## 2020-08-14 DIAGNOSIS — I73.9 PERIPHERAL VASCULAR DISEASE: ICD-10-CM

## 2020-08-14 DIAGNOSIS — I10 HTN (HYPERTENSION), BENIGN: ICD-10-CM

## 2020-08-14 PROCEDURE — 3078F PR MOST RECENT DIASTOLIC BLOOD PRESSURE < 80 MM HG: ICD-10-PCS | Mod: CPTII,S$GLB,, | Performed by: INTERNAL MEDICINE

## 2020-08-14 PROCEDURE — 3077F SYST BP >= 140 MM HG: CPT | Mod: CPTII,S$GLB,, | Performed by: INTERNAL MEDICINE

## 2020-08-14 PROCEDURE — 99999 PR PBB SHADOW E&M-EST. PATIENT-LVL IV: CPT | Mod: PBBFAC,,, | Performed by: INTERNAL MEDICINE

## 2020-08-14 PROCEDURE — 99499 UNLISTED E&M SERVICE: CPT | Mod: S$GLB,,, | Performed by: INTERNAL MEDICINE

## 2020-08-14 PROCEDURE — 1159F PR MEDICATION LIST DOCUMENTED IN MEDICAL RECORD: ICD-10-PCS | Mod: S$GLB,,, | Performed by: INTERNAL MEDICINE

## 2020-08-14 PROCEDURE — 99214 OFFICE O/P EST MOD 30 MIN: CPT | Mod: S$GLB,,, | Performed by: INTERNAL MEDICINE

## 2020-08-14 PROCEDURE — 3288F FALL RISK ASSESSMENT DOCD: CPT | Mod: CPTII,S$GLB,, | Performed by: INTERNAL MEDICINE

## 2020-08-14 PROCEDURE — 3077F PR MOST RECENT SYSTOLIC BLOOD PRESSURE >= 140 MM HG: ICD-10-PCS | Mod: CPTII,S$GLB,, | Performed by: INTERNAL MEDICINE

## 2020-08-14 PROCEDURE — 1126F PR PAIN SEVERITY QUANTIFIED, NO PAIN PRESENT: ICD-10-PCS | Mod: S$GLB,,, | Performed by: INTERNAL MEDICINE

## 2020-08-14 PROCEDURE — 3078F DIAST BP <80 MM HG: CPT | Mod: CPTII,S$GLB,, | Performed by: INTERNAL MEDICINE

## 2020-08-14 PROCEDURE — 1159F MED LIST DOCD IN RCRD: CPT | Mod: S$GLB,,, | Performed by: INTERNAL MEDICINE

## 2020-08-14 PROCEDURE — 99499 RISK ADDL DX/OHS AUDIT: ICD-10-PCS | Mod: S$GLB,,, | Performed by: INTERNAL MEDICINE

## 2020-08-14 PROCEDURE — 1126F AMNT PAIN NOTED NONE PRSNT: CPT | Mod: S$GLB,,, | Performed by: INTERNAL MEDICINE

## 2020-08-14 PROCEDURE — 99214 PR OFFICE/OUTPT VISIT, EST, LEVL IV, 30-39 MIN: ICD-10-PCS | Mod: S$GLB,,, | Performed by: INTERNAL MEDICINE

## 2020-08-14 PROCEDURE — 1100F PTFALLS ASSESS-DOCD GE2>/YR: CPT | Mod: CPTII,S$GLB,, | Performed by: INTERNAL MEDICINE

## 2020-08-14 PROCEDURE — 99999 PR PBB SHADOW E&M-EST. PATIENT-LVL IV: ICD-10-PCS | Mod: PBBFAC,,, | Performed by: INTERNAL MEDICINE

## 2020-08-14 PROCEDURE — 3288F PR FALLS RISK ASSESSMENT DOCUMENTED: ICD-10-PCS | Mod: CPTII,S$GLB,, | Performed by: INTERNAL MEDICINE

## 2020-08-14 PROCEDURE — 1100F PR PT FALLS ASSESS DOC 2+ FALLS/FALL W/INJURY/YR: ICD-10-PCS | Mod: CPTII,S$GLB,, | Performed by: INTERNAL MEDICINE

## 2020-08-14 RX ORDER — AMLODIPINE BESYLATE 5 MG/1
5 TABLET ORAL DAILY
COMMUNITY
Start: 2020-07-13 | End: 2020-10-15

## 2020-08-14 NOTE — PROGRESS NOTES
"Subjective:   Patient ID:  Renata Bergman is a 80 y.o. female who presents for follow-up of Coronary Artery Disease      HPI: Today BP is elevated, but patient did not take her meds yet She is quite sedentary and is recovering from a recent fall. Uses walker for support.  No specific complaints.    Lab Results   Component Value Date     05/04/2020    K 4.1 05/04/2020     05/04/2020    CO2 26 05/04/2020    BUN 16 05/04/2020    CREATININE 1.5 (H) 05/04/2020    GLU 99 05/04/2020    HGBA1C 5.8 (H) 05/04/2020    MG 1.6 02/27/2020    AST 15 05/04/2020    ALT 9 (L) 05/04/2020    ALBUMIN 3.9 05/04/2020    PROT 7.6 05/04/2020    BILITOT 0.6 05/04/2020    WBC 4.97 05/04/2020    HGB 13.5 05/04/2020    HCT 43.5 05/04/2020     (H) 05/04/2020     05/04/2020    INR 1.0 05/23/2017    TSH 2.132 05/04/2020    CHOL 172 05/04/2020    HDL 57 05/04/2020    LDLCALC 95.0 05/04/2020    TRIG 100 05/04/2020       Review of Systems   Constitution: Positive for weight loss.   HENT: Negative.    Eyes: Negative.    Cardiovascular: Negative.  Negative for chest pain, claudication, dyspnea on exertion, irregular heartbeat, leg swelling, near-syncope, palpitations and syncope.   Respiratory: Negative.  Negative for cough, shortness of breath, snoring and wheezing.    Endocrine: Negative.  Negative for cold intolerance, heat intolerance, polydipsia, polyphagia and polyuria.   Skin: Negative.    Musculoskeletal: Negative.    Gastrointestinal: Negative.    Genitourinary: Negative.    Neurological: Negative.    Psychiatric/Behavioral: Negative.        Objective:   Physical Exam   Constitutional: She is oriented to person, place, and time. She appears well-developed and well-nourished.   BP (!) 163/76   Pulse 75   Ht 5' 4" (1.626 m)   Wt 93 kg (205 lb 0.4 oz)   LMP  (LMP Unknown)   BMI 35.19 kg/m²      HENT:   Head: Normocephalic.   Eyes: Pupils are equal, round, and reactive to light.   Neck: Normal range of motion. " Neck supple. Carotid bruit is not present. No thyromegaly present.   Cardiovascular: Normal rate, regular rhythm, normal heart sounds and intact distal pulses. Exam reveals no gallop and no friction rub.   No murmur heard.  Pulses:       Carotid pulses are 2+ on the right side and 2+ on the left side.       Radial pulses are 2+ on the right side and 2+ on the left side.        Femoral pulses are 2+ on the right side and 2+ on the left side.       Popliteal pulses are 2+ on the right side and 2+ on the left side.        Dorsalis pedis pulses are 2+ on the right side and 2+ on the left side.        Posterior tibial pulses are 2+ on the right side and 2+ on the left side.   Pulmonary/Chest: Effort normal and breath sounds normal. No respiratory distress. She has no wheezes. She has no rales. She exhibits no tenderness.   Abdominal: Soft. Bowel sounds are normal.   Musculoskeletal:         General: Deformity and edema present.      Comments: Trace bilateral.  DJD changes   Neurological: She is alert and oriented to person, place, and time.   Skin: Skin is warm and dry.   Psychiatric: She has a normal mood and affect.   Nursing note and vitals reviewed.        Assessment and Plan:     Problem List Items Addressed This Visit        Cardiology Problems    HTN (hypertension), benign (Chronic)    Relevant Orders    CV Ultrasound Bilateral Doppler Carotid    Echo Color Flow Doppler? Yes    Hyperlipidemia (Chronic)    Relevant Orders    CV Ultrasound Bilateral Doppler Carotid    Echo Color Flow Doppler? Yes    Coronary artery disease due to lipid rich plaque - Primary (Chronic)    Relevant Orders    CV Ultrasound Bilateral Doppler Carotid    Echo Color Flow Doppler? Yes    Old MI (myocardial infarction)    Relevant Orders    CV Ultrasound Bilateral Doppler Carotid    Echo Color Flow Doppler? Yes    RBBB    Relevant Orders    CV Ultrasound Bilateral Doppler Carotid    Echo Color Flow Doppler? Yes    Bilateral carotid artery  stenosis    Relevant Orders    CV Ultrasound Bilateral Doppler Carotid    Echo Color Flow Doppler? Yes    Peripheral vascular disease    Relevant Orders    CV Ultrasound Bilateral Doppler Carotid    Echo Color Flow Doppler? Yes       Other    Chronic renal disease, stage 3, moderately decreased glomerular filtration rate between 30-59 mL/min/1.73 square meter (Chronic)    Relevant Orders    CV Ultrasound Bilateral Doppler Carotid    Echo Color Flow Doppler? Yes    S/P coronary artery stent placement    Relevant Orders    CV Ultrasound Bilateral Doppler Carotid    Echo Color Flow Doppler? Yes    Malignant neoplasm of upper-outer quadrant of left female breast    Relevant Orders    CV Ultrasound Bilateral Doppler Carotid    Echo Color Flow Doppler? Yes    Prominent aorta    Relevant Orders    CV Ultrasound Bilateral Doppler Carotid    Echo Color Flow Doppler? Yes          Patient's Medications   New Prescriptions    No medications on file   Previous Medications    ALLOPURINOL (ZYLOPRIM) 300 MG TABLET    TAKE 1 TABLET BY MOUTH ONCE DAILY    AMLODIPINE (NORVASC) 5 MG TABLET    Take 5 mg by mouth once daily.    ANASTROZOLE (ARIMIDEX) 1 MG TAB    TAKE 1 TABLET(1 MG) BY MOUTH EVERY DAY. STOP LETROZOLE FEMARA    ASPIRIN 81 MG CHEW    Take 81 mg by mouth once daily.      BLOOD SUGAR DIAGNOSTIC STRP    1 strip by Misc.(Non-Drug; Combo Route) route once daily.    BLOOD-GLUCOSE METER KIT    Use as instructed    CARVEDILOL (COREG) 3.125 MG TABLET    Take 1 tablet (3.125 mg total) by mouth 2 (two) times daily with meals.    CLOPIDOGREL (PLAVIX) 75 MG TABLET    Take 1 tablet (75 mg total) by mouth once daily.    CYANOCOBALAMIN 1,000 MCG/ML INJECTION    Inject 1 mL (1,000 mcg total) into the muscle once a week. Dispense #12 25 gague one inch needles and #12 one ml syringes    DICLOFENAC SODIUM (VOLTAREN) 1 % GEL    Apply 2 g topically 4 (four) times daily as needed. Apply to bilateral knees as needed for pain    ERGOCALCIFEROL  (ERGOCALCIFEROL) 50,000 UNIT CAP    Take 1 capsule (50,000 Units total) by mouth twice a week.    FERROUS SULFATE 325 (65 FE) MG EC TABLET    Take 1 tablet (325 mg total) by mouth once daily.    FEXOFENADINE (ALLEGRA) 30 MG TABLET    Take 30 mg by mouth daily as needed.    LANCETS MISC    Check blood sugar once daily    LEVETIRACETAM XR (KEPPRA XR) 500 MG TB24 24 HR TABLET    TAKE 2 TABLETS(1000 MG) BY MOUTH EVERY DAY    NITROGLYCERIN (NITROSTAT) 0.4 MG SL TABLET    PLACE 1 TABLET UNDER THE TONGUE EVERY 5 MINUTES AS NEEDED FOR CHEST PAIN.    OMEPRAZOLE (PRILOSEC) 20 MG CAPSULE    TAKE 2 CAPSULES BY MOUTH EVERY DAY    POTASSIUM CHLORIDE (KLOR-CON) 10 MEQ TBSR    TAKE 1 TABLET BY MOUTH EVERY DAY    ROSUVASTATIN (CRESTOR) 40 MG TAB    TAKE 1 TABLET BY MOUTH EVERY DAY   Modified Medications    No medications on file   Discontinued Medications    No medications on file     Review ordered tests and advise.  Otherwise no change in the present regimen.  Follow up in about 6 months (around 2/14/2021).

## 2020-08-19 ENCOUNTER — CLINICAL SUPPORT (OUTPATIENT)
Dept: CARDIOLOGY | Facility: CLINIC | Age: 80
End: 2020-08-19
Attending: INTERNAL MEDICINE
Payer: MEDICARE

## 2020-08-19 ENCOUNTER — TELEPHONE (OUTPATIENT)
Dept: CARDIOLOGY | Facility: CLINIC | Age: 80
End: 2020-08-19

## 2020-08-19 VITALS
WEIGHT: 205 LBS | DIASTOLIC BLOOD PRESSURE: 80 MMHG | HEIGHT: 64 IN | HEART RATE: 70 BPM | SYSTOLIC BLOOD PRESSURE: 140 MMHG | BODY MASS INDEX: 35 KG/M2

## 2020-08-19 DIAGNOSIS — Z95.5 S/P CORONARY ARTERY STENT PLACEMENT: ICD-10-CM

## 2020-08-19 DIAGNOSIS — N18.30 CHRONIC RENAL DISEASE, STAGE 3, MODERATELY DECREASED GLOMERULAR FILTRATION RATE BETWEEN 30-59 ML/MIN/1.73 SQUARE METER: ICD-10-CM

## 2020-08-19 DIAGNOSIS — I10 HTN (HYPERTENSION), BENIGN: ICD-10-CM

## 2020-08-19 DIAGNOSIS — R09.89 PROMINENT AORTA: ICD-10-CM

## 2020-08-19 DIAGNOSIS — I25.2 OLD MI (MYOCARDIAL INFARCTION): ICD-10-CM

## 2020-08-19 DIAGNOSIS — C50.412 MALIGNANT NEOPLASM OF UPPER-OUTER QUADRANT OF LEFT FEMALE BREAST, UNSPECIFIED ESTROGEN RECEPTOR STATUS: ICD-10-CM

## 2020-08-19 DIAGNOSIS — I73.9 PERIPHERAL VASCULAR DISEASE: ICD-10-CM

## 2020-08-19 DIAGNOSIS — E78.2 MIXED HYPERLIPIDEMIA: ICD-10-CM

## 2020-08-19 DIAGNOSIS — I25.83 CORONARY ARTERY DISEASE DUE TO LIPID RICH PLAQUE: ICD-10-CM

## 2020-08-19 DIAGNOSIS — I65.23 BILATERAL CAROTID ARTERY STENOSIS: ICD-10-CM

## 2020-08-19 DIAGNOSIS — I45.10 RBBB: ICD-10-CM

## 2020-08-19 DIAGNOSIS — I25.10 CORONARY ARTERY DISEASE DUE TO LIPID RICH PLAQUE: ICD-10-CM

## 2020-08-19 LAB
ASCENDING AORTA: 2.71 CM
AV INDEX (PROSTH): 0.68
AV MEAN GRADIENT: 4 MMHG
AV PEAK GRADIENT: 7 MMHG
AV VALVE AREA: 2.05 CM2
AV VELOCITY RATIO: 0.64
BSA FOR ECHO PROCEDURE: 2.05 M2
CV ECHO LV RWT: 0.42 CM
DOP CALC AO PEAK VEL: 1.29 M/S
DOP CALC AO VTI: 28.44 CM
DOP CALC LVOT AREA: 3 CM2
DOP CALC LVOT DIAMETER: 1.96 CM
DOP CALC LVOT PEAK VEL: 0.83 M/S
DOP CALC LVOT STROKE VOLUME: 58.38 CM3
DOP CALC RVOT PEAK VEL: 0.56 M/S
DOP CALC RVOT VTI: 12.14 CM
DOP CALCLVOT PEAK VEL VTI: 19.36 CM
E WAVE DECELERATION TIME: 199.7 MSEC
E/A RATIO: 0.81
E/E' RATIO: 12 M/S
ECHO LV POSTERIOR WALL: 0.71 CM (ref 0.6–1.1)
FRACTIONAL SHORTENING: 30 % (ref 28–44)
INTERVENTRICULAR SEPTUM: 0.71 CM (ref 0.6–1.1)
IVRT: 114.18 MSEC
LA MAJOR: 4.51 CM
LA MINOR: 4.24 CM
LA WIDTH: 3.09 CM
LEFT ATRIUM SIZE: 2.73 CM
LEFT ATRIUM VOLUME INDEX: 15.9 ML/M2
LEFT ATRIUM VOLUME: 31.34 CM3
LEFT CBA DIAS: 9 CM/S
LEFT CBA SYS: 68 CM/S
LEFT CCA DIST DIAS: 10 CM/S
LEFT CCA DIST SYS: 71 CM/S
LEFT CCA MID DIAS: 11 CM/S
LEFT CCA MID SYS: 71 CM/S
LEFT CCA PROX DIAS: 12 CM/S
LEFT CCA PROX SYS: 96 CM/S
LEFT ECA DIAS: 14 CM/S
LEFT ECA SYS: 120 CM/S
LEFT ICA DIST DIAS: 25 CM/S
LEFT ICA DIST SYS: 123 CM/S
LEFT ICA MID DIAS: 23 CM/S
LEFT ICA MID SYS: 105 CM/S
LEFT ICA PROX DIAS: 24 CM/S
LEFT ICA PROX SYS: 125 CM/S
LEFT INTERNAL DIMENSION IN SYSTOLE: 2.38 CM (ref 2.1–4)
LEFT VENTRICLE DIASTOLIC VOLUME INDEX: 24.26 ML/M2
LEFT VENTRICLE DIASTOLIC VOLUME: 47.96 ML
LEFT VENTRICLE MASS INDEX: 31 G/M2
LEFT VENTRICLE SYSTOLIC VOLUME INDEX: 10 ML/M2
LEFT VENTRICLE SYSTOLIC VOLUME: 19.84 ML
LEFT VENTRICULAR INTERNAL DIMENSION IN DIASTOLE: 3.42 CM (ref 3.5–6)
LEFT VENTRICULAR MASS: 61.65 G
LEFT VERTEBRAL DIAS: 15 CM/S
LEFT VERTEBRAL SYS: 93 CM/S
LV LATERAL E/E' RATIO: 12 M/S
LV SEPTAL E/E' RATIO: 12 M/S
MV PEAK A VEL: 0.74 M/S
MV PEAK E VEL: 0.6 M/S
MV STENOSIS PRESSURE HALF TIME: 57.91 MS
MV VALVE AREA P 1/2 METHOD: 3.8 CM2
OHS CV CAROTID RIGHT ICA EDV HIGHEST: 33
OHS CV CAROTID ULTRASOUND LEFT ICA/CCA RATIO: 1.76
OHS CV CAROTID ULTRASOUND RIGHT ICA/CCA RATIO: 1.99
OHS CV PV CAROTID LEFT HIGHEST CCA: 96
OHS CV PV CAROTID LEFT HIGHEST ICA: 125
OHS CV PV CAROTID RIGHT HIGHEST CCA: 102
OHS CV PV CAROTID RIGHT HIGHEST ICA: 157
OHS CV US CAROTID LEFT HIGHEST EDV: 25
PISA TR MAX VEL: 2.07 M/S
PULM VEIN S/D RATIO: 1.33
PV MEAN GRADIENT: 0.74 MMHG
PV PEAK D VEL: 0.27 M/S
PV PEAK S VEL: 0.36 M/S
PV PEAK VELOCITY: 0.83 CM/S
RA MAJOR: 4.21 CM
RA PRESSURE: 3 MMHG
RA WIDTH: 3.12 CM
RIGHT ARM DIASTOLIC BLOOD PRESSURE: 80 MMHG
RIGHT ARM SYSTOLIC BLOOD PRESSURE: 140 MMHG
RIGHT CBA DIAS: 20 CM/S
RIGHT CBA SYS: 154 CM/S
RIGHT CCA DIST DIAS: 11 CM/S
RIGHT CCA DIST SYS: 79 CM/S
RIGHT CCA MID DIAS: 8 CM/S
RIGHT CCA MID SYS: 77 CM/S
RIGHT CCA PROX DIAS: 13 CM/S
RIGHT CCA PROX SYS: 102 CM/S
RIGHT ECA DIAS: 18 CM/S
RIGHT ECA SYS: 133 CM/S
RIGHT ICA DIST DIAS: 33 CM/S
RIGHT ICA DIST SYS: 130 CM/S
RIGHT ICA MID DIAS: 14 CM/S
RIGHT ICA MID SYS: 95 CM/S
RIGHT ICA PROX DIAS: 18 CM/S
RIGHT ICA PROX SYS: 157 CM/S
RIGHT VENTRICULAR END-DIASTOLIC DIMENSION: 2.76 CM
RIGHT VERTEBRAL DIAS: 11 CM/S
RIGHT VERTEBRAL SYS: 95 CM/S
SINUS: 2.57 CM
STJ: 2.19 CM
TDI LATERAL: 0.05 M/S
TDI SEPTAL: 0.05 M/S
TDI: 0.05 M/S
TR MAX PG: 17 MMHG
TRICUSPID ANNULAR PLANE SYSTOLIC EXCURSION: 2.07 CM
TV REST PULMONARY ARTERY PRESSURE: 20 MMHG

## 2020-08-19 PROCEDURE — 93880 CV US DOPPLER CAROTID (CUPID ONLY): ICD-10-PCS | Mod: S$GLB,,, | Performed by: INTERNAL MEDICINE

## 2020-08-19 PROCEDURE — 93880 EXTRACRANIAL BILAT STUDY: CPT | Mod: S$GLB,,, | Performed by: INTERNAL MEDICINE

## 2020-08-19 PROCEDURE — 99999 PR PBB SHADOW E&M-EST. PATIENT-LVL II: ICD-10-PCS | Mod: PBBFAC,,,

## 2020-08-19 PROCEDURE — 99999 PR PBB SHADOW E&M-EST. PATIENT-LVL II: CPT | Mod: PBBFAC,,,

## 2020-08-19 NOTE — TELEPHONE ENCOUNTER
----- Message from Jaycee Puente MD sent at 8/19/2020  1:35 PM CDT -----  Please inform the patient that she has mild-moderate blockages in both carotids. We should repeat this test in 6 months.

## 2020-08-19 NOTE — TELEPHONE ENCOUNTER
----- Message from Jaycee Puente MD sent at 8/19/2020 11:03 AM CDT -----  Please inform the patient that cardiac echo was normal.  Great news.

## 2020-09-13 ENCOUNTER — PATIENT OUTREACH (OUTPATIENT)
Dept: ADMINISTRATIVE | Facility: OTHER | Age: 80
End: 2020-09-13

## 2020-09-14 DIAGNOSIS — I10 HTN (HYPERTENSION), BENIGN: Chronic | ICD-10-CM

## 2020-09-14 RX ORDER — CLOPIDOGREL BISULFATE 75 MG/1
75 TABLET ORAL DAILY
Qty: 90 TABLET | Refills: 3 | Status: SHIPPED | OUTPATIENT
Start: 2020-09-14 | End: 2021-08-09 | Stop reason: ALTCHOICE

## 2020-09-14 NOTE — PROGRESS NOTES
Chart reviewed.   Immunizations: updated  Orders placed: n/a  Upcoming appts to satisfy JULISSA topics: Optometry 9/14

## 2020-09-17 ENCOUNTER — OFFICE VISIT (OUTPATIENT)
Dept: OPTOMETRY | Facility: CLINIC | Age: 80
End: 2020-09-17
Payer: MEDICARE

## 2020-09-17 DIAGNOSIS — Z13.5 SCREENING FOR GLAUCOMA: ICD-10-CM

## 2020-09-17 DIAGNOSIS — H52.223 HYPEROPIA OF BOTH EYES WITH REGULAR ASTIGMATISM AND PRESBYOPIA: ICD-10-CM

## 2020-09-17 DIAGNOSIS — H52.03 HYPEROPIA OF BOTH EYES WITH REGULAR ASTIGMATISM AND PRESBYOPIA: ICD-10-CM

## 2020-09-17 DIAGNOSIS — H52.4 HYPEROPIA OF BOTH EYES WITH REGULAR ASTIGMATISM AND PRESBYOPIA: ICD-10-CM

## 2020-09-17 DIAGNOSIS — E11.21 TYPE 2 DIABETES MELLITUS WITH DIABETIC NEPHROPATHY, WITHOUT LONG-TERM CURRENT USE OF INSULIN: Primary | ICD-10-CM

## 2020-09-17 DIAGNOSIS — H25.13 SENILE NUCLEAR SCLEROSIS, BILATERAL: ICD-10-CM

## 2020-09-17 PROCEDURE — 92014 PR EYE EXAM, EST PATIENT,COMPREHESV: ICD-10-PCS | Mod: S$GLB,,, | Performed by: OPTOMETRIST

## 2020-09-17 PROCEDURE — 99999 PR PBB SHADOW E&M-EST. PATIENT-LVL III: ICD-10-PCS | Mod: PBBFAC,,, | Performed by: OPTOMETRIST

## 2020-09-17 PROCEDURE — 92015 PR REFRACTION: ICD-10-PCS | Mod: S$GLB,,, | Performed by: OPTOMETRIST

## 2020-09-17 PROCEDURE — 92014 COMPRE OPH EXAM EST PT 1/>: CPT | Mod: S$GLB,,, | Performed by: OPTOMETRIST

## 2020-09-17 PROCEDURE — 99499 RISK ADDL DX/OHS AUDIT: ICD-10-PCS | Mod: S$GLB,,, | Performed by: OPTOMETRIST

## 2020-09-17 PROCEDURE — 92015 DETERMINE REFRACTIVE STATE: CPT | Mod: S$GLB,,, | Performed by: OPTOMETRIST

## 2020-09-17 PROCEDURE — 99999 PR PBB SHADOW E&M-EST. PATIENT-LVL III: CPT | Mod: PBBFAC,,, | Performed by: OPTOMETRIST

## 2020-09-17 PROCEDURE — 99499 UNLISTED E&M SERVICE: CPT | Mod: S$GLB,,, | Performed by: OPTOMETRIST

## 2020-09-17 NOTE — PROGRESS NOTES
HPI     Concerns About Ocular Health      Additional comments: Near va troubles-intermittent               Comments     Patient states her va seems to be more blurry lately in the morning when   she is trying to read at near.    Type 2 diabetes mellitus with diabetic nephropathy, without long-term   current use of insulin  Senile nuclear sclerosis, bilateral  Screening for glaucoma     Allergy gtts BID OU    Hemoglobin A1C       Date                     Value               Ref Range             Status                05/04/2020               5.8 (H)             4.0 - 5.6 %           Final                 02/11/2020               5.4                 4.0 - 5.6 %           Final                 12/21/2019               6.0 (H)             4.0 - 5.6 %           Final                        Last edited by Moe Noel, OD on 9/17/2020  8:55 AM. (History)            Assessment /Plan     For exam results, see Encounter Report.    Type 2 diabetes mellitus with diabetic nephropathy, without long-term current use of insulin  -No retinopathy noted today.  Continued control with primary care physician and annual comprehensive eye exam.    Senile nuclear sclerosis, bilateral  -     Ambulatory referral/consult to Ophthalmology; Future; Expected date: 09/24/2020  -referral Esperanza velasquez    Screening for glaucoma  -Monitor with annual eye exam and IOP check    Hyperopia of both eyes with regular astigmatism and presbyopia  Hold sRx secondary to cataracts       RTC as directed OMD

## 2020-09-17 NOTE — LETTER
September 17, 2020      Ethel Berger MD  [address]  [facility] [address]           [facility]  [address]  [facility] [address]  Phone: [phone]  Fax: [phone]          Patient: Renata Bergman   MR Number: [id number]   YOB: 1940   Date of Visit: 9/17/2020       Dear Dr. Ethel Berger:    Thank you for referring Renata Bergman to me for evaluation. Attached you will find relevant portions of my assessment and plan of care.    If you have questions, please do not hesitate to call me. I look forward to following Renata Bergman along with you.    Sincerely,    Moe Noel, OD    Enclosure  CC:  No Recipients    If you would like to receive this communication electronically, please contact [email or web address] or [phone] to request more information on [employer] Link access.    For providers and/or their staff who would like to refer a patient to [facility], please contact us through our one-stop-shop provider referral line, [facility], at [phone].    If you feel you have received this communication in error or would no longer like to receive these types of communications, please e-mail [email or web address]

## 2020-09-25 ENCOUNTER — OFFICE VISIT (OUTPATIENT)
Dept: OPHTHALMOLOGY | Facility: CLINIC | Age: 80
End: 2020-09-25
Payer: MEDICARE

## 2020-09-25 DIAGNOSIS — H25.12 NUCLEAR SCLEROTIC CATARACT OF LEFT EYE: ICD-10-CM

## 2020-09-25 DIAGNOSIS — H25.11 NUCLEAR SCLEROTIC CATARACT OF RIGHT EYE: Primary | ICD-10-CM

## 2020-09-25 PROCEDURE — 92136 IOL MASTER - OD - RIGHT EYE: ICD-10-PCS | Mod: RT,S$GLB,, | Performed by: OPHTHALMOLOGY

## 2020-09-25 PROCEDURE — 92014 PR EYE EXAM, EST PATIENT,COMPREHESV: ICD-10-PCS | Mod: S$GLB,,, | Performed by: OPHTHALMOLOGY

## 2020-09-25 PROCEDURE — 92014 COMPRE OPH EXAM EST PT 1/>: CPT | Mod: S$GLB,,, | Performed by: OPHTHALMOLOGY

## 2020-09-25 PROCEDURE — 99999 PR PBB SHADOW E&M-EST. PATIENT-LVL III: ICD-10-PCS | Mod: PBBFAC,,, | Performed by: OPHTHALMOLOGY

## 2020-09-25 PROCEDURE — 92136 OPHTHALMIC BIOMETRY: CPT | Mod: RT,S$GLB,, | Performed by: OPHTHALMOLOGY

## 2020-09-25 PROCEDURE — 99999 PR PBB SHADOW E&M-EST. PATIENT-LVL III: CPT | Mod: PBBFAC,,, | Performed by: OPHTHALMOLOGY

## 2020-09-25 RX ORDER — METFORMIN HYDROCHLORIDE 850 MG/1
1 TABLET ORAL 2 TIMES DAILY
COMMUNITY
End: 2021-03-20

## 2020-09-25 RX ORDER — CHLORTHALIDONE 25 MG/1
1 TABLET ORAL DAILY
Status: ON HOLD | COMMUNITY
End: 2020-12-21

## 2020-09-25 RX ORDER — LANOLIN ALCOHOL/MO/W.PET/CERES
1 CREAM (GRAM) TOPICAL DAILY
COMMUNITY
End: 2021-03-20

## 2020-09-25 NOTE — LETTER
September 25, 2020      Moe Noel, OD  [address]  [facility] [address]           [facility] 10th Fl  [address]  [facility] [address]  Phone: [phone]  Fax: [phone]          Patient: Renata Bergman   MR Number: [id number]   YOB: 1940   Date of Visit: 9/25/2020       Dear Dr. Moe Noel:    Thank you for referring Renata Bergman to me for evaluation. Attached you will find relevant portions of my assessment and plan of care.    If you have questions, please do not hesitate to call me. I look forward to following Renata Bergman along with you.    Sincerely,    Karishma Mata MD    Enclosure  CC:  No Recipients    If you would like to receive this communication electronically, please contact [email or web address] or [phone] to request more information on [employer] Link access.    For providers and/or their staff who would like to refer a patient to [facility], please contact us through our one-stop-shop provider referral line, [facility], at [phone].    If you feel you have received this communication in error or would no longer like to receive these types of communications, please e-mail [email or web address]

## 2020-09-25 NOTE — PROGRESS NOTES
HPI     Ref by Dr. Noel     T2 DM w/ diabetic nephropathy   [facility] OU   Screening for glaucoma   hyperopia    Allergy gtt bid OU     79 YO female here for cat eval. Pt reports that her vision has decreased;   cannot see near too well anymore and vision seems to be cloudy. Wears   prescription bi focals full time. Floaters- last episode was around last   year, has not seen them since. No other ocular complaints.       Last edited by Karishma Mata MD on 9/25/2020  8:59 AM. (History)            Assessment /Plan     For exam results, see Encounter Report.    Nuclear sclerotic cataract of right eye  -     IOL Master - OD - Right Eye    Nuclear sclerotic cataract of left eye      Visually significant nuclear sclerotic cataract   - Interfering with activities of daily living.  Pt desires cataract surgery for Va rehabilitation.   - R/B/A discussed and pt agrees to proceed with surgery.   - IOL options discussed according to patient's goals and concomitant ocular pathology; and pt content with monofocal lens.    - Target: plano.    pcboo 22.0 OD    (pcboo 23.0 OS)

## 2020-09-28 DIAGNOSIS — Z13.9 SCREENING PROCEDURE: ICD-10-CM

## 2020-09-28 DIAGNOSIS — H25.11 NUCLEAR SCLEROTIC CATARACT OF RIGHT EYE: Primary | ICD-10-CM

## 2020-09-28 RX ORDER — PREDNISOLONE ACETATE-GATIFLOXACIN-BROMFENAC .75; 5; 1 MG/ML; MG/ML; MG/ML
1 SUSPENSION/ DROPS OPHTHALMIC 3 TIMES DAILY
Qty: 5 ML | Refills: 3 | Status: SHIPPED | OUTPATIENT
Start: 2020-10-16 | End: 2021-03-20 | Stop reason: ALTCHOICE

## 2020-10-08 ENCOUNTER — TELEPHONE (OUTPATIENT)
Dept: INTERNAL MEDICINE | Facility: CLINIC | Age: 80
End: 2020-10-08

## 2020-10-08 NOTE — TELEPHONE ENCOUNTER
Called pt to see if she had any missed calls, she states she didn't have any missed calls. She states she is okay.

## 2020-10-08 NOTE — TELEPHONE ENCOUNTER
----- Message from Niharika Chandler sent at 1/10/2020  2:34 PM CST -----  Contact: self    Patient is returning a phone call.  Who left a message for the patient: Iraj  Does patient know what this is regarding:  Iraj  Comments:

## 2020-10-15 ENCOUNTER — TELEPHONE (OUTPATIENT)
Dept: OPTOMETRY | Facility: CLINIC | Age: 80
End: 2020-10-15

## 2020-10-15 ENCOUNTER — TELEPHONE (OUTPATIENT)
Dept: OPHTHALMOLOGY | Facility: CLINIC | Age: 80
End: 2020-10-15

## 2020-10-15 RX ORDER — ALLOPURINOL 300 MG/1
TABLET ORAL
Qty: 90 TABLET | Refills: 0 | Status: SHIPPED | OUTPATIENT
Start: 2020-10-15 | End: 2021-01-12

## 2020-10-15 RX ORDER — AMLODIPINE BESYLATE 5 MG/1
TABLET ORAL
Qty: 90 TABLET | Refills: 3 | Status: SHIPPED | OUTPATIENT
Start: 2020-10-15 | End: 2021-10-05

## 2020-10-15 NOTE — H&P
History    Chief complaint:  Painless progressive vision loss    Present Ilness/Diagnosis: Nuclear sclerotic Cataract    Past Medical History:  has a past medical history of Breast cancer (2016), Cataract, Coronary artery disease (9/4/2012), Diabetes mellitus due to abnormal insulin (9/4/2012), Diabetes mellitus type II, Genetic testing, GERD (gastroesophageal reflux disease) (9/4/2012), Gout, arthritis (9/4/2012), Hyperlipidemia (9/4/2012), Hypertension, Obesity, Primary osteoarthritis of both knees (9/4/2012), Renal manifestation of secondary diabetes mellitus, Seizure, and Type 2 diabetes with peripheral circulatory disorder, controlled.    Family History/Social History: refer to chart    Allergies:   Review of patient's allergies indicates:   Allergen Reactions    Lisinopril Anaphylaxis       Current Medications: No current facility-administered medications for this encounter.     Current Outpatient Medications:     allopurinoL (ZYLOPRIM) 300 MG tablet, TAKE 1 TABLET BY MOUTH ONCE DAILY, Disp: 90 tablet, Rfl: 0    amLODIPine (NORVASC) 5 MG tablet, TAKE 1 TABLET BY MOUTH DAILY, Disp: 90 tablet, Rfl: 3    anastrozole (ARIMIDEX) 1 mg Tab, TAKE 1 TABLET(1 MG) BY MOUTH EVERY DAY. STOP LETROZOLE FEMARA, Disp: 30 tablet, Rfl: 11    ascorbic Acid (VITAMIN C) 500 mg CpSR, Take 1 capsule by mouth once daily., Disp: , Rfl:     aspirin 81 MG Chew, Take 81 mg by mouth once daily.  , Disp: , Rfl:     blood sugar diagnostic Strp, 1 strip by Misc.(Non-Drug; Combo Route) route once daily., Disp: 100 strip, Rfl: 4    blood-glucose meter kit, Use as instructed, Disp: 1 each, Rfl: 0    carvediloL (COREG) 3.125 MG tablet, Take 1 tablet (3.125 mg total) by mouth 2 (two) times daily with meals., Disp: 180 tablet, Rfl: 4    chlorthalidone (HYGROTEN) 25 MG Tab, Take 1 tablet by mouth once daily., Disp: , Rfl:     clopidogreL (PLAVIX) 75 mg tablet, Take 1 tablet (75 mg total) by mouth once daily., Disp: 90 tablet, Rfl: 3     cyanocobalamin 1,000 mcg/mL injection, Inject 1 mL (1,000 mcg total) into the muscle once a week. Dispense #12 25 gague one inch needles and #12 one ml syringes, Disp: 12 mL, Rfl: 3    diclofenac sodium (VOLTAREN) 1 % Gel, Apply 2 g topically 4 (four) times daily as needed. Apply to bilateral knees as needed for pain, Disp: , Rfl:     ergocalciferol (ERGOCALCIFEROL) 50,000 unit Cap, Take 1 capsule (50,000 Units total) by mouth twice a week., Disp: 24 capsule, Rfl: 1    ferrous sulfate 325 (65 FE) MG EC tablet, Take 1 tablet (325 mg total) by mouth once daily., Disp: 90 tablet, Rfl: 4    fexofenadine (ALLEGRA) 30 MG tablet, Take 30 mg by mouth daily as needed., Disp: , Rfl:     lancets Misc, Check blood sugar once daily, Disp: 100 each, Rfl: 0    levetiracetam XR (KEPPRA XR) 500 mg Tb24 24 hr tablet, TAKE 2 TABLETS(1000 MG) BY MOUTH EVERY DAY, Disp: 180 tablet, Rfl: 4    metFORMIN (GLUCOPHAGE) 850 MG tablet, Take 1 tablet by mouth 2 (two) times a day., Disp: , Rfl:     nitroGLYCERIN (NITROSTAT) 0.4 MG SL tablet, PLACE 1 TABLET UNDER THE TONGUE EVERY 5 MINUTES AS NEEDED FOR CHEST PAIN., Disp: 450 tablet, Rfl: 0    omeprazole (PRILOSEC) 20 MG capsule, TAKE 2 CAPSULES BY MOUTH EVERY DAY, Disp: 180 capsule, Rfl: 4    potassium chloride (KLOR-CON) 10 MEQ TbSR, TAKE 1 TABLET BY MOUTH EVERY DAY, Disp: 90 tablet, Rfl: 1    [START ON 10/16/2020] prednisolon/gatiflox/bromfenac (PREDNISOL ACE-GATIFLOX-BROMFEN) 1-0.5-0.075 % DrpS, Apply 1 drop to eye 3 (three) times daily., Disp: 5 mL, Rfl: 3    rosuvastatin (CRESTOR) 40 MG Tab, TAKE 1 TABLET BY MOUTH EVERY DAY, Disp: 90 tablet, Rfl: 4    Physical Exam    BP: Vital signs stable  General: No apparent distress  HEENT: nuclear sclerotic cataract  Lungs: adequate respirations  Heart: + pulses  Abdomen: soft  Rectal/pelvic: deferred    Impression: Visually significant Cataract    Plan: Phacoemulsification with implantation of Intraocular lens

## 2020-10-15 NOTE — TELEPHONE ENCOUNTER
No new care gaps identified.  Powered by [employer]. Reference number: [id number]. 10/15/2020 5:23:19 AM   CDT

## 2020-10-15 NOTE — PRE-PROCEDURE INSTRUCTIONS
PREOP INSTRUCTIONS:No solid food ,milk or milk products for 8 hours prior to procedure.Clear liquids are allowed up to 2 hours before procedure.Clear liquids are:water,apple juice,gatorade & powerade.Instructed to follow the surgeon's instructions if they differ from these.Shower instructions as well as directions to the [facility] were given.Encouraged to wear loose fitting,comfortable clothing.Medication instructions for pm prior to and am of procedure reviewed.Instructed to avoid taking vitamins,supplements,aspirin and ibuprofen the morning of surgery. Patient's questions and concerns addressed .Patient informed of the current visitor policy and advised patient that one visitor may accompany each patient into the hospital and wait (socially distanced) until a member of the medical team provides an update at the conclusion of the procedure.When they enter the hospital both patient and visitor will have their temperature checked.All visitors are asked to arrive with a mask and to keep their mask on throughout the visit.     Patient denies any side effects or issues with anesthesia or sedation.   nding.

## 2020-10-15 NOTE — PROGRESS NOTES
Refill Routing Note   Medication(s) are not appropriate for processing by [facility] for the following reason(s):     - Medication not active on medication list    [facility] actions taken in this encounter:   Defer  Approve    Medication-related problems identified: Requires labs  Medication Therapy Plan: Last office visit encounter noted pt currently off amlodipine. On chart as historical med. Unclear if pt should continue. Defer to PCP. CDMR. labs(Uric Acid)   Will follow up with your staff to schedule labs after your decision.    Medication reconciliation completed: No   Automatic Epic Generated Protocol Data:    Orders Placed This Encounter    allopurinoL (ZYLOPRIM) 300 MG tablet      Requested Prescriptions   Pending Prescriptions Disp Refills    amLODIPine (NORVASC) 5 MG tablet [Pharmacy Med Name: AMLODIPINE BESYLATE 5MG TABLETS] 90 tablet 0     Sig: TAKE 1 TABLET BY MOUTH DAILY       Cardiovascular:  Calcium Channel Blockers Failed - 10/15/2020  5:23 AM        Failed - Last BP in normal range within 360 days     BP Readings from Last 3 Encounters:   08/19/20 (!) 140/80   08/14/20 (!) 163/76   08/03/20 110/82              Passed - Patient is at least 18 years old        Passed - Office Visit within last 12 months or future 90 days.     Recent Outpatient Visits            2 weeks ago Nuclear sclerotic cataract of right eye    [facility] 1st Fl Karishma Mata MD    4 weeks ago Type 2 diabetes mellitus with diabetic nephropathy, without long-term current use of insulin    [facility] 1st Fl Moe Noel, OD    2 months ago Coronary artery disease due to lipid rich plaque    [place] - Cardiology Jaycee Puente MD    2 months ago Iron deficiency anemia due to chronic blood loss    [facility] Ethel Berger MD    3 months ago Malignant neoplasm of upper-outer quadrant of left breast in female, estrogen receptor positive    [facility] - Hem Onc  3rd Fl Bismark Guevara MD          Future Appointments              Tomorrow COVID TESTING, [facility] 2nd Fl, [facility]    In 4 days MD [facility] 1st Fl, [facility]    In 3 weeks MD [facility] Primary Care Bldg, [facility] PC[facility]    In 2 months Yun Whitaker, NP [facility] - Hem Onc 3rd Fl, [place] [place]                 Signed Prescriptions Disp Refills    allopurinoL (ZYLOPRIM) 300 MG tablet 90 tablet 0     Sig: TAKE 1 TABLET BY MOUTH ONCE DAILY       Endocrinology:  Gout Agents - allopurinol Failed - 10/15/2020  5:23 AM        Failed - Uric Acid within 360 days     Uric Acid   Date Value Ref Range Status   09/07/2019 2.6 2.4 - 5.7 mg/dL Final   03/12/2016 5.5 2.4 - 5.7 mg/dL Final   12/23/2015 5.5 2.4 - 5.7 mg/dL Final              Failed - Cr is 1.3 or below and within 360 days     Creatinine   Date Value Ref Range Status   05/04/2020 1.5 (H) 0.5 - 1.4 mg/dL Final   03/04/2020 1.8 (H) 0.5 - 1.4 mg/dL Final   03/03/2020 1.6 (H) 0.5 - 1.4 mg/dL Final              Failed - eGFR is 60 or above and within 360 days     eGFR if non    Date Value Ref Range Status   05/04/2020 33.4 (A) >60 mL/min/1.73 m^2 Final     Comment:     Calculation used to obtain the estimated glomerular filtration  rate (eGFR) is the CKD-EPI equation.      03/04/2020 26.8 (A) >60 mL/min/1.73 m^2 Final     Comment:     Calculation used to obtain the estimated glomerular filtration  rate (eGFR) is the CKD-EPI equation.      03/03/2020 30.9 (A) >60 mL/min/1.73 m^2 Final     Comment:     Calculation used to obtain the estimated glomerular filtration  rate (eGFR) is the CKD-EPI equation.        eGFR if    Date Value Ref Range Status   05/04/2020 38.5 (A) >60 mL/min/1.73 m^2 Final   03/04/2020 30.9 (A) >60 mL/min/1.73 m^2 Final   03/03/2020 35.6 (A) >60 mL/min/1.73 m^2 Final              Passed - Patient is at least 18 years old         Passed - Office Visit within last 12 months or future 90 days.     Recent Outpatient Visits            2 weeks ago Nuclear sclerotic cataract of right eye    [facility] 1st Fl Karishma Mata MD    4 weeks ago Type 2 diabetes mellitus with diabetic nephropathy, without long-term current use of insulin    [facility] 1st Fl Moe Noel, OD    2 months ago Coronary artery disease due to lipid rich plaque    [place] - Cardiology Jaycee Puente MD    2 months ago Iron deficiency anemia due to chronic blood loss    [facility] Primary Care Bldg Ethel Berger MD    3 months ago Malignant neoplasm of upper-outer quadrant of left breast in female, estrogen receptor positive    [facility] - Hem Onc 3rd Fl [place] Guevara MD          Future Appointments              Tomorrow COVID TESTING, [facility] 2nd Fl, [facility]    In 4 days Karishma Mata MD [facility] 1st Fl, [facility]    In 3 weeks Ethel Berger MD [facility] Primary Care Bl, [facility] Gamezy PCW    In 2 months Yun Whitaker, LU[facility] - Hem Onc 3rd Fl, Grzegorz Caputo                Passed - WBC within 360 days     WBC   Date Value Ref Range Status   05/04/2020 4.97 3.90 - 12.70 K/uL Final   03/04/2020 6.69 3.90 - 12.70 K/uL Final   03/03/2020 6.71 3.90 - 12.70 K/uL Final              Passed - RBC within 360 days     RBC   Date Value Ref Range Status   05/04/2020 4.35 4.00 - 5.40 M/uL Final   03/04/2020 3.40 (L) 4.00 - 5.40 M/uL Final   03/03/2020 3.62 (L) 4.00 - 5.40 M/uL Final              Passed - HGB within 360 days     Hemoglobin   Date Value Ref Range Status   05/04/2020 13.5 12.0 - 16.0 g/dL Final   03/04/2020 10.3 (L) 12.0 - 16.0 g/dL Final   03/03/2020 10.9 (L) 12.0 - 16.0 g/dL Final              Passed - HCT within 360 days     Hematocrit   Date Value Ref Range Status   05/04/2020 43.5 37.0 - 48.5 % Final   03/04/2020 34.0 (L) 37.0 - 48.5 % Final    03/03/2020 33.7 (L) 37.0 - 48.5 % Final              Passed - PLT within 360 days     Platelets   Date Value Ref Range Status   05/04/2020 248 150 - 350 K/uL Final   03/04/2020 297 150 - 350 K/uL Final   03/03/2020 266 150 - 350 K/uL Final              Passed - ALT completed     ALT   Date Value Ref Range Status   05/04/2020 9 (L) 10 - 44 U/L Final   02/27/2020 9 (L) 10 - 44 U/L Final   02/11/2020 8 (L) 10 - 44 U/L Final              Passed - AST is 54 or below and within 360 days     AST   Date Value Ref Range Status   05/04/2020 15 10 - 40 U/L Final   02/27/2020 17 10 - 40 U/L Final   02/11/2020 14 10 - 40 U/L Final                    Appointments  past 12m or future 3m with PCP    Date Provider   Last Visit   8/3/2020 Ethel Berger MD   Next Visit   11/11/2020 Ethel Berger MD   ED visits in past 90 days: 0        Note composed:11:15 AM 10/15/2020

## 2020-10-16 ENCOUNTER — LAB VISIT (OUTPATIENT)
Dept: SURGERY | Facility: CLINIC | Age: 80
End: 2020-10-16
Payer: MEDICARE

## 2020-10-16 DIAGNOSIS — Z13.9 SCREENING PROCEDURE: ICD-10-CM

## 2020-10-16 LAB — SARS-COV-2 RNA RESP QL NAA+PROBE: NOT DETECTED

## 2020-10-16 PROCEDURE — U0003 INFECTIOUS AGENT DETECTION BY NUCLEIC ACID (DNA OR RNA); SEVERE ACUTE RESPIRATORY SYNDROME CORONAVIRUS 2 (SARS-COV-2) (CORONAVIRUS DISEASE [COVID-19]), AMPLIFIED PROBE TECHNIQUE, MAKING USE OF HIGH THROUGHPUT TECHNOLOGIES AS DESCRIBED BY CMS-2020-01-R: HCPCS

## 2020-10-18 ENCOUNTER — ANESTHESIA (OUTPATIENT)
Dept: SURGERY | Facility: HOSPITAL | Age: 80
End: 2020-10-18
Payer: MEDICARE

## 2020-10-18 ENCOUNTER — ANESTHESIA EVENT (OUTPATIENT)
Dept: SURGERY | Facility: HOSPITAL | Age: 80
End: 2020-10-18
Payer: MEDICARE

## 2020-10-18 ENCOUNTER — HOSPITAL ENCOUNTER (OUTPATIENT)
Facility: HOSPITAL | Age: 80
Discharge: HOME | End: 2020-10-18
Attending: OPHTHALMOLOGY | Admitting: OPHTHALMOLOGY
Payer: MEDICARE

## 2020-10-18 VITALS
RESPIRATION RATE: 18 BRPM | OXYGEN SATURATION: 100 % | SYSTOLIC BLOOD PRESSURE: 171 MMHG | DIASTOLIC BLOOD PRESSURE: 86 MMHG | HEIGHT: 65 IN | BODY MASS INDEX: 34.16 KG/M2 | HEART RATE: 80 BPM | TEMPERATURE: 98 F | WEIGHT: 205 LBS

## 2020-10-18 DIAGNOSIS — H25.10 SENILE NUCLEAR SCLEROSIS: ICD-10-CM

## 2020-10-18 DIAGNOSIS — H25.11 NUCLEAR SCLEROTIC CATARACT OF RIGHT EYE: Primary | ICD-10-CM

## 2020-10-18 LAB — POCT GLUCOSE: 101 MG/DL (ref 70–110)

## 2020-10-18 PROCEDURE — 82962 GLUCOSE BLOOD TEST: CPT | Performed by: OPHTHALMOLOGY

## 2020-10-18 PROCEDURE — 63600175 PHARM REV CODE 636 W HCPCS: Performed by: OPHTHALMOLOGY

## 2020-10-18 PROCEDURE — D9220A PRA ANESTHESIA: ICD-10-PCS | Mod: CRNA,,, | Performed by: NURSE ANESTHETIST, CERTIFIED REGISTERED

## 2020-10-18 PROCEDURE — 25000003 PHARM REV CODE 250: Performed by: NURSE ANESTHETIST, CERTIFIED REGISTERED

## 2020-10-18 PROCEDURE — 25000003 PHARM REV CODE 250

## 2020-10-18 PROCEDURE — 66984 PR REMOVAL, CATARACT, W/INSRT INTRAOC LENS, W/O ENDO CYCLO: ICD-10-PCS | Mod: RT,,, | Performed by: OPHTHALMOLOGY

## 2020-10-18 PROCEDURE — 63600175 PHARM REV CODE 636 W HCPCS: Performed by: NURSE ANESTHETIST, CERTIFIED REGISTERED

## 2020-10-18 PROCEDURE — 66984 XCAPSL CTRC RMVL W/O ECP: CPT | Mod: RT,,, | Performed by: OPHTHALMOLOGY

## 2020-10-18 PROCEDURE — 71000015 HC POSTOP RECOV 1ST HR: Performed by: OPHTHALMOLOGY

## 2020-10-18 PROCEDURE — 37000008 HC ANESTHESIA 1ST 15 MINUTES: Performed by: OPHTHALMOLOGY

## 2020-10-18 PROCEDURE — 25000003 PHARM REV CODE 250: Performed by: OPHTHALMOLOGY

## 2020-10-18 PROCEDURE — 37000009 HC ANESTHESIA EA ADD 15 MINS: Performed by: OPHTHALMOLOGY

## 2020-10-18 PROCEDURE — D9220A PRA ANESTHESIA: Mod: ANES,,, | Performed by: ANESTHESIOLOGY

## 2020-10-18 PROCEDURE — 36000706: Performed by: OPHTHALMOLOGY

## 2020-10-18 PROCEDURE — V2632 POST CHMBR INTRAOCULAR LENS: HCPCS | Performed by: OPHTHALMOLOGY

## 2020-10-18 PROCEDURE — 36000707: Performed by: OPHTHALMOLOGY

## 2020-10-18 PROCEDURE — D9220A PRA ANESTHESIA: ICD-10-PCS | Mod: ANES,,, | Performed by: ANESTHESIOLOGY

## 2020-10-18 PROCEDURE — 71000044 HC DOSC ROUTINE RECOVERY FIRST HOUR: Performed by: OPHTHALMOLOGY

## 2020-10-18 PROCEDURE — D9220A PRA ANESTHESIA: Mod: CRNA,,, | Performed by: NURSE ANESTHETIST, CERTIFIED REGISTERED

## 2020-10-18 DEVICE — LENS IOL ITEC PRELOAD 22.0D: Type: IMPLANTABLE DEVICE | Site: EYE | Status: FUNCTIONAL

## 2020-10-18 RX ORDER — MIDAZOLAM HYDROCHLORIDE 1 MG/ML
INJECTION, SOLUTION INTRAMUSCULAR; INTRAVENOUS
Status: DISCONTINUED | OUTPATIENT
Start: 2020-10-18 | End: 2020-10-18

## 2020-10-18 RX ORDER — KETOROLAC TROMETHAMINE 5 MG/ML
1 SOLUTION OPHTHALMIC ONCE
Status: COMPLETED | OUTPATIENT
Start: 2020-10-18 | End: 2020-10-18

## 2020-10-18 RX ORDER — LIDOCAINE HYDROCHLORIDE 40 MG/ML
INJECTION, SOLUTION RETROBULBAR
Status: DISCONTINUED | OUTPATIENT
Start: 2020-10-18 | End: 2020-10-18 | Stop reason: HOSPADM

## 2020-10-18 RX ORDER — PREDNISOLONE ACETATE 10 MG/ML
SUSPENSION/ DROPS OPHTHALMIC
Status: DISCONTINUED | OUTPATIENT
Start: 2020-10-18 | End: 2020-10-18 | Stop reason: HOSPADM

## 2020-10-18 RX ORDER — ACETAMINOPHEN 325 MG/1
650 TABLET ORAL EVERY 4 HOURS PRN
Status: DISCONTINUED | OUTPATIENT
Start: 2020-10-18 | End: 2020-10-18 | Stop reason: HOSPADM

## 2020-10-18 RX ORDER — PREDNISOLONE ACETATE 10 MG/ML
SUSPENSION/ DROPS OPHTHALMIC
Status: DISCONTINUED
Start: 2020-10-18 | End: 2020-10-18 | Stop reason: HOSPADM

## 2020-10-18 RX ORDER — LIDOCAINE HYDROCHLORIDE 40 MG/ML
INJECTION, SOLUTION RETROBULBAR
Status: DISCONTINUED
Start: 2020-10-18 | End: 2020-10-18 | Stop reason: HOSPADM

## 2020-10-18 RX ORDER — PHENYLEPHRINE HYDROCHLORIDE 25 MG/ML
1 SOLUTION/ DROPS OPHTHALMIC
Status: DISCONTINUED | OUTPATIENT
Start: 2020-10-18 | End: 2022-03-07

## 2020-10-18 RX ORDER — LIDOCAINE HYDROCHLORIDE 10 MG/ML
INJECTION, SOLUTION EPIDURAL; INFILTRATION; INTRACAUDAL; PERINEURAL
Status: DISCONTINUED
Start: 2020-10-18 | End: 2020-10-18 | Stop reason: HOSPADM

## 2020-10-18 RX ORDER — SODIUM CHLORIDE 0.9 % (FLUSH) 0.9 %
10 SYRINGE (ML) INJECTION
Status: DISCONTINUED | OUTPATIENT
Start: 2020-10-18 | End: 2020-10-18 | Stop reason: HOSPADM

## 2020-10-18 RX ORDER — TROPICAMIDE 10 MG/ML
1 SOLUTION/ DROPS OPHTHALMIC
Status: DISCONTINUED | OUTPATIENT
Start: 2020-10-18 | End: 2022-03-07

## 2020-10-18 RX ORDER — SODIUM CHLORIDE 9 MG/ML
INJECTION, SOLUTION INTRAVENOUS CONTINUOUS PRN
Status: DISCONTINUED | OUTPATIENT
Start: 2020-10-18 | End: 2020-10-18

## 2020-10-18 RX ORDER — PROPARACAINE HYDROCHLORIDE 5 MG/ML
1 SOLUTION/ DROPS OPHTHALMIC
Status: DISCONTINUED | OUTPATIENT
Start: 2020-10-18 | End: 2022-03-07

## 2020-10-18 RX ORDER — FENTANYL CITRATE 50 UG/ML
INJECTION, SOLUTION INTRAMUSCULAR; INTRAVENOUS
Status: DISCONTINUED | OUTPATIENT
Start: 2020-10-18 | End: 2020-10-18

## 2020-10-18 RX ORDER — MOXIFLOXACIN 5 MG/ML
1 SOLUTION/ DROPS OPHTHALMIC
Status: COMPLETED | OUTPATIENT
Start: 2020-10-18 | End: 2020-10-18

## 2020-10-18 RX ORDER — MOXIFLOXACIN 5 MG/ML
SOLUTION/ DROPS OPHTHALMIC
Status: DISCONTINUED | OUTPATIENT
Start: 2020-10-18 | End: 2020-10-18 | Stop reason: HOSPADM

## 2020-10-18 RX ORDER — LIDOCAINE HYDROCHLORIDE 10 MG/ML
INJECTION, SOLUTION EPIDURAL; INFILTRATION; INTRACAUDAL; PERINEURAL
Status: DISCONTINUED | OUTPATIENT
Start: 2020-10-18 | End: 2020-10-18 | Stop reason: HOSPADM

## 2020-10-18 RX ADMIN — PHENYLEPHRINE HYDROCHLORIDE 1 DROP: 25 SOLUTION/ DROPS OPHTHALMIC at 09:10

## 2020-10-18 RX ADMIN — KETOROLAC TROMETHAMINE 1 DROP: 5 SOLUTION/ DROPS OPHTHALMIC at 09:10

## 2020-10-18 RX ADMIN — MOXIFLOXACIN 1 DROP: 5 SOLUTION/ DROPS OPHTHALMIC at 09:10

## 2020-10-18 RX ADMIN — FENTANYL CITRATE 50 MCG: 50 INJECTION, SOLUTION INTRAMUSCULAR; INTRAVENOUS at 11:10

## 2020-10-18 RX ADMIN — PROPARACAINE HYDROCHLORIDE 1 DROP: 5 SOLUTION/ DROPS OPHTHALMIC at 09:10

## 2020-10-18 RX ADMIN — TROPICAMIDE 1 DROP: 10 SOLUTION/ DROPS OPHTHALMIC at 09:10

## 2020-10-18 RX ADMIN — SODIUM CHLORIDE: 9 INJECTION, SOLUTION INTRAVENOUS at 11:10

## 2020-10-18 RX ADMIN — MIDAZOLAM HYDROCHLORIDE 1 MG: 1 INJECTION, SOLUTION INTRAMUSCULAR; INTRAVENOUS at 11:10

## 2020-10-18 NOTE — ANESTHESIA POSTPROCEDURE EVALUATION
Anesthesia Post Evaluation    Patient: Renata Bergman    Procedure(s) Performed: Procedure(s) (LRB):  PHACOEMULSIFICATION, CATARACT (Right)  INSERTION, IOL PROSTHESIS (Right)    Final Anesthesia Type: MAC    Patient location during evaluation: PACU  Patient participation: Yes- Able to Participate  Level of consciousness: awake and alert  Post-procedure vital signs: reviewed and stable  Pain management: adequate  Airway patency: patent    PONV status at discharge: No PONV  Anesthetic complications: no      Cardiovascular status: blood pressure returned to baseline and hemodynamically stable  Respiratory status: unassisted and spontaneous ventilation  Hydration status: euvolemic  Follow-up not needed.          Vitals Value Taken Time   /82 10/18/20 1144   Temp 36.7 °C (98.1 °F) 10/18/20 1144   Pulse 79 10/18/20 1144   Resp 18 10/18/20 1144   SpO2 98 10/18/20 1213         No case tracking events are documented in the log.      Pain/Perri Score: Perri Score: 10 (10/18/2020 11:45 AM)

## 2020-10-18 NOTE — DISCHARGE INSTRUCTIONS
Discharge Instructions for Cataract Surgery  A surgeon removed the cloudy lens in your eye and replaced it with a clear man-made lens. Be sure to have an adult family member or friend drive you home after surgery. Heres what you can expect following surgery and tips for a healthy recovery.  It is normal to have the following:  · Bruised or bloodshot eye for 7 days  · Itching and mild discomfort for several days  · Some fluid discharge  · Sensitivity to light  · Scratchy, sandlike feeling in the eye for 2 weeks  · Feeling tired, especially during the first 24 hours  Activity level  · Do not drive for 2 days or as instructed by your doctor.  · Do not drink alcohol for at least 24 hours.  · Avoid bending at the waist to  objects or lifting anything heavy for 2 days.  · Relax for the first 24 hours after surgery. Watching TV and reading are OK and wont harm your eye.  Eye protection  · Do not rub or press on your eye.  · Sleep on your back or on your unoperated side for 2 nights.  · If instructed, wear a bandage over your eye for 2 days and 2 nights.  · If instructed, wear a shield to protect your eye for 2 days and 2 nights.  Using eyedrops  You may be given special eyedrops or ointment. Here is one way to use eyedrops:  · Tilt your head back.  · Pull your bottom eyelid down.  · Squeeze one drop into your eye. Do not touch your eye with the bottle tip.  · Close your eyes for a few seconds.  · If you need more than one drop, wait a few minutes before adding the next one.   Call your doctor right away if you have any of the following:  · Bleeding or discharge from the eye  · Your vision suddenly becomes worse  · Pain medicine you are told to take does not ease your pain  · Nausea or vomiting  · Chills or fever over 100.4°F (39.1°C)   Date Last Reviewed: 5/30/2015  © 4863-3919 [employer]. [address] [place] [address], [place] [address]. All rights reserved. This information is not intended as a  substitute for professional medical care. Always follow your healthcare professional's instructions.        Anesthesia: General Anesthesia     You are watched continuously during your procedure by your anesthesia provider.     Youre due to have surgery. During surgery, youll be given medicine called anesthesia or anesthetic. This will keep you comfortable and pain-free. Your anesthesia provider will use general anesthesia.  What is general anesthesia?  General anesthesia puts you into a state like deep sleep. It goes into the bloodstream (IV anesthetics), into the lungs (gas anesthetics), or both. You feel nothing during the procedure. You will not remember it. During the procedure, the anesthesia provider monitors you continuously. He or she checks your heart rate and rhythm, blood pressure, breathing, and blood oxygen.  · IV anesthetics. IV anesthetics are given through an IV line in your arm. Theyre often given first. This is so you are asleep before a gas anesthetic is started. Some kinds of IV anesthetics relieve pain. Others relax you. Your doctor will decide which kind is best in your case.  · Gas anesthetics. Gas anesthetics are breathed into the lungs. They are often used to keep you asleep. They can be given through a facemask or a tube placed in your larynx or trachea (breathing tube).  ¨ If you have a facemask, your anesthesia provider will most likely place it over your nose and mouth while youre still awake. Youll breathe oxygen through the mask as your IV anesthetic is started. Gas anesthetic may be added through the mask.  ¨ If you have a tube in the larynx or trachea, it will be inserted into your throat after youre asleep.  Anesthesia tools and medicines  You will likely have:  · IV anesthetics. These are put into an IV line into your bloodstream.  · Gas anesthetics. You breathe these anesthetics into your lungs, where they pass into your bloodstream.  · Pulse oximeter. This is a small clip  that is attached to the end of your finger. This measures your blood oxygen level.  · Electrocardiography leads (electrodes). These are small sticky pads that are placed on your chest. They record your heart rate and rhythm.  · Blood pressure cuff. This reads your blood pressure.  Risks and possible complications  General anesthesia has some risks. These include:  · Breathing problems  · Nausea and vomiting  · Sore throat or hoarseness (usually temporary)  · Allergic reaction to the anesthetic  · Irregular heartbeat (rare)  · Cardiac arrest (rare)   Anesthesia safety  · Follow all instructions you are given for how long not to eat or drink before your procedure.  · Be sure your doctor knows what medicines and drugs you take. This includes over-the-counter medicines, herbs, supplements, alcohol or other drugs. You will be asked when those were last taken.  · Have an adult family member or friend drive you home after the procedure.  · For the first 24 hours after your surgery:  ¨ Do not drive or use heavy equipment.  ¨ Do not make important decisions or sign legal documents. If important decisions or signing legal documents is necessary during the first 24 hours after surgery, have a trusted family member or spouse act on your behalf.  ¨ Avoid alcohol.  ¨ Have a responsible adult stay with you. He or she can watch for problems and help keep you safe.  Date Last Reviewed: 12/1/2016  © 8962-4512 [employer]. [address] [place] [address], [place] [address]. All rights reserved. This information is not intended as a substitute for professional medical care. Always follow your healthcare professional's instructions.

## 2020-10-18 NOTE — OP NOTE
DATE OF PROCEDURE: 10/18/2020    SURGEON: JOVANI VAZQUEZ MD    PREOPERATIVE DIAGNOSIS:  Senile nuclear sclerotic cataract right eye.     POSTOPERATIVE DIAGNOSIS: Senile nuclear sclerotic cataract right eye.     PROCEDURE PERFORMED:  Phacoemulsification with placement of intraocular lens, right eye.    IMPLANT:  PCBOO 22.0    ANESTHESIA:  Topical and MAC    COMPLICATIONS: none    ESTIMATED BLOOD LOSS: <1cc    SPECIMENS: none    INDICATIONS FOR PROCEDURE:  This patient presented to the clinic with decreased vision in the right eye and was found to have a cataract.  The risks, benefits, and alternatives were discussed and the patient agreed to proceed with phacoemulsification and implantation of a lens in the right eye.     PROCEDURE IN DETAIL:  The patient was met in the preop holding area.  Consent was confirmed to be signed.  The operative site was marked.  The patient was brought into the operating room by the anesthesia team and placed under monitored anesthesia care.  The right eye was prepped and draped in a sterile ophthalmic fashion.  A Mahnaz speculum was placed into the right eye.   A paracentesis site was made and 1% preservative-free lidocaine was injected into the anterior chamber.  Viscoelastic  material was injected into the anterior chamber.  A keratome blade was used to make a clear corneal incision.  A cystotome was used to initiate the continuous curvilinear capsulorrhexis which was completed with Utrata forceps.  BSS on a reddy cannula was used to perform hydrodissection.  The phacoemulsification tip was introduced into the eye and the nucleus was removed in a standard divide-and-conquer fashion.  Remaining cortical material was removed from the eye using irrigation-aspiration.  The capsular bag was filled with viscoelastic material and the intraocular lens was injected and positioned into place. Remaining viscoelastic material was removed from the eye using irrigation and aspiration.  The  corneal wounds were hydrated.  The eye was filled to physiologic pressure. The wounds were found to be watertight. Drops of Vigamox and prednisilone were placed into the eye.  The eye was washed, dried, and shielded.  The patient tolerated the procedure well and knows to follow up with me tomorrow morning, sooner if needed.

## 2020-10-18 NOTE — ANESTHESIA PREPROCEDURE EVALUATION
10/18/2020  Renata Bergman is a 80 y.o., female.    Anesthesia Evaluation    I have reviewed the Patient Summary Reports.    I have reviewed the Nursing Notes. I have reviewed the NPO Status.   I have reviewed the Medications.     Review of Systems  Anesthesia Hx:  No problems with previous Anesthesia Denies Hx of Anesthetic complications  History of prior surgery of interest to airway management or planning: Denies Family Hx of Anesthesia complications.    Social:  Former Smoker    Hematology/Oncology:         -- Anemia: Current/Recent Cancer. Breast   EENT/Dental:   Cataracts   Cardiovascular:   Hypertension Past MI CAD asymptomatic CABG/stent Dysrhythmias  hyperlipidemia Right bundle branch block    Pulmonary:  Pulmonary Normal    Renal/:   Chronic Renal Disease, CRI    Hepatic/GI:   GERD, well controlled    Musculoskeletal:   Arthritis     Neurological:   Seizures History of Carpel tunnel syndrome    Endocrine:   Diabetes    Dermatological:  Skin Normal    Psych:  Psychiatric Normal           Physical Exam  General:  Well nourished, Obesity    Airway/Jaw/Neck:  Airway Findings: Mouth Opening: Normal Tongue: Normal  General Airway Assessment: Adult  Mallampati: II  TM Distance: Normal, at least 6 cm  Jaw/Neck Findings:  Neck ROM: Normal ROM     Eyes/Ears/Nose:  EYES/EARS/NOSE FINDINGS: Normal   Dental:  Dental Findings: Upper Dentures   Chest/Lungs:  Chest/Lungs Findings: Normal Respiratory Rate     Heart/Vascular:  Heart Findings: Rate: Normal  Rhythm: Regular Rhythm        Mental Status:  Mental Status Findings:  Cooperative, Alert and Oriented         Anesthesia Plan  Type of Anesthesia, risks & benefits discussed:  Anesthesia Type:  general, MAC  Patient's Preference: MAC  Intra-op Monitoring Plan: standard ASA monitors  Intra-op Monitoring Plan Comments:   Post Op Pain Control Plan: per primary  service following discharge from PACU and IV/PO Opioids PRN  Post Op Pain Control Plan Comments:   Induction:   IV  Beta Blocker:  Patient is not currently on a Beta-Blocker (No further documentation required).       Informed Consent: Patient understands risks and agrees with Anesthesia plan.  Questions answered. Anesthesia consent signed with patient.  ASA Score: 3     Day of Surgery Review of History & Physical:    H&P update referred to the surgeon.         Ready For Surgery From Anesthesia Perspective.

## 2020-10-18 NOTE — TRANSFER OF CARE
"Anesthesia Transfer of Care Note    Patient: Renata Bergman    Procedure(s) Performed: Procedure(s) (LRB):  PHACOEMULSIFICATION, CATARACT (Right)  INSERTION, IOL PROSTHESIS (Right)    Patient location: PACU    Anesthesia Type: MAC    Transport from OR: Transported from OR on room air with adequate spontaneous ventilation    Post pain: adequate analgesia    Post assessment: no apparent anesthetic complications and tolerated procedure well    Post vital signs: stable    Level of consciousness: awake, alert and oriented    Nausea/Vomiting: no nausea/vomiting    Complications: none    Transfer of care protocol was followed      Last vitals:   Visit Vitals  BP (!) 184/81 (BP Location: Right arm, Patient Position: Lying)   Pulse 80   Temp 37.1 °C (98.8 °F) (Temporal)   Resp 18   Ht 5' 5" (1.651 m)   Wt 93 kg (205 lb)   LMP  (LMP Unknown)   SpO2 100%   Breastfeeding No   BMI 34.11 kg/m²     "

## 2020-10-19 ENCOUNTER — OFFICE VISIT (OUTPATIENT)
Dept: OPHTHALMOLOGY | Facility: CLINIC | Age: 80
End: 2020-10-19
Payer: MEDICARE

## 2020-10-19 VITALS — SYSTOLIC BLOOD PRESSURE: 130 MMHG | HEART RATE: 87 BPM | DIASTOLIC BLOOD PRESSURE: 81 MMHG

## 2020-10-19 DIAGNOSIS — Z98.41 STATUS POST CATARACT EXTRACTION AND INSERTION OF INTRAOCULAR LENS, RIGHT: Primary | ICD-10-CM

## 2020-10-19 DIAGNOSIS — Z96.1 STATUS POST CATARACT EXTRACTION AND INSERTION OF INTRAOCULAR LENS, RIGHT: Primary | ICD-10-CM

## 2020-10-19 PROCEDURE — 99024 POSTOP FOLLOW-UP VISIT: CPT | Mod: S$GLB,,, | Performed by: OPHTHALMOLOGY

## 2020-10-19 PROCEDURE — 99999 PR PBB SHADOW E&M-EST. PATIENT-LVL III: CPT | Mod: PBBFAC,,, | Performed by: OPHTHALMOLOGY

## 2020-10-19 PROCEDURE — 99024 PR POST-OP FOLLOW-UP VISIT: ICD-10-PCS | Mod: S$GLB,,, | Performed by: OPHTHALMOLOGY

## 2020-10-19 PROCEDURE — 99999 PR PBB SHADOW E&M-EST. PATIENT-LVL III: ICD-10-PCS | Mod: PBBFAC,,, | Performed by: OPHTHALMOLOGY

## 2020-10-19 NOTE — PROGRESS NOTES
HPI     Ref by Dr. Noel     S/p phaco w/IOL OD 10/18/20  T2 DM w/ diabetic nephropathy   NSC OS  Screening for glaucoma   hyperopia    Combo TID OD  Allergy gtt bid OU     Pt here for 1 day post op OD.  Pt states doing well. Pt denies eye pain   OD.     Last edited by Ibis Linder MA on 10/19/2020  9:13 AM.   (History)            Assessment /Plan     For exam results, see Encounter Report.    Status post cataract extraction and insertion of intraocular lens, right      Slit Lamp Exam  L/L - normal  C/s - quiet  Cornea - clear  A/C - 1+ cell  Lens - PCIOL    POD #1 s/p phaco/IOL  - doing well  - continue the following drops:    vigamox or ocuflox TID x 1 wk then stop  Pred forte or durezol or dexamethasone TID x  4 wks  Ketorolac TID until runs out    Versus:    Combination drop - 1 drop TID x total of 1 month    Appropriate precautions and post op medications reviewed.  Patient instructed to call or come in if symptoms of redness, decreased vision, or pain are experienced.    -f/up 1-2wks, sooner PRN.     Cat OS - can sign consent next if ready to proceed.

## 2020-10-27 RX ORDER — ERGOCALCIFEROL 1.25 MG/1
50000 CAPSULE ORAL
Qty: 24 CAPSULE | Refills: 1 | Status: SHIPPED | OUTPATIENT
Start: 2020-10-29 | End: 2021-04-09 | Stop reason: SDUPTHER

## 2020-10-27 NOTE — TELEPHONE ENCOUNTER
----- Message from Rhianna Carvalho sent at 10/27/2020 10:45 AM CDT -----  Contact: [phone]  Requesting an RX refill or new RX.  Is this a refill or new RX: Refill  RX name and strength:Vitamin D  Is this a 30 day or 90 day RX: 90  Pharmacy name and phone # (copy/paste from chart):  [id number] [address] AT [facility] [phone] (Phone)  [phone] (Fax)  Comments:

## 2020-10-28 ENCOUNTER — PATIENT OUTREACH (OUTPATIENT)
Dept: ADMINISTRATIVE | Facility: HOSPITAL | Age: 80
End: 2020-10-28

## 2020-10-30 ENCOUNTER — OFFICE VISIT (OUTPATIENT)
Dept: OPHTHALMOLOGY | Facility: CLINIC | Age: 80
End: 2020-10-30
Payer: MEDICARE

## 2020-10-30 DIAGNOSIS — Z96.1 STATUS POST CATARACT EXTRACTION AND INSERTION OF INTRAOCULAR LENS, RIGHT: Primary | ICD-10-CM

## 2020-10-30 DIAGNOSIS — H25.12 NUCLEAR SCLEROTIC CATARACT OF LEFT EYE: ICD-10-CM

## 2020-10-30 DIAGNOSIS — Z98.41 STATUS POST CATARACT EXTRACTION AND INSERTION OF INTRAOCULAR LENS, RIGHT: Primary | ICD-10-CM

## 2020-10-30 PROCEDURE — 92136 OPHTHALMIC BIOMETRY: CPT | Mod: 26,LT,S$GLB, | Performed by: OPHTHALMOLOGY

## 2020-10-30 PROCEDURE — 99024 PR POST-OP FOLLOW-UP VISIT: ICD-10-PCS | Mod: S$GLB,,, | Performed by: OPHTHALMOLOGY

## 2020-10-30 PROCEDURE — 99999 PR PBB SHADOW E&M-EST. PATIENT-LVL III: ICD-10-PCS | Mod: PBBFAC,,, | Performed by: OPHTHALMOLOGY

## 2020-10-30 PROCEDURE — 99024 POSTOP FOLLOW-UP VISIT: CPT | Mod: S$GLB,,, | Performed by: OPHTHALMOLOGY

## 2020-10-30 PROCEDURE — 92136 IOL MASTER - OU - BOTH EYES: ICD-10-PCS | Mod: 26,LT,S$GLB, | Performed by: OPHTHALMOLOGY

## 2020-10-30 PROCEDURE — 99999 PR PBB SHADOW E&M-EST. PATIENT-LVL III: CPT | Mod: PBBFAC,,, | Performed by: OPHTHALMOLOGY

## 2020-10-30 NOTE — PROGRESS NOTES
HPI     Ref by Dr. Neol     S/p phaco w/IOL OD 10/18/20  T2 DM w/ diabetic nephropathy   NSC OS  Screening for glaucoma   hyperopia    Combo TID OD  Allergy gtt bid OU     Pt here for 1 week post op OD.  Pt states doing well. Pt denies eye pain   OD.     Last edited by Ibis Linder MA on 10/30/2020  9:32 AM.   (History)            Assessment /Plan     For exam results, see Encounter Report.    Status post cataract extraction and insertion of intraocular lens, right    Nuclear sclerotic cataract of left eye  -     IOL Master - OU - Both Eyes      PO week #1 s/p phaco/IOL -    - doing well, no issues    Continue combo drops for a total of 1 month versus: d/c abx gtt, continue PF/ketorolocTID for total of 1 month    - f/up 3-4 wks for MRx, DFE    Will eventually need yag cap OD    Visually significant nuclear sclerotic cataract   - Interfering with activities of daily living.  Pt desires cataract surgery for [facility].   - R/B/A discussed and pt agrees to proceed with surgery.   - IOL options discussed according to patient's goals and concomitant ocular pathology; and pt content with monofocal lens.    - Target: plano.      (pcboo 23.0 OS)

## 2020-11-05 DIAGNOSIS — Z17.0 MALIGNANT NEOPLASM OF UPPER-OUTER QUADRANT OF LEFT BREAST IN FEMALE, ESTROGEN RECEPTOR POSITIVE: ICD-10-CM

## 2020-11-05 DIAGNOSIS — C50.412 MALIGNANT NEOPLASM OF UPPER-OUTER QUADRANT OF LEFT BREAST IN FEMALE, ESTROGEN RECEPTOR POSITIVE: ICD-10-CM

## 2020-11-05 RX ORDER — ANASTROZOLE 1 MG/1
TABLET ORAL
Qty: 30 TABLET | Refills: 11 | Status: SHIPPED | OUTPATIENT
Start: 2020-11-05 | End: 2021-11-09

## 2020-11-10 ENCOUNTER — TELEPHONE (OUTPATIENT)
Dept: OPHTHALMOLOGY | Facility: CLINIC | Age: 80
End: 2020-11-10

## 2020-11-10 NOTE — TELEPHONE ENCOUNTER
Called pt regarding floaters OS.  Pt states this isn't a new onset and she hasn't noticed changes in the floaters.  Pt denies flashes, eye pain, headaches or increase in the amount or size in floaters.  Pt advised to go to the ER if she notices any of the above symptoms. Pt understood.

## 2020-11-10 NOTE — TELEPHONE ENCOUNTER
----- Message from Jarret Hussein sent at 11/10/2020  3:54 PM CST -----  Pt phoned.  Complaining of some floaters in her left eye.  No veil or vision loss.  Please call.  Thanks,   [facility]

## 2020-11-11 ENCOUNTER — OFFICE VISIT (OUTPATIENT)
Dept: INTERNAL MEDICINE | Facility: CLINIC | Age: 80
End: 2020-11-11
Payer: MEDICARE

## 2020-11-11 VITALS — HEART RATE: 85 BPM | WEIGHT: 227.19 LBS | OXYGEN SATURATION: 99 % | HEIGHT: 65 IN | BODY MASS INDEX: 37.85 KG/M2

## 2020-11-11 DIAGNOSIS — Z00.00 ROUTINE GENERAL MEDICAL EXAMINATION AT A HEALTH CARE FACILITY: ICD-10-CM

## 2020-11-11 DIAGNOSIS — E13.9 DIABETES MELLITUS DUE TO ABNORMAL INSULIN: ICD-10-CM

## 2020-11-11 DIAGNOSIS — M25.569 KNEE PAIN, UNSPECIFIED CHRONICITY, UNSPECIFIED LATERALITY: Primary | ICD-10-CM

## 2020-11-11 PROCEDURE — G0008 FLU VACCINE - QUADRIVALENT - ADJUVANTED: ICD-10-PCS | Mod: S$GLB,,, | Performed by: INTERNAL MEDICINE

## 2020-11-11 PROCEDURE — G0008 ADMIN INFLUENZA VIRUS VAC: HCPCS | Mod: S$GLB,,, | Performed by: INTERNAL MEDICINE

## 2020-11-11 PROCEDURE — 99499 UNLISTED E&M SERVICE: CPT | Mod: S$GLB,,, | Performed by: INTERNAL MEDICINE

## 2020-11-11 PROCEDURE — 99214 PR OFFICE/OUTPT VISIT, EST, LEVL IV, 30-39 MIN: ICD-10-PCS | Mod: 25,S$GLB,, | Performed by: INTERNAL MEDICINE

## 2020-11-11 PROCEDURE — 90694 FLU VACCINE - QUADRIVALENT - ADJUVANTED: ICD-10-PCS | Mod: S$GLB,,, | Performed by: INTERNAL MEDICINE

## 2020-11-11 PROCEDURE — 99499 RISK ADDL DX/OHS AUDIT: ICD-10-PCS | Mod: S$GLB,,, | Performed by: INTERNAL MEDICINE

## 2020-11-11 PROCEDURE — 99999 PR PBB SHADOW E&M-EST. PATIENT-LVL III: CPT | Mod: PBBFAC,,, | Performed by: INTERNAL MEDICINE

## 2020-11-11 PROCEDURE — 99999 PR PBB SHADOW E&M-EST. PATIENT-LVL III: ICD-10-PCS | Mod: PBBFAC,,, | Performed by: INTERNAL MEDICINE

## 2020-11-11 PROCEDURE — 99214 OFFICE O/P EST MOD 30 MIN: CPT | Mod: 25,S$GLB,, | Performed by: INTERNAL MEDICINE

## 2020-11-11 PROCEDURE — 90694 VACC AIIV4 NO PRSRV 0.5ML IM: CPT | Mod: S$GLB,,, | Performed by: INTERNAL MEDICINE

## 2020-11-11 NOTE — PROGRESS NOTES
CHIEF COMPLAINT: Annual exam and follow up NSTEMI, breast cancer, diabetes, hypertension, hyperlipidemia.     HISTORY OF PRESENT ILLNESS: 79-year-old woman who present for follow up of above.    Knees have been bothering her.  She needs to see orthopedics     She has had 2 infusinos of iron (1/21/20 and 1/28/20) and EGD on 1/31/20 - few bleeding angioectasias in the stomach.  Treated with argon plasma coagulation (APC).  TWO gastric polyps. Resected and retrieved. NO nausea, vomiting, constipation, diarrhea. She is taking ferrous sulfate 325 mg once daily.  She took vitamin B12 injection several times.    .    No chest pain since back on aspirin and plavix. She saw Dr Puente on 7/23/18.  Cardica PET stress was normal on 7/25/18.     She is taking Keppra  mg 2 in the afternoon.  Gait is better with taking the Keppra in the late afternoon.  No falls.  No seizures.        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 She  takes aspirin 81 mg daily and plavix 75 mg daily and coreg 3.125 mg twice daily.   No chest pain or shortness of breath.  She is currently off the following blood pressure medications:  Coreg 25 mg twice daily, Norvasc 5 mg daily and chlorthaladone 25 mg 1/2 tablet every other daily for her hypertension.   She completed cardiac rehab.     She has completed 5 cycles of CMF for her breast cancer. She completed radiation the end of April 6, 2017.  She took Femara  4/2017 - 4/2018. She had irritation of the lips on Femara. She is taking anastrozole 1 mg daily. Saw Ismael 6/29/20 and JENNIE on 8/5/19.     She graduated from the healthy back program. This program is on hold due to breast cancer. Back is doing well. She is doing stretches every other day. She is off tylenol in the evening.  She has occasional right sided back pain.      She continues to take allopurinol 300 mg daily for her gout. She has not had any gouty flares. She has occasional left  elbow pain.      Her blood sugars have been normal - . She is off metformin 850 mg twice daily. She checks blood sugars once daily.  She denies any polydipsia or polyuria. She continues to take aspirin for her coronary artery disease.      Sinuses have been doing well. She has not had to take Allegra lately. She denies any nausea, vomiting, diarrhea. She has some constipation - once a week.  She took a dulcolax.      Reflux is well controlled on omeprazole 20 mg two tablets daily.      She continues to take Crestor 40 mg daily for her hyperlipidemia. She denies any joint pain or muscle pain from the Crestor.      She is taking vitamin D 50,000 units twice weekly for vitamin D deficiency     Her daughter who is 46 has stage 2 breast cancer. She has had a lumpectomy and chemo and radiation. Her daughter is doing well. She has finished her treatment. Her daughter might be BRCA positive. Mrs Bergman feels that she is coping well with her diagnosis of breast cancer. No anxiety, depression or insomnia.      PAST MEDICAL HISTORY:   1. Hypertension.   2. Diabetes mellitus   3. Hyperlipidemia.   4. Reflux.   5. Gout.   6. Coronary artery disease, status post MI in  with stenting at that time, and then a right coronary artery stent placed in . Had a negative stress test 2008. [facility] 2012 - patent stents and non obstructive cad  7. Osteoarthritis of the right knee. S/P right knee replacement   8. Status post left knee replacement.   9. Status post LISA/BSO secondary to menstrual disorder or polyps after tubal ligation.   10. Left breast cancer and BRCA2 positive    MEDICATIONS and ALLERGIES: Updated on epic.       Family History  Mother had breast cancer in her early 50's then had colon cancer in her 60's. She  of uterine cancer  2 Maternal aunts with breast cancer  Daughter with breast cancer at age 45 - BRCA positive  Father  of a gunshot wound  She is an only child    PHYSICAL EXAMINATION:      "Pulse 85   Ht 5' 5" (1.651 m)   Wt 103 kg (227 lb 2.9 oz)   LMP  (LMP Unknown)   SpO2 99%   BMI 37.81 kg/m²        General: Alert, oriented. No apparent distress. Affect within normal   limits.   Conjunctivae anicteric. Tympanic membranes clear. Oropharynx clear. Irritation of the lips  Neck supple.   Respiratory effort normal. Lungs clear to auscultation.   Heart: Regular rate and rhythm without murmurs, gallops or rubs.   No lower extremity edema.                  ASSESESSMENT    Annual exam - discussed diet, exercise and safety issues.    1. HTN- on coreg 3.125 mg twice daily.  2. Iron deficiency anemia - s/p IV iron. cbc today  3. Angioectasias of duodenal bulb, gastric body, On omeprazole 40 mg daily. S/p EGD 1/31/20 with cautery. Will montior blood counts closely.   4.  CAD with NSTEMI 5/2017- s/p stenting 5/2017 - On aspirin and plavix. on coreg at 3.215 mg twice daily   5. Left breast cancer with BRCA2 positive -Finished chemo and  radiation. On anastrazole-saw Dr Guevara 12/5/19 and   6. Seizure -stable on Keppra XR   7.  Diabetes - controlled. Off metformin.   9. Gout -controlled on allopurinol    10. Hyperlipidemia - on Crestor 40 mg daily. Controlled   11. Obesity - working at diet, exercise and weight loss.   12. Osteoarthritis of the right knee, status post knee replacement - xray and to ortho for evaluation  13. GERD - controlled on meds  14. Hypokalemia -  on replacement potassium     Screening - mammogram 8/20. Colonoscopy 4/23/12 - normal, and 5/25/15 - 3 small polyps (one adenoma).  Colonoscopy 10/8/18 - one polyp - due 2023.     Bone density was normal November 2008.       I'll see her back 3 months,  sooner if problems arise.    "

## 2020-11-14 ENCOUNTER — HOSPITAL ENCOUNTER (OUTPATIENT)
Dept: RADIOLOGY | Facility: HOSPITAL | Age: 80
Discharge: HOME | End: 2020-11-14
Attending: INTERNAL MEDICINE
Payer: MEDICARE

## 2020-11-14 DIAGNOSIS — M25.569 KNEE PAIN, UNSPECIFIED CHRONICITY, UNSPECIFIED LATERALITY: ICD-10-CM

## 2020-11-14 PROCEDURE — 73562 XR KNEE ORTHO BILAT: ICD-10-PCS | Mod: 26,50,, | Performed by: RADIOLOGY

## 2020-11-14 PROCEDURE — 73562 X-RAY EXAM OF KNEE 3: CPT | Mod: TC,50

## 2020-11-14 PROCEDURE — 73562 X-RAY EXAM OF KNEE 3: CPT | Mod: 26,50,, | Performed by: RADIOLOGY

## 2020-11-17 ENCOUNTER — TELEPHONE (OUTPATIENT)
Dept: OPHTHALMOLOGY | Facility: CLINIC | Age: 80
End: 2020-11-17

## 2020-11-17 DIAGNOSIS — H25.12 NUCLEAR SCLEROTIC CATARACT OF LEFT EYE: Primary | ICD-10-CM

## 2020-11-17 DIAGNOSIS — Z13.9 SCREENING PROCEDURE: ICD-10-CM

## 2020-11-19 ENCOUNTER — TELEPHONE (OUTPATIENT)
Dept: INTERNAL MEDICINE | Facility: CLINIC | Age: 80
End: 2020-11-19

## 2020-11-19 DIAGNOSIS — M25.569 KNEE PAIN, UNSPECIFIED CHRONICITY, UNSPECIFIED LATERALITY: Primary | ICD-10-CM

## 2020-11-20 NOTE — TELEPHONE ENCOUNTER
PLease notify pt  Xray of your knees reveals that the left knee replacement is getting worn.  You need to see Dr Wilkerson in clinic for your knees.  DR ORR booked you an apt to see DR Wilkerson on MOnday 12/28/20 at 1:15 pm    Your blood count, blood sugar, kidney function, uric acid level are stable  Continue current medications.

## 2020-11-25 ENCOUNTER — TELEPHONE (OUTPATIENT)
Dept: OPHTHALMOLOGY | Facility: CLINIC | Age: 80
End: 2020-11-25

## 2020-11-25 DIAGNOSIS — Z13.9 SCREENING PROCEDURE: ICD-10-CM

## 2020-11-25 DIAGNOSIS — H25.12 NUCLEAR SCLEROTIC CATARACT OF LEFT EYE: Primary | ICD-10-CM

## 2020-12-08 ENCOUNTER — TELEPHONE (OUTPATIENT)
Dept: OPHTHALMOLOGY | Facility: CLINIC | Age: 80
End: 2020-12-08

## 2020-12-08 NOTE — TELEPHONE ENCOUNTER
Explained to pt that she may keep appt in [facility] since it is closer to her home or come to main campus for covid test  Pt desires to keep covid testing site to be at [facility]-early am for son to bring pt         ----- Message from Carmella Damico sent at 12/8/2020  8:04 AM CST -----  Regarding: Covid Testing([facility] scheduled)  Contact: Renata  Ms. Bergman says that she lives in [place] but her Covid testing was scheduled in [place]. She would like for you to contact her in regards to changing the location. She can be reached at [phone].

## 2020-12-17 NOTE — H&P
History    Chief complaint:  Painless progressive vision loss    Present Ilness/Diagnosis: Nuclear sclerotic Cataract    Past Medical History:  has a past medical history of Breast cancer (2016), Cataract, Coronary artery disease (9/4/2012), Diabetes mellitus due to abnormal insulin (9/4/2012), Diabetes mellitus type II, Genetic testing, GERD (gastroesophageal reflux disease) (9/4/2012), Gout, arthritis (9/4/2012), Hyperlipidemia (9/4/2012), Hypertension, Obesity, Primary osteoarthritis of both knees (9/4/2012), Renal manifestation of secondary diabetes mellitus, Seizure, and Type 2 diabetes with peripheral circulatory disorder, controlled.    Family History/Social History: refer to chart    Allergies:   Review of patient's allergies indicates:   Allergen Reactions    Lisinopril Anaphylaxis       Current Medications: No current facility-administered medications for this encounter.     Current Outpatient Medications:     allopurinoL (ZYLOPRIM) 300 MG tablet, TAKE 1 TABLET BY MOUTH ONCE DAILY, Disp: 90 tablet, Rfl: 0    amLODIPine (NORVASC) 5 MG tablet, TAKE 1 TABLET BY MOUTH DAILY, Disp: 90 tablet, Rfl: 3    anastrozole (ARIMIDEX) 1 mg Tab, TAKE 1 TABLET(1 MG) BY MOUTH EVERY DAY. STOP LETROZOLE FEMARA., Disp: 30 tablet, Rfl: 11    ascorbic Acid (VITAMIN C) 500 mg CpSR, Take 1 capsule by mouth once daily. PATIENT TAKES 3 X WEEKLY, Disp: , Rfl:     aspirin 81 MG Chew, Take 81 mg by mouth once daily.  , Disp: , Rfl:     blood sugar diagnostic Strp, 1 strip by Misc.(Non-Drug; Combo Route) route once daily., Disp: 100 strip, Rfl: 4    blood-glucose meter kit, Use as instructed, Disp: 1 each, Rfl: 0    carvediloL (COREG) 3.125 MG tablet, Take 1 tablet (3.125 mg total) by mouth 2 (two) times daily with meals., Disp: 180 tablet, Rfl: 4    chlorthalidone (HYGROTEN) 25 MG Tab, Take 1 tablet by mouth once daily., Disp: , Rfl:     clopidogreL (PLAVIX) 75 mg tablet, Take 1 tablet (75 mg total) by mouth once daily.,  Disp: 90 tablet, Rfl: 3    cyanocobalamin 1,000 mcg/mL injection, Inject 1 mL (1,000 mcg total) into the muscle once a week. Dispense #12 25 gague one inch needles and #12 one ml syringes, Disp: 12 mL, Rfl: 3    diclofenac sodium (VOLTAREN) 1 % Gel, Apply 2 g topically 4 (four) times daily as needed. Apply to bilateral knees as needed for pain, Disp: , Rfl:     ergocalciferol (ERGOCALCIFEROL) 50,000 unit Cap, Take 1 capsule (50,000 Units total) by mouth twice a week., Disp: 24 capsule, Rfl: 1    ferrous sulfate 325 (65 FE) MG EC tablet, Take 1 tablet (325 mg total) by mouth once daily., Disp: 90 tablet, Rfl: 4    fexofenadine (ALLEGRA) 30 MG tablet, Take 30 mg by mouth daily as needed., Disp: , Rfl:     lancets Misc, Check blood sugar once daily, Disp: 100 each, Rfl: 0    levetiracetam XR (KEPPRA XR) 500 mg Tb24 24 hr tablet, TAKE 2 TABLETS(1000 MG) BY MOUTH EVERY DAY, Disp: 180 tablet, Rfl: 4    metFORMIN (GLUCOPHAGE) 850 MG tablet, Take 1 tablet by mouth 2 (two) times a day., Disp: , Rfl:     nitroGLYCERIN (NITROSTAT) 0.4 MG SL tablet, PLACE 1 TABLET UNDER THE TONGUE EVERY 5 MINUTES AS NEEDED FOR CHEST PAIN. (Patient not taking: Reported on 11/11/2020), Disp: 450 tablet, Rfl: 0    omeprazole (PRILOSEC) 20 MG capsule, TAKE 2 CAPSULES BY MOUTH EVERY DAY, Disp: 180 capsule, Rfl: 4    potassium chloride (KLOR-CON) 10 MEQ TbSR, TAKE 1 TABLET BY MOUTH EVERY DAY, Disp: 90 tablet, Rfl: 1    prednisolon/gatiflox/bromfenac (PREDNISOL ACE-GATIFLOX-BROMFEN) 1-0.5-0.075 % DrpS, Apply 1 drop to eye 3 (three) times daily. (Patient not taking: Reported on 11/11/2020), Disp: 5 mL, Rfl: 3    rosuvastatin (CRESTOR) 40 MG Tab, TAKE 1 TABLET BY MOUTH EVERY DAY, Disp: 90 tablet, Rfl: 4    Facility-Administered Medications Ordered in Other Encounters:     phenylephrine HCL 2.5% ophthalmic solution 1 drop, 1 drop, Right Eye, On Call Procedure, Karishma Mata MD, 1 drop at 10/18/20 0905    proparacaine 0.5 % ophthalmic  solution 1 drop, 1 drop, Right Eye, On Call Procedure, Karishma Mata MD, 1 drop at 10/18/20 0923    tropicamide 1% ophthalmic solution 1 drop, 1 drop, Right Eye, On Call Procedure, Karishma Mata MD, 1 drop at 10/18/20 0937    Physical Exam    BP: Vital signs stable  General: No apparent distress  HEENT: nuclear sclerotic cataract  Lungs: adequate respirations  Heart: + pulses  Abdomen: soft  Rectal/pelvic: deferred    Impression: Visually significant Cataract    Plan: Phacoemulsification with implantation of Intraocular lens

## 2020-12-18 ENCOUNTER — TELEPHONE (OUTPATIENT)
Dept: OPTOMETRY | Facility: CLINIC | Age: 80
End: 2020-12-18

## 2020-12-21 ENCOUNTER — ANESTHESIA (OUTPATIENT)
Dept: SURGERY | Facility: HOSPITAL | Age: 80
End: 2020-12-21
Payer: MEDICARE

## 2020-12-21 ENCOUNTER — HOSPITAL ENCOUNTER (OUTPATIENT)
Facility: HOSPITAL | Age: 80
Discharge: HOME | End: 2020-12-21
Attending: OPHTHALMOLOGY | Admitting: OPHTHALMOLOGY
Payer: MEDICARE

## 2020-12-21 ENCOUNTER — ANESTHESIA EVENT (OUTPATIENT)
Dept: SURGERY | Facility: HOSPITAL | Age: 80
End: 2020-12-21
Payer: MEDICARE

## 2020-12-21 VITALS
OXYGEN SATURATION: 98 % | RESPIRATION RATE: 18 BRPM | TEMPERATURE: 98 F | SYSTOLIC BLOOD PRESSURE: 142 MMHG | HEART RATE: 76 BPM | DIASTOLIC BLOOD PRESSURE: 70 MMHG

## 2020-12-21 DIAGNOSIS — H25.12 NUCLEAR SENILE CATARACT OF LEFT EYE: Primary | ICD-10-CM

## 2020-12-21 DIAGNOSIS — H25.10 SENILE NUCLEAR SCLEROSIS: ICD-10-CM

## 2020-12-21 LAB
POCT GLUCOSE: 108 MG/DL (ref 70–110)
POCT GLUCOSE: 114 MG/DL (ref 70–110)

## 2020-12-21 PROCEDURE — 25000003 PHARM REV CODE 250: Performed by: NURSE ANESTHETIST, CERTIFIED REGISTERED

## 2020-12-21 PROCEDURE — D9220A PRA ANESTHESIA: Mod: CRNA,,, | Performed by: NURSE ANESTHETIST, CERTIFIED REGISTERED

## 2020-12-21 PROCEDURE — 71000015 HC POSTOP RECOV 1ST HR: Performed by: OPHTHALMOLOGY

## 2020-12-21 PROCEDURE — 82962 GLUCOSE BLOOD TEST: CPT | Performed by: OPHTHALMOLOGY

## 2020-12-21 PROCEDURE — 37000009 HC ANESTHESIA EA ADD 15 MINS: Performed by: OPHTHALMOLOGY

## 2020-12-21 PROCEDURE — 37000008 HC ANESTHESIA 1ST 15 MINUTES: Performed by: OPHTHALMOLOGY

## 2020-12-21 PROCEDURE — D9220A PRA ANESTHESIA: ICD-10-PCS | Mod: CRNA,,, | Performed by: NURSE ANESTHETIST, CERTIFIED REGISTERED

## 2020-12-21 PROCEDURE — 63600175 PHARM REV CODE 636 W HCPCS: Performed by: NURSE ANESTHETIST, CERTIFIED REGISTERED

## 2020-12-21 PROCEDURE — D9220A PRA ANESTHESIA: Mod: ANES,,, | Performed by: ANESTHESIOLOGY

## 2020-12-21 PROCEDURE — 36000706: Performed by: OPHTHALMOLOGY

## 2020-12-21 PROCEDURE — 36000707: Performed by: OPHTHALMOLOGY

## 2020-12-21 PROCEDURE — V2632 POST CHMBR INTRAOCULAR LENS: HCPCS | Performed by: OPHTHALMOLOGY

## 2020-12-21 PROCEDURE — 66984 XCAPSL CTRC RMVL W/O ECP: CPT | Mod: 79,LT,, | Performed by: OPHTHALMOLOGY

## 2020-12-21 PROCEDURE — 71000044 HC DOSC ROUTINE RECOVERY FIRST HOUR: Performed by: OPHTHALMOLOGY

## 2020-12-21 PROCEDURE — D9220A PRA ANESTHESIA: ICD-10-PCS | Mod: ANES,,, | Performed by: ANESTHESIOLOGY

## 2020-12-21 PROCEDURE — 25000003 PHARM REV CODE 250: Performed by: OPHTHALMOLOGY

## 2020-12-21 PROCEDURE — 25000003 PHARM REV CODE 250

## 2020-12-21 PROCEDURE — 66984 PR REMOVAL, CATARACT, W/INSRT INTRAOC LENS, W/O ENDO CYCLO: ICD-10-PCS | Mod: 79,LT,, | Performed by: OPHTHALMOLOGY

## 2020-12-21 PROCEDURE — 63600175 PHARM REV CODE 636 W HCPCS: Performed by: OPHTHALMOLOGY

## 2020-12-21 DEVICE — LENS IOL ITEC PRELOAD 23.0D: Type: IMPLANTABLE DEVICE | Site: EYE | Status: FUNCTIONAL

## 2020-12-21 RX ORDER — LIDOCAINE HYDROCHLORIDE 10 MG/ML
INJECTION, SOLUTION EPIDURAL; INFILTRATION; INTRACAUDAL; PERINEURAL
Status: DISCONTINUED | OUTPATIENT
Start: 2020-12-21 | End: 2020-12-21 | Stop reason: HOSPADM

## 2020-12-21 RX ORDER — MIDAZOLAM HYDROCHLORIDE 1 MG/ML
INJECTION, SOLUTION INTRAMUSCULAR; INTRAVENOUS
Status: DISCONTINUED | OUTPATIENT
Start: 2020-12-21 | End: 2020-12-21

## 2020-12-21 RX ORDER — ACETAMINOPHEN 325 MG/1
650 TABLET ORAL EVERY 4 HOURS PRN
Status: DISCONTINUED | OUTPATIENT
Start: 2020-12-21 | End: 2020-12-21 | Stop reason: HOSPADM

## 2020-12-21 RX ORDER — PHENYLEPHRINE HYDROCHLORIDE 25 MG/ML
1 SOLUTION/ DROPS OPHTHALMIC
Status: DISCONTINUED | OUTPATIENT
Start: 2020-12-21 | End: 2020-12-21 | Stop reason: HOSPADM

## 2020-12-21 RX ORDER — FENTANYL CITRATE 50 UG/ML
INJECTION, SOLUTION INTRAMUSCULAR; INTRAVENOUS
Status: DISCONTINUED | OUTPATIENT
Start: 2020-12-21 | End: 2020-12-21

## 2020-12-21 RX ORDER — LIDOCAINE HYDROCHLORIDE 40 MG/ML
INJECTION, SOLUTION RETROBULBAR
Status: DISCONTINUED | OUTPATIENT
Start: 2020-12-21 | End: 2020-12-21 | Stop reason: HOSPADM

## 2020-12-21 RX ORDER — LIDOCAINE HYDROCHLORIDE 40 MG/ML
INJECTION, SOLUTION RETROBULBAR
Status: DISCONTINUED
Start: 2020-12-21 | End: 2020-12-21 | Stop reason: HOSPADM

## 2020-12-21 RX ORDER — TROPICAMIDE 10 MG/ML
1 SOLUTION/ DROPS OPHTHALMIC
Status: DISCONTINUED | OUTPATIENT
Start: 2020-12-21 | End: 2020-12-21 | Stop reason: HOSPADM

## 2020-12-21 RX ORDER — MOXIFLOXACIN 5 MG/ML
SOLUTION/ DROPS OPHTHALMIC
Status: DISCONTINUED | OUTPATIENT
Start: 2020-12-21 | End: 2020-12-21 | Stop reason: HOSPADM

## 2020-12-21 RX ORDER — MOXIFLOXACIN 5 MG/ML
1 SOLUTION/ DROPS OPHTHALMIC
Status: COMPLETED | OUTPATIENT
Start: 2020-12-21 | End: 2020-12-21

## 2020-12-21 RX ORDER — PREDNISOLONE ACETATE 10 MG/ML
SUSPENSION/ DROPS OPHTHALMIC
Status: DISCONTINUED
Start: 2020-12-21 | End: 2020-12-21 | Stop reason: HOSPADM

## 2020-12-21 RX ORDER — KETOROLAC TROMETHAMINE 5 MG/ML
1 SOLUTION OPHTHALMIC ONCE
Status: COMPLETED | OUTPATIENT
Start: 2020-12-21 | End: 2020-12-21

## 2020-12-21 RX ORDER — PROPARACAINE HYDROCHLORIDE 5 MG/ML
1 SOLUTION/ DROPS OPHTHALMIC
Status: DISCONTINUED | OUTPATIENT
Start: 2020-12-21 | End: 2020-12-21 | Stop reason: HOSPADM

## 2020-12-21 RX ORDER — PREDNISOLONE ACETATE 10 MG/ML
SUSPENSION/ DROPS OPHTHALMIC
Status: DISCONTINUED | OUTPATIENT
Start: 2020-12-21 | End: 2020-12-21 | Stop reason: HOSPADM

## 2020-12-21 RX ORDER — LIDOCAINE HYDROCHLORIDE 10 MG/ML
INJECTION, SOLUTION EPIDURAL; INFILTRATION; INTRACAUDAL; PERINEURAL
Status: DISCONTINUED
Start: 2020-12-21 | End: 2020-12-21 | Stop reason: HOSPADM

## 2020-12-21 RX ADMIN — PHENYLEPHRINE HYDROCHLORIDE 1 DROP: 25 SOLUTION/ DROPS OPHTHALMIC at 08:12

## 2020-12-21 RX ADMIN — KETOROLAC TROMETHAMINE 1 DROP: 5 SOLUTION/ DROPS OPHTHALMIC at 07:12

## 2020-12-21 RX ADMIN — PHENYLEPHRINE HYDROCHLORIDE 1 DROP: 25 SOLUTION/ DROPS OPHTHALMIC at 07:12

## 2020-12-21 RX ADMIN — TROPICAMIDE 1 DROP: 10 SOLUTION/ DROPS OPHTHALMIC at 07:12

## 2020-12-21 RX ADMIN — MOXIFLOXACIN 1 DROP: 5 SOLUTION/ DROPS OPHTHALMIC at 07:12

## 2020-12-21 RX ADMIN — PROPARACAINE HYDROCHLORIDE 1 DROP: 5 SOLUTION/ DROPS OPHTHALMIC at 07:12

## 2020-12-21 RX ADMIN — FENTANYL CITRATE 25 MCG: 50 INJECTION, SOLUTION INTRAMUSCULAR; INTRAVENOUS at 08:12

## 2020-12-21 RX ADMIN — SODIUM CHLORIDE 500 ML/HR: 0.9 INJECTION, SOLUTION INTRAVENOUS at 08:12

## 2020-12-21 RX ADMIN — FENTANYL CITRATE 50 MCG: 50 INJECTION, SOLUTION INTRAMUSCULAR; INTRAVENOUS at 08:12

## 2020-12-21 RX ADMIN — MIDAZOLAM HYDROCHLORIDE 1 MG: 1 INJECTION, SOLUTION INTRAMUSCULAR; INTRAVENOUS at 08:12

## 2020-12-21 RX ADMIN — TROPICAMIDE 1 DROP: 10 SOLUTION/ DROPS OPHTHALMIC at 08:12

## 2020-12-21 RX ADMIN — MOXIFLOXACIN 1 DROP: 5 SOLUTION/ DROPS OPHTHALMIC at 08:12

## 2020-12-21 NOTE — ANESTHESIA PREPROCEDURE EVALUATION
12/21/2020  Renata Bergman is a 80 y.o., female.    Pre-op Assessment    I have reviewed the Patient Summary Reports.     I have reviewed the Nursing Notes. I have reviewed the NPO Status.   I have reviewed the Medications.     Review of Systems  Anesthesia Hx:  No problems with previous Anesthesia Denies Hx of Anesthetic complications  History of prior surgery of interest to airway management or planning: Denies Family Hx of Anesthesia complications.    Social:  Former Smoker    Hematology/Oncology:         -- Anemia: Current/Recent Cancer. Breast   EENT/Dental:   Cataracts   Cardiovascular:   Hypertension Past MI CAD asymptomatic CABG/stent Dysrhythmias  hyperlipidemia Right bundle branch block    Pulmonary:  Pulmonary Normal    Renal/:   Chronic Renal Disease, CRI    Hepatic/GI:   GERD, well controlled    Musculoskeletal:   Arthritis     Neurological:   Neuromuscular Disease, Seizures History of Carpel tunnel syndrome    Endocrine:   Diabetes    Dermatological:  Skin Normal    Psych:  Psychiatric Normal           Physical Exam  General:  Well nourished, Obesity    Airway/Jaw/Neck:  Airway Findings: Mouth Opening: Normal Tongue: Normal  General Airway Assessment: Adult  Mallampati: II  TM Distance: Normal, at least 6 cm  Jaw/Neck Findings:  Neck ROM: Normal ROM     Eyes/Ears/Nose:  EYES/EARS/NOSE FINDINGS: Normal   Dental:  Dental Findings: Upper Dentures   Chest/Lungs:  Chest/Lungs Findings: Normal Respiratory Rate     Heart/Vascular:  Heart Findings: Rate: Normal  Rhythm: Regular Rhythm        Mental Status:  Mental Status Findings:  Cooperative, Alert and Oriented         Anesthesia Plan  Type of Anesthesia, risks & benefits discussed:  Anesthesia Type:  general, MAC  Patient's Preference: MAC  Intra-op Monitoring Plan: standard ASA monitors  Intra-op Monitoring Plan Comments:   Post Op Pain  Control Plan: per primary service following discharge from PACU and IV/PO Opioids PRN  Post Op Pain Control Plan Comments:   Induction:   IV  Beta Blocker:  Patient is not currently on a Beta-Blocker (No further documentation required).       Informed Consent: Patient understands risks and agrees with Anesthesia plan.  Questions answered. Anesthesia consent signed with patient.  ASA Score: 3     Day of Surgery Review of History & Physical:    H&P update referred to the surgeon.         Ready For Surgery From Anesthesia Perspective.

## 2020-12-21 NOTE — ANESTHESIA POSTPROCEDURE EVALUATION
Anesthesia Post Evaluation    Patient: Renata Bergman    Procedure(s) Performed: Procedure(s) (LRB):  PHACOEMULSIFICATION, CATARACT (Left)  INSERTION, IOL PROSTHESIS (Left)    Final Anesthesia Type: MAC      Patient location during evaluation: PACU  Patient participation: Yes- Able to Participate  Level of consciousness: awake and alert and awake  Post-procedure vital signs: reviewed and stable  Pain management: adequate  Airway patency: patent    PONV status at discharge: No PONV  Anesthetic complications: no      Cardiovascular status: blood pressure returned to baseline  Respiratory status: unassisted and spontaneous ventilation  Hydration status: euvolemic  Follow-up not needed.          Vitals Value Taken Time   /82 12/21/20 0902   Temp 36.4 °C (97.5 °F) 12/21/20 0855   Pulse 80 12/21/20 0903   Resp 18 12/21/20 0900   SpO2 98 % 12/21/20 0903   Vitals shown include unvalidated device data.      No case tracking events are documented in the log.      Pain/Perri Score: Perri Score: 8 (12/21/2020  9:00 AM)

## 2020-12-21 NOTE — TRANSFER OF CARE
Anesthesia Transfer of Care Note    Patient: Renata Bergman    Procedure(s) Performed: Procedure(s) (LRB):  PHACOEMULSIFICATION, CATARACT (Left)  INSERTION, IOL PROSTHESIS (Left)    Patient location: PACU    Anesthesia Type: MAC    Transport from OR: Transported from OR on room air with adequate spontaneous ventilation    Post pain: adequate analgesia    Post assessment: no apparent anesthetic complications and tolerated procedure well    Post vital signs: stable    Level of consciousness: awake, alert and oriented    Nausea/Vomiting: no nausea/vomiting    Complications: none    Transfer of care protocol was followed      Last vitals:   Visit Vitals  BP (!) 151/69 (BP Location: Right arm, Patient Position: Lying)   Pulse 74   Temp 36.4 °C (97.5 °F) (Temporal)   Resp 18   LMP  (LMP Unknown)   SpO2 99%   Breastfeeding No

## 2020-12-21 NOTE — BRIEF OP NOTE
BRIEF DISCHARGE NOTE:    Date of discharge: 12/21/2020    Reason for hospitalization -  Cataract surgery     Final Diagnosis - Visually significant Cataract    Procedures and treatment provided - Status post phacoemulsification with placement of intraocular lens     Diet - Advance to regular as tolerated    Activity - as tolerated    Disposition at the end of the case - Good.    Discharge: to home    The patient tolerated the procedure well and knows to follow up with me tomorrow morning in the eye clinic, sooner if needed.    Patient and family instructions (as appropriate) - Given to patient on discharge    Karishma Mata MD

## 2020-12-21 NOTE — OP NOTE
DATE OF PROCEDURE: 12/21/2020    SURGEON: JOVANI VAZQUEZ MD    PREOPERATIVE DIAGNOSIS:  Senile nuclear sclerotic cataract left eye.     POSTOPERATIVE DIAGNOSIS: Senile nuclear sclerotic cataract left eye.     PROCEDURE PERFORMED:  Phacoemulsification with placement of intraocular lens, left eye.    IMPLANT:  PCBOO 23.0    ANESTHESIA:  Topical and MAC    COMPLICATIONS: none    ESTIMATED BLOOD LOSS: <1cc    SPECIMENS: none    INDICATIONS FOR PROCEDURE:  This patient presented to the clinic with decreased vision in the left eye and was found to have a cataract.  The risks, benefits, and alternatives were discussed and the patient agreed to proceed with phacoemulsification and implantation of a lens in the left eye.     PROCEDURE IN DETAIL:  The patient was met in the preop holding area.  Consent was confirmed to be signed.  The operative site was marked.  The patient was brought into the operating room by the anesthesia team and placed under monitored anesthesia care.  The left eye was prepped and draped in a sterile ophthalmic fashion.  A Mahnaz speculum was placed into the left eye.   A paracentesis site was made and 1% preservative-free lidocaine was injected into the anterior chamber.  Viscoelastic  material was injected into the anterior chamber.  A keratome blade was used to make a clear corneal incision.  A cystotome was used to initiate the continuous curvilinear capsulorrhexis which was completed with Utrata forceps.  BSS on a reddy cannula was used to perform hydrodissection.  The phacoemulsification tip was introduced into the eye and the nucleus was removed in a standard divide-and-conquer fashion.  Remaining cortical material was removed from the eye using irrigation-aspiration.  The capsular bag was filled with viscoelastic material and the intraocular lens was injected and positioned into place. Remaining viscoelastic material was removed from the eye using irrigation and aspiration.  The corneal  wounds were hydrated.  The eye was filled to physiologic pressure. The wounds were found to be watertight. Drops of Vigamox and prednisilone were placed into the eye.  The eye was washed, dried, and shielded.  The patient tolerated the procedure well and knows to follow up with me tomorrow morning, sooner if needed.

## 2020-12-22 ENCOUNTER — OFFICE VISIT (OUTPATIENT)
Dept: OPHTHALMOLOGY | Facility: CLINIC | Age: 80
End: 2020-12-22
Payer: MEDICARE

## 2020-12-22 ENCOUNTER — PATIENT OUTREACH (OUTPATIENT)
Dept: ADMINISTRATIVE | Facility: OTHER | Age: 80
End: 2020-12-22

## 2020-12-22 DIAGNOSIS — Z96.1 STATUS POST CATARACT EXTRACTION AND INSERTION OF INTRAOCULAR LENS, RIGHT: ICD-10-CM

## 2020-12-22 DIAGNOSIS — Z98.42 STATUS POST CATARACT EXTRACTION AND INSERTION OF INTRAOCULAR LENS, LEFT: Primary | ICD-10-CM

## 2020-12-22 DIAGNOSIS — Z98.41 STATUS POST CATARACT EXTRACTION AND INSERTION OF INTRAOCULAR LENS, RIGHT: ICD-10-CM

## 2020-12-22 DIAGNOSIS — Z96.1 STATUS POST CATARACT EXTRACTION AND INSERTION OF INTRAOCULAR LENS, LEFT: Primary | ICD-10-CM

## 2020-12-22 PROCEDURE — 1101F PR PT FALLS ASSESS DOC 0-1 FALLS W/OUT INJ PAST YR: ICD-10-PCS | Mod: CPTII,S$GLB,, | Performed by: OPHTHALMOLOGY

## 2020-12-22 PROCEDURE — 99999 PR PBB SHADOW E&M-EST. PATIENT-LVL III: CPT | Mod: PBBFAC,,, | Performed by: OPHTHALMOLOGY

## 2020-12-22 PROCEDURE — 1126F AMNT PAIN NOTED NONE PRSNT: CPT | Mod: S$GLB,,, | Performed by: OPHTHALMOLOGY

## 2020-12-22 PROCEDURE — 99024 POSTOP FOLLOW-UP VISIT: CPT | Mod: S$GLB,,, | Performed by: OPHTHALMOLOGY

## 2020-12-22 PROCEDURE — 1126F PR PAIN SEVERITY QUANTIFIED, NO PAIN PRESENT: ICD-10-PCS | Mod: S$GLB,,, | Performed by: OPHTHALMOLOGY

## 2020-12-22 PROCEDURE — 3288F FALL RISK ASSESSMENT DOCD: CPT | Mod: CPTII,S$GLB,, | Performed by: OPHTHALMOLOGY

## 2020-12-22 PROCEDURE — 99024 PR POST-OP FOLLOW-UP VISIT: ICD-10-PCS | Mod: S$GLB,,, | Performed by: OPHTHALMOLOGY

## 2020-12-22 PROCEDURE — 1101F PT FALLS ASSESS-DOCD LE1/YR: CPT | Mod: CPTII,S$GLB,, | Performed by: OPHTHALMOLOGY

## 2020-12-22 PROCEDURE — 3288F PR FALLS RISK ASSESSMENT DOCUMENTED: ICD-10-PCS | Mod: CPTII,S$GLB,, | Performed by: OPHTHALMOLOGY

## 2020-12-22 PROCEDURE — 99999 PR PBB SHADOW E&M-EST. PATIENT-LVL III: ICD-10-PCS | Mod: PBBFAC,,, | Performed by: OPHTHALMOLOGY

## 2020-12-22 NOTE — PROGRESS NOTES
Requested updates within Care Everywhere.  Patient's chart was reviewed for overdue JULISSA topics.  Immunizations reconciled.

## 2020-12-22 NOTE — PROGRESS NOTES
HPI     Ref by Dr. Noel     S/p phaco w/IOL OD 10/18/20   T2 DM w/ diabetic nephropathy   S/p phaco w/IOL OS- 12/21/2020  Screening for glaucoma   hyperopia     Combo TID OD   Allergy gtt bid OU     Pt here for 1 day post op OS. Doing great. No eye pain.     Last edited by Bri Ozuna on 12/22/2020  1:36 PM. (History)            Assessment /Plan     For exam results, see Encounter Report.    Status post cataract extraction and insertion of intraocular lens, left    Status post cataract extraction and insertion of intraocular lens, right      Slit Lamp Exam  L/L - normal  C/s - quiet  Cornea - clear  A/C - 1+ cell  Lens - PCIOL    POD #1 s/p phaco/IOL  - doing well  - continue the following drops:    vigamox or ocuflox TID x 1 wk then stop  Pred forte or durezol or dexamethasone TID x  4 wks  Ketorolac TID until runs out    Versus:    Combination drop - 1 drop TID x total of 1 month    Appropriate precautions and post op medications reviewed.  Patient instructed to call or come in if symptoms of redness, decreased vision, or pain are experienced.    -f/up 4 wks, sooner PRN. mrx with nash

## 2020-12-28 ENCOUNTER — OFFICE VISIT (OUTPATIENT)
Dept: ORTHOPEDICS | Facility: CLINIC | Age: 80
End: 2020-12-28
Payer: MEDICARE

## 2020-12-28 ENCOUNTER — TELEPHONE (OUTPATIENT)
Dept: HEMATOLOGY/ONCOLOGY | Facility: CLINIC | Age: 80
End: 2020-12-28

## 2020-12-28 ENCOUNTER — LAB VISIT (OUTPATIENT)
Dept: LAB | Facility: HOSPITAL | Age: 80
End: 2020-12-28
Attending: ORTHOPAEDIC SURGERY
Payer: MEDICARE

## 2020-12-28 VITALS — BODY MASS INDEX: 39.36 KG/M2 | WEIGHT: 236.25 LBS | HEIGHT: 65 IN

## 2020-12-28 DIAGNOSIS — Z96.659 FAILURE OF TOTAL KNEE REPLACEMENT, INITIAL ENCOUNTER: Primary | ICD-10-CM

## 2020-12-28 DIAGNOSIS — M25.562 CHRONIC PAIN OF LEFT KNEE: ICD-10-CM

## 2020-12-28 DIAGNOSIS — T84.018A FAILURE OF TOTAL KNEE REPLACEMENT, INITIAL ENCOUNTER: Primary | ICD-10-CM

## 2020-12-28 DIAGNOSIS — Z96.659 FAILURE OF TOTAL KNEE REPLACEMENT, INITIAL ENCOUNTER: ICD-10-CM

## 2020-12-28 DIAGNOSIS — G89.29 CHRONIC PAIN OF LEFT KNEE: ICD-10-CM

## 2020-12-28 DIAGNOSIS — T84.018A FAILURE OF TOTAL KNEE REPLACEMENT, INITIAL ENCOUNTER: ICD-10-CM

## 2020-12-28 LAB
CRP SERPL-MCNC: 0.6 MG/L (ref 0–8.2)
ERYTHROCYTE [SEDIMENTATION RATE] IN BLOOD BY WESTERGREN METHOD: 18 MM/HR (ref 0–36)

## 2020-12-28 PROCEDURE — 1159F MED LIST DOCD IN RCRD: CPT | Mod: S$GLB,,, | Performed by: ORTHOPAEDIC SURGERY

## 2020-12-28 PROCEDURE — 1101F PR PT FALLS ASSESS DOC 0-1 FALLS W/OUT INJ PAST YR: ICD-10-PCS | Mod: CPTII,S$GLB,, | Performed by: ORTHOPAEDIC SURGERY

## 2020-12-28 PROCEDURE — 99203 OFFICE O/P NEW LOW 30 MIN: CPT | Mod: S$GLB,,, | Performed by: ORTHOPAEDIC SURGERY

## 2020-12-28 PROCEDURE — 99203 PR OFFICE/OUTPT VISIT, NEW, LEVL III, 30-44 MIN: ICD-10-PCS | Mod: S$GLB,,, | Performed by: ORTHOPAEDIC SURGERY

## 2020-12-28 PROCEDURE — 85652 RBC SED RATE AUTOMATED: CPT

## 2020-12-28 PROCEDURE — 1101F PT FALLS ASSESS-DOCD LE1/YR: CPT | Mod: CPTII,S$GLB,, | Performed by: ORTHOPAEDIC SURGERY

## 2020-12-28 PROCEDURE — 3288F PR FALLS RISK ASSESSMENT DOCUMENTED: ICD-10-PCS | Mod: CPTII,S$GLB,, | Performed by: ORTHOPAEDIC SURGERY

## 2020-12-28 PROCEDURE — 99499 UNLISTED E&M SERVICE: CPT | Mod: S$GLB,,, | Performed by: ORTHOPAEDIC SURGERY

## 2020-12-28 PROCEDURE — 99999 PR PBB SHADOW E&M-EST. PATIENT-LVL IV: CPT | Mod: PBBFAC,,, | Performed by: ORTHOPAEDIC SURGERY

## 2020-12-28 PROCEDURE — 1125F AMNT PAIN NOTED PAIN PRSNT: CPT | Mod: S$GLB,,, | Performed by: ORTHOPAEDIC SURGERY

## 2020-12-28 PROCEDURE — 3072F PR LOW RISK FOR RETINOPATHY: ICD-10-PCS | Mod: S$GLB,,, | Performed by: ORTHOPAEDIC SURGERY

## 2020-12-28 PROCEDURE — 86140 C-REACTIVE PROTEIN: CPT

## 2020-12-28 PROCEDURE — 1125F PR PAIN SEVERITY QUANTIFIED, PAIN PRESENT: ICD-10-PCS | Mod: S$GLB,,, | Performed by: ORTHOPAEDIC SURGERY

## 2020-12-28 PROCEDURE — 36415 COLL VENOUS BLD VENIPUNCTURE: CPT

## 2020-12-28 PROCEDURE — 99499 RISK ADDL DX/OHS AUDIT: ICD-10-PCS | Mod: S$GLB,,, | Performed by: ORTHOPAEDIC SURGERY

## 2020-12-28 PROCEDURE — 1159F PR MEDICATION LIST DOCUMENTED IN MEDICAL RECORD: ICD-10-PCS | Mod: S$GLB,,, | Performed by: ORTHOPAEDIC SURGERY

## 2020-12-28 PROCEDURE — 3072F LOW RISK FOR RETINOPATHY: CPT | Mod: S$GLB,,, | Performed by: ORTHOPAEDIC SURGERY

## 2020-12-28 PROCEDURE — 3288F FALL RISK ASSESSMENT DOCD: CPT | Mod: CPTII,S$GLB,, | Performed by: ORTHOPAEDIC SURGERY

## 2020-12-28 PROCEDURE — 99999 PR PBB SHADOW E&M-EST. PATIENT-LVL IV: ICD-10-PCS | Mod: PBBFAC,,, | Performed by: ORTHOPAEDIC SURGERY

## 2020-12-28 NOTE — TELEPHONE ENCOUNTER
Called and spoke with patient in regards to rescheduling her apt, she stated that I should cancel for now and she will call back to reschedule once she speaks with her daughter as she brings her.

## 2020-12-28 NOTE — LETTER
December 28, 2020      Ethel Berger MD  [address]  [facility] [address]           [facility] 5th Fl  [address]  [facility] [address]  Phone: [phone]          Patient: Renata Bergman   MR Number: [id number]   YOB: 1940   Date of Visit: 12/28/2020       Dear Dr. Ethel Berger:    Thank you for referring Renata Bergman to me for evaluation. Attached you will find relevant portions of my assessment and plan of care.    If you have questions, please do not hesitate to call me. I look forward to following Renata Bergman along with you.    Sincerely,    Gilson Wilkerson MD    Enclosure  CC:  No Recipients    If you would like to receive this communication electronically, please contact [email or web address] or [phone] to request more information on [facility] access.    For providers and/or their staff who would like to refer a patient to [facility], please contact us through our one-stop-shop provider referral line, [facility], at [phone].    If you feel you have received this communication in error or would no longer like to receive these types of communications, please e-mail [email or web address]

## 2020-12-28 NOTE — PROGRESS NOTES
"Subjective:      Patient ID: Renata Bergman is a 80 y.o. female.    Chief Complaint: Pain of the Left Knee    HPI  Renata Bergman is a 80 year old female here with a several month history of left knee pain. Knee replaced around the time of [place] in [place]. Did well until several months ago when she started having pain.  Fell a few times but nothing recent.  No fevers/chils/ recent infection.   The patient is retiree.   The pain is moderate to severe.  The pain is located in the global aspect of the knee.  There is is not radiation.   There are feelings of instability.  The knee does give away. The pain is described as achy.  It is aggravated by standing and walking.    It is not alleviated by rest. There is not numbness or tingling of the lower extremity. There is not associated swelling.   There was not problems with wound healing.  The patient has not had an infection work up. There is locking or catching.  There is not popping of the kneecap.  The patient  has not tried medications and/or injections. The patient does have difficulty getting in or out of a car, getting dressed, or going up or down stairs. Uses a walker.        Past Medical History:   Diagnosis Date    Breast cancer 2016    DCIS and IDC, stage I    Cataract     Coronary artery disease 9/4/2012    Diabetes mellitus due to abnormal insulin 9/4/2012    Diabetes mellitus type II     Genetic testing     brca2 positive     GERD (gastroesophageal reflux disease) 9/4/2012    Gout, arthritis 9/4/2012    Hyperlipidemia 9/4/2012    Hypertension     Obesity     Primary osteoarthritis of both knees 9/4/2012    Renal manifestation of secondary diabetes mellitus     Seizure     states "one time during chemo in 2018"    Type 2 diabetes with peripheral circulatory disorder, controlled      Past Surgical History:   Procedure Laterality Date    BREAST BIOPSY      BREAST LUMPECTOMY Left 08/01/2016    DCIS and IDC, s/p lumpectomy    CATARACT " EXTRACTION      COLONOSCOPY N/A 10/8/2018    Procedure: COLONOSCOPY;  Surgeon: Marcio Louie MD;  Location: [facility] ENDO (4TH FLR);  Service: Endoscopy;  Laterality: N/A;    CORONARY ANGIOPLASTY      ESOPHAGOGASTRODUODENOSCOPY N/A 10/8/2018    Procedure: ESOPHAGOGASTRODUODENOSCOPY (EGD);  Surgeon: Marcio Louie MD;  Location: [facility] ENDO (4TH FLR);  Service: Endoscopy;  Laterality: N/A;  combined egd/colon order/Plavix/OK to hold med 5 days prior to procedures per Dr Puente/see telephone encounter dated 8/22/18/svn    ESOPHAGOGASTRODUODENOSCOPY N/A 1/31/2020    Procedure: ESOPHAGOGASTRODUODENOSCOPY (EGD);  Surgeon: Marcio Louie MD;  Location: [facility] ENDO (4TH FLR);  Service: Endoscopy;  Laterality: N/A;  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 10/2018    per Dr Agudelo-ok to hold Plavix 5 days prior to procedure    HYSTERECTOMY      INTRAOCULAR PROSTHESES INSERTION Right 10/18/2020    Procedure: INSERTION, IOL PROSTHESIS;  Surgeon: Karishma Mata MD;  Location: [facility] OR [address] [place] [address];  Service: Ophthalmology;  Laterality: Right;    INTRAOCULAR PROSTHESES INSERTION Left 12/21/2020    Procedure: INSERTION, IOL PROSTHESIS;  Surgeon: Karishma Mata MD;  Location: [facility] OR [address] [place] [address];  Service: Ophthalmology;  Laterality: Left;    JOINT REPLACEMENT      left and right k nee    KNEE SURGERY      PHACOEMULSIFICATION OF CATARACT Right 10/18/2020    Procedure: PHACOEMULSIFICATION, CATARACT;  Surgeon: Karishma Mata MD;  Location: [facility] OR [address] [place] [address];  Service: Ophthalmology;  Laterality: Right;    PHACOEMULSIFICATION OF CATARACT Left 12/21/2020    Procedure: PHACOEMULSIFICATION, CATARACT;  Surgeon: Karishma Mata MD;  Location: [facility] OR [address] [place] [address];  Service: Ophthalmology;  Laterality: Left;    TOTAL ABDOMINAL HYSTERECTOMY W/ BILATERAL SALPINGOOPHORECTOMY       Family History   Problem Relation Age of Onset    Cancer Mother 55        colon    Diabetes Mother     Hypertension Mother      Breast cancer Mother 45    Ovarian cancer Mother     Hypertension Maternal Aunt     Hyperlipidemia Maternal Aunt     Breast cancer Maternal Aunt     Hypertension Maternal Uncle     Heart disease Father     Breast cancer Daughter 45    Breast cancer Maternal Aunt     Diabetes Son     Stroke Son     Glaucoma Neg Hx     Heart attack Neg Hx     Heart failure Neg Hx      Social History     Socioeconomic History    Marital status:      Spouse name: Not on file    Number of children: Not on file    Years of education: Not on file    Highest education level: Not on file   Occupational History    Not on file   Social Needs    Financial resource strain: Not on file    Food insecurity     Worry: Not on file     Inability: Not on file    Transportation needs     Medical: Not on file     Non-medical: Not on file   Tobacco Use    Smoking status: Former Smoker     Packs/day: 1.00     Types: Cigarettes     Quit date: 1962     Years since quittin.4    Smokeless tobacco: Never Used   Substance and Sexual Activity    Alcohol use: Yes     Comment: occasionally    Drug use: No    Sexual activity: Never   Lifestyle    Physical activity     Days per week: Not on file     Minutes per session: Not on file    Stress: Not on file   Relationships    Social connections     Talks on phone: Not on file     Gets together: Not on file     Attends Jain service: Not on file     Active member of club or organization: Not on file     Attends meetings of clubs or organizations: Not on file     Relationship status: Not on file   Other Topics Concern    Not on file   Social History Narrative    Not on file     Current Outpatient Medications on File Prior to Visit   Medication Sig Dispense Refill    allopurinoL (ZYLOPRIM) 300 MG tablet TAKE 1 TABLET BY MOUTH ONCE DAILY 90 tablet 0    amLODIPine (NORVASC) 5 MG tablet TAKE 1 TABLET BY MOUTH DAILY 90 tablet 3    anastrozole (ARIMIDEX) 1 mg Tab TAKE 1  TABLET(1 MG) BY MOUTH EVERY DAY. STOP LETROZOLE FEMARA. 30 tablet 11    ascorbic Acid (VITAMIN C) 500 mg CpSR Take 1 capsule by mouth once daily. PATIENT TAKES 3 X WEEKLY      aspirin 81 MG Chew Take 81 mg by mouth once daily.        blood sugar diagnostic Strp 1 strip by Misc.(Non-Drug; Combo Route) route once daily. 100 strip 4    blood-glucose meter kit Use as instructed 1 each 0    carvediloL (COREG) 3.125 MG tablet Take 1 tablet (3.125 mg total) by mouth 2 (two) times daily with meals. 180 tablet 4    clopidogreL (PLAVIX) 75 mg tablet Take 1 tablet (75 mg total) by mouth once daily. 90 tablet 3    cyanocobalamin 1,000 mcg/mL injection Inject 1 mL (1,000 mcg total) into the muscle once a week. Dispense #12 25 gague one inch needles and #12 one ml syringes 12 mL 3    diclofenac sodium (VOLTAREN) 1 % Gel Apply 2 g topically 4 (four) times daily as needed. Apply to bilateral knees as needed for pain      ergocalciferol (ERGOCALCIFEROL) 50,000 unit Cap Take 1 capsule (50,000 Units total) by mouth twice a week. 24 capsule 1    ferrous sulfate 325 (65 FE) MG EC tablet Take 1 tablet (325 mg total) by mouth once daily. 90 tablet 4    fexofenadine (ALLEGRA) 30 MG tablet Take 30 mg by mouth daily as needed.      lancets Misc Check blood sugar once daily 100 each 0    levetiracetam XR (KEPPRA XR) 500 mg Tb24 24 hr tablet TAKE 2 TABLETS(1000 MG) BY MOUTH EVERY  tablet 4    metFORMIN (GLUCOPHAGE) 850 MG tablet Take 1 tablet by mouth 2 (two) times a day.      nitroGLYCERIN (NITROSTAT) 0.4 MG SL tablet PLACE 1 TABLET UNDER THE TONGUE EVERY 5 MINUTES AS NEEDED FOR CHEST PAIN. 450 tablet 0    omeprazole (PRILOSEC) 20 MG capsule TAKE 2 CAPSULES BY MOUTH EVERY  capsule 4    potassium chloride (KLOR-CON) 10 MEQ TbSR TAKE 1 TABLET BY MOUTH EVERY DAY 90 tablet 1    prednisolon/gatiflox/bromfenac (PREDNISOL ACE-GATIFLOX-BROMFEN) 1-0.5-0.075 % DrpS Apply 1 drop to eye 3 (three) times daily. 5 mL 3     "rosuvastatin (CRESTOR) 40 MG Tab TAKE 1 TABLET BY MOUTH EVERY DAY 90 tablet 4     Current Facility-Administered Medications on File Prior to Visit   Medication Dose Route Frequency Provider Last Rate Last Dose    phenylephrine HCL 2.5% ophthalmic solution 1 drop  1 drop Right Eye On Call Procedure Karishma Mata MD   1 drop at 10/18/20 0937    proparacaine 0.5 % ophthalmic solution 1 drop  1 drop Right Eye On Call Procedure Karishma Mata MD   1 drop at 10/18/20 0923    tropicamide 1% ophthalmic solution 1 drop  1 drop Right Eye On Call Procedure Karishma Mata MD   1 drop at 10/18/20 0937     Review of patient's allergies indicates:   Allergen Reactions    Lisinopril Anaphylaxis       ROS      Objective:      Body mass index is 39.31 kg/m².  Vitals:    12/28/20 1327   Weight: 107.2 kg (236 lb 3.6 oz)   Height: 5' 5" (1.651 m)         General    Constitutional: She is oriented to person, place, and time. She appears well-developed and well-nourished.   HENT:   Head: Normocephalic and atraumatic.   Eyes: EOM are normal.   Cardiovascular: Normal rate.    Pulmonary/Chest: Effort normal.   Neurological: She is alert and oriented to person, place, and time.   Psychiatric: She has a normal mood and affect. Her behavior is normal.     General Musculoskeletal Exam   Gait: abnormal and antalgic       Right Knee Exam     Inspection   Erythema: absent  Scars: absent  Swelling: absent  Effusion: absent  Deformity: absent  Bruising: absent    Tenderness   The patient is experiencing no tenderness.     Range of Motion   Extension: 0   Flexion: 130     Tests   Ligament Examination Lachman: normal (-1 to 2mm)   MCL - Valgus: normal (0 to 2mm)  LCL - Varus: normal  Patella   Passive Patellar Tilt: neutral    Other   Sensation: normal    Left Knee Exam     Inspection   Erythema: absent  Scars: present  Swelling: absent  Effusion: absent  Deformity: absent  Bruising: absent    Tenderness   The patient tender to palpation " of the medial retinaculum and lateral retinaculum.    Range of Motion   Extension: 0   Flexion: 120     Tests   Stability   Lachman: abnormal  - grade I  MCL - Valgus: abnormal - grade II  LCL - Varus: abnormal - grade II  Patella   Passive Patellar Tilt: neutral    Other   Sensation: normal    Muscle Strength   Right Lower Extremity   Hip Abduction: 5/5   Quadriceps:  5/5   Hamstrin/5   Left Lower Extremity   Hip Abduction: 5/5   Quadriceps:  5/5   Hamstrin/5     Reflexes     Left Side  Quadriceps:  2+    Right Side   Quadriceps:  2+    Vascular Exam     Right Pulses  Dorsalis Pedis:      2+          Left Pulses  Dorsalis Pedis:      2+          Edema  Right Lower Leg: absent  Left Lower Leg: absent    Radiographs taken recently were reviewed by me.  There is a prosthetic replacement of the bilateral knee(s).  The left knee prosthesis is  well positioned.  Evidence of asymmetrical poly wear.  Femoral notch.            Assessment:       Encounter Diagnoses   Name Primary?    Chronic pain of left knee     Failure of total knee replacement, initial encounter Yes          Plan:       Renata was seen today for pain.    Diagnoses and all orders for this visit:    Failure of total knee replacement, initial encounter  -     Sedimentation rate; Future  -     C-Reactive Protein; Future    Chronic pain of left knee  -     Ambulatory referral/consult to Orthopedics  -     Sedimentation rate; Future  -     C-Reactive Protein; Future        If labs ok will get CT scan.

## 2020-12-29 ENCOUNTER — TELEPHONE (OUTPATIENT)
Dept: ORTHOPEDICS | Facility: CLINIC | Age: 80
End: 2020-12-29

## 2020-12-29 NOTE — TELEPHONE ENCOUNTER
I spoke with patient went over lab results also scheduled her CT. Patient verbalized understanding.            ----- Message from Sarah Donovan MA sent at 12/29/2020  8:21 AM CST -----    ----- Message -----  From: Gilson Wilkerson MD  Sent: 12/29/2020   6:43 AM CST  To: [facility] Staff    Please call pt.  Labs are fine.  I ordered CT scan.

## 2021-01-04 ENCOUNTER — IMMUNIZATION (OUTPATIENT)
Dept: INTERNAL MEDICINE | Facility: CLINIC | Age: 81
End: 2021-01-04
Payer: MEDICARE

## 2021-01-04 DIAGNOSIS — Z23 NEED FOR VACCINATION: ICD-10-CM

## 2021-01-04 PROCEDURE — 91300 COVID-19, MRNA, LNP-S, PF, 30 MCG/0.3 ML DOSE VACCINE: CPT | Mod: PBBFAC | Performed by: INTERNAL MEDICINE

## 2021-01-05 ENCOUNTER — HOSPITAL ENCOUNTER (OUTPATIENT)
Dept: RADIOLOGY | Facility: HOSPITAL | Age: 81
Discharge: HOME | End: 2021-01-05
Attending: ORTHOPAEDIC SURGERY
Payer: MEDICARE

## 2021-01-05 DIAGNOSIS — Z96.659 FAILURE OF TOTAL KNEE REPLACEMENT, INITIAL ENCOUNTER: ICD-10-CM

## 2021-01-05 DIAGNOSIS — G89.29 CHRONIC PAIN OF LEFT KNEE: ICD-10-CM

## 2021-01-05 DIAGNOSIS — T84.018A FAILURE OF TOTAL KNEE REPLACEMENT, INITIAL ENCOUNTER: ICD-10-CM

## 2021-01-05 DIAGNOSIS — M25.562 CHRONIC PAIN OF LEFT KNEE: ICD-10-CM

## 2021-01-05 PROCEDURE — 73700 CT LOWER EXTREMITY W/O DYE: CPT | Mod: 26,LT,, | Performed by: RADIOLOGY

## 2021-01-05 PROCEDURE — 73700 CT KNEE WITHOUT CONTRAST LEFT: ICD-10-PCS | Mod: 26,LT,, | Performed by: RADIOLOGY

## 2021-01-05 PROCEDURE — 73700 CT LOWER EXTREMITY W/O DYE: CPT | Mod: TC,LT

## 2021-01-11 ENCOUNTER — TELEPHONE (OUTPATIENT)
Dept: ORTHOPEDICS | Facility: CLINIC | Age: 81
End: 2021-01-11

## 2021-01-12 RX ORDER — ALLOPURINOL 300 MG/1
TABLET ORAL
Qty: 90 TABLET | Refills: 3 | Status: SHIPPED | OUTPATIENT
Start: 2021-01-12 | End: 2021-12-24

## 2021-01-20 ENCOUNTER — TELEPHONE (OUTPATIENT)
Dept: HEMATOLOGY/ONCOLOGY | Facility: CLINIC | Age: 81
End: 2021-01-20

## 2021-01-20 PROBLEM — D50.9 IRON DEFICIENCY ANEMIA: Status: RESOLVED | Noted: 2018-10-08 | Resolved: 2021-01-20

## 2021-01-25 ENCOUNTER — IMMUNIZATION (OUTPATIENT)
Dept: INTERNAL MEDICINE | Facility: CLINIC | Age: 81
End: 2021-01-25
Payer: MEDICARE

## 2021-01-25 DIAGNOSIS — Z23 NEED FOR VACCINATION: Primary | ICD-10-CM

## 2021-01-25 PROCEDURE — 0002A COVID-19, MRNA, LNP-S, PF, 30 MCG/0.3 ML DOSE VACCINE: CPT | Mod: PBBFAC | Performed by: INTERNAL MEDICINE

## 2021-01-25 PROCEDURE — 91300 COVID-19, MRNA, LNP-S, PF, 30 MCG/0.3 ML DOSE VACCINE: CPT | Mod: PBBFAC | Performed by: INTERNAL MEDICINE

## 2021-02-01 ENCOUNTER — TELEPHONE (OUTPATIENT)
Dept: CARDIOLOGY | Facility: CLINIC | Age: 81
End: 2021-02-01

## 2021-02-01 DIAGNOSIS — I25.10 CORONARY ARTERY DISEASE DUE TO LIPID RICH PLAQUE: Primary | ICD-10-CM

## 2021-02-01 DIAGNOSIS — E78.2 MIXED HYPERLIPIDEMIA: ICD-10-CM

## 2021-02-01 DIAGNOSIS — I25.83 CORONARY ARTERY DISEASE DUE TO LIPID RICH PLAQUE: Primary | ICD-10-CM

## 2021-02-01 DIAGNOSIS — E11.51 CONTROLLED TYPE 2 DM WITH PERIPHERAL CIRCULATORY DISORDER: ICD-10-CM

## 2021-02-01 DIAGNOSIS — I10 HTN (HYPERTENSION), BENIGN: ICD-10-CM

## 2021-02-03 ENCOUNTER — OFFICE VISIT (OUTPATIENT)
Dept: HEMATOLOGY/ONCOLOGY | Facility: CLINIC | Age: 81
End: 2021-02-03
Payer: MEDICARE

## 2021-02-03 VITALS
RESPIRATION RATE: 16 BRPM | HEART RATE: 80 BPM | WEIGHT: 243.38 LBS | OXYGEN SATURATION: 94 % | BODY MASS INDEX: 41.55 KG/M2 | TEMPERATURE: 98 F | SYSTOLIC BLOOD PRESSURE: 149 MMHG | HEIGHT: 64 IN | DIASTOLIC BLOOD PRESSURE: 71 MMHG

## 2021-02-03 DIAGNOSIS — K21.9 GASTROESOPHAGEAL REFLUX DISEASE, UNSPECIFIED WHETHER ESOPHAGITIS PRESENT: ICD-10-CM

## 2021-02-03 DIAGNOSIS — D50.0 IRON DEFICIENCY ANEMIA DUE TO CHRONIC BLOOD LOSS: ICD-10-CM

## 2021-02-03 DIAGNOSIS — E11.59 TYPE 2 DIABETES MELLITUS WITH OTHER CIRCULATORY COMPLICATION, WITHOUT LONG-TERM CURRENT USE OF INSULIN: ICD-10-CM

## 2021-02-03 DIAGNOSIS — C50.412 MALIGNANT NEOPLASM OF UPPER-OUTER QUADRANT OF LEFT FEMALE BREAST, UNSPECIFIED ESTROGEN RECEPTOR STATUS: Primary | ICD-10-CM

## 2021-02-03 DIAGNOSIS — I10 HTN (HYPERTENSION), BENIGN: Chronic | ICD-10-CM

## 2021-02-03 DIAGNOSIS — E78.2 MIXED HYPERLIPIDEMIA: Chronic | ICD-10-CM

## 2021-02-03 DIAGNOSIS — E11.21 TYPE 2 DIABETES MELLITUS WITH DIABETIC NEPHROPATHY, WITHOUT LONG-TERM CURRENT USE OF INSULIN: ICD-10-CM

## 2021-02-03 DIAGNOSIS — Z12.31 ENCOUNTER FOR SCREENING MAMMOGRAM FOR MALIGNANT NEOPLASM OF BREAST: ICD-10-CM

## 2021-02-03 DIAGNOSIS — N18.4 CHRONIC RENAL DISEASE, STAGE 4, SEVERELY DECREASED GLOMERULAR FILTRATION RATE (GFR) BETWEEN 15-29 ML/MIN/1.73 SQUARE METER: ICD-10-CM

## 2021-02-03 PROCEDURE — 1126F AMNT PAIN NOTED NONE PRSNT: CPT | Mod: S$GLB,,, | Performed by: NURSE PRACTITIONER

## 2021-02-03 PROCEDURE — 99999 PR PBB SHADOW E&M-EST. PATIENT-LVL V: ICD-10-PCS | Mod: PBBFAC,,, | Performed by: NURSE PRACTITIONER

## 2021-02-03 PROCEDURE — 99214 OFFICE O/P EST MOD 30 MIN: CPT | Mod: S$GLB,,, | Performed by: NURSE PRACTITIONER

## 2021-02-03 PROCEDURE — 1101F PT FALLS ASSESS-DOCD LE1/YR: CPT | Mod: CPTII,S$GLB,, | Performed by: NURSE PRACTITIONER

## 2021-02-03 PROCEDURE — 99999 PR PBB SHADOW E&M-EST. PATIENT-LVL V: CPT | Mod: PBBFAC,,, | Performed by: NURSE PRACTITIONER

## 2021-02-03 PROCEDURE — 99214 PR OFFICE/OUTPT VISIT, EST, LEVL IV, 30-39 MIN: ICD-10-PCS | Mod: S$GLB,,, | Performed by: NURSE PRACTITIONER

## 2021-02-03 PROCEDURE — 99499 RISK ADDL DX/OHS AUDIT: ICD-10-PCS | Mod: S$GLB,,, | Performed by: NURSE PRACTITIONER

## 2021-02-03 PROCEDURE — 1126F PR PAIN SEVERITY QUANTIFIED, NO PAIN PRESENT: ICD-10-PCS | Mod: S$GLB,,, | Performed by: NURSE PRACTITIONER

## 2021-02-03 PROCEDURE — 3288F FALL RISK ASSESSMENT DOCD: CPT | Mod: CPTII,S$GLB,, | Performed by: NURSE PRACTITIONER

## 2021-02-03 PROCEDURE — 3288F PR FALLS RISK ASSESSMENT DOCUMENTED: ICD-10-PCS | Mod: CPTII,S$GLB,, | Performed by: NURSE PRACTITIONER

## 2021-02-03 PROCEDURE — 3072F PR LOW RISK FOR RETINOPATHY: ICD-10-PCS | Mod: S$GLB,,, | Performed by: NURSE PRACTITIONER

## 2021-02-03 PROCEDURE — 3072F LOW RISK FOR RETINOPATHY: CPT | Mod: S$GLB,,, | Performed by: NURSE PRACTITIONER

## 2021-02-03 PROCEDURE — 99499 UNLISTED E&M SERVICE: CPT | Mod: S$GLB,,, | Performed by: NURSE PRACTITIONER

## 2021-02-03 PROCEDURE — 1101F PR PT FALLS ASSESS DOC 0-1 FALLS W/OUT INJ PAST YR: ICD-10-PCS | Mod: CPTII,S$GLB,, | Performed by: NURSE PRACTITIONER

## 2021-02-05 ENCOUNTER — OFFICE VISIT (OUTPATIENT)
Dept: OPTOMETRY | Facility: CLINIC | Age: 81
End: 2021-02-05
Payer: MEDICARE

## 2021-02-05 DIAGNOSIS — Z98.42 S/P CATARACT EXTRACTION AND INSERTION OF INTRAOCULAR LENS, LEFT: Primary | ICD-10-CM

## 2021-02-05 DIAGNOSIS — Z96.1 S/P CATARACT EXTRACTION AND INSERTION OF INTRAOCULAR LENS, LEFT: Primary | ICD-10-CM

## 2021-02-05 PROCEDURE — 99999 PR PBB SHADOW E&M-EST. PATIENT-LVL III: CPT | Mod: PBBFAC,,, | Performed by: OPTOMETRIST

## 2021-02-05 PROCEDURE — 1101F PR PT FALLS ASSESS DOC 0-1 FALLS W/OUT INJ PAST YR: ICD-10-PCS | Mod: CPTII,S$GLB,, | Performed by: OPTOMETRIST

## 2021-02-05 PROCEDURE — 99024 PR POST-OP FOLLOW-UP VISIT: ICD-10-PCS | Mod: S$GLB,,, | Performed by: OPTOMETRIST

## 2021-02-05 PROCEDURE — 3288F PR FALLS RISK ASSESSMENT DOCUMENTED: ICD-10-PCS | Mod: CPTII,S$GLB,, | Performed by: OPTOMETRIST

## 2021-02-05 PROCEDURE — 1126F AMNT PAIN NOTED NONE PRSNT: CPT | Mod: S$GLB,,, | Performed by: OPTOMETRIST

## 2021-02-05 PROCEDURE — 1126F PR PAIN SEVERITY QUANTIFIED, NO PAIN PRESENT: ICD-10-PCS | Mod: S$GLB,,, | Performed by: OPTOMETRIST

## 2021-02-05 PROCEDURE — 3288F FALL RISK ASSESSMENT DOCD: CPT | Mod: CPTII,S$GLB,, | Performed by: OPTOMETRIST

## 2021-02-05 PROCEDURE — 1101F PT FALLS ASSESS-DOCD LE1/YR: CPT | Mod: CPTII,S$GLB,, | Performed by: OPTOMETRIST

## 2021-02-05 PROCEDURE — 99999 PR PBB SHADOW E&M-EST. PATIENT-LVL III: ICD-10-PCS | Mod: PBBFAC,,, | Performed by: OPTOMETRIST

## 2021-02-05 PROCEDURE — 99024 POSTOP FOLLOW-UP VISIT: CPT | Mod: S$GLB,,, | Performed by: OPTOMETRIST

## 2021-02-27 ENCOUNTER — PATIENT OUTREACH (OUTPATIENT)
Dept: ADMINISTRATIVE | Facility: OTHER | Age: 81
End: 2021-02-27

## 2021-03-01 ENCOUNTER — OFFICE VISIT (OUTPATIENT)
Dept: ORTHOPEDICS | Facility: CLINIC | Age: 81
End: 2021-03-01
Payer: MEDICARE

## 2021-03-01 VITALS — HEIGHT: 64 IN | WEIGHT: 248.13 LBS | BODY MASS INDEX: 42.36 KG/M2

## 2021-03-01 DIAGNOSIS — Z96.659 FAILURE OF TOTAL KNEE REPLACEMENT, INITIAL ENCOUNTER: ICD-10-CM

## 2021-03-01 DIAGNOSIS — E66.01 MORBID OBESITY WITH BMI OF 40.0-44.9, ADULT: ICD-10-CM

## 2021-03-01 DIAGNOSIS — M25.562 CHRONIC PAIN OF LEFT KNEE: Primary | ICD-10-CM

## 2021-03-01 DIAGNOSIS — Z01.818 PRE-OP TESTING: ICD-10-CM

## 2021-03-01 DIAGNOSIS — T84.018A FAILURE OF TOTAL KNEE REPLACEMENT, INITIAL ENCOUNTER: ICD-10-CM

## 2021-03-01 DIAGNOSIS — G89.29 CHRONIC PAIN OF LEFT KNEE: Primary | ICD-10-CM

## 2021-03-01 PROCEDURE — 1159F MED LIST DOCD IN RCRD: CPT | Mod: S$GLB,,, | Performed by: ORTHOPAEDIC SURGERY

## 2021-03-01 PROCEDURE — 3072F LOW RISK FOR RETINOPATHY: CPT | Mod: S$GLB,,, | Performed by: ORTHOPAEDIC SURGERY

## 2021-03-01 PROCEDURE — 1159F PR MEDICATION LIST DOCUMENTED IN MEDICAL RECORD: ICD-10-PCS | Mod: S$GLB,,, | Performed by: ORTHOPAEDIC SURGERY

## 2021-03-01 PROCEDURE — 3288F PR FALLS RISK ASSESSMENT DOCUMENTED: ICD-10-PCS | Mod: CPTII,S$GLB,, | Performed by: ORTHOPAEDIC SURGERY

## 2021-03-01 PROCEDURE — 1126F PR PAIN SEVERITY QUANTIFIED, NO PAIN PRESENT: ICD-10-PCS | Mod: S$GLB,,, | Performed by: ORTHOPAEDIC SURGERY

## 2021-03-01 PROCEDURE — 3288F FALL RISK ASSESSMENT DOCD: CPT | Mod: CPTII,S$GLB,, | Performed by: ORTHOPAEDIC SURGERY

## 2021-03-01 PROCEDURE — 99213 PR OFFICE/OUTPT VISIT, EST, LEVL III, 20-29 MIN: ICD-10-PCS | Mod: S$GLB,,, | Performed by: ORTHOPAEDIC SURGERY

## 2021-03-01 PROCEDURE — 1101F PR PT FALLS ASSESS DOC 0-1 FALLS W/OUT INJ PAST YR: ICD-10-PCS | Mod: CPTII,S$GLB,, | Performed by: ORTHOPAEDIC SURGERY

## 2021-03-01 PROCEDURE — 99213 OFFICE O/P EST LOW 20 MIN: CPT | Mod: S$GLB,,, | Performed by: ORTHOPAEDIC SURGERY

## 2021-03-01 PROCEDURE — 1101F PT FALLS ASSESS-DOCD LE1/YR: CPT | Mod: CPTII,S$GLB,, | Performed by: ORTHOPAEDIC SURGERY

## 2021-03-01 PROCEDURE — 3072F PR LOW RISK FOR RETINOPATHY: ICD-10-PCS | Mod: S$GLB,,, | Performed by: ORTHOPAEDIC SURGERY

## 2021-03-01 PROCEDURE — 99999 PR PBB SHADOW E&M-EST. PATIENT-LVL IV: ICD-10-PCS | Mod: PBBFAC,,, | Performed by: ORTHOPAEDIC SURGERY

## 2021-03-01 PROCEDURE — 99999 PR PBB SHADOW E&M-EST. PATIENT-LVL IV: CPT | Mod: PBBFAC,,, | Performed by: ORTHOPAEDIC SURGERY

## 2021-03-01 PROCEDURE — 1126F AMNT PAIN NOTED NONE PRSNT: CPT | Mod: S$GLB,,, | Performed by: ORTHOPAEDIC SURGERY

## 2021-03-08 DIAGNOSIS — Z96.659 FAILURE OF TOTAL KNEE REPLACEMENT, INITIAL ENCOUNTER: ICD-10-CM

## 2021-03-08 DIAGNOSIS — G89.29 CHRONIC PAIN OF LEFT KNEE: Primary | ICD-10-CM

## 2021-03-08 DIAGNOSIS — M25.562 CHRONIC PAIN OF LEFT KNEE: Primary | ICD-10-CM

## 2021-03-08 DIAGNOSIS — T84.018A FAILURE OF TOTAL KNEE REPLACEMENT, INITIAL ENCOUNTER: ICD-10-CM

## 2021-03-18 ENCOUNTER — PATIENT MESSAGE (OUTPATIENT)
Dept: RESEARCH | Facility: HOSPITAL | Age: 81
End: 2021-03-18

## 2021-03-22 ENCOUNTER — PATIENT MESSAGE (OUTPATIENT)
Dept: ADMINISTRATIVE | Facility: OTHER | Age: 81
End: 2021-03-22

## 2021-03-22 ENCOUNTER — HOSPITAL ENCOUNTER (OUTPATIENT)
Dept: RADIOLOGY | Facility: HOSPITAL | Age: 81
Discharge: HOME | End: 2021-03-22
Attending: PHYSICIAN ASSISTANT
Payer: MEDICARE

## 2021-03-22 ENCOUNTER — OFFICE VISIT (OUTPATIENT)
Dept: ORTHOPEDICS | Facility: CLINIC | Age: 81
End: 2021-03-22
Payer: MEDICARE

## 2021-03-22 ENCOUNTER — TELEPHONE (OUTPATIENT)
Dept: CARDIOLOGY | Facility: CLINIC | Age: 81
End: 2021-03-22

## 2021-03-22 VITALS
HEART RATE: 86 BPM | HEIGHT: 64 IN | WEIGHT: 249.13 LBS | SYSTOLIC BLOOD PRESSURE: 187 MMHG | DIASTOLIC BLOOD PRESSURE: 89 MMHG | TEMPERATURE: 99 F | BODY MASS INDEX: 42.53 KG/M2

## 2021-03-22 DIAGNOSIS — Z96.659 FAILURE OF TOTAL KNEE REPLACEMENT, INITIAL ENCOUNTER: ICD-10-CM

## 2021-03-22 DIAGNOSIS — M25.562 CHRONIC PAIN OF LEFT KNEE: ICD-10-CM

## 2021-03-22 DIAGNOSIS — G89.29 CHRONIC PAIN OF LEFT KNEE: ICD-10-CM

## 2021-03-22 DIAGNOSIS — Z96.659 FAILURE OF TOTAL KNEE REPLACEMENT, INITIAL ENCOUNTER: Primary | ICD-10-CM

## 2021-03-22 DIAGNOSIS — T84.018A FAILURE OF TOTAL KNEE REPLACEMENT, INITIAL ENCOUNTER: ICD-10-CM

## 2021-03-22 DIAGNOSIS — T84.018A FAILURE OF TOTAL KNEE REPLACEMENT, INITIAL ENCOUNTER: Primary | ICD-10-CM

## 2021-03-22 PROCEDURE — 99499 NO LOS: ICD-10-PCS | Mod: S$GLB,,, | Performed by: PHYSICIAN ASSISTANT

## 2021-03-22 PROCEDURE — 99499 UNLISTED E&M SERVICE: CPT | Mod: S$GLB,,, | Performed by: PHYSICIAN ASSISTANT

## 2021-03-22 PROCEDURE — 73560 X-RAY EXAM OF KNEE 1 OR 2: CPT | Mod: TC,LT

## 2021-03-22 PROCEDURE — 99999 PR PBB SHADOW E&M-EST. PATIENT-LVL IV: ICD-10-PCS | Mod: PBBFAC,,, | Performed by: PHYSICIAN ASSISTANT

## 2021-03-22 PROCEDURE — 3072F LOW RISK FOR RETINOPATHY: CPT | Mod: S$GLB,,, | Performed by: PHYSICIAN ASSISTANT

## 2021-03-22 PROCEDURE — 1126F PR PAIN SEVERITY QUANTIFIED, NO PAIN PRESENT: ICD-10-PCS | Mod: S$GLB,,, | Performed by: PHYSICIAN ASSISTANT

## 2021-03-22 PROCEDURE — 99999 PR PBB SHADOW E&M-EST. PATIENT-LVL IV: CPT | Mod: PBBFAC,,, | Performed by: PHYSICIAN ASSISTANT

## 2021-03-22 PROCEDURE — 1126F AMNT PAIN NOTED NONE PRSNT: CPT | Mod: S$GLB,,, | Performed by: PHYSICIAN ASSISTANT

## 2021-03-22 PROCEDURE — 3288F PR FALLS RISK ASSESSMENT DOCUMENTED: ICD-10-PCS | Mod: CPTII,S$GLB,, | Performed by: PHYSICIAN ASSISTANT

## 2021-03-22 PROCEDURE — 1101F PT FALLS ASSESS-DOCD LE1/YR: CPT | Mod: CPTII,S$GLB,, | Performed by: PHYSICIAN ASSISTANT

## 2021-03-22 PROCEDURE — 3288F FALL RISK ASSESSMENT DOCD: CPT | Mod: CPTII,S$GLB,, | Performed by: PHYSICIAN ASSISTANT

## 2021-03-22 PROCEDURE — 3072F PR LOW RISK FOR RETINOPATHY: ICD-10-PCS | Mod: S$GLB,,, | Performed by: PHYSICIAN ASSISTANT

## 2021-03-22 PROCEDURE — 1101F PR PT FALLS ASSESS DOC 0-1 FALLS W/OUT INJ PAST YR: ICD-10-PCS | Mod: CPTII,S$GLB,, | Performed by: PHYSICIAN ASSISTANT

## 2021-03-22 PROCEDURE — 73560 XR KNEE 1 OR 2 VIEW LEFT: ICD-10-PCS | Mod: 26,LT,, | Performed by: RADIOLOGY

## 2021-03-22 PROCEDURE — 73560 X-RAY EXAM OF KNEE 1 OR 2: CPT | Mod: 26,LT,, | Performed by: RADIOLOGY

## 2021-03-22 RX ORDER — SODIUM CHLORIDE 0.9 % (FLUSH) 0.9 %
10 SYRINGE (ML) INJECTION
Status: CANCELLED | OUTPATIENT
Start: 2021-03-22

## 2021-03-22 RX ORDER — CEFAZOLIN SODIUM 2 G/50ML
2 SOLUTION INTRAVENOUS
Status: CANCELLED | OUTPATIENT
Start: 2021-03-22

## 2021-03-22 RX ORDER — MUPIROCIN 20 MG/G
OINTMENT TOPICAL
Status: CANCELLED | OUTPATIENT
Start: 2021-03-22

## 2021-03-25 ENCOUNTER — TELEPHONE (OUTPATIENT)
Dept: PREADMISSION TESTING | Facility: HOSPITAL | Age: 81
End: 2021-03-25

## 2021-03-25 DIAGNOSIS — I10 HYPERTENSION, UNSPECIFIED TYPE: Primary | ICD-10-CM

## 2021-03-26 ENCOUNTER — PATIENT MESSAGE (OUTPATIENT)
Dept: RESEARCH | Facility: HOSPITAL | Age: 81
End: 2021-03-26

## 2021-03-31 ENCOUNTER — OFFICE VISIT (OUTPATIENT)
Dept: INTERNAL MEDICINE | Facility: CLINIC | Age: 81
End: 2021-03-31
Payer: MEDICARE

## 2021-03-31 ENCOUNTER — TELEPHONE (OUTPATIENT)
Dept: INTERNAL MEDICINE | Facility: CLINIC | Age: 81
End: 2021-03-31

## 2021-03-31 ENCOUNTER — HOSPITAL ENCOUNTER (OUTPATIENT)
Dept: CARDIOLOGY | Facility: CLINIC | Age: 81
Discharge: HOME | End: 2021-03-31
Payer: MEDICARE

## 2021-03-31 VITALS
OXYGEN SATURATION: 98 % | WEIGHT: 250.88 LBS | TEMPERATURE: 98 F | HEART RATE: 87 BPM | SYSTOLIC BLOOD PRESSURE: 179 MMHG | BODY MASS INDEX: 41.8 KG/M2 | HEIGHT: 65 IN | DIASTOLIC BLOOD PRESSURE: 83 MMHG

## 2021-03-31 DIAGNOSIS — I10 HTN (HYPERTENSION), BENIGN: Chronic | ICD-10-CM

## 2021-03-31 DIAGNOSIS — R29.6 FREQUENT FALLS: ICD-10-CM

## 2021-03-31 DIAGNOSIS — C50.412 MALIGNANT NEOPLASM OF UPPER-OUTER QUADRANT OF LEFT FEMALE BREAST, UNSPECIFIED ESTROGEN RECEPTOR STATUS: ICD-10-CM

## 2021-03-31 DIAGNOSIS — E11.21 TYPE 2 DIABETES MELLITUS WITH DIABETIC NEPHROPATHY, WITHOUT LONG-TERM CURRENT USE OF INSULIN: ICD-10-CM

## 2021-03-31 DIAGNOSIS — I73.9 PERIPHERAL VASCULAR DISEASE: ICD-10-CM

## 2021-03-31 DIAGNOSIS — I65.23 BILATERAL CAROTID ARTERY STENOSIS: ICD-10-CM

## 2021-03-31 DIAGNOSIS — I25.10 CORONARY ARTERY DISEASE DUE TO LIPID RICH PLAQUE: Chronic | ICD-10-CM

## 2021-03-31 DIAGNOSIS — K21.9 GASTROESOPHAGEAL REFLUX DISEASE, UNSPECIFIED WHETHER ESOPHAGITIS PRESENT: ICD-10-CM

## 2021-03-31 DIAGNOSIS — N18.32 STAGE 3B CHRONIC KIDNEY DISEASE: Chronic | ICD-10-CM

## 2021-03-31 DIAGNOSIS — E66.01 CLASS 3 SEVERE OBESITY WITH SERIOUS COMORBIDITY AND BODY MASS INDEX (BMI) OF 40.0 TO 44.9 IN ADULT, UNSPECIFIED OBESITY TYPE: ICD-10-CM

## 2021-03-31 DIAGNOSIS — G40.409 TONIC-CLONIC EPILEPTIC SEIZURES: ICD-10-CM

## 2021-03-31 DIAGNOSIS — I25.83 CORONARY ARTERY DISEASE DUE TO LIPID RICH PLAQUE: Chronic | ICD-10-CM

## 2021-03-31 DIAGNOSIS — D50.0 IRON DEFICIENCY ANEMIA DUE TO CHRONIC BLOOD LOSS: ICD-10-CM

## 2021-03-31 DIAGNOSIS — M10.9 GOUT, ARTHRITIS: ICD-10-CM

## 2021-03-31 DIAGNOSIS — K92.2 GASTROINTESTINAL HEMORRHAGE, UNSPECIFIED GASTROINTESTINAL HEMORRHAGE TYPE: ICD-10-CM

## 2021-03-31 PROBLEM — E66.813 CLASS 3 SEVERE OBESITY IN ADULT: Status: ACTIVE | Noted: 2021-03-31

## 2021-03-31 PROCEDURE — 1126F AMNT PAIN NOTED NONE PRSNT: CPT | Mod: S$GLB,,, | Performed by: NURSE PRACTITIONER

## 2021-03-31 PROCEDURE — 1126F PR PAIN SEVERITY QUANTIFIED, NO PAIN PRESENT: ICD-10-PCS | Mod: S$GLB,,, | Performed by: NURSE PRACTITIONER

## 2021-03-31 PROCEDURE — 1159F MED LIST DOCD IN RCRD: CPT | Mod: S$GLB,,, | Performed by: NURSE PRACTITIONER

## 2021-03-31 PROCEDURE — 3079F PR MOST RECENT DIASTOLIC BLOOD PRESSURE 80-89 MM HG: ICD-10-PCS | Mod: CPTII,S$GLB,, | Performed by: NURSE PRACTITIONER

## 2021-03-31 PROCEDURE — 3079F DIAST BP 80-89 MM HG: CPT | Mod: CPTII,S$GLB,, | Performed by: NURSE PRACTITIONER

## 2021-03-31 PROCEDURE — 93010 EKG 12-LEAD: ICD-10-PCS | Mod: ,,, | Performed by: INTERNAL MEDICINE

## 2021-03-31 PROCEDURE — 99999 PR PBB SHADOW E&M-EST. PATIENT-LVL III: ICD-10-PCS | Mod: PBBFAC,,, | Performed by: NURSE PRACTITIONER

## 2021-03-31 PROCEDURE — 3072F LOW RISK FOR RETINOPATHY: CPT | Mod: S$GLB,,, | Performed by: NURSE PRACTITIONER

## 2021-03-31 PROCEDURE — 99499 RISK ADDL DX/OHS AUDIT: ICD-10-PCS | Mod: S$GLB,,, | Performed by: NURSE PRACTITIONER

## 2021-03-31 PROCEDURE — 3077F SYST BP >= 140 MM HG: CPT | Mod: CPTII,S$GLB,, | Performed by: NURSE PRACTITIONER

## 2021-03-31 PROCEDURE — 99214 PR OFFICE/OUTPT VISIT, EST, LEVL IV, 30-39 MIN: ICD-10-PCS | Mod: S$GLB,,, | Performed by: NURSE PRACTITIONER

## 2021-03-31 PROCEDURE — 93005 ELECTROCARDIOGRAM TRACING: CPT

## 2021-03-31 PROCEDURE — 3072F PR LOW RISK FOR RETINOPATHY: ICD-10-PCS | Mod: S$GLB,,, | Performed by: NURSE PRACTITIONER

## 2021-03-31 PROCEDURE — 1159F PR MEDICATION LIST DOCUMENTED IN MEDICAL RECORD: ICD-10-PCS | Mod: S$GLB,,, | Performed by: NURSE PRACTITIONER

## 2021-03-31 PROCEDURE — 99999 PR PBB SHADOW E&M-EST. PATIENT-LVL III: CPT | Mod: PBBFAC,,, | Performed by: NURSE PRACTITIONER

## 2021-03-31 PROCEDURE — 93010 ELECTROCARDIOGRAM REPORT: CPT | Mod: ,,, | Performed by: INTERNAL MEDICINE

## 2021-03-31 PROCEDURE — 99214 OFFICE O/P EST MOD 30 MIN: CPT | Mod: S$GLB,,, | Performed by: NURSE PRACTITIONER

## 2021-03-31 PROCEDURE — 3077F PR MOST RECENT SYSTOLIC BLOOD PRESSURE >= 140 MM HG: ICD-10-PCS | Mod: CPTII,S$GLB,, | Performed by: NURSE PRACTITIONER

## 2021-03-31 PROCEDURE — 99499 UNLISTED E&M SERVICE: CPT | Mod: S$GLB,,, | Performed by: NURSE PRACTITIONER

## 2021-04-03 ENCOUNTER — ANESTHESIA EVENT (OUTPATIENT)
Dept: SURGERY | Facility: HOSPITAL | Age: 81
DRG: 467 | End: 2021-04-03
Payer: MEDICARE

## 2021-04-05 ENCOUNTER — HOSPITAL ENCOUNTER (OUTPATIENT)
Dept: CARDIOLOGY | Facility: HOSPITAL | Age: 81
Discharge: HOME | End: 2021-04-05
Attending: NURSE PRACTITIONER
Payer: MEDICARE

## 2021-04-05 DIAGNOSIS — I65.23 BILATERAL CAROTID ARTERY STENOSIS: ICD-10-CM

## 2021-04-05 LAB
LEFT CBA DIAS: 20 CM/S
LEFT CBA SYS: 132 CM/S
LEFT CCA DIST DIAS: 10 CM/S
LEFT CCA DIST SYS: 83 CM/S
LEFT CCA MID DIAS: 11 CM/S
LEFT CCA MID SYS: 96 CM/S
LEFT CCA PROX DIAS: 9 CM/S
LEFT CCA PROX SYS: 89 CM/S
LEFT ECA DIAS: 9 CM/S
LEFT ECA SYS: 123 CM/S
LEFT ICA DIST DIAS: 17 CM/S
LEFT ICA DIST SYS: 79 CM/S
LEFT ICA MID DIAS: 19 CM/S
LEFT ICA MID SYS: 82 CM/S
LEFT ICA PROX DIAS: 19 CM/S
LEFT ICA PROX SYS: 100 CM/S
LEFT VERTEBRAL DIAS: 9 CM/S
LEFT VERTEBRAL SYS: 59 CM/S
OHS CV CAROTID RIGHT ICA EDV HIGHEST: 18
OHS CV CAROTID ULTRASOUND LEFT ICA/CCA RATIO: 1.2
OHS CV CAROTID ULTRASOUND RIGHT ICA/CCA RATIO: 1.85
OHS CV PV CAROTID LEFT HIGHEST CCA: 96
OHS CV PV CAROTID LEFT HIGHEST ICA: 100
OHS CV PV CAROTID RIGHT HIGHEST CCA: 102
OHS CV PV CAROTID RIGHT HIGHEST ICA: 161
OHS CV US CAROTID LEFT HIGHEST EDV: 19
RIGHT ARM DIASTOLIC BLOOD PRESSURE: 84 MMHG
RIGHT ARM SYSTOLIC BLOOD PRESSURE: 177 MMHG
RIGHT CBA DIAS: 14 CM/S
RIGHT CBA SYS: 163 CM/S
RIGHT CCA DIST DIAS: 12 CM/S
RIGHT CCA DIST SYS: 87 CM/S
RIGHT CCA MID DIAS: 13 CM/S
RIGHT CCA MID SYS: 102 CM/S
RIGHT CCA PROX DIAS: 9 CM/S
RIGHT CCA PROX SYS: 72 CM/S
RIGHT ECA DIAS: 10 CM/S
RIGHT ECA SYS: 169 CM/S
RIGHT ICA DIST DIAS: 18 CM/S
RIGHT ICA DIST SYS: 98 CM/S
RIGHT ICA MID DIAS: 18 CM/S
RIGHT ICA MID SYS: 76 CM/S
RIGHT ICA PROX DIAS: 13 CM/S
RIGHT ICA PROX SYS: 161 CM/S
RIGHT VERTEBRAL DIAS: 11 CM/S
RIGHT VERTEBRAL SYS: 75 CM/S

## 2021-04-05 PROCEDURE — 93880 CV US DOPPLER CAROTID (CUPID ONLY): ICD-10-PCS | Mod: 26,,, | Performed by: INTERNAL MEDICINE

## 2021-04-05 PROCEDURE — 93880 EXTRACRANIAL BILAT STUDY: CPT

## 2021-04-05 PROCEDURE — 93880 EXTRACRANIAL BILAT STUDY: CPT | Mod: 26,,, | Performed by: INTERNAL MEDICINE

## 2021-04-09 ENCOUNTER — LAB VISIT (OUTPATIENT)
Dept: LAB | Facility: HOSPITAL | Age: 81
End: 2021-04-09
Payer: MEDICARE

## 2021-04-09 ENCOUNTER — OFFICE VISIT (OUTPATIENT)
Dept: INTERNAL MEDICINE | Facility: CLINIC | Age: 81
End: 2021-04-09
Payer: MEDICARE

## 2021-04-09 VITALS
HEIGHT: 65 IN | BODY MASS INDEX: 42.06 KG/M2 | DIASTOLIC BLOOD PRESSURE: 80 MMHG | SYSTOLIC BLOOD PRESSURE: 128 MMHG | OXYGEN SATURATION: 98 % | HEART RATE: 74 BPM | WEIGHT: 252.44 LBS

## 2021-04-09 DIAGNOSIS — K21.9 GASTROESOPHAGEAL REFLUX DISEASE, UNSPECIFIED WHETHER ESOPHAGITIS PRESENT: Primary | ICD-10-CM

## 2021-04-09 DIAGNOSIS — N18.32 STAGE 3B CHRONIC KIDNEY DISEASE: Chronic | ICD-10-CM

## 2021-04-09 DIAGNOSIS — I10 HTN (HYPERTENSION), BENIGN: Chronic | ICD-10-CM

## 2021-04-09 DIAGNOSIS — E11.59 TYPE 2 DIABETES MELLITUS WITH OTHER CIRCULATORY COMPLICATION, WITHOUT LONG-TERM CURRENT USE OF INSULIN: ICD-10-CM

## 2021-04-09 DIAGNOSIS — C50.412 MALIGNANT NEOPLASM OF UPPER-OUTER QUADRANT OF LEFT FEMALE BREAST, UNSPECIFIED ESTROGEN RECEPTOR STATUS: ICD-10-CM

## 2021-04-09 DIAGNOSIS — E78.2 MIXED HYPERLIPIDEMIA: Chronic | ICD-10-CM

## 2021-04-09 DIAGNOSIS — Z95.5 S/P CORONARY ARTERY STENT PLACEMENT: ICD-10-CM

## 2021-04-09 DIAGNOSIS — M10.9 GOUT, ARTHRITIS: ICD-10-CM

## 2021-04-09 PROCEDURE — 36415 COLL VENOUS BLD VENIPUNCTURE: CPT | Performed by: NURSE PRACTITIONER

## 2021-04-09 PROCEDURE — 3072F PR LOW RISK FOR RETINOPATHY: ICD-10-PCS | Mod: S$GLB,,, | Performed by: INTERNAL MEDICINE

## 2021-04-09 PROCEDURE — 1126F PR PAIN SEVERITY QUANTIFIED, NO PAIN PRESENT: ICD-10-PCS | Mod: S$GLB,,, | Performed by: INTERNAL MEDICINE

## 2021-04-09 PROCEDURE — 99999 PR PBB SHADOW E&M-EST. PATIENT-LVL IV: CPT | Mod: PBBFAC,,, | Performed by: INTERNAL MEDICINE

## 2021-04-09 PROCEDURE — 1159F MED LIST DOCD IN RCRD: CPT | Mod: S$GLB,,, | Performed by: INTERNAL MEDICINE

## 2021-04-09 PROCEDURE — 99499 UNLISTED E&M SERVICE: CPT | Mod: S$GLB,,, | Performed by: INTERNAL MEDICINE

## 2021-04-09 PROCEDURE — 1101F PR PT FALLS ASSESS DOC 0-1 FALLS W/OUT INJ PAST YR: ICD-10-PCS | Mod: CPTII,S$GLB,, | Performed by: INTERNAL MEDICINE

## 2021-04-09 PROCEDURE — 99999 PR PBB SHADOW E&M-EST. PATIENT-LVL IV: ICD-10-PCS | Mod: PBBFAC,,, | Performed by: INTERNAL MEDICINE

## 2021-04-09 PROCEDURE — 1159F PR MEDICATION LIST DOCUMENTED IN MEDICAL RECORD: ICD-10-PCS | Mod: S$GLB,,, | Performed by: INTERNAL MEDICINE

## 2021-04-09 PROCEDURE — 3288F FALL RISK ASSESSMENT DOCD: CPT | Mod: CPTII,S$GLB,, | Performed by: INTERNAL MEDICINE

## 2021-04-09 PROCEDURE — 99214 OFFICE O/P EST MOD 30 MIN: CPT | Mod: S$GLB,,, | Performed by: INTERNAL MEDICINE

## 2021-04-09 PROCEDURE — 80048 BASIC METABOLIC PNL TOTAL CA: CPT | Performed by: NURSE PRACTITIONER

## 2021-04-09 PROCEDURE — 3072F LOW RISK FOR RETINOPATHY: CPT | Mod: S$GLB,,, | Performed by: INTERNAL MEDICINE

## 2021-04-09 PROCEDURE — 99214 PR OFFICE/OUTPT VISIT, EST, LEVL IV, 30-39 MIN: ICD-10-PCS | Mod: S$GLB,,, | Performed by: INTERNAL MEDICINE

## 2021-04-09 PROCEDURE — 99499 RISK ADDL DX/OHS AUDIT: ICD-10-PCS | Mod: S$GLB,,, | Performed by: INTERNAL MEDICINE

## 2021-04-09 PROCEDURE — 1126F AMNT PAIN NOTED NONE PRSNT: CPT | Mod: S$GLB,,, | Performed by: INTERNAL MEDICINE

## 2021-04-09 PROCEDURE — 1101F PT FALLS ASSESS-DOCD LE1/YR: CPT | Mod: CPTII,S$GLB,, | Performed by: INTERNAL MEDICINE

## 2021-04-09 PROCEDURE — 3288F PR FALLS RISK ASSESSMENT DOCUMENTED: ICD-10-PCS | Mod: CPTII,S$GLB,, | Performed by: INTERNAL MEDICINE

## 2021-04-09 RX ORDER — ERGOCALCIFEROL 1.25 MG/1
50000 CAPSULE ORAL
Qty: 24 CAPSULE | Refills: 1 | Status: SHIPPED | OUTPATIENT
Start: 2021-04-12 | End: 2021-07-08

## 2021-04-10 ENCOUNTER — LAB VISIT (OUTPATIENT)
Dept: LAB | Facility: HOSPITAL | Age: 81
End: 2021-04-10
Attending: INTERNAL MEDICINE
Payer: MEDICARE

## 2021-04-10 DIAGNOSIS — I25.10 CORONARY ARTERY DISEASE DUE TO LIPID RICH PLAQUE: ICD-10-CM

## 2021-04-10 DIAGNOSIS — E11.51 CONTROLLED TYPE 2 DM WITH PERIPHERAL CIRCULATORY DISORDER: ICD-10-CM

## 2021-04-10 DIAGNOSIS — E78.2 MIXED HYPERLIPIDEMIA: ICD-10-CM

## 2021-04-10 DIAGNOSIS — I10 HTN (HYPERTENSION), BENIGN: ICD-10-CM

## 2021-04-10 DIAGNOSIS — I25.83 CORONARY ARTERY DISEASE DUE TO LIPID RICH PLAQUE: ICD-10-CM

## 2021-04-10 LAB
ALBUMIN SERPL BCP-MCNC: 3.7 G/DL (ref 3.5–5.2)
ALP SERPL-CCNC: 103 U/L (ref 55–135)
ALT SERPL W/O P-5'-P-CCNC: 12 U/L (ref 10–44)
ANION GAP SERPL CALC-SCNC: 10 MMOL/L (ref 8–16)
ANION GAP SERPL CALC-SCNC: 10 MMOL/L (ref 8–16)
AST SERPL-CCNC: 18 U/L (ref 10–40)
BILIRUB SERPL-MCNC: 0.6 MG/DL (ref 0.1–1)
BUN SERPL-MCNC: 24 MG/DL (ref 8–23)
BUN SERPL-MCNC: 24 MG/DL (ref 8–23)
CALCIUM SERPL-MCNC: 10.6 MG/DL (ref 8.7–10.5)
CALCIUM SERPL-MCNC: 10.8 MG/DL (ref 8.7–10.5)
CHLORIDE SERPL-SCNC: 105 MMOL/L (ref 95–110)
CHLORIDE SERPL-SCNC: 107 MMOL/L (ref 95–110)
CHOLEST SERPL-MCNC: 183 MG/DL (ref 120–199)
CHOLEST/HDLC SERPL: 2.6 {RATIO} (ref 2–5)
CO2 SERPL-SCNC: 26 MMOL/L (ref 23–29)
CO2 SERPL-SCNC: 26 MMOL/L (ref 23–29)
CREAT SERPL-MCNC: 1.8 MG/DL (ref 0.5–1.4)
CREAT SERPL-MCNC: 1.8 MG/DL (ref 0.5–1.4)
EST. GFR  (AFRICAN AMERICAN): 30.6 ML/MIN/1.73 M^2
EST. GFR  (AFRICAN AMERICAN): 30.6 ML/MIN/1.73 M^2
EST. GFR  (NON AFRICAN AMERICAN): 26.6 ML/MIN/1.73 M^2
EST. GFR  (NON AFRICAN AMERICAN): 26.6 ML/MIN/1.73 M^2
ESTIMATED AVG GLUCOSE: 128 MG/DL (ref 68–131)
GLUCOSE SERPL-MCNC: 108 MG/DL (ref 70–110)
GLUCOSE SERPL-MCNC: 128 MG/DL (ref 70–110)
HBA1C MFR BLD: 6.1 % (ref 4–5.6)
HDLC SERPL-MCNC: 71 MG/DL (ref 40–75)
HDLC SERPL: 38.8 % (ref 20–50)
LDLC SERPL CALC-MCNC: 97.4 MG/DL (ref 63–159)
NONHDLC SERPL-MCNC: 112 MG/DL
POTASSIUM SERPL-SCNC: 4.6 MMOL/L (ref 3.5–5.1)
POTASSIUM SERPL-SCNC: 5.3 MMOL/L (ref 3.5–5.1)
PROT SERPL-MCNC: 7.9 G/DL (ref 6–8.4)
SODIUM SERPL-SCNC: 141 MMOL/L (ref 136–145)
SODIUM SERPL-SCNC: 143 MMOL/L (ref 136–145)
TRIGL SERPL-MCNC: 73 MG/DL (ref 30–150)
TSH SERPL DL<=0.005 MIU/L-ACNC: 2.22 UIU/ML (ref 0.4–4)

## 2021-04-10 PROCEDURE — 36415 COLL VENOUS BLD VENIPUNCTURE: CPT | Mod: PO | Performed by: INTERNAL MEDICINE

## 2021-04-10 PROCEDURE — 83036 HEMOGLOBIN GLYCOSYLATED A1C: CPT | Performed by: INTERNAL MEDICINE

## 2021-04-10 PROCEDURE — 80053 COMPREHEN METABOLIC PANEL: CPT | Performed by: INTERNAL MEDICINE

## 2021-04-10 PROCEDURE — 80061 LIPID PANEL: CPT | Performed by: INTERNAL MEDICINE

## 2021-04-10 PROCEDURE — 84443 ASSAY THYROID STIM HORMONE: CPT | Performed by: INTERNAL MEDICINE

## 2021-04-12 ENCOUNTER — TELEPHONE (OUTPATIENT)
Dept: CARDIOLOGY | Facility: CLINIC | Age: 81
End: 2021-04-12

## 2021-04-14 ENCOUNTER — TELEPHONE (OUTPATIENT)
Dept: ORTHOPEDICS | Facility: CLINIC | Age: 81
End: 2021-04-14

## 2021-04-15 ENCOUNTER — HOSPITAL ENCOUNTER (INPATIENT)
Facility: HOSPITAL | Age: 81
LOS: 4 days | Discharge: NURSING, REHAB OR LONG-TERM CARE | DRG: 467 | End: 2021-04-19
Attending: ORTHOPAEDIC SURGERY | Admitting: ORTHOPAEDIC SURGERY
Payer: MEDICARE

## 2021-04-15 ENCOUNTER — ANESTHESIA (OUTPATIENT)
Dept: SURGERY | Facility: HOSPITAL | Age: 81
DRG: 467 | End: 2021-04-15
Payer: MEDICARE

## 2021-04-15 DIAGNOSIS — G89.29 CHRONIC PAIN OF LEFT KNEE: ICD-10-CM

## 2021-04-15 DIAGNOSIS — I73.9 PERIPHERAL VASCULAR DISEASE: ICD-10-CM

## 2021-04-15 DIAGNOSIS — I25.2 OLD MI (MYOCARDIAL INFARCTION): ICD-10-CM

## 2021-04-15 DIAGNOSIS — N18.4 CHRONIC RENAL DISEASE, STAGE 4, SEVERELY DECREASED GLOMERULAR FILTRATION RATE (GFR) BETWEEN 15-29 ML/MIN/1.73 SQUARE METER: ICD-10-CM

## 2021-04-15 DIAGNOSIS — Z96.659 FAILURE OF TOTAL KNEE REPLACEMENT, INITIAL ENCOUNTER: ICD-10-CM

## 2021-04-15 DIAGNOSIS — T84.018A FAILED TOTAL KNEE ARTHROPLASTY: ICD-10-CM

## 2021-04-15 DIAGNOSIS — Z80.0 FAMILY HISTORY OF COLON CANCER: ICD-10-CM

## 2021-04-15 DIAGNOSIS — E11.51 CONTROLLED TYPE 2 DM WITH PERIPHERAL CIRCULATORY DISORDER: ICD-10-CM

## 2021-04-15 DIAGNOSIS — T84.018A FAILURE OF TOTAL KNEE REPLACEMENT, INITIAL ENCOUNTER: ICD-10-CM

## 2021-04-15 DIAGNOSIS — R29.6 FREQUENT FALLS: Primary | ICD-10-CM

## 2021-04-15 DIAGNOSIS — G40.409 TONIC-CLONIC EPILEPTIC SEIZURES: ICD-10-CM

## 2021-04-15 DIAGNOSIS — I65.23 BILATERAL CAROTID ARTERY STENOSIS: ICD-10-CM

## 2021-04-15 DIAGNOSIS — Z96.659 FAILED TOTAL KNEE ARTHROPLASTY: ICD-10-CM

## 2021-04-15 DIAGNOSIS — M17.0 PRIMARY OSTEOARTHRITIS OF BOTH KNEES: ICD-10-CM

## 2021-04-15 DIAGNOSIS — I10 HTN (HYPERTENSION), BENIGN: ICD-10-CM

## 2021-04-15 DIAGNOSIS — D50.0 IRON DEFICIENCY ANEMIA DUE TO CHRONIC BLOOD LOSS: ICD-10-CM

## 2021-04-15 DIAGNOSIS — Z95.5 S/P CORONARY ARTERY STENT PLACEMENT: ICD-10-CM

## 2021-04-15 DIAGNOSIS — M10.9 GOUT, ARTHRITIS: ICD-10-CM

## 2021-04-15 DIAGNOSIS — M25.562 CHRONIC PAIN OF LEFT KNEE: ICD-10-CM

## 2021-04-15 DIAGNOSIS — I25.10 CORONARY ARTERY DISEASE DUE TO LIPID RICH PLAQUE: ICD-10-CM

## 2021-04-15 DIAGNOSIS — I45.10 RBBB: ICD-10-CM

## 2021-04-15 DIAGNOSIS — R09.89 PROMINENT AORTA: ICD-10-CM

## 2021-04-15 DIAGNOSIS — I25.83 CORONARY ARTERY DISEASE DUE TO LIPID RICH PLAQUE: ICD-10-CM

## 2021-04-15 LAB
ANION GAP SERPL CALC-SCNC: 9 MMOL/L (ref 8–16)
BASOPHILS # BLD AUTO: 0.03 K/UL (ref 0–0.2)
BASOPHILS NFR BLD: 0.3 % (ref 0–1.9)
BUN SERPL-MCNC: 17 MG/DL (ref 8–23)
CALCIUM SERPL-MCNC: 9.5 MG/DL (ref 8.7–10.5)
CHLORIDE SERPL-SCNC: 106 MMOL/L (ref 95–110)
CO2 SERPL-SCNC: 24 MMOL/L (ref 23–29)
CREAT SERPL-MCNC: 1.5 MG/DL (ref 0.5–1.4)
DIFFERENTIAL METHOD: ABNORMAL
EOSINOPHIL # BLD AUTO: 0 K/UL (ref 0–0.5)
EOSINOPHIL NFR BLD: 0 % (ref 0–8)
ERYTHROCYTE [DISTWIDTH] IN BLOOD BY AUTOMATED COUNT: 13.2 % (ref 11.5–14.5)
EST. GFR  (AFRICAN AMERICAN): 38.2 ML/MIN/1.73 M^2
EST. GFR  (NON AFRICAN AMERICAN): 33.1 ML/MIN/1.73 M^2
GLUCOSE SERPL-MCNC: 158 MG/DL (ref 70–110)
HCT VFR BLD AUTO: 40.9 % (ref 37–48.5)
HGB BLD-MCNC: 13.6 G/DL (ref 12–16)
IMM GRANULOCYTES # BLD AUTO: 0.05 K/UL (ref 0–0.04)
IMM GRANULOCYTES NFR BLD AUTO: 0.4 % (ref 0–0.5)
LYMPHOCYTES # BLD AUTO: 0.9 K/UL (ref 1–4.8)
LYMPHOCYTES NFR BLD: 7.7 % (ref 18–48)
MCH RBC QN AUTO: 30.9 PG (ref 27–31)
MCHC RBC AUTO-ENTMCNC: 33.3 G/DL (ref 32–36)
MCV RBC AUTO: 93 FL (ref 82–98)
MONOCYTES # BLD AUTO: 0.3 K/UL (ref 0.3–1)
MONOCYTES NFR BLD: 2.3 % (ref 4–15)
NEUTROPHILS # BLD AUTO: 10.3 K/UL (ref 1.8–7.7)
NEUTROPHILS NFR BLD: 89.3 % (ref 38–73)
NRBC BLD-RTO: 0 /100 WBC
PLATELET # BLD AUTO: 216 K/UL (ref 150–450)
PMV BLD AUTO: 9.9 FL (ref 9.2–12.9)
POCT GLUCOSE: 107 MG/DL (ref 70–110)
POTASSIUM SERPL-SCNC: 4.8 MMOL/L (ref 3.5–5.1)
RBC # BLD AUTO: 4.4 M/UL (ref 4–5.4)
SODIUM SERPL-SCNC: 139 MMOL/L (ref 136–145)
WBC # BLD AUTO: 11.56 K/UL (ref 3.9–12.7)

## 2021-04-15 PROCEDURE — D9220A PRA ANESTHESIA: ICD-10-PCS | Mod: ANES,,, | Performed by: ANESTHESIOLOGY

## 2021-04-15 PROCEDURE — 88305 TISSUE EXAM BY PATHOLOGIST: CPT | Mod: 59 | Performed by: PATHOLOGY

## 2021-04-15 PROCEDURE — 37000008 HC ANESTHESIA 1ST 15 MINUTES: Performed by: ORTHOPAEDIC SURGERY

## 2021-04-15 PROCEDURE — 76942 ECHO GUIDE FOR BIOPSY: CPT | Performed by: STUDENT IN AN ORGANIZED HEALTH CARE EDUCATION/TRAINING PROGRAM

## 2021-04-15 PROCEDURE — 63600175 PHARM REV CODE 636 W HCPCS: Performed by: STUDENT IN AN ORGANIZED HEALTH CARE EDUCATION/TRAINING PROGRAM

## 2021-04-15 PROCEDURE — 82962 GLUCOSE BLOOD TEST: CPT | Performed by: ORTHOPAEDIC SURGERY

## 2021-04-15 PROCEDURE — C1751 CATH, INF, PER/CENT/MIDLINE: HCPCS | Performed by: ANESTHESIOLOGY

## 2021-04-15 PROCEDURE — 25000003 PHARM REV CODE 250: Performed by: STUDENT IN AN ORGANIZED HEALTH CARE EDUCATION/TRAINING PROGRAM

## 2021-04-15 PROCEDURE — 36415 COLL VENOUS BLD VENIPUNCTURE: CPT | Performed by: STUDENT IN AN ORGANIZED HEALTH CARE EDUCATION/TRAINING PROGRAM

## 2021-04-15 PROCEDURE — 85025 COMPLETE CBC W/AUTO DIFF WBC: CPT | Performed by: STUDENT IN AN ORGANIZED HEALTH CARE EDUCATION/TRAINING PROGRAM

## 2021-04-15 PROCEDURE — 63600175 PHARM REV CODE 636 W HCPCS: Performed by: NURSE ANESTHETIST, CERTIFIED REGISTERED

## 2021-04-15 PROCEDURE — 80048 BASIC METABOLIC PNL TOTAL CA: CPT | Performed by: STUDENT IN AN ORGANIZED HEALTH CARE EDUCATION/TRAINING PROGRAM

## 2021-04-15 PROCEDURE — 88311 DECALCIFY TISSUE: CPT | Mod: 26,,, | Performed by: PATHOLOGY

## 2021-04-15 PROCEDURE — 87102 FUNGUS ISOLATION CULTURE: CPT | Mod: 59 | Performed by: ORTHOPAEDIC SURGERY

## 2021-04-15 PROCEDURE — D9220A PRA ANESTHESIA: Mod: ANES,,, | Performed by: ANESTHESIOLOGY

## 2021-04-15 PROCEDURE — 88300 SURGICAL PATH GROSS: CPT | Mod: 26,59,, | Performed by: PATHOLOGY

## 2021-04-15 PROCEDURE — 27100025 HC TUBING, SET FLUID WARMER: Performed by: ANESTHESIOLOGY

## 2021-04-15 PROCEDURE — 63600175 PHARM REV CODE 636 W HCPCS: Performed by: PHYSICIAN ASSISTANT

## 2021-04-15 PROCEDURE — 87070 CULTURE OTHR SPECIMN AEROBIC: CPT | Mod: 59 | Performed by: ORTHOPAEDIC SURGERY

## 2021-04-15 PROCEDURE — 88305 TISSUE EXAM BY PATHOLOGIST: ICD-10-PCS | Mod: 26,,, | Performed by: PATHOLOGY

## 2021-04-15 PROCEDURE — 25000003 PHARM REV CODE 250: Performed by: NURSE ANESTHETIST, CERTIFIED REGISTERED

## 2021-04-15 PROCEDURE — 27201423 OPTIME MED/SURG SUP & DEVICES STERILE SUPPLY: Performed by: ORTHOPAEDIC SURGERY

## 2021-04-15 PROCEDURE — 63600175 PHARM REV CODE 636 W HCPCS: Performed by: ANESTHESIOLOGY

## 2021-04-15 PROCEDURE — 64448 NJX AA&/STRD FEM NRV NFS IMG: CPT | Mod: 59,LT,, | Performed by: ANESTHESIOLOGY

## 2021-04-15 PROCEDURE — 87075 CULTR BACTERIA EXCEPT BLOOD: CPT | Performed by: ORTHOPAEDIC SURGERY

## 2021-04-15 PROCEDURE — C1713 ANCHOR/SCREW BN/BN,TIS/BN: HCPCS | Performed by: ORTHOPAEDIC SURGERY

## 2021-04-15 PROCEDURE — 97161 PT EVAL LOW COMPLEX 20 MIN: CPT

## 2021-04-15 PROCEDURE — 87206 SMEAR FLUORESCENT/ACID STAI: CPT | Performed by: ORTHOPAEDIC SURGERY

## 2021-04-15 PROCEDURE — 71000015 HC POSTOP RECOV 1ST HR: Performed by: ORTHOPAEDIC SURGERY

## 2021-04-15 PROCEDURE — D9220A PRA ANESTHESIA: ICD-10-PCS | Mod: CRNA,,, | Performed by: NURSE ANESTHETIST, CERTIFIED REGISTERED

## 2021-04-15 PROCEDURE — C1776 JOINT DEVICE (IMPLANTABLE): HCPCS | Performed by: ORTHOPAEDIC SURGERY

## 2021-04-15 PROCEDURE — 88300 PR  SURG PATH,GROSS,LEVEL I: ICD-10-PCS | Mod: 26,59,, | Performed by: PATHOLOGY

## 2021-04-15 PROCEDURE — 25000003 PHARM REV CODE 250: Performed by: PHYSICIAN ASSISTANT

## 2021-04-15 PROCEDURE — 97530 THERAPEUTIC ACTIVITIES: CPT

## 2021-04-15 PROCEDURE — 71000033 HC RECOVERY, INTIAL HOUR: Performed by: ORTHOPAEDIC SURGERY

## 2021-04-15 PROCEDURE — 76942 LEFT ADDUCTOR CANAL CATHETER: ICD-10-PCS | Mod: 26,,, | Performed by: ANESTHESIOLOGY

## 2021-04-15 PROCEDURE — 27487 PR REVISE KNEE JOINT REPLACE,ALL PARTS: ICD-10-PCS | Mod: LT,,, | Performed by: ORTHOPAEDIC SURGERY

## 2021-04-15 PROCEDURE — 97116 GAIT TRAINING THERAPY: CPT

## 2021-04-15 PROCEDURE — 27800903 OPTIME MED/SURG SUP & DEVICES OTHER IMPLANTS: Performed by: ORTHOPAEDIC SURGERY

## 2021-04-15 PROCEDURE — 71000039 HC RECOVERY, EACH ADD'L HOUR: Performed by: ORTHOPAEDIC SURGERY

## 2021-04-15 PROCEDURE — 88311 DECALCIFY TISSUE: CPT | Performed by: PATHOLOGY

## 2021-04-15 PROCEDURE — 11000001 HC ACUTE MED/SURG PRIVATE ROOM

## 2021-04-15 PROCEDURE — 76942 ECHO GUIDE FOR BIOPSY: CPT | Mod: 26,,, | Performed by: ANESTHESIOLOGY

## 2021-04-15 PROCEDURE — 94761 N-INVAS EAR/PLS OXIMETRY MLT: CPT

## 2021-04-15 PROCEDURE — 97165 OT EVAL LOW COMPLEX 30 MIN: CPT

## 2021-04-15 PROCEDURE — 87116 MYCOBACTERIA CULTURE: CPT | Performed by: ORTHOPAEDIC SURGERY

## 2021-04-15 PROCEDURE — 88300 SURGICAL PATH GROSS: CPT | Performed by: PATHOLOGY

## 2021-04-15 PROCEDURE — 88305 TISSUE EXAM BY PATHOLOGIST: CPT | Mod: 26,,, | Performed by: PATHOLOGY

## 2021-04-15 PROCEDURE — 37000009 HC ANESTHESIA EA ADD 15 MINS: Performed by: ORTHOPAEDIC SURGERY

## 2021-04-15 PROCEDURE — 64448 LEFT ADDUCTOR CANAL CATHETER: ICD-10-PCS | Mod: 59,LT,, | Performed by: ANESTHESIOLOGY

## 2021-04-15 PROCEDURE — D9220A PRA ANESTHESIA: Mod: CRNA,,, | Performed by: NURSE ANESTHETIST, CERTIFIED REGISTERED

## 2021-04-15 PROCEDURE — 64448 NJX AA&/STRD FEM NRV NFS IMG: CPT | Performed by: STUDENT IN AN ORGANIZED HEALTH CARE EDUCATION/TRAINING PROGRAM

## 2021-04-15 PROCEDURE — 71000016 HC POSTOP RECOV ADDL HR: Performed by: ORTHOPAEDIC SURGERY

## 2021-04-15 PROCEDURE — 27487 REVISE/REPLACE KNEE JOINT: CPT | Mod: LT,,, | Performed by: ORTHOPAEDIC SURGERY

## 2021-04-15 PROCEDURE — 36000711: Performed by: ORTHOPAEDIC SURGERY

## 2021-04-15 PROCEDURE — 36000710: Performed by: ORTHOPAEDIC SURGERY

## 2021-04-15 PROCEDURE — 88311 PR  DECALCIFY TISSUE: ICD-10-PCS | Mod: 26,,, | Performed by: PATHOLOGY

## 2021-04-15 PROCEDURE — 87176 TISSUE HOMOGENIZATION CULTR: CPT | Performed by: ORTHOPAEDIC SURGERY

## 2021-04-15 DEVICE — SPACER DISTAL FEMORAL SZ3 5MM: Type: IMPLANTABLE DEVICE | Site: KNEE | Status: FUNCTIONAL

## 2021-04-15 DEVICE — IMPLANTABLE DEVICE: Type: IMPLANTABLE DEVICE | Site: KNEE | Status: FUNCTIONAL

## 2021-04-15 DEVICE — SPACER POST FEMORAL SZ3 5MM: Type: IMPLANTABLE DEVICE | Site: KNEE | Status: FUNCTIONAL

## 2021-04-15 DEVICE — BASEPLATE SZ 3 TRI TS IMPLANT: Type: IMPLANTABLE DEVICE | Site: KNEE | Status: FUNCTIONAL

## 2021-04-15 DEVICE — STEM FEMORAL EXT TS 17X100MM: Type: IMPLANTABLE DEVICE | Site: KNEE | Status: FUNCTIONAL

## 2021-04-15 DEVICE — CEMENT BONE SMPLX HV GENTMYCN: Type: IMPLANTABLE DEVICE | Site: KNEE | Status: FUNCTIONAL

## 2021-04-15 DEVICE — COMP FEM TOTAL STABILIZED SZ 3: Type: IMPLANTABLE DEVICE | Site: KNEE | Status: FUNCTIONAL

## 2021-04-15 RX ORDER — ONDANSETRON 8 MG/1
8 TABLET, ORALLY DISINTEGRATING ORAL EVERY 6 HOURS PRN
Qty: 20 TABLET | Refills: 0 | Status: SHIPPED | OUTPATIENT
Start: 2021-04-15 | End: 2022-04-26

## 2021-04-15 RX ORDER — ACETAMINOPHEN 325 MG/1
650 TABLET ORAL EVERY 6 HOURS
Status: DISCONTINUED | OUTPATIENT
Start: 2021-04-15 | End: 2021-04-16

## 2021-04-15 RX ORDER — CEFAZOLIN SODIUM 1 G/3ML
2 INJECTION, POWDER, FOR SOLUTION INTRAMUSCULAR; INTRAVENOUS
Status: DISPENSED | OUTPATIENT
Start: 2021-04-15 | End: 2021-04-16

## 2021-04-15 RX ORDER — MIDAZOLAM HYDROCHLORIDE 1 MG/ML
INJECTION, SOLUTION INTRAMUSCULAR; INTRAVENOUS
Status: DISCONTINUED | OUTPATIENT
Start: 2021-04-15 | End: 2021-04-15

## 2021-04-15 RX ORDER — PREGABALIN 50 MG/1
50 CAPSULE ORAL NIGHTLY
Status: DISCONTINUED | OUTPATIENT
Start: 2021-04-15 | End: 2021-04-16

## 2021-04-15 RX ORDER — OXYCODONE HYDROCHLORIDE 5 MG/1
5 TABLET ORAL EVERY 6 HOURS PRN
Qty: 25 TABLET | Refills: 0 | Status: ON HOLD | OUTPATIENT
Start: 2021-04-15 | End: 2021-05-12 | Stop reason: SDUPTHER

## 2021-04-15 RX ORDER — KETAMINE HCL IN 0.9 % NACL 50 MG/5 ML
SYRINGE (ML) INTRAVENOUS
Status: DISCONTINUED | OUTPATIENT
Start: 2021-04-15 | End: 2021-04-15

## 2021-04-15 RX ORDER — SODIUM CHLORIDE 0.9 % (FLUSH) 0.9 %
3 SYRINGE (ML) INJECTION
Status: DISCONTINUED | OUTPATIENT
Start: 2021-04-15 | End: 2021-04-15 | Stop reason: HOSPADM

## 2021-04-15 RX ORDER — TRANEXAMIC ACID 100 MG/ML
INJECTION, SOLUTION INTRAVENOUS
Status: DISCONTINUED | OUTPATIENT
Start: 2021-04-15 | End: 2021-04-15

## 2021-04-15 RX ORDER — PREGABALIN 150 MG/1
150 CAPSULE ORAL NIGHTLY
Status: DISCONTINUED | OUTPATIENT
Start: 2021-04-15 | End: 2021-04-15

## 2021-04-15 RX ORDER — HYDRALAZINE HYDROCHLORIDE 20 MG/ML
5 INJECTION INTRAMUSCULAR; INTRAVENOUS
Status: COMPLETED | OUTPATIENT
Start: 2021-04-15 | End: 2021-04-15

## 2021-04-15 RX ORDER — ASPIRIN 81 MG/1
81 TABLET ORAL 2 TIMES DAILY
Status: DISCONTINUED | OUTPATIENT
Start: 2021-04-15 | End: 2021-04-20 | Stop reason: HOSPADM

## 2021-04-15 RX ORDER — PROPOFOL 10 MG/ML
VIAL (ML) INTRAVENOUS CONTINUOUS PRN
Status: DISCONTINUED | OUTPATIENT
Start: 2021-04-15 | End: 2021-04-15

## 2021-04-15 RX ORDER — LABETALOL HYDROCHLORIDE 5 MG/ML
INJECTION, SOLUTION INTRAVENOUS
Status: DISCONTINUED | OUTPATIENT
Start: 2021-04-15 | End: 2021-04-15

## 2021-04-15 RX ORDER — FENTANYL CITRATE 50 UG/ML
25 INJECTION, SOLUTION INTRAMUSCULAR; INTRAVENOUS EVERY 5 MIN PRN
Status: DISCONTINUED | OUTPATIENT
Start: 2021-04-15 | End: 2021-04-15 | Stop reason: HOSPADM

## 2021-04-15 RX ORDER — CEFAZOLIN SODIUM 1 G/3ML
INJECTION, POWDER, FOR SOLUTION INTRAMUSCULAR; INTRAVENOUS
Status: DISCONTINUED | OUTPATIENT
Start: 2021-04-15 | End: 2021-04-15

## 2021-04-15 RX ORDER — ASPIRIN 81 MG/1
81 TABLET ORAL 2 TIMES DAILY
Qty: 60 TABLET | Refills: 0 | Status: SHIPPED | OUTPATIENT
Start: 2021-04-15 | End: 2021-05-21 | Stop reason: SDUPTHER

## 2021-04-15 RX ORDER — SODIUM CHLORIDE 0.9 % (FLUSH) 0.9 %
10 SYRINGE (ML) INJECTION
Status: DISCONTINUED | OUTPATIENT
Start: 2021-04-15 | End: 2021-04-15 | Stop reason: HOSPADM

## 2021-04-15 RX ORDER — MIDAZOLAM HYDROCHLORIDE 1 MG/ML
0.5 INJECTION INTRAMUSCULAR; INTRAVENOUS
Status: DISCONTINUED | OUTPATIENT
Start: 2021-04-15 | End: 2021-04-15 | Stop reason: HOSPADM

## 2021-04-15 RX ORDER — ROPIVACAINE HYDROCHLORIDE 2 MG/ML
0.1 INJECTION, SOLUTION EPIDURAL; INFILTRATION; PERINEURAL CONTINUOUS
Status: DISCONTINUED | OUTPATIENT
Start: 2021-04-15 | End: 2021-04-15

## 2021-04-15 RX ORDER — CEFAZOLIN SODIUM 1 G/3ML
2 INJECTION, POWDER, FOR SOLUTION INTRAMUSCULAR; INTRAVENOUS
Status: DISCONTINUED | OUTPATIENT
Start: 2021-04-15 | End: 2021-04-15 | Stop reason: HOSPADM

## 2021-04-15 RX ORDER — TRANEXAMIC ACID 100 MG/ML
1000 INJECTION, SOLUTION INTRAVENOUS
Status: DISCONTINUED | OUTPATIENT
Start: 2021-04-15 | End: 2021-04-15 | Stop reason: HOSPADM

## 2021-04-15 RX ORDER — FENTANYL CITRATE 50 UG/ML
INJECTION, SOLUTION INTRAMUSCULAR; INTRAVENOUS
Status: DISCONTINUED | OUTPATIENT
Start: 2021-04-15 | End: 2021-04-15

## 2021-04-15 RX ORDER — DEXAMETHASONE SODIUM PHOSPHATE 4 MG/ML
INJECTION, SOLUTION INTRA-ARTICULAR; INTRALESIONAL; INTRAMUSCULAR; INTRAVENOUS; SOFT TISSUE
Status: DISCONTINUED | OUTPATIENT
Start: 2021-04-15 | End: 2021-04-15

## 2021-04-15 RX ORDER — OXYCODONE HYDROCHLORIDE 5 MG/1
5 TABLET ORAL EVERY 6 HOURS PRN
Status: DISCONTINUED | OUTPATIENT
Start: 2021-04-15 | End: 2021-04-16

## 2021-04-15 RX ORDER — BISACODYL 5 MG
5 TABLET, DELAYED RELEASE (ENTERIC COATED) ORAL DAILY PRN
Qty: 20 TABLET | Refills: 0 | Status: ON HOLD | OUTPATIENT
Start: 2021-04-15 | End: 2021-05-11 | Stop reason: SDUPTHER

## 2021-04-15 RX ORDER — ROPIVACAINE HYDROCHLORIDE 2 MG/ML
INJECTION, SOLUTION EPIDURAL; INFILTRATION; PERINEURAL CONTINUOUS
Status: DISCONTINUED | OUTPATIENT
Start: 2021-04-15 | End: 2021-04-16

## 2021-04-15 RX ORDER — METHOCARBAMOL 500 MG/1
1000 TABLET, FILM COATED ORAL 3 TIMES DAILY
Status: DISCONTINUED | OUTPATIENT
Start: 2021-04-15 | End: 2021-04-20 | Stop reason: HOSPADM

## 2021-04-15 RX ORDER — OXYCODONE HYDROCHLORIDE 10 MG/1
10 TABLET ORAL EVERY 6 HOURS PRN
Status: DISCONTINUED | OUTPATIENT
Start: 2021-04-15 | End: 2021-04-16

## 2021-04-15 RX ORDER — MUPIROCIN 20 MG/G
OINTMENT TOPICAL
Status: DISCONTINUED | OUTPATIENT
Start: 2021-04-15 | End: 2021-04-15 | Stop reason: HOSPADM

## 2021-04-15 RX ORDER — ACETAMINOPHEN 325 MG/1
650 TABLET ORAL EVERY 6 HOURS
Qty: 30 TABLET | Refills: 0 | Status: SHIPPED | OUTPATIENT
Start: 2021-04-15 | End: 2021-08-09

## 2021-04-15 RX ORDER — HYDROMORPHONE HYDROCHLORIDE 1 MG/ML
0.5 INJECTION, SOLUTION INTRAMUSCULAR; INTRAVENOUS; SUBCUTANEOUS
Status: DISCONTINUED | OUTPATIENT
Start: 2021-04-15 | End: 2021-04-16

## 2021-04-15 RX ADMIN — MIDAZOLAM HYDROCHLORIDE 1 MG: 1 INJECTION, SOLUTION INTRAMUSCULAR; INTRAVENOUS at 09:04

## 2021-04-15 RX ADMIN — VANCOMYCIN HYDROCHLORIDE 1500 MG: 1.5 INJECTION, POWDER, LYOPHILIZED, FOR SOLUTION INTRAVENOUS at 09:04

## 2021-04-15 RX ADMIN — MUPIROCIN: 20 OINTMENT TOPICAL at 08:04

## 2021-04-15 RX ADMIN — CEFAZOLIN 2 G: 330 INJECTION, POWDER, FOR SOLUTION INTRAMUSCULAR; INTRAVENOUS at 09:04

## 2021-04-15 RX ADMIN — PREGABALIN 50 MG: 50 CAPSULE ORAL at 09:04

## 2021-04-15 RX ADMIN — GLYCOPYRROLATE 0.2 MCG: 0.2 INJECTION, SOLUTION INTRAMUSCULAR; INTRAVITREAL at 09:04

## 2021-04-15 RX ADMIN — METHOCARBAMOL 1000 MG: 500 TABLET ORAL at 09:04

## 2021-04-15 RX ADMIN — HYDRALAZINE HYDROCHLORIDE 5 MG: 20 INJECTION, SOLUTION INTRAMUSCULAR; INTRAVENOUS at 04:04

## 2021-04-15 RX ADMIN — TRANEXAMIC ACID 1000 MG: 1 INJECTION, SOLUTION INTRAVENOUS at 11:04

## 2021-04-15 RX ADMIN — HYDROMORPHONE HYDROCHLORIDE 0.5 MG: 1 INJECTION, SOLUTION INTRAMUSCULAR; INTRAVENOUS; SUBCUTANEOUS at 09:04

## 2021-04-15 RX ADMIN — ROPIVACAINE HYDROCHLORIDE 200 ML: 2 INJECTION, SOLUTION EPIDURAL; INFILTRATION at 01:04

## 2021-04-15 RX ADMIN — MEPIVACAINE HYDROCHLORIDE 3 ML: 15 INJECTION, SOLUTION EPIDURAL; INFILTRATION at 09:04

## 2021-04-15 RX ADMIN — TRANEXAMIC ACID 1000 MG: 1 INJECTION, SOLUTION INTRAVENOUS at 09:04

## 2021-04-15 RX ADMIN — METHOCARBAMOL 1000 MG: 500 TABLET ORAL at 01:04

## 2021-04-15 RX ADMIN — FENTANYL CITRATE 50 MCG: 50 INJECTION, SOLUTION INTRAMUSCULAR; INTRAVENOUS at 09:04

## 2021-04-15 RX ADMIN — ASPIRIN 81 MG: 81 TABLET, COATED ORAL at 01:04

## 2021-04-15 RX ADMIN — DEXAMETHASONE SODIUM PHOSPHATE 8 MG: 4 INJECTION INTRA-ARTICULAR; INTRALESIONAL; INTRAMUSCULAR; INTRAVENOUS; SOFT TISSUE at 09:04

## 2021-04-15 RX ADMIN — ASPIRIN 81 MG: 81 TABLET, COATED ORAL at 09:04

## 2021-04-15 RX ADMIN — Medication 25 MG: at 09:04

## 2021-04-15 RX ADMIN — MEPIVACAINE HYDROCHLORIDE 6 ML/HR: 15 INJECTION, SOLUTION EPIDURAL; INFILTRATION at 11:04

## 2021-04-15 RX ADMIN — Medication 5 MG: at 11:04

## 2021-04-15 RX ADMIN — ACETAMINOPHEN 650 MG: 500 TABLET, FILM COATED ORAL at 01:04

## 2021-04-15 RX ADMIN — OXYCODONE HYDROCHLORIDE 10 MG: 10 TABLET ORAL at 11:04

## 2021-04-15 RX ADMIN — LIDOCAINE HYDROCHLORIDE AND EPINEPHRINE 3 ML: 15; 5 INJECTION, SOLUTION EPIDURAL at 11:04

## 2021-04-15 RX ADMIN — SODIUM CHLORIDE: 0.9 INJECTION, SOLUTION INTRAVENOUS at 09:04

## 2021-04-15 RX ADMIN — HYDRALAZINE HYDROCHLORIDE 5 MG: 20 INJECTION, SOLUTION INTRAMUSCULAR; INTRAVENOUS at 05:04

## 2021-04-15 RX ADMIN — PROPOFOL 75 MCG/KG/MIN: 10 INJECTION, EMULSION INTRAVENOUS at 11:04

## 2021-04-15 RX ADMIN — PROPOFOL 100 MCG/KG/MIN: 10 INJECTION, EMULSION INTRAVENOUS at 09:04

## 2021-04-16 ENCOUNTER — PATIENT OUTREACH (OUTPATIENT)
Dept: ADMINISTRATIVE | Facility: OTHER | Age: 81
End: 2021-04-16

## 2021-04-16 LAB
ANION GAP SERPL CALC-SCNC: 11 MMOL/L (ref 8–16)
BASOPHILS # BLD AUTO: 0.01 K/UL (ref 0–0.2)
BASOPHILS NFR BLD: 0.1 % (ref 0–1.9)
BUN SERPL-MCNC: 22 MG/DL (ref 8–23)
CALCIUM SERPL-MCNC: 9.8 MG/DL (ref 8.7–10.5)
CHLORIDE SERPL-SCNC: 101 MMOL/L (ref 95–110)
CO2 SERPL-SCNC: 21 MMOL/L (ref 23–29)
CREAT SERPL-MCNC: 1.6 MG/DL (ref 0.5–1.4)
DIFFERENTIAL METHOD: ABNORMAL
EOSINOPHIL # BLD AUTO: 0 K/UL (ref 0–0.5)
EOSINOPHIL NFR BLD: 0 % (ref 0–8)
ERYTHROCYTE [DISTWIDTH] IN BLOOD BY AUTOMATED COUNT: 13.2 % (ref 11.5–14.5)
EST. GFR  (AFRICAN AMERICAN): 35.3 ML/MIN/1.73 M^2
EST. GFR  (NON AFRICAN AMERICAN): 30.6 ML/MIN/1.73 M^2
GLUCOSE SERPL-MCNC: 128 MG/DL (ref 70–110)
HCT VFR BLD AUTO: 41.3 % (ref 37–48.5)
HGB BLD-MCNC: 13.3 G/DL (ref 12–16)
IMM GRANULOCYTES # BLD AUTO: 0.1 K/UL (ref 0–0.04)
IMM GRANULOCYTES NFR BLD AUTO: 0.7 % (ref 0–0.5)
LYMPHOCYTES # BLD AUTO: 1 K/UL (ref 1–4.8)
LYMPHOCYTES NFR BLD: 7.6 % (ref 18–48)
MCH RBC QN AUTO: 30.3 PG (ref 27–31)
MCHC RBC AUTO-ENTMCNC: 32.2 G/DL (ref 32–36)
MCV RBC AUTO: 94 FL (ref 82–98)
MONOCYTES # BLD AUTO: 1.5 K/UL (ref 0.3–1)
MONOCYTES NFR BLD: 10.6 % (ref 4–15)
NEUTROPHILS # BLD AUTO: 11.1 K/UL (ref 1.8–7.7)
NEUTROPHILS NFR BLD: 81 % (ref 38–73)
NRBC BLD-RTO: 0 /100 WBC
PLATELET # BLD AUTO: 221 K/UL (ref 150–450)
PMV BLD AUTO: 9.7 FL (ref 9.2–12.9)
POCT GLUCOSE: 137 MG/DL (ref 70–110)
POTASSIUM SERPL-SCNC: 4 MMOL/L (ref 3.5–5.1)
RBC # BLD AUTO: 4.39 M/UL (ref 4–5.4)
SODIUM SERPL-SCNC: 133 MMOL/L (ref 136–145)
WBC # BLD AUTO: 13.72 K/UL (ref 3.9–12.7)

## 2021-04-16 PROCEDURE — 97530 THERAPEUTIC ACTIVITIES: CPT | Mod: CQ

## 2021-04-16 PROCEDURE — 36573 INSJ PICC RS&I 5 YR+: CPT

## 2021-04-16 PROCEDURE — 99231 PR SUBSEQUENT HOSPITAL CARE,LEVL I: ICD-10-PCS | Mod: ,,, | Performed by: ANESTHESIOLOGY

## 2021-04-16 PROCEDURE — 11000001 HC ACUTE MED/SURG PRIVATE ROOM

## 2021-04-16 PROCEDURE — 99231 SBSQ HOSP IP/OBS SF/LOW 25: CPT | Mod: ,,, | Performed by: ANESTHESIOLOGY

## 2021-04-16 PROCEDURE — 76937 US GUIDE VASCULAR ACCESS: CPT

## 2021-04-16 PROCEDURE — 97535 SELF CARE MNGMENT TRAINING: CPT | Mod: CO

## 2021-04-16 PROCEDURE — 97530 THERAPEUTIC ACTIVITIES: CPT | Mod: CO

## 2021-04-16 PROCEDURE — 25000003 PHARM REV CODE 250: Performed by: STUDENT IN AN ORGANIZED HEALTH CARE EDUCATION/TRAINING PROGRAM

## 2021-04-16 PROCEDURE — 25000003 PHARM REV CODE 250: Performed by: ANESTHESIOLOGY

## 2021-04-16 PROCEDURE — 63600175 PHARM REV CODE 636 W HCPCS: Performed by: STUDENT IN AN ORGANIZED HEALTH CARE EDUCATION/TRAINING PROGRAM

## 2021-04-16 PROCEDURE — C1751 CATH, INF, PER/CENT/MIDLINE: HCPCS

## 2021-04-16 PROCEDURE — 85025 COMPLETE CBC W/AUTO DIFF WBC: CPT | Performed by: STUDENT IN AN ORGANIZED HEALTH CARE EDUCATION/TRAINING PROGRAM

## 2021-04-16 PROCEDURE — 36415 COLL VENOUS BLD VENIPUNCTURE: CPT | Performed by: STUDENT IN AN ORGANIZED HEALTH CARE EDUCATION/TRAINING PROGRAM

## 2021-04-16 PROCEDURE — 80048 BASIC METABOLIC PNL TOTAL CA: CPT | Performed by: STUDENT IN AN ORGANIZED HEALTH CARE EDUCATION/TRAINING PROGRAM

## 2021-04-16 RX ORDER — ACETAMINOPHEN 500 MG
1000 TABLET ORAL EVERY 6 HOURS
Status: DISCONTINUED | OUTPATIENT
Start: 2021-04-16 | End: 2021-04-20 | Stop reason: HOSPADM

## 2021-04-16 RX ORDER — LEVETIRACETAM 500 MG/1
1000 TABLET, EXTENDED RELEASE ORAL EVERY MORNING
Status: DISCONTINUED | OUTPATIENT
Start: 2021-04-16 | End: 2021-04-17

## 2021-04-16 RX ORDER — ALLOPURINOL 300 MG/1
300 TABLET ORAL DAILY
Status: DISCONTINUED | OUTPATIENT
Start: 2021-04-16 | End: 2021-04-20 | Stop reason: HOSPADM

## 2021-04-16 RX ORDER — OXYCODONE HYDROCHLORIDE 5 MG/1
5 TABLET ORAL
Status: DISCONTINUED | OUTPATIENT
Start: 2021-04-16 | End: 2021-04-20 | Stop reason: HOSPADM

## 2021-04-16 RX ORDER — ROSUVASTATIN CALCIUM 20 MG/1
40 TABLET, COATED ORAL DAILY
Status: DISCONTINUED | OUTPATIENT
Start: 2021-04-16 | End: 2021-04-20 | Stop reason: HOSPADM

## 2021-04-16 RX ORDER — CARVEDILOL 3.12 MG/1
3.12 TABLET ORAL 2 TIMES DAILY WITH MEALS
Status: DISCONTINUED | OUTPATIENT
Start: 2021-04-16 | End: 2021-04-20 | Stop reason: HOSPADM

## 2021-04-16 RX ORDER — NITROGLYCERIN 0.4 MG/1
0.4 TABLET SUBLINGUAL EVERY 5 MIN PRN
Status: DISCONTINUED | OUTPATIENT
Start: 2021-04-16 | End: 2021-04-20 | Stop reason: HOSPADM

## 2021-04-16 RX ORDER — AMLODIPINE BESYLATE 5 MG/1
5 TABLET ORAL DAILY
Status: DISCONTINUED | OUTPATIENT
Start: 2021-04-16 | End: 2021-04-20 | Stop reason: HOSPADM

## 2021-04-16 RX ADMIN — ACETAMINOPHEN 1000 MG: 500 TABLET, FILM COATED ORAL at 11:04

## 2021-04-16 RX ADMIN — METHOCARBAMOL 1000 MG: 500 TABLET ORAL at 09:04

## 2021-04-16 RX ADMIN — CARVEDILOL 3.12 MG: 3.12 TABLET, FILM COATED ORAL at 08:04

## 2021-04-16 RX ADMIN — ACETAMINOPHEN 650 MG: 500 TABLET, FILM COATED ORAL at 06:04

## 2021-04-16 RX ADMIN — ACETAMINOPHEN 1000 MG: 500 TABLET, FILM COATED ORAL at 05:04

## 2021-04-16 RX ADMIN — AMLODIPINE BESYLATE 5 MG: 5 TABLET ORAL at 08:04

## 2021-04-16 RX ADMIN — ALLOPURINOL 300 MG: 300 TABLET ORAL at 09:04

## 2021-04-16 RX ADMIN — LEVETIRACETAM 1000 MG: 500 TABLET, EXTENDED RELEASE ORAL at 05:04

## 2021-04-16 RX ADMIN — CARVEDILOL 3.12 MG: 3.12 TABLET, FILM COATED ORAL at 05:04

## 2021-04-16 RX ADMIN — METHOCARBAMOL 1000 MG: 500 TABLET ORAL at 08:04

## 2021-04-16 RX ADMIN — ROSUVASTATIN CALCIUM 40 MG: 20 TABLET, FILM COATED ORAL at 08:04

## 2021-04-16 RX ADMIN — ASPIRIN 81 MG: 81 TABLET, COATED ORAL at 08:04

## 2021-04-16 RX ADMIN — METHOCARBAMOL 1000 MG: 500 TABLET ORAL at 03:04

## 2021-04-16 RX ADMIN — CEFAZOLIN 2 G: 330 INJECTION, POWDER, FOR SOLUTION INTRAMUSCULAR; INTRAVENOUS at 02:04

## 2021-04-16 RX ADMIN — ACETAMINOPHEN 650 MG: 500 TABLET, FILM COATED ORAL at 12:04

## 2021-04-16 RX ADMIN — ASPIRIN 81 MG: 81 TABLET, COATED ORAL at 09:04

## 2021-04-17 LAB
ANION GAP SERPL CALC-SCNC: 12 MMOL/L (ref 8–16)
BASOPHILS # BLD AUTO: 0.03 K/UL (ref 0–0.2)
BASOPHILS NFR BLD: 0.3 % (ref 0–1.9)
BUN SERPL-MCNC: 24 MG/DL (ref 8–23)
CALCIUM SERPL-MCNC: 9.9 MG/DL (ref 8.7–10.5)
CHLORIDE SERPL-SCNC: 105 MMOL/L (ref 95–110)
CO2 SERPL-SCNC: 19 MMOL/L (ref 23–29)
CREAT SERPL-MCNC: 1.7 MG/DL (ref 0.5–1.4)
DIFFERENTIAL METHOD: ABNORMAL
EOSINOPHIL # BLD AUTO: 0.1 K/UL (ref 0–0.5)
EOSINOPHIL NFR BLD: 0.8 % (ref 0–8)
ERYTHROCYTE [DISTWIDTH] IN BLOOD BY AUTOMATED COUNT: 13.3 % (ref 11.5–14.5)
EST. GFR  (AFRICAN AMERICAN): 32.8 ML/MIN/1.73 M^2
EST. GFR  (NON AFRICAN AMERICAN): 28.5 ML/MIN/1.73 M^2
GLUCOSE SERPL-MCNC: 136 MG/DL (ref 70–110)
HCT VFR BLD AUTO: 37.9 % (ref 37–48.5)
HGB BLD-MCNC: 12.9 G/DL (ref 12–16)
IMM GRANULOCYTES # BLD AUTO: 0.03 K/UL (ref 0–0.04)
IMM GRANULOCYTES NFR BLD AUTO: 0.3 % (ref 0–0.5)
LYMPHOCYTES # BLD AUTO: 1.5 K/UL (ref 1–4.8)
LYMPHOCYTES NFR BLD: 13.2 % (ref 18–48)
MCH RBC QN AUTO: 31.5 PG (ref 27–31)
MCHC RBC AUTO-ENTMCNC: 34 G/DL (ref 32–36)
MCV RBC AUTO: 92 FL (ref 82–98)
MONOCYTES # BLD AUTO: 2 K/UL (ref 0.3–1)
MONOCYTES NFR BLD: 17.1 % (ref 4–15)
NEUTROPHILS # BLD AUTO: 7.8 K/UL (ref 1.8–7.7)
NEUTROPHILS NFR BLD: 68.3 % (ref 38–73)
NRBC BLD-RTO: 0 /100 WBC
PLATELET # BLD AUTO: 162 K/UL (ref 150–450)
PMV BLD AUTO: 10.2 FL (ref 9.2–12.9)
POCT GLUCOSE: 136 MG/DL (ref 70–110)
POCT GLUCOSE: 140 MG/DL (ref 70–110)
POCT GLUCOSE: 150 MG/DL (ref 70–110)
POTASSIUM SERPL-SCNC: 4.4 MMOL/L (ref 3.5–5.1)
RBC # BLD AUTO: 4.1 M/UL (ref 4–5.4)
SODIUM SERPL-SCNC: 136 MMOL/L (ref 136–145)
WBC # BLD AUTO: 11.46 K/UL (ref 3.9–12.7)

## 2021-04-17 PROCEDURE — 97112 NEUROMUSCULAR REEDUCATION: CPT | Mod: CQ

## 2021-04-17 PROCEDURE — 25000003 PHARM REV CODE 250: Performed by: STUDENT IN AN ORGANIZED HEALTH CARE EDUCATION/TRAINING PROGRAM

## 2021-04-17 PROCEDURE — 25000003 PHARM REV CODE 250: Performed by: ANESTHESIOLOGY

## 2021-04-17 PROCEDURE — 99231 SBSQ HOSP IP/OBS SF/LOW 25: CPT | Mod: ,,, | Performed by: ANESTHESIOLOGY

## 2021-04-17 PROCEDURE — 97535 SELF CARE MNGMENT TRAINING: CPT

## 2021-04-17 PROCEDURE — 80048 BASIC METABOLIC PNL TOTAL CA: CPT | Performed by: ORTHOPAEDIC SURGERY

## 2021-04-17 PROCEDURE — 85025 COMPLETE CBC W/AUTO DIFF WBC: CPT | Performed by: ORTHOPAEDIC SURGERY

## 2021-04-17 PROCEDURE — 11000001 HC ACUTE MED/SURG PRIVATE ROOM

## 2021-04-17 PROCEDURE — 36415 COLL VENOUS BLD VENIPUNCTURE: CPT | Performed by: ORTHOPAEDIC SURGERY

## 2021-04-17 PROCEDURE — 97530 THERAPEUTIC ACTIVITIES: CPT

## 2021-04-17 PROCEDURE — 99231 PR SUBSEQUENT HOSPITAL CARE,LEVL I: ICD-10-PCS | Mod: ,,, | Performed by: ANESTHESIOLOGY

## 2021-04-17 RX ORDER — LEVETIRACETAM 500 MG/1
1000 TABLET, EXTENDED RELEASE ORAL DAILY
Status: DISCONTINUED | OUTPATIENT
Start: 2021-04-17 | End: 2021-04-17

## 2021-04-17 RX ORDER — LEVETIRACETAM 500 MG/1
500 TABLET ORAL 2 TIMES DAILY
Status: DISCONTINUED | OUTPATIENT
Start: 2021-04-17 | End: 2021-04-20 | Stop reason: HOSPADM

## 2021-04-17 RX ADMIN — AMLODIPINE BESYLATE 5 MG: 5 TABLET ORAL at 08:04

## 2021-04-17 RX ADMIN — CARVEDILOL 3.12 MG: 3.12 TABLET, FILM COATED ORAL at 08:04

## 2021-04-17 RX ADMIN — METHOCARBAMOL 1000 MG: 500 TABLET ORAL at 08:04

## 2021-04-17 RX ADMIN — ACETAMINOPHEN 1000 MG: 500 TABLET, FILM COATED ORAL at 11:04

## 2021-04-17 RX ADMIN — CARVEDILOL 3.12 MG: 3.12 TABLET, FILM COATED ORAL at 04:04

## 2021-04-17 RX ADMIN — ACETAMINOPHEN 1000 MG: 500 TABLET, FILM COATED ORAL at 04:04

## 2021-04-17 RX ADMIN — ACETAMINOPHEN 1000 MG: 500 TABLET, FILM COATED ORAL at 05:04

## 2021-04-17 RX ADMIN — LEVETIRACETAM 500 MG: 500 TABLET ORAL at 09:04

## 2021-04-17 RX ADMIN — ROSUVASTATIN CALCIUM 40 MG: 20 TABLET, FILM COATED ORAL at 08:04

## 2021-04-17 RX ADMIN — ASPIRIN 81 MG: 81 TABLET, COATED ORAL at 08:04

## 2021-04-17 RX ADMIN — METHOCARBAMOL 1000 MG: 500 TABLET ORAL at 09:04

## 2021-04-17 RX ADMIN — METHOCARBAMOL 1000 MG: 500 TABLET ORAL at 03:04

## 2021-04-17 RX ADMIN — ALLOPURINOL 300 MG: 300 TABLET ORAL at 08:04

## 2021-04-17 RX ADMIN — ASPIRIN 81 MG: 81 TABLET, COATED ORAL at 09:04

## 2021-04-18 LAB
ANION GAP SERPL CALC-SCNC: 10 MMOL/L (ref 8–16)
BASOPHILS # BLD AUTO: 0.05 K/UL (ref 0–0.2)
BASOPHILS NFR BLD: 0.5 % (ref 0–1.9)
BUN SERPL-MCNC: 23 MG/DL (ref 8–23)
CALCIUM SERPL-MCNC: 10.1 MG/DL (ref 8.7–10.5)
CHLORIDE SERPL-SCNC: 105 MMOL/L (ref 95–110)
CO2 SERPL-SCNC: 23 MMOL/L (ref 23–29)
CREAT SERPL-MCNC: 1.5 MG/DL (ref 0.5–1.4)
DIFFERENTIAL METHOD: ABNORMAL
EOSINOPHIL # BLD AUTO: 0.3 K/UL (ref 0–0.5)
EOSINOPHIL NFR BLD: 2.9 % (ref 0–8)
ERYTHROCYTE [DISTWIDTH] IN BLOOD BY AUTOMATED COUNT: 13.5 % (ref 11.5–14.5)
EST. GFR  (AFRICAN AMERICAN): 38.2 ML/MIN/1.73 M^2
EST. GFR  (NON AFRICAN AMERICAN): 33.1 ML/MIN/1.73 M^2
GLUCOSE SERPL-MCNC: 120 MG/DL (ref 70–110)
HCT VFR BLD AUTO: 34.4 % (ref 37–48.5)
HGB BLD-MCNC: 11.5 G/DL (ref 12–16)
IMM GRANULOCYTES # BLD AUTO: 0.03 K/UL (ref 0–0.04)
IMM GRANULOCYTES NFR BLD AUTO: 0.3 % (ref 0–0.5)
LYMPHOCYTES # BLD AUTO: 1.3 K/UL (ref 1–4.8)
LYMPHOCYTES NFR BLD: 13.3 % (ref 18–48)
MCH RBC QN AUTO: 30.8 PG (ref 27–31)
MCHC RBC AUTO-ENTMCNC: 33.4 G/DL (ref 32–36)
MCV RBC AUTO: 92 FL (ref 82–98)
MONOCYTES # BLD AUTO: 1.7 K/UL (ref 0.3–1)
MONOCYTES NFR BLD: 16.7 % (ref 4–15)
NEUTROPHILS # BLD AUTO: 6.7 K/UL (ref 1.8–7.7)
NEUTROPHILS NFR BLD: 66.3 % (ref 38–73)
NRBC BLD-RTO: 0 /100 WBC
PLATELET # BLD AUTO: 168 K/UL (ref 150–450)
PMV BLD AUTO: 10.3 FL (ref 9.2–12.9)
POTASSIUM SERPL-SCNC: 3.4 MMOL/L (ref 3.5–5.1)
RBC # BLD AUTO: 3.73 M/UL (ref 4–5.4)
SODIUM SERPL-SCNC: 138 MMOL/L (ref 136–145)
WBC # BLD AUTO: 10.05 K/UL (ref 3.9–12.7)

## 2021-04-18 PROCEDURE — 85025 COMPLETE CBC W/AUTO DIFF WBC: CPT | Performed by: ORTHOPAEDIC SURGERY

## 2021-04-18 PROCEDURE — 97112 NEUROMUSCULAR REEDUCATION: CPT | Mod: CQ

## 2021-04-18 PROCEDURE — 97530 THERAPEUTIC ACTIVITIES: CPT

## 2021-04-18 PROCEDURE — 11000001 HC ACUTE MED/SURG PRIVATE ROOM

## 2021-04-18 PROCEDURE — 80048 BASIC METABOLIC PNL TOTAL CA: CPT | Performed by: STUDENT IN AN ORGANIZED HEALTH CARE EDUCATION/TRAINING PROGRAM

## 2021-04-18 PROCEDURE — 36415 COLL VENOUS BLD VENIPUNCTURE: CPT | Performed by: STUDENT IN AN ORGANIZED HEALTH CARE EDUCATION/TRAINING PROGRAM

## 2021-04-18 PROCEDURE — 25000003 PHARM REV CODE 250: Performed by: STUDENT IN AN ORGANIZED HEALTH CARE EDUCATION/TRAINING PROGRAM

## 2021-04-18 PROCEDURE — 25000003 PHARM REV CODE 250: Performed by: ANESTHESIOLOGY

## 2021-04-18 RX ADMIN — ACETAMINOPHEN 1000 MG: 500 TABLET, FILM COATED ORAL at 12:04

## 2021-04-18 RX ADMIN — ACETAMINOPHEN 1000 MG: 500 TABLET, FILM COATED ORAL at 05:04

## 2021-04-18 RX ADMIN — LEVETIRACETAM 500 MG: 500 TABLET ORAL at 10:04

## 2021-04-18 RX ADMIN — METHOCARBAMOL 1000 MG: 500 TABLET ORAL at 03:04

## 2021-04-18 RX ADMIN — ALLOPURINOL 300 MG: 300 TABLET ORAL at 10:04

## 2021-04-18 RX ADMIN — METHOCARBAMOL 1000 MG: 500 TABLET ORAL at 08:04

## 2021-04-18 RX ADMIN — ROSUVASTATIN CALCIUM 40 MG: 20 TABLET, FILM COATED ORAL at 10:04

## 2021-04-18 RX ADMIN — AMLODIPINE BESYLATE 5 MG: 5 TABLET ORAL at 10:04

## 2021-04-18 RX ADMIN — CARVEDILOL 3.12 MG: 3.12 TABLET, FILM COATED ORAL at 05:04

## 2021-04-18 RX ADMIN — METHOCARBAMOL 1000 MG: 500 TABLET ORAL at 10:04

## 2021-04-18 RX ADMIN — ASPIRIN 81 MG: 81 TABLET, COATED ORAL at 10:04

## 2021-04-18 RX ADMIN — ASPIRIN 81 MG: 81 TABLET, COATED ORAL at 08:04

## 2021-04-18 RX ADMIN — LEVETIRACETAM 500 MG: 500 TABLET ORAL at 08:04

## 2021-04-18 RX ADMIN — CARVEDILOL 3.12 MG: 3.12 TABLET, FILM COATED ORAL at 07:04

## 2021-04-19 ENCOUNTER — TELEPHONE (OUTPATIENT)
Dept: ORTHOPEDICS | Facility: CLINIC | Age: 81
End: 2021-04-19

## 2021-04-19 VITALS
TEMPERATURE: 98 F | SYSTOLIC BLOOD PRESSURE: 132 MMHG | DIASTOLIC BLOOD PRESSURE: 81 MMHG | BODY MASS INDEX: 41.91 KG/M2 | HEIGHT: 65 IN | RESPIRATION RATE: 16 BRPM | OXYGEN SATURATION: 97 % | HEART RATE: 84 BPM | WEIGHT: 251.56 LBS

## 2021-04-19 LAB
ANION GAP SERPL CALC-SCNC: 11 MMOL/L (ref 8–16)
BACTERIA SPEC AEROBE CULT: NO GROWTH
BACTERIA SPEC AEROBE CULT: NO GROWTH
BASOPHILS # BLD AUTO: 0.04 K/UL (ref 0–0.2)
BASOPHILS NFR BLD: 0.5 % (ref 0–1.9)
BUN SERPL-MCNC: 28 MG/DL (ref 8–23)
CALCIUM SERPL-MCNC: 9.8 MG/DL (ref 8.7–10.5)
CHLORIDE SERPL-SCNC: 103 MMOL/L (ref 95–110)
CO2 SERPL-SCNC: 22 MMOL/L (ref 23–29)
CREAT SERPL-MCNC: 1.7 MG/DL (ref 0.5–1.4)
DIFFERENTIAL METHOD: ABNORMAL
EOSINOPHIL # BLD AUTO: 0.6 K/UL (ref 0–0.5)
EOSINOPHIL NFR BLD: 6.9 % (ref 0–8)
ERYTHROCYTE [DISTWIDTH] IN BLOOD BY AUTOMATED COUNT: 13.2 % (ref 11.5–14.5)
EST. GFR  (AFRICAN AMERICAN): 32.8 ML/MIN/1.73 M^2
EST. GFR  (NON AFRICAN AMERICAN): 28.5 ML/MIN/1.73 M^2
GLUCOSE SERPL-MCNC: 116 MG/DL (ref 70–110)
HCT VFR BLD AUTO: 36.6 % (ref 37–48.5)
HGB BLD-MCNC: 11.9 G/DL (ref 12–16)
IMM GRANULOCYTES # BLD AUTO: 0.02 K/UL (ref 0–0.04)
IMM GRANULOCYTES NFR BLD AUTO: 0.2 % (ref 0–0.5)
LYMPHOCYTES # BLD AUTO: 1.3 K/UL (ref 1–4.8)
LYMPHOCYTES NFR BLD: 15.2 % (ref 18–48)
MCH RBC QN AUTO: 31.2 PG (ref 27–31)
MCHC RBC AUTO-ENTMCNC: 32.5 G/DL (ref 32–36)
MCV RBC AUTO: 96 FL (ref 82–98)
MONOCYTES # BLD AUTO: 1.1 K/UL (ref 0.3–1)
MONOCYTES NFR BLD: 13 % (ref 4–15)
NEUTROPHILS # BLD AUTO: 5.4 K/UL (ref 1.8–7.7)
NEUTROPHILS NFR BLD: 64.2 % (ref 38–73)
NRBC BLD-RTO: 0 /100 WBC
PLATELET # BLD AUTO: 181 K/UL (ref 150–450)
PMV BLD AUTO: 10.4 FL (ref 9.2–12.9)
POTASSIUM SERPL-SCNC: 3.4 MMOL/L (ref 3.5–5.1)
RBC # BLD AUTO: 3.82 M/UL (ref 4–5.4)
SARS-COV-2 RNA RESP QL NAA+PROBE: NOT DETECTED
SODIUM SERPL-SCNC: 136 MMOL/L (ref 136–145)
WBC # BLD AUTO: 8.4 K/UL (ref 3.9–12.7)

## 2021-04-19 PROCEDURE — 97530 THERAPEUTIC ACTIVITIES: CPT

## 2021-04-19 PROCEDURE — 36415 COLL VENOUS BLD VENIPUNCTURE: CPT | Performed by: STUDENT IN AN ORGANIZED HEALTH CARE EDUCATION/TRAINING PROGRAM

## 2021-04-19 PROCEDURE — 25000003 PHARM REV CODE 250: Performed by: STUDENT IN AN ORGANIZED HEALTH CARE EDUCATION/TRAINING PROGRAM

## 2021-04-19 PROCEDURE — 94761 N-INVAS EAR/PLS OXIMETRY MLT: CPT

## 2021-04-19 PROCEDURE — U0003 INFECTIOUS AGENT DETECTION BY NUCLEIC ACID (DNA OR RNA); SEVERE ACUTE RESPIRATORY SYNDROME CORONAVIRUS 2 (SARS-COV-2) (CORONAVIRUS DISEASE [COVID-19]), AMPLIFIED PROBE TECHNIQUE, MAKING USE OF HIGH THROUGHPUT TECHNOLOGIES AS DESCRIBED BY CMS-2020-01-R: HCPCS | Performed by: ORTHOPAEDIC SURGERY

## 2021-04-19 PROCEDURE — 25000003 PHARM REV CODE 250: Performed by: ANESTHESIOLOGY

## 2021-04-19 PROCEDURE — 80048 BASIC METABOLIC PNL TOTAL CA: CPT | Performed by: STUDENT IN AN ORGANIZED HEALTH CARE EDUCATION/TRAINING PROGRAM

## 2021-04-19 PROCEDURE — 94799 UNLISTED PULMONARY SVC/PX: CPT

## 2021-04-19 PROCEDURE — 11000001 HC ACUTE MED/SURG PRIVATE ROOM

## 2021-04-19 PROCEDURE — 85025 COMPLETE CBC W/AUTO DIFF WBC: CPT | Performed by: ORTHOPAEDIC SURGERY

## 2021-04-19 PROCEDURE — U0005 INFEC AGEN DETEC AMPLI PROBE: HCPCS | Performed by: ORTHOPAEDIC SURGERY

## 2021-04-19 RX ORDER — AMLODIPINE BESYLATE 5 MG/1
5 TABLET ORAL DAILY
Status: CANCELLED | OUTPATIENT
Start: 2021-04-20

## 2021-04-19 RX ORDER — ROSUVASTATIN CALCIUM 20 MG/1
40 TABLET, COATED ORAL DAILY
Status: CANCELLED | OUTPATIENT
Start: 2021-04-20

## 2021-04-19 RX ORDER — NITROGLYCERIN 0.4 MG/1
0.4 TABLET SUBLINGUAL EVERY 5 MIN PRN
Status: CANCELLED | OUTPATIENT
Start: 2021-04-19

## 2021-04-19 RX ORDER — ACETAMINOPHEN 500 MG
1000 TABLET ORAL EVERY 6 HOURS
Status: CANCELLED | OUTPATIENT
Start: 2021-04-19

## 2021-04-19 RX ORDER — CARVEDILOL 3.12 MG/1
3.12 TABLET ORAL 2 TIMES DAILY WITH MEALS
Status: CANCELLED | OUTPATIENT
Start: 2021-04-19

## 2021-04-19 RX ORDER — ALLOPURINOL 300 MG/1
300 TABLET ORAL DAILY
Status: CANCELLED | OUTPATIENT
Start: 2021-04-20

## 2021-04-19 RX ORDER — LEVETIRACETAM 500 MG/1
500 TABLET ORAL 2 TIMES DAILY
Status: CANCELLED | OUTPATIENT
Start: 2021-04-19

## 2021-04-19 RX ORDER — ASPIRIN 81 MG/1
81 TABLET ORAL 2 TIMES DAILY
Status: CANCELLED | OUTPATIENT
Start: 2021-04-19

## 2021-04-19 RX ORDER — OXYCODONE HYDROCHLORIDE 5 MG/1
5 TABLET ORAL
Status: CANCELLED | OUTPATIENT
Start: 2021-04-19

## 2021-04-19 RX ORDER — METHOCARBAMOL 500 MG/1
1000 TABLET, FILM COATED ORAL 3 TIMES DAILY
Status: CANCELLED | OUTPATIENT
Start: 2021-04-19

## 2021-04-19 RX ADMIN — ACETAMINOPHEN 1000 MG: 500 TABLET, FILM COATED ORAL at 11:04

## 2021-04-19 RX ADMIN — AMLODIPINE BESYLATE 5 MG: 5 TABLET ORAL at 08:04

## 2021-04-19 RX ADMIN — METHOCARBAMOL 1000 MG: 500 TABLET ORAL at 08:04

## 2021-04-19 RX ADMIN — ACETAMINOPHEN 1000 MG: 500 TABLET, FILM COATED ORAL at 04:04

## 2021-04-19 RX ADMIN — LEVETIRACETAM 500 MG: 500 TABLET ORAL at 08:04

## 2021-04-19 RX ADMIN — CARVEDILOL 3.12 MG: 3.12 TABLET, FILM COATED ORAL at 08:04

## 2021-04-19 RX ADMIN — ASPIRIN 81 MG: 81 TABLET, COATED ORAL at 08:04

## 2021-04-19 RX ADMIN — ACETAMINOPHEN 1000 MG: 500 TABLET, FILM COATED ORAL at 12:04

## 2021-04-19 RX ADMIN — ACETAMINOPHEN 1000 MG: 500 TABLET, FILM COATED ORAL at 06:04

## 2021-04-19 RX ADMIN — ALLOPURINOL 300 MG: 300 TABLET ORAL at 08:04

## 2021-04-19 RX ADMIN — ROSUVASTATIN CALCIUM 40 MG: 20 TABLET, FILM COATED ORAL at 08:04

## 2021-04-19 RX ADMIN — CARVEDILOL 3.12 MG: 3.12 TABLET, FILM COATED ORAL at 04:04

## 2021-04-19 RX ADMIN — METHOCARBAMOL 1000 MG: 500 TABLET ORAL at 04:04

## 2021-04-20 ENCOUNTER — HOSPITAL ENCOUNTER (INPATIENT)
Facility: HOSPITAL | Age: 81
LOS: 23 days | Discharge: HOME HEALTH | DRG: 949 | End: 2021-05-13
Attending: HOSPITALIST | Admitting: ORTHOPAEDIC SURGERY
Payer: MEDICARE

## 2021-04-20 ENCOUNTER — TELEPHONE (OUTPATIENT)
Dept: ORTHOPEDICS | Facility: CLINIC | Age: 81
End: 2021-04-20

## 2021-04-20 DIAGNOSIS — T84.018A FAILURE OF TOTAL KNEE REPLACEMENT, INITIAL ENCOUNTER: ICD-10-CM

## 2021-04-20 DIAGNOSIS — Z96.659 FAILURE OF TOTAL KNEE REPLACEMENT, INITIAL ENCOUNTER: ICD-10-CM

## 2021-04-20 DIAGNOSIS — T84.018D FAILURE OF TOTAL KNEE REPLACEMENT, SUBSEQUENT ENCOUNTER: Primary | ICD-10-CM

## 2021-04-20 DIAGNOSIS — Z96.652 STATUS POST REVISION OF TOTAL REPLACEMENT OF LEFT KNEE: ICD-10-CM

## 2021-04-20 DIAGNOSIS — Z96.659 FAILURE OF TOTAL KNEE REPLACEMENT, SUBSEQUENT ENCOUNTER: Primary | ICD-10-CM

## 2021-04-20 LAB
ANION GAP SERPL CALC-SCNC: 11 MMOL/L (ref 8–16)
BASOPHILS # BLD AUTO: 0.04 K/UL (ref 0–0.2)
BASOPHILS NFR BLD: 0.6 % (ref 0–1.9)
BUN SERPL-MCNC: 30 MG/DL (ref 8–23)
CALCIUM SERPL-MCNC: 9.4 MG/DL (ref 8.7–10.5)
CHLORIDE SERPL-SCNC: 101 MMOL/L (ref 95–110)
CO2 SERPL-SCNC: 22 MMOL/L (ref 23–29)
CREAT SERPL-MCNC: 1.8 MG/DL (ref 0.5–1.4)
DIFFERENTIAL METHOD: ABNORMAL
EOSINOPHIL # BLD AUTO: 0.6 K/UL (ref 0–0.5)
EOSINOPHIL NFR BLD: 9.4 % (ref 0–8)
ERYTHROCYTE [DISTWIDTH] IN BLOOD BY AUTOMATED COUNT: 13.4 % (ref 11.5–14.5)
EST. GFR  (AFRICAN AMERICAN): 30.6 ML/MIN/1.73 M^2
EST. GFR  (NON AFRICAN AMERICAN): 26.6 ML/MIN/1.73 M^2
GLUCOSE SERPL-MCNC: 104 MG/DL (ref 70–110)
HCT VFR BLD AUTO: 38.4 % (ref 37–48.5)
HGB BLD-MCNC: 11.7 G/DL (ref 12–16)
IMM GRANULOCYTES # BLD AUTO: 0.02 K/UL (ref 0–0.04)
IMM GRANULOCYTES NFR BLD AUTO: 0.3 % (ref 0–0.5)
LYMPHOCYTES # BLD AUTO: 1.1 K/UL (ref 1–4.8)
LYMPHOCYTES NFR BLD: 17.1 % (ref 18–48)
MAGNESIUM SERPL-MCNC: 1.9 MG/DL (ref 1.6–2.6)
MCH RBC QN AUTO: 31 PG (ref 27–31)
MCHC RBC AUTO-ENTMCNC: 30.5 G/DL (ref 32–36)
MCV RBC AUTO: 102 FL (ref 82–98)
MONOCYTES # BLD AUTO: 1.1 K/UL (ref 0.3–1)
MONOCYTES NFR BLD: 16.5 % (ref 4–15)
NEUTROPHILS # BLD AUTO: 3.7 K/UL (ref 1.8–7.7)
NEUTROPHILS NFR BLD: 56.1 % (ref 38–73)
NRBC BLD-RTO: 0 /100 WBC
PHOSPHATE SERPL-MCNC: 3.3 MG/DL (ref 2.7–4.5)
PLATELET # BLD AUTO: 197 K/UL (ref 150–450)
PMV BLD AUTO: 10.3 FL (ref 9.2–12.9)
POTASSIUM SERPL-SCNC: 3.6 MMOL/L (ref 3.5–5.1)
RBC # BLD AUTO: 3.78 M/UL (ref 4–5.4)
SODIUM SERPL-SCNC: 134 MMOL/L (ref 136–145)
WBC # BLD AUTO: 6.5 K/UL (ref 3.9–12.7)

## 2021-04-20 PROCEDURE — 84100 ASSAY OF PHOSPHORUS: CPT | Performed by: HOSPITALIST

## 2021-04-20 PROCEDURE — 83735 ASSAY OF MAGNESIUM: CPT | Performed by: HOSPITALIST

## 2021-04-20 PROCEDURE — 80048 BASIC METABOLIC PNL TOTAL CA: CPT | Performed by: HOSPITALIST

## 2021-04-20 PROCEDURE — 97535 SELF CARE MNGMENT TRAINING: CPT

## 2021-04-20 PROCEDURE — 25000003 PHARM REV CODE 250: Performed by: NURSE PRACTITIONER

## 2021-04-20 PROCEDURE — 85025 COMPLETE CBC W/AUTO DIFF WBC: CPT | Performed by: HOSPITALIST

## 2021-04-20 PROCEDURE — 97165 OT EVAL LOW COMPLEX 30 MIN: CPT

## 2021-04-20 PROCEDURE — 97162 PT EVAL MOD COMPLEX 30 MIN: CPT

## 2021-04-20 PROCEDURE — 11000004 HC SNF PRIVATE

## 2021-04-20 PROCEDURE — 97530 THERAPEUTIC ACTIVITIES: CPT

## 2021-04-20 PROCEDURE — 36415 COLL VENOUS BLD VENIPUNCTURE: CPT | Performed by: HOSPITALIST

## 2021-04-20 PROCEDURE — 25000003 PHARM REV CODE 250: Performed by: STUDENT IN AN ORGANIZED HEALTH CARE EDUCATION/TRAINING PROGRAM

## 2021-04-20 PROCEDURE — 97110 THERAPEUTIC EXERCISES: CPT

## 2021-04-20 RX ORDER — AMLODIPINE BESYLATE 5 MG/1
5 TABLET ORAL DAILY
Status: DISCONTINUED | OUTPATIENT
Start: 2021-04-20 | End: 2021-05-13 | Stop reason: HOSPADM

## 2021-04-20 RX ORDER — ROSUVASTATIN CALCIUM 10 MG/1
40 TABLET, COATED ORAL DAILY
Status: DISCONTINUED | OUTPATIENT
Start: 2021-04-20 | End: 2021-05-13 | Stop reason: HOSPADM

## 2021-04-20 RX ORDER — AMOXICILLIN 250 MG
1 CAPSULE ORAL 2 TIMES DAILY
Status: DISCONTINUED | OUTPATIENT
Start: 2021-04-20 | End: 2021-04-27

## 2021-04-20 RX ORDER — NITROGLYCERIN 0.4 MG/1
0.4 TABLET SUBLINGUAL EVERY 5 MIN PRN
Status: DISCONTINUED | OUTPATIENT
Start: 2021-04-20 | End: 2021-05-13 | Stop reason: HOSPADM

## 2021-04-20 RX ORDER — CALCIUM CARBONATE 200(500)MG
500 TABLET,CHEWABLE ORAL DAILY PRN
Status: DISCONTINUED | OUTPATIENT
Start: 2021-04-20 | End: 2021-05-13 | Stop reason: HOSPADM

## 2021-04-20 RX ORDER — POLYETHYLENE GLYCOL 3350 17 G/17G
17 POWDER, FOR SOLUTION ORAL DAILY
Status: DISCONTINUED | OUTPATIENT
Start: 2021-04-20 | End: 2021-05-13 | Stop reason: HOSPADM

## 2021-04-20 RX ORDER — SODIUM BICARBONATE 650 MG/1
650 TABLET ORAL DAILY
Status: COMPLETED | OUTPATIENT
Start: 2021-04-20 | End: 2021-04-21

## 2021-04-20 RX ORDER — OXYCODONE HYDROCHLORIDE 5 MG/1
5 TABLET ORAL
Status: DISCONTINUED | OUTPATIENT
Start: 2021-04-20 | End: 2021-05-13 | Stop reason: HOSPADM

## 2021-04-20 RX ORDER — ACETAMINOPHEN 500 MG
1000 TABLET ORAL EVERY 6 HOURS
Status: DISCONTINUED | OUTPATIENT
Start: 2021-04-20 | End: 2021-04-21

## 2021-04-20 RX ORDER — ALLOPURINOL 100 MG/1
300 TABLET ORAL DAILY
Status: DISCONTINUED | OUTPATIENT
Start: 2021-04-20 | End: 2021-05-13 | Stop reason: HOSPADM

## 2021-04-20 RX ORDER — METHOCARBAMOL 500 MG/1
1000 TABLET, FILM COATED ORAL 3 TIMES DAILY
Status: DISCONTINUED | OUTPATIENT
Start: 2021-04-20 | End: 2021-05-13 | Stop reason: HOSPADM

## 2021-04-20 RX ORDER — TALC
6 POWDER (GRAM) TOPICAL NIGHTLY PRN
Status: DISCONTINUED | OUTPATIENT
Start: 2021-04-20 | End: 2021-05-13 | Stop reason: HOSPADM

## 2021-04-20 RX ORDER — LEVETIRACETAM 500 MG/1
500 TABLET ORAL 2 TIMES DAILY
Status: DISCONTINUED | OUTPATIENT
Start: 2021-04-20 | End: 2021-05-13 | Stop reason: HOSPADM

## 2021-04-20 RX ORDER — CARVEDILOL 3.12 MG/1
3.12 TABLET ORAL 2 TIMES DAILY WITH MEALS
Status: DISCONTINUED | OUTPATIENT
Start: 2021-04-20 | End: 2021-05-13 | Stop reason: HOSPADM

## 2021-04-20 RX ORDER — ONDANSETRON 4 MG/1
4 TABLET, ORALLY DISINTEGRATING ORAL EVERY 6 HOURS PRN
Status: DISCONTINUED | OUTPATIENT
Start: 2021-04-20 | End: 2021-05-13 | Stop reason: HOSPADM

## 2021-04-20 RX ORDER — ASPIRIN 81 MG/1
81 TABLET ORAL 2 TIMES DAILY
Status: DISCONTINUED | OUTPATIENT
Start: 2021-04-20 | End: 2021-05-13 | Stop reason: HOSPADM

## 2021-04-20 RX ADMIN — LEVETIRACETAM 500 MG: 500 TABLET, FILM COATED ORAL at 08:04

## 2021-04-20 RX ADMIN — METHOCARBAMOL 1000 MG: 750 TABLET ORAL at 02:04

## 2021-04-20 RX ADMIN — ROSUVASTATIN CALCIUM 40 MG: 10 TABLET, FILM COATED ORAL at 08:04

## 2021-04-20 RX ADMIN — CARVEDILOL 3.12 MG: 3.12 TABLET, FILM COATED ORAL at 08:04

## 2021-04-20 RX ADMIN — AMLODIPINE BESYLATE 5 MG: 5 TABLET ORAL at 08:04

## 2021-04-20 RX ADMIN — DOCUSATE SODIUM 50MG AND SENNOSIDES 8.6MG 1 TABLET: 8.6; 5 TABLET, FILM COATED ORAL at 08:04

## 2021-04-20 RX ADMIN — DOCUSATE SODIUM 50MG AND SENNOSIDES 8.6MG 1 TABLET: 8.6; 5 TABLET, FILM COATED ORAL at 11:04

## 2021-04-20 RX ADMIN — CARVEDILOL 3.12 MG: 3.12 TABLET, FILM COATED ORAL at 04:04

## 2021-04-20 RX ADMIN — POLYETHYLENE GLYCOL 3350 17 G: 17 POWDER, FOR SOLUTION ORAL at 11:04

## 2021-04-20 RX ADMIN — ACETAMINOPHEN 1000 MG: 500 TABLET, FILM COATED ORAL at 11:04

## 2021-04-20 RX ADMIN — ACETAMINOPHEN 1000 MG: 500 TABLET, FILM COATED ORAL at 05:04

## 2021-04-20 RX ADMIN — METHOCARBAMOL 1000 MG: 750 TABLET ORAL at 08:04

## 2021-04-20 RX ADMIN — SODIUM BICARBONATE 650 MG TABLET 650 MG: at 11:04

## 2021-04-20 RX ADMIN — ASPIRIN 81 MG: 81 TABLET, COATED ORAL at 08:04

## 2021-04-20 RX ADMIN — OXYCODONE 5 MG: 5 TABLET ORAL at 08:04

## 2021-04-20 RX ADMIN — ACETAMINOPHEN 1000 MG: 500 TABLET, FILM COATED ORAL at 02:04

## 2021-04-20 RX ADMIN — ALLOPURINOL 300 MG: 100 TABLET ORAL at 08:04

## 2021-04-21 DIAGNOSIS — G40.409 TONIC-CLONIC EPILEPTIC SEIZURES: ICD-10-CM

## 2021-04-21 PROBLEM — E44.0 MODERATE MALNUTRITION: Status: ACTIVE | Noted: 2021-04-21

## 2021-04-21 PROCEDURE — 25000003 PHARM REV CODE 250: Performed by: STUDENT IN AN ORGANIZED HEALTH CARE EDUCATION/TRAINING PROGRAM

## 2021-04-21 PROCEDURE — 11000004 HC SNF PRIVATE

## 2021-04-21 PROCEDURE — 97535 SELF CARE MNGMENT TRAINING: CPT | Mod: CO

## 2021-04-21 PROCEDURE — 97110 THERAPEUTIC EXERCISES: CPT | Mod: CQ

## 2021-04-21 PROCEDURE — 97530 THERAPEUTIC ACTIVITIES: CPT | Mod: CQ

## 2021-04-21 PROCEDURE — 25000003 PHARM REV CODE 250: Performed by: NURSE PRACTITIONER

## 2021-04-21 RX ORDER — LEVETIRACETAM 500 MG/1
TABLET, EXTENDED RELEASE ORAL
Qty: 180 TABLET | Refills: 4 | Status: SHIPPED | OUTPATIENT
Start: 2021-04-21 | End: 2022-05-13

## 2021-04-21 RX ORDER — POTASSIUM CHLORIDE 750 MG/1
10 CAPSULE, EXTENDED RELEASE ORAL DAILY
Status: DISCONTINUED | OUTPATIENT
Start: 2021-04-21 | End: 2021-04-30

## 2021-04-21 RX ORDER — ACETAMINOPHEN 500 MG
1000 TABLET ORAL EVERY 8 HOURS
Status: DISCONTINUED | OUTPATIENT
Start: 2021-04-21 | End: 2021-05-13 | Stop reason: HOSPADM

## 2021-04-21 RX ORDER — FERROUS SULFATE 325(65) MG
325 TABLET, DELAYED RELEASE (ENTERIC COATED) ORAL DAILY
Status: DISCONTINUED | OUTPATIENT
Start: 2021-04-21 | End: 2021-05-13 | Stop reason: HOSPADM

## 2021-04-21 RX ORDER — ERGOCALCIFEROL 1.25 MG/1
50000 CAPSULE ORAL
Status: DISCONTINUED | OUTPATIENT
Start: 2021-04-22 | End: 2021-05-13 | Stop reason: HOSPADM

## 2021-04-21 RX ORDER — ANASTROZOLE 1 MG/1
1 TABLET ORAL DAILY
Status: DISCONTINUED | OUTPATIENT
Start: 2021-04-21 | End: 2021-05-13 | Stop reason: HOSPADM

## 2021-04-21 RX ORDER — CLOPIDOGREL BISULFATE 75 MG/1
75 TABLET ORAL DAILY
Status: DISCONTINUED | OUTPATIENT
Start: 2021-04-22 | End: 2021-04-22

## 2021-04-21 RX ADMIN — METHOCARBAMOL 1000 MG: 750 TABLET ORAL at 02:04

## 2021-04-21 RX ADMIN — ACETAMINOPHEN 1000 MG: 500 TABLET, FILM COATED ORAL at 02:04

## 2021-04-21 RX ADMIN — SODIUM BICARBONATE 650 MG TABLET 650 MG: at 08:04

## 2021-04-21 RX ADMIN — ALLOPURINOL 300 MG: 100 TABLET ORAL at 08:04

## 2021-04-21 RX ADMIN — CARVEDILOL 3.12 MG: 3.12 TABLET, FILM COATED ORAL at 08:04

## 2021-04-21 RX ADMIN — AMLODIPINE BESYLATE 5 MG: 5 TABLET ORAL at 08:04

## 2021-04-21 RX ADMIN — DOCUSATE SODIUM 50MG AND SENNOSIDES 8.6MG 1 TABLET: 8.6; 5 TABLET, FILM COATED ORAL at 08:04

## 2021-04-21 RX ADMIN — ASPIRIN 81 MG: 81 TABLET, COATED ORAL at 08:04

## 2021-04-21 RX ADMIN — POLYETHYLENE GLYCOL 3350 17 G: 17 POWDER, FOR SOLUTION ORAL at 08:04

## 2021-04-21 RX ADMIN — POTASSIUM CHLORIDE 10 MEQ: 10 CAPSULE, COATED, EXTENDED RELEASE ORAL at 11:04

## 2021-04-21 RX ADMIN — METHOCARBAMOL 1000 MG: 750 TABLET ORAL at 08:04

## 2021-04-21 RX ADMIN — ACETAMINOPHEN 1000 MG: 500 TABLET, FILM COATED ORAL at 09:04

## 2021-04-21 RX ADMIN — CARVEDILOL 3.12 MG: 3.12 TABLET, FILM COATED ORAL at 05:04

## 2021-04-21 RX ADMIN — FERROUS SULFATE TAB EC 325 MG (65 MG FE EQUIVALENT) 325 MG: 325 (65 FE) TABLET DELAYED RESPONSE at 11:04

## 2021-04-21 RX ADMIN — ANASTROZOLE 1 MG: 1 TABLET, COATED ORAL at 11:04

## 2021-04-21 RX ADMIN — LEVETIRACETAM 500 MG: 500 TABLET, FILM COATED ORAL at 08:04

## 2021-04-21 RX ADMIN — ROSUVASTATIN CALCIUM 40 MG: 10 TABLET, FILM COATED ORAL at 11:04

## 2021-04-21 RX ADMIN — OXYCODONE 5 MG: 5 TABLET ORAL at 09:04

## 2021-04-21 RX ADMIN — ACETAMINOPHEN 1000 MG: 500 TABLET, FILM COATED ORAL at 06:04

## 2021-04-22 LAB
BACTERIA SPEC ANAEROBE CULT: NORMAL
BACTERIA SPEC ANAEROBE CULT: NORMAL
FINAL PATHOLOGIC DIAGNOSIS: NORMAL
Lab: NORMAL
POCT GLUCOSE: 159 MG/DL (ref 70–110)

## 2021-04-22 PROCEDURE — 97530 THERAPEUTIC ACTIVITIES: CPT | Mod: CQ

## 2021-04-22 PROCEDURE — 97110 THERAPEUTIC EXERCISES: CPT | Mod: CO

## 2021-04-22 PROCEDURE — 97535 SELF CARE MNGMENT TRAINING: CPT | Mod: CO

## 2021-04-22 PROCEDURE — 25000003 PHARM REV CODE 250: Performed by: STUDENT IN AN ORGANIZED HEALTH CARE EDUCATION/TRAINING PROGRAM

## 2021-04-22 PROCEDURE — 25000003 PHARM REV CODE 250: Performed by: NURSE PRACTITIONER

## 2021-04-22 PROCEDURE — 11000004 HC SNF PRIVATE

## 2021-04-22 RX ORDER — CLOPIDOGREL BISULFATE 75 MG/1
75 TABLET ORAL DAILY
Status: DISCONTINUED | OUTPATIENT
Start: 2021-04-26 | End: 2021-05-13 | Stop reason: HOSPADM

## 2021-04-22 RX ADMIN — ALLOPURINOL 300 MG: 100 TABLET ORAL at 09:04

## 2021-04-22 RX ADMIN — CARVEDILOL 3.12 MG: 3.12 TABLET, FILM COATED ORAL at 05:04

## 2021-04-22 RX ADMIN — ACETAMINOPHEN 1000 MG: 500 TABLET, FILM COATED ORAL at 09:04

## 2021-04-22 RX ADMIN — ROSUVASTATIN CALCIUM 40 MG: 10 TABLET, FILM COATED ORAL at 09:04

## 2021-04-22 RX ADMIN — ASPIRIN 81 MG: 81 TABLET, COATED ORAL at 09:04

## 2021-04-22 RX ADMIN — METHOCARBAMOL 1000 MG: 750 TABLET ORAL at 02:04

## 2021-04-22 RX ADMIN — ACETAMINOPHEN 1000 MG: 500 TABLET, FILM COATED ORAL at 05:04

## 2021-04-22 RX ADMIN — CLOPIDOGREL 75 MG: 75 TABLET, FILM COATED ORAL at 09:04

## 2021-04-22 RX ADMIN — ACETAMINOPHEN 1000 MG: 500 TABLET, FILM COATED ORAL at 02:04

## 2021-04-22 RX ADMIN — POTASSIUM CHLORIDE 10 MEQ: 10 CAPSULE, COATED, EXTENDED RELEASE ORAL at 09:04

## 2021-04-22 RX ADMIN — LEVETIRACETAM 500 MG: 500 TABLET, FILM COATED ORAL at 09:04

## 2021-04-22 RX ADMIN — POLYETHYLENE GLYCOL 3350 17 G: 17 POWDER, FOR SOLUTION ORAL at 09:04

## 2021-04-22 RX ADMIN — METHOCARBAMOL 1000 MG: 750 TABLET ORAL at 09:04

## 2021-04-22 RX ADMIN — LEVETIRACETAM 500 MG: 500 TABLET, FILM COATED ORAL at 08:04

## 2021-04-22 RX ADMIN — DOCUSATE SODIUM 50MG AND SENNOSIDES 8.6MG 1 TABLET: 8.6; 5 TABLET, FILM COATED ORAL at 09:04

## 2021-04-22 RX ADMIN — METHOCARBAMOL 1000 MG: 750 TABLET ORAL at 08:04

## 2021-04-22 RX ADMIN — AMLODIPINE BESYLATE 5 MG: 5 TABLET ORAL at 09:04

## 2021-04-22 RX ADMIN — ANASTROZOLE 1 MG: 1 TABLET, COATED ORAL at 09:04

## 2021-04-22 RX ADMIN — FERROUS SULFATE TAB EC 325 MG (65 MG FE EQUIVALENT) 325 MG: 325 (65 FE) TABLET DELAYED RESPONSE at 09:04

## 2021-04-22 RX ADMIN — CARVEDILOL 3.12 MG: 3.12 TABLET, FILM COATED ORAL at 09:04

## 2021-04-22 RX ADMIN — ERGOCALCIFEROL 50000 UNITS: 1.25 CAPSULE ORAL at 09:04

## 2021-04-23 LAB
ANION GAP SERPL CALC-SCNC: 8 MMOL/L (ref 8–16)
BASOPHILS # BLD AUTO: 0.05 K/UL (ref 0–0.2)
BASOPHILS NFR BLD: 0.6 % (ref 0–1.9)
BUN SERPL-MCNC: 33 MG/DL (ref 8–23)
CALCIUM SERPL-MCNC: 10 MG/DL (ref 8.7–10.5)
CHLORIDE SERPL-SCNC: 104 MMOL/L (ref 95–110)
CO2 SERPL-SCNC: 24 MMOL/L (ref 23–29)
CREAT SERPL-MCNC: 1.6 MG/DL (ref 0.5–1.4)
DIFFERENTIAL METHOD: ABNORMAL
EOSINOPHIL # BLD AUTO: 0.6 K/UL (ref 0–0.5)
EOSINOPHIL NFR BLD: 7.9 % (ref 0–8)
ERYTHROCYTE [DISTWIDTH] IN BLOOD BY AUTOMATED COUNT: 13.2 % (ref 11.5–14.5)
EST. GFR  (AFRICAN AMERICAN): 35.3 ML/MIN/1.73 M^2
EST. GFR  (NON AFRICAN AMERICAN): 30.6 ML/MIN/1.73 M^2
GLUCOSE SERPL-MCNC: 118 MG/DL (ref 70–110)
HCT VFR BLD AUTO: 32.1 % (ref 37–48.5)
HGB BLD-MCNC: 10.5 G/DL (ref 12–16)
IMM GRANULOCYTES # BLD AUTO: 0.07 K/UL (ref 0–0.04)
IMM GRANULOCYTES NFR BLD AUTO: 0.9 % (ref 0–0.5)
LYMPHOCYTES # BLD AUTO: 1.5 K/UL (ref 1–4.8)
LYMPHOCYTES NFR BLD: 18.6 % (ref 18–48)
MAGNESIUM SERPL-MCNC: 1.7 MG/DL (ref 1.6–2.6)
MCH RBC QN AUTO: 30.9 PG (ref 27–31)
MCHC RBC AUTO-ENTMCNC: 32.7 G/DL (ref 32–36)
MCV RBC AUTO: 94 FL (ref 82–98)
MONOCYTES # BLD AUTO: 1.1 K/UL (ref 0.3–1)
MONOCYTES NFR BLD: 13.9 % (ref 4–15)
NEUTROPHILS # BLD AUTO: 4.5 K/UL (ref 1.8–7.7)
NEUTROPHILS NFR BLD: 58.1 % (ref 38–73)
NRBC BLD-RTO: 0 /100 WBC
PHOSPHATE SERPL-MCNC: 2.8 MG/DL (ref 2.7–4.5)
PLATELET # BLD AUTO: 290 K/UL (ref 150–450)
PMV BLD AUTO: 10.1 FL (ref 9.2–12.9)
POTASSIUM SERPL-SCNC: 4 MMOL/L (ref 3.5–5.1)
RBC # BLD AUTO: 3.4 M/UL (ref 4–5.4)
SODIUM SERPL-SCNC: 136 MMOL/L (ref 136–145)
WBC # BLD AUTO: 7.83 K/UL (ref 3.9–12.7)

## 2021-04-23 PROCEDURE — 97530 THERAPEUTIC ACTIVITIES: CPT

## 2021-04-23 PROCEDURE — 80048 BASIC METABOLIC PNL TOTAL CA: CPT | Performed by: HOSPITALIST

## 2021-04-23 PROCEDURE — 84100 ASSAY OF PHOSPHORUS: CPT | Performed by: HOSPITALIST

## 2021-04-23 PROCEDURE — 25000003 PHARM REV CODE 250: Performed by: STUDENT IN AN ORGANIZED HEALTH CARE EDUCATION/TRAINING PROGRAM

## 2021-04-23 PROCEDURE — 97535 SELF CARE MNGMENT TRAINING: CPT | Mod: CO

## 2021-04-23 PROCEDURE — 97110 THERAPEUTIC EXERCISES: CPT

## 2021-04-23 PROCEDURE — 11000004 HC SNF PRIVATE

## 2021-04-23 PROCEDURE — 85025 COMPLETE CBC W/AUTO DIFF WBC: CPT | Performed by: HOSPITALIST

## 2021-04-23 PROCEDURE — 25000003 PHARM REV CODE 250: Performed by: NURSE PRACTITIONER

## 2021-04-23 PROCEDURE — 83735 ASSAY OF MAGNESIUM: CPT | Performed by: HOSPITALIST

## 2021-04-23 PROCEDURE — 36415 COLL VENOUS BLD VENIPUNCTURE: CPT | Performed by: HOSPITALIST

## 2021-04-23 RX ADMIN — AMLODIPINE BESYLATE 5 MG: 5 TABLET ORAL at 08:04

## 2021-04-23 RX ADMIN — METHOCARBAMOL 1000 MG: 750 TABLET ORAL at 08:04

## 2021-04-23 RX ADMIN — METHOCARBAMOL 1000 MG: 750 TABLET ORAL at 04:04

## 2021-04-23 RX ADMIN — ALLOPURINOL 300 MG: 100 TABLET ORAL at 08:04

## 2021-04-23 RX ADMIN — CARVEDILOL 3.12 MG: 3.12 TABLET, FILM COATED ORAL at 04:04

## 2021-04-23 RX ADMIN — METHOCARBAMOL 1000 MG: 750 TABLET ORAL at 09:04

## 2021-04-23 RX ADMIN — ASPIRIN 81 MG: 81 TABLET, COATED ORAL at 09:04

## 2021-04-23 RX ADMIN — ACETAMINOPHEN 1000 MG: 500 TABLET, FILM COATED ORAL at 02:04

## 2021-04-23 RX ADMIN — ACETAMINOPHEN 1000 MG: 500 TABLET, FILM COATED ORAL at 09:04

## 2021-04-23 RX ADMIN — LEVETIRACETAM 500 MG: 500 TABLET, FILM COATED ORAL at 08:04

## 2021-04-23 RX ADMIN — ACETAMINOPHEN 1000 MG: 500 TABLET, FILM COATED ORAL at 05:04

## 2021-04-23 RX ADMIN — ASPIRIN 81 MG: 81 TABLET, COATED ORAL at 08:04

## 2021-04-23 RX ADMIN — CARVEDILOL 3.12 MG: 3.12 TABLET, FILM COATED ORAL at 08:04

## 2021-04-23 RX ADMIN — ROSUVASTATIN CALCIUM 40 MG: 10 TABLET, FILM COATED ORAL at 08:04

## 2021-04-23 RX ADMIN — ANASTROZOLE 1 MG: 1 TABLET, COATED ORAL at 08:04

## 2021-04-23 RX ADMIN — LEVETIRACETAM 500 MG: 500 TABLET, FILM COATED ORAL at 09:04

## 2021-04-23 RX ADMIN — POTASSIUM CHLORIDE 10 MEQ: 10 CAPSULE, COATED, EXTENDED RELEASE ORAL at 08:04

## 2021-04-23 RX ADMIN — FERROUS SULFATE TAB EC 325 MG (65 MG FE EQUIVALENT) 325 MG: 325 (65 FE) TABLET DELAYED RESPONSE at 08:04

## 2021-04-24 PROCEDURE — 99305 PR NURSING FACILITY CARE, INIT, MOD SEVERITY: ICD-10-PCS | Mod: ,,, | Performed by: HOSPITALIST

## 2021-04-24 PROCEDURE — 11000004 HC SNF PRIVATE

## 2021-04-24 PROCEDURE — 25000003 PHARM REV CODE 250: Performed by: NURSE PRACTITIONER

## 2021-04-24 PROCEDURE — 25000003 PHARM REV CODE 250: Performed by: STUDENT IN AN ORGANIZED HEALTH CARE EDUCATION/TRAINING PROGRAM

## 2021-04-24 PROCEDURE — 99305 1ST NF CARE MODERATE MDM 35: CPT | Mod: ,,, | Performed by: HOSPITALIST

## 2021-04-24 RX ADMIN — METHOCARBAMOL 1000 MG: 750 TABLET ORAL at 02:04

## 2021-04-24 RX ADMIN — ACETAMINOPHEN 1000 MG: 500 TABLET, FILM COATED ORAL at 09:04

## 2021-04-24 RX ADMIN — METHOCARBAMOL 1000 MG: 750 TABLET ORAL at 09:04

## 2021-04-24 RX ADMIN — CARVEDILOL 3.12 MG: 3.12 TABLET, FILM COATED ORAL at 05:04

## 2021-04-24 RX ADMIN — POTASSIUM CHLORIDE 10 MEQ: 10 CAPSULE, COATED, EXTENDED RELEASE ORAL at 08:04

## 2021-04-24 RX ADMIN — OXYCODONE 5 MG: 5 TABLET ORAL at 02:04

## 2021-04-24 RX ADMIN — ANASTROZOLE 1 MG: 1 TABLET, COATED ORAL at 08:04

## 2021-04-24 RX ADMIN — ALLOPURINOL 300 MG: 100 TABLET ORAL at 08:04

## 2021-04-24 RX ADMIN — ASPIRIN 81 MG: 81 TABLET, COATED ORAL at 08:04

## 2021-04-24 RX ADMIN — FERROUS SULFATE TAB EC 325 MG (65 MG FE EQUIVALENT) 325 MG: 325 (65 FE) TABLET DELAYED RESPONSE at 08:04

## 2021-04-24 RX ADMIN — DOCUSATE SODIUM 50MG AND SENNOSIDES 8.6MG 1 TABLET: 8.6; 5 TABLET, FILM COATED ORAL at 09:04

## 2021-04-24 RX ADMIN — LEVETIRACETAM 500 MG: 500 TABLET, FILM COATED ORAL at 08:04

## 2021-04-24 RX ADMIN — METHOCARBAMOL 1000 MG: 750 TABLET ORAL at 08:04

## 2021-04-24 RX ADMIN — LEVETIRACETAM 500 MG: 500 TABLET, FILM COATED ORAL at 09:04

## 2021-04-24 RX ADMIN — CARVEDILOL 3.12 MG: 3.12 TABLET, FILM COATED ORAL at 08:04

## 2021-04-24 RX ADMIN — ACETAMINOPHEN 1000 MG: 500 TABLET, FILM COATED ORAL at 05:04

## 2021-04-24 RX ADMIN — DOCUSATE SODIUM 50MG AND SENNOSIDES 8.6MG 1 TABLET: 8.6; 5 TABLET, FILM COATED ORAL at 08:04

## 2021-04-24 RX ADMIN — ASPIRIN 81 MG: 81 TABLET, COATED ORAL at 09:04

## 2021-04-24 RX ADMIN — ACETAMINOPHEN 1000 MG: 500 TABLET, FILM COATED ORAL at 02:04

## 2021-04-24 RX ADMIN — AMLODIPINE BESYLATE 5 MG: 5 TABLET ORAL at 08:04

## 2021-04-25 PROCEDURE — 11000004 HC SNF PRIVATE

## 2021-04-25 PROCEDURE — 97530 THERAPEUTIC ACTIVITIES: CPT | Mod: CQ

## 2021-04-25 PROCEDURE — 97110 THERAPEUTIC EXERCISES: CPT | Mod: CQ

## 2021-04-25 PROCEDURE — 97535 SELF CARE MNGMENT TRAINING: CPT

## 2021-04-25 PROCEDURE — 97530 THERAPEUTIC ACTIVITIES: CPT

## 2021-04-25 PROCEDURE — 97116 GAIT TRAINING THERAPY: CPT | Mod: CQ

## 2021-04-25 PROCEDURE — 25000003 PHARM REV CODE 250: Performed by: STUDENT IN AN ORGANIZED HEALTH CARE EDUCATION/TRAINING PROGRAM

## 2021-04-25 PROCEDURE — 97110 THERAPEUTIC EXERCISES: CPT

## 2021-04-25 PROCEDURE — 25000003 PHARM REV CODE 250: Performed by: NURSE PRACTITIONER

## 2021-04-25 RX ADMIN — AMLODIPINE BESYLATE 5 MG: 5 TABLET ORAL at 09:04

## 2021-04-25 RX ADMIN — LEVETIRACETAM 500 MG: 500 TABLET, FILM COATED ORAL at 09:04

## 2021-04-25 RX ADMIN — ACETAMINOPHEN 1000 MG: 500 TABLET, FILM COATED ORAL at 05:04

## 2021-04-25 RX ADMIN — ASPIRIN 81 MG: 81 TABLET, COATED ORAL at 09:04

## 2021-04-25 RX ADMIN — METHOCARBAMOL 1000 MG: 750 TABLET ORAL at 09:04

## 2021-04-25 RX ADMIN — POTASSIUM CHLORIDE 10 MEQ: 10 CAPSULE, COATED, EXTENDED RELEASE ORAL at 09:04

## 2021-04-25 RX ADMIN — ACETAMINOPHEN 1000 MG: 500 TABLET, FILM COATED ORAL at 01:04

## 2021-04-25 RX ADMIN — ANASTROZOLE 1 MG: 1 TABLET, COATED ORAL at 09:04

## 2021-04-25 RX ADMIN — OXYCODONE 5 MG: 5 TABLET ORAL at 01:04

## 2021-04-25 RX ADMIN — CARVEDILOL 3.12 MG: 3.12 TABLET, FILM COATED ORAL at 09:04

## 2021-04-25 RX ADMIN — ACETAMINOPHEN 1000 MG: 500 TABLET, FILM COATED ORAL at 09:04

## 2021-04-25 RX ADMIN — FERROUS SULFATE TAB EC 325 MG (65 MG FE EQUIVALENT) 325 MG: 325 (65 FE) TABLET DELAYED RESPONSE at 09:04

## 2021-04-25 RX ADMIN — CARVEDILOL 3.12 MG: 3.12 TABLET, FILM COATED ORAL at 04:04

## 2021-04-25 RX ADMIN — ROSUVASTATIN CALCIUM 40 MG: 10 TABLET, FILM COATED ORAL at 09:04

## 2021-04-25 RX ADMIN — ALLOPURINOL 300 MG: 100 TABLET ORAL at 09:04

## 2021-04-26 PROCEDURE — 97110 THERAPEUTIC EXERCISES: CPT | Mod: CQ

## 2021-04-26 PROCEDURE — 25000003 PHARM REV CODE 250: Performed by: STUDENT IN AN ORGANIZED HEALTH CARE EDUCATION/TRAINING PROGRAM

## 2021-04-26 PROCEDURE — 25000003 PHARM REV CODE 250: Performed by: NURSE PRACTITIONER

## 2021-04-26 PROCEDURE — 97530 THERAPEUTIC ACTIVITIES: CPT | Mod: CQ

## 2021-04-26 PROCEDURE — 11000004 HC SNF PRIVATE

## 2021-04-26 RX ADMIN — ACETAMINOPHEN 1000 MG: 500 TABLET, FILM COATED ORAL at 05:04

## 2021-04-26 RX ADMIN — CARVEDILOL 3.12 MG: 3.12 TABLET, FILM COATED ORAL at 10:04

## 2021-04-26 RX ADMIN — POTASSIUM CHLORIDE 10 MEQ: 10 CAPSULE, COATED, EXTENDED RELEASE ORAL at 10:04

## 2021-04-26 RX ADMIN — ASPIRIN 81 MG: 81 TABLET, COATED ORAL at 10:04

## 2021-04-26 RX ADMIN — LEVETIRACETAM 500 MG: 500 TABLET, FILM COATED ORAL at 10:04

## 2021-04-26 RX ADMIN — AMLODIPINE BESYLATE 5 MG: 5 TABLET ORAL at 10:04

## 2021-04-26 RX ADMIN — METHOCARBAMOL 1000 MG: 750 TABLET ORAL at 09:04

## 2021-04-26 RX ADMIN — ANASTROZOLE 1 MG: 1 TABLET, COATED ORAL at 10:04

## 2021-04-26 RX ADMIN — ACETAMINOPHEN 1000 MG: 500 TABLET, FILM COATED ORAL at 02:04

## 2021-04-26 RX ADMIN — CLOPIDOGREL BISULFATE 75 MG: 75 TABLET, FILM COATED ORAL at 10:04

## 2021-04-26 RX ADMIN — METHOCARBAMOL 1000 MG: 750 TABLET ORAL at 10:04

## 2021-04-26 RX ADMIN — FERROUS SULFATE TAB EC 325 MG (65 MG FE EQUIVALENT) 325 MG: 325 (65 FE) TABLET DELAYED RESPONSE at 10:04

## 2021-04-26 RX ADMIN — METHOCARBAMOL 1000 MG: 750 TABLET ORAL at 02:04

## 2021-04-26 RX ADMIN — ALLOPURINOL 300 MG: 100 TABLET ORAL at 10:04

## 2021-04-26 RX ADMIN — ACETAMINOPHEN 1000 MG: 500 TABLET, FILM COATED ORAL at 09:04

## 2021-04-26 RX ADMIN — ERGOCALCIFEROL 50000 UNITS: 1.25 CAPSULE ORAL at 10:04

## 2021-04-26 RX ADMIN — CARVEDILOL 3.12 MG: 3.12 TABLET, FILM COATED ORAL at 04:04

## 2021-04-26 RX ADMIN — OXYCODONE 5 MG: 5 TABLET ORAL at 01:04

## 2021-04-26 RX ADMIN — LEVETIRACETAM 500 MG: 500 TABLET, FILM COATED ORAL at 09:04

## 2021-04-26 RX ADMIN — ASPIRIN 81 MG: 81 TABLET, COATED ORAL at 09:04

## 2021-04-27 LAB
ANION GAP SERPL CALC-SCNC: 9 MMOL/L (ref 8–16)
BASOPHILS # BLD AUTO: 0.06 K/UL (ref 0–0.2)
BASOPHILS NFR BLD: 0.7 % (ref 0–1.9)
BUN SERPL-MCNC: 31 MG/DL (ref 8–23)
CALCIUM SERPL-MCNC: 9.8 MG/DL (ref 8.7–10.5)
CHLORIDE SERPL-SCNC: 105 MMOL/L (ref 95–110)
CO2 SERPL-SCNC: 22 MMOL/L (ref 23–29)
CREAT SERPL-MCNC: 1.5 MG/DL (ref 0.5–1.4)
DIFFERENTIAL METHOD: ABNORMAL
EOSINOPHIL # BLD AUTO: 0.6 K/UL (ref 0–0.5)
EOSINOPHIL NFR BLD: 7.2 % (ref 0–8)
ERYTHROCYTE [DISTWIDTH] IN BLOOD BY AUTOMATED COUNT: 13.5 % (ref 11.5–14.5)
EST. GFR  (AFRICAN AMERICAN): 38.2 ML/MIN/1.73 M^2
EST. GFR  (NON AFRICAN AMERICAN): 33.1 ML/MIN/1.73 M^2
GLUCOSE SERPL-MCNC: 102 MG/DL (ref 70–110)
HCT VFR BLD AUTO: 32.6 % (ref 37–48.5)
HGB BLD-MCNC: 10.4 G/DL (ref 12–16)
IMM GRANULOCYTES # BLD AUTO: 0.17 K/UL (ref 0–0.04)
IMM GRANULOCYTES NFR BLD AUTO: 1.9 % (ref 0–0.5)
LYMPHOCYTES # BLD AUTO: 1.7 K/UL (ref 1–4.8)
LYMPHOCYTES NFR BLD: 18.9 % (ref 18–48)
MAGNESIUM SERPL-MCNC: 1.6 MG/DL (ref 1.6–2.6)
MCH RBC QN AUTO: 30.1 PG (ref 27–31)
MCHC RBC AUTO-ENTMCNC: 31.9 G/DL (ref 32–36)
MCV RBC AUTO: 95 FL (ref 82–98)
MONOCYTES # BLD AUTO: 1 K/UL (ref 0.3–1)
MONOCYTES NFR BLD: 11.4 % (ref 4–15)
NEUTROPHILS # BLD AUTO: 5.2 K/UL (ref 1.8–7.7)
NEUTROPHILS NFR BLD: 59.9 % (ref 38–73)
NRBC BLD-RTO: 0 /100 WBC
PHOSPHATE SERPL-MCNC: 3.2 MG/DL (ref 2.7–4.5)
PLATELET # BLD AUTO: 420 K/UL (ref 150–450)
PMV BLD AUTO: 9.5 FL (ref 9.2–12.9)
POTASSIUM SERPL-SCNC: 4.2 MMOL/L (ref 3.5–5.1)
RBC # BLD AUTO: 3.45 M/UL (ref 4–5.4)
SODIUM SERPL-SCNC: 136 MMOL/L (ref 136–145)
WBC # BLD AUTO: 8.72 K/UL (ref 3.9–12.7)

## 2021-04-27 PROCEDURE — 25000003 PHARM REV CODE 250: Performed by: NURSE PRACTITIONER

## 2021-04-27 PROCEDURE — 11000004 HC SNF PRIVATE

## 2021-04-27 PROCEDURE — 85025 COMPLETE CBC W/AUTO DIFF WBC: CPT | Performed by: HOSPITALIST

## 2021-04-27 PROCEDURE — 97110 THERAPEUTIC EXERCISES: CPT | Mod: CO

## 2021-04-27 PROCEDURE — 97535 SELF CARE MNGMENT TRAINING: CPT | Mod: CO

## 2021-04-27 PROCEDURE — 84100 ASSAY OF PHOSPHORUS: CPT | Performed by: HOSPITALIST

## 2021-04-27 PROCEDURE — 83735 ASSAY OF MAGNESIUM: CPT | Performed by: HOSPITALIST

## 2021-04-27 PROCEDURE — 97530 THERAPEUTIC ACTIVITIES: CPT

## 2021-04-27 PROCEDURE — 25000003 PHARM REV CODE 250: Performed by: STUDENT IN AN ORGANIZED HEALTH CARE EDUCATION/TRAINING PROGRAM

## 2021-04-27 PROCEDURE — 80048 BASIC METABOLIC PNL TOTAL CA: CPT | Performed by: HOSPITALIST

## 2021-04-27 PROCEDURE — 97110 THERAPEUTIC EXERCISES: CPT

## 2021-04-27 PROCEDURE — 36415 COLL VENOUS BLD VENIPUNCTURE: CPT | Performed by: HOSPITALIST

## 2021-04-27 RX ORDER — SODIUM BICARBONATE 650 MG/1
650 TABLET ORAL ONCE
Status: COMPLETED | OUTPATIENT
Start: 2021-04-27 | End: 2021-04-27

## 2021-04-27 RX ORDER — LANOLIN ALCOHOL/MO/W.PET/CERES
400 CREAM (GRAM) TOPICAL 2 TIMES DAILY
Status: COMPLETED | OUTPATIENT
Start: 2021-04-27 | End: 2021-04-29

## 2021-04-27 RX ORDER — AMOXICILLIN 250 MG
2 CAPSULE ORAL 2 TIMES DAILY
Status: DISCONTINUED | OUTPATIENT
Start: 2021-04-27 | End: 2021-05-13 | Stop reason: HOSPADM

## 2021-04-27 RX ADMIN — ACETAMINOPHEN 1000 MG: 500 TABLET, FILM COATED ORAL at 02:04

## 2021-04-27 RX ADMIN — LEVETIRACETAM 500 MG: 500 TABLET, FILM COATED ORAL at 08:04

## 2021-04-27 RX ADMIN — CARVEDILOL 3.12 MG: 3.12 TABLET, FILM COATED ORAL at 08:04

## 2021-04-27 RX ADMIN — ACETAMINOPHEN 1000 MG: 500 TABLET, FILM COATED ORAL at 09:04

## 2021-04-27 RX ADMIN — METHOCARBAMOL 1000 MG: 750 TABLET ORAL at 08:04

## 2021-04-27 RX ADMIN — POTASSIUM CHLORIDE 10 MEQ: 10 CAPSULE, COATED, EXTENDED RELEASE ORAL at 08:04

## 2021-04-27 RX ADMIN — CLOPIDOGREL BISULFATE 75 MG: 75 TABLET, FILM COATED ORAL at 08:04

## 2021-04-27 RX ADMIN — MAGNESIUM OXIDE 400 MG (241.3 MG MAGNESIUM) TABLET 400 MG: TABLET at 09:04

## 2021-04-27 RX ADMIN — ACETAMINOPHEN 1000 MG: 500 TABLET, FILM COATED ORAL at 05:04

## 2021-04-27 RX ADMIN — FERROUS SULFATE TAB EC 325 MG (65 MG FE EQUIVALENT) 325 MG: 325 (65 FE) TABLET DELAYED RESPONSE at 08:04

## 2021-04-27 RX ADMIN — LEVETIRACETAM 500 MG: 500 TABLET, FILM COATED ORAL at 09:04

## 2021-04-27 RX ADMIN — ALLOPURINOL 300 MG: 100 TABLET ORAL at 08:04

## 2021-04-27 RX ADMIN — METHOCARBAMOL 1000 MG: 750 TABLET ORAL at 02:04

## 2021-04-27 RX ADMIN — METHOCARBAMOL 1000 MG: 750 TABLET ORAL at 09:04

## 2021-04-27 RX ADMIN — SODIUM BICARBONATE 650 MG TABLET 650 MG: at 09:04

## 2021-04-27 RX ADMIN — ASPIRIN 81 MG: 81 TABLET, COATED ORAL at 09:04

## 2021-04-27 RX ADMIN — ASPIRIN 81 MG: 81 TABLET, COATED ORAL at 08:04

## 2021-04-27 RX ADMIN — DOCUSATE SODIUM 50MG AND SENNOSIDES 8.6MG 2 TABLET: 8.6; 5 TABLET, FILM COATED ORAL at 09:04

## 2021-04-27 RX ADMIN — ANASTROZOLE 1 MG: 1 TABLET, COATED ORAL at 08:04

## 2021-04-27 RX ADMIN — AMLODIPINE BESYLATE 5 MG: 5 TABLET ORAL at 08:04

## 2021-04-27 RX ADMIN — CARVEDILOL 3.12 MG: 3.12 TABLET, FILM COATED ORAL at 05:04

## 2021-04-28 PROCEDURE — 25000003 PHARM REV CODE 250: Performed by: STUDENT IN AN ORGANIZED HEALTH CARE EDUCATION/TRAINING PROGRAM

## 2021-04-28 PROCEDURE — 97530 THERAPEUTIC ACTIVITIES: CPT | Mod: CQ

## 2021-04-28 PROCEDURE — 25000003 PHARM REV CODE 250: Performed by: NURSE PRACTITIONER

## 2021-04-28 PROCEDURE — 97110 THERAPEUTIC EXERCISES: CPT | Mod: CQ

## 2021-04-28 PROCEDURE — 11000004 HC SNF PRIVATE

## 2021-04-28 RX ADMIN — LEVETIRACETAM 500 MG: 500 TABLET, FILM COATED ORAL at 08:04

## 2021-04-28 RX ADMIN — AMLODIPINE BESYLATE 5 MG: 5 TABLET ORAL at 08:04

## 2021-04-28 RX ADMIN — MAGNESIUM OXIDE 400 MG (241.3 MG MAGNESIUM) TABLET 400 MG: TABLET at 09:04

## 2021-04-28 RX ADMIN — POTASSIUM CHLORIDE 10 MEQ: 10 CAPSULE, COATED, EXTENDED RELEASE ORAL at 08:04

## 2021-04-28 RX ADMIN — LEVETIRACETAM 500 MG: 500 TABLET, FILM COATED ORAL at 09:04

## 2021-04-28 RX ADMIN — MAGNESIUM OXIDE 400 MG (241.3 MG MAGNESIUM) TABLET 400 MG: TABLET at 08:04

## 2021-04-28 RX ADMIN — CARVEDILOL 3.12 MG: 3.12 TABLET, FILM COATED ORAL at 08:04

## 2021-04-28 RX ADMIN — ASPIRIN 81 MG: 81 TABLET, COATED ORAL at 08:04

## 2021-04-28 RX ADMIN — ACETAMINOPHEN 1000 MG: 500 TABLET, FILM COATED ORAL at 05:04

## 2021-04-28 RX ADMIN — ALLOPURINOL 300 MG: 100 TABLET ORAL at 09:04

## 2021-04-28 RX ADMIN — ACETAMINOPHEN 1000 MG: 500 TABLET, FILM COATED ORAL at 09:04

## 2021-04-28 RX ADMIN — CLOPIDOGREL BISULFATE 75 MG: 75 TABLET, FILM COATED ORAL at 08:04

## 2021-04-28 RX ADMIN — ANASTROZOLE 1 MG: 1 TABLET, COATED ORAL at 08:04

## 2021-04-28 RX ADMIN — ACETAMINOPHEN 1000 MG: 500 TABLET, FILM COATED ORAL at 02:04

## 2021-04-28 RX ADMIN — CARVEDILOL 3.12 MG: 3.12 TABLET, FILM COATED ORAL at 06:04

## 2021-04-28 RX ADMIN — METHOCARBAMOL 1000 MG: 750 TABLET ORAL at 08:04

## 2021-04-28 RX ADMIN — METHOCARBAMOL 1000 MG: 750 TABLET ORAL at 02:04

## 2021-04-28 RX ADMIN — ASPIRIN 81 MG: 81 TABLET, COATED ORAL at 09:04

## 2021-04-28 RX ADMIN — FERROUS SULFATE TAB EC 325 MG (65 MG FE EQUIVALENT) 325 MG: 325 (65 FE) TABLET DELAYED RESPONSE at 08:04

## 2021-04-28 RX ADMIN — METHOCARBAMOL 1000 MG: 750 TABLET ORAL at 09:04

## 2021-04-28 RX ADMIN — ROSUVASTATIN CALCIUM 40 MG: 10 TABLET, FILM COATED ORAL at 08:04

## 2021-04-29 ENCOUNTER — PATIENT MESSAGE (OUTPATIENT)
Dept: ADMINISTRATIVE | Facility: OTHER | Age: 81
End: 2021-04-29

## 2021-04-29 ENCOUNTER — OFFICE VISIT (OUTPATIENT)
Dept: ORTHOPEDICS | Facility: CLINIC | Age: 81
End: 2021-04-29
Payer: MEDICARE

## 2021-04-29 VITALS — WEIGHT: 264 LBS | BODY MASS INDEX: 43.99 KG/M2 | HEIGHT: 65 IN

## 2021-04-29 DIAGNOSIS — Z96.652 STATUS POST REVISION OF TOTAL REPLACEMENT OF LEFT KNEE: Primary | ICD-10-CM

## 2021-04-29 PROCEDURE — 25000003 PHARM REV CODE 250: Performed by: STUDENT IN AN ORGANIZED HEALTH CARE EDUCATION/TRAINING PROGRAM

## 2021-04-29 PROCEDURE — 3072F PR LOW RISK FOR RETINOPATHY: ICD-10-PCS | Mod: S$GLB,,, | Performed by: PHYSICIAN ASSISTANT

## 2021-04-29 PROCEDURE — 97530 THERAPEUTIC ACTIVITIES: CPT | Mod: CQ

## 2021-04-29 PROCEDURE — 3072F LOW RISK FOR RETINOPATHY: CPT | Mod: S$GLB,,, | Performed by: PHYSICIAN ASSISTANT

## 2021-04-29 PROCEDURE — 1101F PT FALLS ASSESS-DOCD LE1/YR: CPT | Mod: CPTII,S$GLB,, | Performed by: PHYSICIAN ASSISTANT

## 2021-04-29 PROCEDURE — 3288F FALL RISK ASSESSMENT DOCD: CPT | Mod: CPTII,S$GLB,, | Performed by: PHYSICIAN ASSISTANT

## 2021-04-29 PROCEDURE — 1125F PR PAIN SEVERITY QUANTIFIED, PAIN PRESENT: ICD-10-PCS | Mod: S$GLB,,, | Performed by: PHYSICIAN ASSISTANT

## 2021-04-29 PROCEDURE — 97535 SELF CARE MNGMENT TRAINING: CPT | Mod: CO

## 2021-04-29 PROCEDURE — 1101F PR PT FALLS ASSESS DOC 0-1 FALLS W/OUT INJ PAST YR: ICD-10-PCS | Mod: CPTII,S$GLB,, | Performed by: PHYSICIAN ASSISTANT

## 2021-04-29 PROCEDURE — 99024 PR POST-OP FOLLOW-UP VISIT: ICD-10-PCS | Mod: S$GLB,,, | Performed by: PHYSICIAN ASSISTANT

## 2021-04-29 PROCEDURE — 1125F AMNT PAIN NOTED PAIN PRSNT: CPT | Mod: S$GLB,,, | Performed by: PHYSICIAN ASSISTANT

## 2021-04-29 PROCEDURE — 99024 POSTOP FOLLOW-UP VISIT: CPT | Mod: S$GLB,,, | Performed by: PHYSICIAN ASSISTANT

## 2021-04-29 PROCEDURE — 11000004 HC SNF PRIVATE

## 2021-04-29 PROCEDURE — 99999 PR PBB SHADOW E&M-EST. PATIENT-LVL IV: CPT | Mod: PBBFAC,,, | Performed by: PHYSICIAN ASSISTANT

## 2021-04-29 PROCEDURE — 99999 PR PBB SHADOW E&M-EST. PATIENT-LVL IV: ICD-10-PCS | Mod: PBBFAC,,, | Performed by: PHYSICIAN ASSISTANT

## 2021-04-29 PROCEDURE — 97110 THERAPEUTIC EXERCISES: CPT | Mod: CQ

## 2021-04-29 PROCEDURE — 3288F PR FALLS RISK ASSESSMENT DOCUMENTED: ICD-10-PCS | Mod: CPTII,S$GLB,, | Performed by: PHYSICIAN ASSISTANT

## 2021-04-29 PROCEDURE — 25000003 PHARM REV CODE 250: Performed by: NURSE PRACTITIONER

## 2021-04-29 RX ADMIN — CARVEDILOL 3.12 MG: 3.12 TABLET, FILM COATED ORAL at 05:04

## 2021-04-29 RX ADMIN — ACETAMINOPHEN 1000 MG: 500 TABLET, FILM COATED ORAL at 01:04

## 2021-04-29 RX ADMIN — ACETAMINOPHEN 1000 MG: 500 TABLET, FILM COATED ORAL at 09:04

## 2021-04-29 RX ADMIN — OXYCODONE 5 MG: 5 TABLET ORAL at 01:04

## 2021-04-29 RX ADMIN — ASPIRIN 81 MG: 81 TABLET, COATED ORAL at 09:04

## 2021-04-29 RX ADMIN — ROSUVASTATIN CALCIUM 40 MG: 10 TABLET, FILM COATED ORAL at 10:04

## 2021-04-29 RX ADMIN — AMLODIPINE BESYLATE 5 MG: 5 TABLET ORAL at 10:04

## 2021-04-29 RX ADMIN — METHOCARBAMOL 1000 MG: 750 TABLET ORAL at 10:04

## 2021-04-29 RX ADMIN — LEVETIRACETAM 500 MG: 500 TABLET, FILM COATED ORAL at 09:04

## 2021-04-29 RX ADMIN — METHOCARBAMOL 1000 MG: 750 TABLET ORAL at 02:04

## 2021-04-29 RX ADMIN — LEVETIRACETAM 500 MG: 500 TABLET, FILM COATED ORAL at 10:04

## 2021-04-29 RX ADMIN — ERGOCALCIFEROL 50000 UNITS: 1.25 CAPSULE ORAL at 11:04

## 2021-04-29 RX ADMIN — CLOPIDOGREL BISULFATE 75 MG: 75 TABLET, FILM COATED ORAL at 10:04

## 2021-04-29 RX ADMIN — ASPIRIN 81 MG: 81 TABLET, COATED ORAL at 10:04

## 2021-04-29 RX ADMIN — ALLOPURINOL 300 MG: 100 TABLET ORAL at 10:04

## 2021-04-29 RX ADMIN — FERROUS SULFATE TAB EC 325 MG (65 MG FE EQUIVALENT) 325 MG: 325 (65 FE) TABLET DELAYED RESPONSE at 10:04

## 2021-04-29 RX ADMIN — CARVEDILOL 3.12 MG: 3.12 TABLET, FILM COATED ORAL at 10:04

## 2021-04-29 RX ADMIN — MAGNESIUM OXIDE 400 MG (241.3 MG MAGNESIUM) TABLET 400 MG: TABLET at 10:04

## 2021-04-29 RX ADMIN — POTASSIUM CHLORIDE 10 MEQ: 10 CAPSULE, COATED, EXTENDED RELEASE ORAL at 10:04

## 2021-04-29 RX ADMIN — ANASTROZOLE 1 MG: 1 TABLET, COATED ORAL at 10:04

## 2021-04-29 RX ADMIN — POLYETHYLENE GLYCOL 3350 17 G: 17 POWDER, FOR SOLUTION ORAL at 09:04

## 2021-04-29 RX ADMIN — ACETAMINOPHEN 1000 MG: 500 TABLET, FILM COATED ORAL at 05:04

## 2021-04-29 RX ADMIN — METHOCARBAMOL 1000 MG: 750 TABLET ORAL at 09:04

## 2021-04-30 LAB
ANION GAP SERPL CALC-SCNC: 10 MMOL/L (ref 8–16)
BASOPHILS # BLD AUTO: 0.08 K/UL (ref 0–0.2)
BASOPHILS NFR BLD: 1.1 % (ref 0–1.9)
BUN SERPL-MCNC: 25 MG/DL (ref 8–23)
CALCIUM SERPL-MCNC: 10.2 MG/DL (ref 8.7–10.5)
CHLORIDE SERPL-SCNC: 108 MMOL/L (ref 95–110)
CO2 SERPL-SCNC: 20 MMOL/L (ref 23–29)
CREAT SERPL-MCNC: 1.7 MG/DL (ref 0.5–1.4)
DIFFERENTIAL METHOD: ABNORMAL
EOSINOPHIL # BLD AUTO: 0.6 K/UL (ref 0–0.5)
EOSINOPHIL NFR BLD: 7.9 % (ref 0–8)
ERYTHROCYTE [DISTWIDTH] IN BLOOD BY AUTOMATED COUNT: 14.4 % (ref 11.5–14.5)
EST. GFR  (AFRICAN AMERICAN): 32.8 ML/MIN/1.73 M^2
EST. GFR  (NON AFRICAN AMERICAN): 28.5 ML/MIN/1.73 M^2
GLUCOSE SERPL-MCNC: 83 MG/DL (ref 70–110)
HCT VFR BLD AUTO: 32.6 % (ref 37–48.5)
HGB BLD-MCNC: 10.5 G/DL (ref 12–16)
IMM GRANULOCYTES # BLD AUTO: 0.09 K/UL (ref 0–0.04)
IMM GRANULOCYTES NFR BLD AUTO: 1.3 % (ref 0–0.5)
LYMPHOCYTES # BLD AUTO: 1.4 K/UL (ref 1–4.8)
LYMPHOCYTES NFR BLD: 19.9 % (ref 18–48)
MAGNESIUM SERPL-MCNC: 1.9 MG/DL (ref 1.6–2.6)
MCH RBC QN AUTO: 31.2 PG (ref 27–31)
MCHC RBC AUTO-ENTMCNC: 32.2 G/DL (ref 32–36)
MCV RBC AUTO: 97 FL (ref 82–98)
MONOCYTES # BLD AUTO: 0.9 K/UL (ref 0.3–1)
MONOCYTES NFR BLD: 12.4 % (ref 4–15)
NEUTROPHILS # BLD AUTO: 4.1 K/UL (ref 1.8–7.7)
NEUTROPHILS NFR BLD: 57.4 % (ref 38–73)
NRBC BLD-RTO: 0 /100 WBC
PHOSPHATE SERPL-MCNC: 3.4 MG/DL (ref 2.7–4.5)
PLATELET # BLD AUTO: 355 K/UL (ref 150–450)
PMV BLD AUTO: 9.9 FL (ref 9.2–12.9)
POTASSIUM SERPL-SCNC: 5.1 MMOL/L (ref 3.5–5.1)
RBC # BLD AUTO: 3.37 M/UL (ref 4–5.4)
SODIUM SERPL-SCNC: 138 MMOL/L (ref 136–145)
WBC # BLD AUTO: 7.19 K/UL (ref 3.9–12.7)

## 2021-04-30 PROCEDURE — 97110 THERAPEUTIC EXERCISES: CPT | Mod: CQ

## 2021-04-30 PROCEDURE — 36415 COLL VENOUS BLD VENIPUNCTURE: CPT | Performed by: HOSPITALIST

## 2021-04-30 PROCEDURE — 80048 BASIC METABOLIC PNL TOTAL CA: CPT | Performed by: HOSPITALIST

## 2021-04-30 PROCEDURE — 84100 ASSAY OF PHOSPHORUS: CPT | Performed by: HOSPITALIST

## 2021-04-30 PROCEDURE — 83735 ASSAY OF MAGNESIUM: CPT | Performed by: HOSPITALIST

## 2021-04-30 PROCEDURE — 97530 THERAPEUTIC ACTIVITIES: CPT | Mod: CQ

## 2021-04-30 PROCEDURE — 11000004 HC SNF PRIVATE

## 2021-04-30 PROCEDURE — 85025 COMPLETE CBC W/AUTO DIFF WBC: CPT | Performed by: HOSPITALIST

## 2021-04-30 PROCEDURE — 25000003 PHARM REV CODE 250: Performed by: STUDENT IN AN ORGANIZED HEALTH CARE EDUCATION/TRAINING PROGRAM

## 2021-04-30 PROCEDURE — 25000003 PHARM REV CODE 250: Performed by: NURSE PRACTITIONER

## 2021-04-30 PROCEDURE — 97530 THERAPEUTIC ACTIVITIES: CPT | Mod: CO

## 2021-04-30 RX ADMIN — LEVETIRACETAM 500 MG: 500 TABLET, FILM COATED ORAL at 09:04

## 2021-04-30 RX ADMIN — METHOCARBAMOL 1000 MG: 750 TABLET ORAL at 09:04

## 2021-04-30 RX ADMIN — CARVEDILOL 3.12 MG: 3.12 TABLET, FILM COATED ORAL at 04:04

## 2021-04-30 RX ADMIN — CARVEDILOL 3.12 MG: 3.12 TABLET, FILM COATED ORAL at 09:04

## 2021-04-30 RX ADMIN — ACETAMINOPHEN 1000 MG: 500 TABLET, FILM COATED ORAL at 05:04

## 2021-04-30 RX ADMIN — LEVETIRACETAM 500 MG: 500 TABLET, FILM COATED ORAL at 08:04

## 2021-04-30 RX ADMIN — METHOCARBAMOL 1000 MG: 750 TABLET ORAL at 08:04

## 2021-04-30 RX ADMIN — ACETAMINOPHEN 1000 MG: 500 TABLET, FILM COATED ORAL at 09:04

## 2021-04-30 RX ADMIN — POLYETHYLENE GLYCOL 3350 17 G: 17 POWDER, FOR SOLUTION ORAL at 09:04

## 2021-04-30 RX ADMIN — ASPIRIN 81 MG: 81 TABLET, COATED ORAL at 09:04

## 2021-04-30 RX ADMIN — AMLODIPINE BESYLATE 5 MG: 5 TABLET ORAL at 09:04

## 2021-04-30 RX ADMIN — CLOPIDOGREL BISULFATE 75 MG: 75 TABLET, FILM COATED ORAL at 09:04

## 2021-04-30 RX ADMIN — ACETAMINOPHEN 1000 MG: 500 TABLET, FILM COATED ORAL at 02:04

## 2021-04-30 RX ADMIN — METHOCARBAMOL 1000 MG: 750 TABLET ORAL at 02:04

## 2021-04-30 RX ADMIN — ANASTROZOLE 1 MG: 1 TABLET, COATED ORAL at 09:04

## 2021-04-30 RX ADMIN — ALLOPURINOL 300 MG: 100 TABLET ORAL at 09:04

## 2021-04-30 RX ADMIN — POTASSIUM CHLORIDE 10 MEQ: 10 CAPSULE, COATED, EXTENDED RELEASE ORAL at 09:04

## 2021-04-30 RX ADMIN — ROSUVASTATIN CALCIUM 40 MG: 10 TABLET, FILM COATED ORAL at 09:04

## 2021-04-30 RX ADMIN — OXYCODONE 5 MG: 5 TABLET ORAL at 10:04

## 2021-04-30 RX ADMIN — ASPIRIN 81 MG: 81 TABLET, COATED ORAL at 08:04

## 2021-04-30 RX ADMIN — OXYCODONE 5 MG: 5 TABLET ORAL at 12:04

## 2021-04-30 RX ADMIN — FERROUS SULFATE TAB EC 325 MG (65 MG FE EQUIVALENT) 325 MG: 325 (65 FE) TABLET DELAYED RESPONSE at 09:04

## 2021-05-01 PROCEDURE — 25000003 PHARM REV CODE 250: Performed by: STUDENT IN AN ORGANIZED HEALTH CARE EDUCATION/TRAINING PROGRAM

## 2021-05-01 PROCEDURE — 11000004 HC SNF PRIVATE

## 2021-05-01 PROCEDURE — 25000003 PHARM REV CODE 250: Performed by: NURSE PRACTITIONER

## 2021-05-01 RX ADMIN — ACETAMINOPHEN 1000 MG: 500 TABLET, FILM COATED ORAL at 05:05

## 2021-05-01 RX ADMIN — FERROUS SULFATE TAB EC 325 MG (65 MG FE EQUIVALENT) 325 MG: 325 (65 FE) TABLET DELAYED RESPONSE at 09:05

## 2021-05-01 RX ADMIN — LEVETIRACETAM 500 MG: 500 TABLET, FILM COATED ORAL at 09:05

## 2021-05-01 RX ADMIN — ACETAMINOPHEN 1000 MG: 500 TABLET, FILM COATED ORAL at 02:05

## 2021-05-01 RX ADMIN — ROSUVASTATIN CALCIUM 40 MG: 10 TABLET, FILM COATED ORAL at 09:05

## 2021-05-01 RX ADMIN — ALLOPURINOL 300 MG: 100 TABLET ORAL at 09:05

## 2021-05-01 RX ADMIN — ASPIRIN 81 MG: 81 TABLET, COATED ORAL at 09:05

## 2021-05-01 RX ADMIN — ANASTROZOLE 1 MG: 1 TABLET, COATED ORAL at 09:05

## 2021-05-01 RX ADMIN — CARVEDILOL 3.12 MG: 3.12 TABLET, FILM COATED ORAL at 09:05

## 2021-05-01 RX ADMIN — MELATONIN TAB 3 MG 6 MG: 3 TAB at 08:05

## 2021-05-01 RX ADMIN — LEVETIRACETAM 500 MG: 500 TABLET, FILM COATED ORAL at 08:05

## 2021-05-01 RX ADMIN — ASPIRIN 81 MG: 81 TABLET, COATED ORAL at 08:05

## 2021-05-01 RX ADMIN — METHOCARBAMOL 1000 MG: 750 TABLET ORAL at 08:05

## 2021-05-01 RX ADMIN — CLOPIDOGREL BISULFATE 75 MG: 75 TABLET, FILM COATED ORAL at 09:05

## 2021-05-01 RX ADMIN — CARVEDILOL 3.12 MG: 3.12 TABLET, FILM COATED ORAL at 04:05

## 2021-05-01 RX ADMIN — METHOCARBAMOL 1000 MG: 750 TABLET ORAL at 09:05

## 2021-05-01 RX ADMIN — AMLODIPINE BESYLATE 5 MG: 5 TABLET ORAL at 09:05

## 2021-05-01 RX ADMIN — ACETAMINOPHEN 1000 MG: 500 TABLET, FILM COATED ORAL at 09:05

## 2021-05-01 RX ADMIN — METHOCARBAMOL 1000 MG: 750 TABLET ORAL at 02:05

## 2021-05-02 PROCEDURE — 25000003 PHARM REV CODE 250: Performed by: STUDENT IN AN ORGANIZED HEALTH CARE EDUCATION/TRAINING PROGRAM

## 2021-05-02 PROCEDURE — 11000004 HC SNF PRIVATE

## 2021-05-02 PROCEDURE — 97530 THERAPEUTIC ACTIVITIES: CPT | Mod: CQ

## 2021-05-02 PROCEDURE — 97110 THERAPEUTIC EXERCISES: CPT | Mod: CQ

## 2021-05-02 PROCEDURE — 25000003 PHARM REV CODE 250: Performed by: NURSE PRACTITIONER

## 2021-05-02 PROCEDURE — 97530 THERAPEUTIC ACTIVITIES: CPT

## 2021-05-02 PROCEDURE — 97116 GAIT TRAINING THERAPY: CPT | Mod: CQ

## 2021-05-02 PROCEDURE — 97110 THERAPEUTIC EXERCISES: CPT

## 2021-05-02 PROCEDURE — 97535 SELF CARE MNGMENT TRAINING: CPT

## 2021-05-02 RX ADMIN — OXYCODONE 5 MG: 5 TABLET ORAL at 12:05

## 2021-05-02 RX ADMIN — ACETAMINOPHEN 1000 MG: 500 TABLET, FILM COATED ORAL at 05:05

## 2021-05-02 RX ADMIN — DOCUSATE SODIUM 50MG AND SENNOSIDES 8.6MG 2 TABLET: 8.6; 5 TABLET, FILM COATED ORAL at 10:05

## 2021-05-02 RX ADMIN — LEVETIRACETAM 500 MG: 500 TABLET, FILM COATED ORAL at 10:05

## 2021-05-02 RX ADMIN — POLYETHYLENE GLYCOL 3350 17 G: 17 POWDER, FOR SOLUTION ORAL at 10:05

## 2021-05-02 RX ADMIN — ASPIRIN 81 MG: 81 TABLET, COATED ORAL at 10:05

## 2021-05-02 RX ADMIN — ANASTROZOLE 1 MG: 1 TABLET, COATED ORAL at 10:05

## 2021-05-02 RX ADMIN — CLOPIDOGREL BISULFATE 75 MG: 75 TABLET, FILM COATED ORAL at 10:05

## 2021-05-02 RX ADMIN — METHOCARBAMOL 1000 MG: 750 TABLET ORAL at 08:05

## 2021-05-02 RX ADMIN — LEVETIRACETAM 500 MG: 500 TABLET, FILM COATED ORAL at 08:05

## 2021-05-02 RX ADMIN — ASPIRIN 81 MG: 81 TABLET, COATED ORAL at 08:05

## 2021-05-02 RX ADMIN — ROSUVASTATIN CALCIUM 40 MG: 10 TABLET, FILM COATED ORAL at 10:05

## 2021-05-02 RX ADMIN — MELATONIN TAB 3 MG 6 MG: 3 TAB at 08:05

## 2021-05-02 RX ADMIN — AMLODIPINE BESYLATE 5 MG: 5 TABLET ORAL at 10:05

## 2021-05-02 RX ADMIN — METHOCARBAMOL 1000 MG: 750 TABLET ORAL at 10:05

## 2021-05-02 RX ADMIN — CARVEDILOL 3.12 MG: 3.12 TABLET, FILM COATED ORAL at 10:05

## 2021-05-02 RX ADMIN — CARVEDILOL 3.12 MG: 3.12 TABLET, FILM COATED ORAL at 04:05

## 2021-05-02 RX ADMIN — ALLOPURINOL 300 MG: 100 TABLET ORAL at 10:05

## 2021-05-02 RX ADMIN — FERROUS SULFATE TAB EC 325 MG (65 MG FE EQUIVALENT) 325 MG: 325 (65 FE) TABLET DELAYED RESPONSE at 10:05

## 2021-05-02 RX ADMIN — METHOCARBAMOL 1000 MG: 750 TABLET ORAL at 02:05

## 2021-05-02 RX ADMIN — ACETAMINOPHEN 1000 MG: 500 TABLET, FILM COATED ORAL at 10:05

## 2021-05-02 RX ADMIN — ACETAMINOPHEN 1000 MG: 500 TABLET, FILM COATED ORAL at 02:05

## 2021-05-03 PROCEDURE — 25000003 PHARM REV CODE 250: Performed by: STUDENT IN AN ORGANIZED HEALTH CARE EDUCATION/TRAINING PROGRAM

## 2021-05-03 PROCEDURE — 97530 THERAPEUTIC ACTIVITIES: CPT

## 2021-05-03 PROCEDURE — 11000004 HC SNF PRIVATE

## 2021-05-03 PROCEDURE — 97535 SELF CARE MNGMENT TRAINING: CPT | Mod: CO

## 2021-05-03 PROCEDURE — 97116 GAIT TRAINING THERAPY: CPT

## 2021-05-03 PROCEDURE — 25000003 PHARM REV CODE 250: Performed by: NURSE PRACTITIONER

## 2021-05-03 PROCEDURE — 97110 THERAPEUTIC EXERCISES: CPT

## 2021-05-03 RX ADMIN — CARVEDILOL 3.12 MG: 3.12 TABLET, FILM COATED ORAL at 08:05

## 2021-05-03 RX ADMIN — ASPIRIN 81 MG: 81 TABLET, COATED ORAL at 10:05

## 2021-05-03 RX ADMIN — CARVEDILOL 3.12 MG: 3.12 TABLET, FILM COATED ORAL at 05:05

## 2021-05-03 RX ADMIN — ACETAMINOPHEN 1000 MG: 500 TABLET, FILM COATED ORAL at 05:05

## 2021-05-03 RX ADMIN — OXYCODONE 5 MG: 5 TABLET ORAL at 08:05

## 2021-05-03 RX ADMIN — POLYETHYLENE GLYCOL 3350 17 G: 17 POWDER, FOR SOLUTION ORAL at 08:05

## 2021-05-03 RX ADMIN — OXYCODONE 5 MG: 5 TABLET ORAL at 02:05

## 2021-05-03 RX ADMIN — METHOCARBAMOL 1000 MG: 750 TABLET ORAL at 08:05

## 2021-05-03 RX ADMIN — ANASTROZOLE 1 MG: 1 TABLET, COATED ORAL at 08:05

## 2021-05-03 RX ADMIN — CLOPIDOGREL BISULFATE 75 MG: 75 TABLET, FILM COATED ORAL at 08:05

## 2021-05-03 RX ADMIN — ROSUVASTATIN CALCIUM 40 MG: 10 TABLET, FILM COATED ORAL at 08:05

## 2021-05-03 RX ADMIN — METHOCARBAMOL 1000 MG: 750 TABLET ORAL at 02:05

## 2021-05-03 RX ADMIN — DOCUSATE SODIUM 50MG AND SENNOSIDES 8.6MG 2 TABLET: 8.6; 5 TABLET, FILM COATED ORAL at 10:05

## 2021-05-03 RX ADMIN — DOCUSATE SODIUM 50MG AND SENNOSIDES 8.6MG 2 TABLET: 8.6; 5 TABLET, FILM COATED ORAL at 08:05

## 2021-05-03 RX ADMIN — FERROUS SULFATE TAB EC 325 MG (65 MG FE EQUIVALENT) 325 MG: 325 (65 FE) TABLET DELAYED RESPONSE at 08:05

## 2021-05-03 RX ADMIN — ALLOPURINOL 300 MG: 100 TABLET ORAL at 08:05

## 2021-05-03 RX ADMIN — ERGOCALCIFEROL 50000 UNITS: 1.25 CAPSULE ORAL at 09:05

## 2021-05-03 RX ADMIN — MELATONIN TAB 3 MG 6 MG: 3 TAB at 10:05

## 2021-05-03 RX ADMIN — ASPIRIN 81 MG: 81 TABLET, COATED ORAL at 08:05

## 2021-05-03 RX ADMIN — ACETAMINOPHEN 1000 MG: 500 TABLET, FILM COATED ORAL at 10:05

## 2021-05-03 RX ADMIN — LEVETIRACETAM 500 MG: 500 TABLET, FILM COATED ORAL at 10:05

## 2021-05-03 RX ADMIN — LEVETIRACETAM 500 MG: 500 TABLET, FILM COATED ORAL at 08:05

## 2021-05-03 RX ADMIN — ACETAMINOPHEN 1000 MG: 500 TABLET, FILM COATED ORAL at 02:05

## 2021-05-03 RX ADMIN — AMLODIPINE BESYLATE 5 MG: 5 TABLET ORAL at 08:05

## 2021-05-03 RX ADMIN — METHOCARBAMOL 1000 MG: 750 TABLET ORAL at 10:05

## 2021-05-04 LAB
ANION GAP SERPL CALC-SCNC: 9 MMOL/L (ref 8–16)
BASOPHILS # BLD AUTO: 0.05 K/UL (ref 0–0.2)
BASOPHILS NFR BLD: 0.8 % (ref 0–1.9)
BUN SERPL-MCNC: 23 MG/DL (ref 8–23)
CALCIUM SERPL-MCNC: 9.8 MG/DL (ref 8.7–10.5)
CHLORIDE SERPL-SCNC: 104 MMOL/L (ref 95–110)
CO2 SERPL-SCNC: 25 MMOL/L (ref 23–29)
CREAT SERPL-MCNC: 1.4 MG/DL (ref 0.5–1.4)
DIFFERENTIAL METHOD: ABNORMAL
EOSINOPHIL # BLD AUTO: 0.6 K/UL (ref 0–0.5)
EOSINOPHIL NFR BLD: 10.3 % (ref 0–8)
ERYTHROCYTE [DISTWIDTH] IN BLOOD BY AUTOMATED COUNT: 14.4 % (ref 11.5–14.5)
EST. GFR  (AFRICAN AMERICAN): 41.5 ML/MIN/1.73 M^2
EST. GFR  (NON AFRICAN AMERICAN): 36 ML/MIN/1.73 M^2
GLUCOSE SERPL-MCNC: 89 MG/DL (ref 70–110)
HCT VFR BLD AUTO: 33.6 % (ref 37–48.5)
HGB BLD-MCNC: 10.6 G/DL (ref 12–16)
IMM GRANULOCYTES # BLD AUTO: 0.03 K/UL (ref 0–0.04)
IMM GRANULOCYTES NFR BLD AUTO: 0.5 % (ref 0–0.5)
LYMPHOCYTES # BLD AUTO: 1.6 K/UL (ref 1–4.8)
LYMPHOCYTES NFR BLD: 27.1 % (ref 18–48)
MAGNESIUM SERPL-MCNC: 1.8 MG/DL (ref 1.6–2.6)
MCH RBC QN AUTO: 30.3 PG (ref 27–31)
MCHC RBC AUTO-ENTMCNC: 31.5 G/DL (ref 32–36)
MCV RBC AUTO: 96 FL (ref 82–98)
MONOCYTES # BLD AUTO: 0.8 K/UL (ref 0.3–1)
MONOCYTES NFR BLD: 13.4 % (ref 4–15)
NEUTROPHILS # BLD AUTO: 2.8 K/UL (ref 1.8–7.7)
NEUTROPHILS NFR BLD: 47.9 % (ref 38–73)
NRBC BLD-RTO: 0 /100 WBC
PHOSPHATE SERPL-MCNC: 3.4 MG/DL (ref 2.7–4.5)
PLATELET # BLD AUTO: 305 K/UL (ref 150–450)
PMV BLD AUTO: 9.8 FL (ref 9.2–12.9)
POTASSIUM SERPL-SCNC: 4.3 MMOL/L (ref 3.5–5.1)
RBC # BLD AUTO: 3.5 M/UL (ref 4–5.4)
SODIUM SERPL-SCNC: 138 MMOL/L (ref 136–145)
WBC # BLD AUTO: 5.9 K/UL (ref 3.9–12.7)

## 2021-05-04 PROCEDURE — 84100 ASSAY OF PHOSPHORUS: CPT | Performed by: HOSPITALIST

## 2021-05-04 PROCEDURE — 25000003 PHARM REV CODE 250: Performed by: STUDENT IN AN ORGANIZED HEALTH CARE EDUCATION/TRAINING PROGRAM

## 2021-05-04 PROCEDURE — 85025 COMPLETE CBC W/AUTO DIFF WBC: CPT | Performed by: HOSPITALIST

## 2021-05-04 PROCEDURE — 83735 ASSAY OF MAGNESIUM: CPT | Performed by: HOSPITALIST

## 2021-05-04 PROCEDURE — 11000004 HC SNF PRIVATE

## 2021-05-04 PROCEDURE — 25000003 PHARM REV CODE 250: Performed by: NURSE PRACTITIONER

## 2021-05-04 PROCEDURE — 36415 COLL VENOUS BLD VENIPUNCTURE: CPT | Performed by: HOSPITALIST

## 2021-05-04 PROCEDURE — 80048 BASIC METABOLIC PNL TOTAL CA: CPT | Performed by: HOSPITALIST

## 2021-05-04 RX ORDER — ADHESIVE BANDAGE
30 BANDAGE TOPICAL DAILY PRN
Status: DISCONTINUED | OUTPATIENT
Start: 2021-05-04 | End: 2021-05-13 | Stop reason: HOSPADM

## 2021-05-04 RX ADMIN — ALLOPURINOL 300 MG: 100 TABLET ORAL at 08:05

## 2021-05-04 RX ADMIN — ASPIRIN 81 MG: 81 TABLET, COATED ORAL at 09:05

## 2021-05-04 RX ADMIN — ACETAMINOPHEN 1000 MG: 500 TABLET, FILM COATED ORAL at 09:05

## 2021-05-04 RX ADMIN — ROSUVASTATIN CALCIUM 40 MG: 10 TABLET, FILM COATED ORAL at 08:05

## 2021-05-04 RX ADMIN — FERROUS SULFATE TAB EC 325 MG (65 MG FE EQUIVALENT) 325 MG: 325 (65 FE) TABLET DELAYED RESPONSE at 08:05

## 2021-05-04 RX ADMIN — LEVETIRACETAM 500 MG: 500 TABLET, FILM COATED ORAL at 09:05

## 2021-05-04 RX ADMIN — METHOCARBAMOL 1000 MG: 750 TABLET ORAL at 02:05

## 2021-05-04 RX ADMIN — METHOCARBAMOL 1000 MG: 750 TABLET ORAL at 09:05

## 2021-05-04 RX ADMIN — CARVEDILOL 3.12 MG: 3.12 TABLET, FILM COATED ORAL at 07:05

## 2021-05-04 RX ADMIN — LEVETIRACETAM 500 MG: 500 TABLET, FILM COATED ORAL at 08:05

## 2021-05-04 RX ADMIN — POLYETHYLENE GLYCOL 3350 17 G: 17 POWDER, FOR SOLUTION ORAL at 09:05

## 2021-05-04 RX ADMIN — ACETAMINOPHEN 1000 MG: 500 TABLET, FILM COATED ORAL at 01:05

## 2021-05-04 RX ADMIN — CLOPIDOGREL BISULFATE 75 MG: 75 TABLET, FILM COATED ORAL at 08:05

## 2021-05-04 RX ADMIN — DOCUSATE SODIUM 50MG AND SENNOSIDES 8.6MG 2 TABLET: 8.6; 5 TABLET, FILM COATED ORAL at 08:05

## 2021-05-04 RX ADMIN — OXYCODONE 5 MG: 5 TABLET ORAL at 03:05

## 2021-05-04 RX ADMIN — AMLODIPINE BESYLATE 5 MG: 5 TABLET ORAL at 08:05

## 2021-05-04 RX ADMIN — ASPIRIN 81 MG: 81 TABLET, COATED ORAL at 08:05

## 2021-05-04 RX ADMIN — ACETAMINOPHEN 1000 MG: 500 TABLET, FILM COATED ORAL at 06:05

## 2021-05-04 RX ADMIN — CARVEDILOL 3.12 MG: 3.12 TABLET, FILM COATED ORAL at 05:05

## 2021-05-04 RX ADMIN — ANASTROZOLE 1 MG: 1 TABLET, COATED ORAL at 08:05

## 2021-05-04 RX ADMIN — METHOCARBAMOL 1000 MG: 750 TABLET ORAL at 08:05

## 2021-05-05 PROCEDURE — 25000003 PHARM REV CODE 250: Performed by: STUDENT IN AN ORGANIZED HEALTH CARE EDUCATION/TRAINING PROGRAM

## 2021-05-05 PROCEDURE — 25000003 PHARM REV CODE 250: Performed by: NURSE PRACTITIONER

## 2021-05-05 PROCEDURE — 11000004 HC SNF PRIVATE

## 2021-05-05 PROCEDURE — 97110 THERAPEUTIC EXERCISES: CPT | Mod: CO

## 2021-05-05 PROCEDURE — 97116 GAIT TRAINING THERAPY: CPT

## 2021-05-05 PROCEDURE — 97530 THERAPEUTIC ACTIVITIES: CPT

## 2021-05-05 PROCEDURE — 97535 SELF CARE MNGMENT TRAINING: CPT | Mod: CO

## 2021-05-05 RX ADMIN — ACETAMINOPHEN 1000 MG: 500 TABLET, FILM COATED ORAL at 02:05

## 2021-05-05 RX ADMIN — LEVETIRACETAM 500 MG: 500 TABLET, FILM COATED ORAL at 09:05

## 2021-05-05 RX ADMIN — AMLODIPINE BESYLATE 5 MG: 5 TABLET ORAL at 08:05

## 2021-05-05 RX ADMIN — ACETAMINOPHEN 1000 MG: 500 TABLET, FILM COATED ORAL at 05:05

## 2021-05-05 RX ADMIN — CARVEDILOL 3.12 MG: 3.12 TABLET, FILM COATED ORAL at 05:05

## 2021-05-05 RX ADMIN — METHOCARBAMOL 1000 MG: 750 TABLET ORAL at 08:05

## 2021-05-05 RX ADMIN — ASPIRIN 81 MG: 81 TABLET, COATED ORAL at 09:05

## 2021-05-05 RX ADMIN — METHOCARBAMOL 1000 MG: 750 TABLET ORAL at 09:05

## 2021-05-05 RX ADMIN — DOCUSATE SODIUM 50MG AND SENNOSIDES 8.6MG 2 TABLET: 8.6; 5 TABLET, FILM COATED ORAL at 09:05

## 2021-05-05 RX ADMIN — CARVEDILOL 3.12 MG: 3.12 TABLET, FILM COATED ORAL at 08:05

## 2021-05-05 RX ADMIN — ASPIRIN 81 MG: 81 TABLET, COATED ORAL at 08:05

## 2021-05-05 RX ADMIN — ROSUVASTATIN CALCIUM 40 MG: 10 TABLET, FILM COATED ORAL at 08:05

## 2021-05-05 RX ADMIN — FERROUS SULFATE TAB EC 325 MG (65 MG FE EQUIVALENT) 325 MG: 325 (65 FE) TABLET DELAYED RESPONSE at 08:05

## 2021-05-05 RX ADMIN — CLOPIDOGREL BISULFATE 75 MG: 75 TABLET, FILM COATED ORAL at 08:05

## 2021-05-05 RX ADMIN — ALLOPURINOL 300 MG: 100 TABLET ORAL at 08:05

## 2021-05-05 RX ADMIN — ANASTROZOLE 1 MG: 1 TABLET, COATED ORAL at 08:05

## 2021-05-05 RX ADMIN — METHOCARBAMOL 1000 MG: 750 TABLET ORAL at 02:05

## 2021-05-05 RX ADMIN — ACETAMINOPHEN 1000 MG: 500 TABLET, FILM COATED ORAL at 09:05

## 2021-05-05 RX ADMIN — OXYCODONE 5 MG: 5 TABLET ORAL at 08:05

## 2021-05-05 RX ADMIN — LEVETIRACETAM 500 MG: 500 TABLET, FILM COATED ORAL at 08:05

## 2021-05-06 ENCOUNTER — LAB VISIT (OUTPATIENT)
Dept: LAB | Facility: OTHER | Age: 81
End: 2021-05-06
Payer: MEDICARE

## 2021-05-06 DIAGNOSIS — Z20.822 ENCOUNTER FOR LABORATORY TESTING FOR COVID-19 VIRUS: ICD-10-CM

## 2021-05-06 LAB — SARS-COV-2 RNA RESP QL NAA+PROBE: NOT DETECTED

## 2021-05-06 PROCEDURE — 11000004 HC SNF PRIVATE

## 2021-05-06 PROCEDURE — 97530 THERAPEUTIC ACTIVITIES: CPT | Mod: CQ

## 2021-05-06 PROCEDURE — U0003 INFECTIOUS AGENT DETECTION BY NUCLEIC ACID (DNA OR RNA); SEVERE ACUTE RESPIRATORY SYNDROME CORONAVIRUS 2 (SARS-COV-2) (CORONAVIRUS DISEASE [COVID-19]), AMPLIFIED PROBE TECHNIQUE, MAKING USE OF HIGH THROUGHPUT TECHNOLOGIES AS DESCRIBED BY CMS-2020-01-R: HCPCS | Performed by: NURSE PRACTITIONER

## 2021-05-06 PROCEDURE — 25000003 PHARM REV CODE 250: Performed by: STUDENT IN AN ORGANIZED HEALTH CARE EDUCATION/TRAINING PROGRAM

## 2021-05-06 PROCEDURE — 97530 THERAPEUTIC ACTIVITIES: CPT | Mod: CO

## 2021-05-06 PROCEDURE — 25000003 PHARM REV CODE 250: Performed by: NURSE PRACTITIONER

## 2021-05-06 PROCEDURE — 97116 GAIT TRAINING THERAPY: CPT | Mod: CQ

## 2021-05-06 RX ADMIN — LEVETIRACETAM 500 MG: 500 TABLET, FILM COATED ORAL at 10:05

## 2021-05-06 RX ADMIN — ACETAMINOPHEN 1000 MG: 500 TABLET, FILM COATED ORAL at 02:05

## 2021-05-06 RX ADMIN — CARVEDILOL 3.12 MG: 3.12 TABLET, FILM COATED ORAL at 04:05

## 2021-05-06 RX ADMIN — ASPIRIN 81 MG: 81 TABLET, COATED ORAL at 09:05

## 2021-05-06 RX ADMIN — METHOCARBAMOL 1000 MG: 750 TABLET ORAL at 09:05

## 2021-05-06 RX ADMIN — LEVETIRACETAM 500 MG: 500 TABLET, FILM COATED ORAL at 09:05

## 2021-05-06 RX ADMIN — CLOPIDOGREL BISULFATE 75 MG: 75 TABLET, FILM COATED ORAL at 10:05

## 2021-05-06 RX ADMIN — ALLOPURINOL 300 MG: 100 TABLET ORAL at 10:05

## 2021-05-06 RX ADMIN — ACETAMINOPHEN 1000 MG: 500 TABLET, FILM COATED ORAL at 09:05

## 2021-05-06 RX ADMIN — ACETAMINOPHEN 1000 MG: 500 TABLET, FILM COATED ORAL at 05:05

## 2021-05-06 RX ADMIN — CARVEDILOL 3.12 MG: 3.12 TABLET, FILM COATED ORAL at 10:05

## 2021-05-06 RX ADMIN — AMLODIPINE BESYLATE 5 MG: 5 TABLET ORAL at 10:05

## 2021-05-06 RX ADMIN — FERROUS SULFATE TAB EC 325 MG (65 MG FE EQUIVALENT) 325 MG: 325 (65 FE) TABLET DELAYED RESPONSE at 10:05

## 2021-05-06 RX ADMIN — ROSUVASTATIN CALCIUM 40 MG: 10 TABLET, FILM COATED ORAL at 10:05

## 2021-05-06 RX ADMIN — ERGOCALCIFEROL 50000 UNITS: 1.25 CAPSULE ORAL at 10:05

## 2021-05-06 RX ADMIN — ANASTROZOLE 1 MG: 1 TABLET, COATED ORAL at 10:05

## 2021-05-06 RX ADMIN — METHOCARBAMOL 1000 MG: 750 TABLET ORAL at 02:05

## 2021-05-06 RX ADMIN — METHOCARBAMOL 1000 MG: 750 TABLET ORAL at 10:05

## 2021-05-06 RX ADMIN — MELATONIN TAB 3 MG 6 MG: 3 TAB at 09:05

## 2021-05-06 RX ADMIN — ASPIRIN 81 MG: 81 TABLET, COATED ORAL at 10:05

## 2021-05-07 LAB
ANION GAP SERPL CALC-SCNC: 8 MMOL/L (ref 8–16)
BASOPHILS # BLD AUTO: 0.04 K/UL (ref 0–0.2)
BASOPHILS NFR BLD: 0.8 % (ref 0–1.9)
BUN SERPL-MCNC: 23 MG/DL (ref 8–23)
CALCIUM SERPL-MCNC: 10.3 MG/DL (ref 8.7–10.5)
CHLORIDE SERPL-SCNC: 106 MMOL/L (ref 95–110)
CO2 SERPL-SCNC: 24 MMOL/L (ref 23–29)
CREAT SERPL-MCNC: 1.5 MG/DL (ref 0.5–1.4)
DIFFERENTIAL METHOD: ABNORMAL
EOSINOPHIL # BLD AUTO: 0.5 K/UL (ref 0–0.5)
EOSINOPHIL NFR BLD: 9.8 % (ref 0–8)
ERYTHROCYTE [DISTWIDTH] IN BLOOD BY AUTOMATED COUNT: 14.2 % (ref 11.5–14.5)
EST. GFR  (AFRICAN AMERICAN): 38.2 ML/MIN/1.73 M^2
EST. GFR  (NON AFRICAN AMERICAN): 33.1 ML/MIN/1.73 M^2
GLUCOSE SERPL-MCNC: 97 MG/DL (ref 70–110)
HCT VFR BLD AUTO: 32.4 % (ref 37–48.5)
HGB BLD-MCNC: 10.6 G/DL (ref 12–16)
IMM GRANULOCYTES # BLD AUTO: 0.01 K/UL (ref 0–0.04)
IMM GRANULOCYTES NFR BLD AUTO: 0.2 % (ref 0–0.5)
LYMPHOCYTES # BLD AUTO: 1.3 K/UL (ref 1–4.8)
LYMPHOCYTES NFR BLD: 24.2 % (ref 18–48)
MAGNESIUM SERPL-MCNC: 1.7 MG/DL (ref 1.6–2.6)
MCH RBC QN AUTO: 31.5 PG (ref 27–31)
MCHC RBC AUTO-ENTMCNC: 32.7 G/DL (ref 32–36)
MCV RBC AUTO: 96 FL (ref 82–98)
MONOCYTES # BLD AUTO: 0.9 K/UL (ref 0.3–1)
MONOCYTES NFR BLD: 17.4 % (ref 4–15)
NEUTROPHILS # BLD AUTO: 2.5 K/UL (ref 1.8–7.7)
NEUTROPHILS NFR BLD: 47.6 % (ref 38–73)
NRBC BLD-RTO: 0 /100 WBC
PHOSPHATE SERPL-MCNC: 3.2 MG/DL (ref 2.7–4.5)
PLATELET # BLD AUTO: 245 K/UL (ref 150–450)
PMV BLD AUTO: 10 FL (ref 9.2–12.9)
POTASSIUM SERPL-SCNC: 4 MMOL/L (ref 3.5–5.1)
RBC # BLD AUTO: 3.37 M/UL (ref 4–5.4)
SODIUM SERPL-SCNC: 138 MMOL/L (ref 136–145)
WBC # BLD AUTO: 5.33 K/UL (ref 3.9–12.7)

## 2021-05-07 PROCEDURE — 80048 BASIC METABOLIC PNL TOTAL CA: CPT | Performed by: HOSPITALIST

## 2021-05-07 PROCEDURE — 97116 GAIT TRAINING THERAPY: CPT | Mod: CQ

## 2021-05-07 PROCEDURE — 36415 COLL VENOUS BLD VENIPUNCTURE: CPT | Performed by: HOSPITALIST

## 2021-05-07 PROCEDURE — 97110 THERAPEUTIC EXERCISES: CPT | Mod: CQ

## 2021-05-07 PROCEDURE — 83735 ASSAY OF MAGNESIUM: CPT | Performed by: HOSPITALIST

## 2021-05-07 PROCEDURE — 25000003 PHARM REV CODE 250: Performed by: STUDENT IN AN ORGANIZED HEALTH CARE EDUCATION/TRAINING PROGRAM

## 2021-05-07 PROCEDURE — 97110 THERAPEUTIC EXERCISES: CPT

## 2021-05-07 PROCEDURE — 25000003 PHARM REV CODE 250: Performed by: NURSE PRACTITIONER

## 2021-05-07 PROCEDURE — 11000004 HC SNF PRIVATE

## 2021-05-07 PROCEDURE — 97535 SELF CARE MNGMENT TRAINING: CPT

## 2021-05-07 PROCEDURE — 84100 ASSAY OF PHOSPHORUS: CPT | Performed by: HOSPITALIST

## 2021-05-07 PROCEDURE — 85025 COMPLETE CBC W/AUTO DIFF WBC: CPT | Performed by: HOSPITALIST

## 2021-05-07 PROCEDURE — 97530 THERAPEUTIC ACTIVITIES: CPT | Mod: CQ

## 2021-05-07 RX ADMIN — LEVETIRACETAM 500 MG: 500 TABLET, FILM COATED ORAL at 08:05

## 2021-05-07 RX ADMIN — LEVETIRACETAM 500 MG: 500 TABLET, FILM COATED ORAL at 10:05

## 2021-05-07 RX ADMIN — METHOCARBAMOL 1000 MG: 750 TABLET ORAL at 08:05

## 2021-05-07 RX ADMIN — ASPIRIN 81 MG: 81 TABLET, COATED ORAL at 08:05

## 2021-05-07 RX ADMIN — ALLOPURINOL 300 MG: 100 TABLET ORAL at 08:05

## 2021-05-07 RX ADMIN — MELATONIN TAB 3 MG 6 MG: 3 TAB at 10:05

## 2021-05-07 RX ADMIN — OXYCODONE 5 MG: 5 TABLET ORAL at 02:05

## 2021-05-07 RX ADMIN — ANASTROZOLE 1 MG: 1 TABLET, COATED ORAL at 08:05

## 2021-05-07 RX ADMIN — METHOCARBAMOL 1000 MG: 750 TABLET ORAL at 10:05

## 2021-05-07 RX ADMIN — ROSUVASTATIN CALCIUM 40 MG: 10 TABLET, FILM COATED ORAL at 08:05

## 2021-05-07 RX ADMIN — DOCUSATE SODIUM 50MG AND SENNOSIDES 8.6MG 2 TABLET: 8.6; 5 TABLET, FILM COATED ORAL at 10:05

## 2021-05-07 RX ADMIN — AMLODIPINE BESYLATE 5 MG: 5 TABLET ORAL at 08:05

## 2021-05-07 RX ADMIN — ACETAMINOPHEN 1000 MG: 500 TABLET, FILM COATED ORAL at 10:05

## 2021-05-07 RX ADMIN — CARVEDILOL 3.12 MG: 3.12 TABLET, FILM COATED ORAL at 05:05

## 2021-05-07 RX ADMIN — ASPIRIN 81 MG: 81 TABLET, COATED ORAL at 10:05

## 2021-05-07 RX ADMIN — METHOCARBAMOL 1000 MG: 750 TABLET ORAL at 02:05

## 2021-05-07 RX ADMIN — CARVEDILOL 3.12 MG: 3.12 TABLET, FILM COATED ORAL at 08:05

## 2021-05-07 RX ADMIN — CLOPIDOGREL BISULFATE 75 MG: 75 TABLET, FILM COATED ORAL at 08:05

## 2021-05-07 RX ADMIN — FERROUS SULFATE TAB EC 325 MG (65 MG FE EQUIVALENT) 325 MG: 325 (65 FE) TABLET DELAYED RESPONSE at 08:05

## 2021-05-07 RX ADMIN — ACETAMINOPHEN 1000 MG: 500 TABLET, FILM COATED ORAL at 02:05

## 2021-05-08 PROCEDURE — 25000003 PHARM REV CODE 250: Performed by: STUDENT IN AN ORGANIZED HEALTH CARE EDUCATION/TRAINING PROGRAM

## 2021-05-08 PROCEDURE — 11000004 HC SNF PRIVATE

## 2021-05-08 PROCEDURE — 25000003 PHARM REV CODE 250: Performed by: NURSE PRACTITIONER

## 2021-05-08 PROCEDURE — 97535 SELF CARE MNGMENT TRAINING: CPT | Mod: CO

## 2021-05-08 RX ADMIN — CLOPIDOGREL BISULFATE 75 MG: 75 TABLET, FILM COATED ORAL at 09:05

## 2021-05-08 RX ADMIN — ASPIRIN 81 MG: 81 TABLET, COATED ORAL at 08:05

## 2021-05-08 RX ADMIN — CARVEDILOL 3.12 MG: 3.12 TABLET, FILM COATED ORAL at 04:05

## 2021-05-08 RX ADMIN — FERROUS SULFATE TAB EC 325 MG (65 MG FE EQUIVALENT) 325 MG: 325 (65 FE) TABLET DELAYED RESPONSE at 09:05

## 2021-05-08 RX ADMIN — ACETAMINOPHEN 1000 MG: 500 TABLET, FILM COATED ORAL at 02:05

## 2021-05-08 RX ADMIN — LEVETIRACETAM 500 MG: 500 TABLET, FILM COATED ORAL at 09:05

## 2021-05-08 RX ADMIN — ACETAMINOPHEN 1000 MG: 500 TABLET, FILM COATED ORAL at 10:05

## 2021-05-08 RX ADMIN — OXYCODONE 5 MG: 5 TABLET ORAL at 10:05

## 2021-05-08 RX ADMIN — METHOCARBAMOL 1000 MG: 750 TABLET ORAL at 09:05

## 2021-05-08 RX ADMIN — ROSUVASTATIN CALCIUM 40 MG: 10 TABLET, FILM COATED ORAL at 09:05

## 2021-05-08 RX ADMIN — ALLOPURINOL 300 MG: 100 TABLET ORAL at 09:05

## 2021-05-08 RX ADMIN — ACETAMINOPHEN 1000 MG: 500 TABLET, FILM COATED ORAL at 06:05

## 2021-05-08 RX ADMIN — AMLODIPINE BESYLATE 5 MG: 5 TABLET ORAL at 09:05

## 2021-05-08 RX ADMIN — LEVETIRACETAM 500 MG: 500 TABLET, FILM COATED ORAL at 08:05

## 2021-05-08 RX ADMIN — METHOCARBAMOL 1000 MG: 750 TABLET ORAL at 02:05

## 2021-05-08 RX ADMIN — CARVEDILOL 3.12 MG: 3.12 TABLET, FILM COATED ORAL at 09:05

## 2021-05-08 RX ADMIN — ANASTROZOLE 1 MG: 1 TABLET, COATED ORAL at 09:05

## 2021-05-08 RX ADMIN — ASPIRIN 81 MG: 81 TABLET, COATED ORAL at 09:05

## 2021-05-08 RX ADMIN — METHOCARBAMOL 1000 MG: 750 TABLET ORAL at 08:05

## 2021-05-09 PROCEDURE — 97110 THERAPEUTIC EXERCISES: CPT | Mod: CQ

## 2021-05-09 PROCEDURE — 25000003 PHARM REV CODE 250: Performed by: STUDENT IN AN ORGANIZED HEALTH CARE EDUCATION/TRAINING PROGRAM

## 2021-05-09 PROCEDURE — 11000004 HC SNF PRIVATE

## 2021-05-09 PROCEDURE — 25000003 PHARM REV CODE 250: Performed by: NURSE PRACTITIONER

## 2021-05-09 RX ADMIN — ROSUVASTATIN CALCIUM 40 MG: 10 TABLET, FILM COATED ORAL at 03:05

## 2021-05-09 RX ADMIN — OXYCODONE 5 MG: 5 TABLET ORAL at 09:05

## 2021-05-09 RX ADMIN — ALLOPURINOL 300 MG: 100 TABLET ORAL at 08:05

## 2021-05-09 RX ADMIN — METHOCARBAMOL 1000 MG: 750 TABLET ORAL at 09:05

## 2021-05-09 RX ADMIN — ANASTROZOLE 1 MG: 1 TABLET, COATED ORAL at 08:05

## 2021-05-09 RX ADMIN — FERROUS SULFATE TAB EC 325 MG (65 MG FE EQUIVALENT) 325 MG: 325 (65 FE) TABLET DELAYED RESPONSE at 08:05

## 2021-05-09 RX ADMIN — CLOPIDOGREL BISULFATE 75 MG: 75 TABLET, FILM COATED ORAL at 08:05

## 2021-05-09 RX ADMIN — LEVETIRACETAM 500 MG: 500 TABLET, FILM COATED ORAL at 09:05

## 2021-05-09 RX ADMIN — LEVETIRACETAM 500 MG: 500 TABLET, FILM COATED ORAL at 08:05

## 2021-05-09 RX ADMIN — CARVEDILOL 3.12 MG: 3.12 TABLET, FILM COATED ORAL at 08:05

## 2021-05-09 RX ADMIN — CARVEDILOL 3.12 MG: 3.12 TABLET, FILM COATED ORAL at 06:05

## 2021-05-09 RX ADMIN — ACETAMINOPHEN 1000 MG: 500 TABLET, FILM COATED ORAL at 05:05

## 2021-05-09 RX ADMIN — METHOCARBAMOL 1000 MG: 750 TABLET ORAL at 03:05

## 2021-05-09 RX ADMIN — AMLODIPINE BESYLATE 5 MG: 5 TABLET ORAL at 08:05

## 2021-05-09 RX ADMIN — ASPIRIN 81 MG: 81 TABLET, COATED ORAL at 09:05

## 2021-05-09 RX ADMIN — ACETAMINOPHEN 1000 MG: 500 TABLET, FILM COATED ORAL at 02:05

## 2021-05-09 RX ADMIN — ASPIRIN 81 MG: 81 TABLET, COATED ORAL at 08:05

## 2021-05-09 RX ADMIN — ACETAMINOPHEN 1000 MG: 500 TABLET, FILM COATED ORAL at 09:05

## 2021-05-09 RX ADMIN — METHOCARBAMOL 1000 MG: 750 TABLET ORAL at 08:05

## 2021-05-10 PROCEDURE — 25000003 PHARM REV CODE 250: Performed by: NURSE PRACTITIONER

## 2021-05-10 PROCEDURE — 97116 GAIT TRAINING THERAPY: CPT

## 2021-05-10 PROCEDURE — 97530 THERAPEUTIC ACTIVITIES: CPT

## 2021-05-10 PROCEDURE — 97535 SELF CARE MNGMENT TRAINING: CPT | Mod: CO

## 2021-05-10 PROCEDURE — 97110 THERAPEUTIC EXERCISES: CPT

## 2021-05-10 PROCEDURE — 11000004 HC SNF PRIVATE

## 2021-05-10 PROCEDURE — 25000003 PHARM REV CODE 250: Performed by: STUDENT IN AN ORGANIZED HEALTH CARE EDUCATION/TRAINING PROGRAM

## 2021-05-10 RX ADMIN — ALLOPURINOL 300 MG: 100 TABLET ORAL at 09:05

## 2021-05-10 RX ADMIN — METHOCARBAMOL 1000 MG: 750 TABLET ORAL at 08:05

## 2021-05-10 RX ADMIN — ACETAMINOPHEN 1000 MG: 500 TABLET, FILM COATED ORAL at 09:05

## 2021-05-10 RX ADMIN — LEVETIRACETAM 500 MG: 500 TABLET, FILM COATED ORAL at 08:05

## 2021-05-10 RX ADMIN — FERROUS SULFATE TAB EC 325 MG (65 MG FE EQUIVALENT) 325 MG: 325 (65 FE) TABLET DELAYED RESPONSE at 09:05

## 2021-05-10 RX ADMIN — METHOCARBAMOL 1000 MG: 750 TABLET ORAL at 09:05

## 2021-05-10 RX ADMIN — METHOCARBAMOL 1000 MG: 750 TABLET ORAL at 02:05

## 2021-05-10 RX ADMIN — ASPIRIN 81 MG: 81 TABLET, COATED ORAL at 08:05

## 2021-05-10 RX ADMIN — MELATONIN TAB 3 MG 6 MG: 3 TAB at 08:05

## 2021-05-10 RX ADMIN — ACETAMINOPHEN 1000 MG: 500 TABLET, FILM COATED ORAL at 02:05

## 2021-05-10 RX ADMIN — CARVEDILOL 3.12 MG: 3.12 TABLET, FILM COATED ORAL at 05:05

## 2021-05-10 RX ADMIN — ROSUVASTATIN CALCIUM 40 MG: 10 TABLET, FILM COATED ORAL at 09:05

## 2021-05-10 RX ADMIN — CARVEDILOL 3.12 MG: 3.12 TABLET, FILM COATED ORAL at 09:05

## 2021-05-10 RX ADMIN — ASPIRIN 81 MG: 81 TABLET, COATED ORAL at 09:05

## 2021-05-10 RX ADMIN — AMLODIPINE BESYLATE 5 MG: 5 TABLET ORAL at 09:05

## 2021-05-10 RX ADMIN — CLOPIDOGREL BISULFATE 75 MG: 75 TABLET, FILM COATED ORAL at 09:05

## 2021-05-10 RX ADMIN — ERGOCALCIFEROL 50000 UNITS: 1.25 CAPSULE ORAL at 09:05

## 2021-05-10 RX ADMIN — LEVETIRACETAM 500 MG: 500 TABLET, FILM COATED ORAL at 09:05

## 2021-05-10 RX ADMIN — ANASTROZOLE 1 MG: 1 TABLET, COATED ORAL at 09:05

## 2021-05-11 LAB
ANION GAP SERPL CALC-SCNC: 6 MMOL/L (ref 8–16)
BASOPHILS # BLD AUTO: 0.03 K/UL (ref 0–0.2)
BASOPHILS NFR BLD: 0.5 % (ref 0–1.9)
BUN SERPL-MCNC: 17 MG/DL (ref 8–23)
CALCIUM SERPL-MCNC: 10.2 MG/DL (ref 8.7–10.5)
CHLORIDE SERPL-SCNC: 109 MMOL/L (ref 95–110)
CO2 SERPL-SCNC: 25 MMOL/L (ref 23–29)
CREAT SERPL-MCNC: 1.2 MG/DL (ref 0.5–1.4)
DIFFERENTIAL METHOD: ABNORMAL
EOSINOPHIL # BLD AUTO: 0.7 K/UL (ref 0–0.5)
EOSINOPHIL NFR BLD: 11.4 % (ref 0–8)
ERYTHROCYTE [DISTWIDTH] IN BLOOD BY AUTOMATED COUNT: 14 % (ref 11.5–14.5)
EST. GFR  (AFRICAN AMERICAN): 50 ML/MIN/1.73 M^2
EST. GFR  (NON AFRICAN AMERICAN): 43.4 ML/MIN/1.73 M^2
GLUCOSE SERPL-MCNC: 97 MG/DL (ref 70–110)
HCT VFR BLD AUTO: 31.9 % (ref 37–48.5)
HGB BLD-MCNC: 10.6 G/DL (ref 12–16)
IMM GRANULOCYTES # BLD AUTO: 0.01 K/UL (ref 0–0.04)
IMM GRANULOCYTES NFR BLD AUTO: 0.2 % (ref 0–0.5)
LYMPHOCYTES # BLD AUTO: 1.6 K/UL (ref 1–4.8)
LYMPHOCYTES NFR BLD: 28.8 % (ref 18–48)
MAGNESIUM SERPL-MCNC: 1.6 MG/DL (ref 1.6–2.6)
MCH RBC QN AUTO: 31.9 PG (ref 27–31)
MCHC RBC AUTO-ENTMCNC: 33.2 G/DL (ref 32–36)
MCV RBC AUTO: 96 FL (ref 82–98)
MONOCYTES # BLD AUTO: 0.9 K/UL (ref 0.3–1)
MONOCYTES NFR BLD: 15.3 % (ref 4–15)
NEUTROPHILS # BLD AUTO: 2.5 K/UL (ref 1.8–7.7)
NEUTROPHILS NFR BLD: 43.8 % (ref 38–73)
NRBC BLD-RTO: 0 /100 WBC
PHOSPHATE SERPL-MCNC: 3.2 MG/DL (ref 2.7–4.5)
PLATELET # BLD AUTO: 226 K/UL (ref 150–450)
PMV BLD AUTO: 10.2 FL (ref 9.2–12.9)
POTASSIUM SERPL-SCNC: 3.9 MMOL/L (ref 3.5–5.1)
RBC # BLD AUTO: 3.32 M/UL (ref 4–5.4)
SODIUM SERPL-SCNC: 140 MMOL/L (ref 136–145)
WBC # BLD AUTO: 5.7 K/UL (ref 3.9–12.7)

## 2021-05-11 PROCEDURE — 11000004 HC SNF PRIVATE

## 2021-05-11 PROCEDURE — 83735 ASSAY OF MAGNESIUM: CPT | Performed by: HOSPITALIST

## 2021-05-11 PROCEDURE — 97535 SELF CARE MNGMENT TRAINING: CPT | Mod: CO

## 2021-05-11 PROCEDURE — 25000003 PHARM REV CODE 250: Performed by: STUDENT IN AN ORGANIZED HEALTH CARE EDUCATION/TRAINING PROGRAM

## 2021-05-11 PROCEDURE — 97116 GAIT TRAINING THERAPY: CPT

## 2021-05-11 PROCEDURE — 85025 COMPLETE CBC W/AUTO DIFF WBC: CPT | Performed by: HOSPITALIST

## 2021-05-11 PROCEDURE — 80048 BASIC METABOLIC PNL TOTAL CA: CPT | Performed by: HOSPITALIST

## 2021-05-11 PROCEDURE — 84100 ASSAY OF PHOSPHORUS: CPT | Performed by: HOSPITALIST

## 2021-05-11 PROCEDURE — 25000003 PHARM REV CODE 250: Performed by: NURSE PRACTITIONER

## 2021-05-11 PROCEDURE — 97530 THERAPEUTIC ACTIVITIES: CPT | Mod: CO

## 2021-05-11 PROCEDURE — 36415 COLL VENOUS BLD VENIPUNCTURE: CPT | Performed by: HOSPITALIST

## 2021-05-11 RX ORDER — POLYETHYLENE GLYCOL 3350 17 G/17G
17 POWDER, FOR SOLUTION ORAL DAILY
Refills: 0 | COMMUNITY
Start: 2021-05-12 | End: 2022-04-26

## 2021-05-11 RX ORDER — METHOCARBAMOL 500 MG/1
1000 TABLET, FILM COATED ORAL 3 TIMES DAILY
Qty: 60 TABLET | Refills: 0 | Status: SHIPPED | OUTPATIENT
Start: 2021-05-12 | End: 2021-05-21 | Stop reason: SDUPTHER

## 2021-05-11 RX ORDER — BISACODYL 5 MG
5 TABLET, DELAYED RELEASE (ENTERIC COATED) ORAL DAILY PRN
Qty: 20 TABLET | Refills: 0 | COMMUNITY
Start: 2021-05-11 | End: 2022-04-26

## 2021-05-11 RX ADMIN — FERROUS SULFATE TAB EC 325 MG (65 MG FE EQUIVALENT) 325 MG: 325 (65 FE) TABLET DELAYED RESPONSE at 10:05

## 2021-05-11 RX ADMIN — ASPIRIN 81 MG: 81 TABLET, COATED ORAL at 10:05

## 2021-05-11 RX ADMIN — ACETAMINOPHEN 1000 MG: 500 TABLET, FILM COATED ORAL at 10:05

## 2021-05-11 RX ADMIN — ROSUVASTATIN CALCIUM 40 MG: 10 TABLET, FILM COATED ORAL at 10:05

## 2021-05-11 RX ADMIN — MELATONIN TAB 3 MG 6 MG: 3 TAB at 10:05

## 2021-05-11 RX ADMIN — CARVEDILOL 3.12 MG: 3.12 TABLET, FILM COATED ORAL at 10:05

## 2021-05-11 RX ADMIN — ALLOPURINOL 300 MG: 100 TABLET ORAL at 10:05

## 2021-05-11 RX ADMIN — LEVETIRACETAM 500 MG: 500 TABLET, FILM COATED ORAL at 10:05

## 2021-05-11 RX ADMIN — METHOCARBAMOL 1000 MG: 750 TABLET ORAL at 02:05

## 2021-05-11 RX ADMIN — CLOPIDOGREL BISULFATE 75 MG: 75 TABLET, FILM COATED ORAL at 10:05

## 2021-05-11 RX ADMIN — METHOCARBAMOL 1000 MG: 750 TABLET ORAL at 08:05

## 2021-05-11 RX ADMIN — LEVETIRACETAM 500 MG: 500 TABLET, FILM COATED ORAL at 08:05

## 2021-05-11 RX ADMIN — ACETAMINOPHEN 1000 MG: 500 TABLET, FILM COATED ORAL at 02:05

## 2021-05-11 RX ADMIN — AMLODIPINE BESYLATE 5 MG: 5 TABLET ORAL at 10:05

## 2021-05-11 RX ADMIN — CARVEDILOL 3.12 MG: 3.12 TABLET, FILM COATED ORAL at 05:05

## 2021-05-11 RX ADMIN — METHOCARBAMOL 1000 MG: 750 TABLET ORAL at 10:05

## 2021-05-11 RX ADMIN — ASPIRIN 81 MG: 81 TABLET, COATED ORAL at 08:05

## 2021-05-11 RX ADMIN — ANASTROZOLE 1 MG: 1 TABLET, COATED ORAL at 10:05

## 2021-05-11 RX ADMIN — ACETAMINOPHEN 1000 MG: 500 TABLET, FILM COATED ORAL at 05:05

## 2021-05-12 PROCEDURE — 25000003 PHARM REV CODE 250: Performed by: STUDENT IN AN ORGANIZED HEALTH CARE EDUCATION/TRAINING PROGRAM

## 2021-05-12 PROCEDURE — 25000003 PHARM REV CODE 250: Performed by: NURSE PRACTITIONER

## 2021-05-12 PROCEDURE — 97530 THERAPEUTIC ACTIVITIES: CPT | Mod: CQ

## 2021-05-12 PROCEDURE — 11000004 HC SNF PRIVATE

## 2021-05-12 PROCEDURE — 97535 SELF CARE MNGMENT TRAINING: CPT | Mod: CO

## 2021-05-12 PROCEDURE — 97116 GAIT TRAINING THERAPY: CPT | Mod: CQ

## 2021-05-12 PROCEDURE — 97110 THERAPEUTIC EXERCISES: CPT | Mod: CQ

## 2021-05-12 RX ORDER — OXYCODONE HYDROCHLORIDE 5 MG/1
5 TABLET ORAL EVERY 6 HOURS PRN
Qty: 25 TABLET | Refills: 0 | Status: SHIPPED | OUTPATIENT
Start: 2021-05-12 | End: 2021-05-21

## 2021-05-12 RX ADMIN — AMLODIPINE BESYLATE 5 MG: 5 TABLET ORAL at 08:05

## 2021-05-12 RX ADMIN — ACETAMINOPHEN 1000 MG: 500 TABLET, FILM COATED ORAL at 02:05

## 2021-05-12 RX ADMIN — ACETAMINOPHEN 1000 MG: 500 TABLET, FILM COATED ORAL at 05:05

## 2021-05-12 RX ADMIN — LEVETIRACETAM 500 MG: 500 TABLET, FILM COATED ORAL at 08:05

## 2021-05-12 RX ADMIN — ROSUVASTATIN CALCIUM 40 MG: 10 TABLET, FILM COATED ORAL at 08:05

## 2021-05-12 RX ADMIN — ALLOPURINOL 300 MG: 100 TABLET ORAL at 08:05

## 2021-05-12 RX ADMIN — METHOCARBAMOL 1000 MG: 750 TABLET ORAL at 08:05

## 2021-05-12 RX ADMIN — CARVEDILOL 3.12 MG: 3.12 TABLET, FILM COATED ORAL at 05:05

## 2021-05-12 RX ADMIN — METHOCARBAMOL 1000 MG: 750 TABLET ORAL at 02:05

## 2021-05-12 RX ADMIN — ACETAMINOPHEN 1000 MG: 500 TABLET, FILM COATED ORAL at 09:05

## 2021-05-12 RX ADMIN — ASPIRIN 81 MG: 81 TABLET, COATED ORAL at 08:05

## 2021-05-12 RX ADMIN — CARVEDILOL 3.12 MG: 3.12 TABLET, FILM COATED ORAL at 08:05

## 2021-05-12 RX ADMIN — ANASTROZOLE 1 MG: 1 TABLET, COATED ORAL at 09:05

## 2021-05-12 RX ADMIN — FERROUS SULFATE TAB EC 325 MG (65 MG FE EQUIVALENT) 325 MG: 325 (65 FE) TABLET DELAYED RESPONSE at 08:05

## 2021-05-12 RX ADMIN — CLOPIDOGREL BISULFATE 75 MG: 75 TABLET, FILM COATED ORAL at 08:05

## 2021-05-13 VITALS
SYSTOLIC BLOOD PRESSURE: 131 MMHG | TEMPERATURE: 98 F | HEART RATE: 80 BPM | WEIGHT: 264.75 LBS | OXYGEN SATURATION: 94 % | RESPIRATION RATE: 16 BRPM | HEIGHT: 65 IN | DIASTOLIC BLOOD PRESSURE: 61 MMHG | BODY MASS INDEX: 44.11 KG/M2

## 2021-05-13 PROCEDURE — 25000003 PHARM REV CODE 250: Performed by: STUDENT IN AN ORGANIZED HEALTH CARE EDUCATION/TRAINING PROGRAM

## 2021-05-13 PROCEDURE — 25000003 PHARM REV CODE 250: Performed by: NURSE PRACTITIONER

## 2021-05-13 RX ADMIN — ERGOCALCIFEROL 50000 UNITS: 1.25 CAPSULE ORAL at 09:05

## 2021-05-13 RX ADMIN — ALLOPURINOL 300 MG: 100 TABLET ORAL at 09:05

## 2021-05-13 RX ADMIN — CLOPIDOGREL BISULFATE 75 MG: 75 TABLET, FILM COATED ORAL at 09:05

## 2021-05-13 RX ADMIN — ROSUVASTATIN CALCIUM 40 MG: 10 TABLET, FILM COATED ORAL at 09:05

## 2021-05-13 RX ADMIN — LEVETIRACETAM 500 MG: 500 TABLET, FILM COATED ORAL at 09:05

## 2021-05-13 RX ADMIN — CARVEDILOL 3.12 MG: 3.12 TABLET, FILM COATED ORAL at 09:05

## 2021-05-13 RX ADMIN — AMLODIPINE BESYLATE 5 MG: 5 TABLET ORAL at 09:05

## 2021-05-13 RX ADMIN — ANASTROZOLE 1 MG: 1 TABLET, COATED ORAL at 09:05

## 2021-05-13 RX ADMIN — FERROUS SULFATE TAB EC 325 MG (65 MG FE EQUIVALENT) 325 MG: 325 (65 FE) TABLET DELAYED RESPONSE at 09:05

## 2021-05-13 RX ADMIN — METHOCARBAMOL 1000 MG: 750 TABLET ORAL at 09:05

## 2021-05-13 RX ADMIN — ACETAMINOPHEN 1000 MG: 500 TABLET, FILM COATED ORAL at 05:05

## 2021-05-13 RX ADMIN — ASPIRIN 81 MG: 81 TABLET, COATED ORAL at 09:05

## 2021-05-15 PROCEDURE — G0180 MD CERTIFICATION HHA PATIENT: HCPCS | Mod: ,,, | Performed by: ORTHOPAEDIC SURGERY

## 2021-05-15 PROCEDURE — G0180 PR HOME HEALTH MD CERTIFICATION: ICD-10-PCS | Mod: ,,, | Performed by: ORTHOPAEDIC SURGERY

## 2021-05-18 ENCOUNTER — TELEPHONE (OUTPATIENT)
Dept: CARDIOLOGY | Facility: CLINIC | Age: 81
End: 2021-05-18

## 2021-05-18 DIAGNOSIS — E78.2 MIXED HYPERLIPIDEMIA: ICD-10-CM

## 2021-05-18 DIAGNOSIS — I25.10 CORONARY ARTERY DISEASE DUE TO LIPID RICH PLAQUE: ICD-10-CM

## 2021-05-18 DIAGNOSIS — I10 HTN (HYPERTENSION), BENIGN: Primary | ICD-10-CM

## 2021-05-18 DIAGNOSIS — E11.51 CONTROLLED TYPE 2 DM WITH PERIPHERAL CIRCULATORY DISORDER: ICD-10-CM

## 2021-05-18 DIAGNOSIS — I25.83 CORONARY ARTERY DISEASE DUE TO LIPID RICH PLAQUE: ICD-10-CM

## 2021-05-18 LAB
FUNGUS SPEC CULT: NORMAL
FUNGUS SPEC CULT: NORMAL

## 2021-05-20 ENCOUNTER — DOCUMENT SCAN (OUTPATIENT)
Dept: HOME HEALTH SERVICES | Facility: HOSPITAL | Age: 81
End: 2021-05-20
Payer: MEDICARE

## 2021-05-21 ENCOUNTER — HOSPITAL ENCOUNTER (OUTPATIENT)
Dept: RADIOLOGY | Facility: HOSPITAL | Age: 81
Discharge: HOME | End: 2021-05-21
Attending: PHYSICIAN ASSISTANT
Payer: MEDICARE

## 2021-05-21 ENCOUNTER — OFFICE VISIT (OUTPATIENT)
Dept: ORTHOPEDICS | Facility: CLINIC | Age: 81
End: 2021-05-21
Payer: MEDICARE

## 2021-05-21 ENCOUNTER — OFFICE VISIT (OUTPATIENT)
Dept: INTERNAL MEDICINE | Facility: CLINIC | Age: 81
End: 2021-05-21
Payer: MEDICARE

## 2021-05-21 ENCOUNTER — TELEPHONE (OUTPATIENT)
Dept: INTERNAL MEDICINE | Facility: CLINIC | Age: 81
End: 2021-05-21

## 2021-05-21 VITALS — DIASTOLIC BLOOD PRESSURE: 80 MMHG | OXYGEN SATURATION: 99 % | SYSTOLIC BLOOD PRESSURE: 130 MMHG | HEART RATE: 80 BPM

## 2021-05-21 VITALS — WEIGHT: 264 LBS | HEIGHT: 65 IN | BODY MASS INDEX: 43.99 KG/M2

## 2021-05-21 DIAGNOSIS — M17.12 OSTEOARTHRITIS OF LEFT KNEE, UNSPECIFIED OSTEOARTHRITIS TYPE: Primary | ICD-10-CM

## 2021-05-21 DIAGNOSIS — E11.59 TYPE 2 DIABETES MELLITUS WITH OTHER CIRCULATORY COMPLICATION, WITHOUT LONG-TERM CURRENT USE OF INSULIN: ICD-10-CM

## 2021-05-21 DIAGNOSIS — M10.9 GOUT, ARTHRITIS: ICD-10-CM

## 2021-05-21 DIAGNOSIS — K21.9 GASTROESOPHAGEAL REFLUX DISEASE, UNSPECIFIED WHETHER ESOPHAGITIS PRESENT: ICD-10-CM

## 2021-05-21 DIAGNOSIS — Z96.652 STATUS POST REVISION OF TOTAL REPLACEMENT OF LEFT KNEE: ICD-10-CM

## 2021-05-21 DIAGNOSIS — M25.569 KNEE PAIN, UNSPECIFIED CHRONICITY, UNSPECIFIED LATERALITY: ICD-10-CM

## 2021-05-21 DIAGNOSIS — I10 HTN (HYPERTENSION), BENIGN: ICD-10-CM

## 2021-05-21 DIAGNOSIS — Z96.652 STATUS POST REVISION OF TOTAL REPLACEMENT OF LEFT KNEE: Primary | ICD-10-CM

## 2021-05-21 DIAGNOSIS — I10 ESSENTIAL HYPERTENSION: ICD-10-CM

## 2021-05-21 PROCEDURE — 3079F PR MOST RECENT DIASTOLIC BLOOD PRESSURE 80-89 MM HG: ICD-10-PCS | Mod: CPTII,S$GLB,, | Performed by: INTERNAL MEDICINE

## 2021-05-21 PROCEDURE — 3288F PR FALLS RISK ASSESSMENT DOCUMENTED: ICD-10-PCS | Mod: CPTII,S$GLB,, | Performed by: PHYSICIAN ASSISTANT

## 2021-05-21 PROCEDURE — 99999 PR PBB SHADOW E&M-EST. PATIENT-LVL IV: ICD-10-PCS | Mod: PBBFAC,,, | Performed by: PHYSICIAN ASSISTANT

## 2021-05-21 PROCEDURE — 73560 X-RAY EXAM OF KNEE 1 OR 2: CPT | Mod: 26,RT,, | Performed by: RADIOLOGY

## 2021-05-21 PROCEDURE — 99999 PR PBB SHADOW E&M-EST. PATIENT-LVL II: CPT | Mod: PBBFAC,,, | Performed by: INTERNAL MEDICINE

## 2021-05-21 PROCEDURE — 1126F AMNT PAIN NOTED NONE PRSNT: CPT | Mod: S$GLB,,, | Performed by: PHYSICIAN ASSISTANT

## 2021-05-21 PROCEDURE — 1101F PR PT FALLS ASSESS DOC 0-1 FALLS W/OUT INJ PAST YR: ICD-10-PCS | Mod: CPTII,S$GLB,, | Performed by: PHYSICIAN ASSISTANT

## 2021-05-21 PROCEDURE — 3075F SYST BP GE 130 - 139MM HG: CPT | Mod: CPTII,S$GLB,, | Performed by: INTERNAL MEDICINE

## 2021-05-21 PROCEDURE — 73562 X-RAY EXAM OF KNEE 3: CPT | Mod: 26,LT,, | Performed by: RADIOLOGY

## 2021-05-21 PROCEDURE — 73562 XR KNEE ORTHO LEFT: ICD-10-PCS | Mod: 26,LT,, | Performed by: RADIOLOGY

## 2021-05-21 PROCEDURE — 3072F LOW RISK FOR RETINOPATHY: CPT | Mod: S$GLB,,, | Performed by: INTERNAL MEDICINE

## 2021-05-21 PROCEDURE — 3072F LOW RISK FOR RETINOPATHY: CPT | Mod: S$GLB,,, | Performed by: PHYSICIAN ASSISTANT

## 2021-05-21 PROCEDURE — 3288F FALL RISK ASSESSMENT DOCD: CPT | Mod: CPTII,S$GLB,, | Performed by: PHYSICIAN ASSISTANT

## 2021-05-21 PROCEDURE — 73560 X-RAY EXAM OF KNEE 1 OR 2: CPT | Mod: TC,RT

## 2021-05-21 PROCEDURE — 3075F PR MOST RECENT SYSTOLIC BLOOD PRESS GE 130-139MM HG: ICD-10-PCS | Mod: CPTII,S$GLB,, | Performed by: INTERNAL MEDICINE

## 2021-05-21 PROCEDURE — 1126F PR PAIN SEVERITY QUANTIFIED, NO PAIN PRESENT: ICD-10-PCS | Mod: S$GLB,,, | Performed by: PHYSICIAN ASSISTANT

## 2021-05-21 PROCEDURE — 99499 UNLISTED E&M SERVICE: CPT | Mod: S$GLB,,, | Performed by: INTERNAL MEDICINE

## 2021-05-21 PROCEDURE — 3072F PR LOW RISK FOR RETINOPATHY: ICD-10-PCS | Mod: S$GLB,,, | Performed by: INTERNAL MEDICINE

## 2021-05-21 PROCEDURE — 1159F PR MEDICATION LIST DOCUMENTED IN MEDICAL RECORD: ICD-10-PCS | Mod: S$GLB,,, | Performed by: INTERNAL MEDICINE

## 2021-05-21 PROCEDURE — 99999 PR PBB SHADOW E&M-EST. PATIENT-LVL II: ICD-10-PCS | Mod: PBBFAC,,, | Performed by: INTERNAL MEDICINE

## 2021-05-21 PROCEDURE — 99999 PR PBB SHADOW E&M-EST. PATIENT-LVL IV: CPT | Mod: PBBFAC,,, | Performed by: PHYSICIAN ASSISTANT

## 2021-05-21 PROCEDURE — 99214 OFFICE O/P EST MOD 30 MIN: CPT | Mod: S$GLB,,, | Performed by: INTERNAL MEDICINE

## 2021-05-21 PROCEDURE — 1159F MED LIST DOCD IN RCRD: CPT | Mod: S$GLB,,, | Performed by: INTERNAL MEDICINE

## 2021-05-21 PROCEDURE — 99024 PR POST-OP FOLLOW-UP VISIT: ICD-10-PCS | Mod: S$GLB,,, | Performed by: PHYSICIAN ASSISTANT

## 2021-05-21 PROCEDURE — 99499 RISK ADDL DX/OHS AUDIT: ICD-10-PCS | Mod: S$GLB,,, | Performed by: INTERNAL MEDICINE

## 2021-05-21 PROCEDURE — 3079F DIAST BP 80-89 MM HG: CPT | Mod: CPTII,S$GLB,, | Performed by: INTERNAL MEDICINE

## 2021-05-21 PROCEDURE — 73560 XR KNEE ORTHO LEFT: ICD-10-PCS | Mod: 26,RT,, | Performed by: RADIOLOGY

## 2021-05-21 PROCEDURE — 3072F PR LOW RISK FOR RETINOPATHY: ICD-10-PCS | Mod: S$GLB,,, | Performed by: PHYSICIAN ASSISTANT

## 2021-05-21 PROCEDURE — 1101F PT FALLS ASSESS-DOCD LE1/YR: CPT | Mod: CPTII,S$GLB,, | Performed by: PHYSICIAN ASSISTANT

## 2021-05-21 PROCEDURE — 99024 POSTOP FOLLOW-UP VISIT: CPT | Mod: S$GLB,,, | Performed by: PHYSICIAN ASSISTANT

## 2021-05-21 PROCEDURE — 99214 PR OFFICE/OUTPT VISIT, EST, LEVL IV, 30-39 MIN: ICD-10-PCS | Mod: S$GLB,,, | Performed by: INTERNAL MEDICINE

## 2021-05-21 RX ORDER — METHOCARBAMOL 500 MG/1
500 TABLET, FILM COATED ORAL 3 TIMES DAILY PRN
Qty: 30 TABLET | Refills: 0 | Status: SHIPPED | OUTPATIENT
Start: 2021-05-21 | End: 2022-03-07

## 2021-05-21 RX ORDER — ASPIRIN 81 MG/1
81 TABLET ORAL DAILY
Start: 2021-05-21

## 2021-05-25 ENCOUNTER — EXTERNAL HOME HEALTH (OUTPATIENT)
Dept: HOME HEALTH SERVICES | Facility: HOSPITAL | Age: 81
End: 2021-05-25
Payer: MEDICARE

## 2021-06-01 ENCOUNTER — DOCUMENT SCAN (OUTPATIENT)
Dept: HOME HEALTH SERVICES | Facility: HOSPITAL | Age: 81
End: 2021-06-01
Payer: MEDICARE

## 2021-06-03 ENCOUNTER — DOCUMENT SCAN (OUTPATIENT)
Dept: HOME HEALTH SERVICES | Facility: HOSPITAL | Age: 81
End: 2021-06-03
Payer: MEDICARE

## 2021-06-07 RX ORDER — OMEPRAZOLE 20 MG/1
CAPSULE, DELAYED RELEASE ORAL
Qty: 180 CAPSULE | Refills: 3 | Status: SHIPPED | OUTPATIENT
Start: 2021-06-07 | End: 2022-07-14

## 2021-06-15 ENCOUNTER — TELEPHONE (OUTPATIENT)
Dept: INTERNAL MEDICINE | Facility: CLINIC | Age: 81
End: 2021-06-15

## 2021-06-17 LAB
ACID FAST MOD KINY STN SPEC: NORMAL
ACID FAST MOD KINY STN SPEC: NORMAL
MYCOBACTERIUM SPEC QL CULT: NORMAL
MYCOBACTERIUM SPEC QL CULT: NORMAL

## 2021-06-18 ENCOUNTER — OFFICE VISIT (OUTPATIENT)
Dept: INTERNAL MEDICINE | Facility: CLINIC | Age: 81
End: 2021-06-18
Payer: MEDICARE

## 2021-06-18 VITALS
RESPIRATION RATE: 16 BRPM | DIASTOLIC BLOOD PRESSURE: 72 MMHG | SYSTOLIC BLOOD PRESSURE: 130 MMHG | HEIGHT: 64 IN | OXYGEN SATURATION: 98 % | WEIGHT: 194.88 LBS | BODY MASS INDEX: 33.27 KG/M2 | HEART RATE: 79 BPM

## 2021-06-18 DIAGNOSIS — R30.0 DYSURIA: ICD-10-CM

## 2021-06-18 DIAGNOSIS — N94.9 VAGINAL DISCOMFORT: Primary | ICD-10-CM

## 2021-06-18 LAB
BILIRUB SERPL-MCNC: NORMAL MG/DL
BLOOD, POC UA: NEGATIVE
GLUCOSE UR QL STRIP: NORMAL
KETONES UR QL STRIP: NEGATIVE
LEUKOCYTE ESTERASE URINE, POC: NORMAL
NITRITE, POC UA: NEGATIVE
PH, POC UA: 5
PROTEIN, POC: NORMAL
SPECIFIC GRAVITY, POC UA: 1.02
UROBILINOGEN, POC UA: NORMAL

## 2021-06-18 PROCEDURE — 1126F AMNT PAIN NOTED NONE PRSNT: CPT | Mod: S$GLB,,, | Performed by: PHYSICIAN ASSISTANT

## 2021-06-18 PROCEDURE — 99999 PR PBB SHADOW E&M-EST. PATIENT-LVL V: ICD-10-PCS | Mod: PBBFAC,,, | Performed by: PHYSICIAN ASSISTANT

## 2021-06-18 PROCEDURE — 81003 URINALYSIS AUTO W/O SCOPE: CPT | Mod: QW,S$GLB,, | Performed by: PHYSICIAN ASSISTANT

## 2021-06-18 PROCEDURE — 1126F PR PAIN SEVERITY QUANTIFIED, NO PAIN PRESENT: ICD-10-PCS | Mod: S$GLB,,, | Performed by: PHYSICIAN ASSISTANT

## 2021-06-18 PROCEDURE — 3288F PR FALLS RISK ASSESSMENT DOCUMENTED: ICD-10-PCS | Mod: CPTII,S$GLB,, | Performed by: PHYSICIAN ASSISTANT

## 2021-06-18 PROCEDURE — 81003 POCT URINALYSIS: ICD-10-PCS | Mod: QW,S$GLB,, | Performed by: PHYSICIAN ASSISTANT

## 2021-06-18 PROCEDURE — 3288F FALL RISK ASSESSMENT DOCD: CPT | Mod: CPTII,S$GLB,, | Performed by: PHYSICIAN ASSISTANT

## 2021-06-18 PROCEDURE — 99213 OFFICE O/P EST LOW 20 MIN: CPT | Mod: S$GLB,,, | Performed by: PHYSICIAN ASSISTANT

## 2021-06-18 PROCEDURE — 1101F PT FALLS ASSESS-DOCD LE1/YR: CPT | Mod: CPTII,S$GLB,, | Performed by: PHYSICIAN ASSISTANT

## 2021-06-18 PROCEDURE — 1159F PR MEDICATION LIST DOCUMENTED IN MEDICAL RECORD: ICD-10-PCS | Mod: S$GLB,,, | Performed by: PHYSICIAN ASSISTANT

## 2021-06-18 PROCEDURE — 99999 PR PBB SHADOW E&M-EST. PATIENT-LVL V: CPT | Mod: PBBFAC,,, | Performed by: PHYSICIAN ASSISTANT

## 2021-06-18 PROCEDURE — 3072F PR LOW RISK FOR RETINOPATHY: ICD-10-PCS | Mod: S$GLB,,, | Performed by: PHYSICIAN ASSISTANT

## 2021-06-18 PROCEDURE — 99213 PR OFFICE/OUTPT VISIT, EST, LEVL III, 20-29 MIN: ICD-10-PCS | Mod: S$GLB,,, | Performed by: PHYSICIAN ASSISTANT

## 2021-06-18 PROCEDURE — 87661 TRICHOMONAS VAGINALIS AMPLIF: CPT | Performed by: PHYSICIAN ASSISTANT

## 2021-06-18 PROCEDURE — 87481 CANDIDA DNA AMP PROBE: CPT | Mod: 59 | Performed by: PHYSICIAN ASSISTANT

## 2021-06-18 PROCEDURE — 1159F MED LIST DOCD IN RCRD: CPT | Mod: S$GLB,,, | Performed by: PHYSICIAN ASSISTANT

## 2021-06-18 PROCEDURE — 3072F LOW RISK FOR RETINOPATHY: CPT | Mod: S$GLB,,, | Performed by: PHYSICIAN ASSISTANT

## 2021-06-18 PROCEDURE — 1101F PR PT FALLS ASSESS DOC 0-1 FALLS W/OUT INJ PAST YR: ICD-10-PCS | Mod: CPTII,S$GLB,, | Performed by: PHYSICIAN ASSISTANT

## 2021-06-18 RX ORDER — CEPHALEXIN 500 MG/1
500 CAPSULE ORAL EVERY 12 HOURS
Qty: 14 CAPSULE | Refills: 0 | Status: SHIPPED | OUTPATIENT
Start: 2021-06-18 | End: 2021-06-25

## 2021-06-30 ENCOUNTER — TELEPHONE (OUTPATIENT)
Dept: INTERNAL MEDICINE | Facility: CLINIC | Age: 81
End: 2021-06-30

## 2021-06-30 LAB
BACTERIAL VAGINOSIS DNA: NEGATIVE
CANDIDA GLABRATA DNA: NEGATIVE
CANDIDA KRUSEI DNA: NEGATIVE
CANDIDA RRNA VAG QL PROBE: NEGATIVE
T VAGINALIS RRNA GENITAL QL PROBE: NEGATIVE

## 2021-07-07 DIAGNOSIS — I10 HTN (HYPERTENSION), BENIGN: Primary | ICD-10-CM

## 2021-07-08 RX ORDER — ERGOCALCIFEROL 1.25 MG/1
50000 CAPSULE ORAL
Qty: 26 CAPSULE | Refills: 0 | Status: SHIPPED | OUTPATIENT
Start: 2021-07-08 | End: 2021-08-13 | Stop reason: SDUPTHER

## 2021-07-08 RX ORDER — CARVEDILOL 3.12 MG/1
3.12 TABLET ORAL 2 TIMES DAILY WITH MEALS
Qty: 180 TABLET | Refills: 3 | Status: ON HOLD | OUTPATIENT
Start: 2021-07-08 | End: 2022-02-28 | Stop reason: SDUPTHER

## 2021-07-14 ENCOUNTER — OFFICE VISIT (OUTPATIENT)
Dept: ORTHOPEDICS | Facility: CLINIC | Age: 81
End: 2021-07-14
Payer: MEDICARE

## 2021-07-14 ENCOUNTER — HOSPITAL ENCOUNTER (OUTPATIENT)
Dept: RADIOLOGY | Facility: HOSPITAL | Age: 81
Discharge: HOME | End: 2021-07-14
Attending: ORTHOPAEDIC SURGERY
Payer: MEDICARE

## 2021-07-14 VITALS — WEIGHT: 194.88 LBS | BODY MASS INDEX: 33.27 KG/M2 | HEIGHT: 64 IN

## 2021-07-14 DIAGNOSIS — M25.562 LEFT KNEE PAIN, UNSPECIFIED CHRONICITY: ICD-10-CM

## 2021-07-14 DIAGNOSIS — M25.562 LEFT KNEE PAIN, UNSPECIFIED CHRONICITY: Primary | ICD-10-CM

## 2021-07-14 DIAGNOSIS — Z96.652 STATUS POST REVISION OF TOTAL REPLACEMENT OF LEFT KNEE: Primary | ICD-10-CM

## 2021-07-14 PROCEDURE — 73562 X-RAY EXAM OF KNEE 3: CPT | Mod: 26,LT,, | Performed by: RADIOLOGY

## 2021-07-14 PROCEDURE — 99999 PR PBB SHADOW E&M-EST. PATIENT-LVL IV: CPT | Mod: PBBFAC,,, | Performed by: ORTHOPAEDIC SURGERY

## 2021-07-14 PROCEDURE — 99024 POSTOP FOLLOW-UP VISIT: CPT | Mod: S$GLB,,, | Performed by: ORTHOPAEDIC SURGERY

## 2021-07-14 PROCEDURE — 1126F PR PAIN SEVERITY QUANTIFIED, NO PAIN PRESENT: ICD-10-PCS | Mod: S$GLB,,, | Performed by: ORTHOPAEDIC SURGERY

## 2021-07-14 PROCEDURE — 3072F PR LOW RISK FOR RETINOPATHY: ICD-10-PCS | Mod: S$GLB,,, | Performed by: ORTHOPAEDIC SURGERY

## 2021-07-14 PROCEDURE — 73560 X-RAY EXAM OF KNEE 1 OR 2: CPT | Mod: TC,RT

## 2021-07-14 PROCEDURE — 73560 X-RAY EXAM OF KNEE 1 OR 2: CPT | Mod: 26,RT,, | Performed by: RADIOLOGY

## 2021-07-14 PROCEDURE — 73560 XR KNEE ORTHO LEFT: ICD-10-PCS | Mod: 26,RT,, | Performed by: RADIOLOGY

## 2021-07-14 PROCEDURE — 73562 XR KNEE ORTHO LEFT: ICD-10-PCS | Mod: 26,LT,, | Performed by: RADIOLOGY

## 2021-07-14 PROCEDURE — 99024 PR POST-OP FOLLOW-UP VISIT: ICD-10-PCS | Mod: S$GLB,,, | Performed by: ORTHOPAEDIC SURGERY

## 2021-07-14 PROCEDURE — 1126F AMNT PAIN NOTED NONE PRSNT: CPT | Mod: S$GLB,,, | Performed by: ORTHOPAEDIC SURGERY

## 2021-07-14 PROCEDURE — 99999 PR PBB SHADOW E&M-EST. PATIENT-LVL IV: ICD-10-PCS | Mod: PBBFAC,,, | Performed by: ORTHOPAEDIC SURGERY

## 2021-07-14 PROCEDURE — 3072F LOW RISK FOR RETINOPATHY: CPT | Mod: S$GLB,,, | Performed by: ORTHOPAEDIC SURGERY

## 2021-07-15 ENCOUNTER — TELEPHONE (OUTPATIENT)
Dept: INTERNAL MEDICINE | Facility: CLINIC | Age: 81
End: 2021-07-15

## 2021-07-20 ENCOUNTER — OFFICE VISIT (OUTPATIENT)
Dept: INTERNAL MEDICINE | Facility: CLINIC | Age: 81
End: 2021-07-20
Payer: MEDICARE

## 2021-07-20 VITALS
HEIGHT: 65 IN | WEIGHT: 248.69 LBS | HEART RATE: 95 BPM | DIASTOLIC BLOOD PRESSURE: 78 MMHG | SYSTOLIC BLOOD PRESSURE: 130 MMHG | BODY MASS INDEX: 41.43 KG/M2

## 2021-07-20 DIAGNOSIS — N30.00 ACUTE CYSTITIS WITHOUT HEMATURIA: ICD-10-CM

## 2021-07-20 LAB
BILIRUB UR QL STRIP: NEGATIVE
CAOX CRY UR QL COMP ASSIST: ABNORMAL
CLARITY UR REFRACT.AUTO: CLEAR
COLOR UR AUTO: YELLOW
GLUCOSE UR QL STRIP: NEGATIVE
HGB UR QL STRIP: NEGATIVE
KETONES UR QL STRIP: NEGATIVE
LEUKOCYTE ESTERASE UR QL STRIP: ABNORMAL
MICROSCOPIC COMMENT: ABNORMAL
NITRITE UR QL STRIP: NEGATIVE
PH UR STRIP: 5 [PH] (ref 5–8)
PROT UR QL STRIP: NEGATIVE
RBC #/AREA URNS AUTO: 1 /HPF (ref 0–4)
SP GR UR STRIP: 1.02 (ref 1–1.03)
SQUAMOUS #/AREA URNS AUTO: 0 /HPF
URN SPEC COLLECT METH UR: ABNORMAL
WBC #/AREA URNS AUTO: 8 /HPF (ref 0–5)

## 2021-07-20 PROCEDURE — 87086 URINE CULTURE/COLONY COUNT: CPT | Performed by: STUDENT IN AN ORGANIZED HEALTH CARE EDUCATION/TRAINING PROGRAM

## 2021-07-20 PROCEDURE — 99213 OFFICE O/P EST LOW 20 MIN: CPT | Mod: GC,S$GLB,, | Performed by: STUDENT IN AN ORGANIZED HEALTH CARE EDUCATION/TRAINING PROGRAM

## 2021-07-20 PROCEDURE — 81001 URINALYSIS AUTO W/SCOPE: CPT | Performed by: STUDENT IN AN ORGANIZED HEALTH CARE EDUCATION/TRAINING PROGRAM

## 2021-07-20 PROCEDURE — 99213 PR OFFICE/OUTPT VISIT, EST, LEVL III, 20-29 MIN: ICD-10-PCS | Mod: GC,S$GLB,, | Performed by: STUDENT IN AN ORGANIZED HEALTH CARE EDUCATION/TRAINING PROGRAM

## 2021-07-20 PROCEDURE — 99999 PR PBB SHADOW E&M-EST. PATIENT-LVL IV: ICD-10-PCS | Mod: PBBFAC,GC,, | Performed by: STUDENT IN AN ORGANIZED HEALTH CARE EDUCATION/TRAINING PROGRAM

## 2021-07-20 PROCEDURE — 99999 PR PBB SHADOW E&M-EST. PATIENT-LVL IV: CPT | Mod: PBBFAC,GC,, | Performed by: STUDENT IN AN ORGANIZED HEALTH CARE EDUCATION/TRAINING PROGRAM

## 2021-07-22 LAB
BACTERIA UR CULT: NORMAL
BACTERIA UR CULT: NORMAL

## 2021-07-23 ENCOUNTER — TELEPHONE (OUTPATIENT)
Dept: INTERNAL MEDICINE | Facility: CLINIC | Age: 81
End: 2021-07-23

## 2021-08-03 ENCOUNTER — LAB VISIT (OUTPATIENT)
Dept: LAB | Facility: HOSPITAL | Age: 81
End: 2021-08-03
Attending: INTERNAL MEDICINE
Payer: MEDICARE

## 2021-08-03 ENCOUNTER — TELEPHONE (OUTPATIENT)
Dept: CARDIOLOGY | Facility: CLINIC | Age: 81
End: 2021-08-03

## 2021-08-03 DIAGNOSIS — E11.51 CONTROLLED TYPE 2 DM WITH PERIPHERAL CIRCULATORY DISORDER: ICD-10-CM

## 2021-08-03 DIAGNOSIS — I10 HTN (HYPERTENSION), BENIGN: ICD-10-CM

## 2021-08-03 DIAGNOSIS — E78.2 MIXED HYPERLIPIDEMIA: ICD-10-CM

## 2021-08-03 DIAGNOSIS — I25.83 CORONARY ARTERY DISEASE DUE TO LIPID RICH PLAQUE: ICD-10-CM

## 2021-08-03 DIAGNOSIS — I25.10 CORONARY ARTERY DISEASE DUE TO LIPID RICH PLAQUE: ICD-10-CM

## 2021-08-03 DIAGNOSIS — I10 ESSENTIAL HYPERTENSION: ICD-10-CM

## 2021-08-03 LAB
ALBUMIN SERPL BCP-MCNC: 3.8 G/DL (ref 3.5–5.2)
ALP SERPL-CCNC: 104 U/L (ref 55–135)
ALT SERPL W/O P-5'-P-CCNC: 12 U/L (ref 10–44)
ANION GAP SERPL CALC-SCNC: 5 MMOL/L (ref 8–16)
AST SERPL-CCNC: 18 U/L (ref 10–40)
BASOPHILS # BLD AUTO: 0.04 K/UL (ref 0–0.2)
BASOPHILS NFR BLD: 0.6 % (ref 0–1.9)
BILIRUB SERPL-MCNC: 0.5 MG/DL (ref 0.1–1)
BUN SERPL-MCNC: 17 MG/DL (ref 8–23)
CALCIUM SERPL-MCNC: 10.8 MG/DL (ref 8.7–10.5)
CHLORIDE SERPL-SCNC: 106 MMOL/L (ref 95–110)
CHOLEST SERPL-MCNC: 178 MG/DL (ref 120–199)
CHOLEST/HDLC SERPL: 2.9 {RATIO} (ref 2–5)
CO2 SERPL-SCNC: 30 MMOL/L (ref 23–29)
CREAT SERPL-MCNC: 1.3 MG/DL (ref 0.5–1.4)
DIFFERENTIAL METHOD: ABNORMAL
EOSINOPHIL # BLD AUTO: 0.3 K/UL (ref 0–0.5)
EOSINOPHIL NFR BLD: 4.3 % (ref 0–8)
ERYTHROCYTE [DISTWIDTH] IN BLOOD BY AUTOMATED COUNT: 13.5 % (ref 11.5–14.5)
EST. GFR  (AFRICAN AMERICAN): 45.4 ML/MIN/1.73 M^2
EST. GFR  (NON AFRICAN AMERICAN): 39.4 ML/MIN/1.73 M^2
ESTIMATED AVG GLUCOSE: 128 MG/DL (ref 68–131)
GLUCOSE SERPL-MCNC: 113 MG/DL (ref 70–110)
HBA1C MFR BLD: 6.1 % (ref 4–5.6)
HCT VFR BLD AUTO: 43.2 % (ref 37–48.5)
HDLC SERPL-MCNC: 61 MG/DL (ref 40–75)
HDLC SERPL: 34.3 % (ref 20–50)
HGB BLD-MCNC: 13.7 G/DL (ref 12–16)
IMM GRANULOCYTES # BLD AUTO: 0.02 K/UL (ref 0–0.04)
IMM GRANULOCYTES NFR BLD AUTO: 0.3 % (ref 0–0.5)
LDLC SERPL CALC-MCNC: 101.2 MG/DL (ref 63–159)
LYMPHOCYTES # BLD AUTO: 1.9 K/UL (ref 1–4.8)
LYMPHOCYTES NFR BLD: 30.1 % (ref 18–48)
MCH RBC QN AUTO: 30.6 PG (ref 27–31)
MCHC RBC AUTO-ENTMCNC: 31.7 G/DL (ref 32–36)
MCV RBC AUTO: 96 FL (ref 82–98)
MONOCYTES # BLD AUTO: 0.7 K/UL (ref 0.3–1)
MONOCYTES NFR BLD: 10.9 % (ref 4–15)
NEUTROPHILS # BLD AUTO: 3.5 K/UL (ref 1.8–7.7)
NEUTROPHILS NFR BLD: 53.8 % (ref 38–73)
NONHDLC SERPL-MCNC: 117 MG/DL
NRBC BLD-RTO: 0 /100 WBC
PLATELET # BLD AUTO: 275 K/UL (ref 150–450)
PMV BLD AUTO: 10.4 FL (ref 9.2–12.9)
POTASSIUM SERPL-SCNC: 4.1 MMOL/L (ref 3.5–5.1)
PROT SERPL-MCNC: 7.9 G/DL (ref 6–8.4)
RBC # BLD AUTO: 4.48 M/UL (ref 4–5.4)
SODIUM SERPL-SCNC: 141 MMOL/L (ref 136–145)
TRIGL SERPL-MCNC: 79 MG/DL (ref 30–150)
TSH SERPL DL<=0.005 MIU/L-ACNC: 3.04 UIU/ML (ref 0.4–4)
WBC # BLD AUTO: 6.45 K/UL (ref 3.9–12.7)

## 2021-08-03 PROCEDURE — 80053 COMPREHEN METABOLIC PANEL: CPT | Performed by: INTERNAL MEDICINE

## 2021-08-03 PROCEDURE — 83036 HEMOGLOBIN GLYCOSYLATED A1C: CPT | Performed by: INTERNAL MEDICINE

## 2021-08-03 PROCEDURE — 36415 COLL VENOUS BLD VENIPUNCTURE: CPT | Mod: PO | Performed by: INTERNAL MEDICINE

## 2021-08-03 PROCEDURE — 80061 LIPID PANEL: CPT | Performed by: INTERNAL MEDICINE

## 2021-08-03 PROCEDURE — 85025 COMPLETE CBC W/AUTO DIFF WBC: CPT | Performed by: INTERNAL MEDICINE

## 2021-08-03 PROCEDURE — 84443 ASSAY THYROID STIM HORMONE: CPT | Performed by: INTERNAL MEDICINE

## 2021-08-07 ENCOUNTER — PATIENT OUTREACH (OUTPATIENT)
Dept: ADMINISTRATIVE | Facility: OTHER | Age: 81
End: 2021-08-07

## 2021-08-09 ENCOUNTER — OFFICE VISIT (OUTPATIENT)
Dept: CARDIOLOGY | Facility: CLINIC | Age: 81
End: 2021-08-09
Payer: MEDICARE

## 2021-08-09 VITALS
DIASTOLIC BLOOD PRESSURE: 77 MMHG | HEIGHT: 64 IN | HEART RATE: 87 BPM | BODY MASS INDEX: 42.26 KG/M2 | WEIGHT: 247.56 LBS | SYSTOLIC BLOOD PRESSURE: 162 MMHG

## 2021-08-09 DIAGNOSIS — I73.9 PERIPHERAL VASCULAR DISEASE: ICD-10-CM

## 2021-08-09 DIAGNOSIS — I45.10 RBBB: ICD-10-CM

## 2021-08-09 DIAGNOSIS — I25.83 CORONARY ARTERY DISEASE DUE TO LIPID RICH PLAQUE: Primary | Chronic | ICD-10-CM

## 2021-08-09 DIAGNOSIS — I10 HTN (HYPERTENSION), BENIGN: Chronic | ICD-10-CM

## 2021-08-09 DIAGNOSIS — Z95.5 S/P CORONARY ARTERY STENT PLACEMENT: ICD-10-CM

## 2021-08-09 DIAGNOSIS — R09.89 PROMINENT AORTA: ICD-10-CM

## 2021-08-09 DIAGNOSIS — E78.2 MIXED HYPERLIPIDEMIA: Chronic | ICD-10-CM

## 2021-08-09 DIAGNOSIS — I25.10 CORONARY ARTERY DISEASE DUE TO LIPID RICH PLAQUE: Primary | Chronic | ICD-10-CM

## 2021-08-09 DIAGNOSIS — C50.412 MALIGNANT NEOPLASM OF UPPER-OUTER QUADRANT OF LEFT FEMALE BREAST, UNSPECIFIED ESTROGEN RECEPTOR STATUS: ICD-10-CM

## 2021-08-09 DIAGNOSIS — I25.2 OLD MI (MYOCARDIAL INFARCTION): ICD-10-CM

## 2021-08-09 DIAGNOSIS — N18.32 STAGE 3B CHRONIC KIDNEY DISEASE: Chronic | ICD-10-CM

## 2021-08-09 DIAGNOSIS — E78.2 MIXED HYPERLIPIDEMIA: Primary | ICD-10-CM

## 2021-08-09 DIAGNOSIS — I65.23 BILATERAL CAROTID ARTERY STENOSIS: ICD-10-CM

## 2021-08-09 PROCEDURE — 3077F SYST BP >= 140 MM HG: CPT | Mod: CPTII,S$GLB,, | Performed by: INTERNAL MEDICINE

## 2021-08-09 PROCEDURE — 1160F PR REVIEW ALL MEDS BY PRESCRIBER/CLIN PHARMACIST DOCUMENTED: ICD-10-PCS | Mod: CPTII,S$GLB,, | Performed by: INTERNAL MEDICINE

## 2021-08-09 PROCEDURE — 1126F AMNT PAIN NOTED NONE PRSNT: CPT | Mod: CPTII,S$GLB,, | Performed by: INTERNAL MEDICINE

## 2021-08-09 PROCEDURE — 1126F PR PAIN SEVERITY QUANTIFIED, NO PAIN PRESENT: ICD-10-PCS | Mod: CPTII,S$GLB,, | Performed by: INTERNAL MEDICINE

## 2021-08-09 PROCEDURE — 3078F DIAST BP <80 MM HG: CPT | Mod: CPTII,S$GLB,, | Performed by: INTERNAL MEDICINE

## 2021-08-09 PROCEDURE — 1159F MED LIST DOCD IN RCRD: CPT | Mod: CPTII,S$GLB,, | Performed by: INTERNAL MEDICINE

## 2021-08-09 PROCEDURE — 1101F PR PT FALLS ASSESS DOC 0-1 FALLS W/OUT INJ PAST YR: ICD-10-PCS | Mod: CPTII,S$GLB,, | Performed by: INTERNAL MEDICINE

## 2021-08-09 PROCEDURE — 3072F LOW RISK FOR RETINOPATHY: CPT | Mod: CPTII,S$GLB,, | Performed by: INTERNAL MEDICINE

## 2021-08-09 PROCEDURE — 99999 PR PBB SHADOW E&M-EST. PATIENT-LVL IV: ICD-10-PCS | Mod: PBBFAC,,, | Performed by: INTERNAL MEDICINE

## 2021-08-09 PROCEDURE — 3288F PR FALLS RISK ASSESSMENT DOCUMENTED: ICD-10-PCS | Mod: CPTII,S$GLB,, | Performed by: INTERNAL MEDICINE

## 2021-08-09 PROCEDURE — 1101F PT FALLS ASSESS-DOCD LE1/YR: CPT | Mod: CPTII,S$GLB,, | Performed by: INTERNAL MEDICINE

## 2021-08-09 PROCEDURE — 99214 OFFICE O/P EST MOD 30 MIN: CPT | Mod: S$GLB,,, | Performed by: INTERNAL MEDICINE

## 2021-08-09 PROCEDURE — 1159F PR MEDICATION LIST DOCUMENTED IN MEDICAL RECORD: ICD-10-PCS | Mod: CPTII,S$GLB,, | Performed by: INTERNAL MEDICINE

## 2021-08-09 PROCEDURE — 3072F PR LOW RISK FOR RETINOPATHY: ICD-10-PCS | Mod: CPTII,S$GLB,, | Performed by: INTERNAL MEDICINE

## 2021-08-09 PROCEDURE — 99999 PR PBB SHADOW E&M-EST. PATIENT-LVL IV: CPT | Mod: PBBFAC,,, | Performed by: INTERNAL MEDICINE

## 2021-08-09 PROCEDURE — 3077F PR MOST RECENT SYSTOLIC BLOOD PRESSURE >= 140 MM HG: ICD-10-PCS | Mod: CPTII,S$GLB,, | Performed by: INTERNAL MEDICINE

## 2021-08-09 PROCEDURE — 99214 PR OFFICE/OUTPT VISIT, EST, LEVL IV, 30-39 MIN: ICD-10-PCS | Mod: S$GLB,,, | Performed by: INTERNAL MEDICINE

## 2021-08-09 PROCEDURE — 1160F RVW MEDS BY RX/DR IN RCRD: CPT | Mod: CPTII,S$GLB,, | Performed by: INTERNAL MEDICINE

## 2021-08-09 PROCEDURE — 3078F PR MOST RECENT DIASTOLIC BLOOD PRESSURE < 80 MM HG: ICD-10-PCS | Mod: CPTII,S$GLB,, | Performed by: INTERNAL MEDICINE

## 2021-08-09 PROCEDURE — 3288F FALL RISK ASSESSMENT DOCD: CPT | Mod: CPTII,S$GLB,, | Performed by: INTERNAL MEDICINE

## 2021-08-09 RX ORDER — EZETIMIBE 10 MG/1
10 TABLET ORAL DAILY
COMMUNITY
End: 2021-08-09 | Stop reason: SDUPTHER

## 2021-08-09 RX ORDER — EZETIMIBE 10 MG/1
10 TABLET ORAL DAILY
Qty: 30 TABLET | Refills: 6 | Status: SHIPPED | OUTPATIENT
Start: 2021-08-09 | End: 2022-03-21 | Stop reason: SDUPTHER

## 2021-08-13 ENCOUNTER — OFFICE VISIT (OUTPATIENT)
Dept: INTERNAL MEDICINE | Facility: CLINIC | Age: 81
End: 2021-08-13
Payer: MEDICARE

## 2021-08-13 VITALS
BODY MASS INDEX: 41.48 KG/M2 | HEIGHT: 65 IN | WEIGHT: 249 LBS | DIASTOLIC BLOOD PRESSURE: 70 MMHG | SYSTOLIC BLOOD PRESSURE: 130 MMHG | HEART RATE: 81 BPM | OXYGEN SATURATION: 96 %

## 2021-08-13 DIAGNOSIS — I10 HTN (HYPERTENSION), BENIGN: Primary | ICD-10-CM

## 2021-08-13 DIAGNOSIS — N94.9 VAGINAL DISCOMFORT: ICD-10-CM

## 2021-08-13 DIAGNOSIS — C50.412 MALIGNANT NEOPLASM OF UPPER-OUTER QUADRANT OF LEFT BREAST IN FEMALE, ESTROGEN RECEPTOR POSITIVE: ICD-10-CM

## 2021-08-13 DIAGNOSIS — Z17.0 MALIGNANT NEOPLASM OF UPPER-OUTER QUADRANT OF LEFT BREAST IN FEMALE, ESTROGEN RECEPTOR POSITIVE: ICD-10-CM

## 2021-08-13 DIAGNOSIS — Z95.5 S/P CORONARY ARTERY STENT PLACEMENT: ICD-10-CM

## 2021-08-13 DIAGNOSIS — E11.59 TYPE 2 DIABETES MELLITUS WITH OTHER CIRCULATORY COMPLICATION, WITHOUT LONG-TERM CURRENT USE OF INSULIN: ICD-10-CM

## 2021-08-13 DIAGNOSIS — I10 ESSENTIAL HYPERTENSION: ICD-10-CM

## 2021-08-13 PROCEDURE — 1101F PT FALLS ASSESS-DOCD LE1/YR: CPT | Mod: CPTII,S$GLB,, | Performed by: INTERNAL MEDICINE

## 2021-08-13 PROCEDURE — 99999 PR PBB SHADOW E&M-EST. PATIENT-LVL IV: ICD-10-PCS | Mod: PBBFAC,,, | Performed by: INTERNAL MEDICINE

## 2021-08-13 PROCEDURE — 99499 RISK ADDL DX/OHS AUDIT: ICD-10-PCS | Mod: S$GLB,,, | Performed by: INTERNAL MEDICINE

## 2021-08-13 PROCEDURE — 3075F PR MOST RECENT SYSTOLIC BLOOD PRESS GE 130-139MM HG: ICD-10-PCS | Mod: CPTII,S$GLB,, | Performed by: INTERNAL MEDICINE

## 2021-08-13 PROCEDURE — 3078F DIAST BP <80 MM HG: CPT | Mod: CPTII,S$GLB,, | Performed by: INTERNAL MEDICINE

## 2021-08-13 PROCEDURE — 3288F PR FALLS RISK ASSESSMENT DOCUMENTED: ICD-10-PCS | Mod: CPTII,S$GLB,, | Performed by: INTERNAL MEDICINE

## 2021-08-13 PROCEDURE — 99499 UNLISTED E&M SERVICE: CPT | Mod: S$GLB,,, | Performed by: INTERNAL MEDICINE

## 2021-08-13 PROCEDURE — 1126F PR PAIN SEVERITY QUANTIFIED, NO PAIN PRESENT: ICD-10-PCS | Mod: CPTII,S$GLB,, | Performed by: INTERNAL MEDICINE

## 2021-08-13 PROCEDURE — 3075F SYST BP GE 130 - 139MM HG: CPT | Mod: CPTII,S$GLB,, | Performed by: INTERNAL MEDICINE

## 2021-08-13 PROCEDURE — 1126F AMNT PAIN NOTED NONE PRSNT: CPT | Mod: CPTII,S$GLB,, | Performed by: INTERNAL MEDICINE

## 2021-08-13 PROCEDURE — 3072F LOW RISK FOR RETINOPATHY: CPT | Mod: CPTII,S$GLB,, | Performed by: INTERNAL MEDICINE

## 2021-08-13 PROCEDURE — 99214 PR OFFICE/OUTPT VISIT, EST, LEVL IV, 30-39 MIN: ICD-10-PCS | Mod: S$GLB,,, | Performed by: INTERNAL MEDICINE

## 2021-08-13 PROCEDURE — 99999 PR PBB SHADOW E&M-EST. PATIENT-LVL IV: CPT | Mod: PBBFAC,,, | Performed by: INTERNAL MEDICINE

## 2021-08-13 PROCEDURE — 3288F FALL RISK ASSESSMENT DOCD: CPT | Mod: CPTII,S$GLB,, | Performed by: INTERNAL MEDICINE

## 2021-08-13 PROCEDURE — 1101F PR PT FALLS ASSESS DOC 0-1 FALLS W/OUT INJ PAST YR: ICD-10-PCS | Mod: CPTII,S$GLB,, | Performed by: INTERNAL MEDICINE

## 2021-08-13 PROCEDURE — 1159F MED LIST DOCD IN RCRD: CPT | Mod: CPTII,S$GLB,, | Performed by: INTERNAL MEDICINE

## 2021-08-13 PROCEDURE — 3078F PR MOST RECENT DIASTOLIC BLOOD PRESSURE < 80 MM HG: ICD-10-PCS | Mod: CPTII,S$GLB,, | Performed by: INTERNAL MEDICINE

## 2021-08-13 PROCEDURE — 99214 OFFICE O/P EST MOD 30 MIN: CPT | Mod: S$GLB,,, | Performed by: INTERNAL MEDICINE

## 2021-08-13 PROCEDURE — 1159F PR MEDICATION LIST DOCUMENTED IN MEDICAL RECORD: ICD-10-PCS | Mod: CPTII,S$GLB,, | Performed by: INTERNAL MEDICINE

## 2021-08-13 PROCEDURE — 3072F PR LOW RISK FOR RETINOPATHY: ICD-10-PCS | Mod: CPTII,S$GLB,, | Performed by: INTERNAL MEDICINE

## 2021-08-13 RX ORDER — ERGOCALCIFEROL 1.25 MG/1
50000 CAPSULE ORAL
Qty: 26 CAPSULE | Refills: 4 | Status: SHIPPED | OUTPATIENT
Start: 2021-08-16 | End: 2022-03-07

## 2021-08-13 RX ORDER — NYSTATIN 100000 U/G
CREAM TOPICAL 2 TIMES DAILY
Qty: 60 G | Refills: 3 | Status: SHIPPED | OUTPATIENT
Start: 2021-08-13 | End: 2022-04-26

## 2021-08-18 ENCOUNTER — HOSPITAL ENCOUNTER (OUTPATIENT)
Dept: RADIOLOGY | Facility: HOSPITAL | Age: 81
Discharge: HOME | End: 2021-08-18
Attending: NURSE PRACTITIONER
Payer: MEDICARE

## 2021-08-18 DIAGNOSIS — Z12.31 ENCOUNTER FOR SCREENING MAMMOGRAM FOR MALIGNANT NEOPLASM OF BREAST: ICD-10-CM

## 2021-08-18 DIAGNOSIS — C50.412 MALIGNANT NEOPLASM OF UPPER-OUTER QUADRANT OF LEFT FEMALE BREAST, UNSPECIFIED ESTROGEN RECEPTOR STATUS: ICD-10-CM

## 2021-08-18 PROCEDURE — 77067 MAMMO DIGITAL SCREENING BILAT WITH TOMO: ICD-10-PCS | Mod: 26,,, | Performed by: RADIOLOGY

## 2021-08-18 PROCEDURE — 77067 SCR MAMMO BI INCL CAD: CPT | Mod: TC

## 2021-08-18 PROCEDURE — 77063 BREAST TOMOSYNTHESIS BI: CPT | Mod: 26,,, | Performed by: RADIOLOGY

## 2021-08-18 PROCEDURE — 77063 MAMMO DIGITAL SCREENING BILAT WITH TOMO: ICD-10-PCS | Mod: 26,,, | Performed by: RADIOLOGY

## 2021-08-18 PROCEDURE — 77067 SCR MAMMO BI INCL CAD: CPT | Mod: 26,,, | Performed by: RADIOLOGY

## 2021-09-30 ENCOUNTER — OFFICE VISIT (OUTPATIENT)
Dept: HEMATOLOGY/ONCOLOGY | Facility: CLINIC | Age: 81
End: 2021-09-30
Payer: MEDICARE

## 2021-09-30 VITALS
SYSTOLIC BLOOD PRESSURE: 176 MMHG | HEIGHT: 65 IN | BODY MASS INDEX: 41.92 KG/M2 | WEIGHT: 251.63 LBS | HEART RATE: 87 BPM | TEMPERATURE: 99 F | DIASTOLIC BLOOD PRESSURE: 81 MMHG | RESPIRATION RATE: 16 BRPM | OXYGEN SATURATION: 96 %

## 2021-09-30 DIAGNOSIS — E78.2 MIXED HYPERLIPIDEMIA: Chronic | ICD-10-CM

## 2021-09-30 DIAGNOSIS — N18.4 CHRONIC RENAL DISEASE, STAGE 4, SEVERELY DECREASED GLOMERULAR FILTRATION RATE (GFR) BETWEEN 15-29 ML/MIN/1.73 SQUARE METER: ICD-10-CM

## 2021-09-30 DIAGNOSIS — C50.412 MALIGNANT NEOPLASM OF UPPER-OUTER QUADRANT OF LEFT FEMALE BREAST, UNSPECIFIED ESTROGEN RECEPTOR STATUS: Primary | ICD-10-CM

## 2021-09-30 DIAGNOSIS — E11.59 TYPE 2 DIABETES MELLITUS WITH OTHER CIRCULATORY COMPLICATION, WITHOUT LONG-TERM CURRENT USE OF INSULIN: ICD-10-CM

## 2021-09-30 DIAGNOSIS — I10 HTN (HYPERTENSION), BENIGN: Chronic | ICD-10-CM

## 2021-09-30 DIAGNOSIS — E11.21 TYPE 2 DIABETES MELLITUS WITH DIABETIC NEPHROPATHY, WITHOUT LONG-TERM CURRENT USE OF INSULIN: ICD-10-CM

## 2021-09-30 DIAGNOSIS — E44.0 MODERATE MALNUTRITION: ICD-10-CM

## 2021-09-30 DIAGNOSIS — K21.9 GASTROESOPHAGEAL REFLUX DISEASE, UNSPECIFIED WHETHER ESOPHAGITIS PRESENT: ICD-10-CM

## 2021-09-30 DIAGNOSIS — Z79.811 LONG TERM (CURRENT) USE OF AROMATASE INHIBITORS: ICD-10-CM

## 2021-09-30 DIAGNOSIS — E11.51 CONTROLLED TYPE 2 DM WITH PERIPHERAL CIRCULATORY DISORDER: Chronic | ICD-10-CM

## 2021-09-30 DIAGNOSIS — D50.0 IRON DEFICIENCY ANEMIA DUE TO CHRONIC BLOOD LOSS: ICD-10-CM

## 2021-09-30 PROCEDURE — 1126F AMNT PAIN NOTED NONE PRSNT: CPT | Mod: CPTII,S$GLB,, | Performed by: NURSE PRACTITIONER

## 2021-09-30 PROCEDURE — 3079F DIAST BP 80-89 MM HG: CPT | Mod: CPTII,S$GLB,, | Performed by: NURSE PRACTITIONER

## 2021-09-30 PROCEDURE — 3077F PR MOST RECENT SYSTOLIC BLOOD PRESSURE >= 140 MM HG: ICD-10-PCS | Mod: CPTII,S$GLB,, | Performed by: NURSE PRACTITIONER

## 2021-09-30 PROCEDURE — 3288F FALL RISK ASSESSMENT DOCD: CPT | Mod: CPTII,S$GLB,, | Performed by: NURSE PRACTITIONER

## 2021-09-30 PROCEDURE — 99499 UNLISTED E&M SERVICE: CPT | Mod: S$GLB,,, | Performed by: NURSE PRACTITIONER

## 2021-09-30 PROCEDURE — 1126F PR PAIN SEVERITY QUANTIFIED, NO PAIN PRESENT: ICD-10-PCS | Mod: CPTII,S$GLB,, | Performed by: NURSE PRACTITIONER

## 2021-09-30 PROCEDURE — 3072F PR LOW RISK FOR RETINOPATHY: ICD-10-PCS | Mod: CPTII,S$GLB,, | Performed by: NURSE PRACTITIONER

## 2021-09-30 PROCEDURE — 3077F SYST BP >= 140 MM HG: CPT | Mod: CPTII,S$GLB,, | Performed by: NURSE PRACTITIONER

## 2021-09-30 PROCEDURE — 3072F LOW RISK FOR RETINOPATHY: CPT | Mod: CPTII,S$GLB,, | Performed by: NURSE PRACTITIONER

## 2021-09-30 PROCEDURE — 99999 PR PBB SHADOW E&M-EST. PATIENT-LVL V: ICD-10-PCS | Mod: PBBFAC,,, | Performed by: NURSE PRACTITIONER

## 2021-09-30 PROCEDURE — 3288F PR FALLS RISK ASSESSMENT DOCUMENTED: ICD-10-PCS | Mod: CPTII,S$GLB,, | Performed by: NURSE PRACTITIONER

## 2021-09-30 PROCEDURE — 1160F RVW MEDS BY RX/DR IN RCRD: CPT | Mod: CPTII,S$GLB,, | Performed by: NURSE PRACTITIONER

## 2021-09-30 PROCEDURE — 1159F MED LIST DOCD IN RCRD: CPT | Mod: CPTII,S$GLB,, | Performed by: NURSE PRACTITIONER

## 2021-09-30 PROCEDURE — 1159F PR MEDICATION LIST DOCUMENTED IN MEDICAL RECORD: ICD-10-PCS | Mod: CPTII,S$GLB,, | Performed by: NURSE PRACTITIONER

## 2021-09-30 PROCEDURE — 1101F PT FALLS ASSESS-DOCD LE1/YR: CPT | Mod: CPTII,S$GLB,, | Performed by: NURSE PRACTITIONER

## 2021-09-30 PROCEDURE — 1101F PR PT FALLS ASSESS DOC 0-1 FALLS W/OUT INJ PAST YR: ICD-10-PCS | Mod: CPTII,S$GLB,, | Performed by: NURSE PRACTITIONER

## 2021-09-30 PROCEDURE — 99999 PR PBB SHADOW E&M-EST. PATIENT-LVL V: CPT | Mod: PBBFAC,,, | Performed by: NURSE PRACTITIONER

## 2021-09-30 PROCEDURE — 3079F PR MOST RECENT DIASTOLIC BLOOD PRESSURE 80-89 MM HG: ICD-10-PCS | Mod: CPTII,S$GLB,, | Performed by: NURSE PRACTITIONER

## 2021-09-30 PROCEDURE — 99214 OFFICE O/P EST MOD 30 MIN: CPT | Mod: S$GLB,,, | Performed by: NURSE PRACTITIONER

## 2021-09-30 PROCEDURE — 1160F PR REVIEW ALL MEDS BY PRESCRIBER/CLIN PHARMACIST DOCUMENTED: ICD-10-PCS | Mod: CPTII,S$GLB,, | Performed by: NURSE PRACTITIONER

## 2021-09-30 PROCEDURE — 99214 PR OFFICE/OUTPT VISIT, EST, LEVL IV, 30-39 MIN: ICD-10-PCS | Mod: S$GLB,,, | Performed by: NURSE PRACTITIONER

## 2021-09-30 PROCEDURE — 99499 RISK ADDL DX/OHS AUDIT: ICD-10-PCS | Mod: S$GLB,,, | Performed by: NURSE PRACTITIONER

## 2021-10-05 ENCOUNTER — TELEPHONE (OUTPATIENT)
Dept: CARDIOLOGY | Facility: CLINIC | Age: 81
End: 2021-10-05

## 2021-10-05 RX ORDER — AMLODIPINE BESYLATE 5 MG/1
TABLET ORAL
Qty: 90 TABLET | Refills: 3 | Status: ON HOLD | OUTPATIENT
Start: 2021-10-05 | End: 2022-02-28 | Stop reason: SDUPTHER

## 2021-10-07 DIAGNOSIS — Z96.652 STATUS POST REVISION OF TOTAL REPLACEMENT OF LEFT KNEE: Primary | ICD-10-CM

## 2021-10-13 ENCOUNTER — HOSPITAL ENCOUNTER (OUTPATIENT)
Dept: RADIOLOGY | Facility: HOSPITAL | Age: 81
Discharge: HOME | End: 2021-10-13
Attending: ORTHOPAEDIC SURGERY
Payer: MEDICARE

## 2021-10-13 ENCOUNTER — OFFICE VISIT (OUTPATIENT)
Dept: ORTHOPEDICS | Facility: CLINIC | Age: 81
End: 2021-10-13
Payer: MEDICARE

## 2021-10-13 VITALS — BODY MASS INDEX: 41.92 KG/M2 | WEIGHT: 251.63 LBS | HEIGHT: 65 IN

## 2021-10-13 DIAGNOSIS — Z96.652 STATUS POST REVISION OF TOTAL REPLACEMENT OF LEFT KNEE: Primary | ICD-10-CM

## 2021-10-13 DIAGNOSIS — G89.29 CHRONIC PAIN OF LEFT KNEE: ICD-10-CM

## 2021-10-13 DIAGNOSIS — Z96.652 STATUS POST REVISION OF TOTAL REPLACEMENT OF LEFT KNEE: ICD-10-CM

## 2021-10-13 DIAGNOSIS — M25.562 CHRONIC PAIN OF LEFT KNEE: ICD-10-CM

## 2021-10-13 PROCEDURE — 1160F PR REVIEW ALL MEDS BY PRESCRIBER/CLIN PHARMACIST DOCUMENTED: ICD-10-PCS | Mod: CPTII,S$GLB,, | Performed by: ORTHOPAEDIC SURGERY

## 2021-10-13 PROCEDURE — 99212 OFFICE O/P EST SF 10 MIN: CPT | Mod: S$GLB,,, | Performed by: ORTHOPAEDIC SURGERY

## 2021-10-13 PROCEDURE — 73562 XR KNEE ORTHO LEFT: ICD-10-PCS | Mod: 26,LT,, | Performed by: RADIOLOGY

## 2021-10-13 PROCEDURE — 99999 PR PBB SHADOW E&M-EST. PATIENT-LVL IV: ICD-10-PCS | Mod: PBBFAC,,, | Performed by: ORTHOPAEDIC SURGERY

## 2021-10-13 PROCEDURE — 1126F PR PAIN SEVERITY QUANTIFIED, NO PAIN PRESENT: ICD-10-PCS | Mod: CPTII,S$GLB,, | Performed by: ORTHOPAEDIC SURGERY

## 2021-10-13 PROCEDURE — 99212 PR OFFICE/OUTPT VISIT, EST, LEVL II, 10-19 MIN: ICD-10-PCS | Mod: S$GLB,,, | Performed by: ORTHOPAEDIC SURGERY

## 2021-10-13 PROCEDURE — 1159F MED LIST DOCD IN RCRD: CPT | Mod: CPTII,S$GLB,, | Performed by: ORTHOPAEDIC SURGERY

## 2021-10-13 PROCEDURE — 1126F AMNT PAIN NOTED NONE PRSNT: CPT | Mod: CPTII,S$GLB,, | Performed by: ORTHOPAEDIC SURGERY

## 2021-10-13 PROCEDURE — 99999 PR PBB SHADOW E&M-EST. PATIENT-LVL IV: CPT | Mod: PBBFAC,,, | Performed by: ORTHOPAEDIC SURGERY

## 2021-10-13 PROCEDURE — 73562 X-RAY EXAM OF KNEE 3: CPT | Mod: 26,LT,, | Performed by: RADIOLOGY

## 2021-10-13 PROCEDURE — 3072F LOW RISK FOR RETINOPATHY: CPT | Mod: CPTII,S$GLB,, | Performed by: ORTHOPAEDIC SURGERY

## 2021-10-13 PROCEDURE — 73560 XR KNEE ORTHO LEFT: ICD-10-PCS | Mod: 26,RT,, | Performed by: RADIOLOGY

## 2021-10-13 PROCEDURE — 1159F PR MEDICATION LIST DOCUMENTED IN MEDICAL RECORD: ICD-10-PCS | Mod: CPTII,S$GLB,, | Performed by: ORTHOPAEDIC SURGERY

## 2021-10-13 PROCEDURE — 3072F PR LOW RISK FOR RETINOPATHY: ICD-10-PCS | Mod: CPTII,S$GLB,, | Performed by: ORTHOPAEDIC SURGERY

## 2021-10-13 PROCEDURE — 73560 X-RAY EXAM OF KNEE 1 OR 2: CPT | Mod: TC,RT,59

## 2021-10-13 PROCEDURE — 73560 X-RAY EXAM OF KNEE 1 OR 2: CPT | Mod: 26,RT,, | Performed by: RADIOLOGY

## 2021-10-13 PROCEDURE — 1160F RVW MEDS BY RX/DR IN RCRD: CPT | Mod: CPTII,S$GLB,, | Performed by: ORTHOPAEDIC SURGERY

## 2021-10-19 ENCOUNTER — TELEPHONE (OUTPATIENT)
Dept: CARDIOLOGY | Facility: CLINIC | Age: 81
End: 2021-10-19

## 2021-10-19 ENCOUNTER — HOSPITAL ENCOUNTER (OUTPATIENT)
Dept: RADIOLOGY | Facility: CLINIC | Age: 81
Discharge: HOME | End: 2021-10-19
Attending: NURSE PRACTITIONER
Payer: MEDICARE

## 2021-10-19 DIAGNOSIS — Z79.811 LONG TERM (CURRENT) USE OF AROMATASE INHIBITORS: ICD-10-CM

## 2021-10-19 PROCEDURE — 77080 DXA BONE DENSITY AXIAL: CPT | Mod: TC

## 2021-10-19 PROCEDURE — 77080 DEXA BONE DENSITY SPINE HIP: ICD-10-PCS | Mod: 26,,, | Performed by: INTERNAL MEDICINE

## 2021-10-19 PROCEDURE — 77080 DXA BONE DENSITY AXIAL: CPT | Mod: 26,,, | Performed by: INTERNAL MEDICINE

## 2021-10-30 ENCOUNTER — IMMUNIZATION (OUTPATIENT)
Dept: INTERNAL MEDICINE | Facility: CLINIC | Age: 81
End: 2021-10-30
Payer: MEDICARE

## 2021-10-30 DIAGNOSIS — Z23 NEED FOR VACCINATION: Primary | ICD-10-CM

## 2021-10-30 PROCEDURE — 0003A COVID-19, MRNA, LNP-S, PF, 30 MCG/0.3 ML DOSE VACCINE: CPT | Mod: CV19,PBBFAC | Performed by: INTERNAL MEDICINE

## 2021-10-30 PROCEDURE — 91300 COVID-19, MRNA, LNP-S, PF, 30 MCG/0.3 ML DOSE VACCINE: CPT | Mod: PBBFAC | Performed by: INTERNAL MEDICINE

## 2021-11-17 PROBLEM — N18.4 CHRONIC RENAL DISEASE, STAGE 4, SEVERELY DECREASED GLOMERULAR FILTRATION RATE (GFR) BETWEEN 15-29 ML/MIN/1.73 SQUARE METER: Status: RESOLVED | Noted: 2019-12-26 | Resolved: 2021-11-17

## 2021-11-17 PROBLEM — Z91.81 HISTORY OF FALL: Status: ACTIVE | Noted: 2020-02-28

## 2021-11-17 PROBLEM — E11.22 CKD STAGE 3 SECONDARY TO DIABETES: Status: ACTIVE | Noted: 2021-11-17

## 2021-11-17 PROBLEM — Z91.81 RISK FOR FALLS: Status: ACTIVE | Noted: 2021-11-17

## 2021-11-17 PROBLEM — E11.59 HYPERTENSION ASSOCIATED WITH DIABETES: Status: ACTIVE | Noted: 2021-11-17

## 2021-11-17 PROBLEM — Z87.19 HISTORY OF GASTROINTESTINAL BLEEDING: Status: ACTIVE | Noted: 2020-01-31

## 2021-11-17 PROBLEM — E11.9 DM TYPE 2 WITHOUT RETINOPATHY: Status: ACTIVE | Noted: 2021-11-17

## 2021-11-17 PROBLEM — N18.30 CKD STAGE 3 SECONDARY TO DIABETES: Status: ACTIVE | Noted: 2021-11-17

## 2021-11-17 PROBLEM — I15.2 HYPERTENSION ASSOCIATED WITH DIABETES: Status: ACTIVE | Noted: 2021-11-17

## 2021-11-17 PROBLEM — Z96.659 FAILED TOTAL KNEE ARTHROPLASTY: Status: RESOLVED | Noted: 2021-04-15 | Resolved: 2021-11-17

## 2021-11-17 PROBLEM — T84.018A FAILED TOTAL KNEE ARTHROPLASTY: Status: RESOLVED | Noted: 2021-04-15 | Resolved: 2021-11-17

## 2021-11-18 ENCOUNTER — OFFICE VISIT (OUTPATIENT)
Dept: INTERNAL MEDICINE | Facility: CLINIC | Age: 81
End: 2021-11-18
Payer: MEDICARE

## 2021-11-18 VITALS — SYSTOLIC BLOOD PRESSURE: 130 MMHG | DIASTOLIC BLOOD PRESSURE: 80 MMHG

## 2021-11-18 DIAGNOSIS — K21.9 GASTROESOPHAGEAL REFLUX DISEASE, UNSPECIFIED WHETHER ESOPHAGITIS PRESENT: ICD-10-CM

## 2021-11-18 DIAGNOSIS — H25.10 NUCLEAR SENILE CATARACT, UNSPECIFIED LATERALITY: ICD-10-CM

## 2021-11-18 DIAGNOSIS — G56.00 CARPAL TUNNEL SYNDROME, UNSPECIFIED LATERALITY: ICD-10-CM

## 2021-11-18 DIAGNOSIS — Z80.0 FAMILY HISTORY OF COLON CANCER: ICD-10-CM

## 2021-11-18 DIAGNOSIS — Z87.19 HISTORY OF GASTROINTESTINAL BLEEDING: ICD-10-CM

## 2021-11-18 DIAGNOSIS — Z91.81 HISTORY OF FALL: ICD-10-CM

## 2021-11-18 DIAGNOSIS — I45.10 RBBB: ICD-10-CM

## 2021-11-18 DIAGNOSIS — H91.93 DECREASED HEARING OF BOTH EARS: ICD-10-CM

## 2021-11-18 DIAGNOSIS — I25.118 ATHEROSCLEROTIC HEART DISEASE OF NATIVE CORONARY ARTERY WITH OTHER FORMS OF ANGINA PECTORIS: ICD-10-CM

## 2021-11-18 DIAGNOSIS — N18.30 TYPE 2 DIABETES MELLITUS WITH STAGE 3 CHRONIC KIDNEY DISEASE, WITHOUT LONG-TERM CURRENT USE OF INSULIN, UNSPECIFIED WHETHER STAGE 3A OR 3B CKD: ICD-10-CM

## 2021-11-18 DIAGNOSIS — Z96.652 STATUS POST REVISION OF TOTAL REPLACEMENT OF LEFT KNEE: ICD-10-CM

## 2021-11-18 DIAGNOSIS — I10 HTN (HYPERTENSION), BENIGN: Primary | Chronic | ICD-10-CM

## 2021-11-18 DIAGNOSIS — M17.0 PRIMARY OSTEOARTHRITIS OF BOTH KNEES: ICD-10-CM

## 2021-11-18 DIAGNOSIS — G40.409 TONIC-CLONIC EPILEPTIC SEIZURES: ICD-10-CM

## 2021-11-18 DIAGNOSIS — C50.412 MALIGNANT NEOPLASM OF UPPER-OUTER QUADRANT OF LEFT FEMALE BREAST, UNSPECIFIED ESTROGEN RECEPTOR STATUS: ICD-10-CM

## 2021-11-18 DIAGNOSIS — I25.10 CORONARY ARTERY DISEASE DUE TO LIPID RICH PLAQUE: Chronic | ICD-10-CM

## 2021-11-18 DIAGNOSIS — Z95.5 S/P CORONARY ARTERY STENT PLACEMENT: ICD-10-CM

## 2021-11-18 DIAGNOSIS — E78.5 HYPERLIPIDEMIA ASSOCIATED WITH TYPE 2 DIABETES MELLITUS: ICD-10-CM

## 2021-11-18 DIAGNOSIS — E11.51 CONTROLLED TYPE 2 DM WITH PERIPHERAL CIRCULATORY DISORDER: Chronic | ICD-10-CM

## 2021-11-18 DIAGNOSIS — Z11.59 NEED FOR HEPATITIS C SCREENING TEST: ICD-10-CM

## 2021-11-18 DIAGNOSIS — E11.22 CKD STAGE 3 SECONDARY TO DIABETES: ICD-10-CM

## 2021-11-18 DIAGNOSIS — R26.9 ABNORMALITY OF GAIT AND MOBILITY: ICD-10-CM

## 2021-11-18 DIAGNOSIS — E11.69 HYPERLIPIDEMIA ASSOCIATED WITH TYPE 2 DIABETES MELLITUS: ICD-10-CM

## 2021-11-18 DIAGNOSIS — E66.01 CLASS 3 SEVERE OBESITY WITH BODY MASS INDEX (BMI) OF 45.0 TO 49.9 IN ADULT, UNSPECIFIED OBESITY TYPE, UNSPECIFIED WHETHER SERIOUS COMORBIDITY PRESENT: ICD-10-CM

## 2021-11-18 DIAGNOSIS — Z99.89 DEPENDENCE ON OTHER ENABLING MACHINES AND DEVICES: ICD-10-CM

## 2021-11-18 DIAGNOSIS — E11.9 DM TYPE 2 WITHOUT RETINOPATHY: ICD-10-CM

## 2021-11-18 DIAGNOSIS — N18.30 STAGE 3 CHRONIC KIDNEY DISEASE, UNSPECIFIED WHETHER STAGE 3A OR 3B CKD: Chronic | ICD-10-CM

## 2021-11-18 DIAGNOSIS — E11.59 TYPE 2 DIABETES MELLITUS WITH OTHER CIRCULATORY COMPLICATION, WITHOUT LONG-TERM CURRENT USE OF INSULIN: ICD-10-CM

## 2021-11-18 DIAGNOSIS — I25.2 OLD MI (MYOCARDIAL INFARCTION): ICD-10-CM

## 2021-11-18 DIAGNOSIS — Z00.00 ENCOUNTER FOR PREVENTIVE HEALTH EXAMINATION: ICD-10-CM

## 2021-11-18 DIAGNOSIS — D50.0 IRON DEFICIENCY ANEMIA DUE TO CHRONIC BLOOD LOSS: ICD-10-CM

## 2021-11-18 DIAGNOSIS — Z91.81 RISK FOR FALLS: ICD-10-CM

## 2021-11-18 DIAGNOSIS — I15.2 HYPERTENSION ASSOCIATED WITH DIABETES: ICD-10-CM

## 2021-11-18 DIAGNOSIS — M10.9 GOUT, ARTHRITIS: ICD-10-CM

## 2021-11-18 DIAGNOSIS — N18.30 CKD STAGE 3 SECONDARY TO DIABETES: ICD-10-CM

## 2021-11-18 DIAGNOSIS — E11.59 HYPERTENSION ASSOCIATED WITH DIABETES: ICD-10-CM

## 2021-11-18 DIAGNOSIS — E11.22 TYPE 2 DIABETES MELLITUS WITH STAGE 3 CHRONIC KIDNEY DISEASE, WITHOUT LONG-TERM CURRENT USE OF INSULIN, UNSPECIFIED WHETHER STAGE 3A OR 3B CKD: ICD-10-CM

## 2021-11-18 DIAGNOSIS — R09.89 PROMINENT AORTA: ICD-10-CM

## 2021-11-18 DIAGNOSIS — I73.9 PERIPHERAL VASCULAR DISEASE: ICD-10-CM

## 2021-11-18 DIAGNOSIS — I65.23 BILATERAL CAROTID ARTERY STENOSIS: ICD-10-CM

## 2021-11-18 DIAGNOSIS — I25.83 CORONARY ARTERY DISEASE DUE TO LIPID RICH PLAQUE: Chronic | ICD-10-CM

## 2021-11-18 PROBLEM — E44.0 MODERATE MALNUTRITION: Status: RESOLVED | Noted: 2021-04-21 | Resolved: 2021-11-18

## 2021-11-18 PROCEDURE — G0439 PR MEDICARE ANNUAL WELLNESS SUBSEQUENT VISIT: ICD-10-PCS | Mod: S$GLB,,, | Performed by: NURSE PRACTITIONER

## 2021-11-18 PROCEDURE — 3075F PR MOST RECENT SYSTOLIC BLOOD PRESS GE 130-139MM HG: ICD-10-PCS | Mod: CPTII,S$GLB,, | Performed by: NURSE PRACTITIONER

## 2021-11-18 PROCEDURE — 99499 UNLISTED E&M SERVICE: CPT | Mod: S$GLB,,, | Performed by: NURSE PRACTITIONER

## 2021-11-18 PROCEDURE — 1170F FXNL STATUS ASSESSED: CPT | Mod: CPTII,S$GLB,, | Performed by: NURSE PRACTITIONER

## 2021-11-18 PROCEDURE — 1101F PT FALLS ASSESS-DOCD LE1/YR: CPT | Mod: CPTII,S$GLB,, | Performed by: NURSE PRACTITIONER

## 2021-11-18 PROCEDURE — 1160F RVW MEDS BY RX/DR IN RCRD: CPT | Mod: CPTII,S$GLB,, | Performed by: NURSE PRACTITIONER

## 2021-11-18 PROCEDURE — 3079F PR MOST RECENT DIASTOLIC BLOOD PRESSURE 80-89 MM HG: ICD-10-PCS | Mod: CPTII,S$GLB,, | Performed by: NURSE PRACTITIONER

## 2021-11-18 PROCEDURE — 99999 PR PBB SHADOW E&M-EST. PATIENT-LVL V: ICD-10-PCS | Mod: PBBFAC,,, | Performed by: NURSE PRACTITIONER

## 2021-11-18 PROCEDURE — 1159F PR MEDICATION LIST DOCUMENTED IN MEDICAL RECORD: ICD-10-PCS | Mod: CPTII,S$GLB,, | Performed by: NURSE PRACTITIONER

## 2021-11-18 PROCEDURE — 1101F PR PT FALLS ASSESS DOC 0-1 FALLS W/OUT INJ PAST YR: ICD-10-PCS | Mod: CPTII,S$GLB,, | Performed by: NURSE PRACTITIONER

## 2021-11-18 PROCEDURE — 3072F PR LOW RISK FOR RETINOPATHY: ICD-10-PCS | Mod: CPTII,S$GLB,, | Performed by: NURSE PRACTITIONER

## 2021-11-18 PROCEDURE — 99999 PR PBB SHADOW E&M-EST. PATIENT-LVL V: CPT | Mod: PBBFAC,,, | Performed by: NURSE PRACTITIONER

## 2021-11-18 PROCEDURE — G0439 PPPS, SUBSEQ VISIT: HCPCS | Mod: S$GLB,,, | Performed by: NURSE PRACTITIONER

## 2021-11-18 PROCEDURE — 1170F PR FUNCTIONAL STATUS ASSESSED: ICD-10-PCS | Mod: CPTII,S$GLB,, | Performed by: NURSE PRACTITIONER

## 2021-11-18 PROCEDURE — 1160F PR REVIEW ALL MEDS BY PRESCRIBER/CLIN PHARMACIST DOCUMENTED: ICD-10-PCS | Mod: CPTII,S$GLB,, | Performed by: NURSE PRACTITIONER

## 2021-11-18 PROCEDURE — 3072F LOW RISK FOR RETINOPATHY: CPT | Mod: CPTII,S$GLB,, | Performed by: NURSE PRACTITIONER

## 2021-11-18 PROCEDURE — 99499 RISK ADDL DX/OHS AUDIT: ICD-10-PCS | Mod: S$GLB,,, | Performed by: NURSE PRACTITIONER

## 2021-11-18 PROCEDURE — 3075F SYST BP GE 130 - 139MM HG: CPT | Mod: CPTII,S$GLB,, | Performed by: NURSE PRACTITIONER

## 2021-11-18 PROCEDURE — 3288F PR FALLS RISK ASSESSMENT DOCUMENTED: ICD-10-PCS | Mod: CPTII,S$GLB,, | Performed by: NURSE PRACTITIONER

## 2021-11-18 PROCEDURE — 1159F MED LIST DOCD IN RCRD: CPT | Mod: CPTII,S$GLB,, | Performed by: NURSE PRACTITIONER

## 2021-11-18 PROCEDURE — 3079F DIAST BP 80-89 MM HG: CPT | Mod: CPTII,S$GLB,, | Performed by: NURSE PRACTITIONER

## 2021-11-18 PROCEDURE — 3288F FALL RISK ASSESSMENT DOCD: CPT | Mod: CPTII,S$GLB,, | Performed by: NURSE PRACTITIONER

## 2022-01-07 ENCOUNTER — TELEPHONE (OUTPATIENT)
Dept: CARDIOLOGY | Facility: CLINIC | Age: 82
End: 2022-01-07
Payer: MEDICARE

## 2022-01-07 DIAGNOSIS — I10 HTN (HYPERTENSION), BENIGN: ICD-10-CM

## 2022-01-07 DIAGNOSIS — E78.2 MIXED HYPERLIPIDEMIA: Primary | ICD-10-CM

## 2022-01-07 DIAGNOSIS — E11.51 CONTROLLED TYPE 2 DM WITH PERIPHERAL CIRCULATORY DISORDER: ICD-10-CM

## 2022-01-11 DIAGNOSIS — Z96.652 STATUS POST REVISION OF TOTAL REPLACEMENT OF LEFT KNEE: Primary | ICD-10-CM

## 2022-01-12 ENCOUNTER — HOSPITAL ENCOUNTER (OUTPATIENT)
Dept: RADIOLOGY | Facility: HOSPITAL | Age: 82
Discharge: HOME | End: 2022-01-12
Attending: ORTHOPAEDIC SURGERY
Payer: MEDICARE

## 2022-01-12 ENCOUNTER — OFFICE VISIT (OUTPATIENT)
Dept: ORTHOPEDICS | Facility: CLINIC | Age: 82
End: 2022-01-12
Payer: MEDICARE

## 2022-01-12 VITALS — HEIGHT: 63 IN | WEIGHT: 260 LBS | BODY MASS INDEX: 46.07 KG/M2

## 2022-01-12 DIAGNOSIS — Z96.652 STATUS POST REVISION OF TOTAL REPLACEMENT OF LEFT KNEE: ICD-10-CM

## 2022-01-12 DIAGNOSIS — M25.562 CHRONIC PAIN OF LEFT KNEE: ICD-10-CM

## 2022-01-12 DIAGNOSIS — Z96.652 STATUS POST REVISION OF TOTAL REPLACEMENT OF LEFT KNEE: Primary | ICD-10-CM

## 2022-01-12 DIAGNOSIS — G89.29 CHRONIC PAIN OF LEFT KNEE: ICD-10-CM

## 2022-01-12 PROCEDURE — 73560 X-RAY EXAM OF KNEE 1 OR 2: CPT | Mod: 26,RT,, | Performed by: RADIOLOGY

## 2022-01-12 PROCEDURE — 99212 PR OFFICE/OUTPT VISIT, EST, LEVL II, 10-19 MIN: ICD-10-PCS | Mod: S$GLB,,, | Performed by: ORTHOPAEDIC SURGERY

## 2022-01-12 PROCEDURE — 73562 XR KNEE ORTHO LEFT: ICD-10-PCS | Mod: 26,LT,, | Performed by: RADIOLOGY

## 2022-01-12 PROCEDURE — 99999 PR PBB SHADOW E&M-EST. PATIENT-LVL III: CPT | Mod: PBBFAC,,, | Performed by: ORTHOPAEDIC SURGERY

## 2022-01-12 PROCEDURE — 73562 X-RAY EXAM OF KNEE 3: CPT | Mod: 26,LT,, | Performed by: RADIOLOGY

## 2022-01-12 PROCEDURE — 99212 OFFICE O/P EST SF 10 MIN: CPT | Mod: S$GLB,,, | Performed by: ORTHOPAEDIC SURGERY

## 2022-01-12 PROCEDURE — 1160F RVW MEDS BY RX/DR IN RCRD: CPT | Mod: CPTII,S$GLB,, | Performed by: ORTHOPAEDIC SURGERY

## 2022-01-12 PROCEDURE — 1160F PR REVIEW ALL MEDS BY PRESCRIBER/CLIN PHARMACIST DOCUMENTED: ICD-10-PCS | Mod: CPTII,S$GLB,, | Performed by: ORTHOPAEDIC SURGERY

## 2022-01-12 PROCEDURE — 1126F PR PAIN SEVERITY QUANTIFIED, NO PAIN PRESENT: ICD-10-PCS | Mod: CPTII,S$GLB,, | Performed by: ORTHOPAEDIC SURGERY

## 2022-01-12 PROCEDURE — 1159F PR MEDICATION LIST DOCUMENTED IN MEDICAL RECORD: ICD-10-PCS | Mod: CPTII,S$GLB,, | Performed by: ORTHOPAEDIC SURGERY

## 2022-01-12 PROCEDURE — 99999 PR PBB SHADOW E&M-EST. PATIENT-LVL III: ICD-10-PCS | Mod: PBBFAC,,, | Performed by: ORTHOPAEDIC SURGERY

## 2022-01-12 PROCEDURE — 73560 XR KNEE ORTHO LEFT: ICD-10-PCS | Mod: 26,RT,, | Performed by: RADIOLOGY

## 2022-01-12 PROCEDURE — 73560 X-RAY EXAM OF KNEE 1 OR 2: CPT | Mod: 59,TC,RT

## 2022-01-12 PROCEDURE — 1159F MED LIST DOCD IN RCRD: CPT | Mod: CPTII,S$GLB,, | Performed by: ORTHOPAEDIC SURGERY

## 2022-01-12 PROCEDURE — 1126F AMNT PAIN NOTED NONE PRSNT: CPT | Mod: CPTII,S$GLB,, | Performed by: ORTHOPAEDIC SURGERY

## 2022-01-12 NOTE — PROGRESS NOTES
Renata Bergman is in for 8 month follow up for a  Left revision TKA.  She is doing  well.  No pain in the knee.  She has resumed activities of daily living. She has been quite active  Exam demonstrates  A well developed female in no distress.  Alert and oriented.  Mood and affect are appropriate.    Knee incision is well healed.  ROM is 0-120.  The patella tracks well and there is no instability. The extremity is neurovascularly intact.    Xrays demonstrate a well fixed and positioned prosthesis.      Imp:Doing well    F/u in 4 months with xrays and PROMS

## 2022-02-18 NOTE — PATIENT INSTRUCTIONS
Discharge Instructions for Heart Attack  You have had a heart attack (acute myocardial infarction). A heart attack occurs when a vessel that sends blood to your heart suddenly becomes blocked. This causes your heart not to work as well as it should. Follow these guidelines for home care and lifestyle changes.  Home care  · Take your medicines exactly as directed. Dont skip doses. Talk with your healthcare provider if your medicines aren't working for you. Together you can come up with another treatment plan.  · Remember that recovery after a heart attack takes time. Plan to rest for at least 4 to 8 weeks while you recover. Then return to normal activity when your doctor says its OK.  · Ask your doctor about joining a heart rehabilitation program. This can help strengthen your heart and lungs and give you more energy and confidence.  · Tell your doctor if you are feeling depressed. Feelings of sadness are common after a heart attack. But it is important to speak to someone or seek counseling if you are feeling overwhelmed by these feelings.  · Call 911 right away if you have chest pain or pain that goes to your shoulder, neck, or back. Don't drive yourself to the hospital.  · Ask your family members to learn CPR. This is an important skill that can save lives when it's needed.  · Learn to take your own blood pressure and pulse. Keep a record of your results. Ask your doctor when you should seek emergency medical attention. He or she will tell you which blood pressure reading is dangerous.  Lifestyle changes  Your heart attack might have been caused by cardiovascular disease. Your healthcare provider will work with you to make changes to your lifestyle. This will help the heart disease from getting worse. These changes will most likely be a combination of diet and exercise.  Diet  Your healthcare provider will tell you what changes you need to make to your diet. You may need to see a registered dietitian for help  Problem is spiritual distress related to her decrease in cognition. Goal is listen and acknowledge Ms. Contreras's distress. The assigned home hospice chaplain completed a follow up visit with Ms. Greyson Clemente today. Ms. Greyson Clemente was alert and oriented. Ms. Greyson Clemente continues to experience problems in communicating which leads to her feeling frustrated. Ms. Greyson Clemente is also having difficulty seeing which has greatly increased her anxiety. The hospice chaplain created a sacred and safe place for Ms. Contreras to explore her concerns and feelings about these changes. Ms. Greyson Clemente acknowledged feelings of isolation and despair. The hospice chaplain affirmed Ms. Greyson Clemente for her continued belief in God. Ms. Greyson Clemente welcomed prayer as a way of discerning God's constant presence. Ms. Cameron Heath affect became more positive as evident in her smile. The plan is to follow up with a 's visit in the next two weeks. with these diet changes. These changes may include:  · Cutting back on how much fat and cholesterol you eat  · Cutting back on how much salt (sodium) you eat, especially if you have high blood pressure  · Eating more fresh vegetables and fruits  · Eating lean proteins such as fish, poultry, beans, and peas, and eating less red meat and processed meats  · Using low-fat dairy products  · Using vegetable and nut oils in limited amounts  · Limiting how many sweets and processed foods such as chips, cookies, and baked goods you eat  · Limiting how often you eat out. And when you do eat out, making better food choices.  · Not eating fried or greasy foods, or foods high in saturated fat  Exercise  Your healthcare provider may tell you to get more exercise if you haven't been physically active. Depending on your case, your provider may recommend that you get moderate to vigorous physical activity for at least 40 minutes each day, and for at least 3 to 4 days each week. A few examples of moderate to vigorous activity include:  · Walking at a brisk pace, about 3 to 4 miles per hour  · Jogging or running  · Swimming or water aerobics  · Hiking  · Dancing  · Martial arts  · Tennis  · Riding a bicycle or stationary bike  Other changes  Your healthcare provider may also recommend that you:  · Lose weight. If you are overweight or obese, your provider will work with you to lose extra pounds. Making diet changes and getting more exercise can help. A good goal is to lose your 10% of your body weight in one year.  · Stop smoking. Sign up for a stop-smoking program to make it more likely for you to quit for good. You can join a stop-smoking support group. Or ask your doctor about nicotine replacement products.  · Learn to manage stress. Stress management techniques to help you deal with stress in your home and work life. This will help you feel better emotionally and ease the strain on your heart.  Follow-up  Make a follow-up  appointment as directed by our staff.     When to seek medical advice  Call 911 right away if you have:  · Chest pain that goes to your neck, jaw, back, or shoulder  · Shortness of breath  Otherwise, call your doctor immediately if you have:  · Lightheadedness, dizziness, or fainting  · Feeling of irregular heartbeat or fast pulse.   Date Last Reviewed: 10/1/2016  © 3894-9641 [employer]. [address] [place] [address], [place] [address]. All rights reserved. This information is not intended as a substitute for professional medical care. Always follow your healthcare professional's instructions.

## 2022-02-23 NOTE — PROGRESS NOTES
Subjective:       Patient ID: Renata Bergman is a 81 y.o. female.    Chief Complaint: Malignant neoplasm of upper-outer quadrant of left breast i    HPI Mrs. Hussein is a 81-year-old female with stage I left breast cancer.  She is currently on anasatrozole endocrine therapy.  Her other medical issues include diabetes, seizures and coronary artery disease.    Denies fatigued, overall feeling well.  Denies hot flashes and vaginal dryness.     Blood pressure elevated today, normal on Monday with PCP who manages.       Per Dr. Guevara's previous note  Breast history: Screening mammogram on May 5, 2016 showed an irregular 22 mm mass with abnormal calcifications in the left breast 2:00 position. Follow-up ultrasound July 13 she noted a regular solid 17 mm mass.    On July 21 needle biopsy showed grade 2 infiltrating ductal carcinoma strongly ER TN positive (90%) and HER-2 negative.    On August 1, 2016 lumpectomy and sentinel lymph node biopsy were performed. That showed a 1.8 x 1.6 x 1.0 infiltrating carcinoma intermediate grade (histologic grade 3, nuclear grade 2, mitotic index 2.   The sentinel lymph node was negative and margins were negative. Final pathological stage TIc N0 stage IA.  Oncotype was 31 indicating a 21% risk of recurrence with tamoxifen alone.    She  had 3 weeks of weekly Taxol and then because of insurance issues she was changed to CMF and had 5 cycles of that.     Chemotherapy was completed in January 2017.    She completed radiation in April 2017 and began letrozole endocrine therapy that month.    Review of Systems   Constitutional: Negative for activity change, appetite change, chills, diaphoresis, fatigue, fever and unexpected weight change.   HENT: Negative for mouth sores, nosebleeds, sore throat and trouble swallowing.    Respiratory: Negative for cough and shortness of breath.    Cardiovascular: Negative for chest pain, palpitations and leg swelling.   Gastrointestinal: Negative for  abdominal distention, abdominal pain, blood in stool, constipation, diarrhea, nausea and vomiting.   Genitourinary: Negative for hematuria and vaginal bleeding.   Musculoskeletal: Negative for arthralgias, back pain and myalgias.   Skin: Negative for pallor and rash.   Allergic/Immunologic: Negative for immunocompromised state.   Neurological: Negative for dizziness, weakness, light-headedness, numbness and headaches.   Hematological: Negative for adenopathy. Does not bruise/bleed easily.   Psychiatric/Behavioral: Negative for confusion and dysphoric mood. The patient is not nervous/anxious.        Objective:      Physical Exam  Vitals and nursing note reviewed.   Constitutional:       General: She is not in acute distress.     Appearance: Normal appearance. She is well-developed.   HENT:      Head: Normocephalic.   Eyes:      Pupils: Pupils are equal, round, and reactive to light.   Cardiovascular:      Rate and Rhythm: Normal rate and regular rhythm.      Heart sounds: Normal heart sounds.   Pulmonary:      Effort: Pulmonary effort is normal.      Breath sounds: Normal breath sounds.   Chest:   Breasts:      Right: No mass, nipple discharge, skin change, tenderness, axillary adenopathy or supraclavicular adenopathy.      Left: No mass, nipple discharge, skin change, tenderness, axillary adenopathy or supraclavicular adenopathy.       Abdominal:      General: Bowel sounds are normal. There is no distension.      Palpations: Abdomen is soft.      Tenderness: There is no abdominal tenderness.   Musculoskeletal:      Cervical back: Normal range of motion.      Right lower leg: Edema present.      Left lower leg: Edema present.   Lymphadenopathy:      Cervical: No cervical adenopathy.      Upper Body:      Right upper body: No supraclavicular or axillary adenopathy.      Left upper body: No supraclavicular or axillary adenopathy.   Skin:     General: Skin is warm and dry.      Findings: No rash.   Neurological:       Mental Status: She is alert and oriented to person, place, and time.   Psychiatric:         Behavior: Behavior normal.         Assessment:       1. Malignant neoplasm of upper-outer quadrant of left female breast, unspecified estrogen receptor status    2. Iron deficiency anemia due to chronic blood loss    3. Stage 3 chronic kidney disease, unspecified whether stage 3a or 3b CKD    4. CKD stage 3 secondary to diabetes    5. Hypertension associated with diabetes    6. Hyperlipidemia associated with type 2 diabetes mellitus    7. Controlled type 2 DM with peripheral circulatory disorder    8. Type 2 diabetes mellitus with other circulatory complication, without long-term current use of insulin    9. Type 2 diabetes mellitus with stage 3 chronic kidney disease, without long-term current use of insulin, unspecified whether stage 3a or 3b CKD    10. DM type 2 without retinopathy    11. Gastroesophageal reflux disease, unspecified whether esophagitis present        Plan:     1. Return to clinic in 6 months  Continue anastrozole.  She will be due for mammograms in August.  DXA scan normal bone density  Will send a breast Cancer Index on patient to determine length of anastrozole     2. Will continue to monitor  3.  Will continue to monitor with PCP  4. Monitored today; continue current medication and follow up with PCP   5. Continue current medication and follow up with PCP  6-7. Monitored glucose today; continue current medications and follow up with endocrinology   8. Continue current medication and follow up with GI      Return to clinic in 6 months with MD appointment and imaging.     Patient is in agreement with the proposed treatment plan. All questions were answered to the patient's satisfaction. Patient knows to call clinic for any new or worsening symptoms and if anything is needed before the next clinic visit.          Yun Whitaker, NEWTON-C  Hematology & Medical Oncology   [address] [facility]  [place] [address]  . [phone]  Fax. [phone]    Patient dicussed with collaborating physician, Dr. Guevara.    Total time spent with the patient: 30 minutes.  15 minutes of face to face consultation and 15 minutes of chart review and coordination of care.    Route Chart for Scheduling    Med Onc Chart Routing      Follow up with physician    Follow up with [facility] 6 months.   Labs    Imaging Mammogram   Due in August   Pharmacy appointment    Other referrals

## 2022-02-27 ENCOUNTER — HOSPITAL ENCOUNTER (OUTPATIENT)
Facility: HOSPITAL | Age: 82
Discharge: HOME | End: 2022-02-28
Attending: EMERGENCY MEDICINE | Admitting: INTERNAL MEDICINE
Payer: MEDICARE

## 2022-02-27 DIAGNOSIS — R79.89 ELEVATED TROPONIN: ICD-10-CM

## 2022-02-27 DIAGNOSIS — R07.9 CHEST PAIN, UNSPECIFIED TYPE: Primary | ICD-10-CM

## 2022-02-27 DIAGNOSIS — E11.9 DM TYPE 2 WITHOUT RETINOPATHY: ICD-10-CM

## 2022-02-27 DIAGNOSIS — R11.0 NAUSEA: ICD-10-CM

## 2022-02-27 DIAGNOSIS — I10 HTN (HYPERTENSION), BENIGN: ICD-10-CM

## 2022-02-27 DIAGNOSIS — R07.9 CHEST PAIN: ICD-10-CM

## 2022-02-27 LAB
ALBUMIN SERPL BCP-MCNC: 3.4 G/DL (ref 3.5–5.2)
ALP SERPL-CCNC: 96 U/L (ref 55–135)
ALT SERPL W/O P-5'-P-CCNC: 8 U/L (ref 10–44)
ANION GAP SERPL CALC-SCNC: 9 MMOL/L (ref 8–16)
AST SERPL-CCNC: 18 U/L (ref 10–40)
BACTERIA #/AREA URNS AUTO: NORMAL /HPF
BASOPHILS # BLD AUTO: 0.04 K/UL (ref 0–0.2)
BASOPHILS NFR BLD: 0.6 % (ref 0–1.9)
BILIRUB SERPL-MCNC: 0.7 MG/DL (ref 0.1–1)
BILIRUB UR QL STRIP: NEGATIVE
BUN SERPL-MCNC: 16 MG/DL (ref 8–23)
CALCIUM SERPL-MCNC: 9.8 MG/DL (ref 8.7–10.5)
CHLORIDE SERPL-SCNC: 104 MMOL/L (ref 95–110)
CLARITY UR REFRACT.AUTO: CLEAR
CO2 SERPL-SCNC: 24 MMOL/L (ref 23–29)
COLOR UR AUTO: YELLOW
CREAT SERPL-MCNC: 1.4 MG/DL (ref 0.5–1.4)
CTP QC/QA: YES
DIFFERENTIAL METHOD: ABNORMAL
EOSINOPHIL # BLD AUTO: 0.3 K/UL (ref 0–0.5)
EOSINOPHIL NFR BLD: 4.8 % (ref 0–8)
ERYTHROCYTE [DISTWIDTH] IN BLOOD BY AUTOMATED COUNT: 13.5 % (ref 11.5–14.5)
EST. GFR  (AFRICAN AMERICAN): 41.2 ML/MIN/1.73 M^2
EST. GFR  (NON AFRICAN AMERICAN): 35.8 ML/MIN/1.73 M^2
GLUCOSE SERPL-MCNC: 106 MG/DL (ref 70–110)
GLUCOSE UR QL STRIP: NEGATIVE
HCT VFR BLD AUTO: 39 % (ref 37–48.5)
HGB BLD-MCNC: 12.7 G/DL (ref 12–16)
HGB UR QL STRIP: NEGATIVE
IMM GRANULOCYTES # BLD AUTO: 0.02 K/UL (ref 0–0.04)
IMM GRANULOCYTES NFR BLD AUTO: 0.3 % (ref 0–0.5)
KETONES UR QL STRIP: NEGATIVE
LEUKOCYTE ESTERASE UR QL STRIP: ABNORMAL
LYMPHOCYTES # BLD AUTO: 2 K/UL (ref 1–4.8)
LYMPHOCYTES NFR BLD: 31.1 % (ref 18–48)
MAGNESIUM SERPL-MCNC: 1.5 MG/DL (ref 1.6–2.6)
MCH RBC QN AUTO: 30.8 PG (ref 27–31)
MCHC RBC AUTO-ENTMCNC: 32.6 G/DL (ref 32–36)
MCV RBC AUTO: 94 FL (ref 82–98)
MICROSCOPIC COMMENT: NORMAL
MONOCYTES # BLD AUTO: 1 K/UL (ref 0.3–1)
MONOCYTES NFR BLD: 15.2 % (ref 4–15)
NEUTROPHILS # BLD AUTO: 3 K/UL (ref 1.8–7.7)
NEUTROPHILS NFR BLD: 48 % (ref 38–73)
NITRITE UR QL STRIP: NEGATIVE
NRBC BLD-RTO: 0 /100 WBC
PH UR STRIP: 5 [PH] (ref 5–8)
PLATELET # BLD AUTO: 208 K/UL (ref 150–450)
PMV BLD AUTO: 9.9 FL (ref 9.2–12.9)
POCT GLUCOSE: 112 MG/DL (ref 70–110)
POTASSIUM SERPL-SCNC: 3.8 MMOL/L (ref 3.5–5.1)
PROT SERPL-MCNC: 7 G/DL (ref 6–8.4)
PROT UR QL STRIP: NEGATIVE
RBC # BLD AUTO: 4.13 M/UL (ref 4–5.4)
RBC #/AREA URNS AUTO: 2 /HPF (ref 0–4)
SARS-COV-2 RDRP RESP QL NAA+PROBE: NEGATIVE
SODIUM SERPL-SCNC: 137 MMOL/L (ref 136–145)
SP GR UR STRIP: 1.02 (ref 1–1.03)
SQUAMOUS #/AREA URNS AUTO: 0 /HPF
TROPONIN I SERPL DL<=0.01 NG/ML-MCNC: 0.06 NG/ML (ref 0–0.03)
URN SPEC COLLECT METH UR: ABNORMAL
WBC # BLD AUTO: 6.3 K/UL (ref 3.9–12.7)
WBC #/AREA URNS AUTO: 4 /HPF (ref 0–5)

## 2022-02-27 PROCEDURE — 81001 URINALYSIS AUTO W/SCOPE: CPT

## 2022-02-27 PROCEDURE — 93010 ELECTROCARDIOGRAM REPORT: CPT | Mod: ,,, | Performed by: INTERNAL MEDICINE

## 2022-02-27 PROCEDURE — 93005 ELECTROCARDIOGRAM TRACING: CPT

## 2022-02-27 PROCEDURE — 99220 PR INITIAL OBSERVATION CARE,LEVL III: ICD-10-PCS | Mod: ,,, | Performed by: PHYSICIAN ASSISTANT

## 2022-02-27 PROCEDURE — 99285 EMERGENCY DEPT VISIT HI MDM: CPT | Mod: 25

## 2022-02-27 PROCEDURE — 82962 GLUCOSE BLOOD TEST: CPT

## 2022-02-27 PROCEDURE — 85025 COMPLETE CBC W/AUTO DIFF WBC: CPT

## 2022-02-27 PROCEDURE — U0002 COVID-19 LAB TEST NON-CDC: HCPCS

## 2022-02-27 PROCEDURE — 99285 PR EMERGENCY DEPT VISIT,LEVEL V: ICD-10-PCS | Mod: GC,CS,, | Performed by: EMERGENCY MEDICINE

## 2022-02-27 PROCEDURE — 25000003 PHARM REV CODE 250

## 2022-02-27 PROCEDURE — 99285 EMERGENCY DEPT VISIT HI MDM: CPT | Mod: GC,CS,, | Performed by: EMERGENCY MEDICINE

## 2022-02-27 PROCEDURE — 93010 EKG 12-LEAD: ICD-10-PCS | Mod: ,,, | Performed by: INTERNAL MEDICINE

## 2022-02-27 PROCEDURE — 83880 ASSAY OF NATRIURETIC PEPTIDE: CPT | Performed by: PHYSICIAN ASSISTANT

## 2022-02-27 PROCEDURE — 84484 ASSAY OF TROPONIN QUANT: CPT | Mod: 91

## 2022-02-27 PROCEDURE — 83735 ASSAY OF MAGNESIUM: CPT

## 2022-02-27 PROCEDURE — G0378 HOSPITAL OBSERVATION PER HR: HCPCS

## 2022-02-27 PROCEDURE — 86803 HEPATITIS C AB TEST: CPT | Performed by: EMERGENCY MEDICINE

## 2022-02-27 PROCEDURE — 80053 COMPREHEN METABOLIC PANEL: CPT

## 2022-02-27 PROCEDURE — 99220 PR INITIAL OBSERVATION CARE,LEVL III: CPT | Mod: ,,, | Performed by: PHYSICIAN ASSISTANT

## 2022-02-27 RX ORDER — ONDANSETRON 2 MG/ML
4 INJECTION INTRAMUSCULAR; INTRAVENOUS
Status: DISCONTINUED | OUTPATIENT
Start: 2022-02-27 | End: 2022-02-27

## 2022-02-27 RX ORDER — GLUCAGON 1 MG
1 KIT INJECTION
Status: DISCONTINUED | OUTPATIENT
Start: 2022-02-28 | End: 2022-02-28 | Stop reason: HOSPADM

## 2022-02-27 RX ORDER — IBUPROFEN 200 MG
16 TABLET ORAL
Status: DISCONTINUED | OUTPATIENT
Start: 2022-02-28 | End: 2022-02-28 | Stop reason: HOSPADM

## 2022-02-27 RX ORDER — ENOXAPARIN SODIUM 100 MG/ML
40 INJECTION SUBCUTANEOUS EVERY 24 HOURS
Status: DISCONTINUED | OUTPATIENT
Start: 2022-02-28 | End: 2022-02-28

## 2022-02-27 RX ORDER — POLYETHYLENE GLYCOL 3350 17 G/17G
17 POWDER, FOR SOLUTION ORAL DAILY PRN
Status: DISCONTINUED | OUTPATIENT
Start: 2022-02-28 | End: 2022-02-28 | Stop reason: HOSPADM

## 2022-02-27 RX ORDER — MECLIZINE HCL 12.5 MG 12.5 MG/1
25 TABLET ORAL
Status: COMPLETED | OUTPATIENT
Start: 2022-02-27 | End: 2022-02-27

## 2022-02-27 RX ORDER — ACETAMINOPHEN 325 MG/1
650 TABLET ORAL EVERY 4 HOURS PRN
Status: DISCONTINUED | OUTPATIENT
Start: 2022-02-28 | End: 2022-02-28 | Stop reason: HOSPADM

## 2022-02-27 RX ORDER — ASPIRIN 325 MG
325 TABLET ORAL
Status: COMPLETED | OUTPATIENT
Start: 2022-02-27 | End: 2022-02-27

## 2022-02-27 RX ORDER — INSULIN ASPART 100 [IU]/ML
0-5 INJECTION, SOLUTION INTRAVENOUS; SUBCUTANEOUS
Status: DISCONTINUED | OUTPATIENT
Start: 2022-02-28 | End: 2022-02-28 | Stop reason: HOSPADM

## 2022-02-27 RX ORDER — IBUPROFEN 200 MG
24 TABLET ORAL
Status: DISCONTINUED | OUTPATIENT
Start: 2022-02-28 | End: 2022-02-28 | Stop reason: HOSPADM

## 2022-02-27 RX ORDER — ONDANSETRON 8 MG/1
8 TABLET, ORALLY DISINTEGRATING ORAL EVERY 8 HOURS PRN
Status: DISCONTINUED | OUTPATIENT
Start: 2022-02-28 | End: 2022-02-28 | Stop reason: HOSPADM

## 2022-02-27 RX ORDER — TALC
6 POWDER (GRAM) TOPICAL NIGHTLY PRN
Status: DISCONTINUED | OUTPATIENT
Start: 2022-02-28 | End: 2022-02-28 | Stop reason: HOSPADM

## 2022-02-27 RX ORDER — ACETAMINOPHEN 500 MG
1000 TABLET ORAL
Status: COMPLETED | OUTPATIENT
Start: 2022-02-27 | End: 2022-02-27

## 2022-02-27 RX ORDER — BISACODYL 10 MG
10 SUPPOSITORY, RECTAL RECTAL DAILY PRN
Status: DISCONTINUED | OUTPATIENT
Start: 2022-02-28 | End: 2022-02-28 | Stop reason: HOSPADM

## 2022-02-27 RX ORDER — IPRATROPIUM BROMIDE AND ALBUTEROL SULFATE 2.5; .5 MG/3ML; MG/3ML
3 SOLUTION RESPIRATORY (INHALATION) EVERY 4 HOURS PRN
Status: DISCONTINUED | OUTPATIENT
Start: 2022-02-28 | End: 2022-02-28 | Stop reason: HOSPADM

## 2022-02-27 RX ORDER — PROMETHAZINE HYDROCHLORIDE 25 MG/1
25 TABLET ORAL EVERY 6 HOURS PRN
Status: DISCONTINUED | OUTPATIENT
Start: 2022-02-28 | End: 2022-02-28 | Stop reason: HOSPADM

## 2022-02-27 RX ORDER — LANOLIN ALCOHOL/MO/W.PET/CERES
400 CREAM (GRAM) TOPICAL ONCE
Status: COMPLETED | OUTPATIENT
Start: 2022-02-27 | End: 2022-02-27

## 2022-02-27 RX ADMIN — ACETAMINOPHEN 1000 MG: 500 TABLET ORAL at 08:02

## 2022-02-27 RX ADMIN — ASPIRIN 325 MG ORAL TABLET 325 MG: 325 PILL ORAL at 11:02

## 2022-02-27 RX ADMIN — MECLIZINE 25 MG: 12.5 TABLET ORAL at 08:02

## 2022-02-27 RX ADMIN — Medication 400 MG: at 10:02

## 2022-02-28 VITALS
SYSTOLIC BLOOD PRESSURE: 168 MMHG | TEMPERATURE: 98 F | RESPIRATION RATE: 18 BRPM | HEART RATE: 68 BPM | WEIGHT: 256.19 LBS | OXYGEN SATURATION: 98 % | BODY MASS INDEX: 43.74 KG/M2 | DIASTOLIC BLOOD PRESSURE: 74 MMHG | HEIGHT: 64 IN

## 2022-02-28 PROBLEM — R42 POSTURAL DIZZINESS: Status: ACTIVE | Noted: 2022-02-28

## 2022-02-28 LAB
ANION GAP SERPL CALC-SCNC: 12 MMOL/L (ref 8–16)
ASCENDING AORTA: 2.31 CM
AV INDEX (PROSTH): 0.71
AV MEAN GRADIENT: 3 MMHG
AV PEAK GRADIENT: 5 MMHG
AV VALVE AREA: 2.64 CM2
AV VELOCITY RATIO: 0.74
BASOPHILS # BLD AUTO: 0.04 K/UL (ref 0–0.2)
BASOPHILS NFR BLD: 0.8 % (ref 0–1.9)
BNP SERPL-MCNC: 14 PG/ML (ref 0–99)
BSA FOR ECHO PROCEDURE: 2.29 M2
BUN SERPL-MCNC: 14 MG/DL (ref 8–23)
CALCIUM SERPL-MCNC: 9.8 MG/DL (ref 8.7–10.5)
CHLORIDE SERPL-SCNC: 104 MMOL/L (ref 95–110)
CO2 SERPL-SCNC: 23 MMOL/L (ref 23–29)
CREAT SERPL-MCNC: 1.1 MG/DL (ref 0.5–1.4)
CV ECHO LV RWT: 0.52 CM
DIFFERENTIAL METHOD: NORMAL
DOP CALC AO PEAK VEL: 1.14 M/S
DOP CALC AO VTI: 23.15 CM
DOP CALC LVOT AREA: 3.7 CM2
DOP CALC LVOT DIAMETER: 2.18 CM
DOP CALC LVOT PEAK VEL: 0.84 M/S
DOP CALC LVOT STROKE VOLUME: 61.07 CM3
DOP CALCLVOT PEAK VEL VTI: 16.37 CM
E WAVE DECELERATION TIME: 240.63 MSEC
E/A RATIO: 0.89
E/E' RATIO: 11 M/S
ECHO LV POSTERIOR WALL: 0.97 CM (ref 0.6–1.1)
EJECTION FRACTION: 65 %
EOSINOPHIL # BLD AUTO: 0.3 K/UL (ref 0–0.5)
EOSINOPHIL NFR BLD: 6.1 % (ref 0–8)
ERYTHROCYTE [DISTWIDTH] IN BLOOD BY AUTOMATED COUNT: 13.3 % (ref 11.5–14.5)
EST. GFR  (AFRICAN AMERICAN): 55.2 ML/MIN/1.73 M^2
EST. GFR  (NON AFRICAN AMERICAN): 47.9 ML/MIN/1.73 M^2
ESTIMATED AVG GLUCOSE: 137 MG/DL (ref 68–131)
FRACTIONAL SHORTENING: 25 % (ref 28–44)
GLUCOSE SERPL-MCNC: 104 MG/DL (ref 70–110)
HBA1C MFR BLD: 6.4 % (ref 4–5.6)
HCT VFR BLD AUTO: 40.2 % (ref 37–48.5)
HGB BLD-MCNC: 13 G/DL (ref 12–16)
IMM GRANULOCYTES # BLD AUTO: 0 K/UL (ref 0–0.04)
IMM GRANULOCYTES NFR BLD AUTO: 0 % (ref 0–0.5)
INTERVENTRICULAR SEPTUM: 1.11 CM (ref 0.6–1.1)
IVRT: 85.63 MSEC
LA MAJOR: 4.1 CM
LA MINOR: 3.85 CM
LA WIDTH: 3.74 CM
LEFT ATRIUM SIZE: 3.02 CM
LEFT ATRIUM VOLUME INDEX MOD: 13.8 ML/M2
LEFT ATRIUM VOLUME INDEX: 17.6 ML/M2
LEFT ATRIUM VOLUME MOD: 29.95 CM3
LEFT ATRIUM VOLUME: 38.12 CM3
LEFT INTERNAL DIMENSION IN SYSTOLE: 2.77 CM (ref 2.1–4)
LEFT VENTRICLE DIASTOLIC VOLUME INDEX: 27.03 ML/M2
LEFT VENTRICLE DIASTOLIC VOLUME: 58.66 ML
LEFT VENTRICLE MASS INDEX: 55 G/M2
LEFT VENTRICLE SYSTOLIC VOLUME INDEX: 13.3 ML/M2
LEFT VENTRICLE SYSTOLIC VOLUME: 28.78 ML
LEFT VENTRICULAR INTERNAL DIMENSION IN DIASTOLE: 3.71 CM (ref 3.5–6)
LEFT VENTRICULAR MASS: 119.61 G
LV LATERAL E/E' RATIO: 11 M/S
LV SEPTAL E/E' RATIO: 11 M/S
LYMPHOCYTES # BLD AUTO: 1.7 K/UL (ref 1–4.8)
LYMPHOCYTES NFR BLD: 34.4 % (ref 18–48)
MAGNESIUM SERPL-MCNC: 1.5 MG/DL (ref 1.6–2.6)
MAGNESIUM SERPL-MCNC: 2.2 MG/DL (ref 1.6–2.6)
MCH RBC QN AUTO: 30.3 PG (ref 27–31)
MCHC RBC AUTO-ENTMCNC: 32.3 G/DL (ref 32–36)
MCV RBC AUTO: 94 FL (ref 82–98)
MONOCYTES # BLD AUTO: 0.7 K/UL (ref 0.3–1)
MONOCYTES NFR BLD: 14.2 % (ref 4–15)
MV A" WAVE DURATION": 11.7 MSEC
MV PEAK A VEL: 0.99 M/S
MV PEAK E VEL: 0.88 M/S
MV STENOSIS PRESSURE HALF TIME: 69.78 MS
MV VALVE AREA P 1/2 METHOD: 3.15 CM2
NEUTROPHILS # BLD AUTO: 2.2 K/UL (ref 1.8–7.7)
NEUTROPHILS NFR BLD: 44.5 % (ref 38–73)
NRBC BLD-RTO: 0 /100 WBC
PLATELET # BLD AUTO: 209 K/UL (ref 150–450)
PMV BLD AUTO: 10 FL (ref 9.2–12.9)
POCT GLUCOSE: 110 MG/DL (ref 70–110)
POCT GLUCOSE: 96 MG/DL (ref 70–110)
POTASSIUM SERPL-SCNC: 3.5 MMOL/L (ref 3.5–5.1)
PULM VEIN S/D RATIO: 0.77
PV PEAK D VEL: 0.44 M/S
PV PEAK S VEL: 0.34 M/S
RA MAJOR: 4.3 CM
RA PRESSURE: 3 MMHG
RA WIDTH: 4.11 CM
RBC # BLD AUTO: 4.29 M/UL (ref 4–5.4)
RIGHT VENTRICULAR END-DIASTOLIC DIMENSION: 5.01 CM
RV TISSUE DOPPLER FREE WALL SYSTOLIC VELOCITY 1 (APICAL 4 CHAMBER VIEW): 10.06 CM/S
SINUS: 2.58 CM
SODIUM SERPL-SCNC: 139 MMOL/L (ref 136–145)
STJ: 2.53 CM
TDI LATERAL: 0.08 M/S
TDI SEPTAL: 0.08 M/S
TDI: 0.08 M/S
TRICUSPID ANNULAR PLANE SYSTOLIC EXCURSION: 2.53 CM
TROPONIN I SERPL DL<=0.01 NG/ML-MCNC: 0.05 NG/ML (ref 0–0.03)
TROPONIN I SERPL DL<=0.01 NG/ML-MCNC: 0.06 NG/ML (ref 0–0.03)
WBC # BLD AUTO: 4.94 K/UL (ref 3.9–12.7)

## 2022-02-28 PROCEDURE — 63600175 PHARM REV CODE 636 W HCPCS: Performed by: PHYSICIAN ASSISTANT

## 2022-02-28 PROCEDURE — 25000003 PHARM REV CODE 250

## 2022-02-28 PROCEDURE — 85025 COMPLETE CBC W/AUTO DIFF WBC: CPT | Performed by: PHYSICIAN ASSISTANT

## 2022-02-28 PROCEDURE — 97112 NEUROMUSCULAR REEDUCATION: CPT

## 2022-02-28 PROCEDURE — 97161 PT EVAL LOW COMPLEX 20 MIN: CPT

## 2022-02-28 PROCEDURE — 96365 THER/PROPH/DIAG IV INF INIT: CPT

## 2022-02-28 PROCEDURE — 83735 ASSAY OF MAGNESIUM: CPT | Mod: 91

## 2022-02-28 PROCEDURE — 95992 CANALITH REPOSITIONING PROC: CPT

## 2022-02-28 PROCEDURE — 96372 THER/PROPH/DIAG INJ SC/IM: CPT | Performed by: PHYSICIAN ASSISTANT

## 2022-02-28 PROCEDURE — 99217 PR OBSERVATION CARE DISCHARGE: ICD-10-PCS | Mod: ,,,

## 2022-02-28 PROCEDURE — 36415 COLL VENOUS BLD VENIPUNCTURE: CPT

## 2022-02-28 PROCEDURE — G0378 HOSPITAL OBSERVATION PER HR: HCPCS

## 2022-02-28 PROCEDURE — 36415 COLL VENOUS BLD VENIPUNCTURE: CPT | Performed by: PHYSICIAN ASSISTANT

## 2022-02-28 PROCEDURE — 84484 ASSAY OF TROPONIN QUANT: CPT | Performed by: PHYSICIAN ASSISTANT

## 2022-02-28 PROCEDURE — 25000003 PHARM REV CODE 250: Performed by: PHYSICIAN ASSISTANT

## 2022-02-28 PROCEDURE — 99217 PR OBSERVATION CARE DISCHARGE: CPT | Mod: ,,,

## 2022-02-28 PROCEDURE — 80048 BASIC METABOLIC PNL TOTAL CA: CPT | Performed by: PHYSICIAN ASSISTANT

## 2022-02-28 PROCEDURE — 96366 THER/PROPH/DIAG IV INF ADDON: CPT

## 2022-02-28 PROCEDURE — 83036 HEMOGLOBIN GLYCOSYLATED A1C: CPT | Performed by: PHYSICIAN ASSISTANT

## 2022-02-28 PROCEDURE — 63600175 PHARM REV CODE 636 W HCPCS

## 2022-02-28 PROCEDURE — 96372 THER/PROPH/DIAG INJ SC/IM: CPT | Mod: 59 | Performed by: PHYSICIAN ASSISTANT

## 2022-02-28 PROCEDURE — 97530 THERAPEUTIC ACTIVITIES: CPT

## 2022-02-28 PROCEDURE — 97165 OT EVAL LOW COMPLEX 30 MIN: CPT

## 2022-02-28 PROCEDURE — 83735 ASSAY OF MAGNESIUM: CPT | Performed by: PHYSICIAN ASSISTANT

## 2022-02-28 RX ORDER — ASPIRIN 81 MG/1
81 TABLET ORAL DAILY
Status: DISCONTINUED | OUTPATIENT
Start: 2022-02-28 | End: 2022-02-28 | Stop reason: HOSPADM

## 2022-02-28 RX ORDER — CARVEDILOL 6.25 MG/1
6.25 TABLET ORAL 2 TIMES DAILY WITH MEALS
Qty: 90 TABLET | Refills: 3 | Status: SHIPPED | OUTPATIENT
Start: 2022-02-28 | End: 2022-04-08 | Stop reason: SDUPTHER

## 2022-02-28 RX ORDER — CARVEDILOL 6.25 MG/1
6.25 TABLET ORAL 2 TIMES DAILY WITH MEALS
Status: DISCONTINUED | OUTPATIENT
Start: 2022-02-28 | End: 2022-02-28 | Stop reason: HOSPADM

## 2022-02-28 RX ORDER — LANOLIN ALCOHOL/MO/W.PET/CERES
1 CREAM (GRAM) TOPICAL DAILY
Refills: 4 | Status: DISCONTINUED | OUTPATIENT
Start: 2022-02-28 | End: 2022-02-28 | Stop reason: HOSPADM

## 2022-02-28 RX ORDER — ATORVASTATIN CALCIUM 20 MG/1
80 TABLET, FILM COATED ORAL DAILY
Refills: 4 | Status: DISCONTINUED | OUTPATIENT
Start: 2022-02-28 | End: 2022-02-28 | Stop reason: HOSPADM

## 2022-02-28 RX ORDER — LEVETIRACETAM 500 MG/1
1000 TABLET ORAL DAILY
Refills: 4 | Status: DISCONTINUED | OUTPATIENT
Start: 2022-02-28 | End: 2022-02-28

## 2022-02-28 RX ORDER — CARVEDILOL 3.12 MG/1
6.25 TABLET ORAL 2 TIMES DAILY WITH MEALS
Status: DISCONTINUED | OUTPATIENT
Start: 2022-02-28 | End: 2022-02-28

## 2022-02-28 RX ORDER — ALLOPURINOL 300 MG/1
300 TABLET ORAL EVERY MORNING
Status: DISCONTINUED | OUTPATIENT
Start: 2022-02-28 | End: 2022-02-28 | Stop reason: HOSPADM

## 2022-02-28 RX ORDER — CARVEDILOL 3.12 MG/1
3.12 TABLET ORAL ONCE
Status: COMPLETED | OUTPATIENT
Start: 2022-02-28 | End: 2022-02-28

## 2022-02-28 RX ORDER — AMLODIPINE BESYLATE 5 MG/1
5 TABLET ORAL DAILY
Status: DISCONTINUED | OUTPATIENT
Start: 2022-02-28 | End: 2022-02-28

## 2022-02-28 RX ORDER — AMLODIPINE BESYLATE 10 MG/1
10 TABLET ORAL DAILY
Qty: 45 TABLET | Refills: 3 | Status: ON HOLD | OUTPATIENT
Start: 2022-02-28 | End: 2022-07-18 | Stop reason: SDUPTHER

## 2022-02-28 RX ORDER — MECLIZINE HCL 12.5 MG 12.5 MG/1
25 TABLET ORAL 3 TIMES DAILY PRN
Status: DISCONTINUED | OUTPATIENT
Start: 2022-02-28 | End: 2022-02-28 | Stop reason: HOSPADM

## 2022-02-28 RX ORDER — EZETIMIBE 10 MG/1
10 TABLET ORAL DAILY
Status: DISCONTINUED | OUTPATIENT
Start: 2022-02-28 | End: 2022-02-28 | Stop reason: HOSPADM

## 2022-02-28 RX ORDER — MAGNESIUM SULFATE HEPTAHYDRATE 40 MG/ML
2 INJECTION, SOLUTION INTRAVENOUS ONCE
Status: COMPLETED | OUTPATIENT
Start: 2022-02-28 | End: 2022-02-28

## 2022-02-28 RX ORDER — AMLODIPINE BESYLATE 10 MG/1
10 TABLET ORAL DAILY
Status: DISCONTINUED | OUTPATIENT
Start: 2022-02-28 | End: 2022-02-28 | Stop reason: HOSPADM

## 2022-02-28 RX ORDER — ENOXAPARIN SODIUM 100 MG/ML
40 INJECTION SUBCUTANEOUS EVERY 12 HOURS
Status: DISCONTINUED | OUTPATIENT
Start: 2022-02-28 | End: 2022-02-28 | Stop reason: HOSPADM

## 2022-02-28 RX ORDER — ANASTROZOLE 1 MG/1
1 TABLET ORAL DAILY
Status: DISCONTINUED | OUTPATIENT
Start: 2022-02-28 | End: 2022-02-28 | Stop reason: HOSPADM

## 2022-02-28 RX ORDER — LEVETIRACETAM 500 MG/1
500 TABLET ORAL 2 TIMES DAILY
Status: DISCONTINUED | OUTPATIENT
Start: 2022-02-28 | End: 2022-02-28 | Stop reason: HOSPADM

## 2022-02-28 RX ADMIN — CARVEDILOL 6.25 MG: 6.25 TABLET, FILM COATED ORAL at 05:02

## 2022-02-28 RX ADMIN — ENOXAPARIN SODIUM 40 MG: 100 INJECTION SUBCUTANEOUS at 12:02

## 2022-02-28 RX ADMIN — FERROUS SULFATE TAB 325 MG (65 MG ELEMENTAL FE) 1 EACH: 325 (65 FE) TAB at 08:02

## 2022-02-28 RX ADMIN — ANASTROZOLE 1 MG: 1 TABLET, COATED ORAL at 11:02

## 2022-02-28 RX ADMIN — ATORVASTATIN CALCIUM 80 MG: 20 TABLET, FILM COATED ORAL at 08:02

## 2022-02-28 RX ADMIN — CARVEDILOL 6.25 MG: 6.25 TABLET, FILM COATED ORAL at 08:02

## 2022-02-28 RX ADMIN — ENOXAPARIN SODIUM 40 MG: 100 INJECTION SUBCUTANEOUS at 08:02

## 2022-02-28 RX ADMIN — ALLOPURINOL 300 MG: 300 TABLET ORAL at 06:02

## 2022-02-28 RX ADMIN — EZETIMIBE 10 MG: 10 TABLET ORAL at 08:02

## 2022-02-28 RX ADMIN — LEVETIRACETAM 500 MG: 500 TABLET, FILM COATED ORAL at 08:02

## 2022-02-28 RX ADMIN — MAGNESIUM SULFATE IN WATER 2 G: 40 INJECTION, SOLUTION INTRAVENOUS at 11:02

## 2022-02-28 RX ADMIN — ASPIRIN 81 MG: 81 TABLET, COATED ORAL at 08:02

## 2022-02-28 RX ADMIN — AMLODIPINE BESYLATE 10 MG: 10 TABLET ORAL at 08:02

## 2022-02-28 RX ADMIN — CARVEDILOL 3.12 MG: 3.12 TABLET, FILM COATED ORAL at 12:02

## 2022-02-28 NOTE — NURSING
Arrived to room via stretcher. AAO x's 4. Verbal, obeys command. Respirations even, unlabored. Skin warm and dry. Denies pain. Denies nausea. Pure wick in place. Plan of care discussed with patient and daughter. Bed in lowest position, wheels locked, side rails up x's 2, call light in reach. Instructed to call for assistance as needed, verbalizes understanding. Will continue to monitor.

## 2022-02-28 NOTE — PT/OT/SLP EVAL
Occupational Therapy   Evaluation/d/c    Name: Renata Bergman  MRN: [id number]  Admitting Diagnosis:  Elevated troponin  Recent Surgery: * No surgery found *      Recommendations:     Discharge Recommendations: home  Discharge Equipment Recommendations:  none  Barriers to discharge:  None    Assessment:     Renata Bergman is a 81 y.o. female with a medical diagnosis of Elevated troponin.  She presents with high level of functioning with mobility and ADL tasks on this date. Pt. Reported that she felt like her baseline level of functioning and no more dizziness noted. Pt. Does not require continued OT services at this time. Pt. Is at S level of functioning. Pt. 's family is home with patient. Pt. In agreement that no further OT services are warranted.   Rehab Prognosis: Good;     Plan:     · Patient to be d/c'd from acute OT services  · Plan of Care Reviewed with: patient, family    Subjective     Chief Complaint: wants to get out of hospital before the parades  Patient/Family Comments/goals: to get home ; feels better; no dizziness and no arm pain    Occupational Profile:  Living Environment: pt. Resides with her 2 children in 2 story house but pt. Stays on the first floor. Pt has a WIS with a seat.   Previous level of function: Pt. Was Independent /Mod I for mobility and Mod I for ADL tasks.   Roles and Routines: caretaker of self, mother  Equipment Used at Home:  walker, rolling, rollator, shower chair  Assistance upon Discharge: children if needed    Pain/Comfort:  · Pain Rating 1: 0/10  · Pain Rating Post-Intervention 1: 0/10    Patients cultural, spiritual, [employer] conflicts given the current situation: no    Objective:     Communicated with: nurse prior to session.  Patient found supine with telemetry, peripheral IV upon OT entry to room.    General Precautions: Standard, fall   Orthopedic Precautions:N/A   Braces: N/A  Respiratory Status: Room air    Occupational Performance:    Bed Mobility:     · Patient completed Supine to Sit with independence  · Patient completed Sit to Supine with independence    Functional Mobility/Transfers:  · Patient completed Sit <> Stand Transfer with independence  with  no assistive device   · Patient completed Toilet Transfer Stand Pivot technique with modified independence with  grab bars  · Functional Mobility: pt. Ambulated in room without an AD and Mod I (at times held IV pole)    Activities of Daily Living:  · Grooming: supervision in stand at sink to wash hands  · Lower Body Dressing: total assistance to don socks but wears slip on shoes  · Toileting: supervision with use of grab bar    Cognitive/Visual Perceptual:  Cognitive/Psychosocial Skills:     -       Oriented to: Person, Place, Time and Situation   -       Follows Commands/attention:Follows multistep  commands  -       Communication: clear/fluent  -       Memory: No Deficits noted  -       Safety awareness/insight to disability: intact   -       Mood/Affect/Coping skills/emotional control: Appropriate to situation  Visual/Perceptual:      -wears glasses    Physical Exam:  Balance: -       sit: good; stand S  Postural examination/scapula alignment:    -       Posterior pelvic tilt  Upper Extremity Range of Motion:     -       Right Upper Extremity: WNL  -       Left Upper Extremity: WNL   Strength:    -       Right Upper Extremity: WNL  -       Left Upper Extremity: WNL    AMPAC 6 Click ADL:  AMPAC Total Score: 22    Treatment & Education:  Pt. Educated on role of OT and POC  Education:    Patient left supine with all lines intact, call button in reach and family present    GOALS:   Multidisciplinary Problems     Occupational Therapy Goals        Problem: Occupational Therapy Goal    Goal Priority Disciplines Outcome Interventions   Occupational Therapy Goal     OT, PT/OT     Description: No goals set                   History:     Past Medical History:   Diagnosis Date    Anemia     Breast cancer 2016     "DCIS and IDC, stage I    Cataract     Coronary artery disease 9/4/2012    Diabetes mellitus due to abnormal insulin 9/4/2012    Diabetes mellitus type II     Genetic testing     brca2 positive     GERD (gastroesophageal reflux disease) 9/4/2012    Gout, arthritis 9/4/2012    Hyperlipidemia 9/4/2012    Hypertension     Myocardial infarction     Obesity     Primary osteoarthritis of both knees 9/4/2012    Renal manifestation of secondary diabetes mellitus     Seizure     states "one time during chemo in 2018"    Type 2 diabetes with peripheral circulatory disorder, controlled        Past Surgical History:   Procedure Laterality Date    BREAST BIOPSY      BREAST LUMPECTOMY Left 08/01/2016    DCIS and IDC, s/p lumpectomy    CATARACT EXTRACTION      COLONOSCOPY N/A 10/8/2018    Procedure: COLONOSCOPY;  Surgeon: Marcio Louie MD;  Location: [facility] (4TH FLR);  Service: Endoscopy;  Laterality: N/A;    CORONARY ANGIOPLASTY      ESOPHAGOGASTRODUODENOSCOPY N/A 10/8/2018    Procedure: ESOPHAGOGASTRODUODENOSCOPY (EGD);  Surgeon: Marcio Louie MD;  Location: [facility] (4TH FLR);  Service: Endoscopy;  Laterality: N/A;  combined egd/colon order/Plavix/OK to hold med 5 days prior to procedures per Dr Puente/see telephone encounter dated 8/22/18/tanya    ESOPHAGOGASTRODUODENOSCOPY N/A 1/31/2020    Procedure: ESOPHAGOGASTRODUODENOSCOPY (EGD);  Surgeon: Marcio Louie MD;  Location: [facility] (4TH FLR);  Service: Endoscopy;  Laterality: N/A;  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 OR [address] [place] [address];  Service: Ophthalmology;  Laterality: Left;    JOINT REPLACEMENT      left and right k nee    KNEE SURGERY      PHACOEMULSIFICATION OF CATARACT Right 10/18/2020    Procedure: PHACOEMULSIFICATION, CATARACT;  Surgeon: Karishma Mata MD;  Location: [facility] OR [address] [place] [address];  Service: Ophthalmology;  Laterality: Right;    PHACOEMULSIFICATION OF CATARACT Left 12/21/2020    Procedure: PHACOEMULSIFICATION, CATARACT;  Surgeon: Karishma Mata MD;  Location: [facility] OR [address] [place] [address];  Service: Ophthalmology;  Laterality: Left;    REVISION OF KNEE ARTHROPLASTY Left 4/15/2021    Procedure: REVISION, ARTHROPLASTY, KNEE;  Surgeon: Gilson Wilkerson MD;  Location: [facility] OR [address] [place] [address];  Service: Orthopedics;  Laterality: Left;    TOTAL ABDOMINAL HYSTERECTOMY W/ BILATERAL SALPINGOOPHORECTOMY         Time Tracking:     OT Date of Treatment: 02/28/22  OT Start Time: 1423  OT Stop Time: 1438  OT Total Time (min): 15 min    Billable Minutes:Evaluation 15    2/28/2022

## 2022-02-28 NOTE — ED PROVIDER NOTES
"Encounter Date: 2/27/2022     History     Chief Complaint   Patient presents with    Nausea     And dizziness when standing x3 days. Left arm pain x3 hours.     81 year old female with CAD s/p MI and PCI, T2DM, HTN, HLD, GERD, gout, arthritis, L breast Ca s/p lumpectomy, chemo, and radiation, primary OA BL knees s/p R TKA in 2009 and L TKA in 2005 presenting with nausea and lightheadedness for 3 days. Reports the dizziness and nausea started suddenly on Thursday morning; has waxed and waned in intensity. Reports turning her head and positional change (going from lying to sitting to standing) makes it worse. Associated nausea but no vomiting. Denies focal weakness or gait instability. Never had this issue before. Also reports left arm pain for the past 2 hours; describes as a pressure-type pain with some radiation into the neck. Denies chest pain, SOB, or palpitations. Denies abdominal pain. Denies loose stools/diarrhea. Denies dysuria or hematuria. Denies any falls or trauma. Denies h/o CVA. States she did not have significant chest pain with her previous MI.        Review of patient's allergies indicates:   Allergen Reactions    Lisinopril Anaphylaxis     Past Medical History:   Diagnosis Date    Anemia     Breast cancer 2016    DCIS and IDC, stage I    Cataract     Coronary artery disease 9/4/2012    Diabetes mellitus due to abnormal insulin 9/4/2012    Diabetes mellitus type II     Genetic testing     brca2 positive     GERD (gastroesophageal reflux disease) 9/4/2012    Gout, arthritis 9/4/2012    Hyperlipidemia 9/4/2012    Hypertension     Myocardial infarction     Obesity     Primary osteoarthritis of both knees 9/4/2012    Renal manifestation of secondary diabetes mellitus     Seizure     states "one time during chemo in 2018"    Type 2 diabetes with peripheral circulatory disorder, controlled      Past Surgical History:   Procedure Laterality Date    BREAST BIOPSY      BREAST LUMPECTOMY " Left 08/01/2016    DCIS and IDC, s/p lumpectomy    CATARACT EXTRACTION      COLONOSCOPY N/A 10/8/2018    Procedure: COLONOSCOPY;  Surgeon: Marcio Louie MD;  Location: [facility] ENDO (4TH FLR);  Service: Endoscopy;  Laterality: N/A;    CORONARY ANGIOPLASTY      ESOPHAGOGASTRODUODENOSCOPY N/A 10/8/2018    Procedure: ESOPHAGOGASTRODUODENOSCOPY (EGD);  Surgeon: Marcio Louie MD;  Location: [facility] ENDO (4TH FLR);  Service: Endoscopy;  Laterality: N/A;  combined egd/colon order/Plavix/OK to hold med 5 days prior to procedures per Dr Puente/see telephone encounter dated 8/22/18/svn    ESOPHAGOGASTRODUODENOSCOPY N/A 1/31/2020    Procedure: ESOPHAGOGASTRODUODENOSCOPY (EGD);  Surgeon: Marcio Louie MD;  Location: [facility] ENDO (4TH FLR);  Service: Endoscopy;  Laterality: N/A;  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 10/2018    per Dr Albarran to hold Plavix 5 days prior to procedure    HYSTERECTOMY      INTRAOCULAR PROSTHESES INSERTION Right 10/18/2020    Procedure: INSERTION, IOL PROSTHESIS;  Surgeon: Karishma Mata MD;  Location: [address];  Service: Ophthalmology;  Laterality: Right;    INTRAOCULAR PROSTHESES INSERTION Left 12/21/2020    Procedure: INSERTION, IOL PROSTHESIS;  Surgeon: Karishma Mata MD;  Location: [address];  Service: Ophthalmology;  Laterality: Left;    JOINT REPLACEMENT      left and right k nee    KNEE SURGERY      PHACOEMULSIFICATION OF CATARACT Right 10/18/2020    Procedure: PHACOEMULSIFICATION, CATARACT;  Surgeon: Karishma Mata MD;  Location: [facility] OR [address] [place] [address];  Service: Ophthalmology;  Laterality: Right;    PHACOEMULSIFICATION OF CATARACT Left 12/21/2020    Procedure: PHACOEMULSIFICATION, CATARACT;  Surgeon: Karishma Mata MD;  Location: [facility] OR [address] [place] [address];  Service: Ophthalmology;  Laterality: Left;    REVISION OF KNEE ARTHROPLASTY Left 4/15/2021    Procedure: REVISION, ARTHROPLASTY, KNEE;  Surgeon: Gilson Wilkerson MD;  Location: [facility] OR  2ND FLR;  Service: Orthopedics;  Laterality: Left;    TOTAL ABDOMINAL HYSTERECTOMY W/ BILATERAL SALPINGOOPHORECTOMY       Family History   Problem Relation Age of Onset    Cancer Mother 55        colon    Diabetes Mother     Hypertension Mother     Breast cancer Mother 45    Ovarian cancer Mother     Hypertension Maternal Aunt     Hyperlipidemia Maternal Aunt     Breast cancer Maternal Aunt     Hypertension Maternal Uncle     Heart disease Father     Breast cancer Daughter 45    Breast cancer Maternal Aunt     Diabetes Son     Stroke Son     Kidney disease Son     Glaucoma Neg Hx     Heart attack Neg Hx     Heart failure Neg Hx      Social History     Tobacco Use    Smoking status: Former Smoker     Packs/day: 1.00     Years: 12.00     Pack years: 12.00     Types: Cigarettes     Quit date: 8/13/2006     Years since quitting: 15.5    Smokeless tobacco: Never Used   Substance Use Topics    Alcohol use: Not Currently    Drug use: No     Review of Systems   Constitutional: Negative for fatigue and fever.   HENT: Negative for rhinorrhea and sore throat.    Eyes: Negative for photophobia and visual disturbance.   Respiratory: Negative for cough, shortness of breath and wheezing.    Cardiovascular: Negative for chest pain, palpitations and leg swelling.   Gastrointestinal: Positive for nausea. Negative for abdominal pain, diarrhea and vomiting.   Genitourinary: Negative for dysuria and hematuria.   Musculoskeletal: Negative for arthralgias.   Skin: Negative for rash and wound.   Neurological: Positive for dizziness and light-headedness. Negative for headaches.   Psychiatric/Behavioral: Negative for confusion.       Physical Exam     Initial Vitals [02/27/22 1915]   BP Pulse Resp Temp SpO2   (!) 150/71 70 20 98.1 °F (36.7 °C) 99 %      MAP       --         Physical Exam    Nursing note and vitals reviewed.  Constitutional: She appears well-developed and well-nourished.   HENT:   Head: Normocephalic  and atraumatic.   Eyes: EOM are normal. Pupils are equal, round, and reactive to light.   Cardiovascular: Normal rate and regular rhythm.   No murmur heard.  Pulmonary/Chest: She has no wheezes. She has no rhonchi. She has no rales.   Non tender to palpation of chest wall and sternum   Abdominal: Abdomen is soft. Bowel sounds are normal. She exhibits no distension. There is no guarding.   Musculoskeletal:         General: No tenderness or edema.      Comments: Mild tenderness to palpation of mid humerus     Neurological: She is alert and oriented to person, place, and time. She has normal strength. No cranial nerve deficit or sensory deficit. GCS score is 15. GCS eye subscore is 4. GCS verbal subscore is 5. GCS motor subscore is 6. She displays no Babinski's sign on the right side. She displays no Babinski's sign on the left side.   Skin: Skin is warm. Capillary refill takes less than 2 seconds. No rash noted. No erythema.   Psychiatric: She has a normal mood and affect. Thought content normal.         ED Course   Procedures  Labs Reviewed   CBC W/ AUTO DIFFERENTIAL - Abnormal; Notable for the following components:       Result Value    Mono % 15.2 (*)     All other components within normal limits   COMPREHENSIVE METABOLIC PANEL - Abnormal; Notable for the following components:    Albumin 3.4 (*)     ALT 8 (*)     eGFR if  41.2 (*)     eGFR if non  35.8 (*)     All other components within normal limits   TROPONIN I - Abnormal; Notable for the following components:    Troponin I 0.059 (*)     All other components within normal limits   URINALYSIS, REFLEX TO URINE CULTURE - Abnormal; Notable for the following components:    Leukocytes, UA 1+ (*)     All other components within normal limits    Narrative:     Specimen Source->Urine   MAGNESIUM - Abnormal; Notable for the following components:    Magnesium 1.5 (*)     All other components within normal limits   TROPONIN I - Abnormal;  Notable for the following components:    Troponin I 0.050 (*)     All other components within normal limits   POCT GLUCOSE - Abnormal; Notable for the following components:    POCT Glucose 112 (*)     All other components within normal limits   URINALYSIS MICROSCOPIC    Narrative:     Specimen Source->Urine   B-TYPE NATRIURETIC PEPTIDE   HEPATITIS C ANTIBODY   TROPONIN I   BASIC METABOLIC PANEL   MAGNESIUM   CBC W/ AUTO DIFFERENTIAL   HEMOGLOBIN A1C   SARS-COV-2 RDRP GENE   POCT GLUCOSE MONITORING CONTINUOUS     EKG Readings: (Independently Interpreted)   Initial Reading: No STEMI. Previous EKG: Compared with most recent EKG Rhythm: Normal Sinus Rhythm. ST Segments: Normal ST Segments. T Waves Flipped: V3, V1 and AVR.       Imaging Results          X-Ray Chest AP Portable (Final result)  Result time 02/27/22 22:41:08    Final result by Westley Haynes DO (02/27/22 22:41:08)                 Impression:      1. No acute cardiopulmonary abnormality.  2. Redemonstration of a retained catheter fragment overlying the heart.      Electronically signed by: Westley Haynes  Date:    02/27/2022  Time:    22:41             Narrative:    EXAMINATION:  XR CHEST AP PORTABLE    CLINICAL HISTORY:  Nausea    TECHNIQUE:  Single frontal view of the chest was performed.    COMPARISON:  02/27/2020.    FINDINGS:  Again seen is a curvilinear catheter fragment overlying the cardiac shadow, projecting over the expected location of the right atrium and right ventricle.    The lungs are well expanded and clear. No focal opacities are seen. The pleural spaces are clear.    The cardiac silhouette is unremarkable.    The visualized osseous structures are unremarkable.                               MRI Brain Without Contrast (Final result)  Result time 02/27/22 22:33:24    Final result by Carson Nolen MD (02/27/22 22:33:24)                 Impression:      No acute intracranial abnormality.    Generalized cerebral volume loss and findings  suggestive of advanced microvascular ischemic changes.    Numerous small remote microhemorrhages throughout both cerebral hemispheres in a pattern suggesting probable amyloid angiopathy.  Correlate clinically.    Electronically signed by resident: Bismark Diaz  Date:    02/27/2022  Time:    22:00    Electronically signed by: Carson Nolen MD  Date:    02/27/2022  Time:    22:33             Narrative:    EXAMINATION:  MRI BRAIN WITHOUT CONTRAST    CLINICAL HISTORY:  Dizziness, non-specific;.    TECHNIQUE:  Multiplanar multisequence MR imaging of the brain was performed without contrast.    COMPARISON:  CT head 02/27/2020    FINDINGS:  Intracranial compartment:    Moderate generalized cerebral volume loss with compensatory prominence of ventricles and sulci.  No acute hydrocephalus.  No extra-axial blood or fluid collections.    Patchy and confluent supratentorial white matter T2 FLAIR signal hyperintensities while nonspecific compatible with age advanced chronic microvascular ischemic change.  Gradient imaging demonstrates numerous small remote microhemorrhages throughout both cerebral hemispheres.  No mass lesion, acute hemorrhage, edema or acute infarct.    Normal vascular flow voids are preserved.    Skull/extracranial contents (limited evaluation): Bone marrow signal intensity is normal.                                 Medications   albuterol-ipratropium 2.5 mg-0.5 mg/3 mL nebulizer solution 3 mL (has no administration in time range)   melatonin tablet 6 mg (has no administration in time range)   ondansetron disintegrating tablet 8 mg (has no administration in time range)   promethazine tablet 25 mg (has no administration in time range)   polyethylene glycol packet 17 g (has no administration in time range)   bisacodyL suppository 10 mg (has no administration in time range)   acetaminophen tablet 650 mg (has no administration in time range)   glucose chewable tablet 16 g (has no administration in time range)    glucose chewable tablet 24 g (has no administration in time range)   glucagon (human recombinant) injection 1 mg (has no administration in time range)   insulin aspart U-100 pen 0-5 Units (has no administration in time range)   dextrose 10% bolus 125 mL (has no administration in time range)   dextrose 10% bolus 250 mL (has no administration in time range)   amLODIPine tablet 5 mg (has no administration in time range)   allopurinoL tablet 300 mg (has no administration in time range)   anastrozole tablet 1 mg (has no administration in time range)   aspirin EC tablet 81 mg (has no administration in time range)   ezetimibe tablet 10 mg (has no administration in time range)   ferrous sulfate tablet 1 each (has no administration in time range)   atorvastatin tablet 80 mg (has no administration in time range)   meclizine tablet 25 mg (has no administration in time range)   enoxaparin injection 40 mg (40 mg Subcutaneous Given 2/28/22 0039)   levETIRAcetam tablet 500 mg (has no administration in time range)   carvediloL tablet 6.25 mg (has no administration in time range)   acetaminophen tablet 1,000 mg (1,000 mg Oral Given 2/27/22 2013)   meclizine tablet 25 mg (25 mg Oral Given 2/27/22 2012)   magnesium oxide tablet 400 mg (400 mg Oral Given 2/27/22 2208)   aspirin tablet 325 mg (325 mg Oral Given 2/27/22 2311)   carvediloL tablet 3.125 mg (3.125 mg Oral Given 2/28/22 0048)     Medical Decision Making:   History:   Old Medical Records: I decided to obtain old medical records.  Differential Diagnosis:   Including but not limited to:  ACS  Posterior circulation CVA  BPPV  Clinical Tests:   Lab Tests: Ordered and Reviewed  The following lab test(s) were unremarkable: CBC, CMP, Troponin and Urinalysis  Radiological Study: Ordered and Reviewed  Medical Tests: Ordered and Reviewed  ED Management:  80 yo F with several vascular risk factors presenting with 3 days of dizziness and nausea. Reports her symptoms started suddenly on  Thursday morning and have waxed and waned in intensity since. Reports turning her head and positional changes make it worse; lying still makes it better. Never had this issue before. No h/o CVA. Denies gait instability. Also reports left arm pain that started this afternoon - describes as a pressure type pain in the mid-humerus that radiates to her neck. Denies chest pain or SOB. On arrival to ED, hypertensive /71, afebrile, saturating well on RA. Alert and oriented x3. Neuro exam unremarkable, CN intact, power 5/5 in all extremities, normal sensation and reflexes. Downgoing Babinski BL. CBC WNL, no leukocytosis, normal Hb, normal Plts. CMP unremarkable, renal function at baseline. Mg low - replaced orally. Initial troponin elevated 0.059; repeat due at midnight - repeat flat 0.050. EKG NSR, TWI V1 and V3 which is new. COVID negative. UA unremarkable, leuks 1+. CXR - no consolidation, PTX, or pleural effusion, retained catheter fragment seen. MRI brain showing chronic microvascular ischemic changes. Numerous small remote microhemorrhages throughout both cerebral hemispheres suggesting probably amyloid angiopathy. Given tylenol and meclizine. Pt reviewed afterwards and reports improvement in dizziness and nausea; Pt states she mobilized to the toilet without issue, no gait instability. Left arm pain had also resolved. Given aspirin 325mg. Spoke with cardiology, recommended repeating EKG - no dynamic changes seen; no indication for ACS protocol. Disposition is admission due to ECG changes and elevation in troponin, will need repeat troponin and cardiology review for advice on outpatient Ix.   Other:   I discussed test(s) with the performing physician.  I have discussed this case with another health care provider.                      Clinical Impression:   Final diagnoses:  [R11.0] Nausea  [R07.9] Chest pain, unspecified type (Primary)          ED Disposition Condition    Observation               Dinorah  MD Everardo  Resident  02/28/22 020

## 2022-02-28 NOTE — ASSESSMENT & PLAN NOTE
- not on home meds  - LDSSI; ACHS accuchecks  - repeat A1c  Lab Results   Component Value Date    HGBA1C 6.6 (H) 10/19/2021

## 2022-02-28 NOTE — ASSESSMENT & PLAN NOTE
- increase coreg from 3.125 to 6.25mg BID for better BP and HR control  - continue amlodipine 5 mg daily

## 2022-02-28 NOTE — H&P
"[facility] - Emergency Dept  [facility] Medicine  History & Physical    Patient Name: Renata Bergman  MRN: [id number]  Patient Class: OP- Observation  Admission Date: 2/27/2022  Attending Physician: Jud Sullivan MD   Primary Care Provider: Ethel Berger MD         Patient information was obtained from patient, relative(s), past medical records and ER records.     Subjective:     Principal Problem:Elevated troponin    Chief Complaint:   Chief Complaint   Patient presents with    Nausea     And dizziness when standing x3 days. Left arm pain x3 hours.        HPI: Renata Bergman is a 81 y.o. female with a PMHx of CAD s/p MI and PCI, T2DM, HTN, HLD, GERD, gout, arthritis,  OA BL knees s/p R TKA in 2009 and L TKA in 2005, L breast cancer s/p lumpectomy and chemo/radiation currently on anastrozole who presents to [facility] with intractable nausea and dizziness. Patient reports lightheadedness/dizziness with associated nausea but no vomiting began Thursday morning. Symptoms typically occur with position changes especially when standing from a seated position or when turning her head to the side quickly. She also reports left arm pain decribed as a "heaviness" that radiates to her neck began when she woke up from a nap earlier today. She initially thought she just slept wrong on her arm but symptoms worsened since then so she presented to the ED for further eval. Her L arm pain has completely resolved at the time of my exam and the dizziness/nausea has improved significantly since given meclizine in the ED prior to my exam. She's never had similar symptoms in the past, reports having mild chest pain with previous MI. Denies fever, chills, SOB, orthopnea, LE edema, cough, HA, vision changes, numbness/tinging, or syncope    ED: hypertensive to BP max 182/93. No leukocytosis, Cr stable at baseline 1.4. Trop 0.059, EKG with new TWI in V1 and V3. CXR without acute findings. Cardiology consulted-- no ACS protocol at this " "time, recommend trop trending and formal echo in AM. Given aspirin, tylenol, and meclizine.      Past Medical History:   Diagnosis Date    Anemia     Breast cancer 2016    DCIS and IDC, stage I    Cataract     Coronary artery disease 9/4/2012    Diabetes mellitus due to abnormal insulin 9/4/2012    Diabetes mellitus type II     Genetic testing     brca2 positive     GERD (gastroesophageal reflux disease) 9/4/2012    Gout, arthritis 9/4/2012    Hyperlipidemia 9/4/2012    Hypertension     Myocardial infarction     Obesity     Primary osteoarthritis of both knees 9/4/2012    Renal manifestation of secondary diabetes mellitus     Seizure     states "one time during chemo in 2018"    Type 2 diabetes with peripheral circulatory disorder, controlled        Past Surgical History:   Procedure Laterality Date    BREAST BIOPSY      BREAST LUMPECTOMY Left 08/01/2016    DCIS and IDC, s/p lumpectomy    CATARACT EXTRACTION      COLONOSCOPY N/A 10/8/2018    Procedure: COLONOSCOPY;  Surgeon: Marcio Louie MD;  Location: [facility] (4TH FLR);  Service: Endoscopy;  Laterality: N/A;    CORONARY ANGIOPLASTY      ESOPHAGOGASTRODUODENOSCOPY N/A 10/8/2018    Procedure: ESOPHAGOGASTRODUODENOSCOPY (EGD);  Surgeon: Marcio Louie MD;  Location: [facility] (4TH FLR);  Service: Endoscopy;  Laterality: N/A;  combined egd/colon order/Plavix/OK to hold med 5 days prior to procedures per Dr Puente/see telephone encounter dated 8/22/18/tanya    ESOPHAGOGASTRODUODENOSCOPY N/A 1/31/2020    Procedure: ESOPHAGOGASTRODUODENOSCOPY (EGD);  Surgeon: Marcio Louie MD;  Location: [facility] (4TH FLR);  Service: Endoscopy;  Laterality: N/A;  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 10/2018    per Dr Albarran to hold Plavix 5 days prior to procedure    HYSTERECTOMY      INTRAOCULAR PROSTHESES INSERTION Right 10/18/2020    Procedure: INSERTION, IOL PROSTHESIS;  Surgeon: Karishma Mata MD;  Location: [facility] OR " 1ST FLR;  Service: Ophthalmology;  Laterality: Right;    INTRAOCULAR PROSTHESES INSERTION Left 12/21/2020    Procedure: INSERTION, IOL PROSTHESIS;  Surgeon: Karishma Mata MD;  Location: [facility] OR 1ST FLR;  Service: Ophthalmology;  Laterality: Left;    JOINT REPLACEMENT      left and right k nee    KNEE SURGERY      PHACOEMULSIFICATION OF CATARACT Right 10/18/2020    Procedure: PHACOEMULSIFICATION, CATARACT;  Surgeon: Karishma Mata MD;  Location: [facility] OR 1ST FLR;  Service: Ophthalmology;  Laterality: Right;    PHACOEMULSIFICATION OF CATARACT Left 12/21/2020    Procedure: PHACOEMULSIFICATION, CATARACT;  Surgeon: Karishma Mata MD;  [facility];  Service: Ophthalmology;  Laterality: Left;    REVISION OF KNEE ARTHROPLASTY Left 4/15/2021    Procedure: REVISION, ARTHROPLASTY, KNEE;  Surgeon: Gilson Wilkerson MD;  Location: [facility] OR 2ND FLR;  Service: Orthopedics;  Laterality: Left;    TOTAL ABDOMINAL HYSTERECTOMY W/ BILATERAL SALPINGOOPHORECTOMY         Review of patient's allergies indicates:   Allergen Reactions    Lisinopril Anaphylaxis       Current Facility-Administered Medications on File Prior to Encounter   Medication    phenylephrine HCL 2.5% ophthalmic solution 1 drop    proparacaine 0.5 % ophthalmic solution 1 drop    tropicamide 1% ophthalmic solution 1 drop     Current Outpatient Medications on File Prior to Encounter   Medication Sig    allopurinoL (ZYLOPRIM) 300 MG tablet Take 1 tablet (300 mg total) by mouth every morning.    amLODIPine (NORVASC) 5 MG tablet TAKE 1 TABLET BY MOUTH DAILY    anastrozole (ARIMIDEX) 1 mg Tab Take 1 tablet (1 mg total) by mouth once daily TAKE 1 TABLET(1 MG) BY MOUTH EVERY DAY. STOP LETROZOLE FEMARA.    aspirin (ECOTRIN) 81 MG EC tablet Take 1 tablet (81 mg total) by mouth once daily.    bisacodyL (DULCOLAX) 5 mg EC tablet Take 1 tablet (5 mg total) by mouth daily as needed for Constipation.    blood sugar diagnostic Strp 1 strip by  Misc.(Non-Drug; Combo Route) route once daily.    blood-glucose meter kit Use as instructed    carvediloL (COREG) 3.125 MG tablet Take 1 tablet (3.125 mg total) by mouth 2 (two) times daily with meals.    diclofenac sodium (VOLTAREN) 1 % Gel Apply 2 g topically 4 (four) times daily as needed. Apply to bilateral knees as needed for pain    ergocalciferol (ERGOCALCIFEROL) 50,000 unit Cap Take 1 capsule (50,000 Units total) by mouth twice a week.    ezetimibe (ZETIA) 10 mg tablet Take 1 tablet (10 mg total) by mouth once daily.    ferrous sulfate 325 (65 FE) MG EC tablet Take 1 tablet (325 mg total) by mouth once daily.    fexofenadine (ALLEGRA) 30 MG tablet Take 30 mg by mouth daily as needed.    lancets Misc Check blood sugar once daily    levetiracetam XR (KEPPRA XR) 500 mg Tb24 24 hr tablet TAKE 2 TABLETS(1000 MG) BY MOUTH EVERY DAY    methocarbamoL (ROBAXIN) 500 MG Tab Take 1 tablet (500 mg total) by mouth 3 (three) times daily as needed (muscle spasms).    nitroGLYCERIN (NITROSTAT) 0.4 MG SL tablet PLACE 1 TABLET UNDER THE TONGUE EVERY 5 MINUTES AS NEEDED FOR CHEST PAIN.    nystatin (MYCOSTATIN) cream Apply topically 2 (two) times daily.    omeprazole (PRILOSEC) 20 MG capsule TAKE 2 CAPSULES BY MOUTH EVERY DAY    ondansetron (ZOFRAN-ODT) 8 MG TbDL Dissolve 1 tablet (8 mg total) by mouth every 6 (six) hours as needed.    polyethylene glycol (GLYCOLAX) 17 gram PwPk Take 17 g by mouth once daily.    rosuvastatin (CRESTOR) 40 MG Tab Take 1 tablet (40 mg total) by mouth every morning.     Family History       Problem Relation (Age of Onset)    Breast cancer Mother (45), Maternal Aunt, Daughter (45), Maternal Aunt    Cancer Mother (55)    Diabetes Mother, Son    Heart disease Father    Hyperlipidemia Maternal Aunt    Hypertension Mother, Maternal Aunt, Maternal Uncle    Kidney disease Son    Ovarian cancer Mother    Stroke Son          Tobacco Use    Smoking status: Former Smoker     Packs/day: 1.00      Years: 12.00     Pack years: 12.00     Types: Cigarettes     Quit date: 8/13/2006     Years since quitting: 15.5    Smokeless tobacco: Never Used   Substance and Sexual Activity    Alcohol use: Not Currently    Drug use: No    Sexual activity: Never     Review of Systems   Constitutional:  Negative for activity change, appetite change, chills and fever.   HENT:  Negative for congestion, trouble swallowing and voice change.    Eyes:  Negative for photophobia and visual disturbance.   Respiratory:  Negative for cough, chest tightness, shortness of breath and wheezing.    Cardiovascular:  Negative for chest pain, palpitations and leg swelling.   Gastrointestinal:  Positive for nausea. Negative for abdominal distention, abdominal pain, blood in stool, constipation, diarrhea and vomiting.   Genitourinary:  Negative for decreased urine volume, difficulty urinating, dysuria, flank pain, frequency and hematuria.   Musculoskeletal:  Positive for neck pain. Negative for arthralgias, back pain and gait problem.        L arm heaviness/pain   Skin:  Negative for color change and wound.   Neurological:  Positive for dizziness and light-headedness. Negative for seizures, syncope, facial asymmetry, speech difficulty, weakness and numbness.   Psychiatric/Behavioral:  Negative for behavioral problems, confusion and decreased concentration.    Objective:     Vital Signs (Most Recent):  Temp: 98.1 °F (36.7 °C) (02/27/22 1915)  Pulse: 72 (02/27/22 2330)  Resp: 19 (02/27/22 2330)  BP: (!) 160/81 (02/27/22 2330)  SpO2: 100 % (02/27/22 2330)   Vital Signs (24h Range):  Temp:  [98.1 °F (36.7 °C)] 98.1 °F (36.7 °C)  Pulse:  [70-81] 72  Resp:  [19-20] 19  SpO2:  [95 %-100 %] 100 %  BP: (150-182)/(67-93) 160/81     Weight: 117.9 kg (260 lb)  Body mass index is 46.8 kg/m².    Physical Exam  Vitals and nursing note reviewed.   Constitutional:       General: She is not in acute distress.     Appearance: She is well-developed. She is obese.       Comments: Exam limited by body habitus    HENT:      Head: Normocephalic and atraumatic.      Mouth/Throat:      Pharynx: No oropharyngeal exudate.   Eyes:      Extraocular Movements: Extraocular movements intact.      Conjunctiva/sclera: Conjunctivae normal.   Neck:      Vascular: JVD present.   Cardiovascular:      Rate and Rhythm: Normal rate and regular rhythm.      Heart sounds: Normal heart sounds.   Pulmonary:      Effort: No respiratory distress.      Breath sounds: No wheezing.      Comments: Poor inspiratory effort and diminished BS   Abdominal:      General: Bowel sounds are normal. There is no distension.      Palpations: Abdomen is soft.      Tenderness: There is no abdominal tenderness.   Musculoskeletal:         General: No tenderness. Normal range of motion.      Cervical back: Normal range of motion and neck supple.      Right lower leg: No edema.      Left lower leg: No edema.   Lymphadenopathy:      Cervical: No cervical adenopathy.   Skin:     General: Skin is warm and dry.      Capillary Refill: Capillary refill takes less than 2 seconds.      Findings: No rash.   Neurological:      Mental Status: She is alert and oriented to person, place, and time.      Cranial Nerves: No cranial nerve deficit.      Sensory: No sensory deficit.      Coordination: Coordination normal.   Psychiatric:         Behavior: Behavior normal.         Thought Content: Thought content normal.         Judgment: Judgment normal.           Significant Labs: All pertinent labs within the past 24 hours have been reviewed.  CBC:   Recent Labs   Lab 02/27/22  2100   WBC 6.30   HGB 12.7   HCT 39.0        CMP:   Recent Labs   Lab 02/27/22  2100      K 3.8      CO2 24      BUN 16   CREATININE 1.4   CALCIUM 9.8   PROT 7.0   ALBUMIN 3.4*   BILITOT 0.7   ALKPHOS 96   AST 18   ALT 8*   ANIONGAP 9   EGFRNONAA 35.8*       Significant Imaging: I have reviewed all pertinent imaging results/findings within the  past 24 hours.  X-Ray Chest AP Portable  Narrative: EXAMINATION:  XR CHEST AP PORTABLE    CLINICAL HISTORY:  Nausea    TECHNIQUE:  Single frontal view of the chest was performed.    COMPARISON:  02/27/2020.    FINDINGS:  Again seen is a curvilinear catheter fragment overlying the cardiac shadow, projecting over the expected location of the right atrium and right ventricle.    The lungs are well expanded and clear. No focal opacities are seen. The pleural spaces are clear.    The cardiac silhouette is unremarkable.    The visualized osseous structures are unremarkable.  Impression: 1. No acute cardiopulmonary abnormality.  2. Redemonstration of a retained catheter fragment overlying the heart.    Electronically signed by: Westley Haynes  Date:    02/27/2022  Time:    22:41  MRI Brain Without Contrast  Narrative: EXAMINATION:  MRI BRAIN WITHOUT CONTRAST    CLINICAL HISTORY:  Dizziness, non-specific;.    TECHNIQUE:  Multiplanar multisequence MR imaging of the brain was performed without contrast.    COMPARISON:  CT head 02/27/2020    FINDINGS:  Intracranial compartment:    Moderate generalized cerebral volume loss with compensatory prominence of ventricles and sulci.  No acute hydrocephalus.  No extra-axial blood or fluid collections.    Patchy and confluent supratentorial white matter T2 FLAIR signal hyperintensities while nonspecific compatible with age advanced chronic microvascular ischemic change.  Gradient imaging demonstrates numerous small remote microhemorrhages throughout both cerebral hemispheres.  No mass lesion, acute hemorrhage, edema or acute infarct.    Normal vascular flow voids are preserved.    Skull/extracranial contents (limited evaluation): Bone marrow signal intensity is normal.  Impression: No acute intracranial abnormality.    Generalized cerebral volume loss and findings suggestive of advanced microvascular ischemic changes.    Numerous small remote microhemorrhages throughout both cerebral  hemispheres in a pattern suggesting probable amyloid angiopathy.  Correlate clinically.    Electronically signed by resident: Bismark Diaz  Date:    02/27/2022  Time:    22:00    Electronically signed by: Carson Nolen MD  Date:    02/27/2022  Time:    22:33      Assessment/Plan:     * Elevated troponin  Coronary artery disease due to lipid rich plaque  - Trop 0.059, EKG w/ new TWIs  - no reported chest pain  - cardiology consulted; do not recommend ACS protocol at this time  - possible type 2 demand ischemia from uncontrolled BP?  - s/p ASA 325mg x1  - continue ASA, statin, BB  - trend Tn until peak  - check BNP, 2D echo   - monitor tele  - nitro PRN chest pain    Postural dizziness  Suspect 2/2 vertigo (BPPV?) vs orthostatic hypotension given symptomatic w/ position changes, though some c/f cardiac etiology given elevated trop & EKG changes.    - MRI brain w/ findings as detailed above  - OT consult for vestibular rehab  - PRN meclizine  - follow up TTE  - orthostatics q shift  - further management as above    Hypertension  - increase coreg from 3.125 to 6.25mg BID for better BP and HR control  - continue amlodipine 5 mg daily    Hyperlipidemia  - continue ASA, statin     Type 2 diabetes mellitus with circulatory disorder, without long-term current use of insulin  - not on home meds  - LDSSI; ACHS accuchecks  - repeat A1c  Lab Results   Component Value Date    HGBA1C 6.6 (H) 10/19/2021         Tonic-clonic epileptic seizures  - continue keppra 1000mg daily     Chronic renal disease, stage 3, moderately decreased glomerular filtration rate between 30-59 mL/min/1.73 square meter  - stable at baseline  - daily BMP    VTE Risk Mitigation (From admission, onward)         Ordered     enoxaparin injection 40 mg  Daily         02/27/22 2322     IP VTE HIGH RISK PATIENT  Once         02/27/22 2322                   Parvin Mantilla PA-C  Department of Hospital Medicine   [facility]

## 2022-02-28 NOTE — ASSESSMENT & PLAN NOTE
Suspect 2/2 vertigo (BPPV?) vs orthostatic hypotension given symptomatic w/ position changes, though some c/f cardiac etiology given elevated trop & EKG changes.    - MRI brain w/ findings as detailed above  - OT consult for vestibular rehab  - PRN meclizine  - follow up TTE  - orthostatics q shift  - further management as above

## 2022-02-28 NOTE — ED TRIAGE NOTES
Pt reports to ED with c/o of nausea and dizziness since Thursday 2/24/22. Pt denies fever, vomiting, or diarrhea. Pt denies any trauma or falls. Pt states that she feels that she may be dehydrated.

## 2022-02-28 NOTE — PLAN OF CARE
PT Eval complete and POC established.    2022    Problem: Physical Therapy Goal  Goal: Physical Therapy Goal  Description: Goals to be met by: 3/14/2022     Patient will increase functional independence with mobility by performin. Sit to stand transfer with [place]  2. Gait  x 200 feet with modified [place] using LRAD    Outcome: Ongoing, Progressing

## 2022-02-28 NOTE — ASSESSMENT & PLAN NOTE
Coronary artery disease due to lipid rich plaque  - Trop 0.059, EKG w/ new TWIs  - no reported chest pain  - cardiology consulted; do not recommend ACS protocol at this time  - possible type 2 demand ischemia from uncontrolled BP?  - s/p ASA 325mg x1  - continue ASA, statin, BB  - trend Tn until peak  - check BNP, 2D echo   - monitor tele  - nitro PRN chest pain

## 2022-02-28 NOTE — PLAN OF CARE
"Admit Date:  2/27/2022  7:20 PM      Admit Diagnosis:  Nausea [R11.0]  Elevated troponin [R77.8]  Chest pain [R07.9]  Chest pain, unspecified type [R07.9]    Transferred from:     Past Medical History:   Diagnosis Date    Anemia     Breast cancer 2016    DCIS and IDC, stage I    Cataract     Coronary artery disease 9/4/2012    Diabetes mellitus due to abnormal insulin 9/4/2012    Diabetes mellitus type II     Genetic testing     brca2 positive     GERD (gastroesophageal reflux disease) 9/4/2012    Gout, arthritis 9/4/2012    Hyperlipidemia 9/4/2012    Hypertension     Myocardial infarction     Obesity     Primary osteoarthritis of both knees 9/4/2012    Renal manifestation of secondary diabetes mellitus     Seizure     states "one time during chemo in 2018"    Type 2 diabetes with peripheral circulatory disorder, controlled          SW spoke with Pt daughter for Discharge Planning Assessment. Per Pt daughter, Pt lives with her and her brother in a two story home with 1 step(s) to enter but Pt is able to live on the first floor. Per Pt daughter, Pt was independent with ADLS and used either a cane, a RW, or a Rolator for ambulation.  Patient will have assistance from her adult children upon discharge.   Discharge Planning Booklet given to patient/family and discussed.  All questions addressed.  Assigned SW/CM will follow for needs.    SW observed Pt expected dc date changed to today. Attempted to schedule hospital follow up with PCP but she did not have availability. Scheduled with associated MD for Monday 3/7 at 10:30am    Emergency Contact: Extended Emergency Contact Information  Primary Emergency Contact: Agnes Bergman   [facility] [place]  Home Phone: [phone]  Mobile Phone: [phone]  Relation: Daughter  Preferred language: English     PCP: Ethel Berger MD    Pharmacy:   [id number] [address] [address] [facility] AT [facility] " [place]  [address] [address] [address]  Phone: [phone] Fax: [phone]    [facility] [place] - [address]  [address]  [facility] [address]  Phone: [phone] Fax: [phone]      Payor: Payor: [employer]ICARE / Plan: [employer] MEDICARE / Product Type: Medicare Advantage /      02/28/22 1611   Discharge Assessment   Assessment Type Discharge Planning Assessment   Confirmed/corrected address, phone number and insurance Yes   Confirmed Demographics Correct on Facesheet   Source of Information family   Communicated BROOKE with patient/caregiver Date not available/Unable to determine   Reason For Admission Elevated troponin   Lives With child(brendan), adult   Do you expect to return to your current living situation? Yes   Do you have help at home or someone to help you manage your care at home? Yes   Who are your caregiver(s) and their phone number(s)? Agnes Bergman (daughter) [phone] and Pt son   Prior to hospitilization cognitive status: Alert/Oriented   Current cognitive status: Alert/Oriented   Walking or Climbing Stairs Difficulty ambulation difficulty, requires equipment   Mobility Management RW, Rollator, cane   Dressing/Bathing Difficulty none   Home Accessibility stairs to enter home   Number of Stairs, Main Entrance one   Home Layout Able to live on 1st floor   Equipment Currently Used at Home walker, rolling;rollator;cane, straight;shower chair   Readmission within 30 days? No   Patient currently being followed by outpatient case management? No   Do you currently have service(s) that help you manage your care at home? No   Do you take prescription medications? Yes   Do you have prescription coverage? Yes   Coverage PHN   Do you have any problems affording any of your prescribed medications? No   Is the patient taking medications as prescribed? yes   Who is going to help you get home at discharge? Agnes Bergman  (daughter) [phone] and Pt son   How do you get to doctors appointments? family or friend will provide   Are you on dialysis? No   Do you take coumadin? No   Discharge Plan A Home with family   Discharge Plan B Home with family   DME Needed Upon Discharge  none   Discharge Plan discussed with: Adult children   Discharge Barriers Identified None   Relationship/Environment   Name(s) of Who Lives With Patient Agnes Bergman (daughter) [phone] and Pt son     Sadie Johnson LCSW  Neurocritical Care   [facility]  [id number]

## 2022-02-28 NOTE — SUBJECTIVE & OBJECTIVE
"Past Medical History:   Diagnosis Date    Anemia     Breast cancer 2016    DCIS and IDC, stage I    Cataract     Coronary artery disease 9/4/2012    Diabetes mellitus due to abnormal insulin 9/4/2012    Diabetes mellitus type II     Genetic testing     brca2 positive     GERD (gastroesophageal reflux disease) 9/4/2012    Gout, arthritis 9/4/2012    Hyperlipidemia 9/4/2012    Hypertension     Myocardial infarction     Obesity     Primary osteoarthritis of both knees 9/4/2012    Renal manifestation of secondary diabetes mellitus     Seizure     states "one time during chemo in 2018"    Type 2 diabetes with peripheral circulatory disorder, controlled        Past Surgical History:   Procedure Laterality Date    BREAST BIOPSY      BREAST LUMPECTOMY Left 08/01/2016    DCIS and IDC, s/p lumpectomy    CATARACT EXTRACTION      COLONOSCOPY N/A 10/8/2018    Procedure: COLONOSCOPY;  Surgeon: Marcio Louie MD;  [facility]  Service: Endoscopy;  Laterality: N/A;    CORONARY ANGIOPLASTY      ESOPHAGOGASTRODUODENOSCOPY N/A 10/8/2018    Procedure: ESOPHAGOGASTRODUODENOSCOPY (EGD);  Surgeon: Marcio Louie MD;  Location: [facility] (4TH FLR);  Service: Endoscopy;  Laterality: N/A;  combined egd/colon order/Plavix/OK to hold med 5 days prior to procedures per Dr Puente/see telephone encounter dated 8/22/18/tanya    ESOPHAGOGASTRODUODENOSCOPY N/A 1/31/2020    Procedure: ESOPHAGOGASTRODUODENOSCOPY (EGD);  Surgeon: Marcio Louie MD;  Location: [facility] (4TH FLR);  Service: Endoscopy;  Laterality: N/A;  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 10/2018    per Dr Albarran to hold Plavix 5 days prior to procedure    HYSTERECTOMY      INTRAOCULAR PROSTHESES INSERTION Right 10/18/2020    Procedure: INSERTION, IOL PROSTHESIS;  Surgeon: Karishma Mata MD;  Location: [address];  Service: Ophthalmology;  Laterality: Right;    INTRAOCULAR PROSTHESES INSERTION Left 12/21/2020    Procedure: " INSERTION, IOL PROSTHESIS;  Surgeon: Karishma Mata MD;  Location: [facility] OR [address] [place] [address];  Service: Ophthalmology;  Laterality: Left;    JOINT REPLACEMENT      left and right k nee    KNEE SURGERY      PHACOEMULSIFICATION OF CATARACT Right 10/18/2020    Procedure: PHACOEMULSIFICATION, CATARACT;  Surgeon: Karishma Mata MD;  Location: [facility] OR [address] [place] [address];  Service: Ophthalmology;  Laterality: Right;    PHACOEMULSIFICATION OF CATARACT Left 12/21/2020    Procedure: PHACOEMULSIFICATION, CATARACT;  Surgeon: Karishma Mata MD;  Location: [facility] OR [address] [place] [address];  Service: Ophthalmology;  Laterality: Left;    REVISION OF KNEE ARTHROPLASTY Left 4/15/2021    Procedure: REVISION, ARTHROPLASTY, KNEE;  Surgeon: Gilson Wilkerson MD;  Location: [facility] OR [address] [place] [address];  Service: Orthopedics;  Laterality: Left;    TOTAL ABDOMINAL HYSTERECTOMY W/ BILATERAL SALPINGOOPHORECTOMY         Review of patient's allergies indicates:   Allergen Reactions    Lisinopril Anaphylaxis       Current Facility-Administered Medications on File Prior to Encounter   Medication    phenylephrine HCL 2.5% ophthalmic solution 1 drop    proparacaine 0.5 % ophthalmic solution 1 drop    tropicamide 1% ophthalmic solution 1 drop     Current Outpatient Medications on File Prior to Encounter   Medication Sig    allopurinoL (ZYLOPRIM) 300 MG tablet Take 1 tablet (300 mg total) by mouth every morning.    amLODIPine (NORVASC) 5 MG tablet TAKE 1 TABLET BY MOUTH DAILY    anastrozole (ARIMIDEX) 1 mg Tab Take 1 tablet (1 mg total) by mouth once daily TAKE 1 TABLET(1 MG) BY MOUTH EVERY DAY. STOP LETROZOLE FEMARA.    aspirin (ECOTRIN) 81 MG EC tablet Take 1 tablet (81 mg total) by mouth once daily.    bisacodyL (DULCOLAX) 5 mg EC tablet Take 1 tablet (5 mg total) by mouth daily as needed for Constipation.    blood sugar diagnostic Strp 1 strip by Misc.(Non-Drug; Combo Route) route once daily.    blood-glucose meter kit Use as instructed    carvediloL (COREG) 3.125 MG tablet Take 1  tablet (3.125 mg total) by mouth 2 (two) times daily with meals.    diclofenac sodium (VOLTAREN) 1 % Gel Apply 2 g topically 4 (four) times daily as needed. Apply to bilateral knees as needed for pain    ergocalciferol (ERGOCALCIFEROL) 50,000 unit Cap Take 1 capsule (50,000 Units total) by mouth twice a week.    ezetimibe (ZETIA) 10 mg tablet Take 1 tablet (10 mg total) by mouth once daily.    ferrous sulfate 325 (65 FE) MG EC tablet Take 1 tablet (325 mg total) by mouth once daily.    fexofenadine (ALLEGRA) 30 MG tablet Take 30 mg by mouth daily as needed.    lancets Misc Check blood sugar once daily    levetiracetam XR (KEPPRA XR) 500 mg Tb24 24 hr tablet TAKE 2 TABLETS(1000 MG) BY MOUTH EVERY DAY    methocarbamoL (ROBAXIN) 500 MG Tab Take 1 tablet (500 mg total) by mouth 3 (three) times daily as needed (muscle spasms).    nitroGLYCERIN (NITROSTAT) 0.4 MG SL tablet PLACE 1 TABLET UNDER THE TONGUE EVERY 5 MINUTES AS NEEDED FOR CHEST PAIN.    nystatin (MYCOSTATIN) cream Apply topically 2 (two) times daily.    omeprazole (PRILOSEC) 20 MG capsule TAKE 2 CAPSULES BY MOUTH EVERY DAY    ondansetron (ZOFRAN-ODT) 8 MG TbDL Dissolve 1 tablet (8 mg total) by mouth every 6 (six) hours as needed.    polyethylene glycol (GLYCOLAX) 17 gram PwPk Take 17 g by mouth once daily.    rosuvastatin (CRESTOR) 40 MG Tab Take 1 tablet (40 mg total) by mouth every morning.     Family History       Problem Relation (Age of Onset)    Breast cancer Mother (45), Maternal Aunt, Daughter (45), Maternal Aunt    Cancer Mother (55)    Diabetes Mother, Son    Heart disease Father    Hyperlipidemia Maternal Aunt    Hypertension Mother, Maternal Aunt, Maternal Uncle    Kidney disease Son    Ovarian cancer Mother    Stroke Son          Tobacco Use    Smoking status: Former Smoker     Packs/day: 1.00     Years: 12.00     Pack years: 12.00     Types: Cigarettes     Quit date: 8/13/2006     Years since quitting: 15.5    Smokeless tobacco: Never Used    Substance and Sexual Activity    Alcohol use: Not Currently    Drug use: No    Sexual activity: Never     Review of Systems   Constitutional:  Negative for activity change, appetite change, chills and fever.   HENT:  Negative for congestion, trouble swallowing and voice change.    Eyes:  Negative for photophobia and visual disturbance.   Respiratory:  Negative for cough, chest tightness, shortness of breath and wheezing.    Cardiovascular:  Negative for chest pain, palpitations and leg swelling.   Gastrointestinal:  Positive for nausea. Negative for abdominal distention, abdominal pain, blood in stool, constipation, diarrhea and vomiting.   Genitourinary:  Negative for decreased urine volume, difficulty urinating, dysuria, flank pain, frequency and hematuria.   Musculoskeletal:  Positive for neck pain. Negative for arthralgias, back pain and gait problem.        L arm heaviness/pain   Skin:  Negative for color change and wound.   Neurological:  Positive for dizziness and light-headedness. Negative for seizures, syncope, facial asymmetry, speech difficulty, weakness and numbness.   Psychiatric/Behavioral:  Negative for behavioral problems, confusion and decreased concentration.    Objective:     Vital Signs (Most Recent):  Temp: 98.1 °F (36.7 °C) (02/27/22 1915)  Pulse: 72 (02/27/22 2330)  Resp: 19 (02/27/22 2330)  BP: (!) 160/81 (02/27/22 2330)  SpO2: 100 % (02/27/22 2330)   Vital Signs (24h Range):  Temp:  [98.1 °F (36.7 °C)] 98.1 °F (36.7 °C)  Pulse:  [70-81] 72  Resp:  [19-20] 19  SpO2:  [95 %-100 %] 100 %  BP: (150-182)/(67-93) 160/81     Weight: 117.9 kg (260 lb)  Body mass index is 46.8 kg/m².    Physical Exam  Vitals and nursing note reviewed.   Constitutional:       General: She is not in acute distress.     Appearance: She is well-developed. She is obese.      Comments: Exam limited by body habitus    HENT:      Head: Normocephalic and atraumatic.      Mouth/Throat:      Pharynx: No oropharyngeal exudate.    Eyes:      Extraocular Movements: Extraocular movements intact.      Conjunctiva/sclera: Conjunctivae normal.   Neck:      Vascular: JVD present.   Cardiovascular:      Rate and Rhythm: Normal rate and regular rhythm.      Heart sounds: Normal heart sounds.   Pulmonary:      Effort: No respiratory distress.      Breath sounds: No wheezing.      Comments: Poor inspiratory effort and diminished BS   Abdominal:      General: Bowel sounds are normal. There is no distension.      Palpations: Abdomen is soft.      Tenderness: There is no abdominal tenderness.   Musculoskeletal:         General: No tenderness. Normal range of motion.      Cervical back: Normal range of motion and neck supple.      Right lower leg: No edema.      Left lower leg: No edema.   Lymphadenopathy:      Cervical: No cervical adenopathy.   Skin:     General: Skin is warm and dry.      Capillary Refill: Capillary refill takes less than 2 seconds.      Findings: No rash.   Neurological:      Mental Status: She is alert and oriented to person, place, and time.      Cranial Nerves: No cranial nerve deficit.      Sensory: No sensory deficit.      Coordination: Coordination normal.   Psychiatric:         Behavior: Behavior normal.         Thought Content: Thought content normal.         Judgment: Judgment normal.           Significant Labs: All pertinent labs within the past 24 hours have been reviewed.  CBC:   Recent Labs   Lab 02/27/22  2100   WBC 6.30   HGB 12.7   HCT 39.0        CMP:   Recent Labs   Lab 02/27/22  2100      K 3.8      CO2 24      BUN 16   CREATININE 1.4   CALCIUM 9.8   PROT 7.0   ALBUMIN 3.4*   BILITOT 0.7   ALKPHOS 96   AST 18   ALT 8*   ANIONGAP 9   EGFRNONAA 35.8*       Significant Imaging: I have reviewed all pertinent imaging results/findings within the past 24 hours.  X-Ray Chest AP Portable  Narrative: EXAMINATION:  XR CHEST AP PORTABLE    CLINICAL HISTORY:  Nausea    TECHNIQUE:  Single frontal view  of the chest was performed.    COMPARISON:  02/27/2020.    FINDINGS:  Again seen is a curvilinear catheter fragment overlying the cardiac shadow, projecting over the expected location of the right atrium and right ventricle.    The lungs are well expanded and clear. No focal opacities are seen. The pleural spaces are clear.    The cardiac silhouette is unremarkable.    The visualized osseous structures are unremarkable.  Impression: 1. No acute cardiopulmonary abnormality.  2. Redemonstration of a retained catheter fragment overlying the heart.    Electronically signed by: Westley Haynes  Date:    02/27/2022  Time:    22:41  MRI Brain Without Contrast  Narrative: EXAMINATION:  MRI BRAIN WITHOUT CONTRAST    CLINICAL HISTORY:  Dizziness, non-specific;.    TECHNIQUE:  Multiplanar multisequence MR imaging of the brain was performed without contrast.    COMPARISON:  CT head 02/27/2020    FINDINGS:  Intracranial compartment:    Moderate generalized cerebral volume loss with compensatory prominence of ventricles and sulci.  No acute hydrocephalus.  No extra-axial blood or fluid collections.    Patchy and confluent supratentorial white matter T2 FLAIR signal hyperintensities while nonspecific compatible with age advanced chronic microvascular ischemic change.  Gradient imaging demonstrates numerous small remote microhemorrhages throughout both cerebral hemispheres.  No mass lesion, acute hemorrhage, edema or acute infarct.    Normal vascular flow voids are preserved.    Skull/extracranial contents (limited evaluation): Bone marrow signal intensity is normal.  Impression: No acute intracranial abnormality.    Generalized cerebral volume loss and findings suggestive of advanced microvascular ischemic changes.    Numerous small remote microhemorrhages throughout both cerebral hemispheres in a pattern suggesting probable amyloid angiopathy.  Correlate clinically.    Electronically signed by resident: Bismark  Emily  Date:    02/27/2022  Time:    22:00    Electronically signed by: Carson Nolen MD  Date:    02/27/2022  Time:    22:33

## 2022-02-28 NOTE — PT/OT/SLP EVAL
Physical Therapy Evaluation    Patient Name:  Renata Bergman   MRN:  [id number]    Recommendations:     Discharge Recommendations:  home, outpatient PT   Discharge Equipment Recommendations: none   Barriers to discharge: None    Assessment:     Renata Bergman is a 81 y.o. female admitted with a medical diagnosis of Elevated troponin.  She presents with the following impairments/functional limitations:  gait instability, impaired balance. Pt tolerated eval/treat and maneuvers well. Pt with no further positional dizziness and nystagmus significantly improved, but mild and shorter nystagmus still present. Pt would benefit from f/u with OP PT for additional BPPV treatment.Patient had difficult achieving some positions and may need additional treatment.     Rehab Prognosis: Good; patient would benefit from acute skilled PT services to address these deficits and reach maximum level of function.    Recent Surgery: * No surgery found *      Plan:     During this hospitalization, patient to be seen 2 x/week to address the identified rehab impairments via gait training, therapeutic activities, therapeutic exercises, neuromuscular re-education, canalith reposition procedure and progress toward the following goals:    · Plan of Care Expires:  03/30/22    Subjective     Chief Complaint: none verbalized  Patient/Family Comments/goals: pt reports feeling fine after the adjusted her medications  Pain/Comfort:   Pain Rating 1: 0/10   Pain Rating Post-Intervention 1: 0/10    Patients cultural, spiritual, Muslim conflicts given the current situation: no    Living Environment:  Pt daughter lives with her in a [facility] with 0STE and a [facility] with a built in seat.  Prior to admission, patients level of function was modified independent for all functional mobility and independent for ADLs.  Equipment used at home: walker, rolling, shower chair, cane, straight, rollator, steps to get into her bed.  DME owned (not currently used):  bedside commode.  Upon discharge, patient will have assistance from unknown.    Objective:     Communicated with RN prior to session.  Patient found sitting edge of bed with telemetry, peripheral IV  upon PT entry to room.    General Precautions: Standard, fall   Orthopedic Precautions:N/A   Braces: N/A  Respiratory Status: Room air    Exams:  · Cognitive Exam:  Patient is oriented to Person, Place, Time and Situation  · Fine Motor Coordination:    · -       Intact, BLE, heel/shin  · Gross Motor Coordination:  WFL  · Postural Exam:  Patient presented with the following abnormalities:    · -       Rounded shoulders  · -       Forward head  · Sensation:  Denies numbness/tingling, endorses normal LT on BLEs  · RLE ROM: WFL  · RLE Strength: WFL, grossly 4/5  · LLE ROM: WFL  · LLE Strength: WFL, grossly 4/5    Mental status: normal mood, behavior, speech, and thought processes  Appearance: Hospital garb  Behavior:  calm, cooperative and adequate rapport can be established  Attention Span and Concentration:  Normal          History of Current Symptoms: postural dizziness upon admission as well as positive Nanda Hallpike per chart review. Pt reporting improvements after adjustments to medications and resolution of dizziness. Pt screening with bow and lean test. Pt positive on R. Pt educated on meaning and pt in agreement for further BPPV assessment and treatment.    Upper Cervical Instability Testing: not indicated denies any falls    Modified VAS (Vertebral Artery Screen):  R: negative  L: negative    POSITIONAL CANAL TESTING #1  · Nanda Hallpike (posterior / CL anterior)  · Right: (+) positive; Symptoms: mild dizziness and nystagmus   · Left: (+) positive; Symptoms: mild dizziness and nystagmus   · R symptoms > Left  · Horizontal Canals  · Right: (-) negative  · Left: (-) negative  · Treatment Performed:   · Epley maneuver: performed on B sides, decreased dizziness and nystagmus duration after treatment  · Lempert 360 degree  roll maneuver/berbecue roll: not indicated  · Comments: B sides treat for PC BPPV, improvement in symptoms and duration of nystagmus decreased. Pt demo'd difficulty turning fully onto side for nose pointing towards ground during maneuver due to body habitus.    POSITIONAL CANAL TESTING #2  · Hopkins Hallpike (posterior / CL anterior)  · Right: (+) positive; Symptoms: dizziness and nystagmus of decreased duration  · Left: (-) negative  · Horizontal Canals  · Right: (-) negative  · Left: (-) negative  · Treatment Performed:   · Epley maneuver: R side treated  · Lempert 360 degree roll maneuver/berbecue roll: not idicated  · Comments: dizziness resolved and mild short duration nystagmus noted. Pt denies any dizziness. Pt educated on potential for additional maneuvers at a future date. Pt positioning during maneuvers complicated by pt's body habitus.    Functional Mobility:  · Bed Mobility:     · Rolling Left:  independence  · Rolling Right: independence  · Scooting: independence  · Bridging: independence  · Supine to Sit: supervision  · Sit to Supine: supervision  · Transfers:     · Sit to Stand:  supervision with no AD  · Gait: 12 ft, and 15 ft x 2, CGA-SBA with HHA, WBOS, no major LOBs or unsteadiness noted  · Balance:    · Static Sitting: mod I  · Dynamic Sitting: mod I  · Static Standing: SBA-Supervision  · Dynamic Standing: CGA-SBA for ambualtion    Therapeutic Activities and Exercises:   Patient educated on role of therapy, goals of session, and benefits of mobilizing.   Discussed PT plan of care during hospitalization.   Patient educated on calling for assistance.   Patient educated on how their diagnosis impacts their mobility within PT scope of practice.   Communication board up to date.  All questions answered within PT scope of practice.      AM-PAC 6 CLICK MOBILITY  Total Score:19     Patient left sitting edge of bed with all lines intact, call button in reach, RN notified, and pt eating lunch.    GOALS:  "  Multidisciplinary Problems       Physical Therapy Goals          Problem: Physical Therapy Goal    Goal Priority Disciplines Outcome Goal Variances Interventions   Physical Therapy Goal     PT, PT/OT Ongoing, Progressing     Description: Goals to be met by: 3/14/2022     Patient will increase functional independence with mobility by performin. Sit to stand transfer with Cache  2. Gait  x 200 feet with modified Cache using LRAD                         History:     Past Medical History:   Diagnosis Date    Anemia     Breast cancer     DCIS and IDC, stage I    Cataract     Coronary artery disease 2012    Diabetes mellitus due to abnormal insulin 2012    Diabetes mellitus type II     Genetic testing     brca2 positive     GERD (gastroesophageal reflux disease) 2012    Gout, arthritis 2012    Hyperlipidemia 2012    Hypertension     Myocardial infarction     Obesity     Primary osteoarthritis of both knees 2012    Renal manifestation of secondary diabetes mellitus     Seizure     states "one time during chemo in 2018"    Type 2 diabetes with peripheral circulatory disorder, controlled        Past Surgical History:   Procedure Laterality Date    BREAST BIOPSY      BREAST LUMPECTOMY Left 2016    DCIS and IDC, s/p lumpectomy    CATARACT EXTRACTION      COLONOSCOPY N/A 10/8/2018    Procedure: COLONOSCOPY;  Surgeon: Marcio Louie MD;  Location: [address]);  Service: Endoscopy;  Laterality: N/A;    CORONARY ANGIOPLASTY      ESOPHAGOGASTRODUODENOSCOPY N/A 10/8/2018    Procedure: ESOPHAGOGASTRODUODENOSCOPY (EGD);  Surgeon: Marcio Louie MD;  Location: [address]);  Service: Endoscopy;  Laterality: N/A;  combined egd/colon order/Plavix/OK to hold med 5 days prior to procedures per Dr Puente/see telephone encounter dated 18/tanya    ESOPHAGOGASTRODUODENOSCOPY N/A 2020    Procedure: ESOPHAGOGASTRODUODENOSCOPY (EGD);  " Surgeon: Marcio Louie MD;  Location: [facility] (4TH FLR);  Service: Endoscopy;  Laterality: N/A;  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 10/2018    per Dr Agudelo-ok to hold Plavix 5 days prior to procedure    HYSTERECTOMY      INTRAOCULAR PROSTHESES INSERTION Right 10/18/2020    Procedure: INSERTION, IOL PROSTHESIS;  Surgeon: Karishma Mata MD;  Location: [facility] OR 1ST FLR;  Service: Ophthalmology;  Laterality: Right;    INTRAOCULAR PROSTHESES INSERTION Left 12/21/2020    Procedure: INSERTION, IOL PROSTHESIS;  Surgeon: Karishma Mata MD;  Location: [facility] OR 1ST FLR;  Service: Ophthalmology;  Laterality: Left;    JOINT REPLACEMENT      left and right k nee    KNEE SURGERY      PHACOEMULSIFICATION OF CATARACT Right 10/18/2020    Procedure: PHACOEMULSIFICATION, CATARACT;  Surgeon: Karishma Mata MD;  Location: [facility] OR 1ST FLR;  Service: Ophthalmology;  Laterality: Right;    PHACOEMULSIFICATION OF CATARACT Left 12/21/2020    Procedure: PHACOEMULSIFICATION, CATARACT;  Surgeon: Karishma Mata MD;  Location: [facility] OR 1ST FLR;  Service: Ophthalmology;  Laterality: Left;    REVISION OF KNEE ARTHROPLASTY Left 4/15/2021    Procedure: REVISION, ARTHROPLASTY, KNEE;  Surgeon: Gilson Wilkerson MD;  Location: [facility] OR 2ND FLR;  Service: Orthopedics;  Laterality: Left;    TOTAL ABDOMINAL HYSTERECTOMY W/ BILATERAL SALPINGOOPHORECTOMY         Time Tracking:     PT Received On: 02/28/22  PT Start Time: 1152     PT Stop Time: 1239  PT Total Time (min): 47 min     Billable Minutes: Evaluation 9, Therapeutic Activity 8, Neuromuscular Re-education 15 and  Canalith repos 15      02/28/2022

## 2022-02-28 NOTE — ED NOTES
Adult Physical Assessment  LOC: Renata Bergman, 81 y.o. female verified via two identifiers.  The patient is awake, alert, oriented and speaking appropriately at this time.  APPEARANCE: Patient resting comfortably and appears to be in no acute distress at this time. Patient is clean and well groomed, patient's clothing is properly fastened.  SKIN:The skin is warm and dry, color consistent with ethnicity, patient has normal skin turgor and moist mucus membranes, skin intact, no breakdown or brusing noted.  MUSCULOSKELETAL: Patient moving all extremities well, no obvious swelling or deformities noted.  RESPIRATORY: Airway is open and patent, respirations are spontaneous, patient has a normal effort and rate, no accessory muscle use noted.  CARDIAC: Patient has a normal rate and rhythm, mild edema noted to lower extremities, capillary refill < 3 seconds in all extremities.  ABDOMEN: Soft and non tender to palpation, no abdominal distention noted. Bowel sounds present in all four quadrants.  NEUROLOGIC: Eyes open spontaneously, behavior appropriate to situation, follows commands, facial expression symmetrical, bilateral hand grasp equal and even, purposeful motor response noted, normal sensation in all extremities when touched with a finger.

## 2022-02-28 NOTE — ED NOTES
Telemetry Verification   Patient placed on Telemetry Box  Verified with War Room  Box # [id number]   Monitor Tech    Rate    Rhythm NSR

## 2022-03-01 NOTE — PROGRESS NOTES
Pt discharged to home per MD order. Discharge instructions and prescriptions given and explained to pt and family. PIV removed prior to d/c. Pt ambulating in room with steady gait; tolerating diet; voiding without difficulty. All VSS; O2 sats WNL. Pt left floor by wheelchair to home via hospital transportation; accompanied by family from the floor.

## 2022-03-02 LAB — HCV AB SERPL QL IA: NEGATIVE

## 2022-03-07 ENCOUNTER — OFFICE VISIT (OUTPATIENT)
Dept: INTERNAL MEDICINE | Facility: CLINIC | Age: 82
End: 2022-03-07
Payer: MEDICARE

## 2022-03-07 VITALS
HEIGHT: 64 IN | HEART RATE: 85 BPM | OXYGEN SATURATION: 97 % | DIASTOLIC BLOOD PRESSURE: 65 MMHG | BODY MASS INDEX: 44.11 KG/M2 | RESPIRATION RATE: 18 BRPM | SYSTOLIC BLOOD PRESSURE: 100 MMHG | WEIGHT: 258.38 LBS

## 2022-03-07 DIAGNOSIS — H83.03 LABYRINTHITIS OF BOTH EARS: Primary | ICD-10-CM

## 2022-03-07 DIAGNOSIS — Z95.5 S/P CORONARY ARTERY STENT PLACEMENT: ICD-10-CM

## 2022-03-07 DIAGNOSIS — I10 BENIGN ESSENTIAL HYPERTENSION: ICD-10-CM

## 2022-03-07 DIAGNOSIS — E11.51 CONTROLLED TYPE 2 DM WITH PERIPHERAL CIRCULATORY DISORDER: Chronic | ICD-10-CM

## 2022-03-07 DIAGNOSIS — I25.10 CORONARY ARTERY DISEASE DUE TO LIPID RICH PLAQUE: Chronic | ICD-10-CM

## 2022-03-07 DIAGNOSIS — N18.31 STAGE 3A CHRONIC KIDNEY DISEASE: ICD-10-CM

## 2022-03-07 DIAGNOSIS — E66.01 CLASS 3 SEVERE OBESITY DUE TO EXCESS CALORIES WITH SERIOUS COMORBIDITY IN ADULT, UNSPECIFIED BMI: ICD-10-CM

## 2022-03-07 DIAGNOSIS — I25.83 CORONARY ARTERY DISEASE DUE TO LIPID RICH PLAQUE: Chronic | ICD-10-CM

## 2022-03-07 PROBLEM — R79.89 ELEVATED TROPONIN: Status: RESOLVED | Noted: 2022-02-27 | Resolved: 2022-03-07

## 2022-03-07 PROCEDURE — 1159F PR MEDICATION LIST DOCUMENTED IN MEDICAL RECORD: ICD-10-PCS | Mod: CPTII,S$GLB,, | Performed by: INTERNAL MEDICINE

## 2022-03-07 PROCEDURE — 99999 PR PBB SHADOW E&M-EST. PATIENT-LVL IV: CPT | Mod: PBBFAC,,, | Performed by: INTERNAL MEDICINE

## 2022-03-07 PROCEDURE — 3288F FALL RISK ASSESSMENT DOCD: CPT | Mod: CPTII,S$GLB,, | Performed by: INTERNAL MEDICINE

## 2022-03-07 PROCEDURE — 1101F PT FALLS ASSESS-DOCD LE1/YR: CPT | Mod: CPTII,S$GLB,, | Performed by: INTERNAL MEDICINE

## 2022-03-07 PROCEDURE — 99215 OFFICE O/P EST HI 40 MIN: CPT | Mod: S$GLB,,, | Performed by: INTERNAL MEDICINE

## 2022-03-07 PROCEDURE — 3078F DIAST BP <80 MM HG: CPT | Mod: CPTII,S$GLB,, | Performed by: INTERNAL MEDICINE

## 2022-03-07 PROCEDURE — 3074F PR MOST RECENT SYSTOLIC BLOOD PRESSURE < 130 MM HG: ICD-10-PCS | Mod: CPTII,S$GLB,, | Performed by: INTERNAL MEDICINE

## 2022-03-07 PROCEDURE — 3078F PR MOST RECENT DIASTOLIC BLOOD PRESSURE < 80 MM HG: ICD-10-PCS | Mod: CPTII,S$GLB,, | Performed by: INTERNAL MEDICINE

## 2022-03-07 PROCEDURE — 1126F PR PAIN SEVERITY QUANTIFIED, NO PAIN PRESENT: ICD-10-PCS | Mod: CPTII,S$GLB,, | Performed by: INTERNAL MEDICINE

## 2022-03-07 PROCEDURE — 3288F PR FALLS RISK ASSESSMENT DOCUMENTED: ICD-10-PCS | Mod: CPTII,S$GLB,, | Performed by: INTERNAL MEDICINE

## 2022-03-07 PROCEDURE — 1126F AMNT PAIN NOTED NONE PRSNT: CPT | Mod: CPTII,S$GLB,, | Performed by: INTERNAL MEDICINE

## 2022-03-07 PROCEDURE — 99999 PR PBB SHADOW E&M-EST. PATIENT-LVL IV: ICD-10-PCS | Mod: PBBFAC,,, | Performed by: INTERNAL MEDICINE

## 2022-03-07 PROCEDURE — 3074F SYST BP LT 130 MM HG: CPT | Mod: CPTII,S$GLB,, | Performed by: INTERNAL MEDICINE

## 2022-03-07 PROCEDURE — 99215 PR OFFICE/OUTPT VISIT, EST, LEVL V, 40-54 MIN: ICD-10-PCS | Mod: S$GLB,,, | Performed by: INTERNAL MEDICINE

## 2022-03-07 PROCEDURE — 1101F PR PT FALLS ASSESS DOC 0-1 FALLS W/OUT INJ PAST YR: ICD-10-PCS | Mod: CPTII,S$GLB,, | Performed by: INTERNAL MEDICINE

## 2022-03-07 PROCEDURE — 1159F MED LIST DOCD IN RCRD: CPT | Mod: CPTII,S$GLB,, | Performed by: INTERNAL MEDICINE

## 2022-03-07 NOTE — PROGRESS NOTES
"[facility]  Progress Note   Recent Hospital Discharge     PRESENTING HISTORY     Chief Complaint/Reason for Visit:  Follow up Hospital Discharge     PCP: Ethel Berger MD    History of Present Illness: Ms. Renata Bergman is a 81 y.o. female who was recently admitted to the hospital.     Patient Name: Renata Bergman  MRN: [id number]  Patient Class: OP- Observation  Admission Date: 2/27/2022  Discharge date: 2/28/2022  Attending Physician: Jud Sullivan MD   Primary Care Provider: Ethel Berger MD    Patient information was obtained from patient, relative(s), past medical records and ER records.      Subjective:      Principal Problem:Elevated troponin     Chief Complaint:        Chief Complaint   Patient presents with    Nausea       And dizziness when standing x3 days. Left arm pain x3 hours.         HPI: Renata Bergman is a 81 y.o. female with a PMHx of CAD s/p MI and PCI, T2DM, HTN, HLD, GERD, gout, arthritis,  OA BL knees s/p R TKA in 2009 and L TKA in 2005, L breast cancer s/p lumpectomy and chemo/radiation currently on anastrozole who presents to [facility] with intractable nausea and dizziness. Patient reports lightheadedness/dizziness with associated nausea but no vomiting began Thursday morning. Symptoms typically occur with position changes especially when standing from a seated position or when turning her head to the side quickly. She also reports left arm pain decribed as a "heaviness" that radiates to her neck began when she woke up from a nap earlier today. She initially thought she just slept wrong on her arm but symptoms worsened since then so she presented to the ED for further eval. Her L arm pain has completely resolved at the time of my exam and the dizziness/nausea has improved significantly since given meclizine in the ED prior to my exam. She's never had similar symptoms in the past, reports having mild chest pain with previous MI. Denies fever, chills, SOB, orthopnea, " LE edema, cough, HA, vision changes, numbness/tinging, or syncope     ED: hypertensive to BP max 182/93. No leukocytosis, Cr stable at baseline 1.4. Trop 0.059, EKG with new TWI in V1 and V3. CXR without acute findings. Cardiology consulted-- no ACS protocol at this time, recommend trop trending and formal echo in AM. Given aspirin, tylenol, and meclizine.     Elevated troponin  Coronary artery disease due to lipid rich plaque  - Trop 0.059, EKG w/ new TWIs  - no reported chest pain  - cardiology consulted; do not recommend ACS protocol at this time  - possible type 2 demand ischemia from uncontrolled BP?  - s/p ASA 325mg x1  - continue ASA, statin, BB  - trend Tn until peak  - check BNP, 2D echo   - monitor tele  - nitro PRN chest pain     Postural dizziness  Suspect 2/2 vertigo (BPPV?) vs orthostatic hypotension given symptomatic w/ position changes, though some c/f cardiac etiology given elevated trop & EKG changes.     - MRI brain w/ findings as detailed above  - OT consult for vestibular rehab  - PRN meclizine  - follow up TTE  - orthostatics q shift  - further management as above     Hypertension  - increase coreg from 3.125 to 6.25mg BID for better BP and HR control  - continue amlodipine 5 mg daily     Hyperlipidemia  - continue ASA, statin      Type 2 diabetes mellitus with circulatory disorder, without long-term current use of insulin  - not on home meds  - LDSSI; ACHS accuchecks  - repeat A1c        Lab Results   Component Value Date     HGBA1C 6.6 (H) 10/19/2021        Tonic-clonic epileptic seizures  - continue keppra 1000mg daily      Chronic renal disease, stage 3, moderately decreased glomerular filtration rate between 30-59 mL/min/1.73 square meter  - stable at baseline  - daily BMP    ___________________________________________________________________    Today:    After she left hospital dizziness resolved.    No more vertigo or nausea.    PAST HISTORY:     Past Medical History:   Diagnosis Date  "   Anemia     Benign essential hypertension 9/4/2012    Breast cancer 2016    DCIS and IDC, stage I    Cataract     Coronary artery disease 9/4/2012    Diabetes mellitus due to abnormal insulin 9/4/2012    Diabetes mellitus type II     Genetic testing     brca2 positive     GERD (gastroesophageal reflux disease) 9/4/2012    Gout, arthritis 9/4/2012    Hyperlipidemia 9/4/2012    Hypertension     Labyrinthitis of both ears 2/28/2022    Myocardial infarction     Obesity     Primary osteoarthritis of both knees 9/4/2012    Renal manifestation of secondary diabetes mellitus     Seizure     states "one time during chemo in 2018"    Type 2 diabetes with peripheral circulatory disorder, controlled        Past Surgical History:   Procedure Laterality Date    BREAST BIOPSY      BREAST LUMPECTOMY Left 08/01/2016    DCIS and IDC, s/p lumpectomy    CATARACT EXTRACTION      COLONOSCOPY N/A 10/8/2018    Procedure: COLONOSCOPY;  Surgeon: Marcio Louie MD;  Location: [facility] ([address] [place] [address]);  Service: Endoscopy;  Laterality: N/A;    CORONARY ANGIOPLASTY      ESOPHAGOGASTRODUODENOSCOPY N/A 10/8/2018    Procedure: ESOPHAGOGASTRODUODENOSCOPY (EGD);  Surgeon: Marcio Louie MD;  Location: [facility] (4TH FLR);  Service: Endoscopy;  Laterality: N/A;  combined egd/colon order/Plavix/OK to hold med 5 days prior to procedures per Dr Puente/see telephone encounter dated 8/22/18/tanya    ESOPHAGOGASTRODUODENOSCOPY N/A 1/31/2020    Procedure: ESOPHAGOGASTRODUODENOSCOPY (EGD);  Surgeon: Marcio Louie MD;  Location: [facility] (4TH FLR);  Service: Endoscopy;  Laterality: N/A;  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 10/2018    per Dr Albarran to hold Plavix 5 days prior to procedure    HYSTERECTOMY      INTRAOCULAR PROSTHESES INSERTION Right 10/18/2020    Procedure: INSERTION, IOL PROSTHESIS;  Surgeon: Karishma Mata MD;  Location: [address];  Service: Ophthalmology;  Laterality: " Right;    INTRAOCULAR PROSTHESES INSERTION Left 12/21/2020    Procedure: INSERTION, IOL PROSTHESIS;  Surgeon: Karishma Mata MD;  Location: [facility] OR 1ST FLR;  Service: Ophthalmology;  Laterality: Left;    JOINT REPLACEMENT      left and right k nee    KNEE SURGERY      PHACOEMULSIFICATION OF CATARACT Right 10/18/2020    Procedure: PHACOEMULSIFICATION, CATARACT;  Surgeon: Karishma Mata MD;  Location: [facility] OR 1ST FLR;  Service: Ophthalmology;  Laterality: Right;    PHACOEMULSIFICATION OF CATARACT Left 12/21/2020    Procedure: PHACOEMULSIFICATION, CATARACT;  Surgeon: Karishma Mata MD;  Location: [facility] OR 1ST FLR;  Service: Ophthalmology;  Laterality: Left;    REVISION OF KNEE ARTHROPLASTY Left 4/15/2021    Procedure: REVISION, ARTHROPLASTY, KNEE;  Surgeon: Gilson Wilkerson MD;  Location: [facility] OR 2ND FLR;  Service: Orthopedics;  Laterality: Left;    TOTAL ABDOMINAL HYSTERECTOMY W/ BILATERAL SALPINGOOPHORECTOMY         Family History   Problem Relation Age of Onset    Cancer Mother 55        colon    Diabetes Mother     Hypertension Mother     Breast cancer Mother 45    Ovarian cancer Mother     Hypertension Maternal Aunt     Hyperlipidemia Maternal Aunt     Breast cancer Maternal Aunt     Hypertension Maternal Uncle     Heart disease Father     Breast cancer Daughter 45    Breast cancer Maternal Aunt     Diabetes Son     Stroke Son     Kidney disease Son     Glaucoma Neg Hx     Heart attack Neg Hx     Heart failure Neg Hx        Social History     Socioeconomic History    Marital status:    Tobacco Use    Smoking status: Former Smoker     Packs/day: 1.00     Years: 12.00     Pack years: 12.00     Types: Cigarettes     Quit date: 8/13/2006     Years since quitting: 15.5    Smokeless tobacco: Never Used   Substance and Sexual Activity    Alcohol use: Not Currently    Drug use: No    Sexual activity: Never       MEDICATIONS & ALLERGIES:     Current Outpatient Medications on  File Prior to Visit   Medication Sig Dispense Refill    allopurinoL (ZYLOPRIM) 300 MG tablet Take 1 tablet (300 mg total) by mouth every morning. 90 tablet 4    amLODIPine (NORVASC) 10 MG tablet Take 1 tablet (10 mg total) by mouth once daily. 45 tablet 3    anastrozole (ARIMIDEX) 1 mg Tab Take 1 tablet (1 mg total) by mouth once daily TAKE 1 TABLET(1 MG) BY MOUTH EVERY DAY. STOP LETROZOLE FEMARA. 90 tablet 3    aspirin (ECOTRIN) 81 MG EC tablet Take 1 tablet (81 mg total) by mouth once daily.      bisacodyL (DULCOLAX) 5 mg EC tablet Take 1 tablet (5 mg total) by mouth daily as needed for Constipation. 20 tablet 0    blood sugar diagnostic Strp 1 strip by Misc.(Non-Drug; Combo Route) route once daily. 100 strip 4    blood-glucose meter kit Use as instructed 1 each 0    carvediloL (COREG) 6.25 MG tablet Take 1 tablet (6.25 mg total) by mouth 2 (two) times daily with meals. 90 tablet 3    diclofenac sodium (VOLTAREN) 1 % Gel Apply 2 g topically 4 (four) times daily as needed. Apply to bilateral knees as needed for pain      ezetimibe (ZETIA) 10 mg tablet Take 1 tablet (10 mg total) by mouth once daily. 30 tablet 6    ferrous sulfate 325 (65 FE) MG EC tablet Take 1 tablet (325 mg total) by mouth once daily. 90 tablet 4    fexofenadine (ALLEGRA) 30 MG tablet Take 30 mg by mouth daily as needed.      lancets Misc Check blood sugar once daily 100 each 0    levetiracetam XR (KEPPRA XR) 500 mg Tb24 24 hr tablet TAKE 2 TABLETS(1000 MG) BY MOUTH EVERY  tablet 4    nitroGLYCERIN (NITROSTAT) 0.4 MG SL tablet PLACE 1 TABLET UNDER THE TONGUE EVERY 5 MINUTES AS NEEDED FOR CHEST PAIN. 450 tablet 0    nystatin (MYCOSTATIN) cream Apply topically 2 (two) times daily. 60 g 3    omeprazole (PRILOSEC) 20 MG capsule TAKE 2 CAPSULES BY MOUTH EVERY  capsule 3    ondansetron (ZOFRAN-ODT) 8 MG TbDL Dissolve 1 tablet (8 mg total) by mouth every 6 (six) hours as needed. 20 tablet 0    polyethylene glycol  (GLYCOLAX) 17 gram PwPk Take 17 g by mouth once daily.  0    rosuvastatin (CRESTOR) 40 MG Tab Take 1 tablet (40 mg total) by mouth every morning. 90 tablet 4     Review of patient's allergies indicates:   Allergen Reactions    Lisinopril Anaphylaxis       OBJECTIVE:     Vital Signs:  Vitals:    03/07/22 1026   BP: 100/65   Pulse: 85   Resp: 18     Wt Readings from Last 3 Encounters:   02/28/22 0425 116.2 kg (256 lb 2.8 oz)   02/27/22 1915 117.9 kg (260 lb)   01/12/22 1146 117.9 kg (260 lb)   11/18/21 0905 (P) 116.9 kg (257 lb 11.5 oz)     Body mass index is 44.35 kg/m².     Physical Exam:  General: Well developed, obese. No distress.  HEENT: Head is normocephalic, atraumatic.  PERRLA. No nystagmus.  Eyes: Clear conjunctiva.  Neck: Supple, symmetrical neck; trachea midline.  Lungs: Clear to auscultation bilaterally and normal respiratory effort.  Cardiovascular: Heart with regular rate and rhythm.    Extremities: No LE edema.    Abdomen: Abdomen is soft, non-tender non-distended with normal bowel sounds.  Skin: Skin color, texture, turgor normal. No rashes.  Musculoskeletal: Uses walker.  Neurologic: Normal strength and tone. No focal numbness or weakness.   Psychiatric: Normal affect. Alert.    Laboratory  Lab Results   Component Value Date    WBC 4.94 02/28/2022    HGB 13.0 02/28/2022    HCT 40.2 02/28/2022    MCV 94 02/28/2022     02/28/2022     BMP  Lab Results   Component Value Date     02/28/2022    K 3.5 02/28/2022     02/28/2022    CO2 23 02/28/2022    BUN 14 02/28/2022    CREATININE 1.1 02/28/2022    CALCIUM 9.8 02/28/2022    ANIONGAP 12 02/28/2022    ESTGFRAFRICA 55.2 (A) 02/28/2022    EGFRNONAA 47.9 (A) 02/28/2022     Lab Results   Component Value Date    ALT 8 (L) 02/27/2022    AST 18 02/27/2022    ALKPHOS 96 02/27/2022    BILITOT 0.7 02/27/2022     Lab Results   Component Value Date    INR 1.0 05/23/2017    INR 1.1 05/22/2017    INR 1.0 06/26/2016     Lab Results   Component Value  Date    HGBA1C 6.4 (H) 02/28/2022      Latest Reference Range & Units 02/27/22 23:46 02/28/22 05:48   Troponin I 0.000 - 0.026 ng/mL 0.050 (H) [1] 0.058 (H) [2]     Echo 2-28  · The left ventricle is normal in size with concentric remodeling and normal systolic function.  · The estimated ejection fraction is 65%.  · Normal left ventricular diastolic function.  · Normal right ventricular size with normal right ventricular systolic function.  · Normal central venous pressure (3 mmHg).     MRI 2-27  No acute intracranial abnormality.   Generalized cerebral volume loss and findings suggestive of advanced microvascular ischemic changes.   Numerous small remote microhemorrhages throughout both cerebral hemispheres in a pattern suggesting probable amyloid angiopathy.  Correlate clinically.      TRANSITION OF CARE:     Family and/or Caretaker present at visit?  No.  Diagnostic tests reviewed/disposition: No diagnosic tests pending after this hospitalization.  Disease/illness education: vertigo (labyrinthitis)  Home health/community services discussion/referrals: Patient does not have home health established from hospital visit.  They do not need home health.  If needed, we will set up home health for the patient.   Establishment or re-establishment of referral orders for community resources: No other necessary community resources.   Discussion with other health care providers: No discussion with other health care providers necessary.     Transition of Care Visit:     I have reviewed and updated the history and problem list.  I have reconciled the medication list.  I have discussed the hospitalization and current medical issues, prognosis and plans with the patient/family.  I  spent more than 50% of time discussing the care with the patient/family.  Total Face-to-Face Encounter in the [facility]: 60 minutes.    Medications Reconciliation:   I have reconciled the patient's home medications and discharge medications with  the patient/family. I have updated all changes.  Refer to After-Visit Medication List.    ASSESSMENT & PLAN:     Labyrinthitis of both ears  - Resolved post discharge.  - Had work up including Echo and MRI of head - no acute findings.    Troponin likely due to CKD 3a.    Stage 3a chronic kidney disease  - Stable.    Controlled type 2 DM with peripheral circulatory disorder  - Last A1c 6.4      Coronary artery disease due to lipid rich plaque  S/P coronary artery stent placement  Benign essential hypertension  -  Controlled BP.  No chest pain.    Class 3 severe obesity in adult  Body mass index is 44.35 kg/m².      Scheduled Follow-up :  Future Appointments   Date Time Provider Department Center   3/9/2022  1:00 PM Yun Whitaker, LUIS [facility] HEMONC3 [place]   4/20/2022  3:00 PM Irvin Crespo, PATO [facility] OPTOMTY [facility]   4/26/2022  1:30 PM Ethel Berger MD [facility] IM [facility]   5/11/2022 10:15 AM Gilson Wilkerson MD [facility] ORTHO [facility]   6/22/2022 10:00 AM LAB, APPOINTMENT [facility] INTMED [facility] LAB IM [facility]   6/29/2022 10:00 AM Jaycee Puente MD [facility] CARDIO [place]       After Visit Medication List :     Medication List          Accurate as of March 7, 2022 10:48 AM. If you have any questions, ask your nurse or doctor.            CONTINUE taking these medications    allopurinoL 300 MG tablet  Commonly known as: ZYLOPRIM  Take 1 tablet (300 mg total) by mouth every morning.     amLODIPine 10 MG tablet  Commonly known as: NORVASC  Take 1 tablet (10 mg total) by mouth once daily.     anastrozole 1 mg Tab  Commonly known as: ARIMIDEX  Take 1 tablet (1 mg total) by mouth once daily TAKE 1 TABLET(1 MG) BY MOUTH EVERY DAY. STOP LETROZOLE FEMARA.     aspirin 81 MG EC tablet  Commonly known as: ECOTRIN  Take 1 tablet (81 mg total) by mouth once daily.     bisacodyL 5 mg EC tablet  Commonly known as: DULCOLAX  Take 1 tablet (5 mg total) by mouth daily as needed for Constipation.     blood sugar  diagnostic Strp  1 strip by Misc.(Non-Drug; Combo Route) route once daily.     blood-glucose meter kit  Use as instructed     carvediloL 6.25 MG tablet  Commonly known as: COREG  Take 1 tablet (6.25 mg total) by mouth 2 (two) times daily with meals.     diclofenac sodium 1 % Gel  Commonly known as: VOLTAREN  Apply 2 g topically 4 (four) times daily as needed. Apply to bilateral knees as needed for pain     ezetimibe 10 mg tablet  Commonly known as: ZETIA  Take 1 tablet (10 mg total) by mouth once daily.     ferrous sulfate 325 (65 FE) MG EC tablet  Take 1 tablet (325 mg total) by mouth once daily.     fexofenadine 30 MG tablet  Commonly known as: ALLEGRA     lancets Misc  Check blood sugar once daily     levetiracetam  mg Tb24 24 hr tablet  Commonly known as: KEPPRA XR  TAKE 2 TABLETS(1000 MG) BY MOUTH EVERY DAY     nitroGLYCERIN 0.4 MG SL tablet  Commonly known as: NITROSTAT  PLACE 1 TABLET UNDER THE TONGUE EVERY 5 MINUTES AS NEEDED FOR CHEST PAIN.     nystatin cream  Commonly known as: MYCOSTATIN  Apply topically 2 (two) times daily.     omeprazole 20 MG capsule  Commonly known as: PRILOSEC  TAKE 2 CAPSULES BY MOUTH EVERY DAY     ondansetron 8 MG Tbdl  Commonly known as: ZOFRAN-ODT  Dissolve 1 tablet (8 mg total) by mouth every 6 (six) hours as needed.     polyethylene glycol 17 gram Pwpk  Commonly known as: GLYCOLAX  Take 17 g by mouth once daily.     rosuvastatin 40 MG Tab  Commonly known as: CRESTOR  Take 1 tablet (40 mg total) by mouth every morning.        STOP taking these medications    methocarbamoL 500 MG Tab  Commonly known as: ROBAXIN  Stopped by: Tom Up MD              Signing Physician:  Tom Up MD

## 2022-03-08 NOTE — HOSPITAL COURSE
Ms Bergman was placed in observation for chest pain workup. AF, HDS. BP improved with addition of home meds. No additional episodes of chest pain or arm pain. Tn peaked at 0.059. Echo revealing normal cardiac function with EF 65%. Discharge home with cardiology follow up outpatient.

## 2022-03-09 ENCOUNTER — OFFICE VISIT (OUTPATIENT)
Dept: HEMATOLOGY/ONCOLOGY | Facility: CLINIC | Age: 82
End: 2022-03-09
Payer: MEDICARE

## 2022-03-09 VITALS
DIASTOLIC BLOOD PRESSURE: 85 MMHG | OXYGEN SATURATION: 96 % | RESPIRATION RATE: 16 BRPM | BODY MASS INDEX: 44.26 KG/M2 | TEMPERATURE: 98 F | WEIGHT: 259.25 LBS | SYSTOLIC BLOOD PRESSURE: 186 MMHG | HEIGHT: 64 IN | HEART RATE: 81 BPM

## 2022-03-09 DIAGNOSIS — N18.30 CKD STAGE 3 SECONDARY TO DIABETES: ICD-10-CM

## 2022-03-09 DIAGNOSIS — N18.30 STAGE 3 CHRONIC KIDNEY DISEASE, UNSPECIFIED WHETHER STAGE 3A OR 3B CKD: Chronic | ICD-10-CM

## 2022-03-09 DIAGNOSIS — E11.51 CONTROLLED TYPE 2 DM WITH PERIPHERAL CIRCULATORY DISORDER: Chronic | ICD-10-CM

## 2022-03-09 DIAGNOSIS — D50.0 IRON DEFICIENCY ANEMIA DUE TO CHRONIC BLOOD LOSS: ICD-10-CM

## 2022-03-09 DIAGNOSIS — E11.69 HYPERLIPIDEMIA ASSOCIATED WITH TYPE 2 DIABETES MELLITUS: ICD-10-CM

## 2022-03-09 DIAGNOSIS — E11.59 TYPE 2 DIABETES MELLITUS WITH OTHER CIRCULATORY COMPLICATION, WITHOUT LONG-TERM CURRENT USE OF INSULIN: ICD-10-CM

## 2022-03-09 DIAGNOSIS — Z12.31 ENCOUNTER FOR SCREENING MAMMOGRAM FOR MALIGNANT NEOPLASM OF BREAST: ICD-10-CM

## 2022-03-09 DIAGNOSIS — E11.22 CKD STAGE 3 SECONDARY TO DIABETES: ICD-10-CM

## 2022-03-09 DIAGNOSIS — E11.22 TYPE 2 DIABETES MELLITUS WITH STAGE 3 CHRONIC KIDNEY DISEASE, WITHOUT LONG-TERM CURRENT USE OF INSULIN, UNSPECIFIED WHETHER STAGE 3A OR 3B CKD: ICD-10-CM

## 2022-03-09 DIAGNOSIS — E11.59 HYPERTENSION ASSOCIATED WITH DIABETES: ICD-10-CM

## 2022-03-09 DIAGNOSIS — E78.5 HYPERLIPIDEMIA ASSOCIATED WITH TYPE 2 DIABETES MELLITUS: ICD-10-CM

## 2022-03-09 DIAGNOSIS — C50.412 MALIGNANT NEOPLASM OF UPPER-OUTER QUADRANT OF LEFT FEMALE BREAST, UNSPECIFIED ESTROGEN RECEPTOR STATUS: Primary | ICD-10-CM

## 2022-03-09 DIAGNOSIS — E11.9 DM TYPE 2 WITHOUT RETINOPATHY: ICD-10-CM

## 2022-03-09 DIAGNOSIS — I15.2 HYPERTENSION ASSOCIATED WITH DIABETES: ICD-10-CM

## 2022-03-09 DIAGNOSIS — N18.30 TYPE 2 DIABETES MELLITUS WITH STAGE 3 CHRONIC KIDNEY DISEASE, WITHOUT LONG-TERM CURRENT USE OF INSULIN, UNSPECIFIED WHETHER STAGE 3A OR 3B CKD: ICD-10-CM

## 2022-03-09 DIAGNOSIS — K21.9 GASTROESOPHAGEAL REFLUX DISEASE, UNSPECIFIED WHETHER ESOPHAGITIS PRESENT: ICD-10-CM

## 2022-03-09 PROCEDURE — 99499 RISK ADDL DX/OHS AUDIT: ICD-10-PCS | Mod: S$GLB,,, | Performed by: NURSE PRACTITIONER

## 2022-03-09 PROCEDURE — 99999 PR PBB SHADOW E&M-EST. PATIENT-LVL V: CPT | Mod: PBBFAC,,, | Performed by: NURSE PRACTITIONER

## 2022-03-09 PROCEDURE — 3288F FALL RISK ASSESSMENT DOCD: CPT | Mod: CPTII,S$GLB,, | Performed by: NURSE PRACTITIONER

## 2022-03-09 PROCEDURE — 1101F PT FALLS ASSESS-DOCD LE1/YR: CPT | Mod: CPTII,S$GLB,, | Performed by: NURSE PRACTITIONER

## 2022-03-09 PROCEDURE — 1160F RVW MEDS BY RX/DR IN RCRD: CPT | Mod: CPTII,S$GLB,, | Performed by: NURSE PRACTITIONER

## 2022-03-09 PROCEDURE — 99214 OFFICE O/P EST MOD 30 MIN: CPT | Mod: S$GLB,,, | Performed by: NURSE PRACTITIONER

## 2022-03-09 PROCEDURE — 1126F PR PAIN SEVERITY QUANTIFIED, NO PAIN PRESENT: ICD-10-PCS | Mod: CPTII,S$GLB,, | Performed by: NURSE PRACTITIONER

## 2022-03-09 PROCEDURE — 1159F PR MEDICATION LIST DOCUMENTED IN MEDICAL RECORD: ICD-10-PCS | Mod: CPTII,S$GLB,, | Performed by: NURSE PRACTITIONER

## 2022-03-09 PROCEDURE — 99999 PR PBB SHADOW E&M-EST. PATIENT-LVL V: ICD-10-PCS | Mod: PBBFAC,,, | Performed by: NURSE PRACTITIONER

## 2022-03-09 PROCEDURE — 99214 PR OFFICE/OUTPT VISIT, EST, LEVL IV, 30-39 MIN: ICD-10-PCS | Mod: S$GLB,,, | Performed by: NURSE PRACTITIONER

## 2022-03-09 PROCEDURE — 3079F DIAST BP 80-89 MM HG: CPT | Mod: CPTII,S$GLB,, | Performed by: NURSE PRACTITIONER

## 2022-03-09 PROCEDURE — 99499 UNLISTED E&M SERVICE: CPT | Mod: S$GLB,,, | Performed by: NURSE PRACTITIONER

## 2022-03-09 PROCEDURE — 3077F PR MOST RECENT SYSTOLIC BLOOD PRESSURE >= 140 MM HG: ICD-10-PCS | Mod: CPTII,S$GLB,, | Performed by: NURSE PRACTITIONER

## 2022-03-09 PROCEDURE — 3077F SYST BP >= 140 MM HG: CPT | Mod: CPTII,S$GLB,, | Performed by: NURSE PRACTITIONER

## 2022-03-09 PROCEDURE — 3288F PR FALLS RISK ASSESSMENT DOCUMENTED: ICD-10-PCS | Mod: CPTII,S$GLB,, | Performed by: NURSE PRACTITIONER

## 2022-03-09 PROCEDURE — 1160F PR REVIEW ALL MEDS BY PRESCRIBER/CLIN PHARMACIST DOCUMENTED: ICD-10-PCS | Mod: CPTII,S$GLB,, | Performed by: NURSE PRACTITIONER

## 2022-03-09 PROCEDURE — 1126F AMNT PAIN NOTED NONE PRSNT: CPT | Mod: CPTII,S$GLB,, | Performed by: NURSE PRACTITIONER

## 2022-03-09 PROCEDURE — 1101F PR PT FALLS ASSESS DOC 0-1 FALLS W/OUT INJ PAST YR: ICD-10-PCS | Mod: CPTII,S$GLB,, | Performed by: NURSE PRACTITIONER

## 2022-03-09 PROCEDURE — 1159F MED LIST DOCD IN RCRD: CPT | Mod: CPTII,S$GLB,, | Performed by: NURSE PRACTITIONER

## 2022-03-09 PROCEDURE — 3079F PR MOST RECENT DIASTOLIC BLOOD PRESSURE 80-89 MM HG: ICD-10-PCS | Mod: CPTII,S$GLB,, | Performed by: NURSE PRACTITIONER

## 2022-03-10 NOTE — DISCHARGE SUMMARY
"[facility] Werner - Oncology ([facility])  Hospital Medicine  Discharge Summary      Patient Name: Renata Bergman  MRN: [id number]  Patient Class: OP- Observation  Admission Date: 2/27/2022  Hospital Length of Stay: 0 days  Discharge Date and Time:  03/09/2022 6:36 PM  Attending Physician: No att. providers found   Discharging Provider: Haylie Vega PA-C  Primary Care Provider: Ethel Berger MD  [facility] Medicine Team: [facility]OS[facility] MED E Haylie Vega PA-C    HPI:   Renata Bergman is a 81 y.o. female with a PMHx of CAD s/p MI and PCI, T2DM, HTN, HLD, GERD, gout, arthritis,  OA BL knees s/p R TKA in 2009 and L TKA in 2005, L breast cancer s/p lumpectomy and chemo/radiation currently on anastrozole who presents to [facility] with intractable nausea and dizziness. Patient reports lightheadedness/dizziness with associated nausea but no vomiting began Thursday morning. Symptoms typically occur with position changes especially when standing from a seated position or when turning her head to the side quickly. She also reports left arm pain decribed as a "heaviness" that radiates to her neck began when she woke up from a nap earlier today. She initially thought she just slept wrong on her arm but symptoms worsened since then so she presented to the ED for further eval. Her L arm pain has completely resolved at the time of my exam and the dizziness/nausea has improved significantly since given meclizine in the ED prior to my exam. She's never had similar symptoms in the past, reports having mild chest pain with previous MI. Denies fever, chills, SOB, orthopnea, LE edema, cough, HA, vision changes, numbness/tinging, or syncope    ED: hypertensive to BP max 182/93. No leukocytosis, Cr stable at baseline 1.4. Trop 0.059, EKG with new TWI in V1 and V3. CXR without acute findings. MRI brain without acute intracranial abnormality. Cardiology consulted-- no ACS protocol at this time, recommend trending trops and formal echo in AM. " Given aspirin, tylenol, and meclizine.      * No surgery found *      Hospital Course:   Ms Bergman was placed in observation for chest pain workup. AF, HDS. BP improved with addition of home meds. No additional episodes of chest pain or arm pain. Tn peaked at 0.059. Echo revealing normal cardiac function with EF 65%. Discharge home with cardiology follow up outpatient.        Goals of Care Treatment Preferences:  Code Status: Full Code      Consults:     Labyrinthitis of both ears  Suspect 2/2 vertigo (BPPV?) vs orthostatic hypotension given symptomatic w/ position changes, though some c/f cardiac etiology given elevated trop & EKG changes.    - MRI brain w/ findings as detailed above - no acute process  - OT consult for vestibular rehab  - PRN meclizine  - follow up TTE  - orthostatics q shift  - further management as above    Type 2 diabetes mellitus with circulatory disorder, without long-term current use of insulin  - not on home meds  - LDSSI; ACHS accuchecks  - repeat A1c 6.4  Lab Results   Component Value Date    HGBA1C 6.4 (H) 02/28/2022         Tonic-clonic epileptic seizures  - continue keppra 1000mg daily     Stage 3a chronic kidney disease  - stable at baseline  - daily BMP    Coronary artery disease due to lipid rich plaque          Final Active Diagnoses:    Diagnosis Date Noted POA    Labyrinthitis of both ears [H83.03] 02/28/2022 Yes    Type 2 diabetes mellitus with circulatory disorder, without long-term current use of insulin [E11.59] 06/03/2019 Yes    Tonic-clonic epileptic seizures [G40.409] 11/22/2016 Yes    Stage 3a chronic kidney disease [N18.31] 10/14/2013 Yes    Coronary artery disease due to lipid rich plaque [I25.10, I25.83] 09/04/2012 Yes     Chronic      Problems Resolved During this Admission:    Diagnosis Date Noted Date Resolved POA    PRINCIPAL PROBLEM:  Elevated troponin [R77.8] 02/27/2022 03/07/2022 Yes       Discharged Condition: good    Disposition: Home or Self  Care    Follow Up:   Follow-up Information     Tom Up MD. Go to.    Specialties: Internal Medicine, Hospitalist  Why: Hospital follow up Monday, 3/7 at 10:30am  Contact information:  Rodo Werner  [facility] [id number] [phone]                       Patient Instructions:      Diet diabetic     Notify your health care provider if you experience any of the following:  severe uncontrolled pain     Notify your health care provider if you experience any of the following:  difficulty breathing or increased cough     Notify your health care provider if you experience any of the following:  persistent dizziness, light-headedness, or visual disturbances     Activity as tolerated       Pending Diagnostic Studies:     None         Medications:  Reconciled Home Medications:      Medication List      CHANGE how you take these medications    amLODIPine 10 MG tablet  Commonly known as: NORVASC  Take 1 tablet (10 mg total) by mouth once daily.  What changed:   · medication strength  · how much to take     carvediloL 6.25 MG tablet  Commonly known as: COREG  Take 1 tablet (6.25 mg total) by mouth 2 (two) times daily with meals.  What changed:   · medication strength  · how much to take        CONTINUE taking these medications    allopurinoL 300 MG tablet  Commonly known as: ZYLOPRIM  Take 1 tablet (300 mg total) by mouth every morning.     anastrozole 1 mg Tab  Commonly known as: ARIMIDEX  Take 1 tablet (1 mg total) by mouth once daily TAKE 1 TABLET(1 MG) BY MOUTH EVERY DAY. STOP LETROZOLE FEMARA.     aspirin 81 MG EC tablet  Commonly known as: ECOTRIN  Take 1 tablet (81 mg total) by mouth once daily.     bisacodyL 5 mg EC tablet  Commonly known as: DULCOLAX  Take 1 tablet (5 mg total) by mouth daily as needed for Constipation.     blood sugar diagnostic Strp  1 strip by Misc.(Non-Drug; Combo Route) route once daily.     blood-glucose meter kit  Use as instructed     diclofenac sodium 1 % Gel  Commonly known as:  VOLTAREN  Apply 2 g topically 4 (four) times daily as needed. Apply to bilateral knees as needed for pain     ezetimibe 10 mg tablet  Commonly known as: ZETIA  Take 1 tablet (10 mg total) by mouth once daily.     ferrous sulfate 325 (65 FE) MG EC tablet  Take 1 tablet (325 mg total) by mouth once daily.     fexofenadine 30 MG tablet  Commonly known as: ALLEGRA  Take 30 mg by mouth daily as needed.     lancets Misc  Check blood sugar once daily     levetiracetam  mg Tb24 24 hr tablet  Commonly known as: KEPPRA XR  TAKE 2 TABLETS(1000 MG) BY MOUTH EVERY DAY     nitroGLYCERIN 0.4 MG SL tablet  Commonly known as: NITROSTAT  PLACE 1 TABLET UNDER THE TONGUE EVERY 5 MINUTES AS NEEDED FOR CHEST PAIN.     nystatin cream  Commonly known as: MYCOSTATIN  Apply topically 2 (two) times daily.     omeprazole 20 MG capsule  Commonly known as: PRILOSEC  TAKE 2 CAPSULES BY MOUTH EVERY DAY     ondansetron 8 MG Tbdl  Commonly known as: ZOFRAN-ODT  Dissolve 1 tablet (8 mg total) by mouth every 6 (six) hours as needed.     polyethylene glycol 17 gram Pwpk  Commonly known as: GLYCOLAX  Take 17 g by mouth once daily.     rosuvastatin 40 MG Tab  Commonly known as: CRESTOR  Take 1 tablet (40 mg total) by mouth every morning.        STOP taking these medications    methocarbamoL 500 MG Tab  Commonly known as: ROBAXIN            Indwelling Lines/Drains at time of discharge:   Lines/Drains/Airways     Drain  Duration           Female External Urinary Catheter 02/28/22 0600 9 days                Time spent on the discharge of patient: 36 minutes         Haylie Vega PA-C  Department of Hospital Medicine  [facility] - Oncology ([facility])

## 2022-03-10 NOTE — ASSESSMENT & PLAN NOTE
- not on home meds  - LDSSI; ACHS accuchecks  - repeat A1c 6.4  Lab Results   Component Value Date    HGBA1C 6.4 (H) 02/28/2022

## 2022-03-10 NOTE — ASSESSMENT & PLAN NOTE
Suspect 2/2 vertigo (BPPV?) vs orthostatic hypotension given symptomatic w/ position changes, though some c/f cardiac etiology given elevated trop & EKG changes.    - MRI brain w/ findings as detailed above - no acute process  - OT consult for vestibular rehab  - PRN meclizine  - follow up TTE  - orthostatics q shift  - further management as above

## 2022-03-21 DIAGNOSIS — E78.2 MIXED HYPERLIPIDEMIA: ICD-10-CM

## 2022-03-21 RX ORDER — EZETIMIBE 10 MG/1
10 TABLET ORAL DAILY
Qty: 90 TABLET | Refills: 0 | Status: ON HOLD | OUTPATIENT
Start: 2022-03-21 | End: 2022-07-18 | Stop reason: SDUPTHER

## 2022-04-08 DIAGNOSIS — I10 HTN (HYPERTENSION), BENIGN: ICD-10-CM

## 2022-04-08 RX ORDER — CARVEDILOL 6.25 MG/1
6.25 TABLET ORAL 2 TIMES DAILY WITH MEALS
Qty: 90 TABLET | Refills: 3 | Status: ON HOLD | OUTPATIENT
Start: 2022-04-08 | End: 2022-07-18 | Stop reason: SDUPTHER

## 2022-04-08 NOTE — TELEPHONE ENCOUNTER
----- Message from Sary Prather sent at 4/8/2022 12:31 PM CDT -----  Contact: [phone] Patient  Requesting an RX refill or new RX.  Is this a refill or new RX: new  RX name and strength (copy/paste from chart):  carvediloL (COREG) 6.25 MG tablet  Is this a 30 day or 90 day RX:   Pharmacy name and phone # (copy/paste from chart):    [id number] - [address] AT [address] [address]  Phone: [phone] Fax: [phone]

## 2022-04-08 NOTE — TELEPHONE ENCOUNTER
Care Due:                  Date            Visit Type   Department     Provider  --------------------------------------------------------------------------------                                HOSPITAL                              FOLLOW UP    [facility] INTERNAL  Last Visit: 03-      [facility]     MEDICINE       Tom MERINO -                              PRIMARY      [facility] INTERNAL  Next Visit: 04-      CARE ([facility])   MEDICINE       AKANKSHA LARIOS                                                            Last  Test          Frequency    Reason                     Performed    Due Date  --------------------------------------------------------------------------------    Uric Acid...  12 months..  allopurinoL..............  11- 11-    Powered by [employer]. Reference number: [id number].   4/08/2022 2:29:38 PM CDT

## 2022-04-13 ENCOUNTER — TELEPHONE (OUTPATIENT)
Dept: HEMATOLOGY/ONCOLOGY | Facility: CLINIC | Age: 82
End: 2022-04-13
Payer: MEDICARE

## 2022-04-13 NOTE — TELEPHONE ENCOUNTER
"Returned call. Duplicate sent by mistake.    ----- Message from Westley Manuel sent at 4/13/2022  1:31 PM CDT -----  Consult/Advisory:          Name Of Caller: Robyn ([employer])      Contact Preference?:  [phone]    Fax [phone]      Provider Name: [facility]bleday      Does patient feel the need to be seen today? No      What is the nature of the call?: Calling to speak w/ nurse about a duplicate order for Breast Cancer Index Test.          Additional Notes:  "Thank you for all that you do for our patients"        "

## 2022-04-17 RX ORDER — ERGOCALCIFEROL 1.25 MG/1
CAPSULE ORAL
Qty: 26 CAPSULE | Refills: 4 | Status: SHIPPED | OUTPATIENT
Start: 2022-04-17 | End: 2023-01-05 | Stop reason: SDUPTHER

## 2022-04-18 ENCOUNTER — PATIENT OUTREACH (OUTPATIENT)
Dept: ADMINISTRATIVE | Facility: OTHER | Age: 82
End: 2022-04-18
Payer: MEDICARE

## 2022-04-18 NOTE — PROGRESS NOTES
Requested updates within Care Everywhere.  Patient's chart was reviewed for overdue JULISSA topics.  Health maintenance:updated  Immunizations:reconciled   Legacy:   Media:  Orders placed:  Tasked appts:  Labs Linked:  Upcoming appt:Optometry 4/20/22

## 2022-04-20 ENCOUNTER — OFFICE VISIT (OUTPATIENT)
Dept: OPTOMETRY | Facility: CLINIC | Age: 82
End: 2022-04-20
Payer: MEDICARE

## 2022-04-20 DIAGNOSIS — H52.03 HYPEROPIA OF BOTH EYES WITH ASTIGMATISM AND PRESBYOPIA: ICD-10-CM

## 2022-04-20 DIAGNOSIS — H52.4 HYPEROPIA OF BOTH EYES WITH ASTIGMATISM AND PRESBYOPIA: ICD-10-CM

## 2022-04-20 DIAGNOSIS — H52.203 HYPEROPIA OF BOTH EYES WITH ASTIGMATISM AND PRESBYOPIA: ICD-10-CM

## 2022-04-20 DIAGNOSIS — E11.21 TYPE 2 DIABETES MELLITUS WITH DIABETIC NEPHROPATHY, WITHOUT LONG-TERM CURRENT USE OF INSULIN: Primary | ICD-10-CM

## 2022-04-20 PROCEDURE — 92015 PR REFRACTION: ICD-10-PCS | Mod: S$GLB,,,

## 2022-04-20 PROCEDURE — 1159F PR MEDICATION LIST DOCUMENTED IN MEDICAL RECORD: ICD-10-PCS | Mod: CPTII,S$GLB,,

## 2022-04-20 PROCEDURE — 99999 PR PBB SHADOW E&M-EST. PATIENT-LVL I: ICD-10-PCS | Mod: PBBFAC,,,

## 2022-04-20 PROCEDURE — 2023F PR DILATED RETINAL EXAM W/O EVID OF RETINOPATHY: ICD-10-PCS | Mod: CPTII,S$GLB,,

## 2022-04-20 PROCEDURE — 92014 PR EYE EXAM, EST PATIENT,COMPREHESV: ICD-10-PCS | Mod: S$GLB,,,

## 2022-04-20 PROCEDURE — 1159F MED LIST DOCD IN RCRD: CPT | Mod: CPTII,S$GLB,,

## 2022-04-20 PROCEDURE — 99999 PR PBB SHADOW E&M-EST. PATIENT-LVL I: CPT | Mod: PBBFAC,,,

## 2022-04-20 PROCEDURE — 2023F DILAT RTA XM W/O RTNOPTHY: CPT | Mod: CPTII,S$GLB,,

## 2022-04-20 PROCEDURE — 92015 DETERMINE REFRACTIVE STATE: CPT | Mod: S$GLB,,,

## 2022-04-20 PROCEDURE — 1160F PR REVIEW ALL MEDS BY PRESCRIBER/CLIN PHARMACIST DOCUMENTED: ICD-10-PCS | Mod: CPTII,S$GLB,,

## 2022-04-20 PROCEDURE — 1160F RVW MEDS BY RX/DR IN RCRD: CPT | Mod: CPTII,S$GLB,,

## 2022-04-20 PROCEDURE — 92014 COMPRE OPH EXAM EST PT 1/>: CPT | Mod: S$GLB,,,

## 2022-04-20 NOTE — PROGRESS NOTES
HPI     Diabetic Eye Exam      Additional comments: Hemoglobin A1C       Date                     Value               Ref Range             Status                02/28/2022               6.4 (H)             4.0 - 5.6 %           Final                    10/19/2021               6.6 (H)             4.0 - 5.6 %           Final                 08/03/2021               6.1 (H)             4.0 - 5.6 %           Final               LBS: unknown    Sees PCP every 6 months. DM is under control according to patient.                Comments     Patient reports mild blur at distance and near. Not bothersome. Blur is   not constant. Only notices blur when she reads for a long period of time.   No other ocular or visual complaints.           Last edited by Irvin Crespo, OD on 4/20/2022  3:18 PM. (History)            Assessment /Plan     For exam results, see Encounter Report.    Type 2 diabetes mellitus with diabetic nephropathy, without long-term current use of insulin  -Stressed importance of proper blood sugar control through diet, exercise and medication.   -Continue f/u with PCP as scheduled.     Hyperopia of both eyes with astigmatism and presbyopia  Glasses Prescription (4/20/2022)        Sphere Cylinder Axis Add    Right [place] +0.75 020 +2.50    Left [place] +0.75 165 +2.50    Type: Bifocal    Expiration Date: 4/20/2023        -Spectacle Rx given, discussed different options for glasses.       RTC 1 year routine eye exam.

## 2022-04-26 ENCOUNTER — OFFICE VISIT (OUTPATIENT)
Dept: INTERNAL MEDICINE | Facility: CLINIC | Age: 82
End: 2022-04-26
Payer: MEDICARE

## 2022-04-26 VITALS
DIASTOLIC BLOOD PRESSURE: 80 MMHG | WEIGHT: 261.44 LBS | BODY MASS INDEX: 44.63 KG/M2 | OXYGEN SATURATION: 98 % | HEART RATE: 88 BPM | SYSTOLIC BLOOD PRESSURE: 120 MMHG | HEIGHT: 64 IN

## 2022-04-26 DIAGNOSIS — I10 BENIGN ESSENTIAL HYPERTENSION: ICD-10-CM

## 2022-04-26 DIAGNOSIS — M10.9 GOUT, ARTHRITIS: ICD-10-CM

## 2022-04-26 DIAGNOSIS — K21.9 GASTROESOPHAGEAL REFLUX DISEASE, UNSPECIFIED WHETHER ESOPHAGITIS PRESENT: ICD-10-CM

## 2022-04-26 DIAGNOSIS — E11.22 TYPE 2 DIABETES MELLITUS WITH STAGE 3 CHRONIC KIDNEY DISEASE, WITHOUT LONG-TERM CURRENT USE OF INSULIN, UNSPECIFIED WHETHER STAGE 3A OR 3B CKD: ICD-10-CM

## 2022-04-26 DIAGNOSIS — E11.69 HYPERLIPIDEMIA ASSOCIATED WITH TYPE 2 DIABETES MELLITUS: ICD-10-CM

## 2022-04-26 DIAGNOSIS — E78.5 HYPERLIPIDEMIA ASSOCIATED WITH TYPE 2 DIABETES MELLITUS: ICD-10-CM

## 2022-04-26 DIAGNOSIS — N18.31 STAGE 3A CHRONIC KIDNEY DISEASE: Primary | ICD-10-CM

## 2022-04-26 DIAGNOSIS — I10 HTN (HYPERTENSION), BENIGN: ICD-10-CM

## 2022-04-26 DIAGNOSIS — I25.83 CORONARY ARTERY DISEASE DUE TO LIPID RICH PLAQUE: ICD-10-CM

## 2022-04-26 DIAGNOSIS — M17.0 PRIMARY OSTEOARTHRITIS OF BOTH KNEES: ICD-10-CM

## 2022-04-26 DIAGNOSIS — E66.01 CLASS 3 SEVERE OBESITY DUE TO EXCESS CALORIES WITH SERIOUS COMORBIDITY IN ADULT, UNSPECIFIED BMI: ICD-10-CM

## 2022-04-26 DIAGNOSIS — D50.0 IRON DEFICIENCY ANEMIA DUE TO CHRONIC BLOOD LOSS: ICD-10-CM

## 2022-04-26 DIAGNOSIS — N18.30 TYPE 2 DIABETES MELLITUS WITH STAGE 3 CHRONIC KIDNEY DISEASE, WITHOUT LONG-TERM CURRENT USE OF INSULIN, UNSPECIFIED WHETHER STAGE 3A OR 3B CKD: ICD-10-CM

## 2022-04-26 DIAGNOSIS — I25.10 CORONARY ARTERY DISEASE DUE TO LIPID RICH PLAQUE: ICD-10-CM

## 2022-04-26 PROCEDURE — 3288F PR FALLS RISK ASSESSMENT DOCUMENTED: ICD-10-PCS | Mod: CPTII,S$GLB,, | Performed by: INTERNAL MEDICINE

## 2022-04-26 PROCEDURE — 3079F DIAST BP 80-89 MM HG: CPT | Mod: CPTII,S$GLB,, | Performed by: INTERNAL MEDICINE

## 2022-04-26 PROCEDURE — 1159F PR MEDICATION LIST DOCUMENTED IN MEDICAL RECORD: ICD-10-PCS | Mod: CPTII,S$GLB,, | Performed by: INTERNAL MEDICINE

## 2022-04-26 PROCEDURE — 3079F PR MOST RECENT DIASTOLIC BLOOD PRESSURE 80-89 MM HG: ICD-10-PCS | Mod: CPTII,S$GLB,, | Performed by: INTERNAL MEDICINE

## 2022-04-26 PROCEDURE — 99999 PR PBB SHADOW E&M-EST. PATIENT-LVL III: CPT | Mod: PBBFAC,,, | Performed by: INTERNAL MEDICINE

## 2022-04-26 PROCEDURE — 99214 PR OFFICE/OUTPT VISIT, EST, LEVL IV, 30-39 MIN: ICD-10-PCS | Mod: S$GLB,,, | Performed by: INTERNAL MEDICINE

## 2022-04-26 PROCEDURE — 3074F SYST BP LT 130 MM HG: CPT | Mod: CPTII,S$GLB,, | Performed by: INTERNAL MEDICINE

## 2022-04-26 PROCEDURE — 1126F PR PAIN SEVERITY QUANTIFIED, NO PAIN PRESENT: ICD-10-PCS | Mod: CPTII,S$GLB,, | Performed by: INTERNAL MEDICINE

## 2022-04-26 PROCEDURE — 3288F FALL RISK ASSESSMENT DOCD: CPT | Mod: CPTII,S$GLB,, | Performed by: INTERNAL MEDICINE

## 2022-04-26 PROCEDURE — 99214 OFFICE O/P EST MOD 30 MIN: CPT | Mod: S$GLB,,, | Performed by: INTERNAL MEDICINE

## 2022-04-26 PROCEDURE — 99999 PR PBB SHADOW E&M-EST. PATIENT-LVL III: ICD-10-PCS | Mod: PBBFAC,,, | Performed by: INTERNAL MEDICINE

## 2022-04-26 PROCEDURE — 3074F PR MOST RECENT SYSTOLIC BLOOD PRESSURE < 130 MM HG: ICD-10-PCS | Mod: CPTII,S$GLB,, | Performed by: INTERNAL MEDICINE

## 2022-04-26 PROCEDURE — 1101F PR PT FALLS ASSESS DOC 0-1 FALLS W/OUT INJ PAST YR: ICD-10-PCS | Mod: CPTII,S$GLB,, | Performed by: INTERNAL MEDICINE

## 2022-04-26 PROCEDURE — 1126F AMNT PAIN NOTED NONE PRSNT: CPT | Mod: CPTII,S$GLB,, | Performed by: INTERNAL MEDICINE

## 2022-04-26 PROCEDURE — 1159F MED LIST DOCD IN RCRD: CPT | Mod: CPTII,S$GLB,, | Performed by: INTERNAL MEDICINE

## 2022-04-26 PROCEDURE — 99499 UNLISTED E&M SERVICE: CPT | Mod: S$GLB,,, | Performed by: INTERNAL MEDICINE

## 2022-04-26 PROCEDURE — 1101F PT FALLS ASSESS-DOCD LE1/YR: CPT | Mod: CPTII,S$GLB,, | Performed by: INTERNAL MEDICINE

## 2022-04-26 PROCEDURE — 99499 RISK ADDL DX/OHS AUDIT: ICD-10-PCS | Mod: S$GLB,,, | Performed by: INTERNAL MEDICINE

## 2022-04-26 NOTE — PROGRESS NOTES
CHIEF COMPLAINT:  follow up NSTEMI, breast cancer, diabetes, hypertension, hyperlipidemia.     HISTORY OF PRESENT ILLNESS: 81 year-old woman who present for follow up of above.    She had an episode of vertigo on 2/27/22 - lasted less than 24 hours and has not reoccured     Vaginal discomfort resolved with Desitin mixed with equal parts of nystatin.      She had  a left knee replacement 4/15/21.She is walking with a rollator. She is doing fine. She can tell when it is going to rain.      She will take tylenol 325 mg once or twice a week as needed for general aches and pain.    .       No chest pain, shortness of breath and palpitations. She is followed by Dr Puente in cardiology on 8/9/21.  Cardica PET stress was normal on 7/25/18.  Dr Agudelo stopped the plavix on 8/9/21 and is doing fine off Plavix.        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 She takes aspirin 81 mg daily  and coreg 6.25 mg twice daily and amlodipine 10 mg daily.   No chest pain or shortness of breath.  She completed cardiac rehab. She is on crestor 40 mg daily     She is taking Keppra  mg 2 in the afternoon.  Gait is better with taking the Keppra in the late afternoon.  No falls.  No seizures.      She has completed 5 cycles of CMF for her breast cancer. She completed radiation the end of April 6, 2017.  She took Femara  4/2017 - 4/2018. She had irritation of the lips on Femara. She is taking anastrozole 1 mg daily. Saw [facility] Onc 2/3/21 and MMG on8/2021      Back is doing well. She is doing stretches every other day     She continues to take allopurinol 300 mg daily for her gout. She has not had any gouty flares. She has occasional left elbow pain.      Her blood sugars have been normal - . She is off metformin 850 mg twice daily. She checks blood sugars once daily.  She denies any polydipsia or polyuria. She continues to take aspirin for her coronary artery disease.  "     Sinuses have been doing well. She has not had to take Allegra lately. She denies any nausea, vomiting, diarrhea. She has some constipation - once a week.  She took a dulcolax.      Reflux is well controlled on omeprazole 20 mg two tablets daily.      She continues to take Crestor 40 mg daily for her hyperlipidemia. She denies any joint pain or muscle pain from the Crestor.      She is taking vitamin D 50,000 units twice weekly for vitamin D deficiency     Her daughter who is 46 has stage 2 breast cancer. She has had a lumpectomy and chemo and radiation. Her daughter is doing well. She has finished her treatment. Her daughter might be BRCA positive. Mrs Bergman feels that she is coping well with her diagnosis of breast cancer. No anxiety, depression or insomnia.      PAST MEDICAL HISTORY:   1. Hypertension.   2. Diabetes mellitus   3. Hyperlipidemia.   4. Reflux.   5. Gout.   6. Coronary artery disease, status post MI in  with stenting at that time, and then a right coronary artery stent placed in . Had a negative stress test 2008. [facility] 2012 - patent stents and non obstructive cad  7. Osteoarthritis of the right knee. S/P right knee replacement   8. Status post left knee replacement.   9. Status post LISA/BSO secondary to menstrual disorder or polyps after tubal ligation.   10. Left breast cancer and BRCA2 positive    MEDICATIONS and ALLERGIES: Updated on epic.       Family History  Mother had breast cancer in her early 50's then had colon cancer in her 60's. She  of uterine cancer  2 Maternal aunts with breast cancer  Daughter with breast cancer at age 45 - BRCA positive  Father  of a gunshot wound  She is an only child    PHYSICAL EXAMINATION:          /84 (BP Location: Right arm, Patient Position: Sitting)   Pulse 88   Ht 5' 4" (1.626 m)   Wt 118.6 kg (261 lb 7.5 oz)   LMP  (LMP Unknown)   SpO2 98%   BMI 44.88 kg/m²        General: Alert, oriented. No apparent distress. " Affect within normal   limits.   Conjunctivae anicteric. Neck supple.   Respiratory effort normal. Lungs clear to auscultation.   Heart: Regular rate and rhythm without murmurs, gallops or rubs.   No lower extremity edema.    Well healed scar over the left leg with some minimal surrounding edema.           labs 2/27/22 reviewed       ASSESESSMENT  1.  S/P left knee replacement - doing well.   1. HTN- controlled  2. Iron deficiency anemia - on oral iron -stable  3. Angioectasias of duodenal bulb, gastric body, On omeprazole 20 mg daily. S/p EGD 1/31/20 with cautery. Will montior blood counts closely.   4.  CAD with NSTEMI 5/2017- s/p stenting 5/2017 - on risk factor modifications.    5. Left breast cancer with BRCA2 positive -Finished chemo and  radiation. On anastrazole-  6. Seizure -stable on Keppra XR   7.  Diabetes - controlled.  9. Gout -controlled on allopurinol    10. Hyperlipidemia - on Crestor 40 mg daily. Controlled   11. Obesity - working at diet, exercise and weight loss.   12.  GERD - controlled on meds  13. Hypokalemia -  on replacement potassium              Screening - mammogram 8/21. Colonoscopy 4/23/12 - normal, and 5/25/15 - 3 small polyps (one adenoma).  Colonoscopy 10/8/18 - one polyp - due 2023.     Bone density was normal November 2008.       I'll see her back 4 months,  sooner if problems arise.

## 2022-05-11 ENCOUNTER — OFFICE VISIT (OUTPATIENT)
Dept: ORTHOPEDICS | Facility: CLINIC | Age: 82
End: 2022-05-11
Payer: MEDICARE

## 2022-05-11 ENCOUNTER — HOSPITAL ENCOUNTER (OUTPATIENT)
Dept: RADIOLOGY | Facility: HOSPITAL | Age: 82
Discharge: HOME | End: 2022-05-11
Attending: ORTHOPAEDIC SURGERY
Payer: MEDICARE

## 2022-05-11 VITALS — HEIGHT: 64 IN | WEIGHT: 263.56 LBS | BODY MASS INDEX: 45 KG/M2

## 2022-05-11 DIAGNOSIS — G89.29 CHRONIC PAIN OF LEFT KNEE: Primary | ICD-10-CM

## 2022-05-11 DIAGNOSIS — M25.562 CHRONIC PAIN OF LEFT KNEE: Primary | ICD-10-CM

## 2022-05-11 DIAGNOSIS — G89.29 CHRONIC PAIN OF LEFT KNEE: ICD-10-CM

## 2022-05-11 DIAGNOSIS — M25.562 CHRONIC PAIN OF LEFT KNEE: ICD-10-CM

## 2022-05-11 PROCEDURE — 1159F MED LIST DOCD IN RCRD: CPT | Mod: CPTII,S$GLB,, | Performed by: ORTHOPAEDIC SURGERY

## 2022-05-11 PROCEDURE — 1126F AMNT PAIN NOTED NONE PRSNT: CPT | Mod: CPTII,S$GLB,, | Performed by: ORTHOPAEDIC SURGERY

## 2022-05-11 PROCEDURE — 73560 XR KNEE ORTHO LEFT: ICD-10-PCS | Mod: 26,RT,, | Performed by: RADIOLOGY

## 2022-05-11 PROCEDURE — 3288F FALL RISK ASSESSMENT DOCD: CPT | Mod: CPTII,S$GLB,, | Performed by: ORTHOPAEDIC SURGERY

## 2022-05-11 PROCEDURE — 73560 X-RAY EXAM OF KNEE 1 OR 2: CPT | Mod: 26,RT,, | Performed by: RADIOLOGY

## 2022-05-11 PROCEDURE — 1101F PT FALLS ASSESS-DOCD LE1/YR: CPT | Mod: CPTII,S$GLB,, | Performed by: ORTHOPAEDIC SURGERY

## 2022-05-11 PROCEDURE — 1159F PR MEDICATION LIST DOCUMENTED IN MEDICAL RECORD: ICD-10-PCS | Mod: CPTII,S$GLB,, | Performed by: ORTHOPAEDIC SURGERY

## 2022-05-11 PROCEDURE — 99212 PR OFFICE/OUTPT VISIT, EST, LEVL II, 10-19 MIN: ICD-10-PCS | Mod: S$GLB,,, | Performed by: ORTHOPAEDIC SURGERY

## 2022-05-11 PROCEDURE — 73560 X-RAY EXAM OF KNEE 1 OR 2: CPT | Mod: TC,RT

## 2022-05-11 PROCEDURE — 73562 X-RAY EXAM OF KNEE 3: CPT | Mod: 26,LT,, | Performed by: RADIOLOGY

## 2022-05-11 PROCEDURE — 3288F PR FALLS RISK ASSESSMENT DOCUMENTED: ICD-10-PCS | Mod: CPTII,S$GLB,, | Performed by: ORTHOPAEDIC SURGERY

## 2022-05-11 PROCEDURE — 1101F PR PT FALLS ASSESS DOC 0-1 FALLS W/OUT INJ PAST YR: ICD-10-PCS | Mod: CPTII,S$GLB,, | Performed by: ORTHOPAEDIC SURGERY

## 2022-05-11 PROCEDURE — 99999 PR PBB SHADOW E&M-EST. PATIENT-LVL III: ICD-10-PCS | Mod: PBBFAC,,, | Performed by: ORTHOPAEDIC SURGERY

## 2022-05-11 PROCEDURE — 1160F PR REVIEW ALL MEDS BY PRESCRIBER/CLIN PHARMACIST DOCUMENTED: ICD-10-PCS | Mod: CPTII,S$GLB,, | Performed by: ORTHOPAEDIC SURGERY

## 2022-05-11 PROCEDURE — 73562 XR KNEE ORTHO LEFT: ICD-10-PCS | Mod: 26,LT,, | Performed by: RADIOLOGY

## 2022-05-11 PROCEDURE — 99212 OFFICE O/P EST SF 10 MIN: CPT | Mod: S$GLB,,, | Performed by: ORTHOPAEDIC SURGERY

## 2022-05-11 PROCEDURE — 1126F PR PAIN SEVERITY QUANTIFIED, NO PAIN PRESENT: ICD-10-PCS | Mod: CPTII,S$GLB,, | Performed by: ORTHOPAEDIC SURGERY

## 2022-05-11 PROCEDURE — 1160F RVW MEDS BY RX/DR IN RCRD: CPT | Mod: CPTII,S$GLB,, | Performed by: ORTHOPAEDIC SURGERY

## 2022-05-11 PROCEDURE — 99999 PR PBB SHADOW E&M-EST. PATIENT-LVL III: CPT | Mod: PBBFAC,,, | Performed by: ORTHOPAEDIC SURGERY

## 2022-05-11 NOTE — PROGRESS NOTES
Renata Bergman is in for one year follow up for a  Left revision TKA.  She is doing  well.  No pain in the knee.  She has resumed activities of daily living. She has an exactech right TKA and is doing well. No pain  Exam demonstrates  A well developed female in no distress.  Alert and oriented.  Mood and affect are appropriate.    Bilateral Knee incisions are well healed.  ROM is 0-120.  The patellea track well and there is no instability. The extremities are  neurovascularly intact.    Xrays demonstrate a well fixed and positioned prosthesis, bilaterally  /  PROMIS-10 Questionnaire Scores 5/11/2022 7/14/2021 3/22/2021   Global Physical Health 12 9 15   Global Mental health Score 14 11 11     KOOS Jr. Questionnaire Score 5/11/2022 7/14/2021 3/22/2021   KOOS Jr Score 1 1 11     Oxford Knee Questionnaire Score 5/11/2022 7/14/2021 3/22/2021   Oxford Knee Score 41 42 29         Imp:Doing well  Explained exactech recall  F/u in one year with bilateral knee xrays and PROMS

## 2022-05-25 ENCOUNTER — PES CALL (OUTPATIENT)
Dept: ADMINISTRATIVE | Facility: CLINIC | Age: 82
End: 2022-05-25
Payer: MEDICARE

## 2022-06-08 ENCOUNTER — PES CALL (OUTPATIENT)
Dept: ADMINISTRATIVE | Facility: CLINIC | Age: 82
End: 2022-06-08
Payer: MEDICARE

## 2022-06-22 ENCOUNTER — LAB VISIT (OUTPATIENT)
Dept: LAB | Facility: HOSPITAL | Age: 82
End: 2022-06-22
Payer: MEDICARE

## 2022-06-22 ENCOUNTER — IMMUNIZATION (OUTPATIENT)
Dept: INTERNAL MEDICINE | Facility: CLINIC | Age: 82
End: 2022-06-22
Payer: MEDICARE

## 2022-06-22 DIAGNOSIS — I10 HTN (HYPERTENSION), BENIGN: ICD-10-CM

## 2022-06-22 DIAGNOSIS — E78.2 MIXED HYPERLIPIDEMIA: ICD-10-CM

## 2022-06-22 DIAGNOSIS — Z11.59 NEED FOR HEPATITIS C SCREENING TEST: ICD-10-CM

## 2022-06-22 DIAGNOSIS — Z23 NEED FOR VACCINATION: Primary | ICD-10-CM

## 2022-06-22 DIAGNOSIS — D50.0 IRON DEFICIENCY ANEMIA DUE TO CHRONIC BLOOD LOSS: ICD-10-CM

## 2022-06-22 DIAGNOSIS — E11.51 CONTROLLED TYPE 2 DM WITH PERIPHERAL CIRCULATORY DISORDER: ICD-10-CM

## 2022-06-22 LAB
ALBUMIN SERPL BCP-MCNC: 3.7 G/DL (ref 3.5–5.2)
ALP SERPL-CCNC: 107 U/L (ref 55–135)
ALT SERPL W/O P-5'-P-CCNC: 25 U/L (ref 10–44)
ANION GAP SERPL CALC-SCNC: 11 MMOL/L (ref 8–16)
AST SERPL-CCNC: 29 U/L (ref 10–40)
BASOPHILS # BLD AUTO: 0.04 K/UL (ref 0–0.2)
BASOPHILS NFR BLD: 0.8 % (ref 0–1.9)
BILIRUB SERPL-MCNC: 0.6 MG/DL (ref 0.1–1)
BUN SERPL-MCNC: 16 MG/DL (ref 8–23)
CALCIUM SERPL-MCNC: 10.5 MG/DL (ref 8.7–10.5)
CHLORIDE SERPL-SCNC: 108 MMOL/L (ref 95–110)
CHOLEST SERPL-MCNC: 159 MG/DL (ref 120–199)
CHOLEST/HDLC SERPL: 2.7 {RATIO} (ref 2–5)
CO2 SERPL-SCNC: 25 MMOL/L (ref 23–29)
CREAT SERPL-MCNC: 1.6 MG/DL (ref 0.5–1.4)
DIFFERENTIAL METHOD: ABNORMAL
EOSINOPHIL # BLD AUTO: 0.4 K/UL (ref 0–0.5)
EOSINOPHIL NFR BLD: 7.5 % (ref 0–8)
ERYTHROCYTE [DISTWIDTH] IN BLOOD BY AUTOMATED COUNT: 13.3 % (ref 11.5–14.5)
EST. GFR  (AFRICAN AMERICAN): 35.1 ML/MIN/1.73 M^2
EST. GFR  (NON AFRICAN AMERICAN): 30.4 ML/MIN/1.73 M^2
ESTIMATED AVG GLUCOSE: 143 MG/DL (ref 68–131)
FERRITIN SERPL-MCNC: 110 NG/ML (ref 20–300)
GLUCOSE SERPL-MCNC: 123 MG/DL (ref 70–110)
HBA1C MFR BLD: 6.6 % (ref 4–5.6)
HCT VFR BLD AUTO: 43.8 % (ref 37–48.5)
HDLC SERPL-MCNC: 60 MG/DL (ref 40–75)
HDLC SERPL: 37.7 % (ref 20–50)
HGB BLD-MCNC: 13.9 G/DL (ref 12–16)
IMM GRANULOCYTES # BLD AUTO: 0.01 K/UL (ref 0–0.04)
IMM GRANULOCYTES NFR BLD AUTO: 0.2 % (ref 0–0.5)
IRON SERPL-MCNC: 68 UG/DL (ref 30–160)
LDLC SERPL CALC-MCNC: 84.8 MG/DL (ref 63–159)
LYMPHOCYTES # BLD AUTO: 1.6 K/UL (ref 1–4.8)
LYMPHOCYTES NFR BLD: 29.8 % (ref 18–48)
MCH RBC QN AUTO: 30.8 PG (ref 27–31)
MCHC RBC AUTO-ENTMCNC: 31.7 G/DL (ref 32–36)
MCV RBC AUTO: 97 FL (ref 82–98)
MONOCYTES # BLD AUTO: 0.7 K/UL (ref 0.3–1)
MONOCYTES NFR BLD: 12.8 % (ref 4–15)
NEUTROPHILS # BLD AUTO: 2.6 K/UL (ref 1.8–7.7)
NEUTROPHILS NFR BLD: 48.9 % (ref 38–73)
NONHDLC SERPL-MCNC: 99 MG/DL
NRBC BLD-RTO: 0 /100 WBC
PLATELET # BLD AUTO: 247 K/UL (ref 150–450)
PMV BLD AUTO: 10.4 FL (ref 9.2–12.9)
POTASSIUM SERPL-SCNC: 4.2 MMOL/L (ref 3.5–5.1)
PROT SERPL-MCNC: 7.7 G/DL (ref 6–8.4)
RBC # BLD AUTO: 4.52 M/UL (ref 4–5.4)
SATURATED IRON: 20 % (ref 20–50)
SODIUM SERPL-SCNC: 144 MMOL/L (ref 136–145)
TOTAL IRON BINDING CAPACITY: 336 UG/DL (ref 250–450)
TRANSFERRIN SERPL-MCNC: 227 MG/DL (ref 200–375)
TRIGL SERPL-MCNC: 71 MG/DL (ref 30–150)
TSH SERPL DL<=0.005 MIU/L-ACNC: 3.68 UIU/ML (ref 0.4–4)
WBC # BLD AUTO: 5.33 K/UL (ref 3.9–12.7)

## 2022-06-22 PROCEDURE — 82728 ASSAY OF FERRITIN: CPT | Performed by: INTERNAL MEDICINE

## 2022-06-22 PROCEDURE — 80053 COMPREHEN METABOLIC PANEL: CPT | Performed by: INTERNAL MEDICINE

## 2022-06-22 PROCEDURE — 86803 HEPATITIS C AB TEST: CPT | Performed by: NURSE PRACTITIONER

## 2022-06-22 PROCEDURE — 85025 COMPLETE CBC W/AUTO DIFF WBC: CPT | Performed by: INTERNAL MEDICINE

## 2022-06-22 PROCEDURE — 36415 COLL VENOUS BLD VENIPUNCTURE: CPT | Performed by: INTERNAL MEDICINE

## 2022-06-22 PROCEDURE — 91305 COVID-19, MRNA, LNP-S, PF, 30 MCG/0.3 ML DOSE VACCINE (PFIZER): CPT | Mod: PBBFAC | Performed by: INTERNAL MEDICINE

## 2022-06-22 PROCEDURE — 83036 HEMOGLOBIN GLYCOSYLATED A1C: CPT | Performed by: INTERNAL MEDICINE

## 2022-06-22 PROCEDURE — 84443 ASSAY THYROID STIM HORMONE: CPT | Performed by: INTERNAL MEDICINE

## 2022-06-22 PROCEDURE — 84466 ASSAY OF TRANSFERRIN: CPT | Performed by: INTERNAL MEDICINE

## 2022-06-22 PROCEDURE — 80061 LIPID PANEL: CPT | Performed by: INTERNAL MEDICINE

## 2022-06-23 LAB — HCV AB SERPL QL IA: NEGATIVE

## 2022-06-23 NOTE — PROGRESS NOTES
Please review.  We will discuss the results during your upcoming visit with me. The results look good except for slightly worsened kidney functions..

## 2022-06-24 ENCOUNTER — TELEPHONE (OUTPATIENT)
Dept: CARDIOLOGY | Facility: CLINIC | Age: 82
End: 2022-06-24
Payer: MEDICARE

## 2022-06-24 NOTE — TELEPHONE ENCOUNTER
----- Message from Jaycee Puente MD sent at 6/23/2022  6:33 PM CDT -----  Please review.  We will discuss the results during your upcoming visit with me. The results look good except for slightly worsened kidney functions..

## 2022-06-29 ENCOUNTER — OFFICE VISIT (OUTPATIENT)
Dept: CARDIOLOGY | Facility: CLINIC | Age: 82
End: 2022-06-29
Payer: MEDICARE

## 2022-06-29 VITALS
HEART RATE: 82 BPM | WEIGHT: 260.06 LBS | BODY MASS INDEX: 44.4 KG/M2 | HEIGHT: 64 IN | SYSTOLIC BLOOD PRESSURE: 122 MMHG | DIASTOLIC BLOOD PRESSURE: 98 MMHG

## 2022-06-29 DIAGNOSIS — E78.5 HYPERLIPIDEMIA ASSOCIATED WITH TYPE 2 DIABETES MELLITUS: ICD-10-CM

## 2022-06-29 DIAGNOSIS — Z95.5 S/P CORONARY ARTERY STENT PLACEMENT: ICD-10-CM

## 2022-06-29 DIAGNOSIS — I45.10 RBBB: ICD-10-CM

## 2022-06-29 DIAGNOSIS — N18.31 STAGE 3A CHRONIC KIDNEY DISEASE: ICD-10-CM

## 2022-06-29 DIAGNOSIS — I73.9 PERIPHERAL VASCULAR DISEASE: ICD-10-CM

## 2022-06-29 DIAGNOSIS — I65.23 BILATERAL CAROTID ARTERY STENOSIS: ICD-10-CM

## 2022-06-29 DIAGNOSIS — I25.2 OLD MI (MYOCARDIAL INFARCTION): ICD-10-CM

## 2022-06-29 DIAGNOSIS — E11.59 HYPERTENSION ASSOCIATED WITH DIABETES: ICD-10-CM

## 2022-06-29 DIAGNOSIS — I25.83 CORONARY ARTERY DISEASE DUE TO LIPID RICH PLAQUE: Chronic | ICD-10-CM

## 2022-06-29 DIAGNOSIS — E66.01 CLASS 3 SEVERE OBESITY DUE TO EXCESS CALORIES WITH SERIOUS COMORBIDITY IN ADULT, UNSPECIFIED BMI: ICD-10-CM

## 2022-06-29 DIAGNOSIS — Z91.81 RISK FOR FALLS: ICD-10-CM

## 2022-06-29 DIAGNOSIS — E11.51 CONTROLLED TYPE 2 DM WITH PERIPHERAL CIRCULATORY DISORDER: Chronic | ICD-10-CM

## 2022-06-29 DIAGNOSIS — N18.30 CKD STAGE 3 SECONDARY TO DIABETES: ICD-10-CM

## 2022-06-29 DIAGNOSIS — Z91.81 HISTORY OF FALL: ICD-10-CM

## 2022-06-29 DIAGNOSIS — I15.2 HYPERTENSION ASSOCIATED WITH DIABETES: ICD-10-CM

## 2022-06-29 DIAGNOSIS — I10 BENIGN ESSENTIAL HYPERTENSION: Primary | ICD-10-CM

## 2022-06-29 DIAGNOSIS — C50.412 MALIGNANT NEOPLASM OF UPPER-OUTER QUADRANT OF LEFT FEMALE BREAST, UNSPECIFIED ESTROGEN RECEPTOR STATUS: ICD-10-CM

## 2022-06-29 DIAGNOSIS — I25.10 CORONARY ARTERY DISEASE DUE TO LIPID RICH PLAQUE: Chronic | ICD-10-CM

## 2022-06-29 DIAGNOSIS — E11.69 HYPERLIPIDEMIA ASSOCIATED WITH TYPE 2 DIABETES MELLITUS: ICD-10-CM

## 2022-06-29 DIAGNOSIS — R09.89 PROMINENT AORTA: ICD-10-CM

## 2022-06-29 DIAGNOSIS — E11.22 CKD STAGE 3 SECONDARY TO DIABETES: ICD-10-CM

## 2022-06-29 PROCEDURE — 99499 RISK ADDL DX/OHS AUDIT: ICD-10-PCS | Mod: S$GLB,,, | Performed by: INTERNAL MEDICINE

## 2022-06-29 PROCEDURE — 1126F PR PAIN SEVERITY QUANTIFIED, NO PAIN PRESENT: ICD-10-PCS | Mod: CPTII,S$GLB,, | Performed by: INTERNAL MEDICINE

## 2022-06-29 PROCEDURE — 3074F SYST BP LT 130 MM HG: CPT | Mod: CPTII,S$GLB,, | Performed by: INTERNAL MEDICINE

## 2022-06-29 PROCEDURE — 99499 UNLISTED E&M SERVICE: CPT | Mod: S$GLB,,, | Performed by: INTERNAL MEDICINE

## 2022-06-29 PROCEDURE — 1101F PR PT FALLS ASSESS DOC 0-1 FALLS W/OUT INJ PAST YR: ICD-10-PCS | Mod: CPTII,S$GLB,, | Performed by: INTERNAL MEDICINE

## 2022-06-29 PROCEDURE — 3074F PR MOST RECENT SYSTOLIC BLOOD PRESSURE < 130 MM HG: ICD-10-PCS | Mod: CPTII,S$GLB,, | Performed by: INTERNAL MEDICINE

## 2022-06-29 PROCEDURE — 3080F DIAST BP >= 90 MM HG: CPT | Mod: CPTII,S$GLB,, | Performed by: INTERNAL MEDICINE

## 2022-06-29 PROCEDURE — 3080F PR MOST RECENT DIASTOLIC BLOOD PRESSURE >= 90 MM HG: ICD-10-PCS | Mod: CPTII,S$GLB,, | Performed by: INTERNAL MEDICINE

## 2022-06-29 PROCEDURE — 99999 PR PBB SHADOW E&M-EST. PATIENT-LVL III: CPT | Mod: PBBFAC,,, | Performed by: INTERNAL MEDICINE

## 2022-06-29 PROCEDURE — 99214 PR OFFICE/OUTPT VISIT, EST, LEVL IV, 30-39 MIN: ICD-10-PCS | Mod: S$GLB,,, | Performed by: INTERNAL MEDICINE

## 2022-06-29 PROCEDURE — 99999 PR PBB SHADOW E&M-EST. PATIENT-LVL III: ICD-10-PCS | Mod: PBBFAC,,, | Performed by: INTERNAL MEDICINE

## 2022-06-29 PROCEDURE — 1159F PR MEDICATION LIST DOCUMENTED IN MEDICAL RECORD: ICD-10-PCS | Mod: CPTII,S$GLB,, | Performed by: INTERNAL MEDICINE

## 2022-06-29 PROCEDURE — 99214 OFFICE O/P EST MOD 30 MIN: CPT | Mod: S$GLB,,, | Performed by: INTERNAL MEDICINE

## 2022-06-29 PROCEDURE — 1101F PT FALLS ASSESS-DOCD LE1/YR: CPT | Mod: CPTII,S$GLB,, | Performed by: INTERNAL MEDICINE

## 2022-06-29 PROCEDURE — 1126F AMNT PAIN NOTED NONE PRSNT: CPT | Mod: CPTII,S$GLB,, | Performed by: INTERNAL MEDICINE

## 2022-06-29 PROCEDURE — 3288F PR FALLS RISK ASSESSMENT DOCUMENTED: ICD-10-PCS | Mod: CPTII,S$GLB,, | Performed by: INTERNAL MEDICINE

## 2022-06-29 PROCEDURE — 1159F MED LIST DOCD IN RCRD: CPT | Mod: CPTII,S$GLB,, | Performed by: INTERNAL MEDICINE

## 2022-06-29 PROCEDURE — 3288F FALL RISK ASSESSMENT DOCD: CPT | Mod: CPTII,S$GLB,, | Performed by: INTERNAL MEDICINE

## 2022-06-29 NOTE — PROGRESS NOTES
Subjective:   Patient ID:  Renata Bergman is a 81 y.o. female who presents for follow-up of Coronary Artery Disease and Follow-up      HPI: Recent history: taking medications as instructed, no medication side effects noted, no TIA's, no chest pain on exertion, no dyspnea on exertion and no swelling of ankles. Patient's symptoms have been unchanged. No medication side effects.    Renal functions slightly worse.  Known CKD due to Diabetes and hypertension.      Lab Results   Component Value Date     06/22/2022    K 4.2 06/22/2022     06/22/2022    CO2 25 06/22/2022    BUN 16 06/22/2022    CREATININE 1.6 (H) 06/22/2022     (H) 06/22/2022    HGBA1C 6.6 (H) 06/22/2022    MG 2.2 02/28/2022    AST 29 06/22/2022    ALT 25 06/22/2022    ALBUMIN 3.7 06/22/2022    PROT 7.7 06/22/2022    BILITOT 0.6 06/22/2022    WBC 5.33 06/22/2022    HGB 13.9 06/22/2022    HCT 43.8 06/22/2022    MCV 97 06/22/2022     06/22/2022    INR 1.0 05/23/2017    TSH 3.679 06/22/2022    CHOL 159 06/22/2022    HDL 60 06/22/2022    LDLCALC 84.8 06/22/2022    TRIG 71 06/22/2022       No results found in the last 24 hours.     Review of Systems   Constitutional: Negative.   HENT: Negative.    Eyes: Negative.    Cardiovascular: Negative.  Negative for chest pain, claudication, dyspnea on exertion, irregular heartbeat, leg swelling, near-syncope, palpitations and syncope.   Respiratory: Negative.  Negative for cough, shortness of breath, snoring and wheezing.    Endocrine: Negative.  Negative for cold intolerance, heat intolerance, polydipsia, polyphagia and polyuria.   Skin: Negative.    Musculoskeletal: Positive for falls and joint pain.   Gastrointestinal: Negative.    Genitourinary: Negative.    Neurological: Negative.    Psychiatric/Behavioral: Negative.        Objective:   Physical Exam  Vitals and nursing note reviewed.   Constitutional:       Appearance: She is well-developed. She is obese.      Comments: BP (!) 122/98   " Pulse 82   Ht 5' 4" (1.626 m)   Wt 117.9 kg (260 lb 0.5 oz)   LMP  (LMP Unknown)   BMI 44.63 kg/m²      HENT:      Head: Normocephalic.   Eyes:      Pupils: Pupils are equal, round, and reactive to light.   Neck:      Thyroid: No thyromegaly.      Vascular: No carotid bruit.   Cardiovascular:      Rate and Rhythm: Normal rate and regular rhythm.      Pulses: Intact distal pulses.           Carotid pulses are 2+ on the right side and 2+ on the left side.       Radial pulses are 2+ on the right side and 2+ on the left side.        Femoral pulses are 2+ on the right side and 2+ on the left side.       Popliteal pulses are 2+ on the right side and 2+ on the left side.        Dorsalis pedis pulses are 2+ on the right side and 2+ on the left side.        Posterior tibial pulses are 2+ on the right side and 2+ on the left side.      Heart sounds: Normal heart sounds. No murmur heard.    No friction rub. No gallop.   Pulmonary:      Effort: Pulmonary effort is normal. No respiratory distress.      Breath sounds: Normal breath sounds. No wheezing or rales.   Chest:      Chest wall: No tenderness.   Abdominal:      General: Bowel sounds are normal.      Palpations: Abdomen is soft.   Musculoskeletal:         General: Normal range of motion.      Cervical back: Normal range of motion and neck supple.   Skin:     General: Skin is warm and dry.   Neurological:      Mental Status: She is alert and oriented to person, place, and time.           Assessment and Plan:     Problem List Items Addressed This Visit        Cardiology Problems    Coronary artery disease due to lipid rich plaque (Chronic)    Benign essential hypertension - Primary    Relevant Orders    CV Ultrasound Bilateral Doppler Carotid    CV US Renal Artery Stenosis Hypertension Complete    Controlled type 2 DM with peripheral circulatory disorder (Chronic)    Old MI (myocardial infarction)    RBBB    Bilateral carotid artery stenosis    Relevant Orders    CV " Ultrasound Bilateral Doppler Carotid    CV US Renal Artery Stenosis Hypertension Complete    Peripheral vascular disease    Hypertension associated with diabetes       Other    Hyperlipidemia associated with type 2 diabetes mellitus    S/P coronary artery stent placement    Stage 3a chronic kidney disease    Malignant neoplasm of upper-outer quadrant of left female breast    Prominent aorta    History of fall    Class 3 severe obesity in adult    CKD stage 3 secondary to diabetes    Risk for falls          Patient's Medications   New Prescriptions    No medications on file   Previous Medications    ALLOPURINOL (ZYLOPRIM) 300 MG TABLET    Take 1 tablet (300 mg total) by mouth every morning.    AMLODIPINE (NORVASC) 10 MG TABLET    Take 1 tablet (10 mg total) by mouth once daily.    ANASTROZOLE (ARIMIDEX) 1 MG TAB    Take 1 tablet (1 mg total) by mouth once daily TAKE 1 TABLET(1 MG) BY MOUTH EVERY DAY. STOP LETROZOLE FEMARA.    ASPIRIN (ECOTRIN) 81 MG EC TABLET    Take 1 tablet (81 mg total) by mouth once daily.    BLOOD SUGAR DIAGNOSTIC STRP    1 strip by Misc.(Non-Drug; Combo Route) route once daily.    BLOOD-GLUCOSE METER KIT    Use as instructed    CARVEDILOL (COREG) 6.25 MG TABLET    Take 1 tablet (6.25 mg total) by mouth 2 (two) times daily with meals.    ERGOCALCIFEROL (ERGOCALCIFEROL) 50,000 UNIT CAP    TAKE 1 CAPSULE BY MOUTH 2 TIMES A WEEK    EZETIMIBE (ZETIA) 10 MG TABLET    Take 1 tablet (10 mg total) by mouth once daily.    FEXOFENADINE (ALLEGRA) 30 MG TABLET    Take 30 mg by mouth daily as needed.    LANCETS MISC    Check blood sugar once daily    LEVETIRACETAM XR (KEPPRA XR) 500 MG TB24 24 HR TABLET    TAKE 2 TABLETS(1000 MG) BY MOUTH EVERY DAY    NITROGLYCERIN (NITROSTAT) 0.4 MG SL TABLET    PLACE 1 TABLET UNDER THE TONGUE EVERY 5 MINUTES AS NEEDED FOR CHEST PAIN.    OMEPRAZOLE (PRILOSEC) 20 MG CAPSULE    TAKE 2 CAPSULES BY MOUTH EVERY DAY    ROSUVASTATIN (CRESTOR) 40 MG TAB    Take 1 tablet (40 mg  total) by mouth every morning.   Modified Medications    No medications on file   Discontinued Medications    No medications on file     Continue on the present regimen.  Review ordered tests and advise.  Follow up in about 1 year (around 6/29/2023).

## 2022-07-14 ENCOUNTER — TELEPHONE (OUTPATIENT)
Dept: CARDIOLOGY | Facility: CLINIC | Age: 82
End: 2022-07-14

## 2022-07-14 ENCOUNTER — HOSPITAL ENCOUNTER (OUTPATIENT)
Dept: CARDIOLOGY | Facility: HOSPITAL | Age: 82
Discharge: HOME | End: 2022-07-14
Attending: INTERNAL MEDICINE
Payer: MEDICARE

## 2022-07-14 DIAGNOSIS — I10 BENIGN ESSENTIAL HYPERTENSION: ICD-10-CM

## 2022-07-14 DIAGNOSIS — I65.23 BILATERAL CAROTID ARTERY STENOSIS: ICD-10-CM

## 2022-07-14 LAB
ABDOMINAL AORTA MID EDV: 15 CM/S
ABDOMINAL AORTA MID PSV: 106 CM/S
LEFT CBA DIAS: 24 CM/S
LEFT CBA SYS: 105 CM/S
LEFT CCA DIST DIAS: 11 CM/S
LEFT CCA DIST SYS: 67 CM/S
LEFT CCA MID DIAS: 10 CM/S
LEFT CCA MID SYS: 76 CM/S
LEFT CCA PROX DIAS: 9 CM/S
LEFT CCA PROX SYS: 72 CM/S
LEFT ECA DIAS: 0 CM/S
LEFT ECA SYS: 93 CM/S
LEFT ICA DIST DIAS: 24 CM/S
LEFT ICA DIST SYS: 98 CM/S
LEFT ICA MID DIAS: 21 CM/S
LEFT ICA MID SYS: 85 CM/S
LEFT ICA PROX DIAS: 13 CM/S
LEFT ICA PROX SYS: 82 CM/S
LEFT RENAL DIST DIAS: 9 CM/S
LEFT RENAL DIST SYS: 33 CM/S
LEFT RENAL MID DIAS: 35 CM/S
LEFT RENAL MID RAR: 1.59
LEFT RENAL MID SYS: 169 CM/S
LEFT RENAL ORIGIN DIAS: 30 CM/S
LEFT RENAL ORIGIN SYS: 146 CM/S
LEFT RENAL PROX DIAS: 27 CM/S
LEFT RENAL PROX SYS: 142 CM/S
LEFT RENAL ULTRASOUND ACCELERATION TIME MEASUREMENT 1: 37 MS
LEFT RENAL ULTRASOUND ACCELERATION TIME MEASUREMENT 2: 34 MS
LEFT RENAL ULTRASOUND ACCELERATION TIME MEASUREMENT AVERAGE: 37 MS
LEFT RENAL ULTRASOUND KIDNEY SIZE MEASUREMENT 1: 9.3 CM
LEFT RENAL ULTRASOUND KIDNEY SIZE MEASUREMENT 2: 9.3 CM
LEFT RENAL ULTRASOUND KIDNEY SIZE MEASUREMENT 3: 9.3 CM
LEFT RENAL ULTRASOUND KIDNEY SIZE MEASUREMENT AVERAGE: 9.3 CM
LEFT RENAL ULTRASOUND RESISTIVE INDEX MEASUREMENT 1: 0.67
LEFT RENAL ULTRASOUND RESISTIVE INDEX MEASUREMENT 2: 0.76
LEFT RENAL ULTRASOUND RESISTIVE INDEX MEASUREMENT AVERAGE: 0.76
LEFT VERTEBRAL DIAS: 8 CM/S
LEFT VERTEBRAL SYS: 45 CM/S
OHS CV CAROTID RIGHT ICA EDV HIGHEST: 22
OHS CV CAROTID ULTRASOUND LEFT ICA/CCA RATIO: 1.46
OHS CV CAROTID ULTRASOUND RIGHT ICA/CCA RATIO: 1.76
OHS CV LEFT RENAL RAR: 1.59
OHS CV PV CAROTID LEFT HIGHEST CCA: 76
OHS CV PV CAROTID LEFT HIGHEST ICA: 98
OHS CV PV CAROTID RIGHT HIGHEST CCA: 70
OHS CV PV CAROTID RIGHT HIGHEST ICA: 102
OHS CV RIGHT RENAL RAR: 1.69
OHS CV US CAROTID LEFT HIGHEST EDV: 24
OHS CV US LEFT RENAL HIGHEST EDV: 35
OHS CV US LEFT RENAL HIGHEST PSV: 169
OHS CV US RIGHT RENAL HIGHEST EDV: 54
OHS CV US RIGHT RENAL HIGHEST PSV: 179
RIGHT ARM DIASTOLIC BLOOD PRESSURE: 93 MMHG
RIGHT ARM SYSTOLIC BLOOD PRESSURE: 194 MMHG
RIGHT CBA DIAS: 39 CM/S
RIGHT CBA SYS: 196 CM/S
RIGHT CCA DIST DIAS: 12 CM/S
RIGHT CCA DIST SYS: 58 CM/S
RIGHT CCA MID DIAS: 14 CM/S
RIGHT CCA MID SYS: 70 CM/S
RIGHT CCA PROX DIAS: 10 CM/S
RIGHT CCA PROX SYS: 59 CM/S
RIGHT ECA DIAS: 9 CM/S
RIGHT ECA SYS: 100 CM/S
RIGHT ICA DIST DIAS: 22 CM/S
RIGHT ICA DIST SYS: 71 CM/S
RIGHT ICA MID DIAS: 18 CM/S
RIGHT ICA MID SYS: 102 CM/S
RIGHT ICA PROX DIAS: 20 CM/S
RIGHT ICA PROX SYS: 89 CM/S
RIGHT RENAL DIST DIAS: 32 CM/S
RIGHT RENAL DIST SYS: 98 CM/S
RIGHT RENAL MID DIAS: 54 CM/S
RIGHT RENAL MID RAR: 1.58
RIGHT RENAL MID SYS: 167 CM/S
RIGHT RENAL ORIGIN DIAS: 26 CM/S
RIGHT RENAL ORIGIN SYS: 133 CM/S
RIGHT RENAL PROX DIAS: 33 CM/S
RIGHT RENAL PROX SYS: 179 CM/S
RIGHT RENAL ULTRASOUND ACCELERATION TIME MEASUREMENT 1: 66 MS
RIGHT RENAL ULTRASOUND ACCELERATION TIME MEASUREMENT 2: 46 MS
RIGHT RENAL ULTRASOUND ACCELERATION TIME MEASUREMENT 3: 51 MS
RIGHT RENAL ULTRASOUND ACCELERATION TIME MEASUREMENT AVERAGE: 66 MS
RIGHT RENAL ULTRASOUND KIDNEY SIZE MEASUREMENT 1: 9 CM
RIGHT RENAL ULTRASOUND KIDNEY SIZE MEASUREMENT 2: 9 CM
RIGHT RENAL ULTRASOUND KIDNEY SIZE MEASUREMENT 3: 9 CM
RIGHT RENAL ULTRASOUND KIDNEY SIZE MEASUREMENT AVERAGE: 9 CM
RIGHT RENAL ULTRASOUND RESISTIVE INDEX MEASUREMENT 1: 0.67
RIGHT RENAL ULTRASOUND RESISTIVE INDEX MEASUREMENT 2: 0.72
RIGHT RENAL ULTRASOUND RESISTIVE INDEX MEASUREMENT 3: 0.69
RIGHT RENAL ULTRASOUND RESISTIVE INDEX MEASUREMENT AVERAGE: 0.72
RIGHT VERTEBRAL DIAS: 12 CM/S
RIGHT VERTEBRAL SYS: 58 CM/S

## 2022-07-14 PROCEDURE — 93880 EXTRACRANIAL BILAT STUDY: CPT

## 2022-07-14 PROCEDURE — 93975 CV US RENAL ARTERY STENOSIS HYPERTENSION COMPLETE (CUPID ONLY): ICD-10-PCS | Mod: 26,,, | Performed by: INTERNAL MEDICINE

## 2022-07-14 PROCEDURE — 93975 VASCULAR STUDY: CPT | Mod: 26,,, | Performed by: INTERNAL MEDICINE

## 2022-07-14 PROCEDURE — 93880 CV US DOPPLER CAROTID (CUPID ONLY): ICD-10-PCS | Mod: 26,,, | Performed by: INTERNAL MEDICINE

## 2022-07-14 PROCEDURE — 93975 VASCULAR STUDY: CPT

## 2022-07-14 PROCEDURE — 93880 EXTRACRANIAL BILAT STUDY: CPT | Mod: 26,,, | Performed by: INTERNAL MEDICINE

## 2022-07-14 RX ORDER — OMEPRAZOLE 20 MG/1
CAPSULE, DELAYED RELEASE ORAL
Qty: 180 CAPSULE | Refills: 3 | Status: SHIPPED | OUTPATIENT
Start: 2022-07-14 | End: 2023-06-23 | Stop reason: SDUPTHER

## 2022-07-14 NOTE — TELEPHONE ENCOUNTER
Care Due:                  Date            Visit Type   Department     Provider  --------------------------------------------------------------------------------                                EP -                              PRIMARY      [facility] INTERNAL  Last Visit: 04-      CARE ([facility])   MEDICINE       AKANKSHA LARIOS                              EP                               PRIMARY      [facility] INTERNAL  Next Visit: 09-      CARE ([facility])   MEDICINE       AKANKSHA LARIOS                                                            Last  Test          Frequency    Reason                     Performed    Due Date  --------------------------------------------------------------------------------    Uric Acid...  12 months..  allopurinoL..............  11- 11-    [employer] Embedded Care Gaps. Reference number: [id number]. 7/14/2022   5:46:28 AM CDT

## 2022-07-14 NOTE — PROGRESS NOTES
Please inform the patient that her carotid Duplex showed moderate-severe right ICA stenosis and mild left  ICA stenosis. Renal Doppler did not show renal artery stenosis.  Overall  good news. Would have to repeat carotid Duplex in 6 months.

## 2022-07-14 NOTE — TELEPHONE ENCOUNTER
----- Message from Jaycee Puente MD sent at 7/14/2022 11:00 AM CDT -----  Please inform the patient that her carotid Duplex showed moderate-severe right ICA stenosis and mild left  ICA stenosis. Renal Doppler did not show renal artery stenosis.  Overall  good news. Would have to repeat carotid Duplex in 6 months.

## 2022-07-15 NOTE — TELEPHONE ENCOUNTER
Refill Decision Note   Renata Bergman  is requesting a refill authorization.  Brief Assessment and Rationale for Refill:  Approve    -Medication-Related Problems Identified: Requires labs  Medication Therapy Plan:  Lab due: Uric acid    Medication Reconciliation Completed: No   Comments:     Provider Staff:     Action is required for this patient.   Please see care gap opportunities below in Care Due Message.     Thanks!  [facility]     Appointments      Date Provider   Last Visit   4/26/2022 Ethel Berger MD   Next Visit   9/6/2022 Ethel Berger MD     Note composed:10:31 PM 07/14/2022           Note composed:10:30 PM 07/14/2022

## 2022-07-16 ENCOUNTER — HOSPITAL ENCOUNTER (OUTPATIENT)
Facility: HOSPITAL | Age: 82
Discharge: HOME | End: 2022-07-18
Attending: EMERGENCY MEDICINE | Admitting: INTERNAL MEDICINE
Payer: MEDICARE

## 2022-07-16 DIAGNOSIS — I24.9 ACS (ACUTE CORONARY SYNDROME): ICD-10-CM

## 2022-07-16 DIAGNOSIS — E78.2 MIXED HYPERLIPIDEMIA: ICD-10-CM

## 2022-07-16 DIAGNOSIS — I16.0 HYPERTENSIVE URGENCY: ICD-10-CM

## 2022-07-16 DIAGNOSIS — R07.9 CHEST PAIN: Primary | ICD-10-CM

## 2022-07-16 DIAGNOSIS — I10 HTN (HYPERTENSION), BENIGN: ICD-10-CM

## 2022-07-16 LAB
ALBUMIN SERPL BCP-MCNC: 3.5 G/DL (ref 3.5–5.2)
ALP SERPL-CCNC: 105 U/L (ref 55–135)
ALT SERPL W/O P-5'-P-CCNC: 11 U/L (ref 10–44)
ANION GAP SERPL CALC-SCNC: 10 MMOL/L (ref 8–16)
AST SERPL-CCNC: 20 U/L (ref 10–40)
BASOPHILS # BLD AUTO: 0.05 K/UL (ref 0–0.2)
BASOPHILS NFR BLD: 0.9 % (ref 0–1.9)
BILIRUB SERPL-MCNC: 0.9 MG/DL (ref 0.1–1)
BNP SERPL-MCNC: 20 PG/ML (ref 0–99)
BUN SERPL-MCNC: 15 MG/DL (ref 8–23)
CALCIUM SERPL-MCNC: 10.2 MG/DL (ref 8.7–10.5)
CHLORIDE SERPL-SCNC: 107 MMOL/L (ref 95–110)
CO2 SERPL-SCNC: 21 MMOL/L (ref 23–29)
CREAT SERPL-MCNC: 1.2 MG/DL (ref 0.5–1.4)
DIFFERENTIAL METHOD: NORMAL
EOSINOPHIL # BLD AUTO: 0.4 K/UL (ref 0–0.5)
EOSINOPHIL NFR BLD: 7.4 % (ref 0–8)
ERYTHROCYTE [DISTWIDTH] IN BLOOD BY AUTOMATED COUNT: 13.7 % (ref 11.5–14.5)
EST. GFR  (AFRICAN AMERICAN): 49.7 ML/MIN/1.73 M^2
EST. GFR  (NON AFRICAN AMERICAN): 43.1 ML/MIN/1.73 M^2
GLUCOSE SERPL-MCNC: 118 MG/DL (ref 70–110)
HCT VFR BLD AUTO: 41.9 % (ref 37–48.5)
HGB BLD-MCNC: 13.6 G/DL (ref 12–16)
IMM GRANULOCYTES # BLD AUTO: 0.01 K/UL (ref 0–0.04)
IMM GRANULOCYTES NFR BLD AUTO: 0.2 % (ref 0–0.5)
LYMPHOCYTES # BLD AUTO: 1.5 K/UL (ref 1–4.8)
LYMPHOCYTES NFR BLD: 26.9 % (ref 18–48)
MAGNESIUM SERPL-MCNC: 1.4 MG/DL (ref 1.6–2.6)
MCH RBC QN AUTO: 30.6 PG (ref 27–31)
MCHC RBC AUTO-ENTMCNC: 32.5 G/DL (ref 32–36)
MCV RBC AUTO: 94 FL (ref 82–98)
MONOCYTES # BLD AUTO: 0.7 K/UL (ref 0.3–1)
MONOCYTES NFR BLD: 12.6 % (ref 4–15)
NEUTROPHILS # BLD AUTO: 2.8 K/UL (ref 1.8–7.7)
NEUTROPHILS NFR BLD: 52 % (ref 38–73)
NRBC BLD-RTO: 0 /100 WBC
PLATELET # BLD AUTO: 211 K/UL (ref 150–450)
PMV BLD AUTO: 9.8 FL (ref 9.2–12.9)
POCT GLUCOSE: 101 MG/DL (ref 70–110)
POCT GLUCOSE: 99 MG/DL (ref 70–110)
POTASSIUM SERPL-SCNC: 3.6 MMOL/L (ref 3.5–5.1)
PROT SERPL-MCNC: 7.6 G/DL (ref 6–8.4)
RBC # BLD AUTO: 4.45 M/UL (ref 4–5.4)
SODIUM SERPL-SCNC: 138 MMOL/L (ref 136–145)
TROPONIN I SERPL DL<=0.01 NG/ML-MCNC: 0.08 NG/ML (ref 0–0.03)
TROPONIN I SERPL DL<=0.01 NG/ML-MCNC: 0.09 NG/ML (ref 0–0.03)
WBC # BLD AUTO: 5.4 K/UL (ref 3.9–12.7)

## 2022-07-16 PROCEDURE — G0378 HOSPITAL OBSERVATION PER HR: HCPCS

## 2022-07-16 PROCEDURE — 96372 THER/PROPH/DIAG INJ SC/IM: CPT | Performed by: PHYSICIAN ASSISTANT

## 2022-07-16 PROCEDURE — 25000003 PHARM REV CODE 250: Performed by: PHYSICIAN ASSISTANT

## 2022-07-16 PROCEDURE — 63600175 PHARM REV CODE 636 W HCPCS: Performed by: PHYSICIAN ASSISTANT

## 2022-07-16 PROCEDURE — 99285 EMERGENCY DEPT VISIT HI MDM: CPT | Mod: ,,, | Performed by: EMERGENCY MEDICINE

## 2022-07-16 PROCEDURE — 83735 ASSAY OF MAGNESIUM: CPT | Performed by: STUDENT IN AN ORGANIZED HEALTH CARE EDUCATION/TRAINING PROGRAM

## 2022-07-16 PROCEDURE — 93005 ELECTROCARDIOGRAM TRACING: CPT

## 2022-07-16 PROCEDURE — 99220 PR INITIAL OBSERVATION CARE,LEVL III: ICD-10-PCS | Mod: ,,, | Performed by: PHYSICIAN ASSISTANT

## 2022-07-16 PROCEDURE — 99285 PR EMERGENCY DEPT VISIT,LEVEL V: ICD-10-PCS | Mod: ,,, | Performed by: EMERGENCY MEDICINE

## 2022-07-16 PROCEDURE — 80053 COMPREHEN METABOLIC PANEL: CPT | Performed by: STUDENT IN AN ORGANIZED HEALTH CARE EDUCATION/TRAINING PROGRAM

## 2022-07-16 PROCEDURE — 93010 ELECTROCARDIOGRAM REPORT: CPT | Mod: ,,, | Performed by: INTERNAL MEDICINE

## 2022-07-16 PROCEDURE — 99220 PR INITIAL OBSERVATION CARE,LEVL III: CPT | Mod: ,,, | Performed by: PHYSICIAN ASSISTANT

## 2022-07-16 PROCEDURE — 83880 ASSAY OF NATRIURETIC PEPTIDE: CPT | Performed by: STUDENT IN AN ORGANIZED HEALTH CARE EDUCATION/TRAINING PROGRAM

## 2022-07-16 PROCEDURE — 85025 COMPLETE CBC W/AUTO DIFF WBC: CPT | Performed by: STUDENT IN AN ORGANIZED HEALTH CARE EDUCATION/TRAINING PROGRAM

## 2022-07-16 PROCEDURE — 99285 EMERGENCY DEPT VISIT HI MDM: CPT | Mod: 25

## 2022-07-16 PROCEDURE — 82962 GLUCOSE BLOOD TEST: CPT

## 2022-07-16 PROCEDURE — 93010 EKG 12-LEAD: ICD-10-PCS | Mod: ,,, | Performed by: INTERNAL MEDICINE

## 2022-07-16 PROCEDURE — 84484 ASSAY OF TROPONIN QUANT: CPT | Performed by: STUDENT IN AN ORGANIZED HEALTH CARE EDUCATION/TRAINING PROGRAM

## 2022-07-16 RX ORDER — CARVEDILOL 3.12 MG/1
6.25 TABLET ORAL 2 TIMES DAILY WITH MEALS
Status: DISCONTINUED | OUTPATIENT
Start: 2022-07-16 | End: 2022-07-16

## 2022-07-16 RX ORDER — TALC
9 POWDER (GRAM) TOPICAL NIGHTLY PRN
Status: DISCONTINUED | OUTPATIENT
Start: 2022-07-16 | End: 2022-07-18 | Stop reason: HOSPADM

## 2022-07-16 RX ORDER — ACETAMINOPHEN 325 MG/1
650 TABLET ORAL EVERY 4 HOURS PRN
Status: DISCONTINUED | OUTPATIENT
Start: 2022-07-16 | End: 2022-07-18 | Stop reason: HOSPADM

## 2022-07-16 RX ORDER — IPRATROPIUM BROMIDE AND ALBUTEROL SULFATE 2.5; .5 MG/3ML; MG/3ML
3 SOLUTION RESPIRATORY (INHALATION) EVERY 4 HOURS PRN
Status: DISCONTINUED | OUTPATIENT
Start: 2022-07-16 | End: 2022-07-18 | Stop reason: HOSPADM

## 2022-07-16 RX ORDER — LEVETIRACETAM 500 MG/1
1000 TABLET, EXTENDED RELEASE ORAL DAILY
Status: DISCONTINUED | OUTPATIENT
Start: 2022-07-17 | End: 2022-07-16

## 2022-07-16 RX ORDER — POLYETHYLENE GLYCOL 3350 17 G/17G
17 POWDER, FOR SOLUTION ORAL 2 TIMES DAILY PRN
Status: DISCONTINUED | OUTPATIENT
Start: 2022-07-16 | End: 2022-07-18 | Stop reason: HOSPADM

## 2022-07-16 RX ORDER — AMLODIPINE BESYLATE 10 MG/1
10 TABLET ORAL DAILY
Status: DISCONTINUED | OUTPATIENT
Start: 2022-07-17 | End: 2022-07-16

## 2022-07-16 RX ORDER — LEVETIRACETAM 500 MG/1
500 TABLET ORAL 2 TIMES DAILY
Status: DISCONTINUED | OUTPATIENT
Start: 2022-07-16 | End: 2022-07-18 | Stop reason: HOSPADM

## 2022-07-16 RX ORDER — EZETIMIBE 10 MG/1
10 TABLET ORAL DAILY
Status: DISCONTINUED | OUTPATIENT
Start: 2022-07-17 | End: 2022-07-16

## 2022-07-16 RX ORDER — ENOXAPARIN SODIUM 100 MG/ML
40 INJECTION SUBCUTANEOUS EVERY 12 HOURS
Status: DISCONTINUED | OUTPATIENT
Start: 2022-07-16 | End: 2022-07-18 | Stop reason: HOSPADM

## 2022-07-16 RX ORDER — NITROGLYCERIN 0.4 MG/1
0.4 TABLET SUBLINGUAL EVERY 5 MIN PRN
Status: DISCONTINUED | OUTPATIENT
Start: 2022-07-16 | End: 2022-07-18 | Stop reason: HOSPADM

## 2022-07-16 RX ORDER — ANASTROZOLE 1 MG/1
1 TABLET ORAL DAILY
Status: DISCONTINUED | OUTPATIENT
Start: 2022-07-17 | End: 2022-07-18 | Stop reason: HOSPADM

## 2022-07-16 RX ORDER — ASPIRIN 81 MG/1
81 TABLET ORAL DAILY
Status: DISCONTINUED | OUTPATIENT
Start: 2022-07-17 | End: 2022-07-18 | Stop reason: HOSPADM

## 2022-07-16 RX ORDER — EZETIMIBE 10 MG/1
10 TABLET ORAL DAILY
Status: DISCONTINUED | OUTPATIENT
Start: 2022-07-16 | End: 2022-07-18 | Stop reason: HOSPADM

## 2022-07-16 RX ORDER — NALOXONE HCL 0.4 MG/ML
0.02 VIAL (ML) INJECTION
Status: DISCONTINUED | OUTPATIENT
Start: 2022-07-16 | End: 2022-07-18 | Stop reason: HOSPADM

## 2022-07-16 RX ORDER — PANTOPRAZOLE SODIUM 40 MG/1
40 TABLET, DELAYED RELEASE ORAL
Status: DISCONTINUED | OUTPATIENT
Start: 2022-07-16 | End: 2022-07-18 | Stop reason: HOSPADM

## 2022-07-16 RX ORDER — ASPIRIN 325 MG
325 TABLET ORAL
Status: DISCONTINUED | OUTPATIENT
Start: 2022-07-16 | End: 2022-07-16

## 2022-07-16 RX ORDER — ATORVASTATIN CALCIUM 40 MG/1
80 TABLET, FILM COATED ORAL DAILY
Status: DISCONTINUED | OUTPATIENT
Start: 2022-07-16 | End: 2022-07-18 | Stop reason: HOSPADM

## 2022-07-16 RX ORDER — IBUPROFEN 200 MG
24 TABLET ORAL
Status: DISCONTINUED | OUTPATIENT
Start: 2022-07-16 | End: 2022-07-18 | Stop reason: HOSPADM

## 2022-07-16 RX ORDER — PANTOPRAZOLE SODIUM 40 MG/1
40 TABLET, DELAYED RELEASE ORAL
Refills: 3 | Status: DISCONTINUED | OUTPATIENT
Start: 2022-07-17 | End: 2022-07-16

## 2022-07-16 RX ORDER — ONDANSETRON 2 MG/ML
4 INJECTION INTRAMUSCULAR; INTRAVENOUS EVERY 8 HOURS PRN
Status: DISCONTINUED | OUTPATIENT
Start: 2022-07-16 | End: 2022-07-18 | Stop reason: HOSPADM

## 2022-07-16 RX ORDER — BISACODYL 10 MG
10 SUPPOSITORY, RECTAL RECTAL DAILY PRN
Status: DISCONTINUED | OUTPATIENT
Start: 2022-07-16 | End: 2022-07-18 | Stop reason: HOSPADM

## 2022-07-16 RX ORDER — ALLOPURINOL 300 MG/1
300 TABLET ORAL EVERY MORNING
Status: DISCONTINUED | OUTPATIENT
Start: 2022-07-17 | End: 2022-07-18 | Stop reason: HOSPADM

## 2022-07-16 RX ORDER — ATORVASTATIN CALCIUM 40 MG/1
80 TABLET, FILM COATED ORAL DAILY
Refills: 4 | Status: DISCONTINUED | OUTPATIENT
Start: 2022-07-17 | End: 2022-07-16

## 2022-07-16 RX ORDER — GLUCAGON 1 MG
1 KIT INJECTION
Status: DISCONTINUED | OUTPATIENT
Start: 2022-07-16 | End: 2022-07-18 | Stop reason: HOSPADM

## 2022-07-16 RX ORDER — INSULIN ASPART 100 [IU]/ML
0-5 INJECTION, SOLUTION INTRAVENOUS; SUBCUTANEOUS
Status: DISCONTINUED | OUTPATIENT
Start: 2022-07-16 | End: 2022-07-18 | Stop reason: HOSPADM

## 2022-07-16 RX ORDER — ACETAMINOPHEN 500 MG
1000 TABLET ORAL EVERY 8 HOURS PRN
Status: DISCONTINUED | OUTPATIENT
Start: 2022-07-16 | End: 2022-07-18 | Stop reason: HOSPADM

## 2022-07-16 RX ORDER — IBUPROFEN 200 MG
16 TABLET ORAL
Status: DISCONTINUED | OUTPATIENT
Start: 2022-07-16 | End: 2022-07-18 | Stop reason: HOSPADM

## 2022-07-16 RX ORDER — HYDRALAZINE HYDROCHLORIDE 25 MG/1
25 TABLET, FILM COATED ORAL EVERY 6 HOURS PRN
Status: DISCONTINUED | OUTPATIENT
Start: 2022-07-16 | End: 2022-07-18 | Stop reason: HOSPADM

## 2022-07-16 RX ORDER — AMLODIPINE BESYLATE 10 MG/1
10 TABLET ORAL DAILY
Status: DISCONTINUED | OUTPATIENT
Start: 2022-07-16 | End: 2022-07-18 | Stop reason: HOSPADM

## 2022-07-16 RX ADMIN — EZETIMIBE 10 MG: 10 TABLET ORAL at 12:07

## 2022-07-16 RX ADMIN — AMLODIPINE BESYLATE 10 MG: 10 TABLET ORAL at 12:07

## 2022-07-16 RX ADMIN — ENOXAPARIN SODIUM 40 MG: 100 INJECTION SUBCUTANEOUS at 12:07

## 2022-07-16 RX ADMIN — LEVETIRACETAM 500 MG: 500 TABLET, FILM COATED ORAL at 09:07

## 2022-07-16 RX ADMIN — ENOXAPARIN SODIUM 40 MG: 100 INJECTION SUBCUTANEOUS at 09:07

## 2022-07-16 RX ADMIN — ATORVASTATIN CALCIUM 80 MG: 40 TABLET, FILM COATED ORAL at 12:07

## 2022-07-16 RX ADMIN — LEVETIRACETAM 500 MG: 500 TABLET, FILM COATED ORAL at 12:07

## 2022-07-16 RX ADMIN — PANTOPRAZOLE SODIUM 40 MG: 40 TABLET, DELAYED RELEASE ORAL at 12:07

## 2022-07-16 NOTE — HPI
81 y.o. female with significant past medical history of CAD s/p MI and PCI, T2DM with CKD3, HTN, HLD, GERD, gout, arthritis presented to the ER complaining of intermittent chest pain.  Chest pain started while sleeping, pt woke up drenched in sweat.  The pain is located substernal with minimal radiation to the L.  The pain is described as pressure.  The pain is not reproducible.  Pt denies nausea, vomiting, or palpitations.  Pt reports some SOB but not worse with exertion.  Pt denies any trauma or heavy lifting.  Pt reports this has never happened before.  Previous cardiac events chest pain described as similar, pressure substernal and L however less intense than when pt had MI.  Pt denies fever, chills, congestions, cough, vocal changes, globus sensation, leg swelling or pain, weight gain, fatigue, weakness, confusion, syncope.  No recent seizures.    In the ED, VSS with elevated BP up to 206/88.  EKG NSR, RBBB (seen previously).  Troponin 0.09 (compared to baseline 0.05).  BNP 20.  Pt given asa 325 mg.  CBC unremarkable.  CMP with bicarb 21, Cr 1.2 at baseline.  CXR normal.  Chest pain resolved at time of admit.

## 2022-07-16 NOTE — SUBJECTIVE & OBJECTIVE
"Past Medical History:   Diagnosis Date    Anemia     Benign essential hypertension 9/4/2012    Breast cancer 2016    DCIS and IDC, stage I    Cataract     Coronary artery disease 9/4/2012    Diabetes mellitus due to abnormal insulin 9/4/2012    Diabetes mellitus type II     Genetic testing     brca2 positive     GERD (gastroesophageal reflux disease) 9/4/2012    Gout, arthritis 9/4/2012    Hyperlipidemia 9/4/2012    Hypertension     Labyrinthitis of both ears 2/28/2022    Myocardial infarction     Obesity     Primary osteoarthritis of both knees 9/4/2012    Renal manifestation of secondary diabetes mellitus     Seizure     states "one time during chemo in 2018"    Type 2 diabetes with peripheral circulatory disorder, controlled        Past Surgical History:   Procedure Laterality Date    BREAST BIOPSY      BREAST LUMPECTOMY Left 08/01/2016    DCIS and IDC, s/p lumpectomy    CATARACT EXTRACTION      COLONOSCOPY N/A 10/8/2018    Procedure: COLONOSCOPY;  Surgeon: Marcio Louie MD;  Location: [facility] (4TH FLR);  Service: Endoscopy;  Laterality: N/A;    CORONARY ANGIOPLASTY      ESOPHAGOGASTRODUODENOSCOPY N/A 10/8/2018    Procedure: ESOPHAGOGASTRODUODENOSCOPY (EGD);  Surgeon: Marcio Louie MD;  Location: [facility] (4TH FLR);  Service: Endoscopy;  Laterality: N/A;  combined egd/colon order/Plavix/OK to hold med 5 days prior to procedures per Dr Puente/see telephone encounter dated 8/22/18/tanya    ESOPHAGOGASTRODUODENOSCOPY N/A 1/31/2020    Procedure: ESOPHAGOGASTRODUODENOSCOPY (EGD);  Surgeon: Marcio Louie MD;  Location: [facility] (4TH FLR);  Service: Endoscopy;  Laterality: N/A;  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 10/2018    per Dr Albarran to hold Plavix 5 days prior to procedure    HYSTERECTOMY      INTRAOCULAR PROSTHESES INSERTION Right 10/18/2020    Procedure: INSERTION, IOL PROSTHESIS;  Surgeon: Karishma Mata MD;  Location: [address];  Service: Ophthalmology;  " Laterality: Right;    INTRAOCULAR PROSTHESES INSERTION Left 12/21/2020    Procedure: INSERTION, IOL PROSTHESIS;  Surgeon: Karishma Mata MD;  Location: [facility] OR 1ST FLR;  Service: Ophthalmology;  Laterality: Left;    JOINT REPLACEMENT      left and right k nee    KNEE SURGERY      PHACOEMULSIFICATION OF CATARACT Right 10/18/2020    Procedure: PHACOEMULSIFICATION, CATARACT;  Surgeon: Karishma Mata MD;  Location: [facility] OR 1ST FLR;  Service: Ophthalmology;  Laterality: Right;    PHACOEMULSIFICATION OF CATARACT Left 12/21/2020    Procedure: PHACOEMULSIFICATION, CATARACT;  Surgeon: Karishma Mata MD;  Location: [facility] OR 1ST FLR;  Service: Ophthalmology;  Laterality: Left;    REVISION OF KNEE ARTHROPLASTY Left 4/15/2021    Procedure: REVISION, ARTHROPLASTY, KNEE;  Surgeon: Gilson Wilkerson MD;  Location: [facility] OR 2ND FLR;  Service: Orthopedics;  Laterality: Left;    TOTAL ABDOMINAL HYSTERECTOMY W/ BILATERAL SALPINGOOPHORECTOMY         Review of patient's allergies indicates:   Allergen Reactions    Lisinopril Anaphylaxis       No current facility-administered medications on file prior to encounter.     Current Outpatient Medications on File Prior to Encounter   Medication Sig    allopurinoL (ZYLOPRIM) 300 MG tablet Take 1 tablet (300 mg total) by mouth every morning.    amLODIPine (NORVASC) 10 MG tablet Take 1 tablet (10 mg total) by mouth once daily.    anastrozole (ARIMIDEX) 1 mg Tab Take 1 tablet (1 mg total) by mouth once daily TAKE 1 TABLET(1 MG) BY MOUTH EVERY DAY. STOP LETROZOLE FEMARA.    aspirin (ECOTRIN) 81 MG EC tablet Take 1 tablet (81 mg total) by mouth once daily.    blood sugar diagnostic Strp 1 strip by Misc.(Non-Drug; Combo Route) route once daily.    blood-glucose meter kit Use as instructed    carvediloL (COREG) 6.25 MG tablet Take 1 tablet (6.25 mg total) by mouth 2 (two) times daily with meals.    ergocalciferol (ERGOCALCIFEROL) 50,000 unit Cap TAKE 1 CAPSULE BY MOUTH 2 TIMES A WEEK     ezetimibe (ZETIA) 10 mg tablet Take 1 tablet (10 mg total) by mouth once daily.    fexofenadine (ALLEGRA) 30 MG tablet Take 30 mg by mouth daily as needed.    lancets Misc Check blood sugar once daily    levetiracetam XR (KEPPRA XR) 500 mg Tb24 24 hr tablet TAKE 2 TABLETS(1000 MG) BY MOUTH EVERY DAY    nitroGLYCERIN (NITROSTAT) 0.4 MG SL tablet PLACE 1 TABLET UNDER THE TONGUE EVERY 5 MINUTES AS NEEDED FOR CHEST PAIN.    omeprazole (PRILOSEC) 20 MG capsule TAKE 2 CAPSULES BY MOUTH EVERY DAY    rosuvastatin (CRESTOR) 40 MG Tab Take 1 tablet (40 mg total) by mouth every morning.     Family History       Problem Relation (Age of Onset)    Breast cancer Mother (45), Maternal Aunt, Daughter (45), Maternal Aunt    Cancer Mother (55)    Diabetes Mother, Son    Heart disease Father    Hyperlipidemia Maternal Aunt    Hypertension Mother, Maternal Aunt, Maternal Uncle    Kidney disease Son    Ovarian cancer Mother    Stroke Son          Tobacco Use    Smoking status: Former Smoker     Packs/day: 1.00     Years: 12.00     Pack years: 12.00     Types: Cigarettes     Quit date: 8/13/2006     Years since quitting: 15.9    Smokeless tobacco: Never Used   Substance and Sexual Activity    Alcohol use: Not Currently    Drug use: No    Sexual activity: Never     Review of Systems   Constitutional:  Positive for diaphoresis. Negative for chills, fatigue, fever and unexpected weight change.   HENT:  Negative for congestion, trouble swallowing and voice change.    Eyes:  Negative for photophobia, redness and visual disturbance.   Respiratory:  Positive for shortness of breath. Negative for cough and wheezing.    Cardiovascular:  Positive for chest pain. Negative for palpitations and leg swelling.   Gastrointestinal:  Negative for abdominal pain, constipation, diarrhea, nausea and vomiting.   Endocrine: Negative for polydipsia, polyphagia and polyuria.   Genitourinary:  Negative for dysuria, flank pain and frequency.   Musculoskeletal:   Negative for arthralgias, back pain and myalgias.   Skin:  Negative for color change, rash and wound.   Neurological:  Negative for dizziness, tremors, syncope, facial asymmetry, speech difficulty, weakness, light-headedness and headaches.   Hematological:  Negative for adenopathy. Does not bruise/bleed easily.   Psychiatric/Behavioral:  Negative for confusion and dysphoric mood. The patient is not nervous/anxious.    Objective:     Vital Signs (Most Recent):  Temp: 98.3 °F (36.8 °C) (07/16/22 0847)  Pulse: 72 (07/16/22 0959)  Resp: (!) 22 (07/16/22 0959)  BP: (!) 206/88 (07/16/22 0947)  SpO2: 99 % (07/16/22 0959)   Vital Signs (24h Range):  Temp:  [98.3 °F (36.8 °C)] 98.3 °F (36.8 °C)  Pulse:  [72-82] 72  Resp:  [16-22] 22  SpO2:  [97 %-99 %] 99 %  BP: (168-206)/(79-88) 206/88     Weight: 117.9 kg (260 lb)  Body mass index is 44.63 kg/m².    Physical Exam  Vitals and nursing note reviewed.   Constitutional:       Appearance: She is well-developed.   HENT:      Head: Normocephalic and atraumatic.   Eyes:      Extraocular Movements: EOM normal.      Pupils: Pupils are equal, round, and reactive to light.   Neck:      Thyroid: No thyromegaly.      Trachea: No tracheal deviation.   Cardiovascular:      Rate and Rhythm: Normal rate and regular rhythm.      Heart sounds: No murmur heard.  Pulmonary:      Effort: Pulmonary effort is normal.      Breath sounds: Normal breath sounds. No wheezing.   Abdominal:      General: Bowel sounds are normal.      Palpations: Abdomen is soft.      Tenderness: There is no abdominal tenderness.   Musculoskeletal:         General: No tenderness. Normal range of motion.      Cervical back: Normal range of motion and neck supple.   Lymphadenopathy:      Cervical: No cervical adenopathy.   Skin:     General: Skin is warm and dry.   Neurological:      Mental Status: She is alert and oriented to person, place, and time.      Cranial Nerves: No cranial nerve deficit.      Deep Tendon Reflexes:  Reflexes are normal and symmetric.   Psychiatric:         Behavior: Behavior normal.         CRANIAL NERVES     CN III, IV, VI   Pupils are equal, round, and reactive to light.  Extraocular motions are normal.      Significant Labs: All pertinent labs within the past 24 hours have been reviewed.    Significant Imaging: I have reviewed all pertinent imaging results/findings within the past 24 hours.

## 2022-07-16 NOTE — ED NOTES
81 year old female PMHx of CAD s/p MI and PCI, T2DM, HTN, HLD, GERD, gout, arthritis ED with substernal intermittent chest pain that started at 3:00 a.m. this morning.  She woke up drenched in sweat thinking that she needed to go to the bathroom.

## 2022-07-16 NOTE — ED TRIAGE NOTES
"81 yr old female presents to the ER w/ mid-sternal, intermittent CP. She reports waking up around 3am w/ CP that she describs as lasting a few minutes and "heavy". She endorses SOB w/ exertion and sweats. Denies dizziness or N/V. Took 3 baby ASA before admission. Hx of stents placed for MI.  "

## 2022-07-16 NOTE — ASSESSMENT & PLAN NOTE
Body mass index is 44.63 kg/m². Morbid obesity complicates all aspects of disease management from diagnostic modalities to treatment. Weight loss encouraged and health benefits explained to patient.

## 2022-07-16 NOTE — H&P
[facility] - Emergency Dept  [facility] Medicine  History & Physical    Patient Name: Renata Bergman  MRN: [id number]  Patient Class: OP- Observation  Admission Date: 7/16/2022  Attending Physician: Jud Sullivan MD   Primary Care Provider: Ethel Berger MD         Patient information was obtained from patient, past medical records and ER records.     Subjective:     Principal Problem:Chest pain    Chief Complaint:   Chief Complaint   Patient presents with    Chest Pain     Started at 0300. Woke up drenched in sweat. +SOB.         HPI: 81 y.o. female with significant past medical history of CAD s/p MI and PCI, T2DM with CKD3, HTN, HLD, GERD, gout, arthritis presented to the ER complaining of intermittent chest pain.  Chest pain started while sleeping, pt woke up drenched in sweat.  The pain is located substernal with minimal radiation to the L.  The pain is described as pressure.  The pain is not reproducible.  Pt denies nausea, vomiting, or palpitations.  Pt reports some SOB but not worse with exertion.  Pt denies any trauma or heavy lifting.  Pt reports this has never happened before.  Previous cardiac events chest pain described as similar, pressure substernal and L however less intense than when pt had MI.  Pt denies fever, chills, congestions, cough, vocal changes, globus sensation, leg swelling or pain, weight gain, fatigue, weakness, confusion, syncope.  No recent seizures.    In the ED, VSS with elevated BP up to 206/88.  EKG NSR, RBBB (seen previously).  Troponin 0.09 (compared to baseline 0.05).  BNP 20.  Pt given asa 325 mg.  CBC unremarkable.  CMP with bicarb 21, Cr 1.2 at baseline.  CXR normal.  Chest pain resolved at time of admit.      Past Medical History:   Diagnosis Date    Anemia     Benign essential hypertension 9/4/2012    Breast cancer 2016    DCIS and IDC, stage I    Cataract     Coronary artery disease 9/4/2012    Diabetes mellitus due to abnormal insulin 9/4/2012    Diabetes  "mellitus type II     Genetic testing     brca2 positive     GERD (gastroesophageal reflux disease) 9/4/2012    Gout, arthritis 9/4/2012    Hyperlipidemia 9/4/2012    Hypertension     Labyrinthitis of both ears 2/28/2022    Myocardial infarction     Obesity     Primary osteoarthritis of both knees 9/4/2012    Renal manifestation of secondary diabetes mellitus     Seizure     states "one time during chemo in 2018"    Type 2 diabetes with peripheral circulatory disorder, controlled        Past Surgical History:   Procedure Laterality Date    BREAST BIOPSY      BREAST LUMPECTOMY Left 08/01/2016    DCIS and IDC, s/p lumpectomy    CATARACT EXTRACTION      COLONOSCOPY N/A 10/8/2018    Procedure: COLONOSCOPY;  Surgeon: Marcio Louie MD;  Location: [facility] (4TH FLR);  Service: Endoscopy;  Laterality: N/A;    CORONARY ANGIOPLASTY      ESOPHAGOGASTRODUODENOSCOPY N/A 10/8/2018    Procedure: ESOPHAGOGASTRODUODENOSCOPY (EGD);  Surgeon: Marcio Louie MD;  Location: [facility] (4TH FLR);  Service: Endoscopy;  Laterality: N/A;  combined egd/colon order/Plavix/OK to hold med 5 days prior to procedures per Dr Puente/see telephone encounter dated 8/22/18/svn    ESOPHAGOGASTRODUODENOSCOPY N/A 1/31/2020    Procedure: ESOPHAGOGASTRODUODENOSCOPY (EGD);  Surgeon: Marcio Louie MD;  Location: [facility] (4TH FLR);  Service: Endoscopy;  Laterality: N/A;  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 10/2018    per Dr Agudelo-ok to hold Plavix 5 days prior to procedure    HYSTERECTOMY      INTRAOCULAR PROSTHESES INSERTION Right 10/18/2020    Procedure: INSERTION, IOL PROSTHESIS;  Surgeon: Karishma Mata MD;  Location: [facility] OR [address] [place] [address];  Service: Ophthalmology;  Laterality: Right;    INTRAOCULAR PROSTHESES INSERTION Left 12/21/2020    Procedure: INSERTION, IOL PROSTHESIS;  Surgeon: Karishma Mata MD;  Location: [facility] OR [address] [place] [address];  Service: Ophthalmology;  Laterality: Left;    JOINT REPLACEMENT  "     left and right k nee    KNEE SURGERY      PHACOEMULSIFICATION OF CATARACT Right 10/18/2020    Procedure: PHACOEMULSIFICATION, CATARACT;  Surgeon: Karishma Mata MD;  Location: [facility] OR [address] [place] [address];  Service: Ophthalmology;  Laterality: Right;    PHACOEMULSIFICATION OF CATARACT Left 12/21/2020    Procedure: PHACOEMULSIFICATION, CATARACT;  Surgeon: Karishma Mata MD;  [facility];  Service: Ophthalmology;  Laterality: Left;    REVISION OF KNEE ARTHROPLASTY Left 4/15/2021    Procedure: REVISION, ARTHROPLASTY, KNEE;  Surgeon: Gilson Wilkerson MD;  [facility];  Service: Orthopedics;  Laterality: Left;    TOTAL ABDOMINAL HYSTERECTOMY W/ BILATERAL SALPINGOOPHORECTOMY         Review of patient's allergies indicates:   Allergen Reactions    Lisinopril Anaphylaxis       No current facility-administered medications on file prior to encounter.     Current Outpatient Medications on File Prior to Encounter   Medication Sig    allopurinoL (ZYLOPRIM) 300 MG tablet Take 1 tablet (300 mg total) by mouth every morning.    amLODIPine (NORVASC) 10 MG tablet Take 1 tablet (10 mg total) by mouth once daily.    anastrozole (ARIMIDEX) 1 mg Tab Take 1 tablet (1 mg total) by mouth once daily TAKE 1 TABLET(1 MG) BY MOUTH EVERY DAY. STOP LETROZOLE FEMARA.    aspirin (ECOTRIN) 81 MG EC tablet Take 1 tablet (81 mg total) by mouth once daily.    blood sugar diagnostic Strp 1 strip by Misc.(Non-Drug; Combo Route) route once daily.    blood-glucose meter kit Use as instructed    carvediloL (COREG) 6.25 MG tablet Take 1 tablet (6.25 mg total) by mouth 2 (two) times daily with meals.    ergocalciferol (ERGOCALCIFEROL) 50,000 unit Cap TAKE 1 CAPSULE BY MOUTH 2 TIMES A WEEK    ezetimibe (ZETIA) 10 mg tablet Take 1 tablet (10 mg total) by mouth once daily.    fexofenadine (ALLEGRA) 30 MG tablet Take 30 mg by mouth daily as needed.    lancets Misc Check blood sugar once daily    levetiracetam XR  (KEPPRA XR) 500 mg Tb24 24 hr tablet TAKE 2 TABLETS(1000 MG) BY MOUTH EVERY DAY    nitroGLYCERIN (NITROSTAT) 0.4 MG SL tablet PLACE 1 TABLET UNDER THE TONGUE EVERY 5 MINUTES AS NEEDED FOR CHEST PAIN.    omeprazole (PRILOSEC) 20 MG capsule TAKE 2 CAPSULES BY MOUTH EVERY DAY    rosuvastatin (CRESTOR) 40 MG Tab Take 1 tablet (40 mg total) by mouth every morning.     Family History       Problem Relation (Age of Onset)    Breast cancer Mother (45), Maternal Aunt, Daughter (45), Maternal Aunt    Cancer Mother (55)    Diabetes Mother, Son    Heart disease Father    Hyperlipidemia Maternal Aunt    Hypertension Mother, Maternal Aunt, Maternal Uncle    Kidney disease Son    Ovarian cancer Mother    Stroke Son          Tobacco Use    Smoking status: Former Smoker     Packs/day: 1.00     Years: 12.00     Pack years: 12.00     Types: Cigarettes     Quit date: 8/13/2006     Years since quitting: 15.9    Smokeless tobacco: Never Used   Substance and Sexual Activity    Alcohol use: Not Currently    Drug use: No    Sexual activity: Never     Review of Systems   Constitutional:  Positive for diaphoresis. Negative for chills, fatigue, fever and unexpected weight change.   HENT:  Negative for congestion, trouble swallowing and voice change.    Eyes:  Negative for photophobia, redness and visual disturbance.   Respiratory:  Positive for shortness of breath. Negative for cough and wheezing.    Cardiovascular:  Positive for chest pain. Negative for palpitations and leg swelling.   Gastrointestinal:  Negative for abdominal pain, constipation, diarrhea, nausea and vomiting.   Endocrine: Negative for polydipsia, polyphagia and polyuria.   Genitourinary:  Negative for dysuria, flank pain and frequency.   Musculoskeletal:  Negative for arthralgias, back pain and myalgias.   Skin:  Negative for color change, rash and wound.   Neurological:  Negative for dizziness, tremors, syncope, facial asymmetry, speech difficulty, weakness,  light-headedness and headaches.   Hematological:  Negative for adenopathy. Does not bruise/bleed easily.   Psychiatric/Behavioral:  Negative for confusion and dysphoric mood. The patient is not nervous/anxious.    Objective:     Vital Signs (Most Recent):  Temp: 98.3 °F (36.8 °C) (07/16/22 0847)  Pulse: 72 (07/16/22 0959)  Resp: (!) 22 (07/16/22 0959)  BP: (!) 206/88 (07/16/22 0947)  SpO2: 99 % (07/16/22 0959)   Vital Signs (24h Range):  Temp:  [98.3 °F (36.8 °C)] 98.3 °F (36.8 °C)  Pulse:  [72-82] 72  Resp:  [16-22] 22  SpO2:  [97 %-99 %] 99 %  BP: (168-206)/(79-88) 206/88     Weight: 117.9 kg (260 lb)  Body mass index is 44.63 kg/m².    Physical Exam  Vitals and nursing note reviewed.   Constitutional:       Appearance: She is well-developed.   HENT:      Head: Normocephalic and atraumatic.   Eyes:      Extraocular Movements: EOM normal.      Pupils: Pupils are equal, round, and reactive to light.   Neck:      Thyroid: No thyromegaly.      Trachea: No tracheal deviation.   Cardiovascular:      Rate and Rhythm: Normal rate and regular rhythm.      Heart sounds: No murmur heard.  Pulmonary:      Effort: Pulmonary effort is normal.      Breath sounds: Normal breath sounds. No wheezing.   Abdominal:      General: Bowel sounds are normal.      Palpations: Abdomen is soft.      Tenderness: There is no abdominal tenderness.   Musculoskeletal:         General: No tenderness. Normal range of motion.      Cervical back: Normal range of motion and neck supple.   Lymphadenopathy:      Cervical: No cervical adenopathy.   Skin:     General: Skin is warm and dry.   Neurological:      Mental Status: She is alert and oriented to person, place, and time.      Cranial Nerves: No cranial nerve deficit.      Deep Tendon Reflexes: Reflexes are normal and symmetric.   Psychiatric:         Behavior: Behavior normal.         CRANIAL NERVES     CN III, IV, VI   Pupils are equal, round, and reactive to light.  Extraocular motions are  normal.      Significant Labs: All pertinent labs within the past 24 hours have been reviewed.    Significant Imaging: I have reviewed all pertinent imaging results/findings within the past 24 hours.    Assessment/Plan:     * Chest pain  Serial cardiac markers and ECG.  HEART Score 6  Arrange stress test, Serial Enzymes  Troponin 0.09 (compared to baseline 0.05).  BNP 20  CXR normal  EKG NSR with known RBBB  Mg in process, K 3.6  TSH and lipid panel in June 2022 WNL  Place patient on telemetry.  NPO after midnight for possible stress test.  Hold BB is on home medication list.      Coronary artery disease due to lipid rich plaque  S/p stent placement  Stent placed 2017, Cardiac PET 2018 negative for ischemia  Continue asa, statin and zetia    Benign essential hypertension  Uncontrolled upon arrival.  Pt did not take morning meds.  Continue amlodipine, hold carvedilol   Hydralazine prn      Hyperlipidemia associated with type 2 diabetes mellitus  See above, CAD  Continue home meds      Type 2 diabetes mellitus with kidney complication, without long-term current use of insulin  Patient's FSGs are controlled on current medication regimen.  Last A1c reviewed-   Lab Results   Component Value Date    HGBA1C 6.6 (H) 06/22/2022     Most recent fingerstick glucose reviewed- No results for input(s): POCTGLUCOSE in the last 24 hours.  Current correctional scale  Low  Maintain anti-hyperglycemic dose as follows-   Antihyperglycemics (From admission, onward)            Start     Stop Route Frequency Ordered    07/16/22 1127  insulin aspart U-100 pen 0-5 Units         -- SubQ Before meals & nightly PRN 07/16/22 1029        Hold Oral hypoglycemics while patient is in the hospital.      Stage 3a chronic kidney disease  Labs reviewed- Renal function/electrolytes with Estimated Creatinine Clearance: 48 mL/min (based on SCr of 1.2 mg/dL). according to latest data.   Pt at baseline 1.1-1.4  Monitor urine output and serial BMP and adjust  therapy as needed.   Avoid nephrotoxins and renally dose meds for GFR listed above.       GERD (gastroesophageal reflux disease)  Stable, continue PPI  Pt reports compliance with meds    Gout, arthritis  Stable, continue allopurinol      Tonic-clonic epileptic seizures  Stable, continue home meds  Pt reports compliance with meds and no recent seizures    Class 3 severe obesity in adult  Body mass index is 44.63 kg/m². Morbid obesity complicates all aspects of disease management from diagnostic modalities to treatment. Weight loss encouraged and health benefits explained to patient.           VTE Risk Mitigation (From admission, onward)         Ordered     enoxaparin injection 40 mg  Every 12 hours         07/16/22 1029     IP VTE HIGH RISK PATIENT  Once         07/16/22 1029     Place sequential compression device  Until discontinued         07/16/22 1029                   Veronica Harper PA-C  Department of Hospital Medicine   [facility] - Emergency Dept

## 2022-07-16 NOTE — ASSESSMENT & PLAN NOTE
Labs reviewed- Renal function/electrolytes with Estimated Creatinine Clearance: 48 mL/min (based on SCr of 1.2 mg/dL). according to latest data.   Pt at baseline 1.1-1.4  Monitor urine output and serial BMP and adjust therapy as needed.   Avoid nephrotoxins and renally dose meds for GFR listed above.

## 2022-07-16 NOTE — ASSESSMENT & PLAN NOTE
Patient's FSGs are controlled on current medication regimen.  Last A1c reviewed-   Lab Results   Component Value Date    HGBA1C 6.6 (H) 06/22/2022     Most recent fingerstick glucose reviewed- No results for input(s): POCTGLUCOSE in the last 24 hours.  Current correctional scale  Low  Maintain anti-hyperglycemic dose as follows-   Antihyperglycemics (From admission, onward)            Start     Stop Route Frequency Ordered    07/16/22 1127  insulin aspart U-100 pen 0-5 Units         -- SubQ Before meals & nightly PRN 07/16/22 1029        Hold Oral hypoglycemics while patient is in the hospital.

## 2022-07-16 NOTE — ASSESSMENT & PLAN NOTE
Uncontrolled upon arrival.  Pt did not take morning meds.  Continue amlodipine, hold carvedilol   Hydralazine prn

## 2022-07-16 NOTE — ASSESSMENT & PLAN NOTE
S/p stent placement  Stent placed 2017, Cardiac PET 2018 negative for ischemia  Continue asa, statin and zetia

## 2022-07-16 NOTE — PLAN OF CARE
Problem: Adult Inpatient Plan of Care  Goal: Plan of Care Review  Outcome: Ongoing, Progressing     Problem: Bariatric Environmental Safety  Goal: Safety Maintained with Care  Outcome: Ongoing, Progressing     Problem: Diabetes Comorbidity  Goal: Blood Glucose Level Within Targeted Range  Outcome: Ongoing, Progressing     Problem: Chest Pain  Goal: Resolution of Chest Pain Symptoms  Outcome: Ongoing, Progressing

## 2022-07-16 NOTE — ASSESSMENT & PLAN NOTE
Serial cardiac markers and ECG.  HEART Score 6  Arrange stress test, Serial Enzymes  Troponin 0.09 (compared to baseline 0.05).  BNP 20  CXR normal  EKG NSR with known RBBB  Mg in process, K 3.6  TSH and lipid panel in June 2022 WNL  Place patient on telemetry.  NPO after midnight for possible stress test.  Hold BB is on home medication list.

## 2022-07-16 NOTE — ED PROVIDER NOTES
"Encounter Date: 7/16/2022       History     Chief Complaint   Patient presents with    Chest Pain     Started at 0300. Woke up drenched in sweat. +SOB.      81 year old female PMHx of CAD s/p MI and PCI, T2DM, HTN, HLD, GERD, gout, arthritis ED with substernal intermittent chest pain that started at 3:00 a.m. this morning.  She woke up drenched in sweat thinking that she needed to go to the bathroom.  Subsequently developed intermittent substernal chest pressure as nonradiating.  Lasts a few minutes at a time.  However, patient reports that this is similar to the last time she had ACS event.  She also has associated shortness of breath during this time without cough.  However, since then, has not had any shortness of breath.  Still has had intermittent chest pain.  She took three baby aspirins at home.  Denies any fevers, chills, nausea, vomiting, abdominal pain.        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     GERD (gastroesophageal reflux disease) 9/4/2012    Gout, arthritis 9/4/2012    Hyperlipidemia 9/4/2012    Hypertension     Labyrinthitis of both ears 2/28/2022    Myocardial infarction     Obesity     Primary osteoarthritis of both knees 9/4/2012    Renal manifestation of secondary diabetes mellitus     Seizure     states "one time during chemo in 2018"    Type 2 diabetes with peripheral circulatory disorder, controlled      Past Surgical History:   Procedure Laterality Date    BREAST BIOPSY      BREAST LUMPECTOMY Left 08/01/2016    DCIS and IDC, s/p lumpectomy    CATARACT EXTRACTION      COLONOSCOPY N/A 10/8/2018    Procedure: " COLONOSCOPY;  Surgeon: Marcio Louie MD;  Location: [facility] ENDO (4TH FLR);  Service: Endoscopy;  Laterality: N/A;    CORONARY ANGIOPLASTY      ESOPHAGOGASTRODUODENOSCOPY N/A 10/8/2018    Procedure: ESOPHAGOGASTRODUODENOSCOPY (EGD);  Surgeon: Marcio Louie MD;  Location: [facility] ENDO (4TH FLR);  Service: Endoscopy;  Laterality: N/A;  combined egd/colon order/Plavix/OK to hold med 5 days prior to procedures per Dr Puente/see telephone encounter dated 8/22/18/svn    ESOPHAGOGASTRODUODENOSCOPY N/A 1/31/2020    Procedure: ESOPHAGOGASTRODUODENOSCOPY (EGD);  Surgeon: Marcio Louie MD;  Location: [facility] ENDO (4TH FLR);  Service: Endoscopy;  Laterality: N/A;  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 10/2018    per Dr Agudelo-ok to hold Plavix 5 days prior to procedure    HYSTERECTOMY      INTRAOCULAR PROSTHESES INSERTION Right 10/18/2020    Procedure: INSERTION, IOL PROSTHESIS;  Surgeon: Karishma Mata MD;  Location: [facility] OR 1ST FLR;  Service: Ophthalmology;  Laterality: Right;    INTRAOCULAR PROSTHESES INSERTION Left 12/21/2020    Procedure: INSERTION, IOL PROSTHESIS;  Surgeon: Karishma Mata MD;  Location: [facility] OR 1ST FLR;  Service: Ophthalmology;  Laterality: Left;    JOINT REPLACEMENT      left and right k nee    KNEE SURGERY      PHACOEMULSIFICATION OF CATARACT Right 10/18/2020    Procedure: PHACOEMULSIFICATION, CATARACT;  Surgeon: Karishma Mata MD;  Location: [facility] OR 1ST FLR;  Service: Ophthalmology;  Laterality: Right;    PHACOEMULSIFICATION OF CATARACT Left 12/21/2020    Procedure: PHACOEMULSIFICATION, CATARACT;  Surgeon: Karishma Mata MD;  Location: [facility] OR 1ST FLR;  Service: Ophthalmology;  Laterality: Left;    REVISION OF KNEE ARTHROPLASTY Left 4/15/2021    Procedure: REVISION, ARTHROPLASTY, KNEE;  Surgeon: Gilson Wilkerson MD;  Location: [facility] OR 2ND FLR;  Service: Orthopedics;  Laterality: Left;    TOTAL ABDOMINAL HYSTERECTOMY W/ BILATERAL SALPINGOOPHORECTOMY        Family History   Problem Relation Age of Onset    Cancer Mother 55        colon    Diabetes Mother     Hypertension Mother     Breast cancer Mother 45    Ovarian cancer Mother     Hypertension Maternal Aunt     Hyperlipidemia Maternal Aunt     Breast cancer Maternal Aunt     Hypertension Maternal Uncle     Heart disease Father     Breast cancer Daughter 45    Breast cancer Maternal Aunt     Diabetes Son     Stroke Son     Kidney disease Son     Glaucoma Neg Hx     Heart attack Neg Hx     Heart failure Neg Hx      Social History     Tobacco Use    Smoking status: Former Smoker     Packs/day: 1.00     Years: 12.00     Pack years: 12.00     Types: Cigarettes     Quit date: 8/13/2006     Years since quitting: 15.9    Smokeless tobacco: Never Used   Substance Use Topics    Alcohol use: Not Currently    Drug use: No     Review of Systems   Constitutional: Positive for diaphoresis. Negative for fever.   HENT: Negative for sore throat.    Respiratory: Positive for shortness of breath.    Cardiovascular: Positive for chest pain.   Gastrointestinal: Negative for abdominal pain, diarrhea, nausea and vomiting.   Genitourinary: Negative for dysuria.   Musculoskeletal: Negative for back pain.   Skin: Negative for rash.   Neurological: Negative for weakness, light-headedness and headaches.   Hematological: Does not bruise/bleed easily.       Physical Exam     Initial Vitals   BP Pulse Resp Temp SpO2   07/16/22 0748 07/16/22 0748 07/16/22 0748 07/16/22 0847 07/16/22 0748   (!) 168/79 82 16 98.3 °F (36.8 °C) 97 %      MAP       --                Physical Exam    Nursing note and vitals reviewed.  Constitutional: She appears well-developed and well-nourished. She is not diaphoretic. No distress.   HENT:   Head: Normocephalic and atraumatic.   Eyes: Conjunctivae and EOM are normal. Right eye exhibits no discharge. Left eye exhibits no discharge. No scleral icterus.   Neck:   Normal range of  motion.  Cardiovascular: Normal rate and regular rhythm. Exam reveals no gallop and no friction rub.    No murmur heard.  Pulmonary/Chest: No respiratory distress. She has no wheezes. She has no rhonchi. She has no rales. She exhibits no tenderness.   Reproducible left anterior chest wall tenderness to palpation   Abdominal: Abdomen is soft. She exhibits no distension and no mass. There is no abdominal tenderness. There is no rebound and no guarding.   Musculoskeletal:      Cervical back: Normal range of motion.     Neurological: She is alert and oriented to person, place, and time.   Skin: Skin is warm and dry. No erythema. No pallor.         ED Course   Procedures  Labs Reviewed   COMPREHENSIVE METABOLIC PANEL - Abnormal; Notable for the following components:       Result Value    CO2 21 (*)     Glucose 118 (*)     eGFR if  49.7 (*)     eGFR if non  43.1 (*)     All other components within normal limits   TROPONIN I - Abnormal; Notable for the following components:    Troponin I 0.094 (*)     All other components within normal limits   TROPONIN I - Abnormal; Notable for the following components:    Troponin I 0.080 (*)     All other components within normal limits   MAGNESIUM - Abnormal; Notable for the following components:    Magnesium 1.4 (*)     All other components within normal limits    Narrative:     ADD ON mg ORD#[id number] PER CARL CURTIS 07/16/22 @1058   CBC W/ AUTO DIFFERENTIAL   B-TYPE NATRIURETIC PEPTIDE   MAGNESIUM   POCT GLUCOSE   POCT GLUCOSE MONITORING CONTINUOUS     EKG Readings: (Independently Interpreted)   Normal sinus rhythm, rate of 76. Right bundle branch block.  No ST elevations or depressions concerning for ischemia.  No STEMI per my independent interpretation       ECG Results          EKG 12-lead (Final result)  Result time 07/16/22 11:18:13    Final result by Interface, Lab In [facility] (07/16/22 11:18:13)                 Narrative:    Test Reason :  R07.9,    Vent. Rate : 076 BPM     Atrial Rate : 076 BPM     P-R Int : 206 ms          QRS Dur : 140 ms      QT Int : 412 ms       P-R-T Axes : 032 -68 022 degrees     QTc Int : 463 ms    Normal sinus rhythm  Left axis deviation  Right bundle branch block  Minimal voltage criteria for LVH, may be normal variant ( R in aVL )  Probable  Inferior infarct (cited on or before 27-FEB-2022)  Anterolateral infarct (cited on or before 27-FEB-2022)  Abnormal ECG  When compared with ECG of 27-FEB-2022 20:17,  NV interval has decreased  QRS duration has increased  Questionable change in initial forces of Lateral leads  Questionable change in initial forces of Inferior leads  Confirmed by Adrien CATALAN MD ([id number]) on 7/16/2022 11:18:05 AM    Referred By: [email or web address]   SELF           Confirmed By:Adrien CATALAN MD                            Imaging Results          X-Ray Chest AP Portable (Final result)  Result time 07/16/22 09:34:21    Final result by Yun Braun MD (07/16/22 09:34:21)                 Impression:      Normal exam.      Electronically signed by: Yun Braun MD  Date:    07/16/2022  Time:    09:34             Narrative:    EXAMINATION:  XR CHEST AP PORTABLE    CLINICAL HISTORY:  chest pain;    TECHNIQUE:  Single frontal view of the chest was performed.    COMPARISON:  02/27/2022    FINDINGS:  There is redemonstration of the curvilinear catheter fragment projecting over the right atrium.    The lungs are symmetrically inflated with no mass, nodule, pneumothorax, airspace consolidation or pleural effusion.  The cardiomediastinal silhouette, osseous and soft tissue structures are normal.                              X-Rays:   Independently Interpreted Readings:   Other Readings:  No pneumonia, pneumothorax, or pleural effusion noted on CXR      Medications   allopurinoL tablet 300 mg (has no administration in time range)   anastrozole tablet 1 mg (has no administration in time range)   aspirin EC tablet 81 mg (has  no administration in time range)   nitroGLYCERIN SL tablet 0.4 mg (has no administration in time range)   albuterol-ipratropium 2.5 mg-0.5 mg/3 mL nebulizer solution 3 mL (has no administration in time range)   melatonin tablet 9 mg (has no administration in time range)   polyethylene glycol packet 17 g (has no administration in time range)   bisacodyL suppository 10 mg (has no administration in time range)   acetaminophen tablet 650 mg (has no administration in time range)   naloxone 0.4 mg/mL injection 0.02 mg (has no administration in time range)   glucose chewable tablet 16 g (has no administration in time range)   glucose chewable tablet 24 g (has no administration in time range)   glucagon (human recombinant) injection 1 mg (has no administration in time range)   dextrose 10% bolus 125 mL (has no administration in time range)   dextrose 10% bolus 250 mL (has no administration in time range)   enoxaparin injection 40 mg (40 mg Subcutaneous Given 7/16/22 1252)   acetaminophen tablet 1,000 mg (has no administration in time range)   ondansetron injection 4 mg (has no administration in time range)   insulin aspart U-100 pen 0-5 Units (has no administration in time range)   amLODIPine tablet 10 mg (10 mg Oral Given 7/16/22 1252)   atorvastatin tablet 80 mg (80 mg Oral Given 7/16/22 1252)   ezetimibe tablet 10 mg (10 mg Oral Given 7/16/22 1251)   pantoprazole EC tablet 40 mg (40 mg Oral Given 7/16/22 1251)   levETIRAcetam tablet 500 mg (500 mg Oral Given 7/16/22 1252)   hydrALAZINE tablet 25 mg (has no administration in time range)     Medical Decision Making:   History:   Old Medical Records: I decided to obtain old medical records.  Initial Assessment:   Seven 11-year-old female history of CAD status post PCI with stents dizzy due to the ED with intermittent substernal chest pain that she describes as similar to her previous ACS event.  Had associated diaphoresis and shortness of breath at that time, has but since  has not had any.  Took three baby aspirin prior to arrival.  No other symptoms other than intermittent chest pain here.  Now chest pain-free.    Differential includes but is not limited to ACS, pneumonia, costochondritis, musculoskeletal injury, gastritis, COVID    CBC without leukocytosis or anemia compared to baseline.  CMP showed no electrolyte abnormalities concerning for hospitalization.  EKG showed no concern for ischemic changes.  Troponin elevated compared to baseline.  CXR showed no concern for pneumonia or pneumothorax.  Heart score 6. Due to high risk of having an ACS event as well as elevated troponin, patient admitted to observation for further management.    Independently Interpreted Test(s):   I have ordered and independently interpreted X-rays - see prior notes.  I have ordered and independently interpreted EKG Reading(s) - see prior notes  Clinical Tests:   Lab Tests: Ordered and Reviewed  Radiological Study: Ordered and Reviewed  Medical Tests: Ordered and Reviewed  Other:   I have discussed this case with another health care provider.       <> Summary of the Discussion: Hospital Medicine    Additional MDM:   Heart Score:    History:          Moderately suspicious.  ECG:             Nonspecific repolarisation disturbance  Age:               >65 years  Risk factors: >= 3 risk factors or history of atherosclerotic disease  Troponin:       Less than or equal to normal limit  Final Score: 6                       Clinical Impression:   Final diagnoses:  [R07.9] Chest pain (Primary)  [I24.9] ACS (acute coronary syndrome)          ED Disposition Condition    Observation               Corby Mota MD  Resident  07/16/22 1682

## 2022-07-17 PROBLEM — I16.0 HYPERTENSIVE URGENCY: Status: ACTIVE | Noted: 2022-07-17

## 2022-07-17 LAB
ANION GAP SERPL CALC-SCNC: 10 MMOL/L (ref 8–16)
BASOPHILS # BLD AUTO: 0.05 K/UL (ref 0–0.2)
BASOPHILS NFR BLD: 0.8 % (ref 0–1.9)
BUN SERPL-MCNC: 20 MG/DL (ref 8–23)
CALCIUM SERPL-MCNC: 10.4 MG/DL (ref 8.7–10.5)
CHLORIDE SERPL-SCNC: 105 MMOL/L (ref 95–110)
CO2 SERPL-SCNC: 21 MMOL/L (ref 23–29)
CREAT SERPL-MCNC: 1.3 MG/DL (ref 0.5–1.4)
DIFFERENTIAL METHOD: ABNORMAL
EOSINOPHIL # BLD AUTO: 0.5 K/UL (ref 0–0.5)
EOSINOPHIL NFR BLD: 7.5 % (ref 0–8)
ERYTHROCYTE [DISTWIDTH] IN BLOOD BY AUTOMATED COUNT: 13.5 % (ref 11.5–14.5)
EST. GFR  (AFRICAN AMERICAN): 45.1 ML/MIN/1.73 M^2
EST. GFR  (NON AFRICAN AMERICAN): 39.1 ML/MIN/1.73 M^2
GLUCOSE SERPL-MCNC: 143 MG/DL (ref 70–110)
HCT VFR BLD AUTO: 44 % (ref 37–48.5)
HGB BLD-MCNC: 14.7 G/DL (ref 12–16)
IMM GRANULOCYTES # BLD AUTO: 0.02 K/UL (ref 0–0.04)
IMM GRANULOCYTES NFR BLD AUTO: 0.3 % (ref 0–0.5)
LYMPHOCYTES # BLD AUTO: 1.8 K/UL (ref 1–4.8)
LYMPHOCYTES NFR BLD: 30 % (ref 18–48)
MAGNESIUM SERPL-MCNC: 1.6 MG/DL (ref 1.6–2.6)
MCH RBC QN AUTO: 31.2 PG (ref 27–31)
MCHC RBC AUTO-ENTMCNC: 33.4 G/DL (ref 32–36)
MCV RBC AUTO: 93 FL (ref 82–98)
MONOCYTES # BLD AUTO: 0.8 K/UL (ref 0.3–1)
MONOCYTES NFR BLD: 13.2 % (ref 4–15)
NEUTROPHILS # BLD AUTO: 2.9 K/UL (ref 1.8–7.7)
NEUTROPHILS NFR BLD: 48.2 % (ref 38–73)
NRBC BLD-RTO: 0 /100 WBC
PLATELET # BLD AUTO: 220 K/UL (ref 150–450)
PMV BLD AUTO: 10.1 FL (ref 9.2–12.9)
POCT GLUCOSE: 112 MG/DL (ref 70–110)
POCT GLUCOSE: 132 MG/DL (ref 70–110)
POCT GLUCOSE: 168 MG/DL (ref 70–110)
POTASSIUM SERPL-SCNC: 3.8 MMOL/L (ref 3.5–5.1)
RBC # BLD AUTO: 4.71 M/UL (ref 4–5.4)
SODIUM SERPL-SCNC: 136 MMOL/L (ref 136–145)
WBC # BLD AUTO: 5.97 K/UL (ref 3.9–12.7)

## 2022-07-17 PROCEDURE — 36415 COLL VENOUS BLD VENIPUNCTURE: CPT | Performed by: PHYSICIAN ASSISTANT

## 2022-07-17 PROCEDURE — G0378 HOSPITAL OBSERVATION PER HR: HCPCS

## 2022-07-17 PROCEDURE — 25000003 PHARM REV CODE 250: Performed by: PHYSICIAN ASSISTANT

## 2022-07-17 PROCEDURE — 85025 COMPLETE CBC W/AUTO DIFF WBC: CPT | Performed by: INTERNAL MEDICINE

## 2022-07-17 PROCEDURE — 80048 BASIC METABOLIC PNL TOTAL CA: CPT | Performed by: PHYSICIAN ASSISTANT

## 2022-07-17 PROCEDURE — 96372 THER/PROPH/DIAG INJ SC/IM: CPT | Performed by: PHYSICIAN ASSISTANT

## 2022-07-17 PROCEDURE — 63600175 PHARM REV CODE 636 W HCPCS: Performed by: PHYSICIAN ASSISTANT

## 2022-07-17 PROCEDURE — 83735 ASSAY OF MAGNESIUM: CPT | Performed by: PHYSICIAN ASSISTANT

## 2022-07-17 PROCEDURE — 99226 PR SUBSEQUENT OBSERVATION CARE,LEVEL III: ICD-10-PCS | Mod: ,,, | Performed by: PHYSICIAN ASSISTANT

## 2022-07-17 PROCEDURE — 99226 PR SUBSEQUENT OBSERVATION CARE,LEVEL III: CPT | Mod: ,,, | Performed by: PHYSICIAN ASSISTANT

## 2022-07-17 PROCEDURE — 36415 COLL VENOUS BLD VENIPUNCTURE: CPT | Performed by: INTERNAL MEDICINE

## 2022-07-17 PROCEDURE — 96366 THER/PROPH/DIAG IV INF ADDON: CPT

## 2022-07-17 PROCEDURE — 96365 THER/PROPH/DIAG IV INF INIT: CPT

## 2022-07-17 RX ORDER — MAGNESIUM SULFATE HEPTAHYDRATE 40 MG/ML
2 INJECTION, SOLUTION INTRAVENOUS ONCE
Status: COMPLETED | OUTPATIENT
Start: 2022-07-17 | End: 2022-07-17

## 2022-07-17 RX ORDER — POTASSIUM CHLORIDE 20 MEQ/1
40 TABLET, EXTENDED RELEASE ORAL ONCE
Status: COMPLETED | OUTPATIENT
Start: 2022-07-17 | End: 2022-07-17

## 2022-07-17 RX ORDER — CARVEDILOL 6.25 MG/1
6.25 TABLET ORAL 2 TIMES DAILY WITH MEALS
Status: COMPLETED | OUTPATIENT
Start: 2022-07-17 | End: 2022-07-17

## 2022-07-17 RX ADMIN — ATORVASTATIN CALCIUM 80 MG: 40 TABLET, FILM COATED ORAL at 08:07

## 2022-07-17 RX ADMIN — POTASSIUM CHLORIDE 40 MEQ: 1500 TABLET, EXTENDED RELEASE ORAL at 08:07

## 2022-07-17 RX ADMIN — AMLODIPINE BESYLATE 10 MG: 10 TABLET ORAL at 08:07

## 2022-07-17 RX ADMIN — ASPIRIN 81 MG: 81 TABLET, COATED ORAL at 08:07

## 2022-07-17 RX ADMIN — CARVEDILOL 6.25 MG: 6.25 TABLET, FILM COATED ORAL at 04:07

## 2022-07-17 RX ADMIN — ALLOPURINOL 300 MG: 300 TABLET ORAL at 06:07

## 2022-07-17 RX ADMIN — LEVETIRACETAM 500 MG: 500 TABLET, FILM COATED ORAL at 09:07

## 2022-07-17 RX ADMIN — EZETIMIBE 10 MG: 10 TABLET ORAL at 08:07

## 2022-07-17 RX ADMIN — HYDRALAZINE HYDROCHLORIDE 25 MG: 25 TABLET, FILM COATED ORAL at 04:07

## 2022-07-17 RX ADMIN — LEVETIRACETAM 500 MG: 500 TABLET, FILM COATED ORAL at 08:07

## 2022-07-17 RX ADMIN — ANASTROZOLE 1 MG: 1 TABLET, COATED ORAL at 08:07

## 2022-07-17 RX ADMIN — MAGNESIUM SULFATE HEPTAHYDRATE 2 G: 40 INJECTION, SOLUTION INTRAVENOUS at 08:07

## 2022-07-17 RX ADMIN — PANTOPRAZOLE SODIUM 40 MG: 40 TABLET, DELAYED RELEASE ORAL at 05:07

## 2022-07-17 RX ADMIN — ENOXAPARIN SODIUM 40 MG: 100 INJECTION SUBCUTANEOUS at 08:07

## 2022-07-17 RX ADMIN — ENOXAPARIN SODIUM 40 MG: 100 INJECTION SUBCUTANEOUS at 09:07

## 2022-07-17 NOTE — ASSESSMENT & PLAN NOTE
Patient's FSGs are controlled on current medication regimen.  Last A1c reviewed-   Lab Results   Component Value Date    HGBA1C 6.6 (H) 06/22/2022     Most recent fingerstick glucose reviewed-   Recent Labs   Lab 07/16/22  1832 07/17/22  0916 07/17/22  1257   POCTGLUCOSE 101 112* 168*     Current correctional scale  Low  Maintain anti-hyperglycemic dose as follows-   Antihyperglycemics (From admission, onward)            Start     Stop Route Frequency Ordered    07/16/22 1127  insulin aspart U-100 pen 0-5 Units         -- SubQ Before meals & nightly PRN 07/16/22 1029        Hold Oral hypoglycemics while patient is in the hospital.

## 2022-07-17 NOTE — HOSPITAL COURSE
Pt placed in observation for Chest pain. Trop initially elevated but trended down. ECG showed NSR w/ previously seen RBBB. Low suspicion for ACS at the time. Chest pain likely d/t demand ischemia in setting of hypertensive urgency. BP managed w/ OP meds and prn hydralazine. Home carvedilol increased to 12.5 BID. Hunter score 5, so will pursue IP cardiac workup w/ NM stress test. NM stress test negative for myocardial ischemia. Patient discharged on increased metoprolol dose. Instructed to keep strict BP log until PCP follow up. Patient verbalized understanding. All questions answered.

## 2022-07-17 NOTE — ASSESSMENT & PLAN NOTE
Patient has a current diagnosis of hypertensive urgency/emergency diagnosis: hypertensive urgency (without evidence of end organ damage) which is controlled.  Latest blood pressure and vitals reviewed-   Temp:  [97 °F (36.1 °C)-98.7 °F (37.1 °C)]   Pulse:  [75-84]   Resp:  [16-18]   BP: (118-214)/(57-96)   SpO2:  [96 %-98 %] .   Patient currently off IV antihypertensives.   Home meds for hypertension were reviewed and noted below.   Hypertension Medications             amLODIPine (NORVASC) 10 MG tablet Take 1 tablet (10 mg total) by mouth once daily.    carvediloL (COREG) 6.25 MG tablet Take 1 tablet (6.25 mg total) by mouth 2 (two) times daily with meals.    nitroGLYCERIN (NITROSTAT) 0.4 MG SL tablet PLACE 1 TABLET UNDER THE TONGUE EVERY 5 MINUTES AS NEEDED FOR CHEST PAIN.          Medication adjustment for hospital antihypertensives as above    Will goal for controlled BP reduction as noted be medications noted above and monitor and mitigate end organ damage as indicated.

## 2022-07-17 NOTE — PROGRESS NOTES
[facility] - Telemetry Stepdown ([address] Medicine  Progress Note    Patient Name: Renata Bergman  MRN: [id number]  Patient Class: OP- Observation   Admission Date: 7/16/2022  Length of Stay: 0 days  Attending Physician: Jud Sullivan MD  Primary Care Provider: Ethel Berger MD        Subjective:     Principal Problem:Chest pain        HPI:  81 y.o. female with significant past medical history of CAD s/p MI and PCI, T2DM with CKD3, HTN, HLD, GERD, gout, arthritis presented to the ER complaining of intermittent chest pain.  Chest pain started while sleeping, pt woke up drenched in sweat.  The pain is located substernal with minimal radiation to the L.  The pain is described as pressure.  The pain is not reproducible.  Pt denies nausea, vomiting, or palpitations.  Pt reports some SOB but not worse with exertion.  Pt denies any trauma or heavy lifting.  Pt reports this has never happened before.  Previous cardiac events chest pain described as similar, pressure substernal and L however less intense than when pt had MI.  Pt denies fever, chills, congestions, cough, vocal changes, globus sensation, leg swelling or pain, weight gain, fatigue, weakness, confusion, syncope.  No recent seizures.    In the ED, VSS with elevated BP up to 206/88.  EKG NSR, RBBB (seen previously).  Troponin 0.09 (compared to baseline 0.05).  BNP 20.  Pt given asa 325 mg.  CBC unremarkable.  CMP with bicarb 21, Cr 1.2 at baseline.  CXR normal.  Chest pain resolved at time of admit.      Overview/Hospital Course:  Pt placed in observation for Chest pain. Trop initially elevated but trended down. ECG showed NSR w/ previously seen RBBB. Low suspicion for ACS at the time. Chest pain likely d/t demand ischemia in setting of hypertensive urgency. BP managed w/ OP meds and prn hydralazine. Hunter score 5, so will pursue IP cardiac workup w/ NM stress test.      Interval History: NAEON. Pt seen and evaluated at bedside this am. Pt  was hypertensive to SBP>200 which improved w/ home amlodipine and prn hydralazine. Pt will coreg for better BP control, however will hold in am for stress test. She reports complete resolution of chest pain. Trop flat. Hunter score 5, will pursue IP stress test. NPO at midnight    Review of Systems   Constitutional:  Negative for chills, diaphoresis, fatigue, fever and unexpected weight change.   HENT:  Negative for congestion, trouble swallowing and voice change.    Eyes:  Negative for photophobia, redness and visual disturbance.   Respiratory:  Negative for cough, shortness of breath and wheezing.    Cardiovascular:  Negative for chest pain, palpitations and leg swelling.   Gastrointestinal:  Negative for abdominal pain, constipation, diarrhea, nausea and vomiting.   Endocrine: Negative for polydipsia, polyphagia and polyuria.   Genitourinary:  Negative for dysuria, flank pain and frequency.   Musculoskeletal:  Negative for arthralgias, back pain and myalgias.   Skin:  Negative for color change, rash and wound.   Neurological:  Negative for dizziness, tremors, syncope, facial asymmetry, speech difficulty, weakness, light-headedness and headaches.   Hematological:  Negative for adenopathy. Does not bruise/bleed easily.   Psychiatric/Behavioral:  Negative for confusion and dysphoric mood. The patient is not nervous/anxious.    Objective:     Vital Signs (Most Recent):  Temp: 97 °F (36.1 °C) (07/17/22 1154)  Pulse: 77 (07/17/22 1154)  Resp: 18 (07/17/22 1154)  BP: (!) 118/57 (07/17/22 1154)  SpO2: 96 % (07/17/22 1154)   Vital Signs (24h Range):  Temp:  [97 °F (36.1 °C)-98.7 °F (37.1 °C)] 97 °F (36.1 °C)  Pulse:  [75-84] 77  Resp:  [16-18] 18  SpO2:  [96 %-98 %] 96 %  BP: (118-214)/(57-96) 118/57     Weight: 119.7 kg (263 lb 14.4 oz)  Body mass index is 45.3 kg/m².    Intake/Output Summary (Last 24 hours) at 7/17/2022 1340  Last data filed at 7/17/2022 0700  Gross per 24 hour   Intake --   Output 800 ml   Net -800 ml       Physical Exam  Vitals and nursing note reviewed.   Constitutional:       General: She is not in acute distress.     Appearance: She is well-developed. She is obese. She is not diaphoretic.   HENT:      Head: Normocephalic and atraumatic.      Right Ear: External ear normal.      Left Ear: External ear normal.      Nose: Nose normal. No congestion.      Mouth/Throat:      Pharynx: Oropharynx is clear.   Eyes:      General: No scleral icterus.     Extraocular Movements: Extraocular movements intact.      Pupils: Pupils are equal, round, and reactive to light.   Neck:      Thyroid: No thyromegaly.      Trachea: No tracheal deviation.   Cardiovascular:      Rate and Rhythm: Normal rate and regular rhythm.      Pulses: Normal pulses.      Heart sounds: Normal heart sounds. No murmur heard.  Pulmonary:      Effort: Pulmonary effort is normal. No respiratory distress.      Breath sounds: Normal breath sounds. No wheezing or rales.   Abdominal:      General: Bowel sounds are normal. There is no distension.      Palpations: Abdomen is soft.      Tenderness: There is no abdominal tenderness. There is no guarding or rebound.   Musculoskeletal:         General: No tenderness. Normal range of motion.      Cervical back: Normal range of motion and neck supple.      Right lower leg: No edema.      Left lower leg: No edema.   Lymphadenopathy:      Cervical: No cervical adenopathy.   Skin:     General: Skin is warm and dry.      Capillary Refill: Capillary refill takes less than 2 seconds.   Neurological:      General: No focal deficit present.      Mental Status: She is alert and oriented to person, place, and time. Mental status is at baseline.      Cranial Nerves: No cranial nerve deficit.      Deep Tendon Reflexes: Reflexes are normal and symmetric.   Psychiatric:         Mood and Affect: Mood normal.         Behavior: Behavior normal.         Thought Content: Thought content normal.       Significant Labs: All pertinent labs  within the past 24 hours have been reviewed.    Significant Imaging: I have reviewed all pertinent imaging results/findings within the past 24 hours.      Assessment/Plan:      * Chest pain  Serial cardiac markers and ECG.  HEART Score 6, NADIRA score 5  Arrange stress test, Serial Enzymes  Troponin 0.09 (compared to baseline 0.05).  BNP 20  CXR normal  EKG NSR with known RBBB  Mg>2, K>4  TSH and lipid panel in June 2022 WNL  Place patient on telemetry.  NPO after midnight for possible stress test.  Hold BB is on home medication list.      Hypertensive urgency  Patient has a current diagnosis of hypertensive urgency/emergency diagnosis: hypertensive urgency (without evidence of end organ damage) which is controlled.  Latest blood pressure and vitals reviewed-   Temp:  [97 °F (36.1 °C)-98.7 °F (37.1 °C)]   Pulse:  [75-84]   Resp:  [16-18]   BP: (118-214)/(57-96)   SpO2:  [96 %-98 %] .   Patient currently off IV antihypertensives.   Home meds for hypertension were reviewed and noted below.   Hypertension Medications             amLODIPine (NORVASC) 10 MG tablet Take 1 tablet (10 mg total) by mouth once daily.    carvediloL (COREG) 6.25 MG tablet Take 1 tablet (6.25 mg total) by mouth 2 (two) times daily with meals.    nitroGLYCERIN (NITROSTAT) 0.4 MG SL tablet PLACE 1 TABLET UNDER THE TONGUE EVERY 5 MINUTES AS NEEDED FOR CHEST PAIN.          Medication adjustment for hospital antihypertensives as above    Will goal for controlled BP reduction as noted be medications noted above and monitor and mitigate end organ damage as indicated.    Class 3 severe obesity in adult  Body mass index is 45.3 kg/m². Morbid obesity complicates all aspects of disease management from diagnostic modalities to treatment. Weight loss encouraged and health benefits explained to patient.     Type 2 diabetes mellitus with kidney complication, without long-term current use of insulin  Patient's FSGs are controlled on current medication  regimen.  Last A1c reviewed-   Lab Results   Component Value Date    HGBA1C 6.6 (H) 06/22/2022     Most recent fingerstick glucose reviewed-   Recent Labs   Lab 07/16/22  1832 07/17/22  0916 07/17/22  1257   POCTGLUCOSE 101 112* 168*     Current correctional scale  Low  Maintain anti-hyperglycemic dose as follows-   Antihyperglycemics (From admission, onward)            Start     Stop Route Frequency Ordered    07/16/22 1127  insulin aspart U-100 pen 0-5 Units         -- SubQ Before meals & nightly PRN 07/16/22 1029        Hold Oral hypoglycemics while patient is in the hospital.      Tonic-clonic epileptic seizures  Stable, continue home meds  Pt reports compliance with meds and no recent seizures    Stage 3a chronic kidney disease  Labs reviewed- Renal function/electrolytes with Estimated Creatinine Clearance: 44.7 mL/min (based on SCr of 1.3 mg/dL). according to latest data.   Pt at baseline 1.1-1.4  Monitor urine output and serial BMP and adjust therapy as needed.   Avoid nephrotoxins and renally dose meds for GFR listed above.       GERD (gastroesophageal reflux disease)  Stable, continue PPI  Pt reports compliance with meds    Gout, arthritis  Stable, continue allopurinol      Coronary artery disease due to lipid rich plaque  S/p stent placement  Stent placed 2017, Cardiac PET 2018 negative for ischemia  Continue asa, statin and zetia    Hyperlipidemia associated with type 2 diabetes mellitus  See above, CAD  Continue home meds      Benign essential hypertension  Uncontrolled upon arrival.  Pt did not take morning meds.  Continue amlodipine, hold carvedilol morning of stress test  Hydralazine prn    VTE Risk Mitigation (From admission, onward)         Ordered     enoxaparin injection 40 mg  Every 12 hours         07/16/22 1029     IP VTE HIGH RISK PATIENT  Once         07/16/22 1029     Place sequential compression device  Until discontinued         07/16/22 1029                Discharge Planning   BROOKE:  7/18/2022     Code Status: Full Code   Is the patient medically ready for discharge?: No    Reason for patient still in hospital (select all that apply): Patient trending condition, Laboratory test and Treatment                     Collette Noland PA-C  Department of Hospital Medicine   [facility] - Telemetry Stepdown ([place]-[facility])

## 2022-07-17 NOTE — ASSESSMENT & PLAN NOTE
Uncontrolled upon arrival.  Pt did not take morning meds.  Continue amlodipine, hold carvedilol morning of stress test  Hydralazine prn  - increased home coreg with improvement  - educated on low salt diet  - strict BP log  - PCP follow up

## 2022-07-17 NOTE — ASSESSMENT & PLAN NOTE
Body mass index is 45.3 kg/m². Morbid obesity complicates all aspects of disease management from diagnostic modalities to treatment. Weight loss encouraged and health benefits explained to patient.

## 2022-07-17 NOTE — ASSESSMENT & PLAN NOTE
Labs reviewed- Renal function/electrolytes with Estimated Creatinine Clearance: 44.7 mL/min (based on SCr of 1.3 mg/dL). according to latest data.   Pt at baseline 1.1-1.4  Monitor urine output and serial BMP and adjust therapy as needed.   Avoid nephrotoxins and renally dose meds for GFR listed above.

## 2022-07-17 NOTE — SUBJECTIVE & OBJECTIVE
Interval History: NAEON. Pt seen and evaluated at bedside this am. Pt was hypertensive to SBP>200 which improved w/ home amlodipine and prn hydralazine. Pt will coreg for better BP control, however will hold in am for stress test. She reports complete resolution of chest pain. Trop flat. Hunter score 5, will pursue IP stress test. NPO at midnight    Review of Systems   Constitutional:  Negative for chills, diaphoresis, fatigue, fever and unexpected weight change.   HENT:  Negative for congestion, trouble swallowing and voice change.    Eyes:  Negative for photophobia, redness and visual disturbance.   Respiratory:  Negative for cough, shortness of breath and wheezing.    Cardiovascular:  Negative for chest pain, palpitations and leg swelling.   Gastrointestinal:  Negative for abdominal pain, constipation, diarrhea, nausea and vomiting.   Endocrine: Negative for polydipsia, polyphagia and polyuria.   Genitourinary:  Negative for dysuria, flank pain and frequency.   Musculoskeletal:  Negative for arthralgias, back pain and myalgias.   Skin:  Negative for color change, rash and wound.   Neurological:  Negative for dizziness, tremors, syncope, facial asymmetry, speech difficulty, weakness, light-headedness and headaches.   Hematological:  Negative for adenopathy. Does not bruise/bleed easily.   Psychiatric/Behavioral:  Negative for confusion and dysphoric mood. The patient is not nervous/anxious.    Objective:     Vital Signs (Most Recent):  Temp: 97 °F (36.1 °C) (07/17/22 1154)  Pulse: 77 (07/17/22 1154)  Resp: 18 (07/17/22 1154)  BP: (!) 118/57 (07/17/22 1154)  SpO2: 96 % (07/17/22 1154)   Vital Signs (24h Range):  Temp:  [97 °F (36.1 °C)-98.7 °F (37.1 °C)] 97 °F (36.1 °C)  Pulse:  [75-84] 77  Resp:  [16-18] 18  SpO2:  [96 %-98 %] 96 %  BP: (118-214)/(57-96) 118/57     Weight: 119.7 kg (263 lb 14.4 oz)  Body mass index is 45.3 kg/m².    Intake/Output Summary (Last 24 hours) at 7/17/2022 1340  Last data filed at  7/17/2022 0700  Gross per 24 hour   Intake --   Output 800 ml   Net -800 ml      Physical Exam  Vitals and nursing note reviewed.   Constitutional:       General: She is not in acute distress.     Appearance: She is well-developed. She is obese. She is not diaphoretic.   HENT:      Head: Normocephalic and atraumatic.      Right Ear: External ear normal.      Left Ear: External ear normal.      Nose: Nose normal. No congestion.      Mouth/Throat:      Pharynx: Oropharynx is clear.   Eyes:      General: No scleral icterus.     Extraocular Movements: Extraocular movements intact.      Pupils: Pupils are equal, round, and reactive to light.   Neck:      Thyroid: No thyromegaly.      Trachea: No tracheal deviation.   Cardiovascular:      Rate and Rhythm: Normal rate and regular rhythm.      Pulses: Normal pulses.      Heart sounds: Normal heart sounds. No murmur heard.  Pulmonary:      Effort: Pulmonary effort is normal. No respiratory distress.      Breath sounds: Normal breath sounds. No wheezing or rales.   Abdominal:      General: Bowel sounds are normal. There is no distension.      Palpations: Abdomen is soft.      Tenderness: There is no abdominal tenderness. There is no guarding or rebound.   Musculoskeletal:         General: No tenderness. Normal range of motion.      Cervical back: Normal range of motion and neck supple.      Right lower leg: No edema.      Left lower leg: No edema.   Lymphadenopathy:      Cervical: No cervical adenopathy.   Skin:     General: Skin is warm and dry.      Capillary Refill: Capillary refill takes less than 2 seconds.   Neurological:      General: No focal deficit present.      Mental Status: She is alert and oriented to person, place, and time. Mental status is at baseline.      Cranial Nerves: No cranial nerve deficit.      Deep Tendon Reflexes: Reflexes are normal and symmetric.   Psychiatric:         Mood and Affect: Mood normal.         Behavior: Behavior normal.          Thought Content: Thought content normal.       Significant Labs: All pertinent labs within the past 24 hours have been reviewed.    Significant Imaging: I have reviewed all pertinent imaging results/findings within the past 24 hours.

## 2022-07-17 NOTE — ASSESSMENT & PLAN NOTE
Serial cardiac markers and ECG.  HEART Score 6, NADIRA score 5  Arrange stress test, Serial Enzymes  Troponin 0.09 (compared to baseline 0.05).  BNP 20  CXR normal  EKG NSR with known RBBB  Mg>2, K>4  TSH and lipid panel in June 2022 WNL  Place patient on telemetry.  NPO after midnight for possible stress test.  Hold BB is on home medication list.     Vaccine status unknown

## 2022-07-17 NOTE — PLAN OF CARE
Problem: Adult Inpatient Plan of Care  Goal: Plan of Care Review  7/17/2022 1709 by Anna Perrin RN  Outcome: Ongoing, Progressing  7/17/2022 1708 by Anna Perrin RN  Outcome: Ongoing, Progressing     Problem: Diabetes Comorbidity  Goal: Blood Glucose Level Within Targeted Range  7/17/2022 1709 by Anna Perrin RN  Outcome: Ongoing, Progressing  7/17/2022 1708 by Anna Perrin RN  Outcome: Ongoing, Progressing     Problem: Chest Pain  Goal: Resolution of Chest Pain Symptoms  7/17/2022 1709 by Anna Perrin RN  Outcome: Ongoing, Progressing  7/17/2022 1708 by Anna Perrin RN  Outcome: Ongoing, Progressing     Problem: Hypertension Acute  Goal: Blood Pressure Within Desired Range  Outcome: Ongoing, Progressing

## 2022-07-17 NOTE — ASSESSMENT & PLAN NOTE
Uncontrolled upon arrival.  Pt did not take morning meds.  Continue amlodipine, hold carvedilol morning of stress test  Hydralazine prn

## 2022-07-18 VITALS
WEIGHT: 263 LBS | HEIGHT: 64 IN | RESPIRATION RATE: 18 BRPM | SYSTOLIC BLOOD PRESSURE: 163 MMHG | TEMPERATURE: 98 F | DIASTOLIC BLOOD PRESSURE: 73 MMHG | HEART RATE: 74 BPM | BODY MASS INDEX: 44.9 KG/M2 | OXYGEN SATURATION: 94 %

## 2022-07-18 LAB
ANION GAP SERPL CALC-SCNC: 10 MMOL/L (ref 8–16)
BASOPHILS # BLD AUTO: 0.06 K/UL (ref 0–0.2)
BASOPHILS NFR BLD: 1 % (ref 0–1.9)
BUN SERPL-MCNC: 23 MG/DL (ref 8–23)
CALCIUM SERPL-MCNC: 10.4 MG/DL (ref 8.7–10.5)
CHLORIDE SERPL-SCNC: 106 MMOL/L (ref 95–110)
CO2 SERPL-SCNC: 23 MMOL/L (ref 23–29)
CREAT SERPL-MCNC: 1.5 MG/DL (ref 0.5–1.4)
CV PHARM DOSE: 0.4 MG
CV STRESS BASE HR: 76 BPM
DIASTOLIC BLOOD PRESSURE: 74 MMHG
DIFFERENTIAL METHOD: ABNORMAL
EJECTION FRACTION- HIGH: 65 %
END DIASTOLIC INDEX-HIGH: 153 ML/M2
END DIASTOLIC INDEX-LOW: 93 ML/M2
END SYSTOLIC INDEX-HIGH: 71 ML/M2
END SYSTOLIC INDEX-LOW: 31 ML/M2
EOSINOPHIL # BLD AUTO: 0.5 K/UL (ref 0–0.5)
EOSINOPHIL NFR BLD: 8 % (ref 0–8)
ERYTHROCYTE [DISTWIDTH] IN BLOOD BY AUTOMATED COUNT: 13.7 % (ref 11.5–14.5)
EST. GFR  (AFRICAN AMERICAN): 37.9 ML/MIN/1.73 M^2
EST. GFR  (NON AFRICAN AMERICAN): 32.9 ML/MIN/1.73 M^2
GLUCOSE SERPL-MCNC: 105 MG/DL (ref 70–110)
HCT VFR BLD AUTO: 43.5 % (ref 37–48.5)
HGB BLD-MCNC: 13.8 G/DL (ref 12–16)
IMM GRANULOCYTES # BLD AUTO: 0.02 K/UL (ref 0–0.04)
IMM GRANULOCYTES NFR BLD AUTO: 0.3 % (ref 0–0.5)
LYMPHOCYTES # BLD AUTO: 1.7 K/UL (ref 1–4.8)
LYMPHOCYTES NFR BLD: 29.6 % (ref 18–48)
MAGNESIUM SERPL-MCNC: 1.8 MG/DL (ref 1.6–2.6)
MCH RBC QN AUTO: 30.5 PG (ref 27–31)
MCHC RBC AUTO-ENTMCNC: 31.7 G/DL (ref 32–36)
MCV RBC AUTO: 96 FL (ref 82–98)
MONOCYTES # BLD AUTO: 0.9 K/UL (ref 0.3–1)
MONOCYTES NFR BLD: 15.7 % (ref 4–15)
NEUTROPHILS # BLD AUTO: 2.7 K/UL (ref 1.8–7.7)
NEUTROPHILS NFR BLD: 45.4 % (ref 38–73)
NRBC BLD-RTO: 0 /100 WBC
OHS CV CPX 85 PERCENT MAX PREDICTED HEART RATE MALE: 116
OHS CV CPX MAX PREDICTED HEART RATE: 136
OHS CV CPX PATIENT IS FEMALE: 1
OHS CV CPX PATIENT IS MALE: 0
OHS CV CPX PEAK DIASTOLIC BLOOD PRESSURE: 74 MMHG
OHS CV CPX PEAK HEAR RATE: 77 BPM
OHS CV CPX PEAK RATE PRESSURE PRODUCT: NORMAL
OHS CV CPX PEAK SYSTOLIC BLOOD PRESSURE: 145 MMHG
OHS CV CPX PERCENT MAX PREDICTED HEART RATE ACHIEVED: 56
OHS CV CPX RATE PRESSURE PRODUCT PRESENTING: NORMAL
PLATELET # BLD AUTO: 244 K/UL (ref 150–450)
PMV BLD AUTO: 10.4 FL (ref 9.2–12.9)
POCT GLUCOSE: 109 MG/DL (ref 70–110)
POCT GLUCOSE: 119 MG/DL (ref 70–110)
POTASSIUM SERPL-SCNC: 4.5 MMOL/L (ref 3.5–5.1)
RBC # BLD AUTO: 4.52 M/UL (ref 4–5.4)
RETIRED EF AND QEF - SEE NOTES: 53 %
SODIUM SERPL-SCNC: 139 MMOL/L (ref 136–145)
SYSTOLIC BLOOD PRESSURE: 145 MMHG
WBC # BLD AUTO: 5.87 K/UL (ref 3.9–12.7)

## 2022-07-18 PROCEDURE — 85025 COMPLETE CBC W/AUTO DIFF WBC: CPT | Performed by: INTERNAL MEDICINE

## 2022-07-18 PROCEDURE — 63600175 PHARM REV CODE 636 W HCPCS: Performed by: INTERNAL MEDICINE

## 2022-07-18 PROCEDURE — 25000003 PHARM REV CODE 250: Performed by: PHYSICIAN ASSISTANT

## 2022-07-18 PROCEDURE — 83735 ASSAY OF MAGNESIUM: CPT | Performed by: PHYSICIAN ASSISTANT

## 2022-07-18 PROCEDURE — 96372 THER/PROPH/DIAG INJ SC/IM: CPT | Performed by: PHYSICIAN ASSISTANT

## 2022-07-18 PROCEDURE — G0378 HOSPITAL OBSERVATION PER HR: HCPCS

## 2022-07-18 PROCEDURE — 36415 COLL VENOUS BLD VENIPUNCTURE: CPT | Performed by: PHYSICIAN ASSISTANT

## 2022-07-18 PROCEDURE — 99217 PR OBSERVATION CARE DISCHARGE: ICD-10-PCS | Mod: ,,, | Performed by: PHYSICIAN ASSISTANT

## 2022-07-18 PROCEDURE — 80048 BASIC METABOLIC PNL TOTAL CA: CPT | Performed by: PHYSICIAN ASSISTANT

## 2022-07-18 PROCEDURE — 99217 PR OBSERVATION CARE DISCHARGE: CPT | Mod: ,,, | Performed by: PHYSICIAN ASSISTANT

## 2022-07-18 PROCEDURE — 63600175 PHARM REV CODE 636 W HCPCS: Performed by: PHYSICIAN ASSISTANT

## 2022-07-18 RX ORDER — CARVEDILOL 12.5 MG/1
12.5 TABLET ORAL 2 TIMES DAILY WITH MEALS
Qty: 60 TABLET | Refills: 3 | Status: SHIPPED | OUTPATIENT
Start: 2022-07-18 | End: 2022-10-18 | Stop reason: SDUPTHER

## 2022-07-18 RX ORDER — CARVEDILOL 12.5 MG/1
12.5 TABLET ORAL 2 TIMES DAILY
Status: DISCONTINUED | OUTPATIENT
Start: 2022-07-18 | End: 2022-07-18 | Stop reason: HOSPADM

## 2022-07-18 RX ORDER — AMLODIPINE BESYLATE 10 MG/1
10 TABLET ORAL DAILY
Qty: 90 TABLET | Refills: 0 | Status: SHIPPED | OUTPATIENT
Start: 2022-07-18 | End: 2022-10-18 | Stop reason: SDUPTHER

## 2022-07-18 RX ORDER — REGADENOSON 0.08 MG/ML
0.4 INJECTION, SOLUTION INTRAVENOUS ONCE
Status: COMPLETED | OUTPATIENT
Start: 2022-07-18 | End: 2022-07-18

## 2022-07-18 RX ORDER — CARVEDILOL 6.25 MG/1
6.25 TABLET ORAL 2 TIMES DAILY
Status: DISCONTINUED | OUTPATIENT
Start: 2022-07-18 | End: 2022-07-18

## 2022-07-18 RX ORDER — EZETIMIBE 10 MG/1
10 TABLET ORAL DAILY
Qty: 90 TABLET | Refills: 0 | Status: SHIPPED | OUTPATIENT
Start: 2022-07-18 | End: 2022-09-06 | Stop reason: SDUPTHER

## 2022-07-18 RX ORDER — ACETAMINOPHEN 500 MG
1000 TABLET ORAL DAILY PRN
COMMUNITY

## 2022-07-18 RX ADMIN — ASPIRIN 81 MG: 81 TABLET, COATED ORAL at 08:07

## 2022-07-18 RX ADMIN — REGADENOSON 0.4 MG: 0.08 INJECTION, SOLUTION INTRAVENOUS at 10:07

## 2022-07-18 RX ADMIN — CARVEDILOL 12.5 MG: 12.5 TABLET, FILM COATED ORAL at 01:07

## 2022-07-18 RX ADMIN — AMLODIPINE BESYLATE 10 MG: 10 TABLET ORAL at 08:07

## 2022-07-18 RX ADMIN — PANTOPRAZOLE SODIUM 40 MG: 40 TABLET, DELAYED RELEASE ORAL at 05:07

## 2022-07-18 RX ADMIN — ALLOPURINOL 300 MG: 300 TABLET ORAL at 07:07

## 2022-07-18 RX ADMIN — HYDRALAZINE HYDROCHLORIDE 25 MG: 25 TABLET, FILM COATED ORAL at 08:07

## 2022-07-18 RX ADMIN — EZETIMIBE 10 MG: 10 TABLET ORAL at 08:07

## 2022-07-18 RX ADMIN — ANASTROZOLE 1 MG: 1 TABLET, COATED ORAL at 08:07

## 2022-07-18 RX ADMIN — LEVETIRACETAM 500 MG: 500 TABLET, FILM COATED ORAL at 08:07

## 2022-07-18 RX ADMIN — ATORVASTATIN CALCIUM 80 MG: 40 TABLET, FILM COATED ORAL at 08:07

## 2022-07-18 RX ADMIN — ENOXAPARIN SODIUM 40 MG: 100 INJECTION SUBCUTANEOUS at 08:07

## 2022-07-18 NOTE — PLAN OF CARE
[facility] - Telemetry Stepdown ([facility])  Discharge Assessment    Primary Care Provider: Ethel Berger MD     Discharge Assessment (most recent)     BRIEF DISCHARGE ASSESSMENT - 07/18/22 1401        Discharge Planning    Assessment Type Discharge Planning Brief Assessment     Resource/Environmental Concerns none     Support Systems Children     Equipment Currently Used at Home rollator;bath bench     Current Living Arrangements home/apartment/condo     Patient/Family Anticipates Transition to home     Patient/Family Anticipated Services at Transition none     DME Needed Upon Discharge  none     Discharge Plan A Home     Discharge Plan B Home               Pt is a 81 y.o. female admitted with Chest Pain and has a PMH of CAD s/p MI and PCI, T2DM. She lives with her dtr in a 2 story house, she is on the first floor. She is independent in all of her self care, her family assists with transportation. [facility] Discharge Packet given to patient and/or family with understanding verbalized.   name and number and estimated discharge date written on white board in patient's room with request to call for any questions or concerns.  Will continue to follow for needs.  Bismark Rosado RN,BSN

## 2022-07-18 NOTE — NURSING
Pt being discharged home in stable condition. AVS packet given to pt. Discharged instructions reviewed with pt and pt's daughter. Both pt and pt's daughter demonstrated understanding using the teach back technique. Pt awaiting WC escort to car at this time.

## 2022-07-18 NOTE — PLAN OF CARE
[facility] - Telemetry Stepdown ([place] [address])  Discharge Final Note    Primary Care Provider: Ethel Berger MD    Expected Discharge Date: 7/18/2022    Future Appointments   Date Time Provider Department Center   7/25/2022 11:00 AM Eun Omalley MD [facility] IM [facility]   8/19/2022  1:00 PM LAMAR MCINTOSHO2 [facility]   9/6/2022  9:30 AM Ethel Berger MD [facility] IM [facility]       Pt discharged home with no services.    Bismark Rosado, RN,BSN      Final Discharge Note (most recent)     Final Note - 07/18/22 1625        Final Note    Assessment Type Final Discharge Note     Anticipated Discharge Disposition Home or Self Care     Hospital Resources/Appts/Education Provided Provided patient/caregiver with written discharge plan information;Appointments scheduled and added to AVS        Post-Acute Status    Discharge Delays None known at this time                 Important Message from Medicare

## 2022-07-18 NOTE — PHARMACY MED REC
"Admission Medication History     The home medication history was taken by Debora Gloria.    You may go to "Admission" then "Reconcile Home Medications" tabs to review and/or act upon these items.      The home medication list has been updated by the Pharmacy department.    Please read ALL comments highlighted in yellow.    Please address this information as you see fit.     Feel free to contact us if you have any questions or require assistance.        Medications listed below were obtained from: Patient/family  PTA Medications   Medication Sig    acetaminophen (TYLENOL) 500 MG tablet   Take 1,000 mg by mouth daily as needed for Pain (headache).    allopurinoL (ZYLOPRIM) 300 MG tablet   Take 1 tablet (300 mg total) by mouth every morning.    amLODIPine (NORVASC) 10 MG tablet   Take 1 tablet (10 mg total) by mouth once daily.    anastrozole (ARIMIDEX) 1 mg Tab     Take 1 tablet (1 mg total) by mouth once daily TAKE 1 TABLET(1 MG) BY MOUTH EVERY DAY. STOP LETROZOLE FEMARA.    aspirin (ECOTRIN) 81 MG EC tablet   Take 1 tablet (81 mg total) by mouth once daily.    carvediloL (COREG) 6.25 MG tablet   Take 1 tablet (6.25 mg total) by mouth 2 (two) times daily with meals.    ergocalciferol (ERGOCALCIFEROL) 50,000 unit Cap   Take 1 capsule by mouth twice weekly (every Sunday & Wednesday)    ezetimibe (ZETIA) 10 mg tablet   Take 1 tablet (10 mg total) by mouth once daily.    fexofenadine (ALLEGRA) 30 MG tablet   Take 30 mg by mouth daily as needed (allergies).    levetiracetam XR (KEPPRA XR) 500 mg Tb24 24 hr tablet   Take 500 mg by mouth 2 (two) times a day.    nitroGLYCERIN (NITROSTAT) 0.4 MG SL tablet   PLACE 1 TABLET UNDER THE TONGUE EVERY 5 MINUTES AS NEEDED FOR CHEST PAIN.    omeprazole (PRILOSEC) 20 MG capsule   Take 20 mg by mouth 2 (two) times a day.    propylene glycol (SYSTANE BALANCE OPHT)   Apply 1-2 drops to eye daily as needed (dry eyes).    rosuvastatin (CRESTOR) 40 MG Tab   Take 1 tablet " (40 mg total) by mouth every morning.    blood sugar diagnostic Strp 1 strip by Misc.(Non-Drug; Combo Route) route once daily.    blood-glucose meter kit Use as instructed    lancets Misc Check blood sugar once daily       Potential issues to be addressed PRIOR TO DISCHARGE  Patient requested refills for the following medications: (AMLODIPINE 10MG AND NITROGLYCERIN 0.4MG SL)    Debora Gloria  EXT [id number]                  .

## 2022-07-19 ENCOUNTER — TELEPHONE (OUTPATIENT)
Dept: INTERNAL MEDICINE | Facility: CLINIC | Age: 82
End: 2022-07-19
Payer: MEDICARE

## 2022-07-19 DIAGNOSIS — E11.51 CONTROLLED TYPE 2 DM WITH PERIPHERAL CIRCULATORY DISORDER: Primary | ICD-10-CM

## 2022-07-19 NOTE — TELEPHONE ENCOUNTER
----- Message from Logan Duke sent at 7/19/2022  9:50 AM CDT -----  Contact: Caspida Vidya [phone]  Vidya is requesting a call back about getting lab orders for the pt. I was to get the info she ws rushing me off the phone. One was a GFR, and urine test but not sure what else. Please call her back to get the right info.

## 2022-07-19 NOTE — TELEPHONE ENCOUNTER
Called and spoke to Lisa with [facility]. States pt is due for a CMP and a microalbumin creatine ratio urine test. States pt must have these test done together to be compliant. Will pend orders.

## 2022-07-19 NOTE — ASSESSMENT & PLAN NOTE
Serial cardiac markers and ECG.   Chest pain likely due to hypertensive urgency  HEART Score 6, NADIRA score 5  Arrange stress test, Serial Enzymes  Troponin 0.09 (compared to baseline 0.05).  BNP 20  CXR normal  EKG NSR with known RBBB  Mg>2, K>4  TSH and lipid panel in June 2022 WNL  Place patient on telemetry.  NM stress test negative for myocardial ischemia    Results for orders placed during the hospital encounter of 07/16/22    Nuclear Stress - Cardiology Interpreted    Interpretation Summary    Normal myocardial perfusion scan. There is no evidence of myocardial ischemia or infarction.    There is a  moderate intensity fixed perfusion abnormality in the inferior wall of the left ventricle secondary to diaphragm attenuation.    The visually estimated ejection fraction is normal at rest and normal during stress.    There is normal wall motion at rest and post stress.    LV cavity size is normal at rest and normal at stress.    The EKG portion of this study is negative for ischemia.    The patient reported no chest pain during the stress test.    During stress, rare PVCs are noted.    There are no prior studies for comparison.

## 2022-07-19 NOTE — DISCHARGE SUMMARY
[facility] - Telemetry Stepdown ([place] [id number])  [facility] Medicine  Discharge Summary      Patient Name: Renata Bergman  MRN: [id number]  Patient Class: OP- Observation  Admission Date: 7/16/2022  Hospital Length of Stay: 0 days  Discharge Date and Time: 7/18/2022  4:08 PM  Attending Physician: No att. providers found   Discharging Provider: Judy Smith PA-C  Primary Care Provider: Ethel Berger MD  [facility] Medicine Team: [facility] MED F Judy Smith PA-C    HPI:   81 y.o. female with significant past medical history of CAD s/p MI and PCI, T2DM with CKD3, HTN, HLD, GERD, gout, arthritis presented to the ER complaining of intermittent chest pain.  Chest pain started while sleeping, pt woke up drenched in sweat.  The pain is located substernal with minimal radiation to the L.  The pain is described as pressure.  The pain is not reproducible.  Pt denies nausea, vomiting, or palpitations.  Pt reports some SOB but not worse with exertion.  Pt denies any trauma or heavy lifting.  Pt reports this has never happened before.  Previous cardiac events chest pain described as similar, pressure substernal and L however less intense than when pt had MI.  Pt denies fever, chills, congestions, cough, vocal changes, globus sensation, leg swelling or pain, weight gain, fatigue, weakness, confusion, syncope.  No recent seizures.    In the ED, VSS with elevated BP up to 206/88.  EKG NSR, RBBB (seen previously).  Troponin 0.09 (compared to baseline 0.05).  BNP 20.  Pt given asa 325 mg.  CBC unremarkable.  CMP with bicarb 21, Cr 1.2 at baseline.  CXR normal.  Chest pain resolved at time of admit.      * No surgery found *      Hospital Course:   Pt placed in observation for Chest pain. Trop initially elevated but trended down. ECG showed NSR w/ previously seen RBBB. Low suspicion for ACS at the time. Chest pain likely d/t demand ischemia in setting of hypertensive urgency. BP managed w/ OP meds and prn hydralazine. Home  carvedilol increased to 12.5 BID. Nadira score 5, so will pursue IP cardiac workup w/ NM stress test. NM stress test negative for myocardial ischemia. Patient discharged on increased metoprolol dose. Instructed to keep strict BP log until PCP follow up. Patient verbalized understanding. All questions answered.       Goals of Care Treatment Preferences:  Code Status: Full Code      Consults:     * Chest pain  Serial cardiac markers and ECG.   Chest pain likely due to hypertensive urgency  HEART Score 6, NADIRA score 5  Arrange stress test, Serial Enzymes  Troponin 0.09 (compared to baseline 0.05).  BNP 20  CXR normal  EKG NSR with known RBBB  Mg>2, K>4  TSH and lipid panel in June 2022 WNL  Place patient on telemetry.  NM stress test negative for myocardial ischemia    Results for orders placed during the hospital encounter of 07/16/22    Nuclear Stress - Cardiology Interpreted    Interpretation Summary    Normal myocardial perfusion scan. There is no evidence of myocardial ischemia or infarction.    There is a  moderate intensity fixed perfusion abnormality in the inferior wall of the left ventricle secondary to diaphragm attenuation.    The visually estimated ejection fraction is normal at rest and normal during stress.    There is normal wall motion at rest and post stress.    LV cavity size is normal at rest and normal at stress.    The EKG portion of this study is negative for ischemia.    The patient reported no chest pain during the stress test.    During stress, rare PVCs are noted.    There are no prior studies for comparison.        Hypertensive urgency  Patient has a current diagnosis of hypertensive urgency/emergency diagnosis: hypertensive urgency (without evidence of end organ damage) which is controlled.  Latest blood pressure and vitals reviewed-   Temp:  [97 °F (36.1 °C)-98.7 °F (37.1 °C)]   Pulse:  [75-84]   Resp:  [16-18]   BP: (118-214)/(57-96)   SpO2:  [96 %-98 %] .   Patient currently off  IV antihypertensives.   Home meds for hypertension were reviewed and noted below.   Hypertension Medications             amLODIPine (NORVASC) 10 MG tablet Take 1 tablet (10 mg total) by mouth once daily.    carvediloL (COREG) 6.25 MG tablet Take 1 tablet (6.25 mg total) by mouth 2 (two) times daily with meals.    nitroGLYCERIN (NITROSTAT) 0.4 MG SL tablet PLACE 1 TABLET UNDER THE TONGUE EVERY 5 MINUTES AS NEEDED FOR CHEST PAIN.          Medication adjustment for hospital antihypertensives as above    Will goal for controlled BP reduction as noted be medications noted above and monitor and mitigate end organ damage as indicated.    Benign essential hypertension  Uncontrolled upon arrival.  Pt did not take morning meds.  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] 11/22/2016 Yes    Stage 3a chronic kidney disease [N18.31] 10/14/2013 Yes    S/P coronary artery stent placement [Z95.5] 09/10/2012 Not Applicable    Benign essential hypertension [I10] 09/04/2012 Yes    Hyperlipidemia associated with type 2 diabetes mellitus [E11.69, E78.5] 09/04/2012 Yes    Gout, arthritis [M10.9] 09/04/2012 Yes    GERD (gastroesophageal reflux disease) [K21.9] 09/04/2012 Yes    Coronary artery disease due to lipid rich plaque [I25.10, I25.83] 09/04/2012 Yes     Chronic      Problems Resolved During this Admission:    Diagnosis Date Noted Date Resolved POA    RBBB [I45.10] 06/27/2016 07/17/2022 Yes       Discharged  Condition: good    Disposition: Home or Self Care    Follow Up:    Patient Instructions:      Diet diabetic     Diet Cardiac     Notify your health care provider if you experience any of the following:  temperature >100.4     Notify your health care provider if you experience any of the following:  severe uncontrolled pain     Notify your health care provider if you experience any of the following:  increased confusion or weakness     Notify your health care provider if you experience any of the following:  persistent dizziness, light-headedness, or visual disturbances     Activity as tolerated       Significant Diagnostic Studies: Labs:   CMP   Recent Labs   Lab 07/18/22  0832      K 4.5      CO2 23      BUN 23   CREATININE 1.5*   CALCIUM 10.4   ANIONGAP 10   ESTGFRAFRICA 37.9*   EGFRNONAA 32.9*   , CBC   Recent Labs   Lab 07/17/22  1226 07/18/22  0837   WBC 5.97 5.87   HGB 14.7 13.8   HCT 44.0 43.5    244    and Troponin   Recent Labs   Lab 07/16/22  0846 07/16/22  1151   TROPONINI 0.094* 0.080*     Cardiac Graphics: Stress Test:   Results for orders placed during the hospital encounter of 07/16/22    Nuclear Stress - Cardiology Interpreted    Interpretation Summary    Normal myocardial perfusion scan. There is no evidence of myocardial ischemia or infarction.    There is a  moderate intensity fixed perfusion abnormality in the inferior wall of the left ventricle secondary to diaphragm attenuation.    The visually estimated ejection fraction is normal at rest and normal during stress.    There is normal wall motion at rest and post stress.    LV cavity size is normal at rest and normal at stress.    The EKG portion of this study is negative for ischemia.    The patient reported no chest pain during the stress test.    During stress, rare PVCs are noted.    There are no prior studies for comparison.      Pending Diagnostic Studies:     None         Medications:  Reconciled Home  Medications:      Medication List      CHANGE how you take these medications    carvediloL 12.5 MG tablet  Commonly known as: COREG  Take 1 tablet (12.5 mg total) by mouth 2 (two) times daily with meals.  What changed:   · medication strength  · how much to take     ergocalciferol 50,000 unit Cap  Commonly known as: ERGOCALCIFEROL  TAKE 1 CAPSULE BY MOUTH 2 TIMES A WEEK  What changed: See the new instructions.     levetiracetam  mg Tb24 24 hr tablet  Commonly known as: KEPPRA XR  TAKE 2 TABLETS(1000 MG) BY MOUTH EVERY DAY  What changed: See the new instructions.     omeprazole 20 MG capsule  Commonly known as: PRILOSEC  TAKE 2 CAPSULES BY MOUTH EVERY DAY  What changed:   · how much to take  · when to take this        CONTINUE taking these medications    acetaminophen 500 MG tablet  Commonly known as: TYLENOL  Take 1,000 mg by mouth daily as needed for Pain (headache).     allopurinoL 300 MG tablet  Commonly known as: ZYLOPRIM  Take 1 tablet (300 mg total) by mouth every morning.     amLODIPine 10 MG tablet  Commonly known as: NORVASC  Take 1 tablet (10 mg total) by mouth once daily.     anastrozole 1 mg Tab  Commonly known as: ARIMIDEX  Take 1 tablet (1 mg total) by mouth once daily TAKE 1 TABLET(1 MG) BY MOUTH EVERY DAY. STOP LETROZOLE FEMARA.     aspirin 81 MG EC tablet  Commonly known as: ECOTRIN  Take 1 tablet (81 mg total) by mouth once daily.     blood sugar diagnostic Strp  1 strip by Misc.(Non-Drug; Combo Route) route once daily.     blood-glucose meter kit  Use as instructed     ezetimibe 10 mg tablet  Commonly known as: ZETIA  Take 1 tablet (10 mg total) by mouth once daily.     fexofenadine 30 MG tablet  Commonly known as: ALLEGRA  Take 30 mg by mouth daily as needed (allergies).     lancets Misc  Check blood sugar once daily     nitroGLYCERIN 0.4 MG SL tablet  Commonly known as: NITROSTAT  PLACE 1 TABLET UNDER THE TONGUE EVERY 5 MINUTES AS NEEDED FOR CHEST PAIN.     rosuvastatin 40 MG Tab  Commonly  known as: CRESTOR  Take 1 tablet (40 mg total) by mouth every morning.     SYSTANE BALANCE OPHT  Apply 1-2 drops to eye daily as needed (dry eyes).            Indwelling Lines/Drains at time of discharge:   Lines/Drains/Airways     None                 Time spent on the discharge of patient: 36 minutes         Judy Smith PA-C  Department of Hospital Medicine  [facility] - Telemetry Stepdown ([place]-)

## 2022-07-25 ENCOUNTER — OFFICE VISIT (OUTPATIENT)
Dept: INTERNAL MEDICINE | Facility: CLINIC | Age: 82
End: 2022-07-25
Payer: MEDICARE

## 2022-07-25 VITALS
HEIGHT: 64 IN | OXYGEN SATURATION: 96 % | BODY MASS INDEX: 45.12 KG/M2 | DIASTOLIC BLOOD PRESSURE: 84 MMHG | SYSTOLIC BLOOD PRESSURE: 140 MMHG | WEIGHT: 264.31 LBS | HEART RATE: 83 BPM

## 2022-07-25 DIAGNOSIS — I25.83 CORONARY ARTERY DISEASE DUE TO LIPID RICH PLAQUE: ICD-10-CM

## 2022-07-25 DIAGNOSIS — Z95.5 S/P CORONARY ARTERY STENT PLACEMENT: ICD-10-CM

## 2022-07-25 DIAGNOSIS — I25.10 CORONARY ARTERY DISEASE DUE TO LIPID RICH PLAQUE: ICD-10-CM

## 2022-07-25 DIAGNOSIS — Z09 HOSPITAL DISCHARGE FOLLOW-UP: Primary | ICD-10-CM

## 2022-07-25 DIAGNOSIS — I16.0 HYPERTENSIVE URGENCY: ICD-10-CM

## 2022-07-25 PROCEDURE — 3077F SYST BP >= 140 MM HG: CPT | Mod: CPTII,S$GLB,, | Performed by: STUDENT IN AN ORGANIZED HEALTH CARE EDUCATION/TRAINING PROGRAM

## 2022-07-25 PROCEDURE — 1126F AMNT PAIN NOTED NONE PRSNT: CPT | Mod: CPTII,S$GLB,, | Performed by: STUDENT IN AN ORGANIZED HEALTH CARE EDUCATION/TRAINING PROGRAM

## 2022-07-25 PROCEDURE — 99999 PR PBB SHADOW E&M-EST. PATIENT-LVL V: ICD-10-PCS | Mod: PBBFAC,,, | Performed by: STUDENT IN AN ORGANIZED HEALTH CARE EDUCATION/TRAINING PROGRAM

## 2022-07-25 PROCEDURE — 3079F DIAST BP 80-89 MM HG: CPT | Mod: CPTII,S$GLB,, | Performed by: STUDENT IN AN ORGANIZED HEALTH CARE EDUCATION/TRAINING PROGRAM

## 2022-07-25 PROCEDURE — 99214 PR OFFICE/OUTPT VISIT, EST, LEVL IV, 30-39 MIN: ICD-10-PCS | Mod: S$GLB,,, | Performed by: STUDENT IN AN ORGANIZED HEALTH CARE EDUCATION/TRAINING PROGRAM

## 2022-07-25 PROCEDURE — 99214 OFFICE O/P EST MOD 30 MIN: CPT | Mod: S$GLB,,, | Performed by: STUDENT IN AN ORGANIZED HEALTH CARE EDUCATION/TRAINING PROGRAM

## 2022-07-25 PROCEDURE — 1126F PR PAIN SEVERITY QUANTIFIED, NO PAIN PRESENT: ICD-10-PCS | Mod: CPTII,S$GLB,, | Performed by: STUDENT IN AN ORGANIZED HEALTH CARE EDUCATION/TRAINING PROGRAM

## 2022-07-25 PROCEDURE — 3079F PR MOST RECENT DIASTOLIC BLOOD PRESSURE 80-89 MM HG: ICD-10-PCS | Mod: CPTII,S$GLB,, | Performed by: STUDENT IN AN ORGANIZED HEALTH CARE EDUCATION/TRAINING PROGRAM

## 2022-07-25 PROCEDURE — 99999 PR PBB SHADOW E&M-EST. PATIENT-LVL V: CPT | Mod: PBBFAC,,, | Performed by: STUDENT IN AN ORGANIZED HEALTH CARE EDUCATION/TRAINING PROGRAM

## 2022-07-25 PROCEDURE — 1160F PR REVIEW ALL MEDS BY PRESCRIBER/CLIN PHARMACIST DOCUMENTED: ICD-10-PCS | Mod: CPTII,S$GLB,, | Performed by: STUDENT IN AN ORGANIZED HEALTH CARE EDUCATION/TRAINING PROGRAM

## 2022-07-25 PROCEDURE — 1159F MED LIST DOCD IN RCRD: CPT | Mod: CPTII,S$GLB,, | Performed by: STUDENT IN AN ORGANIZED HEALTH CARE EDUCATION/TRAINING PROGRAM

## 2022-07-25 PROCEDURE — 3077F PR MOST RECENT SYSTOLIC BLOOD PRESSURE >= 140 MM HG: ICD-10-PCS | Mod: CPTII,S$GLB,, | Performed by: STUDENT IN AN ORGANIZED HEALTH CARE EDUCATION/TRAINING PROGRAM

## 2022-07-25 PROCEDURE — 3288F PR FALLS RISK ASSESSMENT DOCUMENTED: ICD-10-PCS | Mod: CPTII,S$GLB,, | Performed by: STUDENT IN AN ORGANIZED HEALTH CARE EDUCATION/TRAINING PROGRAM

## 2022-07-25 PROCEDURE — 1160F RVW MEDS BY RX/DR IN RCRD: CPT | Mod: CPTII,S$GLB,, | Performed by: STUDENT IN AN ORGANIZED HEALTH CARE EDUCATION/TRAINING PROGRAM

## 2022-07-25 PROCEDURE — 1101F PR PT FALLS ASSESS DOC 0-1 FALLS W/OUT INJ PAST YR: ICD-10-PCS | Mod: CPTII,S$GLB,, | Performed by: STUDENT IN AN ORGANIZED HEALTH CARE EDUCATION/TRAINING PROGRAM

## 2022-07-25 PROCEDURE — 3288F FALL RISK ASSESSMENT DOCD: CPT | Mod: CPTII,S$GLB,, | Performed by: STUDENT IN AN ORGANIZED HEALTH CARE EDUCATION/TRAINING PROGRAM

## 2022-07-25 PROCEDURE — 1159F PR MEDICATION LIST DOCUMENTED IN MEDICAL RECORD: ICD-10-PCS | Mod: CPTII,S$GLB,, | Performed by: STUDENT IN AN ORGANIZED HEALTH CARE EDUCATION/TRAINING PROGRAM

## 2022-07-25 PROCEDURE — 1101F PT FALLS ASSESS-DOCD LE1/YR: CPT | Mod: CPTII,S$GLB,, | Performed by: STUDENT IN AN ORGANIZED HEALTH CARE EDUCATION/TRAINING PROGRAM

## 2022-07-25 NOTE — Clinical Note
Renata was here for post-hospital follow up for hypertensive urgency. BP moderately well controlled. I advised her to check x 2 weeks and bring in logs to her visit with you next month

## 2022-07-25 NOTE — PROGRESS NOTES
INTERNAL MEDICINE ESTABLISHED PATIENT VISIT NOTE        Assessment/Plan     1. Hospital discharge follow-up  2. Hypertensive urgency  3. Coronary artery disease due to lipid rich plaque  4. S/P coronary artery stent placement      All previous labs, imaging, and notes reviewed up to the time point of this note.  Patient is here for post-hospital follow up after having chest pain at home. Moderate risk score - underwent a MPS which was negative for signs of ischemia. Thought to be 2/2 to hypertensive urgency. BP moderately well controlled today - advised to continue therapy and follow up with PCP next month.     Health Maintenance reviewed and discussed with patient.   RTC in 2 mo for PCP visit, sooner if needed.    Subjective:     Chief Complaint: Follow-up (Hospital )       Patient ID: Renata Bergman is a 81 y.o. female with CAD s/p stent, TIIDM, HTN, HLD, GERD, hx breast cancer, hx seizures, CKD III, who is here to discuss hospital follow up.     Patient was hospitalized 7/16 - 7/18 for CP and SOB with elevated NADIRA and HEART score. She underwent an MPS without any evidence of cardiac ischemia. Of note, BP up to 206/88 in the ER, troponin 0.09. Symptoms thought to be 2/2 to demand ischemia in the setting of hypertensive urgency. BP medication adjusted - coreg increased to 12.5 mg BID. NADIRA 5. NM stress test negative. Discharged with close BP log planned.     Patient is here today with her daughter, Radhames.   She describes she does not often get chest pain. Her BP has been anywhere from 120-180 in the clinic (systolic), she brings 5 days of readings, all below 150/90. Encouraged to continue checking BP regularly up until her visit with PCP next month.     HTN - currently on amlodipine 10 mg, coreg 12.5 mg BID    No further bouts of chest pain, SOB, palpitations, lightheadedness/dizziness.       Past medical history, social history, family history, medications and allergies reviewed.      Review of Systems  "  Constitutional: Negative for fever and unexpected weight change.   HENT: Negative for sinus pressure and sore throat.    Eyes: Negative for visual disturbance.   Respiratory: Negative for cough and shortness of breath.    Cardiovascular: Negative for chest pain and palpitations.   Gastrointestinal: Negative for constipation, diarrhea, nausea and vomiting.   Endocrine: Negative for polyuria.   Genitourinary: Negative for dysuria and urgency.   Musculoskeletal: Negative for arthralgias and myalgias.   Skin: Negative for rash.   Allergic/Immunologic: Negative for environmental allergies.   Neurological: Negative for dizziness, light-headedness and headaches.   Hematological: Does not bruise/bleed easily.   Psychiatric/Behavioral: Negative for agitation and decreased concentration. The patient is not nervous/anxious.            Objective:   BP (!) 140/84 (BP Location: Right arm, Patient Position: Sitting, BP Method: Thigh Cuff (Manual))   Pulse 83   Ht 5' 4" (1.626 m)   Wt 119.9 kg (264 lb 5.3 oz)   LMP  (LMP Unknown)   SpO2 96%   BMI 45.37 kg/m²        General: AAO x3, no apparent distress, obese, well appearing with walker   HEENT: PERRL, OP clear  Neck: Supple   CV: RRR, no m/r/g  Pulm: Lungs CTAB, no crackles, no wheezes  Abd: s/NT/ND +BS  Extremities: appears warm and well-perfused  Skin: no rashes, lesions, or tears          Eun Omalley MD   [facility]  Department of Internal Medicine   07/25/2022    "

## 2022-07-25 NOTE — PATIENT INSTRUCTIONS
--check blood pressure x 2 weeks (morning and evening, same time every day) and bring log in to your visit with Dr. Berger

## 2022-07-29 ENCOUNTER — PES CALL (OUTPATIENT)
Dept: ADMINISTRATIVE | Facility: CLINIC | Age: 82
End: 2022-07-29
Payer: MEDICARE

## 2022-08-09 ENCOUNTER — TELEPHONE (OUTPATIENT)
Dept: ADMINISTRATIVE | Facility: CLINIC | Age: 82
End: 2022-08-09
Payer: MEDICARE

## 2022-08-09 NOTE — TELEPHONE ENCOUNTER
Called pt, no answer; left message informing pt I was calling to remind pt of her in office EAWV on 8/11/22 and to return my call if she had any questions; left my name and number

## 2022-08-11 ENCOUNTER — OFFICE VISIT (OUTPATIENT)
Dept: PRIMARY CARE CLINIC | Facility: CLINIC | Age: 82
End: 2022-08-11
Payer: MEDICARE

## 2022-08-11 VITALS
HEART RATE: 67 BPM | OXYGEN SATURATION: 95 % | BODY MASS INDEX: 45.29 KG/M2 | HEIGHT: 64 IN | SYSTOLIC BLOOD PRESSURE: 138 MMHG | WEIGHT: 265.31 LBS | DIASTOLIC BLOOD PRESSURE: 84 MMHG

## 2022-08-11 DIAGNOSIS — Z91.81 RISK FOR FALLS: ICD-10-CM

## 2022-08-11 DIAGNOSIS — I25.83 CORONARY ARTERY DISEASE DUE TO LIPID RICH PLAQUE: Chronic | ICD-10-CM

## 2022-08-11 DIAGNOSIS — N18.30 TYPE 2 DIABETES MELLITUS WITH STAGE 3 CHRONIC KIDNEY DISEASE, WITHOUT LONG-TERM CURRENT USE OF INSULIN, UNSPECIFIED WHETHER STAGE 3A OR 3B CKD: ICD-10-CM

## 2022-08-11 DIAGNOSIS — D50.0 IRON DEFICIENCY ANEMIA DUE TO CHRONIC BLOOD LOSS: ICD-10-CM

## 2022-08-11 DIAGNOSIS — Z80.0 FAMILY HISTORY OF COLON CANCER: ICD-10-CM

## 2022-08-11 DIAGNOSIS — E11.69 HYPERLIPIDEMIA ASSOCIATED WITH TYPE 2 DIABETES MELLITUS: ICD-10-CM

## 2022-08-11 DIAGNOSIS — H25.10 NUCLEAR SENILE CATARACT, UNSPECIFIED LATERALITY: ICD-10-CM

## 2022-08-11 DIAGNOSIS — E66.01 CLASS 3 SEVERE OBESITY DUE TO EXCESS CALORIES WITH SERIOUS COMORBIDITY IN ADULT, UNSPECIFIED BMI: ICD-10-CM

## 2022-08-11 DIAGNOSIS — N18.30 CKD STAGE 3 SECONDARY TO DIABETES: ICD-10-CM

## 2022-08-11 DIAGNOSIS — E11.51 CONTROLLED TYPE 2 DM WITH PERIPHERAL CIRCULATORY DISORDER: Chronic | ICD-10-CM

## 2022-08-11 DIAGNOSIS — M17.0 PRIMARY OSTEOARTHRITIS OF BOTH KNEES: ICD-10-CM

## 2022-08-11 DIAGNOSIS — Z91.81 HISTORY OF FALL: ICD-10-CM

## 2022-08-11 DIAGNOSIS — I25.2 OLD MI (MYOCARDIAL INFARCTION): ICD-10-CM

## 2022-08-11 DIAGNOSIS — E11.22 TYPE 2 DIABETES MELLITUS WITH STAGE 3 CHRONIC KIDNEY DISEASE, WITHOUT LONG-TERM CURRENT USE OF INSULIN, UNSPECIFIED WHETHER STAGE 3A OR 3B CKD: ICD-10-CM

## 2022-08-11 DIAGNOSIS — R09.89 PROMINENT AORTA: ICD-10-CM

## 2022-08-11 DIAGNOSIS — E11.59 TYPE 2 DIABETES MELLITUS WITH OTHER CIRCULATORY COMPLICATION, WITHOUT LONG-TERM CURRENT USE OF INSULIN: ICD-10-CM

## 2022-08-11 DIAGNOSIS — I25.10 CORONARY ARTERY DISEASE DUE TO LIPID RICH PLAQUE: Chronic | ICD-10-CM

## 2022-08-11 DIAGNOSIS — E11.22 CKD STAGE 3 SECONDARY TO DIABETES: ICD-10-CM

## 2022-08-11 DIAGNOSIS — Z96.652 STATUS POST REVISION OF TOTAL REPLACEMENT OF LEFT KNEE: ICD-10-CM

## 2022-08-11 DIAGNOSIS — E11.59 HYPERTENSION ASSOCIATED WITH DIABETES: ICD-10-CM

## 2022-08-11 DIAGNOSIS — H83.03 LABYRINTHITIS OF BOTH EARS: ICD-10-CM

## 2022-08-11 DIAGNOSIS — Z87.19 HISTORY OF GASTROINTESTINAL BLEEDING: ICD-10-CM

## 2022-08-11 DIAGNOSIS — C50.412 MALIGNANT NEOPLASM OF UPPER-OUTER QUADRANT OF LEFT FEMALE BREAST, UNSPECIFIED ESTROGEN RECEPTOR STATUS: ICD-10-CM

## 2022-08-11 DIAGNOSIS — I73.9 PERIPHERAL VASCULAR DISEASE: ICD-10-CM

## 2022-08-11 DIAGNOSIS — Z74.09 OTHER REDUCED MOBILITY: ICD-10-CM

## 2022-08-11 DIAGNOSIS — M10.9 GOUT, ARTHRITIS: ICD-10-CM

## 2022-08-11 DIAGNOSIS — Z99.89 DEPENDENCE ON OTHER ENABLING MACHINES AND DEVICES: ICD-10-CM

## 2022-08-11 DIAGNOSIS — Z95.5 S/P CORONARY ARTERY STENT PLACEMENT: ICD-10-CM

## 2022-08-11 DIAGNOSIS — I15.2 HYPERTENSION ASSOCIATED WITH DIABETES: ICD-10-CM

## 2022-08-11 DIAGNOSIS — I10 BENIGN ESSENTIAL HYPERTENSION: ICD-10-CM

## 2022-08-11 DIAGNOSIS — Z00.00 ENCOUNTER FOR PREVENTIVE HEALTH EXAMINATION: Primary | ICD-10-CM

## 2022-08-11 DIAGNOSIS — E78.5 HYPERLIPIDEMIA ASSOCIATED WITH TYPE 2 DIABETES MELLITUS: ICD-10-CM

## 2022-08-11 DIAGNOSIS — G56.00 CARPAL TUNNEL SYNDROME, UNSPECIFIED LATERALITY: ICD-10-CM

## 2022-08-11 DIAGNOSIS — I16.0 HYPERTENSIVE URGENCY: ICD-10-CM

## 2022-08-11 DIAGNOSIS — E11.9 DM TYPE 2 WITHOUT RETINOPATHY: ICD-10-CM

## 2022-08-11 DIAGNOSIS — R07.9 CHEST PAIN, UNSPECIFIED TYPE: ICD-10-CM

## 2022-08-11 DIAGNOSIS — G40.409 TONIC-CLONIC EPILEPTIC SEIZURES: ICD-10-CM

## 2022-08-11 DIAGNOSIS — I65.23 BILATERAL CAROTID ARTERY STENOSIS: ICD-10-CM

## 2022-08-11 DIAGNOSIS — K21.9 GASTROESOPHAGEAL REFLUX DISEASE, UNSPECIFIED WHETHER ESOPHAGITIS PRESENT: ICD-10-CM

## 2022-08-11 PROCEDURE — 3075F SYST BP GE 130 - 139MM HG: CPT | Mod: CPTII,S$GLB,, | Performed by: NURSE PRACTITIONER

## 2022-08-11 PROCEDURE — 1126F AMNT PAIN NOTED NONE PRSNT: CPT | Mod: CPTII,S$GLB,, | Performed by: NURSE PRACTITIONER

## 2022-08-11 PROCEDURE — 3075F PR MOST RECENT SYSTOLIC BLOOD PRESS GE 130-139MM HG: ICD-10-PCS | Mod: CPTII,S$GLB,, | Performed by: NURSE PRACTITIONER

## 2022-08-11 PROCEDURE — 3288F PR FALLS RISK ASSESSMENT DOCUMENTED: ICD-10-PCS | Mod: CPTII,S$GLB,, | Performed by: NURSE PRACTITIONER

## 2022-08-11 PROCEDURE — 3079F PR MOST RECENT DIASTOLIC BLOOD PRESSURE 80-89 MM HG: ICD-10-PCS | Mod: CPTII,S$GLB,, | Performed by: NURSE PRACTITIONER

## 2022-08-11 PROCEDURE — 99999 PR PBB SHADOW E&M-EST. PATIENT-LVL IV: CPT | Mod: PBBFAC,,, | Performed by: NURSE PRACTITIONER

## 2022-08-11 PROCEDURE — 1101F PT FALLS ASSESS-DOCD LE1/YR: CPT | Mod: CPTII,S$GLB,, | Performed by: NURSE PRACTITIONER

## 2022-08-11 PROCEDURE — 99499 RISK ADDL DX/OHS AUDIT: ICD-10-PCS | Mod: S$GLB,,, | Performed by: NURSE PRACTITIONER

## 2022-08-11 PROCEDURE — G0439 PPPS, SUBSEQ VISIT: HCPCS | Mod: S$GLB,,, | Performed by: NURSE PRACTITIONER

## 2022-08-11 PROCEDURE — 3079F DIAST BP 80-89 MM HG: CPT | Mod: CPTII,S$GLB,, | Performed by: NURSE PRACTITIONER

## 2022-08-11 PROCEDURE — 99499 UNLISTED E&M SERVICE: CPT | Mod: S$GLB,,, | Performed by: NURSE PRACTITIONER

## 2022-08-11 PROCEDURE — 1101F PR PT FALLS ASSESS DOC 0-1 FALLS W/OUT INJ PAST YR: ICD-10-PCS | Mod: CPTII,S$GLB,, | Performed by: NURSE PRACTITIONER

## 2022-08-11 PROCEDURE — G0439 PR MEDICARE ANNUAL WELLNESS SUBSEQUENT VISIT: ICD-10-PCS | Mod: S$GLB,,, | Performed by: NURSE PRACTITIONER

## 2022-08-11 PROCEDURE — 1126F PR PAIN SEVERITY QUANTIFIED, NO PAIN PRESENT: ICD-10-PCS | Mod: CPTII,S$GLB,, | Performed by: NURSE PRACTITIONER

## 2022-08-11 PROCEDURE — 3288F FALL RISK ASSESSMENT DOCD: CPT | Mod: CPTII,S$GLB,, | Performed by: NURSE PRACTITIONER

## 2022-08-11 PROCEDURE — 99999 PR PBB SHADOW E&M-EST. PATIENT-LVL IV: ICD-10-PCS | Mod: PBBFAC,,, | Performed by: NURSE PRACTITIONER

## 2022-08-11 NOTE — PROGRESS NOTES
"Renata Bergman presented for a follow-up Medicare AWV and Health Risk Assessment  today. The following components were reviewed and updated:    Medical history  Family History  Social history  Allergies and Current Medications  Health Risk Assessment  Health Maintenance  Care Team    **See Completed Assessments for Annual Wellness visit with in the encounter summary    The following assessments were completed:  Depression Screening  Cognitive function Screening  Timed Get Up Test  Whisper Test  Nutrition  Activities of Daily Living   PAQ          Vitals:    08/11/22 0823   BP: 138/84   BP Location: Right arm   Patient Position: Sitting   BP Method: Large (Manual)   Pulse: 67   SpO2: 95%   Weight: 120.4 kg (265 lb 5.2 oz)   Height: 5' 4" (1.626 m)     Body mass index is 45.54 kg/m².   ]      Physical Exam  Constitutional:       Appearance: Normal appearance.   HENT:      Head: Normocephalic.      Right Ear: External ear normal.      Left Ear: External ear normal.      Nose: Nose normal.      Mouth/Throat:      Mouth: Mucous membranes are moist.      Pharynx: Oropharynx is clear.   Eyes:      Pupils: Pupils are equal, round, and reactive to light.   Cardiovascular:      Rate and Rhythm: Normal rate.      Pulses: Normal pulses.      Heart sounds: Normal heart sounds.   Pulmonary:      Effort: Pulmonary effort is normal.   Abdominal:      General: Bowel sounds are normal.   Musculoskeletal:         General: Normal range of motion.      Cervical back: Normal range of motion and neck supple.   Skin:     General: Skin is warm and dry.      Capillary Refill: Capillary refill takes less than 2 seconds.   Neurological:      Mental Status: She is alert and oriented to person, place, and time.   Psychiatric:         Behavior: Behavior normal.     Diagnoses and health risks identified today and associated recommendations/orders:  1. Encounter for preventive health examination  Screenings performed,  as noted above. Personal " preventive testing needs reviewed.     2. Dependence on other enabling machines and devices  Stable, followed by PCP.     3. Other reduced mobility  Stable, followed by PCP.     4. Carpal tunnel syndrome, unspecified laterality  Stable, followed by PCP.     5. Tonic-clonic epileptic seizures  Stable, followed by PCP.     6. Nuclear senile cataract, unspecified laterality  Stable, followed by PCP.     7. Labyrinthitis of both ears  Stable, followed by PCP.     8. Coronary artery disease due to lipid rich plaque  Stable, followed by PCP.     9. Benign essential hypertension  Stable, followed by PCP.     10. Hyperlipidemia associated with type 2 diabetes mellitus  Stable, followed by PCP.     11. S/P coronary artery stent placement  Stable, followed by PCP.     12. Chest pain, unspecified type  Stable, followed by PCP.     13. Old MI (myocardial infarction)  Stable, followed by PCP.     14. Bilateral carotid artery stenosis  Stable, followed by PCP.     15. Prominent aorta  Stable, followed by PCP.     16. Peripheral vascular disease  Stable, followed by PCP.     17. Hypertension associated with diabetes  Stable, followed by PCP.     18. Hypertensive urgency  Stable, followed by PCP.     19. Stage 3a chronic kidney disease  Stable, followed by PCP.     20. CKD stage 3 secondary to diabetes  Stable, followed by PCP.     21. Family history of colon cancer  Stable, followed by PCP.     22. Malignant neoplasm of upper-outer quadrant of left female breast, unspecified estrogen receptor status  Stable, followed by PCP.     23. Iron deficiency anemia due to chronic blood loss  Stable, followed by PCP.     24. Controlled type 2 DM with peripheral circulatory disorder  Stable, followed by PCP.     25. Type 2 diabetes mellitus with stage 3 chronic kidney disease, without long-term current use of insulin, unspecified whether stage 3a or 3b CKD  Stable, followed by PCP.   - Microalbumin/Creatinine Ratio, Urine; Future    26. Type  2 diabetes mellitus with other circulatory complication, without long-term current use of insulin  Stable, followed by PCP.     27. Class 3 severe obesity due to excess calories with serious comorbidity in adult, unspecified BMI  Stable, followed by PCP.     28. DM type 2 without retinopathy  Stable, followed by PCP.     29. Gastroesophageal reflux disease, unspecified whether esophagitis present  Stable, followed by PCP.     30. History of gastrointestinal bleeding  Stable, followed by PCP.     31. Gout, arthritis  Stable, followed by PCP.     32. Primary osteoarthritis of both knees  Stable, followed by PCP.     33. 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     34. History of fall  Stable, followed by PCP.     35. Risk for falls  Stable, followed by PCP.     Review for Opioid Screening: Pt does not have Rx for Opioids.  Review for Substance Use Disorders: Patient does not use substance.    I offered to discuss advanced care planning, including how to pick a person who would make decisions for you if you were unable to make them for yourself, called a health care power of , and what kind of decisions you might make such as use of life sustaining treatments such as ventilators and tube feeding when faced with a life limiting illness recorded on a living will that they will need to know. (How you want to be cared for as you near the end of your natural life)     X Patient is interested in learning more about how to make advanced directives.  I provided them paperwork and offered to discuss this with them.    Provided Renata with a 5-10 year written screening schedule and personal prevention plan. Recommendations were developed using the USPSTF age appropriate recommendations. Education, counseling, and referrals were provided as needed.  After Visit Summary printed and given to patient which includes a list of additional screenings\tests needed.    Follow up  in 1 year (on 8/11/2023), or Annual Wellness Examination.      Debbie Eaton NP

## 2022-08-11 NOTE — PATIENT INSTRUCTIONS
Counseling and Referral of Other Preventative  (Italic type indicates deductible and co-insurance are waived)    Patient Name: Renata Bergman  Today's Date: 8/11/2022    Health Maintenance       Date Due Completion Date    Diabetes Urine Screening 06/27/2020 6/27/2019    Foot Exam 08/13/2022 8/13/2021    Shingles Vaccine (1 of 2) 11/18/2022 (Originally 8/10/1959) ---    Influenza Vaccine (1) 09/01/2022 10/11/2021    Override on 2/6/2020: Declined    Hemoglobin A1c 12/22/2022 6/22/2022    Eye Exam 04/20/2023 4/20/2022    Override on 2/28/2018: Done    Override on 4/13/2017: Done    Lipid Panel 06/22/2023 6/22/2022    Colonoscopy 10/08/2023 10/8/2018    DEXA Scan 10/19/2023 10/19/2021    Override on 11/6/2008: Done    TETANUS VACCINE 05/05/2026 5/5/2016        No orders of the defined types were placed in this encounter.    The following information is provided to all patients.  This information is to help you find resources for any of the problems found today that may be affecting your health:                Living healthy guide: www.[facility].[place].gov      Understanding Diabetes: www.diabetes.org      Eating healthy: www.cdc.gov/healthyweight      CDC home safety checklist: www.cdc.gov/steadi/patient.html      Agency on Aging: www.goea.[place].gov      Alcoholics anonymous (AA): www.aa.org      Physical Activity: www.soni.nih.gov/ks6rngq      Tobacco use: www.quitwithusla.org

## 2022-08-16 ENCOUNTER — CLINICAL SUPPORT (OUTPATIENT)
Dept: PRIMARY CARE CLINIC | Facility: CLINIC | Age: 82
End: 2022-08-16
Payer: MEDICARE

## 2022-08-16 DIAGNOSIS — E11.51 CONTROLLED TYPE 2 DM WITH PERIPHERAL CIRCULATORY DISORDER: ICD-10-CM

## 2022-08-16 LAB
ALBUMIN/CREAT UR: 7.6 UG/MG (ref 0–30)
CREAT UR-MCNC: 66 MG/DL (ref 15–325)
MICROALBUMIN UR DL<=1MG/L-MCNC: 5 UG/ML

## 2022-08-16 PROCEDURE — 82570 ASSAY OF URINE CREATININE: CPT | Performed by: INTERNAL MEDICINE

## 2022-08-16 PROCEDURE — 82043 UR ALBUMIN QUANTITATIVE: CPT | Performed by: INTERNAL MEDICINE

## 2022-08-19 ENCOUNTER — HOSPITAL ENCOUNTER (OUTPATIENT)
Dept: RADIOLOGY | Facility: HOSPITAL | Age: 82
Discharge: HOME | End: 2022-08-19
Attending: NURSE PRACTITIONER
Payer: MEDICARE

## 2022-08-19 DIAGNOSIS — Z12.31 ENCOUNTER FOR SCREENING MAMMOGRAM FOR MALIGNANT NEOPLASM OF BREAST: ICD-10-CM

## 2022-08-19 DIAGNOSIS — C50.412 MALIGNANT NEOPLASM OF UPPER-OUTER QUADRANT OF LEFT FEMALE BREAST, UNSPECIFIED ESTROGEN RECEPTOR STATUS: ICD-10-CM

## 2022-08-19 PROCEDURE — 77063 BREAST TOMOSYNTHESIS BI: CPT | Mod: TC

## 2022-08-19 PROCEDURE — 77067 MAMMO DIGITAL SCREENING BILAT WITH TOMO: ICD-10-PCS | Mod: 26,,, | Performed by: RADIOLOGY

## 2022-08-19 PROCEDURE — 77067 SCR MAMMO BI INCL CAD: CPT | Mod: TC

## 2022-08-19 PROCEDURE — 77063 MAMMO DIGITAL SCREENING BILAT WITH TOMO: ICD-10-PCS | Mod: 26,,, | Performed by: RADIOLOGY

## 2022-08-19 PROCEDURE — 77063 BREAST TOMOSYNTHESIS BI: CPT | Mod: 26,,, | Performed by: RADIOLOGY

## 2022-08-19 PROCEDURE — 77067 SCR MAMMO BI INCL CAD: CPT | Mod: 26,,, | Performed by: RADIOLOGY

## 2022-09-06 ENCOUNTER — OFFICE VISIT (OUTPATIENT)
Dept: INTERNAL MEDICINE | Facility: CLINIC | Age: 82
End: 2022-09-06
Payer: MEDICARE

## 2022-09-06 VITALS
WEIGHT: 264.56 LBS | DIASTOLIC BLOOD PRESSURE: 80 MMHG | HEART RATE: 84 BPM | SYSTOLIC BLOOD PRESSURE: 136 MMHG | HEIGHT: 64 IN | BODY MASS INDEX: 45.17 KG/M2 | OXYGEN SATURATION: 98 %

## 2022-09-06 DIAGNOSIS — E11.51 CONTROLLED TYPE 2 DM WITH PERIPHERAL CIRCULATORY DISORDER: Chronic | ICD-10-CM

## 2022-09-06 DIAGNOSIS — D50.0 IRON DEFICIENCY ANEMIA DUE TO CHRONIC BLOOD LOSS: ICD-10-CM

## 2022-09-06 DIAGNOSIS — Z00.00 ROUTINE GENERAL MEDICAL EXAMINATION AT A HEALTH CARE FACILITY: Primary | ICD-10-CM

## 2022-09-06 DIAGNOSIS — I10 BENIGN ESSENTIAL HYPERTENSION: ICD-10-CM

## 2022-09-06 DIAGNOSIS — E78.2 MIXED HYPERLIPIDEMIA: ICD-10-CM

## 2022-09-06 PROCEDURE — 99499 UNLISTED E&M SERVICE: CPT | Mod: S$GLB,,, | Performed by: INTERNAL MEDICINE

## 2022-09-06 PROCEDURE — 99397 PR PREVENTIVE VISIT,EST,65 & OVER: ICD-10-PCS | Mod: GZ,S$GLB,, | Performed by: INTERNAL MEDICINE

## 2022-09-06 PROCEDURE — 99397 PER PM REEVAL EST PAT 65+ YR: CPT | Mod: GZ,S$GLB,, | Performed by: INTERNAL MEDICINE

## 2022-09-06 PROCEDURE — 1126F AMNT PAIN NOTED NONE PRSNT: CPT | Mod: CPTII,S$GLB,, | Performed by: INTERNAL MEDICINE

## 2022-09-06 PROCEDURE — 3079F DIAST BP 80-89 MM HG: CPT | Mod: CPTII,S$GLB,, | Performed by: INTERNAL MEDICINE

## 2022-09-06 PROCEDURE — 99999 PR PBB SHADOW E&M-EST. PATIENT-LVL III: CPT | Mod: PBBFAC,,, | Performed by: INTERNAL MEDICINE

## 2022-09-06 PROCEDURE — 99499 RISK ADDL DX/OHS AUDIT: ICD-10-PCS | Mod: S$GLB,,, | Performed by: INTERNAL MEDICINE

## 2022-09-06 PROCEDURE — 1126F PR PAIN SEVERITY QUANTIFIED, NO PAIN PRESENT: ICD-10-PCS | Mod: CPTII,S$GLB,, | Performed by: INTERNAL MEDICINE

## 2022-09-06 PROCEDURE — 3075F PR MOST RECENT SYSTOLIC BLOOD PRESS GE 130-139MM HG: ICD-10-PCS | Mod: CPTII,S$GLB,, | Performed by: INTERNAL MEDICINE

## 2022-09-06 PROCEDURE — 3288F FALL RISK ASSESSMENT DOCD: CPT | Mod: CPTII,S$GLB,, | Performed by: INTERNAL MEDICINE

## 2022-09-06 PROCEDURE — 1101F PR PT FALLS ASSESS DOC 0-1 FALLS W/OUT INJ PAST YR: ICD-10-PCS | Mod: CPTII,S$GLB,, | Performed by: INTERNAL MEDICINE

## 2022-09-06 PROCEDURE — 1159F MED LIST DOCD IN RCRD: CPT | Mod: CPTII,S$GLB,, | Performed by: INTERNAL MEDICINE

## 2022-09-06 PROCEDURE — 3079F PR MOST RECENT DIASTOLIC BLOOD PRESSURE 80-89 MM HG: ICD-10-PCS | Mod: CPTII,S$GLB,, | Performed by: INTERNAL MEDICINE

## 2022-09-06 PROCEDURE — 99999 PR PBB SHADOW E&M-EST. PATIENT-LVL III: ICD-10-PCS | Mod: PBBFAC,,, | Performed by: INTERNAL MEDICINE

## 2022-09-06 PROCEDURE — 3288F PR FALLS RISK ASSESSMENT DOCUMENTED: ICD-10-PCS | Mod: CPTII,S$GLB,, | Performed by: INTERNAL MEDICINE

## 2022-09-06 PROCEDURE — 3075F SYST BP GE 130 - 139MM HG: CPT | Mod: CPTII,S$GLB,, | Performed by: INTERNAL MEDICINE

## 2022-09-06 PROCEDURE — 1159F PR MEDICATION LIST DOCUMENTED IN MEDICAL RECORD: ICD-10-PCS | Mod: CPTII,S$GLB,, | Performed by: INTERNAL MEDICINE

## 2022-09-06 PROCEDURE — 1101F PT FALLS ASSESS-DOCD LE1/YR: CPT | Mod: CPTII,S$GLB,, | Performed by: INTERNAL MEDICINE

## 2022-09-06 RX ORDER — EZETIMIBE 10 MG/1
10 TABLET ORAL DAILY
Qty: 90 TABLET | Refills: 4 | Status: SHIPPED | OUTPATIENT
Start: 2022-09-06 | End: 2022-10-17 | Stop reason: SDUPTHER

## 2022-09-06 NOTE — PROGRESS NOTES
CHIEF COMPLAINT:  Annual exam and follow up NSTEMI, breast cancer, diabetes, hypertension, hyperlipidemia.     HISTORY OF PRESENT ILLNESS: 82 year-old woman who present for follow up of above.    She was hospitalized 7/16/22 to 7/18/22. She had chest pain and elevated blood pressure.  Nuclear stress test was fine. Coreg was increased. She takes aspirin 81 mg daily  and coreg 12.5 mg twice daily and amlodipine 10 mg daily.   No chest pain or shortness of breath.   She is on crestor 40 mg daily. 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 Dr Agudelo stopped the plavix on 8/9/21 and is doing fine off Plavix.         She had a vibratory sense in her left foot for a several seconds twice yesterday. No pain. She was laying in bed reading the first time.  This morning it occurred while standing. No back pain.      No  more episodes of vertigo.     She had  a left knee replacement 4/15/21.She is walking with a rollator. She is doing fine. She can tell when it is going to rain.       She will take tylenol 325 mg once or twice a week as needed for general aches and pain.            She is taking Keppra  mg 2 in the afternoon.  Gait is better with taking the Keppra in the late afternoon.  No falls.  No seizures.      She has completed 5 cycles of CMF for her breast cancer. She completed radiation the end of April 6, 2017.  She took Femara  4/2017 - 4/2018. She had irritation of the lips on Femara. She is taking anastrozole 1 mg daily. Saw Heme Onc 2/3/21 and MMG on 8/2022      Back is doing well. She is doing stretches every other day     She continues to take allopurinol 300 mg daily for her gout. She has not had any gouty flares. She has occasional left elbow pain.      Her blood sugars have been normal - . She is off metformin 850 mg twice daily. She checks blood sugars once daily.  She denies any polydipsia or polyuria. She continues to take aspirin for  "her coronary artery disease.      Sinuses have been doing well. She has not had to take Allegra lately. She denies any nausea, vomiting, diarrhea. She has some constipation - once a week.  She took a dulcolax.      Reflux is well controlled on omeprazole 20 mg two tablets daily.      She continues to take Crestor 40 mg daily for her hyperlipidemia. She denies any joint pain or muscle pain from the Crestor.      She is taking vitamin D 50,000 units twice weekly for vitamin D deficiency     Her daughter who is 46 has stage 2 breast cancer. She has had a lumpectomy and chemo and radiation. Her daughter is doing well. She has finished her treatment. Her daughter might be BRCA positive. Mrs Bergman feels that she is coping well with her diagnosis of breast cancer. No anxiety, depression or insomnia.      PAST MEDICAL HISTORY:   1. Hypertension.   2. Diabetes mellitus   3. Hyperlipidemia.   4. Reflux.   5. Gout.   6. Coronary artery disease, status post MI in  with stenting at that time, and then a right coronary artery stent placed in . Had a negative stress test 2008. [facility] 2012 - patent stents and non obstructive cad  7. Osteoarthritis of the right knee. S/P right knee replacement   8. Status post left knee replacement.   9. Status post LISA/BSO secondary to menstrual disorder or polyps after tubal ligation.   10. Left breast cancer and BRCA2 positive    MEDICATIONS and ALLERGIES: Updated on epic.       Family History  Mother had breast cancer in her early 50's then had colon cancer in her 60's. She  of uterine cancer  2 Maternal aunts with breast cancer  Daughter with breast cancer at age 45 - BRCA positive  Father  of a gunshot wound  She is an only child    PHYSICAL EXAMINATION:           /80 (BP Location: Right arm, Patient Position: Sitting)   Pulse 84   Ht 5' 4" (1.626 m)   Wt 120 kg (264 lb 8.8 oz)   LMP  (LMP Unknown)   SpO2 98%   BMI 45.41 kg/m²     General: Alert, oriented. No " apparent distress. Affect within normal   limits.   Conjunctivae anicteric. Neck supple.   Respiratory effort normal. Lungs clear to auscultation.   Heart: Regular rate and rhythm without murmurs, gallops or rubs.   No lower extremity edema.    Well healed scar over the left leg with some minimal surrounding edema.     Protective Sensation (w/ 10 gram monofilament):  Right: Intact  Left: Intact    Visual Inspection:  Normal -  Bilateral    Pedal Pulses:   Right: Present  Left: Present    Posterior tibialis:   Right:Present  Left: Present             Medical records reviewed     ASSESESSMENT    Annual exam - discussed diet, exercise and safety issues.    1.  S/P left knee replacement - doing well.   1. HTN- controlled  2. Iron deficiency anemia - on oral iron -stable  3. Angioectasias of duodenal bulb, gastric body, On omeprazole 20 mg 2 tablets daily. S/p EGD 1/31/20 with cautery. Will montior blood counts closely.   4.  CAD with NSTEMI 5/2017- s/p stenting 5/2017 - on risk factor modifications.    5. Left breast cancer with BRCA2 positive -Finished chemo and  radiation. On anastrazole-  6. Seizure -stable on Keppra XR   7.  Diabetes - controlled.  9. Gout -controlled on allopurinol    10. Hyperlipidemia - on Crestor 40 mg daily. Controlled   11. Obesity - working at diet, exercise and weight loss.   12.  GERD - controlled on meds  13. Hypokalemia -  on replacement potassium              Screening - mammogram 8/22  Colonoscopy 4/23/12 - normal, and 5/25/15 - 3 small polyps (one adenoma).  Colonoscopy 10/8/18 - one polyp - due 2023.     Bone density was normal 10/2021       I'll see her back 4 months,  sooner if problems arise.

## 2022-10-17 DIAGNOSIS — E78.2 MIXED HYPERLIPIDEMIA: ICD-10-CM

## 2022-10-17 DIAGNOSIS — I10 HTN (HYPERTENSION), BENIGN: ICD-10-CM

## 2022-10-17 RX ORDER — EZETIMIBE 10 MG/1
10 TABLET ORAL DAILY
Qty: 90 TABLET | Refills: 4 | Status: SHIPPED | OUTPATIENT
Start: 2022-10-17 | End: 2023-06-23 | Stop reason: SDUPTHER

## 2022-10-17 NOTE — TELEPHONE ENCOUNTER
----- Message from Ashely Veloz sent at 10/17/2022  8:23 AM CDT -----  Contact: [phone]  Requesting an RX refill or new RX.  Is this a refill or new RX: refill  RX name and strength (copy/paste from chart):  ezetimibe (ZETIA) 10 mg tablet  Is this a 30 day or 90 day RX: 30  Pharmacy name and phone # (copy/paste from chart):    [facility][id number] [address] [address]  Phone: [phone] Fax: [phone]     The doctors have asked that we provide their patients with the following 2 reminders -- prescription refills can take up to 72 hours, and a friendly reminder that in the future you can use your [facility] account to request refills: yes

## 2022-10-17 NOTE — TELEPHONE ENCOUNTER
Care Due:                  Date            Visit Type   Department     Provider  --------------------------------------------------------------------------------                                EP -                              PRIMARY      [facility] INTERNAL  Last Visit: 09-      CARE ([facility])   MEDICINE       AKANKSHA LARIOS                              EP                               PRIMARY      [facility] INTERNAL  Next Visit: 01-      CARE ([facility])   MEDICINE       AKANKSHA LARIOS                                                            Last  Test          Frequency    Reason                     Performed    Due Date  --------------------------------------------------------------------------------    Uric Acid...  12 months..  allopurinoL..............  11- 11-    [employer] Embedded Care Gaps. Reference number: [id number]. 10/17/2022   8:59:12 AM CDT

## 2022-10-18 NOTE — TELEPHONE ENCOUNTER
Refill Routing Note   Medication(s) are not appropriate for processing by [facility] for the following reason(s):      - Patient has been seen in the ED/Hospital since the last PCP visit    [facility] action(s):  Defer Medication-related problems identified: Requires appointment     Medication Therapy Plan: [id number] Admission since last office visit with PCP  Medication reconciliation completed: No     Appointments  past 12m or future 3m with PCP    Date Provider   Last Visit   9/6/2022 Ethel Berger MD   Next Visit   1/5/2023 Ethel Berger MD   ED visits in past 90 days: 0        Note composed:4:56 PM 10/18/2022

## 2022-10-18 NOTE — TELEPHONE ENCOUNTER
----- Message from Logan Duke sent at 10/18/2022  4:32 PM CDT -----  Contact: [phone]  Requesting an RX refill or new RX.  Is this a refill or new RX: refill  RX name and strength (copy/paste from chart):  amLODIPine (NORVASC) 10 MG tablet  Is this a 30 day or 90 day RX:   Pharmacy name and phone # (copy/paste from chart):    [facility] #[id number] - [address] AT [address] [address]  Phone: [phone] Fax: [phone]  The doctors have asked that we provide their patients with the following 2 reminders -- prescription refills can take up to 72 hours, and a friendly reminder that in the future you can use your [facility] account to request refills: call back    Requesting an RX refill or new RX.  Is this a refill or new RX: refill  RX name and strength (copy/paste from chart):  carvediloL (COREG) 12.5 MG tablet  Is this a 30 day or 90 day RX:   Pharmacy name and phone # (copy/paste from chart):    [facility] #[id number] [address] AT [address] [address]  Phone: [phone] Fax: [phone]  The doctors have asked that we provide their patients with the following 2 reminders -- prescription refills can take up to 72 hours, and a friendly reminder that in the future you can use your [facility] account to request refills: call back

## 2022-10-19 RX ORDER — CARVEDILOL 12.5 MG/1
12.5 TABLET ORAL 2 TIMES DAILY WITH MEALS
Qty: 60 TABLET | Refills: 3 | Status: SHIPPED | OUTPATIENT
Start: 2022-10-19 | End: 2023-01-19

## 2022-10-19 RX ORDER — AMLODIPINE BESYLATE 10 MG/1
10 TABLET ORAL DAILY
Qty: 90 TABLET | Refills: 0 | Status: SHIPPED | OUTPATIENT
Start: 2022-10-19 | End: 2023-01-05 | Stop reason: SDUPTHER

## 2022-11-01 ENCOUNTER — TELEPHONE (OUTPATIENT)
Dept: INTERNAL MEDICINE | Facility: CLINIC | Age: 82
End: 2022-11-01
Payer: MEDICARE

## 2022-11-01 NOTE — TELEPHONE ENCOUNTER
----- Message from Rita Cooley sent at 11/1/2022 12:31 PM CDT -----  Pt would like to be called back  wants to know if she should take  Flu and booster shot    Pt can be reached at  [phone]

## 2022-11-02 ENCOUNTER — TELEPHONE (OUTPATIENT)
Dept: HEMATOLOGY/ONCOLOGY | Facility: CLINIC | Age: 82
End: 2022-11-02
Payer: MEDICARE

## 2022-12-08 NOTE — PROGRESS NOTES
Subjective:       Patient ID: eRnata Bergman is a 82 y.o. female.    Chief Complaint: Malignant neoplasm of upper-outer quadrant of left female b      HPI Mrs. Hussein is a 82-year-old female with stage I left breast cancer.  She is currently on anasatrozole endocrine therapy.    Overall she has been doing well.   She does have a cold, she was flu negative, the symptoms are bothersome, but otherwise she is doing well.   Appetite and bowel movements are good.  Denies hot flashes, vaginal dryness and arthralgias.   Her other medical issues include diabetes, seizures and coronary artery disease.       Per Dr. Guevara's previous note  Breast history: Screening mammogram on May 5, 2016 showed an irregular 22 mm mass with abnormal calcifications in the left breast 2:00 position. Follow-up ultrasound July 13 she noted a regular solid 17 mm mass.    On July 21 needle biopsy showed grade 2 infiltrating ductal carcinoma strongly ER LA positive (90%) and HER-2 negative.    On August 1, 2016 lumpectomy and sentinel lymph node biopsy were performed. That showed a 1.8 x 1.6 x 1.0 infiltrating carcinoma intermediate grade (histologic grade 3, nuclear grade 2, mitotic index 2.   The sentinel lymph node was negative and margins were negative. Final pathological stage TIc N0 stage IA.  Oncotype was 31 indicating a 21% risk of recurrence with tamoxifen alone.    She  had 3 weeks of weekly Taxol and then because of insurance issues she was changed to CMF and had 5 cycles of that.     Chemotherapy was completed in January 2017.    She completed radiation in April 2017 and began letrozole endocrine therapy that month.    Review of Systems   Constitutional:  Negative for activity change, appetite change, chills, diaphoresis, fatigue, fever and unexpected weight change.   HENT:  Positive for congestion and sinus pressure. Negative for nosebleeds.    Respiratory:  Positive for cough. Negative for shortness of breath.    Cardiovascular:   Negative for chest pain, palpitations and leg swelling.   Gastrointestinal:  Negative for abdominal distention, abdominal pain, blood in stool, constipation, diarrhea, nausea and vomiting.   Genitourinary:  Negative for hematuria and vaginal bleeding.   Musculoskeletal:  Negative for arthralgias, back pain and myalgias.   Skin:  Negative for pallor and rash.   Allergic/Immunologic: Negative for immunocompromised state.   Neurological:  Negative for dizziness, weakness, light-headedness, numbness and headaches.   Hematological:  Negative for adenopathy. Does not bruise/bleed easily.   Psychiatric/Behavioral:  Negative for confusion and dysphoric mood. The patient is not nervous/anxious.      Objective:      Physical Exam  Vitals and nursing note reviewed.   Constitutional:       General: She is not in acute distress.     Appearance: Normal appearance. She is well-developed.   HENT:      Head: Normocephalic.   Eyes:      Pupils: Pupils are equal, round, and reactive to light.   Cardiovascular:      Rate and Rhythm: Normal rate and regular rhythm.      Heart sounds: Normal heart sounds.   Pulmonary:      Effort: Pulmonary effort is normal.      Breath sounds: Normal breath sounds.   Chest:   Breasts:     Right: No mass, nipple discharge, skin change or tenderness.      Left: Skin change (due to lumpectomy) present. No mass, nipple discharge or tenderness.   Abdominal:      General: Bowel sounds are normal. There is no distension.      Palpations: Abdomen is soft.      Tenderness: There is no abdominal tenderness.   Musculoskeletal:      Cervical back: Normal range of motion.      Right lower leg: Edema present.      Left lower leg: Edema present.   Lymphadenopathy:      Cervical: No cervical adenopathy.      Upper Body:      Right upper body: No supraclavicular or axillary adenopathy.      Left upper body: No supraclavicular or axillary adenopathy.   Skin:     General: Skin is warm and dry.      Findings: No rash.    Neurological:      Mental Status: She is alert and oriented to person, place, and time.   Psychiatric:         Behavior: Behavior normal.       Assessment:       1. Malignant neoplasm of upper-outer quadrant of left female breast, unspecified estrogen receptor status    2. Iron deficiency anemia due to chronic blood loss    3. CKD stage 3 secondary to diabetes    4. Hypertension associated with diabetes    5. Hyperlipidemia associated with type 2 diabetes mellitus    6. Type 2 diabetes mellitus with stage 3 chronic kidney disease, without long-term current use of insulin, unspecified whether stage 3a or 3b CKD    7. Gastroesophageal reflux disease, unspecified whether esophagitis present        Plan:     1. Return to clinic in 6 months  Continue anastrozole.  She will be due for mammograms in August.  DXA scan normal bone density, will repeat in 2023  Breast Cancer Index indicates she needs to take 10 years of AI therapy    2. Will continue to monitor  3.  Will continue to monitor with PCP  4. Monitored today; continue current medication and follow up with PCP   5. Continue current medication and follow up with PCP  6-7. Monitored glucose today; continue current medications and follow up with endocrinology   8. Continue current medication and follow up with GI      Return to clinic in 6 months with MD appointment.     Patient is in agreement with the proposed treatment plan. All questions were answered to the patient's satisfaction. Patient knows to call clinic for any new or worsening symptoms and if anything is needed before the next clinic visit.          Yun Whitaker, NEWTON-C  Hematology & Medical Oncology   [facility] [place] [address]  ph. [phone]  Fax. [phone]    Collaborating physician, Dr. Guevara.    Total time spent with the patient: 20 minutes.  10 minutes of face to face consultation and 10 minutes of chart review and coordination of care.    Route Chart for  Scheduling    Med Onc Chart Routing      Follow up with physician    Follow up with LONDON 6 months.   Infusion scheduling note    Injection scheduling note    Labs    Imaging Mammogram   mammo in Augu   Pharmacy appointment    Other referrals

## 2022-12-22 ENCOUNTER — OFFICE VISIT (OUTPATIENT)
Dept: HEMATOLOGY/ONCOLOGY | Facility: CLINIC | Age: 82
End: 2022-12-22
Payer: MEDICARE

## 2022-12-22 VITALS
BODY MASS INDEX: 45.49 KG/M2 | WEIGHT: 266.44 LBS | TEMPERATURE: 98 F | RESPIRATION RATE: 18 BRPM | OXYGEN SATURATION: 99 % | HEIGHT: 64 IN | HEART RATE: 84 BPM | DIASTOLIC BLOOD PRESSURE: 68 MMHG | SYSTOLIC BLOOD PRESSURE: 147 MMHG

## 2022-12-22 DIAGNOSIS — N18.30 CKD STAGE 3 SECONDARY TO DIABETES: ICD-10-CM

## 2022-12-22 DIAGNOSIS — E11.69 HYPERLIPIDEMIA ASSOCIATED WITH TYPE 2 DIABETES MELLITUS: ICD-10-CM

## 2022-12-22 DIAGNOSIS — E78.5 HYPERLIPIDEMIA ASSOCIATED WITH TYPE 2 DIABETES MELLITUS: ICD-10-CM

## 2022-12-22 DIAGNOSIS — E11.22 TYPE 2 DIABETES MELLITUS WITH STAGE 3 CHRONIC KIDNEY DISEASE, WITHOUT LONG-TERM CURRENT USE OF INSULIN, UNSPECIFIED WHETHER STAGE 3A OR 3B CKD: ICD-10-CM

## 2022-12-22 DIAGNOSIS — N18.30 TYPE 2 DIABETES MELLITUS WITH STAGE 3 CHRONIC KIDNEY DISEASE, WITHOUT LONG-TERM CURRENT USE OF INSULIN, UNSPECIFIED WHETHER STAGE 3A OR 3B CKD: ICD-10-CM

## 2022-12-22 DIAGNOSIS — C50.412 MALIGNANT NEOPLASM OF UPPER-OUTER QUADRANT OF LEFT FEMALE BREAST, UNSPECIFIED ESTROGEN RECEPTOR STATUS: Primary | ICD-10-CM

## 2022-12-22 DIAGNOSIS — I15.2 HYPERTENSION ASSOCIATED WITH DIABETES: ICD-10-CM

## 2022-12-22 DIAGNOSIS — E11.22 CKD STAGE 3 SECONDARY TO DIABETES: ICD-10-CM

## 2022-12-22 DIAGNOSIS — D50.0 IRON DEFICIENCY ANEMIA DUE TO CHRONIC BLOOD LOSS: ICD-10-CM

## 2022-12-22 DIAGNOSIS — K21.9 GASTROESOPHAGEAL REFLUX DISEASE, UNSPECIFIED WHETHER ESOPHAGITIS PRESENT: ICD-10-CM

## 2022-12-22 DIAGNOSIS — E11.59 HYPERTENSION ASSOCIATED WITH DIABETES: ICD-10-CM

## 2022-12-22 DIAGNOSIS — Z12.31 ENCOUNTER FOR SCREENING MAMMOGRAM FOR MALIGNANT NEOPLASM OF BREAST: ICD-10-CM

## 2022-12-22 PROCEDURE — 3288F PR FALLS RISK ASSESSMENT DOCUMENTED: ICD-10-PCS | Mod: CPTII,S$GLB,, | Performed by: NURSE PRACTITIONER

## 2022-12-22 PROCEDURE — 3288F FALL RISK ASSESSMENT DOCD: CPT | Mod: CPTII,S$GLB,, | Performed by: NURSE PRACTITIONER

## 2022-12-22 PROCEDURE — 3078F PR MOST RECENT DIASTOLIC BLOOD PRESSURE < 80 MM HG: ICD-10-PCS | Mod: CPTII,S$GLB,, | Performed by: NURSE PRACTITIONER

## 2022-12-22 PROCEDURE — 3078F DIAST BP <80 MM HG: CPT | Mod: CPTII,S$GLB,, | Performed by: NURSE PRACTITIONER

## 2022-12-22 PROCEDURE — 1159F PR MEDICATION LIST DOCUMENTED IN MEDICAL RECORD: ICD-10-PCS | Mod: CPTII,S$GLB,, | Performed by: NURSE PRACTITIONER

## 2022-12-22 PROCEDURE — 1101F PT FALLS ASSESS-DOCD LE1/YR: CPT | Mod: CPTII,S$GLB,, | Performed by: NURSE PRACTITIONER

## 2022-12-22 PROCEDURE — 3077F PR MOST RECENT SYSTOLIC BLOOD PRESSURE >= 140 MM HG: ICD-10-PCS | Mod: CPTII,S$GLB,, | Performed by: NURSE PRACTITIONER

## 2022-12-22 PROCEDURE — 99999 PR PBB SHADOW E&M-EST. PATIENT-LVL IV: CPT | Mod: PBBFAC,,, | Performed by: NURSE PRACTITIONER

## 2022-12-22 PROCEDURE — 99214 OFFICE O/P EST MOD 30 MIN: CPT | Mod: S$GLB,,, | Performed by: NURSE PRACTITIONER

## 2022-12-22 PROCEDURE — 1126F AMNT PAIN NOTED NONE PRSNT: CPT | Mod: CPTII,S$GLB,, | Performed by: NURSE PRACTITIONER

## 2022-12-22 PROCEDURE — 1160F PR REVIEW ALL MEDS BY PRESCRIBER/CLIN PHARMACIST DOCUMENTED: ICD-10-PCS | Mod: CPTII,S$GLB,, | Performed by: NURSE PRACTITIONER

## 2022-12-22 PROCEDURE — 99999 PR PBB SHADOW E&M-EST. PATIENT-LVL IV: ICD-10-PCS | Mod: PBBFAC,,, | Performed by: NURSE PRACTITIONER

## 2022-12-22 PROCEDURE — 99214 PR OFFICE/OUTPT VISIT, EST, LEVL IV, 30-39 MIN: ICD-10-PCS | Mod: S$GLB,,, | Performed by: NURSE PRACTITIONER

## 2022-12-22 PROCEDURE — 1159F MED LIST DOCD IN RCRD: CPT | Mod: CPTII,S$GLB,, | Performed by: NURSE PRACTITIONER

## 2022-12-22 PROCEDURE — 1126F PR PAIN SEVERITY QUANTIFIED, NO PAIN PRESENT: ICD-10-PCS | Mod: CPTII,S$GLB,, | Performed by: NURSE PRACTITIONER

## 2022-12-22 PROCEDURE — 3077F SYST BP >= 140 MM HG: CPT | Mod: CPTII,S$GLB,, | Performed by: NURSE PRACTITIONER

## 2022-12-22 PROCEDURE — 1160F RVW MEDS BY RX/DR IN RCRD: CPT | Mod: CPTII,S$GLB,, | Performed by: NURSE PRACTITIONER

## 2022-12-22 PROCEDURE — 1101F PR PT FALLS ASSESS DOC 0-1 FALLS W/OUT INJ PAST YR: ICD-10-PCS | Mod: CPTII,S$GLB,, | Performed by: NURSE PRACTITIONER

## 2022-12-29 ENCOUNTER — LAB VISIT (OUTPATIENT)
Dept: PRIMARY CARE CLINIC | Facility: CLINIC | Age: 82
End: 2022-12-29
Payer: MEDICARE

## 2022-12-29 DIAGNOSIS — D50.0 IRON DEFICIENCY ANEMIA DUE TO CHRONIC BLOOD LOSS: ICD-10-CM

## 2022-12-29 DIAGNOSIS — E11.51 CONTROLLED TYPE 2 DM WITH PERIPHERAL CIRCULATORY DISORDER: Chronic | ICD-10-CM

## 2022-12-29 DIAGNOSIS — I10 BENIGN ESSENTIAL HYPERTENSION: ICD-10-CM

## 2022-12-29 LAB
ALBUMIN SERPL BCP-MCNC: 3.6 G/DL (ref 3.5–5.2)
ALP SERPL-CCNC: 94 U/L (ref 55–135)
ALT SERPL W/O P-5'-P-CCNC: 16 U/L (ref 10–44)
ANION GAP SERPL CALC-SCNC: 8 MMOL/L (ref 8–16)
AST SERPL-CCNC: 22 U/L (ref 10–40)
BASOPHILS # BLD AUTO: 0.04 K/UL (ref 0–0.2)
BASOPHILS NFR BLD: 0.7 % (ref 0–1.9)
BILIRUB SERPL-MCNC: 0.7 MG/DL (ref 0.1–1)
BUN SERPL-MCNC: 16 MG/DL (ref 8–23)
CALCIUM SERPL-MCNC: 9.8 MG/DL (ref 8.7–10.5)
CHLORIDE SERPL-SCNC: 104 MMOL/L (ref 95–110)
CO2 SERPL-SCNC: 28 MMOL/L (ref 23–29)
CREAT SERPL-MCNC: 1.2 MG/DL (ref 0.5–1.4)
DIFFERENTIAL METHOD: ABNORMAL
EOSINOPHIL # BLD AUTO: 0.3 K/UL (ref 0–0.5)
EOSINOPHIL NFR BLD: 5 % (ref 0–8)
ERYTHROCYTE [DISTWIDTH] IN BLOOD BY AUTOMATED COUNT: 13.2 % (ref 11.5–14.5)
EST. GFR  (NO RACE VARIABLE): 45.8 ML/MIN/1.73 M^2
ESTIMATED AVG GLUCOSE: 140 MG/DL (ref 68–131)
FERRITIN SERPL-MCNC: 96 NG/ML (ref 20–300)
GLUCOSE SERPL-MCNC: 108 MG/DL (ref 70–110)
HBA1C MFR BLD: 6.5 % (ref 4–5.6)
HCT VFR BLD AUTO: 43.2 % (ref 37–48.5)
HGB BLD-MCNC: 13.7 G/DL (ref 12–16)
IMM GRANULOCYTES # BLD AUTO: 0.02 K/UL (ref 0–0.04)
IMM GRANULOCYTES NFR BLD AUTO: 0.3 % (ref 0–0.5)
LYMPHOCYTES # BLD AUTO: 1.8 K/UL (ref 1–4.8)
LYMPHOCYTES NFR BLD: 30 % (ref 18–48)
MCH RBC QN AUTO: 30.1 PG (ref 27–31)
MCHC RBC AUTO-ENTMCNC: 31.7 G/DL (ref 32–36)
MCV RBC AUTO: 95 FL (ref 82–98)
MONOCYTES # BLD AUTO: 0.7 K/UL (ref 0.3–1)
MONOCYTES NFR BLD: 12 % (ref 4–15)
NEUTROPHILS # BLD AUTO: 3.1 K/UL (ref 1.8–7.7)
NEUTROPHILS NFR BLD: 52 % (ref 38–73)
NRBC BLD-RTO: 0 /100 WBC
PLATELET # BLD AUTO: 268 K/UL (ref 150–450)
PMV BLD AUTO: 10.5 FL (ref 9.2–12.9)
POTASSIUM SERPL-SCNC: 3.7 MMOL/L (ref 3.5–5.1)
PROT SERPL-MCNC: 7.6 G/DL (ref 6–8.4)
RBC # BLD AUTO: 4.55 M/UL (ref 4–5.4)
SODIUM SERPL-SCNC: 140 MMOL/L (ref 136–145)
WBC # BLD AUTO: 6.01 K/UL (ref 3.9–12.7)

## 2022-12-29 PROCEDURE — 82728 ASSAY OF FERRITIN: CPT | Performed by: INTERNAL MEDICINE

## 2022-12-29 PROCEDURE — 83036 HEMOGLOBIN GLYCOSYLATED A1C: CPT | Performed by: INTERNAL MEDICINE

## 2022-12-29 PROCEDURE — 36415 COLL VENOUS BLD VENIPUNCTURE: CPT | Performed by: INTERNAL MEDICINE

## 2022-12-29 PROCEDURE — 80053 COMPREHEN METABOLIC PANEL: CPT | Performed by: INTERNAL MEDICINE

## 2022-12-29 PROCEDURE — 85025 COMPLETE CBC W/AUTO DIFF WBC: CPT | Performed by: INTERNAL MEDICINE

## 2023-01-05 ENCOUNTER — OFFICE VISIT (OUTPATIENT)
Dept: INTERNAL MEDICINE | Facility: CLINIC | Age: 83
End: 2023-01-05
Payer: MEDICARE

## 2023-01-05 ENCOUNTER — IMMUNIZATION (OUTPATIENT)
Dept: INTERNAL MEDICINE | Facility: CLINIC | Age: 83
End: 2023-01-05
Payer: MEDICARE

## 2023-01-05 VITALS
DIASTOLIC BLOOD PRESSURE: 78 MMHG | OXYGEN SATURATION: 96 % | HEIGHT: 64 IN | HEART RATE: 75 BPM | SYSTOLIC BLOOD PRESSURE: 122 MMHG | BODY MASS INDEX: 45.88 KG/M2 | WEIGHT: 268.75 LBS

## 2023-01-05 DIAGNOSIS — E11.22 TYPE 2 DIABETES MELLITUS WITH STAGE 3 CHRONIC KIDNEY DISEASE, WITHOUT LONG-TERM CURRENT USE OF INSULIN, UNSPECIFIED WHETHER STAGE 3A OR 3B CKD: ICD-10-CM

## 2023-01-05 DIAGNOSIS — C50.412 MALIGNANT NEOPLASM OF UPPER-OUTER QUADRANT OF LEFT FEMALE BREAST, UNSPECIFIED ESTROGEN RECEPTOR STATUS: ICD-10-CM

## 2023-01-05 DIAGNOSIS — I25.118 ATHEROSCLEROTIC HEART DISEASE OF NATIVE CORONARY ARTERY WITH OTHER FORMS OF ANGINA PECTORIS: ICD-10-CM

## 2023-01-05 DIAGNOSIS — D50.0 IRON DEFICIENCY ANEMIA DUE TO CHRONIC BLOOD LOSS: Primary | ICD-10-CM

## 2023-01-05 DIAGNOSIS — G40.409 TONIC-CLONIC EPILEPTIC SEIZURES: ICD-10-CM

## 2023-01-05 DIAGNOSIS — E11.51 CONTROLLED TYPE 2 DM WITH PERIPHERAL CIRCULATORY DISORDER: ICD-10-CM

## 2023-01-05 DIAGNOSIS — N18.30 TYPE 2 DIABETES MELLITUS WITH STAGE 3 CHRONIC KIDNEY DISEASE, WITHOUT LONG-TERM CURRENT USE OF INSULIN, UNSPECIFIED WHETHER STAGE 3A OR 3B CKD: ICD-10-CM

## 2023-01-05 DIAGNOSIS — I10 BENIGN ESSENTIAL HYPERTENSION: ICD-10-CM

## 2023-01-05 DIAGNOSIS — M10.9 GOUT, ARTHRITIS: ICD-10-CM

## 2023-01-05 DIAGNOSIS — E66.01 CLASS 3 SEVERE OBESITY DUE TO EXCESS CALORIES WITH SERIOUS COMORBIDITY IN ADULT, UNSPECIFIED BMI: ICD-10-CM

## 2023-01-05 DIAGNOSIS — E55.9 VITAMIN D DEFICIENCY DISEASE: ICD-10-CM

## 2023-01-05 DIAGNOSIS — Z23 NEED FOR VACCINATION: Primary | ICD-10-CM

## 2023-01-05 PROCEDURE — 91312 COVID-19, MRNA, LNP-S, BIVALENT BOOSTER, PF, 30 MCG/0.3 ML DOSE: CPT | Mod: S$GLB,,, | Performed by: INTERNAL MEDICINE

## 2023-01-05 PROCEDURE — 3078F DIAST BP <80 MM HG: CPT | Mod: CPTII,S$GLB,, | Performed by: INTERNAL MEDICINE

## 2023-01-05 PROCEDURE — 3074F SYST BP LT 130 MM HG: CPT | Mod: CPTII,S$GLB,, | Performed by: INTERNAL MEDICINE

## 2023-01-05 PROCEDURE — 1159F PR MEDICATION LIST DOCUMENTED IN MEDICAL RECORD: ICD-10-PCS | Mod: CPTII,S$GLB,, | Performed by: INTERNAL MEDICINE

## 2023-01-05 PROCEDURE — 3288F FALL RISK ASSESSMENT DOCD: CPT | Mod: CPTII,S$GLB,, | Performed by: INTERNAL MEDICINE

## 2023-01-05 PROCEDURE — 99214 PR OFFICE/OUTPT VISIT, EST, LEVL IV, 30-39 MIN: ICD-10-PCS | Mod: S$GLB,,, | Performed by: INTERNAL MEDICINE

## 2023-01-05 PROCEDURE — 91312 COVID-19, MRNA, LNP-S, BIVALENT BOOSTER, PF, 30 MCG/0.3 ML DOSE: ICD-10-PCS | Mod: S$GLB,,, | Performed by: INTERNAL MEDICINE

## 2023-01-05 PROCEDURE — 1101F PR PT FALLS ASSESS DOC 0-1 FALLS W/OUT INJ PAST YR: ICD-10-PCS | Mod: CPTII,S$GLB,, | Performed by: INTERNAL MEDICINE

## 2023-01-05 PROCEDURE — 1126F AMNT PAIN NOTED NONE PRSNT: CPT | Mod: CPTII,S$GLB,, | Performed by: INTERNAL MEDICINE

## 2023-01-05 PROCEDURE — 3078F PR MOST RECENT DIASTOLIC BLOOD PRESSURE < 80 MM HG: ICD-10-PCS | Mod: CPTII,S$GLB,, | Performed by: INTERNAL MEDICINE

## 2023-01-05 PROCEDURE — 99999 PR PBB SHADOW E&M-EST. PATIENT-LVL III: CPT | Mod: PBBFAC,,, | Performed by: INTERNAL MEDICINE

## 2023-01-05 PROCEDURE — 0124A COVID-19, MRNA, LNP-S, BIVALENT BOOSTER, PF, 30 MCG/0.3 ML DOSE: CPT | Mod: CV19,PBBFAC | Performed by: INTERNAL MEDICINE

## 2023-01-05 PROCEDURE — 99999 PR PBB SHADOW E&M-EST. PATIENT-LVL III: ICD-10-PCS | Mod: PBBFAC,,, | Performed by: INTERNAL MEDICINE

## 2023-01-05 PROCEDURE — 1159F MED LIST DOCD IN RCRD: CPT | Mod: CPTII,S$GLB,, | Performed by: INTERNAL MEDICINE

## 2023-01-05 PROCEDURE — 3074F PR MOST RECENT SYSTOLIC BLOOD PRESSURE < 130 MM HG: ICD-10-PCS | Mod: CPTII,S$GLB,, | Performed by: INTERNAL MEDICINE

## 2023-01-05 PROCEDURE — 99499 UNLISTED E&M SERVICE: CPT | Mod: S$GLB,,, | Performed by: INTERNAL MEDICINE

## 2023-01-05 PROCEDURE — 3288F PR FALLS RISK ASSESSMENT DOCUMENTED: ICD-10-PCS | Mod: CPTII,S$GLB,, | Performed by: INTERNAL MEDICINE

## 2023-01-05 PROCEDURE — 99499 RISK ADDL DX/OHS AUDIT: ICD-10-PCS | Mod: S$GLB,,, | Performed by: INTERNAL MEDICINE

## 2023-01-05 PROCEDURE — 99214 OFFICE O/P EST MOD 30 MIN: CPT | Mod: S$GLB,,, | Performed by: INTERNAL MEDICINE

## 2023-01-05 PROCEDURE — 1126F PR PAIN SEVERITY QUANTIFIED, NO PAIN PRESENT: ICD-10-PCS | Mod: CPTII,S$GLB,, | Performed by: INTERNAL MEDICINE

## 2023-01-05 PROCEDURE — 1101F PT FALLS ASSESS-DOCD LE1/YR: CPT | Mod: CPTII,S$GLB,, | Performed by: INTERNAL MEDICINE

## 2023-01-05 RX ORDER — ERGOCALCIFEROL 1.25 MG/1
50000 CAPSULE ORAL
Qty: 26 CAPSULE | Refills: 4 | Status: SHIPPED | OUTPATIENT
Start: 2023-01-05 | End: 2023-12-22

## 2023-01-05 RX ORDER — AMLODIPINE BESYLATE 10 MG/1
10 TABLET ORAL DAILY
Qty: 90 TABLET | Refills: 4 | Status: SHIPPED | OUTPATIENT
Start: 2023-01-05 | End: 2023-01-19

## 2023-01-05 RX ORDER — ALLOPURINOL 300 MG/1
300 TABLET ORAL EVERY MORNING
Qty: 90 TABLET | Refills: 4 | Status: SHIPPED | OUTPATIENT
Start: 2023-01-05 | End: 2023-06-23 | Stop reason: SDUPTHER

## 2023-01-05 NOTE — PROGRESS NOTES
CHIEF COMPLAINT:   follow up NSTEMI, breast cancer, diabetes, hypertension, hyperlipidemia.     HISTORY OF PRESENT ILLNESS: 82 year-old woman who present for follow up of above.     She was hospitalized 7/16/22 to 7/18/22. She had chest pain and elevated blood pressure.  Nuclear stress test was fine. Coreg was increased. She takes aspirin 81 mg daily  and coreg 12.5 mg twice daily and amlodipine 10 mg daily.   No chest pain or shortness of breath.   She is on crestor 40 mg daily. 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 Dr Agudelo stopped the plavix on 8/9/21 and is doing fine off Plavix.       No more vibration in the left foot.     No  more episodes of vertigo.     She had  a left knee replacement 4/15/21.She is walking with a rollator. Left knee is doing fine. She can tell when it is going to rain.       She will take tylenol 325 mg once or twice a week as needed for general aches and pain.       She is taking Keppra  mg 2 in the afternoon.  Gait is better with taking the Keppra in the late afternoon.  No falls.  No seizures.      She has completed 5 cycles of CMF for her breast cancer. She completed radiation the end of April 6, 2017.  She took Femara  4/2017 - 4/2018. She had irritation of the lips on Femara. She is taking anastrozole 1 mg daily. Saw Heme Onc 12/2022 and MMG on 8/2022      Back is doing well. She is doing stretches every other day     She continues to take allopurinol 300 mg daily for her gout. She has not had any gouty flares. She has occasional left elbow pain.      Her blood sugars have been normal - . She is off metformin 850 mg twice daily. She checks blood sugars once daily.  She denies any polydipsia or polyuria. She continues to take aspirin for her coronary artery disease.      Sinuses have been doing well. She has not had to take Allegra lately. She denies any nausea, vomiting, diarrhea. She has some constipation -  "once a week.  She took a dulcolax.      Reflux is well controlled on omeprazole 20 mg two tablets daily.      She continues to take Crestor 40 mg daily for her hyperlipidemia. She denies any joint pain or muscle pain from the Crestor.      She is taking vitamin D 50,000 units twice weekly for vitamin D deficiency     Her daughter who is 46 had stage 2 breast cancer. She has had a lumpectomy and chemo and radiation. Her daughter is doing well. She has finished her treatment.  No anxiety, depression or insomnia.      PAST MEDICAL HISTORY:   1. Hypertension.   2. Diabetes mellitus   3. Hyperlipidemia.   4. Reflux.   5. Gout.   6. Coronary artery disease, status post MI in  with stenting at that time, and then a right coronary artery stent placed in . Had a negative stress test 2008. [facility] 2012 - patent stents and non obstructive cad  7. Osteoarthritis of the right knee. S/P right knee replacement   8. Status post left knee replacement.   9. Status post LISA/BSO secondary to menstrual disorder or polyps after tubal ligation.   10. Left breast cancer and BRCA2 positive    MEDICATIONS and ALLERGIES: Updated on epic.       Family History  Mother had breast cancer in her early 50's then had colon cancer in her 60's. She  of uterine cancer  2 Maternal aunts with breast cancer  Daughter with breast cancer at age 45 - BRCA positive  Father  of a gunshot wound  She is an only child    PHYSICAL EXAMINATION:    /78 (BP Location: Right arm, Patient Position: Sitting)   Pulse 75   Ht 5' 4" (1.626 m)   Wt 121.9 kg (268 lb 11.9 oz)   LMP  (LMP Unknown)   SpO2 96%   BMI 46.13 kg/m²      General: Alert, oriented. No apparent distress. Affect within normal   limits.   Conjunctivae anicteric. Neck supple.   Respiratory effort normal. Lungs clear to auscultation.   Heart: Regular rate and rhythm without murmurs, gallops or rubs.   No lower extremity edema.    Well healed scar over the left leg with some " minimal surrounding edema.     Labs 12/29/22 reviewed   ASSESSMENT        1. HTN- controlled  2. Iron deficiency anemia - stable off iron   3. Angioectasias of duodenal bulb, gastric body, On omeprazole 20 mg 2 tablets daily. S/p EGD 1/31/20 with cautery. Will montior blood counts closely.   4.  CAD with NSTEMI 5/2017- s/p stenting 5/2017 - on risk factor modifications.    5. Left breast cancer with BRCA2 positive -Finished chemo and  radiation. On anastrazole-  6. Seizure -stable on Keppra XR   7.  Diabetes - controlled.  9. Gout -controlled on allopurinol    10. Hyperlipidemia - on Crestor 40 mg daily. Controlled   11. Obesity - working at diet, exercise and weight loss.   12.  GERD - controlled on meds  13. Hypokalemia -  on replacement potassium   14. S/P left knee replacement - doing well.         Screening - mammogram 8/22  Colonoscopy 4/23/12 - normal, and 5/25/15 - 3 small polyps (one adenoma).  Colonoscopy 10/8/18 - one polyp - due 2023.     Bone density was normal 10/2021      Follow up in 6 months, sooner if issues.

## 2023-02-02 ENCOUNTER — PES CALL (OUTPATIENT)
Dept: ADMINISTRATIVE | Facility: CLINIC | Age: 83
End: 2023-02-02
Payer: MEDICARE

## 2023-03-24 ENCOUNTER — TELEPHONE (OUTPATIENT)
Dept: INTERNAL MEDICINE | Facility: CLINIC | Age: 83
End: 2023-03-24
Payer: MEDICARE

## 2023-03-24 NOTE — TELEPHONE ENCOUNTER
Spoke to pt, pt wants to know if she needs another endoscopy and if so she need the order to get it scheduled....

## 2023-03-24 NOTE — TELEPHONE ENCOUNTER
----- Message from Uma Phipps sent at 3/24/2023  8:57 AM CDT -----  Contact: [phone] Patient  Type: Orders Request    What orders/ testing are being requested? Endoscopy  pt received a reminder letter for the endoscopy    Is there a future appointment scheduled for the patient with PCP? Yes    When? 06/23/2023    Would you prefer a response via [facility]? Call Back Patient Please    Comments:

## 2023-03-27 NOTE — TELEPHONE ENCOUNTER
Please notify pt  You are due for an endoscopy between [id number].  you might also be due for a colonoscopy this year. If she is not having any problems. Dr ORR will discuss at your next visit in June.

## 2023-03-30 ENCOUNTER — PES CALL (OUTPATIENT)
Dept: ADMINISTRATIVE | Facility: CLINIC | Age: 83
End: 2023-03-30
Payer: MEDICARE

## 2023-04-18 ENCOUNTER — TELEPHONE (OUTPATIENT)
Dept: INTERNAL MEDICINE | Facility: CLINIC | Age: 83
End: 2023-04-18
Payer: MEDICARE

## 2023-04-18 NOTE — TELEPHONE ENCOUNTER
----- Message from Britta Crawford sent at 4/18/2023  3:35 PM CDT -----  Contact: Renata [phone]  Patient is returning a phone call.  Who left a message for the patient: Nurse  Does patient know what this is regarding:  Patient wasn't sure  Would you like a call back, or a response through your [facility] portal?: call back  Comments:

## 2023-04-20 ENCOUNTER — TELEPHONE (OUTPATIENT)
Dept: INTERNAL MEDICINE | Facility: CLINIC | Age: 83
End: 2023-04-20
Payer: MEDICARE

## 2023-04-20 NOTE — TELEPHONE ENCOUNTER
----- Message from Kelechi Noland sent at 4/20/2023  9:22 AM CDT -----  Contact: self [phone]  Patient is returning a phone call.  Who left a message for the patient: Fide Martinez MA   Does patient know what this is regarding:    Would you like a call back, or a response through your [facility] portal?:   call back  Comments:     Please call and advise

## 2023-04-20 NOTE — TELEPHONE ENCOUNTER
Called and spoke to pt she sd it was Reilly that called   Looked back at last message explained to her what Dr Berger wanted she sd she was fine and will discuss it with you in [place]

## 2023-05-23 PROBLEM — I16.0 HYPERTENSIVE URGENCY: Status: RESOLVED | Noted: 2022-07-17 | Resolved: 2023-05-23

## 2023-05-23 NOTE — PROGRESS NOTES
Subjective:       Patient ID: Renata Bergman is a 82 y.o. female.    Chief Complaint: Malignant neoplasm of upper-outer quadrant of left female b (6 month )      HPI Mrs. Hussein is a 82-year-old female with stage I left breast cancer.  She is currently on anasatrozole endocrine therapy.    Overall she has been doing well. Tolerating Arimidex well.   Notes she is not sleeping well. She is urinating at night.   Appetite and bowel movements are good.   Denies hot flashes, vaginal dryness. Occasional arthralgias.    Her other medical issues include diabetes, seizures and coronary artery disease.  Occasional bilateral buttock pain.        Per Dr. Guevara's previous note  Breast history: Screening mammogram on May 5, 2016 showed an irregular 22 mm mass with abnormal calcifications in the left breast 2:00 position. Follow-up ultrasound July 13 she noted a regular solid 17 mm mass.    On July 21 needle biopsy showed grade 2 infiltrating ductal carcinoma strongly ER TN positive (90%) and HER-2 negative.    On August 1, 2016 lumpectomy and sentinel lymph node biopsy were performed. That showed a 1.8 x 1.6 x 1.0 infiltrating carcinoma intermediate grade (histologic grade 3, nuclear grade 2, mitotic index 2.   The sentinel lymph node was negative and margins were negative. Final pathological stage TIc N0 stage IA.  Oncotype was 31 indicating a 21% risk of recurrence with tamoxifen alone.    She  had 3 weeks of weekly Taxol and then because of insurance issues she was changed to CMF and had 5 cycles of that.     Chemotherapy was completed in January 2017.    She completed radiation in April 2017 and began letrozole endocrine therapy that month.    Review of Systems   Constitutional:  Negative for activity change, appetite change, chills, diaphoresis, fatigue, fever and unexpected weight change.   HENT:  Negative for congestion, nosebleeds and sinus pressure.    Respiratory:  Negative for cough and shortness of breath.     Cardiovascular:  Negative for chest pain, palpitations and leg swelling.   Gastrointestinal:  Negative for abdominal distention, abdominal pain, blood in stool, constipation, diarrhea, nausea and vomiting.   Genitourinary:  Negative for hematuria and vaginal bleeding.   Musculoskeletal:  Positive for arthralgias (occasional). Negative for back pain and myalgias.   Skin:  Negative for pallor and rash.   Allergic/Immunologic: Negative for immunocompromised state.   Neurological:  Negative for dizziness, weakness, light-headedness, numbness and headaches.   Hematological:  Negative for adenopathy. Does not bruise/bleed easily.   Psychiatric/Behavioral:  Negative for confusion and dysphoric mood. The patient is not nervous/anxious.      Objective:      Physical Exam  Vitals and nursing note reviewed.   Constitutional:       General: She is not in acute distress.     Appearance: Normal appearance. She is well-developed.   HENT:      Head: Normocephalic.   Eyes:      Pupils: Pupils are equal, round, and reactive to light.   Cardiovascular:      Rate and Rhythm: Normal rate and regular rhythm.      Heart sounds: Normal heart sounds.   Pulmonary:      Effort: Pulmonary effort is normal.      Breath sounds: Normal breath sounds.   Chest:   Breasts:     Right: No mass, nipple discharge, skin change or tenderness.      Left: Skin change (due to lumpectomy) present. No mass, nipple discharge or tenderness.   Abdominal:      General: Bowel sounds are normal. There is no distension.      Palpations: Abdomen is soft.      Tenderness: There is no abdominal tenderness.   Musculoskeletal:      Cervical back: Normal range of motion.      Right lower leg: Edema present.      Left lower leg: Edema present.   Lymphadenopathy:      Cervical: No cervical adenopathy.      Upper Body:      Right upper body: No supraclavicular or axillary adenopathy.      Left upper body: No supraclavicular or axillary adenopathy.   Skin:     General: Skin  is warm and dry.      Findings: No rash.   Neurological:      Mental Status: She is alert and oriented to person, place, and time.   Psychiatric:         Behavior: Behavior normal.       Assessment:       1. Malignant neoplasm of upper-outer quadrant of left female breast, unspecified estrogen receptor status    2. Iron deficiency anemia due to chronic blood loss    3. Stage 3a chronic kidney disease    4. Hypertension associated with diabetes    5. Hyperlipidemia associated with type 2 diabetes mellitus    6. Gastroesophageal reflux disease, unspecified whether esophagitis present        Plan:     1. Return to clinic in 6 months  Continue anastrozole.  She will be due for mammograms in August.  DXA scan normal bone density, will repeat in Oct 2023  Breast Cancer Index indicates she needs to take 10 years of AI therapy    2. Will continue to monitor  3.  Will continue to monitor with PCP  4. Monitored today; continue current medication and follow up with PCP   5. Continue current medication and follow up with PCP  6-7. Monitored glucose today; continue current medications and follow up with endocrinology   8. Continue current medication and follow up with GI    DMV Handicap tag for filled out       Return to clinic in 6 months with MD appointment.     Patient is in agreement with the proposed treatment plan. All questions were answered to the patient's satisfaction. Patient knows to call clinic for any new or worsening symptoms and if anything is needed before the next clinic visit.          Yun Whitaker, JAIDAP-C  Hematology & Medical Oncology   [facility] [place] [address]  ph. [phone]  Fax. [phone]    Collaborating physician, Dr. Guevara.    Total time spent with the patient: 20 minutes.  10 minutes of face to face consultation and 10 minutes of chart review and coordination of care.    Route Chart for Scheduling    Med Onc Chart Routing      Follow up with physician    Follow up with  L[facility]ON 6 months. me or Dr. Guevara   Infusion scheduling note    Injection scheduling note    Labs    Imaging DXA scan   due in Oct   Pharmacy appointment    Other referrals

## 2023-06-06 ENCOUNTER — OFFICE VISIT (OUTPATIENT)
Dept: HEMATOLOGY/ONCOLOGY | Facility: CLINIC | Age: 83
End: 2023-06-06
Payer: MEDICARE

## 2023-06-06 VITALS
TEMPERATURE: 98 F | OXYGEN SATURATION: 98 % | HEIGHT: 64 IN | SYSTOLIC BLOOD PRESSURE: 159 MMHG | DIASTOLIC BLOOD PRESSURE: 73 MMHG | HEART RATE: 71 BPM | RESPIRATION RATE: 16 BRPM | WEIGHT: 270.75 LBS | BODY MASS INDEX: 46.22 KG/M2

## 2023-06-06 DIAGNOSIS — N18.31 STAGE 3A CHRONIC KIDNEY DISEASE: ICD-10-CM

## 2023-06-06 DIAGNOSIS — K21.9 GASTROESOPHAGEAL REFLUX DISEASE, UNSPECIFIED WHETHER ESOPHAGITIS PRESENT: ICD-10-CM

## 2023-06-06 DIAGNOSIS — I15.2 HYPERTENSION ASSOCIATED WITH DIABETES: ICD-10-CM

## 2023-06-06 DIAGNOSIS — E11.59 HYPERTENSION ASSOCIATED WITH DIABETES: ICD-10-CM

## 2023-06-06 DIAGNOSIS — Z79.811 LONG TERM (CURRENT) USE OF AROMATASE INHIBITORS: ICD-10-CM

## 2023-06-06 DIAGNOSIS — C50.412 MALIGNANT NEOPLASM OF UPPER-OUTER QUADRANT OF LEFT FEMALE BREAST, UNSPECIFIED ESTROGEN RECEPTOR STATUS: Primary | ICD-10-CM

## 2023-06-06 DIAGNOSIS — E11.69 HYPERLIPIDEMIA ASSOCIATED WITH TYPE 2 DIABETES MELLITUS: ICD-10-CM

## 2023-06-06 DIAGNOSIS — D50.0 IRON DEFICIENCY ANEMIA DUE TO CHRONIC BLOOD LOSS: ICD-10-CM

## 2023-06-06 DIAGNOSIS — E78.5 HYPERLIPIDEMIA ASSOCIATED WITH TYPE 2 DIABETES MELLITUS: ICD-10-CM

## 2023-06-06 PROCEDURE — 3078F PR MOST RECENT DIASTOLIC BLOOD PRESSURE < 80 MM HG: ICD-10-PCS | Mod: CPTII,S$GLB,, | Performed by: NURSE PRACTITIONER

## 2023-06-06 PROCEDURE — 3072F LOW RISK FOR RETINOPATHY: CPT | Mod: CPTII,S$GLB,, | Performed by: NURSE PRACTITIONER

## 2023-06-06 PROCEDURE — 99999 PR PBB SHADOW E&M-EST. PATIENT-LVL IV: ICD-10-PCS | Mod: PBBFAC,,, | Performed by: NURSE PRACTITIONER

## 2023-06-06 PROCEDURE — 1160F PR REVIEW ALL MEDS BY PRESCRIBER/CLIN PHARMACIST DOCUMENTED: ICD-10-PCS | Mod: CPTII,S$GLB,, | Performed by: NURSE PRACTITIONER

## 2023-06-06 PROCEDURE — 3072F PR LOW RISK FOR RETINOPATHY: ICD-10-PCS | Mod: CPTII,S$GLB,, | Performed by: NURSE PRACTITIONER

## 2023-06-06 PROCEDURE — 99214 PR OFFICE/OUTPT VISIT, EST, LEVL IV, 30-39 MIN: ICD-10-PCS | Mod: S$GLB,,, | Performed by: NURSE PRACTITIONER

## 2023-06-06 PROCEDURE — 3077F PR MOST RECENT SYSTOLIC BLOOD PRESSURE >= 140 MM HG: ICD-10-PCS | Mod: CPTII,S$GLB,, | Performed by: NURSE PRACTITIONER

## 2023-06-06 PROCEDURE — 1160F RVW MEDS BY RX/DR IN RCRD: CPT | Mod: CPTII,S$GLB,, | Performed by: NURSE PRACTITIONER

## 2023-06-06 PROCEDURE — 3288F FALL RISK ASSESSMENT DOCD: CPT | Mod: CPTII,S$GLB,, | Performed by: NURSE PRACTITIONER

## 2023-06-06 PROCEDURE — 3077F SYST BP >= 140 MM HG: CPT | Mod: CPTII,S$GLB,, | Performed by: NURSE PRACTITIONER

## 2023-06-06 PROCEDURE — 1101F PR PT FALLS ASSESS DOC 0-1 FALLS W/OUT INJ PAST YR: ICD-10-PCS | Mod: CPTII,S$GLB,, | Performed by: NURSE PRACTITIONER

## 2023-06-06 PROCEDURE — 3078F DIAST BP <80 MM HG: CPT | Mod: CPTII,S$GLB,, | Performed by: NURSE PRACTITIONER

## 2023-06-06 PROCEDURE — 1159F MED LIST DOCD IN RCRD: CPT | Mod: CPTII,S$GLB,, | Performed by: NURSE PRACTITIONER

## 2023-06-06 PROCEDURE — 1126F AMNT PAIN NOTED NONE PRSNT: CPT | Mod: CPTII,S$GLB,, | Performed by: NURSE PRACTITIONER

## 2023-06-06 PROCEDURE — 99999 PR PBB SHADOW E&M-EST. PATIENT-LVL IV: CPT | Mod: PBBFAC,,, | Performed by: NURSE PRACTITIONER

## 2023-06-06 PROCEDURE — 3288F PR FALLS RISK ASSESSMENT DOCUMENTED: ICD-10-PCS | Mod: CPTII,S$GLB,, | Performed by: NURSE PRACTITIONER

## 2023-06-06 PROCEDURE — 1101F PT FALLS ASSESS-DOCD LE1/YR: CPT | Mod: CPTII,S$GLB,, | Performed by: NURSE PRACTITIONER

## 2023-06-06 PROCEDURE — 1159F PR MEDICATION LIST DOCUMENTED IN MEDICAL RECORD: ICD-10-PCS | Mod: CPTII,S$GLB,, | Performed by: NURSE PRACTITIONER

## 2023-06-06 PROCEDURE — 99214 OFFICE O/P EST MOD 30 MIN: CPT | Mod: S$GLB,,, | Performed by: NURSE PRACTITIONER

## 2023-06-06 PROCEDURE — 1126F PR PAIN SEVERITY QUANTIFIED, NO PAIN PRESENT: ICD-10-PCS | Mod: CPTII,S$GLB,, | Performed by: NURSE PRACTITIONER

## 2023-06-17 ENCOUNTER — LAB VISIT (OUTPATIENT)
Dept: LAB | Facility: HOSPITAL | Age: 83
End: 2023-06-17
Attending: INTERNAL MEDICINE
Payer: MEDICARE

## 2023-06-17 DIAGNOSIS — E55.9 VITAMIN D DEFICIENCY DISEASE: ICD-10-CM

## 2023-06-17 DIAGNOSIS — D50.0 IRON DEFICIENCY ANEMIA DUE TO CHRONIC BLOOD LOSS: ICD-10-CM

## 2023-06-17 DIAGNOSIS — I10 BENIGN ESSENTIAL HYPERTENSION: ICD-10-CM

## 2023-06-17 DIAGNOSIS — G40.409 TONIC-CLONIC EPILEPTIC SEIZURES: ICD-10-CM

## 2023-06-17 DIAGNOSIS — N18.30 TYPE 2 DIABETES MELLITUS WITH STAGE 3 CHRONIC KIDNEY DISEASE, WITHOUT LONG-TERM CURRENT USE OF INSULIN, UNSPECIFIED WHETHER STAGE 3A OR 3B CKD: ICD-10-CM

## 2023-06-17 DIAGNOSIS — M10.9 GOUT, ARTHRITIS: ICD-10-CM

## 2023-06-17 DIAGNOSIS — E11.22 TYPE 2 DIABETES MELLITUS WITH STAGE 3 CHRONIC KIDNEY DISEASE, WITHOUT LONG-TERM CURRENT USE OF INSULIN, UNSPECIFIED WHETHER STAGE 3A OR 3B CKD: ICD-10-CM

## 2023-06-17 LAB
25(OH)D3+25(OH)D2 SERPL-MCNC: 48 NG/ML (ref 30–96)
ALBUMIN SERPL BCP-MCNC: 3.8 G/DL (ref 3.5–5.2)
ALP SERPL-CCNC: 107 U/L (ref 55–135)
ALT SERPL W/O P-5'-P-CCNC: 15 U/L (ref 10–44)
ANION GAP SERPL CALC-SCNC: 10 MMOL/L (ref 8–16)
AST SERPL-CCNC: 21 U/L (ref 10–40)
BASOPHILS # BLD AUTO: 0.04 K/UL (ref 0–0.2)
BASOPHILS NFR BLD: 0.8 % (ref 0–1.9)
BILIRUB SERPL-MCNC: 0.5 MG/DL (ref 0.1–1)
BUN SERPL-MCNC: 22 MG/DL (ref 8–23)
CALCIUM SERPL-MCNC: 10.6 MG/DL (ref 8.7–10.5)
CHLORIDE SERPL-SCNC: 109 MMOL/L (ref 95–110)
CHOLEST SERPL-MCNC: 167 MG/DL (ref 120–199)
CHOLEST/HDLC SERPL: 2.6 {RATIO} (ref 2–5)
CO2 SERPL-SCNC: 23 MMOL/L (ref 23–29)
CREAT SERPL-MCNC: 1.5 MG/DL (ref 0.5–1.4)
DIFFERENTIAL METHOD: NORMAL
EOSINOPHIL # BLD AUTO: 0.2 K/UL (ref 0–0.5)
EOSINOPHIL NFR BLD: 4.4 % (ref 0–8)
ERYTHROCYTE [DISTWIDTH] IN BLOOD BY AUTOMATED COUNT: 13.9 % (ref 11.5–14.5)
EST. GFR  (NO RACE VARIABLE): 35 ML/MIN/1.73 M^2
ESTIMATED AVG GLUCOSE: 137 MG/DL (ref 68–131)
FERRITIN SERPL-MCNC: 76 NG/ML (ref 20–300)
GLUCOSE SERPL-MCNC: 96 MG/DL (ref 70–110)
HBA1C MFR BLD: 6.4 % (ref 4–5.6)
HCT VFR BLD AUTO: 43.8 % (ref 37–48.5)
HDLC SERPL-MCNC: 65 MG/DL (ref 40–75)
HDLC SERPL: 38.9 % (ref 20–50)
HGB BLD-MCNC: 14.3 G/DL (ref 12–16)
IMM GRANULOCYTES # BLD AUTO: 0.02 K/UL (ref 0–0.04)
IMM GRANULOCYTES NFR BLD AUTO: 0.4 % (ref 0–0.5)
IRON SERPL-MCNC: 67 UG/DL (ref 30–160)
LDLC SERPL CALC-MCNC: 90.2 MG/DL (ref 63–159)
LYMPHOCYTES # BLD AUTO: 1.5 K/UL (ref 1–4.8)
LYMPHOCYTES NFR BLD: 29.1 % (ref 18–48)
MCH RBC QN AUTO: 30.6 PG (ref 27–31)
MCHC RBC AUTO-ENTMCNC: 32.6 G/DL (ref 32–36)
MCV RBC AUTO: 94 FL (ref 82–98)
MONOCYTES # BLD AUTO: 0.6 K/UL (ref 0.3–1)
MONOCYTES NFR BLD: 11 % (ref 4–15)
NEUTROPHILS # BLD AUTO: 2.7 K/UL (ref 1.8–7.7)
NEUTROPHILS NFR BLD: 54.3 % (ref 38–73)
NONHDLC SERPL-MCNC: 102 MG/DL
NRBC BLD-RTO: 0 /100 WBC
PLATELET # BLD AUTO: 224 K/UL (ref 150–450)
PMV BLD AUTO: 10.2 FL (ref 9.2–12.9)
POTASSIUM SERPL-SCNC: 3.8 MMOL/L (ref 3.5–5.1)
PROT SERPL-MCNC: 7.9 G/DL (ref 6–8.4)
RBC # BLD AUTO: 4.67 M/UL (ref 4–5.4)
SATURATED IRON: 17 % (ref 20–50)
SODIUM SERPL-SCNC: 142 MMOL/L (ref 136–145)
TOTAL IRON BINDING CAPACITY: 385 UG/DL (ref 250–450)
TRANSFERRIN SERPL-MCNC: 260 MG/DL (ref 200–375)
TRIGL SERPL-MCNC: 59 MG/DL (ref 30–150)
TSH SERPL DL<=0.005 MIU/L-ACNC: 3.24 UIU/ML (ref 0.4–4)
URATE SERPL-MCNC: 4 MG/DL (ref 2.4–5.7)
WBC # BLD AUTO: 4.98 K/UL (ref 3.9–12.7)

## 2023-06-17 PROCEDURE — 36415 COLL VENOUS BLD VENIPUNCTURE: CPT | Performed by: INTERNAL MEDICINE

## 2023-06-17 PROCEDURE — 84466 ASSAY OF TRANSFERRIN: CPT | Performed by: INTERNAL MEDICINE

## 2023-06-17 PROCEDURE — 80053 COMPREHEN METABOLIC PANEL: CPT | Performed by: INTERNAL MEDICINE

## 2023-06-17 PROCEDURE — 84443 ASSAY THYROID STIM HORMONE: CPT | Performed by: INTERNAL MEDICINE

## 2023-06-17 PROCEDURE — 84550 ASSAY OF BLOOD/URIC ACID: CPT | Performed by: INTERNAL MEDICINE

## 2023-06-17 PROCEDURE — 85025 COMPLETE CBC W/AUTO DIFF WBC: CPT | Performed by: INTERNAL MEDICINE

## 2023-06-17 PROCEDURE — 82728 ASSAY OF FERRITIN: CPT | Performed by: INTERNAL MEDICINE

## 2023-06-17 PROCEDURE — 80061 LIPID PANEL: CPT | Performed by: INTERNAL MEDICINE

## 2023-06-17 PROCEDURE — 83036 HEMOGLOBIN GLYCOSYLATED A1C: CPT | Performed by: INTERNAL MEDICINE

## 2023-06-17 PROCEDURE — 82306 VITAMIN D 25 HYDROXY: CPT | Performed by: INTERNAL MEDICINE

## 2023-06-18 RX ORDER — LEVETIRACETAM 500 MG/1
TABLET, EXTENDED RELEASE ORAL
Qty: 180 TABLET | Refills: 4 | Status: SHIPPED | OUTPATIENT
Start: 2023-06-18 | End: 2023-06-23 | Stop reason: SDUPTHER

## 2023-06-23 ENCOUNTER — OFFICE VISIT (OUTPATIENT)
Dept: INTERNAL MEDICINE | Facility: CLINIC | Age: 83
End: 2023-06-23
Payer: MEDICARE

## 2023-06-23 VITALS
DIASTOLIC BLOOD PRESSURE: 68 MMHG | WEIGHT: 269 LBS | BODY MASS INDEX: 45.93 KG/M2 | HEART RATE: 71 BPM | HEIGHT: 64 IN | OXYGEN SATURATION: 98 % | SYSTOLIC BLOOD PRESSURE: 126 MMHG

## 2023-06-23 DIAGNOSIS — C50.412 MALIGNANT NEOPLASM OF UPPER-OUTER QUADRANT OF LEFT BREAST IN FEMALE, ESTROGEN RECEPTOR POSITIVE: ICD-10-CM

## 2023-06-23 DIAGNOSIS — N18.30 TYPE 2 DIABETES MELLITUS WITH STAGE 3 CHRONIC KIDNEY DISEASE, WITHOUT LONG-TERM CURRENT USE OF INSULIN, UNSPECIFIED WHETHER STAGE 3A OR 3B CKD: Primary | ICD-10-CM

## 2023-06-23 DIAGNOSIS — E11.22 TYPE 2 DIABETES MELLITUS WITH STAGE 3 CHRONIC KIDNEY DISEASE, WITHOUT LONG-TERM CURRENT USE OF INSULIN, UNSPECIFIED WHETHER STAGE 3A OR 3B CKD: Primary | ICD-10-CM

## 2023-06-23 DIAGNOSIS — I10 HTN (HYPERTENSION), BENIGN: ICD-10-CM

## 2023-06-23 DIAGNOSIS — K63.5 POLYP OF COLON, UNSPECIFIED PART OF COLON, UNSPECIFIED TYPE: ICD-10-CM

## 2023-06-23 DIAGNOSIS — E78.2 MIXED HYPERLIPIDEMIA: ICD-10-CM

## 2023-06-23 DIAGNOSIS — G40.409 TONIC-CLONIC EPILEPTIC SEIZURES: ICD-10-CM

## 2023-06-23 DIAGNOSIS — Z17.0 MALIGNANT NEOPLASM OF UPPER-OUTER QUADRANT OF LEFT BREAST IN FEMALE, ESTROGEN RECEPTOR POSITIVE: ICD-10-CM

## 2023-06-23 PROCEDURE — 3072F PR LOW RISK FOR RETINOPATHY: ICD-10-PCS | Mod: CPTII,S$GLB,, | Performed by: INTERNAL MEDICINE

## 2023-06-23 PROCEDURE — 99999 PR PBB SHADOW E&M-EST. PATIENT-LVL III: ICD-10-PCS | Mod: PBBFAC,,, | Performed by: INTERNAL MEDICINE

## 2023-06-23 PROCEDURE — 1101F PR PT FALLS ASSESS DOC 0-1 FALLS W/OUT INJ PAST YR: ICD-10-PCS | Mod: CPTII,S$GLB,, | Performed by: INTERNAL MEDICINE

## 2023-06-23 PROCEDURE — 1126F AMNT PAIN NOTED NONE PRSNT: CPT | Mod: CPTII,S$GLB,, | Performed by: INTERNAL MEDICINE

## 2023-06-23 PROCEDURE — 1126F PR PAIN SEVERITY QUANTIFIED, NO PAIN PRESENT: ICD-10-PCS | Mod: CPTII,S$GLB,, | Performed by: INTERNAL MEDICINE

## 2023-06-23 PROCEDURE — 1159F PR MEDICATION LIST DOCUMENTED IN MEDICAL RECORD: ICD-10-PCS | Mod: CPTII,S$GLB,, | Performed by: INTERNAL MEDICINE

## 2023-06-23 PROCEDURE — 3074F PR MOST RECENT SYSTOLIC BLOOD PRESSURE < 130 MM HG: ICD-10-PCS | Mod: CPTII,S$GLB,, | Performed by: INTERNAL MEDICINE

## 2023-06-23 PROCEDURE — 3074F SYST BP LT 130 MM HG: CPT | Mod: CPTII,S$GLB,, | Performed by: INTERNAL MEDICINE

## 2023-06-23 PROCEDURE — 3288F FALL RISK ASSESSMENT DOCD: CPT | Mod: CPTII,S$GLB,, | Performed by: INTERNAL MEDICINE

## 2023-06-23 PROCEDURE — 99214 OFFICE O/P EST MOD 30 MIN: CPT | Mod: S$GLB,,, | Performed by: INTERNAL MEDICINE

## 2023-06-23 PROCEDURE — 99999 PR PBB SHADOW E&M-EST. PATIENT-LVL III: CPT | Mod: PBBFAC,,, | Performed by: INTERNAL MEDICINE

## 2023-06-23 PROCEDURE — 99214 PR OFFICE/OUTPT VISIT, EST, LEVL IV, 30-39 MIN: ICD-10-PCS | Mod: S$GLB,,, | Performed by: INTERNAL MEDICINE

## 2023-06-23 PROCEDURE — 3078F PR MOST RECENT DIASTOLIC BLOOD PRESSURE < 80 MM HG: ICD-10-PCS | Mod: CPTII,S$GLB,, | Performed by: INTERNAL MEDICINE

## 2023-06-23 PROCEDURE — 3072F LOW RISK FOR RETINOPATHY: CPT | Mod: CPTII,S$GLB,, | Performed by: INTERNAL MEDICINE

## 2023-06-23 PROCEDURE — 3078F DIAST BP <80 MM HG: CPT | Mod: CPTII,S$GLB,, | Performed by: INTERNAL MEDICINE

## 2023-06-23 PROCEDURE — 1159F MED LIST DOCD IN RCRD: CPT | Mod: CPTII,S$GLB,, | Performed by: INTERNAL MEDICINE

## 2023-06-23 PROCEDURE — 3288F PR FALLS RISK ASSESSMENT DOCUMENTED: ICD-10-PCS | Mod: CPTII,S$GLB,, | Performed by: INTERNAL MEDICINE

## 2023-06-23 PROCEDURE — 1101F PT FALLS ASSESS-DOCD LE1/YR: CPT | Mod: CPTII,S$GLB,, | Performed by: INTERNAL MEDICINE

## 2023-06-23 RX ORDER — CARVEDILOL 12.5 MG/1
12.5 TABLET ORAL 2 TIMES DAILY
Qty: 180 TABLET | Refills: 3 | Status: SHIPPED | OUTPATIENT
Start: 2023-06-23 | End: 2023-07-12

## 2023-06-23 RX ORDER — EZETIMIBE 10 MG/1
10 TABLET ORAL DAILY
Qty: 90 TABLET | Refills: 4 | Status: SHIPPED | OUTPATIENT
Start: 2023-06-23

## 2023-06-23 RX ORDER — AMLODIPINE BESYLATE 10 MG/1
10 TABLET ORAL DAILY
Qty: 90 TABLET | Refills: 3 | Status: SHIPPED | OUTPATIENT
Start: 2023-06-23 | End: 2024-01-21

## 2023-06-23 RX ORDER — ALLOPURINOL 300 MG/1
300 TABLET ORAL EVERY MORNING
Qty: 90 TABLET | Refills: 4 | Status: SHIPPED | OUTPATIENT
Start: 2023-06-23 | End: 2024-01-21

## 2023-06-23 RX ORDER — OMEPRAZOLE 20 MG/1
20 CAPSULE, DELAYED RELEASE ORAL 2 TIMES DAILY
Qty: 180 CAPSULE | Refills: 4 | Status: SHIPPED | OUTPATIENT
Start: 2023-06-23 | End: 2023-08-07

## 2023-06-23 RX ORDER — ROSUVASTATIN CALCIUM 40 MG/1
40 TABLET, COATED ORAL EVERY MORNING
Qty: 90 TABLET | Refills: 1 | Status: SHIPPED | OUTPATIENT
Start: 2023-06-23 | End: 2023-08-15

## 2023-06-23 RX ORDER — ANASTROZOLE 1 MG/1
1 TABLET ORAL DAILY
Qty: 90 TABLET | Refills: 3 | Status: SHIPPED | OUTPATIENT
Start: 2023-06-23

## 2023-06-23 RX ORDER — LEVETIRACETAM 500 MG/1
1000 TABLET, EXTENDED RELEASE ORAL DAILY
Qty: 180 TABLET | Refills: 4 | Status: SHIPPED | OUTPATIENT
Start: 2023-06-23

## 2023-06-23 NOTE — PROGRESS NOTES
CHIEF COMPLAINT:   follow up NSTEMI, breast cancer, diabetes, hypertension, hyperlipidemia.     HISTORY OF PRESENT ILLNESS: 82 year-old woman who present for follow up of above.     She was hospitalized 7/16/22 to 7/18/22. She had chest pain and elevated blood pressure.  Nuclear stress test was fine. Coreg was increased. She takes aspirin 81 mg daily  and coreg 12.5 mg twice daily and amlodipine 10 mg daily.   No chest pain or shortness of breath.   She is on crestor 40 mg daily. 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 Dr Agudelo stopped the plavix on 8/9/21 and is doing fine off Plavix.       No more vibration in the left foot.     No  more episodes of vertigo.     She had  a left knee replacement 4/15/21.She is walking with a rollator. Left knee is doing fine. She can tell when it is going to rain.  No falls     She will take tylenol 325 mg once or twice a week as needed for general aches and pain.       She is taking Keppra  mg 2 in the afternoon.  Gait is better with taking the Keppra in the late afternoon.  No falls.  No seizures.      She has completed 5 cycles of CMF for her breast cancer. She completed radiation the end of April 6, 2017.  She took Femara  4/2017 - 4/2018. She had irritation of the lips on Femara. She is taking anastrozole 1 mg daily. Saw Heme Onc 6/6/23 and MMG on 8/2022      Back is doing well. She is doing stretches every other day     She continues to take allopurinol 300 mg daily for her gout. She has not had any gouty flares. She has occasional left elbow pain.      Her blood sugars have been normal. She is off metformin 850 mg twice daily. She checks blood sugars once daily.  She denies any polydipsia or polyuria. She continues to take aspirin for her coronary artery disease.      Sinuses have been doing well. She has not had to take Allegra lately. She denies any nausea, vomiting, diarrhea. She has some constipation -  once a week.  She took a dulcolax.      Reflux is well controlled on omeprazole 20 mg two tablets daily.      She continues to take Crestor 40 mg daily for her hyperlipidemia. She denies any joint pain or muscle pain from the Crestor.      She is taking vitamin D 50,000 units twice weekly for vitamin D deficiency     Her daughter who is 46 had stage 2 breast cancer. She has had a lumpectomy and chemo and radiation. Her daughter is doing well. She has finished her treatment.  No anxiety, depression or insomnia.      PAST MEDICAL HISTORY:   1. Hypertension.   2. Diabetes mellitus   3. Hyperlipidemia.   4. Reflux.   5. Gout.   6. Coronary artery disease, status post MI in  with stenting at that time, and then a right coronary artery stent placed in . Had a negative stress test 2008. [facility] 2012 - patent stents and non obstructive cad  7. Osteoarthritis of the right knee. S/P right knee replacement   8. Status post left knee replacement.   9. Status post LISA/BSO secondary to menstrual disorder or polyps after tubal ligation.   10. Left breast cancer and BRCA2 positive    MEDICATIONS and ALLERGIES: Updated on epic.       Family History  Mother had breast cancer in her early 50's then had colon cancer in her 60's. She  of uterine cancer  2 Maternal aunts with breast cancer  Daughter with breast cancer at age 45 - BRCA positive  Father  of a gunshot wound  She is an only child    PHYSICAL EXAMINATION:         General: Alert, oriented. No apparent distress. Affect within normal   limits.   Conjunctivae anicteric. Neck supple.   Respiratory effort normal. Lungs clear to auscultation.   Heart: Regular rate and rhythm without murmurs, gallops or rubs.   No lower extremity edema.    Well healed scar over the left leg with some minimal surrounding edema.      Labs 23 reviewed     ASSESSMENT        1. HTN- controlled  2. Iron deficiency anemia - stable off iron   3. Angioectasias of duodenal bulb,  gastric body, On omeprazole 20 mg 2 tablets daily. S/p EGD 1/31/20 with cautery. Will montior blood counts closely.   4.  CAD with NSTEMI 5/2017- s/p stenting 5/2017 - on risk factor modifications.    5. Left breast cancer with BRCA2 positive -Finished chemo and  radiation. On anastrazole-  6. Seizure -stable on Keppra XR   7.  Diabetes - controlled.  9. Gout -controlled on allopurinol    10. Hyperlipidemia - on Crestor 40 mg daily. Controlled   11. Obesity - working at diet, exercise and weight loss.   12.  GERD - controlled on meds  13. Hypokalemia -  on replacement potassium   14. S/P left knee replacement - doing well.         Screening - mammogram 8/22    Colonoscopy 4/23/12 - normal, and 5/25/15 - 3 small polyps (one adenoma).  Colonoscopy 10/8/18 - one Tubular adenoma polyp - due 2023. - ordered     Bone density was normal 10/2021  - scheduled     Follow up in 6 months, sooner if issues.

## 2023-06-27 ENCOUNTER — OFFICE VISIT (OUTPATIENT)
Dept: CARDIOLOGY | Facility: CLINIC | Age: 83
End: 2023-06-27
Payer: MEDICARE

## 2023-06-27 VITALS
HEART RATE: 69 BPM | DIASTOLIC BLOOD PRESSURE: 68 MMHG | HEIGHT: 64 IN | SYSTOLIC BLOOD PRESSURE: 149 MMHG | BODY MASS INDEX: 46.43 KG/M2 | WEIGHT: 271.94 LBS

## 2023-06-27 DIAGNOSIS — I10 HTN (HYPERTENSION), BENIGN: Chronic | ICD-10-CM

## 2023-06-27 DIAGNOSIS — R09.89 RIGHT CAROTID BRUIT: ICD-10-CM

## 2023-06-27 DIAGNOSIS — Z95.5 STENTED CORONARY ARTERY: ICD-10-CM

## 2023-06-27 DIAGNOSIS — I25.118 ATHEROSCLEROTIC HEART DISEASE OF NATIVE CORONARY ARTERY WITH OTHER FORMS OF ANGINA PECTORIS: Primary | ICD-10-CM

## 2023-06-27 DIAGNOSIS — E78.2 MIXED HYPERLIPIDEMIA: Chronic | ICD-10-CM

## 2023-06-27 DIAGNOSIS — R07.89 CHEST DISCOMFORT: ICD-10-CM

## 2023-06-27 DIAGNOSIS — N18.30 CHRONIC RENAL DISEASE, STAGE 3, MODERATELY DECREASED GLOMERULAR FILTRATION RATE BETWEEN 30-59 ML/MIN/1.73 SQUARE METER: Chronic | ICD-10-CM

## 2023-06-27 DIAGNOSIS — E11.51 CONTROLLED TYPE 2 DM WITH PERIPHERAL CIRCULATORY DISORDER: Chronic | ICD-10-CM

## 2023-06-27 DIAGNOSIS — I15.2 HYPERTENSION ASSOCIATED WITH DIABETES: ICD-10-CM

## 2023-06-27 DIAGNOSIS — E11.59 HYPERTENSION ASSOCIATED WITH DIABETES: ICD-10-CM

## 2023-06-27 DIAGNOSIS — C50.412 MALIGNANT NEOPLASM OF UPPER-OUTER QUADRANT OF LEFT FEMALE BREAST: ICD-10-CM

## 2023-06-27 DIAGNOSIS — I25.2 OLD MI (MYOCARDIAL INFARCTION): ICD-10-CM

## 2023-06-27 DIAGNOSIS — E11.69 HYPERLIPIDEMIA ASSOCIATED WITH TYPE 2 DIABETES MELLITUS: ICD-10-CM

## 2023-06-27 DIAGNOSIS — I65.23 BILATERAL CAROTID ARTERY STENOSIS: ICD-10-CM

## 2023-06-27 DIAGNOSIS — E78.5 HYPERLIPIDEMIA ASSOCIATED WITH TYPE 2 DIABETES MELLITUS: ICD-10-CM

## 2023-06-27 DIAGNOSIS — Z95.5 S/P CORONARY ARTERY STENT PLACEMENT: ICD-10-CM

## 2023-06-27 PROCEDURE — 99999 PR PBB SHADOW E&M-EST. PATIENT-LVL III: ICD-10-PCS | Mod: PBBFAC,,, | Performed by: PHYSICIAN ASSISTANT

## 2023-06-27 PROCEDURE — 1126F AMNT PAIN NOTED NONE PRSNT: CPT | Mod: CPTII,S$GLB,, | Performed by: PHYSICIAN ASSISTANT

## 2023-06-27 PROCEDURE — 3072F LOW RISK FOR RETINOPATHY: CPT | Mod: CPTII,S$GLB,, | Performed by: PHYSICIAN ASSISTANT

## 2023-06-27 PROCEDURE — 3077F SYST BP >= 140 MM HG: CPT | Mod: CPTII,S$GLB,, | Performed by: PHYSICIAN ASSISTANT

## 2023-06-27 PROCEDURE — 93000 ELECTROCARDIOGRAM COMPLETE: CPT | Mod: S$GLB,,, | Performed by: INTERNAL MEDICINE

## 2023-06-27 PROCEDURE — 1101F PT FALLS ASSESS-DOCD LE1/YR: CPT | Mod: CPTII,S$GLB,, | Performed by: PHYSICIAN ASSISTANT

## 2023-06-27 PROCEDURE — 99999 PR PBB SHADOW E&M-EST. PATIENT-LVL III: CPT | Mod: PBBFAC,,, | Performed by: PHYSICIAN ASSISTANT

## 2023-06-27 PROCEDURE — 1159F MED LIST DOCD IN RCRD: CPT | Mod: CPTII,S$GLB,, | Performed by: PHYSICIAN ASSISTANT

## 2023-06-27 PROCEDURE — 3288F FALL RISK ASSESSMENT DOCD: CPT | Mod: CPTII,S$GLB,, | Performed by: PHYSICIAN ASSISTANT

## 2023-06-27 PROCEDURE — 3078F PR MOST RECENT DIASTOLIC BLOOD PRESSURE < 80 MM HG: ICD-10-PCS | Mod: CPTII,S$GLB,, | Performed by: PHYSICIAN ASSISTANT

## 2023-06-27 PROCEDURE — 3288F PR FALLS RISK ASSESSMENT DOCUMENTED: ICD-10-PCS | Mod: CPTII,S$GLB,, | Performed by: PHYSICIAN ASSISTANT

## 2023-06-27 PROCEDURE — 99214 PR OFFICE/OUTPT VISIT, EST, LEVL IV, 30-39 MIN: ICD-10-PCS | Mod: S$GLB,,, | Performed by: PHYSICIAN ASSISTANT

## 2023-06-27 PROCEDURE — 99214 OFFICE O/P EST MOD 30 MIN: CPT | Mod: S$GLB,,, | Performed by: PHYSICIAN ASSISTANT

## 2023-06-27 PROCEDURE — 1101F PR PT FALLS ASSESS DOC 0-1 FALLS W/OUT INJ PAST YR: ICD-10-PCS | Mod: CPTII,S$GLB,, | Performed by: PHYSICIAN ASSISTANT

## 2023-06-27 PROCEDURE — 1126F PR PAIN SEVERITY QUANTIFIED, NO PAIN PRESENT: ICD-10-PCS | Mod: CPTII,S$GLB,, | Performed by: PHYSICIAN ASSISTANT

## 2023-06-27 PROCEDURE — 3078F DIAST BP <80 MM HG: CPT | Mod: CPTII,S$GLB,, | Performed by: PHYSICIAN ASSISTANT

## 2023-06-27 PROCEDURE — 1159F PR MEDICATION LIST DOCUMENTED IN MEDICAL RECORD: ICD-10-PCS | Mod: CPTII,S$GLB,, | Performed by: PHYSICIAN ASSISTANT

## 2023-06-27 PROCEDURE — 93000 EKG 12-LEAD: ICD-10-PCS | Mod: S$GLB,,, | Performed by: INTERNAL MEDICINE

## 2023-06-27 PROCEDURE — 3077F PR MOST RECENT SYSTOLIC BLOOD PRESSURE >= 140 MM HG: ICD-10-PCS | Mod: CPTII,S$GLB,, | Performed by: PHYSICIAN ASSISTANT

## 2023-06-27 PROCEDURE — 3072F PR LOW RISK FOR RETINOPATHY: ICD-10-PCS | Mod: CPTII,S$GLB,, | Performed by: PHYSICIAN ASSISTANT

## 2023-06-27 RX ORDER — NITROGLYCERIN 0.4 MG/1
0.4 TABLET SUBLINGUAL EVERY 5 MIN PRN
Qty: 450 TABLET | Refills: 0 | Status: SHIPPED | OUTPATIENT
Start: 2023-06-27 | End: 2023-11-22

## 2023-06-27 NOTE — PROGRESS NOTES
"     Cardiology Clinic Note  Reason for Visit: Annual visit   TOMMY avina. Dr. Puente: 6/29/2022    HPI:     PMHx:  CAD. S/p PCI  HTN  DMT2  Carotid stenosis      Renata Bergman is a 82 y.o. female who presents to clinic for annual visit.  Her blood pressure is elevated at time of presentation.  She did take amlodipine and 1st dose of carvedilol prior to today's visit.  She saw her primary care doctor June 23rd and had normal reading at that clinic visit.  She does have a blood pressure cuff at home, but has not regularly monitoring at this time.  Her last lipid panel drawn June 17, 2023 shows her LDL above goal at 90.2.  She was found to have carotid stenosis, right greater than left last year.  Dr. Puente recommended repeating in 6 months.  Patient had a right knee replacement surgery last year.  She had a normal stress echo in July of 2022 prior to that procedure.  She is requesting a refill of nitroglycerin today.  She reports non exertional chest heaviness that has occurred 2-3 times, lasting 10-15 minutes, over the past 3 months.  It is exacerbated by pushing herself up into a seated position.  It is not associated with shortness of breath.      ROS:    Pertinent ROS included in HPI and below.  PMH:     Past Medical History:   Diagnosis Date    Anemia     Benign essential hypertension 09/04/2012    Breast cancer 2016    DCIS and IDC, stage I    Carpal tunnel syndrome 6/23/2014    Cataract     Coronary artery disease 09/04/2012    Diabetes mellitus due to abnormal insulin 09/04/2012    Diabetes mellitus type II     Genetic testing     brca2 positive     GERD (gastroesophageal reflux disease) 09/04/2012    Gout, arthritis 09/04/2012    Heart failure     Hyperlipidemia 09/04/2012    Hypertension     Labyrinthitis of both ears 02/28/2022    Myocardial infarction     Obesity     Primary osteoarthritis of both knees 09/04/2012    Renal manifestation of secondary diabetes mellitus     Seizure     states "one " "time during chemo in 2018"    Type 2 diabetes with peripheral circulatory disorder, controlled      Past Surgical History:   Procedure Laterality Date    BREAST BIOPSY      BREAST LUMPECTOMY Left 08/01/2016    DCIS and IDC, s/p lumpectomy    CATARACT EXTRACTION      COLONOSCOPY N/A 10/8/2018    Procedure: COLONOSCOPY;  Surgeon: Marcio Louie MD;  Location: [facility] ENDO (4TH FLR);  Service: Endoscopy;  Laterality: N/A;    CORONARY ANGIOPLASTY      ESOPHAGOGASTRODUODENOSCOPY N/A 10/8/2018    Procedure: ESOPHAGOGASTRODUODENOSCOPY (EGD);  Surgeon: Marcio Louie MD;  Location: [facility] ENDO (4TH FLR);  Service: Endoscopy;  Laterality: N/A;  combined egd/colon order/Plavix/OK to hold med 5 days prior to procedures per Dr Puente/see telephone encounter dated 8/22/18/svn    ESOPHAGOGASTRODUODENOSCOPY N/A 1/31/2020    Procedure: ESOPHAGOGASTRODUODENOSCOPY (EGD);  Surgeon: Marcio Louie MD;  Location: [facility] (4TH FLR);  Service: Endoscopy;  Laterality: N/A;  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 10/2018    per Dr Albarran to hold Plavix 5 days prior to procedure    HYSTERECTOMY      INTRAOCULAR PROSTHESES INSERTION Right 10/18/2020    Procedure: INSERTION, IOL PROSTHESIS;  Surgeon: Karishma Mata MD;  Location: [facility] OR [address] [place] [address];  Service: Ophthalmology;  Laterality: Right;    INTRAOCULAR PROSTHESES INSERTION Left 12/21/2020    Procedure: INSERTION, IOL PROSTHESIS;  Surgeon: Karishma Mata MD;  Location: [facility] OR [address] [place] [address];  Service: Ophthalmology;  Laterality: Left;    JOINT REPLACEMENT      left and right k nee    KNEE SURGERY      PHACOEMULSIFICATION OF CATARACT Right 10/18/2020    Procedure: PHACOEMULSIFICATION, CATARACT;  Surgeon: Karishma Mata MD;  Location: [facility] OR [address] [place] [address];  Service: Ophthalmology;  Laterality: Right;    PHACOEMULSIFICATION OF CATARACT Left 12/21/2020    Procedure: PHACOEMULSIFICATION, CATARACT;  Surgeon: Karishma Mata MD;  Location: [facility] OR [address] [place] [address];  Service: " Ophthalmology;  Laterality: Left;    REVISION OF KNEE ARTHROPLASTY Left 4/15/2021    Procedure: REVISION, ARTHROPLASTY, KNEE;  Surgeon: Gilson Wilkerson MD;  Location: [facility] OR [address] [place] [address];  Service: Orthopedics;  Laterality: Left;    TOTAL ABDOMINAL HYSTERECTOMY W/ BILATERAL SALPINGOOPHORECTOMY       Allergies:     Review of patient's allergies indicates:   Allergen Reactions    Lisinopril Anaphylaxis     Medications:     Current Outpatient Medications on File Prior to Visit   Medication Sig Dispense Refill    acetaminophen (TYLENOL) 500 MG tablet Take 1,000 mg by mouth daily as needed for Pain (headache).      allopurinoL (ZYLOPRIM) 300 MG tablet Take 1 tablet (300 mg total) by mouth every morning. 90 tablet 4    amLODIPine (NORVASC) 10 MG tablet Take 1 tablet (10 mg total) by mouth once daily. 90 tablet 3    anastrozole (ARIMIDEX) 1 mg Tab Take 1 tablet (1 mg total) by mouth once daily. 90 tablet 3    aspirin (ECOTRIN) 81 MG EC tablet Take 1 tablet (81 mg total) by mouth once daily.      blood sugar diagnostic Strp 1 strip by Misc.(Non-Drug; Combo Route) route once daily. 100 strip 4    blood-glucose meter kit Use as instructed 1 each 0    carvediloL (COREG) 12.5 MG tablet Take 1 tablet (12.5 mg total) by mouth 2 (two) times daily. 180 tablet 3    ergocalciferol (ERGOCALCIFEROL) 50,000 unit Cap Take 1 capsule (50,000 Units total) by mouth twice a week. 26 capsule 4    ezetimibe (ZETIA) 10 mg tablet Take 1 tablet (10 mg total) by mouth once daily. 90 tablet 4    lancets Misc Check blood sugar once daily 100 each 0    levetiracetam XR (KEPPRA XR) 500 mg Tb24 24 hr tablet Take 2 tablets (1,000 mg total) by mouth once daily. 180 tablet 4    omeprazole (PRILOSEC) 20 MG capsule Take 1 capsule (20 mg total) by mouth 2 (two) times a day. 180 capsule 4    propylene glycol (SYSTANE BALANCE OPHT) Apply 1-2 drops to eye daily as needed (dry eyes).      rosuvastatin (CRESTOR) 40 MG Tab Take 1 tablet (40 mg total) by mouth every  "morning. 90 tablet 1    [DISCONTINUED] nitroGLYCERIN (NITROSTAT) 0.4 MG SL tablet PLACE 1 TABLET UNDER THE TONGUE EVERY 5 MINUTES AS NEEDED FOR CHEST PAIN. 450 tablet 0     No current facility-administered medications on file prior to visit.     Social History:     Social History     Tobacco Use    Smoking status: Former     Packs/day: 1.00     Years: 12.00     Pack years: 12.00     Types: Cigarettes     Quit date: 2006     Years since quittin.8    Smokeless tobacco: Never   Substance Use Topics    Alcohol use: Not Currently     Family History:     Family History   Problem Relation Age of Onset    Cancer Mother 55        colon    Diabetes Mother     Hypertension Mother     Breast cancer Mother 45    Ovarian cancer Mother     Hypertension Maternal Aunt     Hyperlipidemia Maternal Aunt     Breast cancer Maternal Aunt     Hypertension Maternal Uncle     Heart disease Father     Breast cancer Daughter 45    Breast cancer Maternal Aunt     Diabetes Son     Stroke Son     Kidney disease Son     Glaucoma Neg Hx     Heart attack Neg Hx     Heart failure Neg Hx      Physical Exam:   BP (!) 149/68   Pulse 69   Ht 5' 4" (1.626 m)   Wt 123.4 kg (271 lb 15 oz)   LMP  (LMP Unknown)   BMI 46.68 kg/m²      Physical Exam  Vitals and nursing note reviewed.   Constitutional:       Appearance: Normal appearance.   HENT:      Head: Normocephalic and atraumatic.   Neck:      Vascular: No carotid bruit or hepatojugular reflux.   Cardiovascular:      Rate and Rhythm: Normal rate and regular rhythm.      Pulses:           Radial pulses are 2+ on the right side and 2+ on the left side.        Dorsalis pedis pulses are 2+ on the right side and 2+ on the left side.        Posterior tibial pulses are 2+ on the right side and 2+ on the left side.      Heart sounds: S1 normal and S2 normal.   Pulmonary:      Effort: Pulmonary effort is normal.      Breath sounds: Normal breath sounds.   Abdominal:      General: Bowel sounds are " normal.      Palpations: Abdomen is soft.      Tenderness: There is no abdominal tenderness.   Feet:      Right foot:      Skin integrity: Skin integrity normal.      Left foot:      Skin integrity: Skin integrity normal.   Neurological:      Mental Status: She is alert.   Psychiatric:         Behavior: Behavior is cooperative.        Labs:     Blood Tests:  Lab Results   Component Value Date    BNP 20 07/16/2022     06/17/2023    K 3.8 06/17/2023     06/17/2023    CO2 23 06/17/2023    BUN 22 06/17/2023    CREATININE 1.5 (H) 06/17/2023    GLU 96 06/17/2023    HGBA1C 6.4 (H) 06/17/2023    MG 1.8 07/18/2022    AST 21 06/17/2023    ALT 15 06/17/2023    ALBUMIN 3.8 06/17/2023    PROT 7.9 06/17/2023    BILITOT 0.5 06/17/2023    WBC 4.98 06/17/2023    HGB 14.3 06/17/2023    HCT 43.8 06/17/2023    MCV 94 06/17/2023     06/17/2023    INR 1.0 05/23/2017    TSH 3.241 06/17/2023       Lab Results   Component Value Date    CHOL 167 06/17/2023    HDL 65 06/17/2023    TRIG 59 06/17/2023       Lab Results   Component Value Date    LDLCALC 90.2 06/17/2023         Imaging:     Echocardiogram  TTE 2/28/2022  The left ventricle is normal in size with concentric remodeling and normal systolic function.  The estimated ejection fraction is 65%.  Normal left ventricular diastolic function.  Normal right ventricular size with normal right ventricular systolic function.  Normal central venous pressure (3 mmHg).    Stress testing  SPECT stress test 7/18/2022    Normal myocardial perfusion scan. There is no evidence of myocardial ischemia or infarction.    There is a  moderate intensity fixed perfusion abnormality in the inferior wall of the left ventricle secondary to diaphragm attenuation.    The visually estimated ejection fraction is normal at rest and normal during stress.    There is normal wall motion at rest and post stress.    LV cavity size is normal at rest and normal at stress.    The EKG portion of this study  is negative for ischemia.    The patient reported no chest pain during the stress test.    During stress, rare PVCs are noted.    There are no prior studies for comparison.    Other  Carotid US 7/14/2022  Bilateral carotid atherosclerosis is present.  There is 50-69% right ICA stenosis.  There is less than 50% left ICA stenosis.  Vertebral flow is antegrade bilaterally.      Assessment:     1. Atherosclerotic heart disease of native coronary artery with other forms of angina pectoris    2. S/P coronary artery stent placement    3. Bilateral carotid artery stenosis    4. Hyperlipidemia associated with type 2 diabetes mellitus    5. Hypertension associated with diabetes    6. Chest discomfort    7. BMI 45.0-49.9, adult        Plan:     Atherosclerotic heart disease of native coronary artery with other forms of angina pectoris  S/P coronary artery stent placement  Continue aspirin and statin    Bilateral carotid artery stenosis  -     CV Ultrasound Bilateral Doppler Carotid; Future  Recommended updating carotid ultrasound  Currently asymptomatic  Continue aspirin and statin    Hyperlipidemia associated with type 2 diabetes mellitus  Continue zetia and rosuvastatin 40 mg qD  Recommend Mediterranean diet     Hypertension associated with diabetes  We will call the patient in 2 weeks to confirm that her blood pressure is well-controlled at home  Recommend limiting dietary sodium to less than 2 g per day  Could consider increasing dose of carvedilol if patient is not at goal of less than 130/80 on home monitor  Continue amlodipine 10 mg q.d.    Chest discomfort  -     IN OFFICE EKG 12-LEAD (to Musa)  Reassured patient that her symptoms do not seem obviously cardiac at this point, and her stress test less than 1 year ago did not show any evidence of ischemia.  However if she notices that the symptoms increasing in frequency or intensity she should contact the clinic right away or present to the ER for urgent evaluation.  No  concerning changes on EKG at time of visit today.    BMI 45.0-49.9, adult  Weight loss through a combination of diet and exercise encouraged.       Signed:  Estee Malloy PA-C  Cardiology     6/27/2023 8:24 AM    Follow-up:     Future Appointments   Date Time Provider Department Center   7/13/2023 10:00 AM VASCULAR, [place] [facility] [place]   8/21/2023  1:00 PM [facility] [place] MAMMO2 [facility] MAMMO [facility]   10/20/2023 10:00 AM NOM, DEXA1 [facility] BMD [facility]   12/14/2023 10:40 AM Bismark Guevara MD [facility] TNSYHO [facility]   12/16/2023  8:00 AM LAB, APPOINTMENT [facility] INTMED [facility] LAB  Jermaine sharda [facility]   12/22/2023  8:00 AM Ethel Berger MD [facility]

## 2023-07-12 DIAGNOSIS — I10 HTN (HYPERTENSION), BENIGN: ICD-10-CM

## 2023-07-12 RX ORDER — CARVEDILOL 25 MG/1
25 TABLET ORAL 2 TIMES DAILY
Qty: 180 TABLET | Refills: 3 | Status: SHIPPED | OUTPATIENT
Start: 2023-07-12 | End: 2024-07-11

## 2023-07-13 ENCOUNTER — HOSPITAL ENCOUNTER (OUTPATIENT)
Dept: CARDIOLOGY | Facility: HOSPITAL | Age: 83
Discharge: HOME | End: 2023-07-13
Attending: PHYSICIAN ASSISTANT
Payer: MEDICARE

## 2023-07-13 DIAGNOSIS — I65.23 BILATERAL CAROTID ARTERY STENOSIS: ICD-10-CM

## 2023-07-13 LAB
LEFT CBA DIAS: 8 CM/S
LEFT CBA SYS: 76 CM/S
LEFT CCA DIST DIAS: 11 CM/S
LEFT CCA DIST SYS: 79 CM/S
LEFT CCA MID DIAS: 13 CM/S
LEFT CCA MID SYS: 94 CM/S
LEFT CCA PROX DIAS: 14 CM/S
LEFT CCA PROX SYS: 119 CM/S
LEFT ECA DIAS: 11 CM/S
LEFT ECA SYS: 129 CM/S
LEFT ICA DIST DIAS: 30 CM/S
LEFT ICA DIST SYS: 118 CM/S
LEFT ICA MID DIAS: 26 CM/S
LEFT ICA MID SYS: 105 CM/S
LEFT ICA PROX DIAS: 26 CM/S
LEFT ICA PROX SYS: 179 CM/S
LEFT VERTEBRAL DIAS: 7 CM/S
LEFT VERTEBRAL SYS: 49 CM/S
OHS CV CAROTID RIGHT ICA EDV HIGHEST: 30
OHS CV CAROTID ULTRASOUND LEFT ICA/CCA RATIO: 2.27
OHS CV CAROTID ULTRASOUND RIGHT ICA/CCA RATIO: 3.05
OHS CV PV CAROTID LEFT HIGHEST CCA: 119
OHS CV PV CAROTID LEFT HIGHEST ICA: 179
OHS CV PV CAROTID RIGHT HIGHEST CCA: 76
OHS CV PV CAROTID RIGHT HIGHEST ICA: 198
OHS CV US CAROTID LEFT HIGHEST EDV: 30
RIGHT ARM DIASTOLIC BLOOD PRESSURE: 70 MMHG
RIGHT ARM SYSTOLIC BLOOD PRESSURE: 120 MMHG
RIGHT CBA DIAS: 23 CM/S
RIGHT CBA SYS: 209 CM/S
RIGHT CCA DIST DIAS: 11 CM/S
RIGHT CCA DIST SYS: 65 CM/S
RIGHT CCA MID DIAS: 10 CM/S
RIGHT CCA MID SYS: 67 CM/S
RIGHT CCA PROX DIAS: 10 CM/S
RIGHT CCA PROX SYS: 76 CM/S
RIGHT ECA DIAS: 10 CM/S
RIGHT ECA SYS: 169 CM/S
RIGHT ICA DIST DIAS: 30 CM/S
RIGHT ICA DIST SYS: 100 CM/S
RIGHT ICA MID DIAS: 7 CM/S
RIGHT ICA MID SYS: 90 CM/S
RIGHT ICA PROX DIAS: 27 CM/S
RIGHT ICA PROX SYS: 198 CM/S
RIGHT VERTEBRAL DIAS: 10 CM/S
RIGHT VERTEBRAL SYS: 48 CM/S

## 2023-07-13 PROCEDURE — 93880 EXTRACRANIAL BILAT STUDY: CPT | Mod: 26,,, | Performed by: INTERNAL MEDICINE

## 2023-07-13 PROCEDURE — 93880 CV US DOPPLER CAROTID (CUPID ONLY): ICD-10-PCS | Mod: 26,,, | Performed by: INTERNAL MEDICINE

## 2023-07-13 PROCEDURE — 93880 EXTRACRANIAL BILAT STUDY: CPT

## 2023-07-14 ENCOUNTER — TELEPHONE (OUTPATIENT)
Dept: CARDIOLOGY | Facility: CLINIC | Age: 83
End: 2023-07-14
Payer: MEDICARE

## 2023-07-14 NOTE — TELEPHONE ENCOUNTER
----- Message from Jud Olson RN sent at 2023  1:39 PM CDT -----  Regardin Week Blood Presure Check  Estee Recommendation from 23: We will call the patient in 2 weeks to confirm that her blood pressure is well-controlled at home

## 2023-07-14 NOTE — TELEPHONE ENCOUNTER
Followed up with pt to see if she has been monitoring her BP at home as recommended by Estee Malloy. Pt states she has not been monitoring it regularly like she should. Advised pt to start monitoring BP. Pt verbalized understanding.

## 2023-08-06 NOTE — TELEPHONE ENCOUNTER
No care due was identified.  [facility] Embedded Care Due Messages. Reference number: [id number].   8/06/2023 5:37:27 AM CDT

## 2023-08-07 RX ORDER — OMEPRAZOLE 20 MG/1
CAPSULE, DELAYED RELEASE ORAL
Qty: 180 CAPSULE | Refills: 3 | Status: SHIPPED | OUTPATIENT
Start: 2023-08-07

## 2023-08-07 NOTE — TELEPHONE ENCOUNTER
Refill Routing Note     Refill Routing Note   Medication(s) are not appropriate for processing by [facility] for the following reason(s):      New or recently adjusted medication    [facility] action(s):  Defer Care Due:  None identified     Medication Therapy Plan: SIG CHANGED FROM 2 CAPSULES BY MOUTH DAILY TO 1 CAPSULE BY MOUTH TWICE DAILY      Appointments  past 12m or future 3m with PCP    Date Provider   Last Visit   6/23/2023 Ethel Berger MD   Next Visit   12/22/2023 Ethel Berger MD   ED visits in past 90 days: 0        Note composed:4:54 AM 08/07/2023

## 2023-08-15 RX ORDER — ROSUVASTATIN CALCIUM 40 MG/1
40 TABLET, COATED ORAL EVERY MORNING
Qty: 90 TABLET | Refills: 3 | Status: SHIPPED | OUTPATIENT
Start: 2023-08-15 | End: 2024-02-16

## 2023-08-15 NOTE — TELEPHONE ENCOUNTER
No care due was identified.  [facility] Embedded Care Due Messages. Reference number: [id number].   8/15/2023 5:38:45 AM CDT

## 2023-08-15 NOTE — TELEPHONE ENCOUNTER
Refill Decision Note      Refill Decision Note   Renata Bergman  is requesting a refill authorization.  Brief Assessment and Rationale for Refill:  Approve     Medication Therapy Plan:         Comments:     Note composed:6:53 AM 08/15/2023             Appointments     Last Visit   6/23/2023 Ethel Berger MD   Next Visit   12/22/2023 Ehtel Berger MD

## 2023-08-21 ENCOUNTER — HOSPITAL ENCOUNTER (OUTPATIENT)
Dept: RADIOLOGY | Facility: HOSPITAL | Age: 83
Discharge: HOME | End: 2023-08-21
Attending: NURSE PRACTITIONER
Payer: MEDICARE

## 2023-08-21 ENCOUNTER — CLINICAL SUPPORT (OUTPATIENT)
Dept: ENDOSCOPY | Facility: HOSPITAL | Age: 83
End: 2023-08-21
Attending: INTERNAL MEDICINE
Payer: MEDICARE

## 2023-08-21 DIAGNOSIS — K63.5 POLYP OF COLON, UNSPECIFIED PART OF COLON, UNSPECIFIED TYPE: ICD-10-CM

## 2023-08-21 DIAGNOSIS — C50.412 MALIGNANT NEOPLASM OF UPPER-OUTER QUADRANT OF LEFT FEMALE BREAST, UNSPECIFIED ESTROGEN RECEPTOR STATUS: ICD-10-CM

## 2023-08-21 DIAGNOSIS — Z12.31 ENCOUNTER FOR SCREENING MAMMOGRAM FOR MALIGNANT NEOPLASM OF BREAST: ICD-10-CM

## 2023-08-21 PROCEDURE — 77067 SCR MAMMO BI INCL CAD: CPT | Mod: 26,,, | Performed by: RADIOLOGY

## 2023-08-21 PROCEDURE — 77067 SCR MAMMO BI INCL CAD: CPT | Mod: TC

## 2023-08-21 PROCEDURE — 77063 MAMMO DIGITAL SCREENING BILAT WITH TOMO: ICD-10-PCS | Mod: 26,,, | Performed by: RADIOLOGY

## 2023-08-21 PROCEDURE — 77067 MAMMO DIGITAL SCREENING BILAT WITH TOMO: ICD-10-PCS | Mod: 26,,, | Performed by: RADIOLOGY

## 2023-08-21 PROCEDURE — 77063 BREAST TOMOSYNTHESIS BI: CPT | Mod: 26,,, | Performed by: RADIOLOGY

## 2023-08-25 ENCOUNTER — TELEPHONE (OUTPATIENT)
Dept: ENDOSCOPY | Facility: HOSPITAL | Age: 83
End: 2023-08-25
Payer: MEDICARE

## 2023-08-25 VITALS — BODY MASS INDEX: 44.9 KG/M2 | WEIGHT: 263 LBS | HEIGHT: 64 IN

## 2023-08-25 DIAGNOSIS — K63.5 POLYP OF COLON, UNSPECIFIED PART OF COLON, UNSPECIFIED TYPE: Primary | ICD-10-CM

## 2023-08-25 DIAGNOSIS — Z12.11 SCREEN FOR COLON CANCER: Primary | ICD-10-CM

## 2023-08-25 DIAGNOSIS — K21.9 GASTROESOPHAGEAL REFLUX DISEASE, UNSPECIFIED WHETHER ESOPHAGITIS PRESENT: ICD-10-CM

## 2023-08-25 NOTE — TELEPHONE ENCOUNTER
"----- Message from Giulia Jain sent at 2023 10:25 AM CDT -----    ----- Message -----  From: Marcio Louie MD  Sent: 2023   8:56 AM CDT  To: [facility] Patients    Procedure: EGD/Colonoscopy    Diagnosis: Family history of first degree relative with CRC less than 60, personal history of colon adenoma and gerd    Procedure Timin-12 weeks    #If within 4 weeks selected, please geni as high priority#    #If greater than 12 weeks, please select "5-12 weeks" and delay sending until 2 months prior to requested date#     Provider: Any endoscopist    Location: [address]    Additional Scheduling Information: No scheduling concerns    Prep Specifications:Standard prep    Is the patient taking a GLP-1 Agonist:no    Have you attached a patient to this message: yes         "

## 2023-08-25 NOTE — TELEPHONE ENCOUNTER
Spoke to Renata to schedule procedure(s) Colonoscopy/EGD       Physician to perform procedure(s) Dr. REGINA Villegas  Date of Procedure (s) 11/22/23  Arrival Time 1:00 PM  Time of Procedure(s) 2:00 PM   Location of Procedure(s) [place] 4th Floor  Type of Rx Prep sent to patient: PEG  Instructions provided to patient via [facility] and mail    Patient was informed on the following information and verbalized understanding. Screening questionnaire reviewed with patient and complete. If procedure requires anesthesia, a responsible adult needs to be present to accompany the patient home, patient cannot drive after receiving anesthesia. Appointment details are tentative, especially check-in time. Patient will receive a prep-op call 4 days prior to confirm check-in time for procedure. If applicable the patient should contact their pharmacy to verify Rx for procedure prep is ready for pick-up. Patient was advised to call the scheduling department at [phone] if pharmacy states no Rx is available. Patient was advised to call the endoscopy scheduling department if any questions or concerns arise.       Endoscopy Scheduling Department

## 2023-09-05 ENCOUNTER — TELEPHONE (OUTPATIENT)
Dept: ADMINISTRATIVE | Facility: CLINIC | Age: 83
End: 2023-09-05
Payer: MEDICARE

## 2023-09-05 NOTE — TELEPHONE ENCOUNTER
Called pt, informed pt I was calling to remind pt of her in office EAWV on 9/7/23; clinic location provided to patient; pt confirmed appointment

## 2023-11-07 ENCOUNTER — TELEPHONE (OUTPATIENT)
Dept: ENDOSCOPY | Facility: HOSPITAL | Age: 83
End: 2023-11-07
Payer: MEDICARE

## 2023-11-07 DIAGNOSIS — Z12.11 SPECIAL SCREENING FOR MALIGNANT NEOPLASMS, COLON: Primary | ICD-10-CM

## 2023-11-07 NOTE — TELEPHONE ENCOUNTER
Patient calling for information about upcoming colonoscopy on 11/22/23. PEG prescription resent to patient's preferred pharmacy as requested. All questions answered. Patient verbalized understanding.

## 2023-11-20 ENCOUNTER — TELEPHONE (OUTPATIENT)
Dept: ENDOSCOPY | Facility: HOSPITAL | Age: 83
End: 2023-11-20
Payer: MEDICARE

## 2023-11-20 NOTE — TELEPHONE ENCOUNTER
Called and spoke to patient. Instructed patient on medications. All questions answered. Patient verbalized an understanding.

## 2023-11-22 ENCOUNTER — HOSPITAL ENCOUNTER (OUTPATIENT)
Facility: HOSPITAL | Age: 83
Discharge: HOME | End: 2023-11-22
Attending: INTERNAL MEDICINE | Admitting: INTERNAL MEDICINE
Payer: MEDICARE

## 2023-11-22 ENCOUNTER — ANESTHESIA (OUTPATIENT)
Dept: ENDOSCOPY | Facility: HOSPITAL | Age: 83
End: 2023-11-22
Payer: MEDICARE

## 2023-11-22 ENCOUNTER — ANESTHESIA EVENT (OUTPATIENT)
Dept: ENDOSCOPY | Facility: HOSPITAL | Age: 83
End: 2023-11-22
Payer: MEDICARE

## 2023-11-22 VITALS
SYSTOLIC BLOOD PRESSURE: 131 MMHG | WEIGHT: 263 LBS | HEART RATE: 75 BPM | RESPIRATION RATE: 18 BRPM | OXYGEN SATURATION: 95 % | BODY MASS INDEX: 44.9 KG/M2 | HEIGHT: 64 IN | DIASTOLIC BLOOD PRESSURE: 60 MMHG | TEMPERATURE: 98 F

## 2023-11-22 DIAGNOSIS — C50.412 MALIGNANT NEOPLASM OF UPPER-OUTER QUADRANT OF LEFT FEMALE BREAST: ICD-10-CM

## 2023-11-22 DIAGNOSIS — E11.51 CONTROLLED TYPE 2 DM WITH PERIPHERAL CIRCULATORY DISORDER: Chronic | ICD-10-CM

## 2023-11-22 DIAGNOSIS — E78.2 MIXED HYPERLIPIDEMIA: Chronic | ICD-10-CM

## 2023-11-22 DIAGNOSIS — Z95.5 STENTED CORONARY ARTERY: ICD-10-CM

## 2023-11-22 DIAGNOSIS — I25.2 OLD MI (MYOCARDIAL INFARCTION): ICD-10-CM

## 2023-11-22 DIAGNOSIS — N18.30 CHRONIC RENAL DISEASE, STAGE 3, MODERATELY DECREASED GLOMERULAR FILTRATION RATE BETWEEN 30-59 ML/MIN/1.73 SQUARE METER: Chronic | ICD-10-CM

## 2023-11-22 DIAGNOSIS — R09.89 RIGHT CAROTID BRUIT: ICD-10-CM

## 2023-11-22 DIAGNOSIS — I10 HTN (HYPERTENSION), BENIGN: Chronic | ICD-10-CM

## 2023-11-22 DIAGNOSIS — Z86.010 HISTORY OF COLON POLYPS: ICD-10-CM

## 2023-11-22 LAB
POCT GLUCOSE: 67 MG/DL (ref 70–110)
POCT GLUCOSE: 75 MG/DL (ref 70–110)

## 2023-11-22 PROCEDURE — G0105 COLORECTAL SCRN; HI RISK IND: ICD-10-PCS | Mod: ,,, | Performed by: INTERNAL MEDICINE

## 2023-11-22 PROCEDURE — G0105 COLORECTAL SCRN; HI RISK IND: HCPCS | Performed by: INTERNAL MEDICINE

## 2023-11-22 PROCEDURE — 25000003 PHARM REV CODE 250: Performed by: NURSE ANESTHETIST, CERTIFIED REGISTERED

## 2023-11-22 PROCEDURE — 63600175 PHARM REV CODE 636 W HCPCS: Performed by: NURSE ANESTHETIST, CERTIFIED REGISTERED

## 2023-11-22 PROCEDURE — D9220A PRA ANESTHESIA: Mod: ANES,,, | Performed by: ANESTHESIOLOGY

## 2023-11-22 PROCEDURE — D9220A PRA ANESTHESIA: ICD-10-PCS | Mod: ANES,,, | Performed by: ANESTHESIOLOGY

## 2023-11-22 PROCEDURE — D9220A PRA ANESTHESIA: Mod: CRNA,,, | Performed by: NURSE ANESTHETIST, CERTIFIED REGISTERED

## 2023-11-22 PROCEDURE — G0105 COLORECTAL SCRN; HI RISK IND: HCPCS | Mod: ,,, | Performed by: INTERNAL MEDICINE

## 2023-11-22 PROCEDURE — 25000003 PHARM REV CODE 250: Performed by: INTERNAL MEDICINE

## 2023-11-22 PROCEDURE — 37000009 HC ANESTHESIA EA ADD 15 MINS: Performed by: INTERNAL MEDICINE

## 2023-11-22 PROCEDURE — 37000008 HC ANESTHESIA 1ST 15 MINUTES: Performed by: INTERNAL MEDICINE

## 2023-11-22 PROCEDURE — D9220A PRA ANESTHESIA: ICD-10-PCS | Mod: CRNA,,, | Performed by: NURSE ANESTHETIST, CERTIFIED REGISTERED

## 2023-11-22 RX ORDER — SODIUM CHLORIDE 0.9 % (FLUSH) 0.9 %
10 SYRINGE (ML) INJECTION
Status: CANCELLED | OUTPATIENT
Start: 2023-11-22

## 2023-11-22 RX ORDER — PROPOFOL 10 MG/ML
VIAL (ML) INTRAVENOUS
Status: DISCONTINUED | OUTPATIENT
Start: 2023-11-22 | End: 2023-11-22

## 2023-11-22 RX ORDER — SODIUM CHLORIDE 9 MG/ML
INJECTION, SOLUTION INTRAVENOUS CONTINUOUS
Status: DISCONTINUED | OUTPATIENT
Start: 2023-11-22 | End: 2023-11-22 | Stop reason: HOSPADM

## 2023-11-22 RX ORDER — SODIUM CHLORIDE 0.9 % (FLUSH) 0.9 %
10 SYRINGE (ML) INJECTION
Status: DISCONTINUED | OUTPATIENT
Start: 2023-11-22 | End: 2023-11-22 | Stop reason: HOSPADM

## 2023-11-22 RX ORDER — NITROGLYCERIN 0.4 MG/1
TABLET SUBLINGUAL
Qty: 450 TABLET | Refills: 0 | Status: SHIPPED | OUTPATIENT
Start: 2023-11-22

## 2023-11-22 RX ORDER — FENTANYL CITRATE 50 UG/ML
25 INJECTION, SOLUTION INTRAMUSCULAR; INTRAVENOUS EVERY 5 MIN PRN
Status: CANCELLED | OUTPATIENT
Start: 2023-11-22

## 2023-11-22 RX ORDER — LIDOCAINE HYDROCHLORIDE 20 MG/ML
INJECTION INTRAVENOUS
Status: DISCONTINUED | OUTPATIENT
Start: 2023-11-22 | End: 2023-11-22

## 2023-11-22 RX ORDER — ONDANSETRON 2 MG/ML
4 INJECTION INTRAMUSCULAR; INTRAVENOUS DAILY PRN
Status: CANCELLED | OUTPATIENT
Start: 2023-11-22

## 2023-11-22 RX ADMIN — PROPOFOL 60 MG: 10 INJECTION, EMULSION INTRAVENOUS at 02:11

## 2023-11-22 RX ADMIN — LIDOCAINE HYDROCHLORIDE 30 MG: 20 INJECTION INTRAVENOUS at 02:11

## 2023-11-22 RX ADMIN — SODIUM CHLORIDE: 9 INJECTION, SOLUTION INTRAVENOUS at 01:11

## 2023-11-22 RX ADMIN — SODIUM CHLORIDE: 0.9 INJECTION, SOLUTION INTRAVENOUS at 01:11

## 2023-11-22 NOTE — TRANSFER OF CARE
"Anesthesia Transfer of Care Note    Patient: Renata Bergman    Procedure(s) Performed: Procedure(s) (LRB):  EGD (ESOPHAGOGASTRODUODENOSCOPY) (N/A)  COLONOSCOPY (N/A)    Patient location: PACU    Anesthesia Type: general    Transport from OR: Transported from OR on room air with adequate spontaneous ventilation    Post pain: adequate analgesia    Post assessment: no apparent anesthetic complications and tolerated procedure well    Post vital signs: stable    Level of consciousness: awake, alert and oriented    Nausea/Vomiting: no nausea/vomiting    Complications: none    Transfer of care protocol was followed      Last vitals: Visit Vitals  /60 (Patient Position: Lying)   Pulse 75   Temp 36.4 °C (97.5 °F) (Temporal)   Resp 18   Ht 5' 4" (1.626 m)   Wt 119.3 kg (263 lb)   LMP  (LMP Unknown)   SpO2 96%   Breastfeeding No   BMI 45.14 kg/m²     "

## 2023-11-22 NOTE — ANESTHESIA POSTPROCEDURE EVALUATION
Anesthesia Post Evaluation    Patient: Renata Bergman    Procedure(s) Performed: Procedure(s) (LRB):  EGD (ESOPHAGOGASTRODUODENOSCOPY) (N/A)  COLONOSCOPY (N/A)    Final Anesthesia Type: general      Patient location during evaluation: GI PACU  Patient participation: Yes- Able to Participate  Level of consciousness: awake and alert and oriented  Post-procedure vital signs: reviewed and stable  Pain management: adequate  Airway patency: patent    PONV status at discharge: No PONV  Anesthetic complications: no      Cardiovascular status: blood pressure returned to baseline and hemodynamically stable  Respiratory status: unassisted, spontaneous ventilation and room air  Hydration status: euvolemic  Follow-up not needed.          Vitals Value Taken Time   /60 11/22/23 1453   Temp 36.4 °C (97.5 °F) 11/22/23 1423   Pulse 77 11/22/23 1456   Resp 18 11/22/23 1448   SpO2 96 % 11/22/23 1456   Vitals shown include unvalidated device data.      No case tracking events are documented in the log.      Pain/Perri Score: Perri Score: 10 (11/22/2023  2:38 PM)

## 2023-11-22 NOTE — ANESTHESIA PREPROCEDURE EVALUATION
11/22/2023  Renata Bergman is a 83 y.o., female Pre-operative evaluation for Procedure(s) (LRB):  EGD (ESOPHAGOGASTRODUODENOSCOPY) (N/A)  COLONOSCOPY (N/A)    Renata Bergman is a 83 y.o. female     Patient Active Problem List   Diagnosis    Hyperlipidemia associated with type 2 diabetes mellitus    Atherosclerotic heart disease of native coronary artery with other forms of angina pectoris    Gout, arthritis    GERD (gastroesophageal reflux disease)    Primary osteoarthritis of both knees    S/P coronary artery stent placement    Controlled type 2 DM with peripheral circulatory disorder    Stage 3a chronic kidney disease    Family history of colon cancer    Chest pain    Old MI (myocardial infarction)    Malignant neoplasm of upper-outer quadrant of left female breast    Tonic-clonic epileptic seizures    Bilateral carotid artery stenosis    Type 2 diabetes mellitus with kidney complication, without long-term current use of insulin    Prominent aorta    Peripheral vascular disease    Type 2 diabetes mellitus with circulatory disorder, without long-term current use of insulin    Iron deficiency anemia due to chronic blood loss    History of gastrointestinal bleeding    History of fall    Senile nuclear sclerosis    Class 3 severe obesity in adult    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     Current Outpatient Medications on File Prior to Encounter   Medication Sig Dispense Refill    allopurinoL (ZYLOPRIM) 300 MG tablet Take 1 tablet (300 mg total)  by mouth every morning. 90 tablet 4    amLODIPine (NORVASC) 10 MG tablet Take 1 tablet (10 mg total) by mouth once daily. 90 tablet 3    aspirin (ECOTRIN) 81 MG EC tablet Take 1 tablet (81 mg total) by mouth once daily.      carvediloL (COREG) 25 MG tablet Take 1 tablet (25 mg total) by mouth 2 (two) times daily. 180 tablet 3    levetiracetam XR (KEPPRA XR) 500 mg Tb24 24 hr tablet Take 2 tablets (1,000 mg total) by mouth once daily. 180 tablet 4    omeprazole (PRILOSEC) 20 MG capsule Take 1 capsule (20 mg total) by mouth 2 (two) times a day 180 capsule 3    rosuvastatin (CRESTOR) 40 MG Tab TAKE 1 TABLET(40 MG) BY MOUTH EVERY MORNING 90 tablet 3    acetaminophen (TYLENOL) 500 MG tablet Take 1,000 mg by mouth daily as needed for Pain (headache).      anastrozole (ARIMIDEX) 1 mg Tab Take 1 tablet (1 mg total) by mouth once daily. 90 tablet 3    blood sugar diagnostic Strp 1 strip by Misc.(Non-Drug; Combo Route) route once daily. 100 strip 4    blood-glucose meter kit Use as instructed 1 each 0    ergocalciferol (ERGOCALCIFEROL) 50,000 unit Cap Take 1 capsule (50,000 Units total) by mouth twice a week. 26 capsule 4    ezetimibe (ZETIA) 10 mg tablet Take 1 tablet (10 mg total) by mouth once daily. 90 tablet 4    lancets Misc Check blood sugar once daily 100 each 0    propylene glycol (SYSTANE BALANCE OPHT) Apply 1-2 drops to eye daily as needed (dry eyes).         Past Surgical History:   Procedure Laterality Date    BREAST BIOPSY      BREAST LUMPECTOMY Left 08/01/2016    DCIS and IDC, s/p lumpectomy    CATARACT EXTRACTION      COLONOSCOPY N/A 10/8/2018    Procedure: COLONOSCOPY;  Surgeon: Marcio Louie MD;  Location: [facility] ([address] [place] [address]);  Service: Endoscopy;  Laterality: N/A;    CORONARY ANGIOPLASTY      ESOPHAGOGASTRODUODENOSCOPY N/A 10/8/2018    Procedure: ESOPHAGOGASTRODUODENOSCOPY (EGD);  Surgeon: Marcio Louie MD;  [facility]  Service: Endoscopy;  Laterality: N/A;  combined egd/colon  order/Plavix/OK to hold med 5 days prior to procedures per Dr Puente/see telephone encounter dated 8/22/18/svn    ESOPHAGOGASTRODUODENOSCOPY N/A 1/31/2020    Procedure: ESOPHAGOGASTRODUODENOSCOPY (EGD);  Surgeon: Marcio Louie MD;  Location: [facility] (4TH FLR);  Service: Endoscopy;  Laterality: N/A;  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 10/2018    per Dr Agudelo-ok to hold Plavix 5 days prior to procedure    HYSTERECTOMY      INTRAOCULAR PROSTHESES INSERTION Right 10/18/2020    Procedure: INSERTION, IOL PROSTHESIS;  Surgeon: Karishma Mata MD;  Location: [facility] OR 1ST FLR;  Service: Ophthalmology;  Laterality: Right;    INTRAOCULAR PROSTHESES INSERTION Left 12/21/2020    Procedure: INSERTION, IOL PROSTHESIS;  Surgeon: Karishma Mata MD;  Location: [facility] OR 1ST FLR;  Service: Ophthalmology;  Laterality: Left;    JOINT REPLACEMENT      left and right k nee    KNEE SURGERY      PHACOEMULSIFICATION OF CATARACT Right 10/18/2020    Procedure: PHACOEMULSIFICATION, CATARACT;  Surgeon: Karishma Mata MD;  Location: [facility] OR 1ST FLR;  Service: Ophthalmology;  Laterality: Right;    PHACOEMULSIFICATION OF CATARACT Left 12/21/2020    Procedure: PHACOEMULSIFICATION, CATARACT;  Surgeon: Karishma Mata MD;  Location: [facility] OR 1ST FLR;  Service: Ophthalmology;  Laterality: Left;    REVISION OF KNEE ARTHROPLASTY Left 4/15/2021    Procedure: REVISION, ARTHROPLASTY, KNEE;  Surgeon: Gilson Wilkerson MD;  Location: [facility] OR 2ND FLR;  Service: Orthopedics;  Laterality: Left;    TOTAL ABDOMINAL HYSTERECTOMY W/ BILATERAL SALPINGOOPHORECTOMY         2D Echo:  Results for orders placed or performed during the hospital encounter of 05/21/17   2D echo with color flow doppler   Result Value Ref Range    EF + QEF 60 55 - 65    Mitral Valve Regurgitation MILD     Diastolic Dysfunction No     Est. PA Systolic Pressure 24.34     Tricuspid Valve Regurgitation TRIVIAL            Pre-op Assessment    I have reviewed  the Patient Summary Reports.     I have reviewed the Nursing Notes. I have reviewed the NPO Status.   I have reviewed the Medications.     Review of Systems  Anesthesia Hx:  No problems with previous Anesthesia   History of prior surgery of interest to airway management or planning:          Denies Family Hx of Anesthesia complications.    Denies Personal Hx of Anesthesia complications.                    Social:  No Alcohol Use, Non-Smoker, Former Smoker       Hematology/Oncology:  Hematology Normal   Oncology Normal                                   EENT/Dental:  EENT/Dental Normal           Cardiovascular:  Exercise tolerance: good   Hypertension  Past MI CAD      Angina                                  Pulmonary:  Pulmonary Normal                       Renal/:  Chronic Renal Disease, CKD                Hepatic/GI:     GERD             Musculoskeletal:  Arthritis               Neurological:       Seizures                                Endocrine:  Diabetes, type 2           Psych:  Psychiatric Normal                    Physical Exam  General: Well nourished, Cooperative, Alert and Oriented    Airway:  Mallampati: III   Mouth Opening: Normal  TM Distance: Normal  Tongue: Normal  Neck ROM: Normal ROM    Dental:  Intact    Chest/Lungs:  Clear to auscultation, Normal Respiratory Rate    Heart:  Rate: Normal  Rhythm: Regular Rhythm        Anesthesia Plan  Type of Anesthesia, risks & benefits discussed:    Anesthesia Type: Gen Natural Airway  Intra-op Monitoring Plan: Standard ASA Monitors  Post Op Pain Control Plan: multimodal analgesia and IV/PO Opioids PRN  Induction:  IV  Airway Plan: Direct  Informed Consent: Informed consent signed with the Patient and all parties understand the risks and agree with anesthesia plan.  All questions answered. Patient consented to blood products? No  ASA Score: 3  Day of Surgery Review of History & Physical: H&P Update referred to the surgeon/provider.    Ready For Surgery From  Anesthesia Perspective.     .

## 2023-11-22 NOTE — H&P
Short Stay Endoscopy History and Physical    PCP - Ethel Berger MD  Referring Physician - Marcio Louie MD  [address] [place] [address]    Procedure - Endoscopy  ASA - per anesthesia  Mallampati - per anesthesia  History of Anesthesia problems - no  Family history Anesthesia problems -  no   Plan of anesthesia - General    HPI  83 y.o. female  Reason for procedure:   History of polyps    ROS:  Constitutional: No fevers, chills, No weight loss  CV: No chest pain  Pulm: No cough, No shortness of breath  GI: see HPI    Medical History:  has a past medical history of Anemia, Benign essential hypertension (09/04/2012), Breast cancer (2016), Carpal tunnel syndrome (6/23/2014), Cataract, Coronary artery disease (09/04/2012), Diabetes mellitus due to abnormal insulin (09/04/2012), Diabetes mellitus type II, Genetic testing, GERD (gastroesophageal reflux disease) (09/04/2012), Gout, arthritis (09/04/2012), Heart failure, Hyperlipidemia (09/04/2012), Hypertension, Labyrinthitis of both ears (02/28/2022), Myocardial infarction, Obesity, Primary osteoarthritis of both knees (09/04/2012), Renal manifestation of secondary diabetes mellitus, Seizure, and Type 2 diabetes with peripheral circulatory disorder, controlled.    Surgical History:  has a past surgical history that includes Joint replacement; Knee surgery; Total abdominal hysterectomy w/ bilateral salpingoophorectomy; Coronary angioplasty; Hysterectomy; Breast lumpectomy (Left, 08/01/2016); Esophagogastroduodenoscopy (N/A, 10/8/2018); Colonoscopy (N/A, 10/8/2018); Breast biopsy; Esophagogastroduodenoscopy (N/A, 1/31/2020); Cataract extraction; Phacoemulsification of cataract (Right, 10/18/2020); Intraocular prosthesis insertion (Right, 10/18/2020); Phacoemulsification of cataract (Left, 12/21/2020); Intraocular prosthesis insertion (Left, 12/21/2020); and Revision of knee arthroplasty (Left, 4/15/2021).    Family History: family history includes  Breast cancer in her maternal aunt and maternal aunt; Breast cancer (age of onset: 45) in her daughter and mother; Cancer (age of onset: 55) in her mother; Diabetes in her mother and son; Heart disease in her father; Hyperlipidemia in her maternal aunt; Hypertension in her maternal aunt, maternal uncle, and mother; Kidney disease in her son; Ovarian cancer in her mother; Stroke in her son..    Social History:  reports that she quit smoking about 17 years ago. Her smoking use included cigarettes. She started smoking about 29 years ago. She has a 12.0 pack-year smoking history. She has never used smokeless tobacco. She reports that she does not currently use alcohol. She reports that she does not use drugs.    Review of patient's allergies indicates:   Allergen Reactions    Lisinopril Anaphylaxis       Medications:   Medications Prior to Admission   Medication Sig Dispense Refill Last Dose    allopurinoL (ZYLOPRIM) 300 MG tablet Take 1 tablet (300 mg total) by mouth every morning. 90 tablet 4 11/21/2023    amLODIPine (NORVASC) 10 MG tablet Take 1 tablet (10 mg total) by mouth once daily. 90 tablet 3 11/22/2023    aspirin (ECOTRIN) 81 MG EC tablet Take 1 tablet (81 mg total) by mouth once daily.   11/22/2023    carvediloL (COREG) 25 MG tablet Take 1 tablet (25 mg total) by mouth 2 (two) times daily. 180 tablet 3 11/22/2023    levetiracetam XR (KEPPRA XR) 500 mg Tb24 24 hr tablet Take 2 tablets (1,000 mg total) by mouth once daily. 180 tablet 4 11/22/2023    nitroGLYCERIN (NITROSTAT) 0.4 MG SL tablet PLACE 1 TABLET UNDER TONGUE EVERY 5 MINUTES AS NEEDED FOR CHEST PAIN. USE 3 TIMES, IF NO RELIEF CALL 911 450 tablet 0     omeprazole (PRILOSEC) 20 MG capsule Take 1 capsule (20 mg total) by mouth 2 (two) times a day 180 capsule 3 11/21/2023    rosuvastatin (CRESTOR) 40 MG Tab TAKE 1 TABLET(40 MG) BY MOUTH EVERY MORNING 90 tablet 3 11/21/2023    acetaminophen (TYLENOL) 500 MG tablet Take 1,000 mg by mouth daily as needed for  Pain (headache).       anastrozole (ARIMIDEX) 1 mg Tab Take 1 tablet (1 mg total) by mouth once daily. 90 tablet 3     blood sugar diagnostic Strp 1 strip by Misc.(Non-Drug; Combo Route) route once daily. 100 strip 4     blood-glucose meter kit Use as instructed 1 each 0     ergocalciferol (ERGOCALCIFEROL) 50,000 unit Cap Take 1 capsule (50,000 Units total) by mouth twice a week. 26 capsule 4     ezetimibe (ZETIA) 10 mg tablet Take 1 tablet (10 mg total) by mouth once daily. 90 tablet 4     lancets Misc Check blood sugar once daily 100 each 0     propylene glycol (SYSTANE BALANCE OPHT) Apply 1-2 drops to eye daily as needed (dry eyes).          Physical Exam:    Vital Signs:   Vitals:    11/22/23 1312   BP: (!) 161/75   Pulse: 75   Resp: 16   Temp: 97.7 °F (36.5 °C)       General Appearance: Well appearing in no acute distress  Abdomen: Soft, non tender, non distended with normal bowel sounds, no masses    Labs:  Lab Results   Component Value Date    WBC 4.98 06/17/2023    HGB 14.3 06/17/2023    HCT 43.8 06/17/2023     06/17/2023    CHOL 167 06/17/2023    TRIG 59 06/17/2023    HDL 65 06/17/2023    ALT 15 06/17/2023    AST 21 06/17/2023     06/17/2023    K 3.8 06/17/2023     06/17/2023    CREATININE 1.5 (H) 06/17/2023    BUN 22 06/17/2023    CO2 23 06/17/2023    TSH 3.241 06/17/2023    INR 1.0 05/23/2017    GLUF 117 (H) 10/04/2017    HGBA1C 6.4 (H) 06/17/2023       I have explained the risks and benefits of this endoscopic procedure to the patient including but not limited to bleeding, inflammation, infection, perforation, and death.      Gary Bess MD

## 2023-11-22 NOTE — [employer]OVATION PATIENT INSTRUCTIONS
Discharge Summary/Instructions after an Endoscopic Procedure  Patient Name: Renata Bergman  Patient MRN: [id number]  Patient YOB: 1940  Wednesday, November 22, 2023  Shahram Urbina MD  Dear patient,  As a result of recent federal legislation (The Federal Cures Act), you may   receive lab or pathology results from your procedure in your [facility]   account before your physician is able to contact you. Your physician or   their representative will relay the results to you with their   recommendations at their soonest availability.  Thank you,  RESTRICTIONS:  During your procedure today, you received medications for sedation.  These   medications may affect your judgment, balance and coordination.  Therefore,   for 24 hours, you have the following restrictions:   - DO NOT drive a car, operate machinery, make legal/financial decisions,   sign important papers or drink alcohol.    ACTIVITY:  Today: no heavy lifting, straining or running due to procedural   sedation/anesthesia.  The following day: return to full activity including work.  DIET:  Eat and drink normally unless instructed otherwise.     TREATMENT FOR COMMON SIDE EFFECTS:  - Mild abdominal pain, nausea, belching, bloating or excessive gas:  rest,   eat lightly and use a heating pad.  - Sore Throat: treat with throat lozenges and/or gargle with warm salt   water.  - Because air was used during the procedure, expelling large amounts of air   from your rectum or belching is normal.  - If a bowel prep was taken, you may not have a bowel movement for 1-3 days.    This is normal.  SYMPTOMS TO WATCH FOR AND REPORT TO YOUR PHYSICIAN:  1. Abdominal pain or bloating, other than gas cramps.  2. Chest pain.  3. Back pain.  4. Signs of infection such as: chills or fever occurring within 24 hours   after the procedure.  5. Rectal bleeding, which would show as bright red, maroon, or black stools.   (A tablespoon of blood from the rectum is not serious,  especially if   hemorrhoids are present.)  6. Vomiting.  7. Weakness or dizziness.  GO DIRECTLY TO THE NEAREST EMERGENCY ROOM IF YOU HAVE ANY OF THE FOLLOWING:      Difficulty breathing              Chills and/or fever over 101 F   Persistent vomiting and/or vomiting blood   Severe abdominal pain   Severe chest pain   Black, tarry stools   Bleeding- more than one tablespoon   Any other symptom or condition that you feel may need urgent attention  Your doctor recommends these additional instructions:  If any biopsies were taken, your doctors clinic will contact you in 1 to 2   weeks with any results.  - Discharge patient to home (ambulatory).   - Patient has a contact number available for emergencies.  The signs and   symptoms of potential delayed complications were discussed with the   patient.  Return to normal activities tomorrow.  Written discharge   instructions were provided to the patient.   - Resume previous diet.   - Continue present medications.   - Return to primary care physician as previously scheduled.   - No recommendation at this time regarding repeat colonoscopy due to age.  For questions, problems or results please call your physician - Shahram Urbina MD at Work:  [phone].  [facility], EMERGENCY ROOM PHONE NUMBER: [phone]  IF A COMPLICATION OR EMERGENCY SITUATION ARISES AND YOU ARE UNABLE TO REACH   YOUR PHYSICIAN - GO DIRECTLY TO THE EMERGENCY ROOM.  Shahram Urbina MD  11/22/2023 2:17:55 PM  This report has been verified and signed electronically.  Dear patient,  As a result of recent federal legislation (The Federal Cures Act), you may   receive lab or pathology results from your procedure in your [facility]   account before your physician is able to contact you. Your physician or   their representative will relay the results to you with their   recommendations at their soonest availability.  Thank you,  PROVATION

## 2023-12-14 ENCOUNTER — OFFICE VISIT (OUTPATIENT)
Dept: HEMATOLOGY/ONCOLOGY | Facility: CLINIC | Age: 83
End: 2023-12-14
Payer: MEDICARE

## 2023-12-14 VITALS
HEART RATE: 76 BPM | WEIGHT: 268.75 LBS | RESPIRATION RATE: 18 BRPM | SYSTOLIC BLOOD PRESSURE: 169 MMHG | OXYGEN SATURATION: 99 % | BODY MASS INDEX: 45.88 KG/M2 | DIASTOLIC BLOOD PRESSURE: 73 MMHG | HEIGHT: 64 IN

## 2023-12-14 DIAGNOSIS — C50.412 MALIGNANT NEOPLASM OF UPPER-OUTER QUADRANT OF LEFT BREAST IN FEMALE, ESTROGEN RECEPTOR POSITIVE: Primary | ICD-10-CM

## 2023-12-14 DIAGNOSIS — Z17.0 MALIGNANT NEOPLASM OF UPPER-OUTER QUADRANT OF LEFT BREAST IN FEMALE, ESTROGEN RECEPTOR POSITIVE: Primary | ICD-10-CM

## 2023-12-14 PROCEDURE — 1101F PT FALLS ASSESS-DOCD LE1/YR: CPT | Mod: CPTII,S$GLB,, | Performed by: INTERNAL MEDICINE

## 2023-12-14 PROCEDURE — 3072F LOW RISK FOR RETINOPATHY: CPT | Mod: CPTII,S$GLB,, | Performed by: INTERNAL MEDICINE

## 2023-12-14 PROCEDURE — 99213 OFFICE O/P EST LOW 20 MIN: CPT | Mod: S$GLB,,, | Performed by: INTERNAL MEDICINE

## 2023-12-14 PROCEDURE — 99999 PR PBB SHADOW E&M-EST. PATIENT-LVL IV: CPT | Mod: PBBFAC,,, | Performed by: INTERNAL MEDICINE

## 2023-12-14 PROCEDURE — 1159F MED LIST DOCD IN RCRD: CPT | Mod: CPTII,S$GLB,, | Performed by: INTERNAL MEDICINE

## 2023-12-14 PROCEDURE — 3072F PR LOW RISK FOR RETINOPATHY: ICD-10-PCS | Mod: CPTII,S$GLB,, | Performed by: INTERNAL MEDICINE

## 2023-12-14 PROCEDURE — 3077F PR MOST RECENT SYSTOLIC BLOOD PRESSURE >= 140 MM HG: ICD-10-PCS | Mod: CPTII,S$GLB,, | Performed by: INTERNAL MEDICINE

## 2023-12-14 PROCEDURE — 3078F PR MOST RECENT DIASTOLIC BLOOD PRESSURE < 80 MM HG: ICD-10-PCS | Mod: CPTII,S$GLB,, | Performed by: INTERNAL MEDICINE

## 2023-12-14 PROCEDURE — 1126F AMNT PAIN NOTED NONE PRSNT: CPT | Mod: CPTII,S$GLB,, | Performed by: INTERNAL MEDICINE

## 2023-12-14 PROCEDURE — 1126F PR PAIN SEVERITY QUANTIFIED, NO PAIN PRESENT: ICD-10-PCS | Mod: CPTII,S$GLB,, | Performed by: INTERNAL MEDICINE

## 2023-12-14 PROCEDURE — 3077F SYST BP >= 140 MM HG: CPT | Mod: CPTII,S$GLB,, | Performed by: INTERNAL MEDICINE

## 2023-12-14 PROCEDURE — 99213 PR OFFICE/OUTPT VISIT, EST, LEVL III, 20-29 MIN: ICD-10-PCS | Mod: S$GLB,,, | Performed by: INTERNAL MEDICINE

## 2023-12-14 PROCEDURE — 3288F PR FALLS RISK ASSESSMENT DOCUMENTED: ICD-10-PCS | Mod: CPTII,S$GLB,, | Performed by: INTERNAL MEDICINE

## 2023-12-14 PROCEDURE — 3288F FALL RISK ASSESSMENT DOCD: CPT | Mod: CPTII,S$GLB,, | Performed by: INTERNAL MEDICINE

## 2023-12-14 PROCEDURE — 3078F DIAST BP <80 MM HG: CPT | Mod: CPTII,S$GLB,, | Performed by: INTERNAL MEDICINE

## 2023-12-14 PROCEDURE — 1101F PR PT FALLS ASSESS DOC 0-1 FALLS W/OUT INJ PAST YR: ICD-10-PCS | Mod: CPTII,S$GLB,, | Performed by: INTERNAL MEDICINE

## 2023-12-14 PROCEDURE — 99999 PR PBB SHADOW E&M-EST. PATIENT-LVL IV: ICD-10-PCS | Mod: PBBFAC,,, | Performed by: INTERNAL MEDICINE

## 2023-12-14 PROCEDURE — 1159F PR MEDICATION LIST DOCUMENTED IN MEDICAL RECORD: ICD-10-PCS | Mod: CPTII,S$GLB,, | Performed by: INTERNAL MEDICINE

## 2023-12-14 NOTE — PROGRESS NOTES
Yes mass  Subjective:       Patient ID: Renata Bergman is a 83 y.o. female.    Chief Complaint: No chief complaint on file.    HPI Mrs. Hussein is a 83-year-old female with a history of stage I ER + left breast cancer.  She is on adjuvant anastrozole.      She has no new issues. Her only pain is some occasional buttock pain that resolves with tylenol.  She has some HARRIS.  Appetite and bowels are normal.  She has no breast complaints.        Breast history: Screening mammogram on May 5, 2016 showed an irregular 22 mm mass with abnormal calcifications in the left breast 2:00 position. Follow-up ultrasound July 13 she noted a regular solid 17 mm mass.    On July 21 needle biopsy showed grade 2 infiltrating ductal carcinoma strongly ER CO positive (90%) and HER-2 negative.    On August 1, 2016 lumpectomy and sentinel lymph node biopsy were performed. That showed a 1.8 x 1.6 x 1.0 infiltrating carcinoma intermediate grade (histologic grade 3, nuclear grade 2, mitotic index 2.   The sentinel lymph node was negative and margins were negative. Final pathological stage TIc N0 stage IA.  Oncotype was 31 indicating a 21% risk of recurrence with tamoxifen alone.    She  had 3 weeks of weekly Taxol and then because of insurance issues she was changed to CMF and had 5 cycles of that.     Chemotherapy was completed in January 2017.    She completed radiation in April 2017 and began letrozole endocrine therapy that month.  Subsequently changed to anastrozole.    Breast Cancer Index March 2022 showed 14.3 % risk of late recurrence with benefit to extended endocrine therapy.    Review of Systems   Constitutional:  Positive for fatigue. Negative for activity change, appetite change, fever and unexpected weight change.   HENT:  Negative for trouble swallowing.    Respiratory:  Positive for shortness of breath. Negative for cough.    Gastrointestinal:  Negative for abdominal pain, constipation, diarrhea and nausea.   Musculoskeletal:   Negative for back pain.   Neurological:  Negative for headaches.   Psychiatric/Behavioral:  Negative for dysphoric mood. The patient is not nervous/anxious.        Objective:      Physical Exam  Vitals reviewed.   Constitutional:       Appearance: She is well-developed.   HENT:      Mouth/Throat:      Pharynx: No oropharyngeal exudate.   Eyes:      General: No scleral icterus.  Cardiovascular:      Rate and Rhythm: Regular rhythm.      Heart sounds: Normal heart sounds.   Pulmonary:      Effort: Pulmonary effort is normal.      Breath sounds: Normal breath sounds. No wheezing or rales.   Chest:   Breasts:     Right: No mass, nipple discharge or skin change.      Left: Skin change present. No mass or nipple discharge.       Abdominal:      Palpations: Abdomen is soft. There is no mass.      Tenderness: There is no abdominal tenderness.   Lymphadenopathy:      Cervical: No cervical adenopathy.      Upper Body:      Right upper body: No supraclavicular or axillary adenopathy.      Left upper body: No supraclavicular or axillary adenopathy.   Neurological:      Mental Status: She is alert and oriented to person, place, and time.      Cranial Nerves: No cranial nerve deficit.   Psychiatric:         Behavior: Behavior normal.         Thought Content: Thought content normal.         Assessment:       1. Malignant neoplasm of upper-outer quadrant of left breast in female, estrogen receptor positive        Plan:     Return to clinic in 6 months  Continue anastrozole.  She will be due for mammograms in August.    Route Chart for Scheduling    Med Onc Chart Routing      Follow up with physician    Follow up with [facility] 6 months.   Infusion scheduling note    Injection scheduling note    Labs None   Scheduling:  Preferred lab:  Lab interval:     Imaging None      Pharmacy appointment No pharmacy appointment needed      Other referrals no referral to [facility] needed -  no Massage appointment needed    No additional  referrals needed                 Supportive Plan Information  OP FERRIC CARBOXYMALTOSE Q2W   Bismark Guevara MD   Upcoming Treatment Dates - OP FERRIC CARBOXYMALTOSE Q2W    No upcoming days in selected categories.

## 2023-12-16 ENCOUNTER — LAB VISIT (OUTPATIENT)
Dept: LAB | Facility: HOSPITAL | Age: 83
End: 2023-12-16
Attending: INTERNAL MEDICINE
Payer: MEDICARE

## 2023-12-16 DIAGNOSIS — N18.30 TYPE 2 DIABETES MELLITUS WITH STAGE 3 CHRONIC KIDNEY DISEASE, WITHOUT LONG-TERM CURRENT USE OF INSULIN, UNSPECIFIED WHETHER STAGE 3A OR 3B CKD: ICD-10-CM

## 2023-12-16 DIAGNOSIS — E11.22 TYPE 2 DIABETES MELLITUS WITH STAGE 3 CHRONIC KIDNEY DISEASE, WITHOUT LONG-TERM CURRENT USE OF INSULIN, UNSPECIFIED WHETHER STAGE 3A OR 3B CKD: ICD-10-CM

## 2023-12-16 LAB
ALBUMIN SERPL BCP-MCNC: 3.9 G/DL (ref 3.5–5.2)
ALP SERPL-CCNC: 100 U/L (ref 55–135)
ALT SERPL W/O P-5'-P-CCNC: 11 U/L (ref 10–44)
ANION GAP SERPL CALC-SCNC: 10 MMOL/L (ref 8–16)
AST SERPL-CCNC: 22 U/L (ref 10–40)
BASOPHILS # BLD AUTO: 0.05 K/UL (ref 0–0.2)
BASOPHILS NFR BLD: 1 % (ref 0–1.9)
BILIRUB SERPL-MCNC: 0.7 MG/DL (ref 0.1–1)
BUN SERPL-MCNC: 14 MG/DL (ref 8–23)
CALCIUM SERPL-MCNC: 10 MG/DL (ref 8.7–10.5)
CHLORIDE SERPL-SCNC: 107 MMOL/L (ref 95–110)
CO2 SERPL-SCNC: 25 MMOL/L (ref 23–29)
CREAT SERPL-MCNC: 1.2 MG/DL (ref 0.5–1.4)
DIFFERENTIAL METHOD: NORMAL
EOSINOPHIL # BLD AUTO: 0.3 K/UL (ref 0–0.5)
EOSINOPHIL NFR BLD: 6.3 % (ref 0–8)
ERYTHROCYTE [DISTWIDTH] IN BLOOD BY AUTOMATED COUNT: 13.5 % (ref 11.5–14.5)
EST. GFR  (NO RACE VARIABLE): 45.5 ML/MIN/1.73 M^2
ESTIMATED AVG GLUCOSE: 137 MG/DL (ref 68–131)
FERRITIN SERPL-MCNC: 59 NG/ML (ref 20–300)
GLUCOSE SERPL-MCNC: 110 MG/DL (ref 70–110)
HBA1C MFR BLD: 6.4 % (ref 4–5.6)
HCT VFR BLD AUTO: 41.2 % (ref 37–48.5)
HGB BLD-MCNC: 13.7 G/DL (ref 12–16)
IMM GRANULOCYTES # BLD AUTO: 0.01 K/UL (ref 0–0.04)
IMM GRANULOCYTES NFR BLD AUTO: 0.2 % (ref 0–0.5)
IRON SERPL-MCNC: 81 UG/DL (ref 30–160)
LYMPHOCYTES # BLD AUTO: 1.8 K/UL (ref 1–4.8)
LYMPHOCYTES NFR BLD: 35.7 % (ref 18–48)
MCH RBC QN AUTO: 30.6 PG (ref 27–31)
MCHC RBC AUTO-ENTMCNC: 33.3 G/DL (ref 32–36)
MCV RBC AUTO: 92 FL (ref 82–98)
MONOCYTES # BLD AUTO: 0.7 K/UL (ref 0.3–1)
MONOCYTES NFR BLD: 12.9 % (ref 4–15)
NEUTROPHILS # BLD AUTO: 2.2 K/UL (ref 1.8–7.7)
NEUTROPHILS NFR BLD: 43.9 % (ref 38–73)
NRBC BLD-RTO: 0 /100 WBC
PLATELET # BLD AUTO: 218 K/UL (ref 150–450)
PMV BLD AUTO: 10.2 FL (ref 9.2–12.9)
POTASSIUM SERPL-SCNC: 3.8 MMOL/L (ref 3.5–5.1)
PROT SERPL-MCNC: 7.8 G/DL (ref 6–8.4)
RBC # BLD AUTO: 4.48 M/UL (ref 4–5.4)
SATURATED IRON: 21 % (ref 20–50)
SODIUM SERPL-SCNC: 142 MMOL/L (ref 136–145)
TOTAL IRON BINDING CAPACITY: 388 UG/DL (ref 250–450)
TRANSFERRIN SERPL-MCNC: 262 MG/DL (ref 200–375)
TSH SERPL DL<=0.005 MIU/L-ACNC: 3.27 UIU/ML (ref 0.4–4)
WBC # BLD AUTO: 5.1 K/UL (ref 3.9–12.7)

## 2023-12-16 PROCEDURE — 82728 ASSAY OF FERRITIN: CPT | Performed by: INTERNAL MEDICINE

## 2023-12-16 PROCEDURE — 80053 COMPREHEN METABOLIC PANEL: CPT | Performed by: INTERNAL MEDICINE

## 2023-12-16 PROCEDURE — 36415 COLL VENOUS BLD VENIPUNCTURE: CPT | Performed by: INTERNAL MEDICINE

## 2023-12-16 PROCEDURE — 83540 ASSAY OF IRON: CPT | Performed by: INTERNAL MEDICINE

## 2023-12-16 PROCEDURE — 84466 ASSAY OF TRANSFERRIN: CPT | Performed by: INTERNAL MEDICINE

## 2023-12-16 PROCEDURE — 84443 ASSAY THYROID STIM HORMONE: CPT | Performed by: INTERNAL MEDICINE

## 2023-12-16 PROCEDURE — 85025 COMPLETE CBC W/AUTO DIFF WBC: CPT | Performed by: INTERNAL MEDICINE

## 2023-12-16 PROCEDURE — 83036 HEMOGLOBIN GLYCOSYLATED A1C: CPT | Performed by: INTERNAL MEDICINE

## 2023-12-19 ENCOUNTER — HOSPITAL ENCOUNTER (OUTPATIENT)
Dept: RADIOLOGY | Facility: CLINIC | Age: 83
Discharge: HOME | End: 2023-12-19
Attending: NURSE PRACTITIONER
Payer: MEDICARE

## 2023-12-19 DIAGNOSIS — Z79.811 LONG TERM (CURRENT) USE OF AROMATASE INHIBITORS: ICD-10-CM

## 2023-12-19 PROCEDURE — 77080 DXA BONE DENSITY AXIAL SKELETON 1 OR MORE SITES: ICD-10-PCS | Mod: 26,,, | Performed by: INTERNAL MEDICINE

## 2023-12-19 PROCEDURE — 77080 DXA BONE DENSITY AXIAL: CPT | Mod: 26,,, | Performed by: INTERNAL MEDICINE

## 2023-12-19 PROCEDURE — 77080 DXA BONE DENSITY AXIAL: CPT | Mod: TC

## 2023-12-22 ENCOUNTER — OFFICE VISIT (OUTPATIENT)
Dept: INTERNAL MEDICINE | Facility: CLINIC | Age: 83
End: 2023-12-22
Payer: MEDICARE

## 2023-12-22 VITALS
HEART RATE: 69 BPM | OXYGEN SATURATION: 98 % | WEIGHT: 273.56 LBS | HEIGHT: 65 IN | SYSTOLIC BLOOD PRESSURE: 132 MMHG | BODY MASS INDEX: 45.58 KG/M2 | DIASTOLIC BLOOD PRESSURE: 74 MMHG

## 2023-12-22 DIAGNOSIS — E53.8 VITAMIN B12 DEFICIENCY: ICD-10-CM

## 2023-12-22 DIAGNOSIS — N18.30 TYPE 2 DIABETES MELLITUS WITH STAGE 3 CHRONIC KIDNEY DISEASE, WITHOUT LONG-TERM CURRENT USE OF INSULIN, UNSPECIFIED WHETHER STAGE 3A OR 3B CKD: ICD-10-CM

## 2023-12-22 DIAGNOSIS — E11.22 TYPE 2 DIABETES MELLITUS WITH STAGE 3 CHRONIC KIDNEY DISEASE, WITHOUT LONG-TERM CURRENT USE OF INSULIN, UNSPECIFIED WHETHER STAGE 3A OR 3B CKD: ICD-10-CM

## 2023-12-22 DIAGNOSIS — Z00.00 ROUTINE GENERAL MEDICAL EXAMINATION AT A HEALTH CARE FACILITY: Primary | ICD-10-CM

## 2023-12-22 DIAGNOSIS — E55.9 VITAMIN D DEFICIENCY DISEASE: ICD-10-CM

## 2023-12-22 PROCEDURE — 3072F LOW RISK FOR RETINOPATHY: CPT | Mod: CPTII,S$GLB,, | Performed by: INTERNAL MEDICINE

## 2023-12-22 PROCEDURE — 3078F PR MOST RECENT DIASTOLIC BLOOD PRESSURE < 80 MM HG: ICD-10-PCS | Mod: CPTII,S$GLB,, | Performed by: INTERNAL MEDICINE

## 2023-12-22 PROCEDURE — 1101F PR PT FALLS ASSESS DOC 0-1 FALLS W/OUT INJ PAST YR: ICD-10-PCS | Mod: CPTII,S$GLB,, | Performed by: INTERNAL MEDICINE

## 2023-12-22 PROCEDURE — 1126F AMNT PAIN NOTED NONE PRSNT: CPT | Mod: CPTII,S$GLB,, | Performed by: INTERNAL MEDICINE

## 2023-12-22 PROCEDURE — 3078F DIAST BP <80 MM HG: CPT | Mod: CPTII,S$GLB,, | Performed by: INTERNAL MEDICINE

## 2023-12-22 PROCEDURE — 1159F MED LIST DOCD IN RCRD: CPT | Mod: CPTII,S$GLB,, | Performed by: INTERNAL MEDICINE

## 2023-12-22 PROCEDURE — 3288F FALL RISK ASSESSMENT DOCD: CPT | Mod: CPTII,S$GLB,, | Performed by: INTERNAL MEDICINE

## 2023-12-22 PROCEDURE — 1126F PR PAIN SEVERITY QUANTIFIED, NO PAIN PRESENT: ICD-10-PCS | Mod: CPTII,S$GLB,, | Performed by: INTERNAL MEDICINE

## 2023-12-22 PROCEDURE — 1159F PR MEDICATION LIST DOCUMENTED IN MEDICAL RECORD: ICD-10-PCS | Mod: CPTII,S$GLB,, | Performed by: INTERNAL MEDICINE

## 2023-12-22 PROCEDURE — 99397 PER PM REEVAL EST PAT 65+ YR: CPT | Mod: GZ,S$GLB,, | Performed by: INTERNAL MEDICINE

## 2023-12-22 PROCEDURE — 99999 PR PBB SHADOW E&M-EST. PATIENT-LVL III: CPT | Mod: PBBFAC,,, | Performed by: INTERNAL MEDICINE

## 2023-12-22 PROCEDURE — 99397 PR PREVENTIVE VISIT,EST,65 & OVER: ICD-10-PCS | Mod: GZ,S$GLB,, | Performed by: INTERNAL MEDICINE

## 2023-12-22 PROCEDURE — 3075F SYST BP GE 130 - 139MM HG: CPT | Mod: CPTII,S$GLB,, | Performed by: INTERNAL MEDICINE

## 2023-12-22 PROCEDURE — 3288F PR FALLS RISK ASSESSMENT DOCUMENTED: ICD-10-PCS | Mod: CPTII,S$GLB,, | Performed by: INTERNAL MEDICINE

## 2023-12-22 PROCEDURE — 3075F PR MOST RECENT SYSTOLIC BLOOD PRESS GE 130-139MM HG: ICD-10-PCS | Mod: CPTII,S$GLB,, | Performed by: INTERNAL MEDICINE

## 2023-12-22 PROCEDURE — 99999 PR PBB SHADOW E&M-EST. PATIENT-LVL III: ICD-10-PCS | Mod: PBBFAC,,, | Performed by: INTERNAL MEDICINE

## 2023-12-22 PROCEDURE — 1101F PT FALLS ASSESS-DOCD LE1/YR: CPT | Mod: CPTII,S$GLB,, | Performed by: INTERNAL MEDICINE

## 2023-12-22 PROCEDURE — 3072F PR LOW RISK FOR RETINOPATHY: ICD-10-PCS | Mod: CPTII,S$GLB,, | Performed by: INTERNAL MEDICINE

## 2023-12-22 NOTE — PROGRESS NOTES
CHIEF COMPLAINT:   Annual exam and follow up NSTEMI, breast cancer, diabetes, hypertension, hyperlipidemia.     HISTORY OF PRESENT ILLNESS: 83 year-old woman who present for follow up of above.     She was hospitalized 7/16/22 to 7/18/22. She had chest pain and elevated blood pressure.  Nuclear stress test was fine. Coreg was increased. She takes aspirin 81 mg daily  and coreg 12.5 mg twice daily and amlodipine 10 mg daily.   No chest pain or shortness of breath.   She is on crestor 40 mg daily. 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 Dr Agudelo stopped the plavix on 8/9/21 and is doing fine off Plavix.       No more vibration in the left foot.     No more episodes of vertigo.     She had  a left knee replacement 4/15/21.She is walking with a rollator. Left knee is doing fine. She can tell when it is going to rain.  No falls     She will take tylenol 325 mg once or twice a week as needed for general aches and pain.       She is taking Keppra  mg 2 in the afternoon.  Gait is better with taking the Keppra in the late afternoon.  No falls.  No seizures.      She has completed 5 cycles of CMF for her breast cancer. She completed radiation the end of April 6, 2017.  She took Femara  4/2017 - 4/2018. She had irritation of the lips on Femara. She is taking anastrozole 1 mg daily. Saw Heme Onc 12/14/23 and MMG on 8/2023      Back is doing well. She is doing stretches every other day     She continues to take allopurinol 300 mg daily for her gout. She has not had any gouty flares. She has occasional left elbow pain.      Her blood sugars have been normal. She is off metformin 850 mg twice daily. She checks blood sugars once daily.  She denies any polydipsia or polyuria. She continues to take aspirin for her coronary artery disease.      Sinuses have been doing well. She has not had to take Allegra lately. She denies any nausea, vomiting, diarrhea. She has some  constipation - once a week.  She took a dulcolax.      Reflux is well controlled on omeprazole 20 mg two tablets daily.      She continues to take Crestor 40 mg daily for her hyperlipidemia. She denies any joint pain or muscle pain from the Crestor.      She is taking vitamin D 50,000 units twice weekly for vitamin D deficiency     Her daughter who is 46 had stage 2 breast cancer. She has had a lumpectomy and chemo and radiation. Her daughter is doing well. She has finished her treatment.  No anxiety, depression or insomnia.      PAST MEDICAL HISTORY:   1. Hypertension.   2. Diabetes mellitus   3. Hyperlipidemia.   4. Reflux.   5. Gout.   6. Coronary artery disease, status post MI in  with stenting at that time, and then a right coronary artery stent placed in . Had a negative stress test 2008. [facility] 2012 - patent stents and non obstructive cad  7. Osteoarthritis of the right knee. S/P right knee replacement   8. Status post left knee replacement.   9. Status post LISA/BSO secondary to menstrual disorder or polyps after tubal ligation.   10. Left breast cancer and BRCA2 positive    MEDICATIONS and ALLERGIES: Updated on epic.       Family History  Mother had breast cancer in her early 50's then had colon cancer in her 60's. She  of uterine cancer  2 Maternal aunts with breast cancer  Daughter with breast cancer at age 45 - BRCA positive  Father  of a gunshot wound  She is an only child    PHYSICAL EXAMINATION:         General: Alert, oriented. No apparent distress. Affect within normal   limits.   Conjunctivae anicteric. Neck supple.   Respiratory effort normal. Lungs clear to auscultation.   Heart: Regular rate and rhythm without murmurs, gallops or rubs.   No lower extremity edema.    Well healed scar over the left leg with some minimal surrounding edema.      Labs 23 reviewed      ASSESSMENT      Annual exam - discussed diet, exercise and safety issues.    1. HTN- controlled  2. Iron  deficiency anemia - stable off iron   3. Angioectasias of duodenal bulb, gastric body, On omeprazole 20 mg 2 tablets daily. S/p EGD 1/31/20 with cautery. Will montior blood counts closely.   4.  CAD with NSTEMI 5/2017- s/p stenting 5/2017 - on risk factor modifications.    5. Left breast cancer with BRCA2 positive -Finished chemo and  radiation. On anastrazole-  6. Seizure -stable on Keppra XR   7.  Diabetes - controlled.  9. Gout -controlled on allopurinol    10. Hyperlipidemia - on Crestor 40 mg daily. Controlled   11. Obesity - working at diet, exercise and weight loss.   12.  GERD - controlled on meds  13. Hypokalemia -  on replacement potassium   14. S/P left knee replacement - doing well.   15. Carotid artery stenosis - ultrasound 7/2023 - due 7/2024        Screening - mammogram 8/23     Colonoscopy 4/23/12 - normal, and 5/25/15 - 3 small polyps (one adenoma).   Colonoscopy 10/8/18 - one Tubular adenoma polyp - due 2023.   Colonoscopy 11/22/23- normal - no need to repeat    Bone density was normal 12/19/23     Follow up in 6 months, sooner if issues.

## 2024-01-20 NOTE — TELEPHONE ENCOUNTER
No care due was identified.  [facility] Embedded Care Due Messages. Reference number: [id number].   1/20/2024 5:37:06 AM CST

## 2024-01-21 RX ORDER — AMLODIPINE BESYLATE 10 MG/1
10 TABLET ORAL
Qty: 90 TABLET | Refills: 3 | Status: SHIPPED | OUTPATIENT
Start: 2024-01-21

## 2024-01-21 RX ORDER — ALLOPURINOL 300 MG/1
300 TABLET ORAL EVERY MORNING
Qty: 90 TABLET | Refills: 1 | Status: SHIPPED | OUTPATIENT
Start: 2024-01-21

## 2024-01-21 NOTE — TELEPHONE ENCOUNTER
Refill Decision Note   Renata Bergman  is requesting a refill authorization.  Brief Assessment and Rationale for Refill:  Approve     Medication Therapy Plan:         Comments:     Note composed:4:28 AM 01/21/2024

## 2024-02-16 RX ORDER — ROSUVASTATIN CALCIUM 40 MG/1
40 TABLET, COATED ORAL EVERY MORNING
Qty: 90 TABLET | Refills: 1 | Status: SHIPPED | OUTPATIENT
Start: 2024-02-16

## 2024-02-16 NOTE — TELEPHONE ENCOUNTER
No care due was identified.  [facility] Embedded Care Due Messages. Reference number: [id number].   2/16/2024 5:34:17 AM CST

## 2024-02-16 NOTE — TELEPHONE ENCOUNTER
Refill Decision Note   Renata Bergman  is requesting a refill authorization.    Brief Assessment and Rationale for Refill:   Approve       Medication Therapy Plan:         Comments:     Note composed:1:47 PM 02/16/2024

## 2024-02-29 ENCOUNTER — PATIENT MESSAGE (OUTPATIENT)
Dept: OPTOMETRY | Facility: CLINIC | Age: 84
End: 2024-02-29
Payer: MEDICARE

## 2024-03-05 ENCOUNTER — TELEPHONE (OUTPATIENT)
Dept: OPHTHALMOLOGY | Facility: CLINIC | Age: 84
End: 2024-03-05
Payer: MEDICARE

## 2024-03-05 NOTE — TELEPHONE ENCOUNTER
----- Message from Pastora Medley sent at 3/5/2024  9:46 AM CST -----  Consult/Advisory    Name Of Caller:Renata       Contact Preference:[phone]    Nature of call: Ptn is having pain in the left eye that is causing blurriness

## 2024-03-05 NOTE — TELEPHONE ENCOUNTER
Spoke w/patient in regards to eye pain. Advised her to use AT's twice a day for next 2 weeks. If still giving her an issue give the office a call back.Pt understood.

## 2024-03-22 ENCOUNTER — HOSPITAL ENCOUNTER (OUTPATIENT)
Facility: HOSPITAL | Age: 84
Discharge: HOME | End: 2024-03-24
Attending: STUDENT IN AN ORGANIZED HEALTH CARE EDUCATION/TRAINING PROGRAM | Admitting: HOSPITALIST
Payer: MEDICARE

## 2024-03-22 DIAGNOSIS — R50.9 FEVER: ICD-10-CM

## 2024-03-22 DIAGNOSIS — R07.9 CHEST PAIN: ICD-10-CM

## 2024-03-22 DIAGNOSIS — N30.00 ACUTE CYSTITIS WITHOUT HEMATURIA: ICD-10-CM

## 2024-03-22 DIAGNOSIS — N17.9 AKI (ACUTE KIDNEY INJURY): Primary | ICD-10-CM

## 2024-03-22 LAB
ALBUMIN SERPL BCP-MCNC: 4.1 G/DL (ref 3.5–5.2)
ALP SERPL-CCNC: 95 U/L (ref 55–135)
ALT SERPL W/O P-5'-P-CCNC: 15 U/L (ref 10–44)
ANION GAP SERPL CALC-SCNC: 11 MMOL/L (ref 8–16)
AST SERPL-CCNC: 29 U/L (ref 10–40)
BASOPHILS # BLD AUTO: 0.04 K/UL (ref 0–0.2)
BASOPHILS NFR BLD: 0.6 % (ref 0–1.9)
BILIRUB SERPL-MCNC: 0.7 MG/DL (ref 0.1–1)
BUN SERPL-MCNC: 29 MG/DL (ref 8–23)
CALCIUM SERPL-MCNC: 10.3 MG/DL (ref 8.7–10.5)
CHLORIDE SERPL-SCNC: 102 MMOL/L (ref 95–110)
CO2 SERPL-SCNC: 22 MMOL/L (ref 23–29)
CREAT SERPL-MCNC: 2.4 MG/DL (ref 0.5–1.4)
DIFFERENTIAL METHOD BLD: ABNORMAL
EOSINOPHIL # BLD AUTO: 0 K/UL (ref 0–0.5)
EOSINOPHIL NFR BLD: 0.6 % (ref 0–8)
ERYTHROCYTE [DISTWIDTH] IN BLOOD BY AUTOMATED COUNT: 13.6 % (ref 11.5–14.5)
EST. GFR  (NO RACE VARIABLE): 19.8 ML/MIN/1.73 M^2
GLUCOSE SERPL-MCNC: 121 MG/DL (ref 70–110)
HCT VFR BLD AUTO: 46.5 % (ref 37–48.5)
HGB BLD-MCNC: 14.4 G/DL (ref 12–16)
IMM GRANULOCYTES # BLD AUTO: 0.03 K/UL (ref 0–0.04)
IMM GRANULOCYTES NFR BLD AUTO: 0.4 % (ref 0–0.5)
INFLUENZA A, MOLECULAR: NEGATIVE
INFLUENZA B, MOLECULAR: NEGATIVE
LYMPHOCYTES # BLD AUTO: 1.8 K/UL (ref 1–4.8)
LYMPHOCYTES NFR BLD: 25.2 % (ref 18–48)
MCH RBC QN AUTO: 31.2 PG (ref 27–31)
MCHC RBC AUTO-ENTMCNC: 31 G/DL (ref 32–36)
MCV RBC AUTO: 101 FL (ref 82–98)
MONOCYTES # BLD AUTO: 1 K/UL (ref 0.3–1)
MONOCYTES NFR BLD: 14.8 % (ref 4–15)
NEUTROPHILS # BLD AUTO: 4.1 K/UL (ref 1.8–7.7)
NEUTROPHILS NFR BLD: 58.4 % (ref 38–73)
NRBC BLD-RTO: 0 /100 WBC
PLATELET # BLD AUTO: 207 K/UL (ref 150–450)
PMV BLD AUTO: 10.5 FL (ref 9.2–12.9)
POTASSIUM SERPL-SCNC: 4 MMOL/L (ref 3.5–5.1)
PROT SERPL-MCNC: 8.1 G/DL (ref 6–8.4)
RBC # BLD AUTO: 4.61 M/UL (ref 4–5.4)
SARS-COV-2 RDRP RESP QL NAA+PROBE: NEGATIVE
SODIUM SERPL-SCNC: 135 MMOL/L (ref 136–145)
SPECIMEN SOURCE: NORMAL
WBC # BLD AUTO: 6.95 K/UL (ref 3.9–12.7)

## 2024-03-22 PROCEDURE — 87502 INFLUENZA DNA AMP PROBE: CPT | Performed by: PHYSICIAN ASSISTANT

## 2024-03-22 PROCEDURE — 63600175 PHARM REV CODE 636 W HCPCS: Performed by: PHYSICIAN ASSISTANT

## 2024-03-22 PROCEDURE — 80053 COMPREHEN METABOLIC PANEL: CPT | Performed by: PHYSICIAN ASSISTANT

## 2024-03-22 PROCEDURE — 85025 COMPLETE CBC W/AUTO DIFF WBC: CPT | Performed by: PHYSICIAN ASSISTANT

## 2024-03-22 PROCEDURE — 96361 HYDRATE IV INFUSION ADD-ON: CPT

## 2024-03-22 PROCEDURE — 99285 EMERGENCY DEPT VISIT HI MDM: CPT | Mod: 25

## 2024-03-22 PROCEDURE — U0002 COVID-19 LAB TEST NON-CDC: HCPCS | Performed by: PHYSICIAN ASSISTANT

## 2024-03-22 RX ADMIN — SODIUM CHLORIDE, POTASSIUM CHLORIDE, SODIUM LACTATE AND CALCIUM CHLORIDE 500 ML: 600; 310; 30; 20 INJECTION, SOLUTION INTRAVENOUS at 10:03

## 2024-03-22 NOTE — Clinical Note
Diagnosis: MARIA ALEJANDRA (acute kidney injury) [[id number]]   Future Attending Provider: LEONILA ARIAS [[id number]]   Is the patient being sent to ED Observation?: No

## 2024-03-23 PROBLEM — J06.9 UPPER RESPIRATORY INFECTION: Status: ACTIVE | Noted: 2024-03-23

## 2024-03-23 PROBLEM — N30.00 ACUTE CYSTITIS: Status: ACTIVE | Noted: 2024-03-23

## 2024-03-23 LAB
ALBUMIN SERPL BCP-MCNC: 3.5 G/DL (ref 3.5–5.2)
ALP SERPL-CCNC: 79 U/L (ref 55–135)
ALT SERPL W/O P-5'-P-CCNC: 13 U/L (ref 10–44)
ANION GAP SERPL CALC-SCNC: 13 MMOL/L (ref 8–16)
AST SERPL-CCNC: 33 U/L (ref 10–40)
BACTERIA #/AREA URNS AUTO: ABNORMAL /HPF
BASOPHILS # BLD AUTO: 0.04 K/UL (ref 0–0.2)
BASOPHILS NFR BLD: 0.6 % (ref 0–1.9)
BILIRUB SERPL-MCNC: 0.6 MG/DL (ref 0.1–1)
BILIRUB UR QL STRIP: ABNORMAL
BUN SERPL-MCNC: 30 MG/DL (ref 8–23)
CALCIUM SERPL-MCNC: 10.2 MG/DL (ref 8.7–10.5)
CHLORIDE SERPL-SCNC: 106 MMOL/L (ref 95–110)
CLARITY UR REFRACT.AUTO: ABNORMAL
CO2 SERPL-SCNC: 16 MMOL/L (ref 23–29)
COLOR UR AUTO: YELLOW
CREAT SERPL-MCNC: 2 MG/DL (ref 0.5–1.4)
DIFFERENTIAL METHOD BLD: ABNORMAL
EOSINOPHIL # BLD AUTO: 0.1 K/UL (ref 0–0.5)
EOSINOPHIL NFR BLD: 1 % (ref 0–8)
ERYTHROCYTE [DISTWIDTH] IN BLOOD BY AUTOMATED COUNT: 13.7 % (ref 11.5–14.5)
EST. GFR  (NO RACE VARIABLE): 24.6 ML/MIN/1.73 M^2
GLUCOSE SERPL-MCNC: 87 MG/DL (ref 70–110)
GLUCOSE UR QL STRIP: NEGATIVE
HCT VFR BLD AUTO: 42.4 % (ref 37–48.5)
HGB BLD-MCNC: 13.7 G/DL (ref 12–16)
HGB UR QL STRIP: NEGATIVE
HYALINE CASTS UR QL AUTO: 0 /LPF
IMM GRANULOCYTES # BLD AUTO: 0.03 K/UL (ref 0–0.04)
IMM GRANULOCYTES NFR BLD AUTO: 0.4 % (ref 0–0.5)
KETONES UR QL STRIP: ABNORMAL
LEUKOCYTE ESTERASE UR QL STRIP: ABNORMAL
LYMPHOCYTES # BLD AUTO: 2.3 K/UL (ref 1–4.8)
LYMPHOCYTES NFR BLD: 31.2 % (ref 18–48)
MAGNESIUM SERPL-MCNC: 1.3 MG/DL (ref 1.6–2.6)
MCH RBC QN AUTO: 30.5 PG (ref 27–31)
MCHC RBC AUTO-ENTMCNC: 32.3 G/DL (ref 32–36)
MCV RBC AUTO: 94 FL (ref 82–98)
MICROSCOPIC COMMENT: ABNORMAL
MONOCYTES # BLD AUTO: 1.7 K/UL (ref 0.3–1)
MONOCYTES NFR BLD: 23.5 % (ref 4–15)
NEUTROPHILS # BLD AUTO: 3.2 K/UL (ref 1.8–7.7)
NEUTROPHILS NFR BLD: 43.3 % (ref 38–73)
NITRITE UR QL STRIP: NEGATIVE
NRBC BLD-RTO: 0 /100 WBC
PH UR STRIP: 5 [PH] (ref 5–8)
PHOSPHATE SERPL-MCNC: 2.5 MG/DL (ref 2.7–4.5)
PLATELET # BLD AUTO: 170 K/UL (ref 150–450)
PMV BLD AUTO: 10.3 FL (ref 9.2–12.9)
POCT GLUCOSE: 104 MG/DL (ref 70–110)
POCT GLUCOSE: 115 MG/DL (ref 70–110)
POCT GLUCOSE: 123 MG/DL (ref 70–110)
POCT GLUCOSE: 144 MG/DL (ref 70–110)
POCT GLUCOSE: 98 MG/DL (ref 70–110)
POTASSIUM SERPL-SCNC: 4.5 MMOL/L (ref 3.5–5.1)
PROT SERPL-MCNC: 7.8 G/DL (ref 6–8.4)
PROT UR QL STRIP: ABNORMAL
RBC # BLD AUTO: 4.49 M/UL (ref 4–5.4)
RBC #/AREA URNS AUTO: 2 /HPF (ref 0–4)
SODIUM SERPL-SCNC: 135 MMOL/L (ref 136–145)
SP GR UR STRIP: >1.03 (ref 1–1.03)
SQUAMOUS #/AREA URNS AUTO: 4 /HPF
URN SPEC COLLECT METH UR: ABNORMAL
WBC # BLD AUTO: 7.27 K/UL (ref 3.9–12.7)
WBC #/AREA URNS AUTO: 24 /HPF (ref 0–5)

## 2024-03-23 PROCEDURE — 96361 HYDRATE IV INFUSION ADD-ON: CPT

## 2024-03-23 PROCEDURE — G0378 HOSPITAL OBSERVATION PER HR: HCPCS

## 2024-03-23 PROCEDURE — 96372 THER/PROPH/DIAG INJ SC/IM: CPT

## 2024-03-23 PROCEDURE — 80053 COMPREHEN METABOLIC PANEL: CPT

## 2024-03-23 PROCEDURE — 84100 ASSAY OF PHOSPHORUS: CPT

## 2024-03-23 PROCEDURE — 25000003 PHARM REV CODE 250

## 2024-03-23 PROCEDURE — 87086 URINE CULTURE/COLONY COUNT: CPT | Performed by: PHYSICIAN ASSISTANT

## 2024-03-23 PROCEDURE — 83735 ASSAY OF MAGNESIUM: CPT

## 2024-03-23 PROCEDURE — 96367 TX/PROPH/DG ADDL SEQ IV INF: CPT

## 2024-03-23 PROCEDURE — 96366 THER/PROPH/DIAG IV INF ADDON: CPT

## 2024-03-23 PROCEDURE — 63600175 PHARM REV CODE 636 W HCPCS: Performed by: PHYSICIAN ASSISTANT

## 2024-03-23 PROCEDURE — 96365 THER/PROPH/DIAG IV INF INIT: CPT

## 2024-03-23 PROCEDURE — 63600175 PHARM REV CODE 636 W HCPCS

## 2024-03-23 PROCEDURE — 51798 US URINE CAPACITY MEASURE: CPT

## 2024-03-23 PROCEDURE — 85025 COMPLETE CBC W/AUTO DIFF WBC: CPT

## 2024-03-23 PROCEDURE — 36415 COLL VENOUS BLD VENIPUNCTURE: CPT

## 2024-03-23 PROCEDURE — 81001 URINALYSIS AUTO W/SCOPE: CPT | Performed by: PHYSICIAN ASSISTANT

## 2024-03-23 RX ORDER — PROMETHAZINE HYDROCHLORIDE 25 MG/1
25 TABLET ORAL EVERY 6 HOURS PRN
Status: DISCONTINUED | OUTPATIENT
Start: 2024-03-23 | End: 2024-03-24 | Stop reason: HOSPADM

## 2024-03-23 RX ORDER — INSULIN ASPART 100 [IU]/ML
0-5 INJECTION, SOLUTION INTRAVENOUS; SUBCUTANEOUS
Status: DISCONTINUED | OUTPATIENT
Start: 2024-03-23 | End: 2024-03-24 | Stop reason: HOSPADM

## 2024-03-23 RX ORDER — EZETIMIBE 10 MG/1
10 TABLET ORAL DAILY
Status: DISCONTINUED | OUTPATIENT
Start: 2024-03-23 | End: 2024-03-24 | Stop reason: HOSPADM

## 2024-03-23 RX ORDER — POLYETHYLENE GLYCOL 3350 17 G/17G
17 POWDER, FOR SOLUTION ORAL DAILY PRN
Status: DISCONTINUED | OUTPATIENT
Start: 2024-03-23 | End: 2024-03-24 | Stop reason: HOSPADM

## 2024-03-23 RX ORDER — IPRATROPIUM BROMIDE AND ALBUTEROL SULFATE 2.5; .5 MG/3ML; MG/3ML
3 SOLUTION RESPIRATORY (INHALATION) EVERY 4 HOURS PRN
Status: DISCONTINUED | OUTPATIENT
Start: 2024-03-23 | End: 2024-03-23

## 2024-03-23 RX ORDER — SODIUM CHLORIDE 0.9 % (FLUSH) 0.9 %
10 SYRINGE (ML) INJECTION
Status: DISCONTINUED | OUTPATIENT
Start: 2024-03-23 | End: 2024-03-24 | Stop reason: HOSPADM

## 2024-03-23 RX ORDER — ACETAMINOPHEN 325 MG/1
650 TABLET ORAL EVERY 4 HOURS PRN
Status: DISCONTINUED | OUTPATIENT
Start: 2024-03-23 | End: 2024-03-24 | Stop reason: HOSPADM

## 2024-03-23 RX ORDER — AMLODIPINE BESYLATE 10 MG/1
10 TABLET ORAL DAILY
Status: DISCONTINUED | OUTPATIENT
Start: 2024-03-23 | End: 2024-03-24 | Stop reason: HOSPADM

## 2024-03-23 RX ORDER — ATORVASTATIN CALCIUM 40 MG/1
80 TABLET, FILM COATED ORAL DAILY
Status: DISCONTINUED | OUTPATIENT
Start: 2024-03-23 | End: 2024-03-24 | Stop reason: HOSPADM

## 2024-03-23 RX ORDER — TALC
6 POWDER (GRAM) TOPICAL NIGHTLY PRN
Status: DISCONTINUED | OUTPATIENT
Start: 2024-03-23 | End: 2024-03-24 | Stop reason: HOSPADM

## 2024-03-23 RX ORDER — IBUPROFEN 200 MG
24 TABLET ORAL
Status: DISCONTINUED | OUTPATIENT
Start: 2024-03-23 | End: 2024-03-24 | Stop reason: HOSPADM

## 2024-03-23 RX ORDER — SODIUM CHLORIDE, SODIUM LACTATE, POTASSIUM CHLORIDE, CALCIUM CHLORIDE 600; 310; 30; 20 MG/100ML; MG/100ML; MG/100ML; MG/100ML
INJECTION, SOLUTION INTRAVENOUS CONTINUOUS
Status: ACTIVE | OUTPATIENT
Start: 2024-03-23 | End: 2024-03-23

## 2024-03-23 RX ORDER — CARVEDILOL 25 MG/1
25 TABLET ORAL 2 TIMES DAILY WITH MEALS
Status: DISCONTINUED | OUTPATIENT
Start: 2024-03-23 | End: 2024-03-24 | Stop reason: HOSPADM

## 2024-03-23 RX ORDER — MAGNESIUM SULFATE HEPTAHYDRATE 40 MG/ML
2 INJECTION, SOLUTION INTRAVENOUS ONCE
Status: COMPLETED | OUTPATIENT
Start: 2024-03-23 | End: 2024-03-23

## 2024-03-23 RX ORDER — ASPIRIN 81 MG/1
81 TABLET ORAL DAILY
Status: DISCONTINUED | OUTPATIENT
Start: 2024-03-23 | End: 2024-03-24 | Stop reason: HOSPADM

## 2024-03-23 RX ORDER — IBUPROFEN 200 MG
16 TABLET ORAL
Status: DISCONTINUED | OUTPATIENT
Start: 2024-03-23 | End: 2024-03-24 | Stop reason: HOSPADM

## 2024-03-23 RX ORDER — BENZONATATE 100 MG/1
100 CAPSULE ORAL 3 TIMES DAILY PRN
Status: DISCONTINUED | OUTPATIENT
Start: 2024-03-23 | End: 2024-03-24 | Stop reason: HOSPADM

## 2024-03-23 RX ORDER — IPRATROPIUM BROMIDE AND ALBUTEROL SULFATE 2.5; .5 MG/3ML; MG/3ML
3 SOLUTION RESPIRATORY (INHALATION) ONCE
Status: DISCONTINUED | OUTPATIENT
Start: 2024-03-23 | End: 2024-03-24 | Stop reason: HOSPADM

## 2024-03-23 RX ORDER — ALLOPURINOL 300 MG/1
300 TABLET ORAL EVERY MORNING
Status: DISCONTINUED | OUTPATIENT
Start: 2024-03-23 | End: 2024-03-24 | Stop reason: HOSPADM

## 2024-03-23 RX ORDER — SODIUM CHLORIDE 0.9 % (FLUSH) 0.9 %
10 SYRINGE (ML) INJECTION
Status: DISCONTINUED | OUTPATIENT
Start: 2024-03-23 | End: 2024-03-23

## 2024-03-23 RX ORDER — BISACODYL 10 MG/1
10 SUPPOSITORY RECTAL DAILY PRN
Status: DISCONTINUED | OUTPATIENT
Start: 2024-03-23 | End: 2024-03-24 | Stop reason: HOSPADM

## 2024-03-23 RX ORDER — LEVETIRACETAM 500 MG/1
1000 TABLET ORAL DAILY
Status: DISCONTINUED | OUTPATIENT
Start: 2024-03-23 | End: 2024-03-24 | Stop reason: HOSPADM

## 2024-03-23 RX ORDER — ONDANSETRON 8 MG/1
8 TABLET, ORALLY DISINTEGRATING ORAL EVERY 8 HOURS PRN
Status: DISCONTINUED | OUTPATIENT
Start: 2024-03-23 | End: 2024-03-24 | Stop reason: HOSPADM

## 2024-03-23 RX ORDER — GLUCAGON 1 MG
1 KIT INJECTION
Status: DISCONTINUED | OUTPATIENT
Start: 2024-03-23 | End: 2024-03-24 | Stop reason: HOSPADM

## 2024-03-23 RX ORDER — HEPARIN SODIUM 5000 [USP'U]/ML
5000 INJECTION, SOLUTION INTRAVENOUS; SUBCUTANEOUS EVERY 8 HOURS
Status: DISCONTINUED | OUTPATIENT
Start: 2024-03-23 | End: 2024-03-24 | Stop reason: HOSPADM

## 2024-03-23 RX ADMIN — MAGNESIUM SULFATE HEPTAHYDRATE 2 G: 40 INJECTION, SOLUTION INTRAVENOUS at 10:03

## 2024-03-23 RX ADMIN — EZETIMIBE 10 MG: 10 TABLET ORAL at 09:03

## 2024-03-23 RX ADMIN — CARVEDILOL 25 MG: 25 TABLET, FILM COATED ORAL at 05:03

## 2024-03-23 RX ADMIN — LEVETIRACETAM 1000 MG: 500 TABLET, FILM COATED ORAL at 09:03

## 2024-03-23 RX ADMIN — HEPARIN SODIUM 5000 UNITS: 5000 INJECTION INTRAVENOUS; SUBCUTANEOUS at 09:03

## 2024-03-23 RX ADMIN — AMLODIPINE BESYLATE 10 MG: 10 TABLET ORAL at 09:03

## 2024-03-23 RX ADMIN — HEPARIN SODIUM 5000 UNITS: 5000 INJECTION INTRAVENOUS; SUBCUTANEOUS at 02:03

## 2024-03-23 RX ADMIN — SODIUM CHLORIDE, POTASSIUM CHLORIDE, SODIUM LACTATE AND CALCIUM CHLORIDE: 600; 310; 30; 20 INJECTION, SOLUTION INTRAVENOUS at 02:03

## 2024-03-23 RX ADMIN — ALLOPURINOL 300 MG: 300 TABLET ORAL at 05:03

## 2024-03-23 RX ADMIN — ATORVASTATIN CALCIUM 80 MG: 40 TABLET, FILM COATED ORAL at 09:03

## 2024-03-23 RX ADMIN — CEFTRIAXONE 1 G: 1 INJECTION, POWDER, FOR SOLUTION INTRAMUSCULAR; INTRAVENOUS at 10:03

## 2024-03-23 RX ADMIN — HEPARIN SODIUM 5000 UNITS: 5000 INJECTION INTRAVENOUS; SUBCUTANEOUS at 05:03

## 2024-03-23 RX ADMIN — CARVEDILOL 25 MG: 25 TABLET, FILM COATED ORAL at 09:03

## 2024-03-23 RX ADMIN — ASPIRIN 81 MG: 81 TABLET, COATED ORAL at 09:03

## 2024-03-23 RX ADMIN — SODIUM CHLORIDE, POTASSIUM CHLORIDE, SODIUM LACTATE AND CALCIUM CHLORIDE 500 ML: 600; 310; 30; 20 INJECTION, SOLUTION INTRAVENOUS at 12:03

## 2024-03-23 NOTE — ASSESSMENT & PLAN NOTE
Patient's FSGs are controlled on current medication regimen.  Last A1c reviewed-   Lab Results   Component Value Date    HGBA1C 6.4 (H) 12/16/2023     Most recent fingerstick glucose reviewed-   Recent Labs   Lab 03/23/24  0253   POCTGLUCOSE 104     Current correctional scale  Low  Maintain anti-hyperglycemic dose as follows-   Antihyperglycemics (From admission, onward)      Start     Stop Route Frequency Ordered    03/23/24 0248  insulin aspart U-100 pen 0-5 Units         -- SubQ Before meals & nightly PRN 03/23/24 0148          Hold Oral hypoglycemics while patient is in the hospital.

## 2024-03-23 NOTE — HOSPITAL COURSE
Renata Bergman is a 83 y.o. female who was admitted to hospital medicine for a UTI and MARIA ALEJANDRA. Cr 2.4 >> 2.0 >> 1.6 following IVF. Treating UTI with rocephin, urine culture with multiple organisms isolated, none in predominance. Will discharge with cefdinir. Pt was seen and evaluated by me this morning, reports feeling well, and is eager to discharge home. All questions were answered. Patient acknowledged understanding of discharge instructions and feels safe to discharge home. Patient was discharged on 3/24/2024 in stable condition with PCP follow-up. Education regarding condition provided and return precautions given.     Physical Exam  Gen: in NAD, appears stated age  CVS: RRR  Resp: lungs CTAB  Abd: NTND, soft to palpation  Extrem: no UE or LE edema BL

## 2024-03-23 NOTE — NURSING
Nurses Note -- 4 Eyes      3/23/2024   2:51 AM      Skin assessed during: Admit      [x] No Altered Skin Integrity Present    [x]Prevention Measures Documented      [] Yes- Altered Skin Integrity Present or Discovered   [] LDA Added if Not in Epic (Describe Wound)   [] New Altered Skin Integrity was Present on Admit and Documented in LDA   [] Wound Image Taken    Wound Care Consulted? No    Attending Nurse:  Liz Posadas RN/Staff Member:   Leyda

## 2024-03-23 NOTE — ED PROVIDER NOTES
"Encounter Date: 3/22/2024       History     Chief Complaint   Patient presents with    Influenza     Arrives via EMS for flu like symptoms, fever and cough for a few days, states her fever at home was 101.4, took two tylenol now down to 98.6 in EMS, denies any SOB or chest pain.      83-year-old female with hypertension, history of breast cancer, CAD, DM 2, GERD, hypertension presents for fever for about 3 or 4 days.  Daughter reports fever of 104° at home, improved with Tylenol.  She reports associated nonproductive cough, sinus congestion.  She denies shortness of breath, chest pain, sore throat, nausea/vomiting, abdominal pain or urinary symptoms.  Daughter had similar symptoms earlier this week and she works as a .  No recent international travel      Review of patient's allergies indicates:   Allergen Reactions    Lisinopril Anaphylaxis     Past Medical History:   Diagnosis Date    Anemia     Benign essential hypertension 09/04/2012    Breast cancer 2016    DCIS and IDC, stage I    Carpal tunnel syndrome 6/23/2014    Cataract     Coronary artery disease 09/04/2012    Diabetes mellitus due to abnormal insulin 09/04/2012    Diabetes mellitus type II     Genetic testing     brca2 positive     GERD (gastroesophageal reflux disease) 09/04/2012    Gout, arthritis 09/04/2012    Heart failure     Hyperlipidemia 09/04/2012    Hypertension     Labyrinthitis of both ears 02/28/2022    Myocardial infarction     Obesity     Primary osteoarthritis of both knees 09/04/2012    Renal manifestation of secondary diabetes mellitus     Seizure     states "one time during chemo in 2018"    Type 2 diabetes with peripheral circulatory disorder, controlled      Past Surgical History:   Procedure Laterality Date    BREAST BIOPSY      BREAST LUMPECTOMY Left 08/01/2016    DCIS and IDC, s/p lumpectomy    CATARACT EXTRACTION      COLONOSCOPY N/A 10/8/2018    Procedure: COLONOSCOPY;  Surgeon: Marcio Louie MD;  Location: " [facility] (4TH FLR);  Service: Endoscopy;  Laterality: N/A;    COLONOSCOPY N/A 11/22/2023    Procedure: COLONOSCOPY;  Surgeon: Shahram Urbina MD;  Location: [facility] (2ND FLR);  Service: Endoscopy;  Laterality: N/A;  9/6 changed MD from Bakari to Carlitos - EDISON  11/15-precall complete-MS  11/20/23-Pt confirmed check-in location change to 2nd Floor Surg Waiting Room, voicemail also left for pt's daughter-DS    CORONARY ANGIOPLASTY      ESOPHAGOGASTRODUODENOSCOPY N/A 10/8/2018    Procedure: ESOPHAGOGASTRODUODENOSCOPY (EGD);  Surgeon: Marcio Louie MD;  Location: [facility] (4TH FLR);  Service: Endoscopy;  Laterality: N/A;  combined egd/colon order/Plavix/OK to hold med 5 days prior to procedures per Dr Puente/see telephone encounter dated 8/22/18/svn    ESOPHAGOGASTRODUODENOSCOPY N/A 1/31/2020    Procedure: ESOPHAGOGASTRODUODENOSCOPY (EGD);  Surgeon: Marcio Louie MD;  Location: [facility] (4TH FLR);  Service: Endoscopy;  Laterality: N/A;  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 10/2018    per Dr Agudelo-ok to hold Plavix 5 days prior to procedure    ESOPHAGOGASTRODUODENOSCOPY N/A 11/22/2023    Procedure: EGD (ESOPHAGOGASTRODUODENOSCOPY);  Surgeon: Shahram Urbina MD;  Location: [facility] (2ND FLR);  Service: Endoscopy;  Laterality: N/A;  Prep instructions snet to pt via portal and mail -  diabetic  Peg prep  time frame 5-12 weeks   pt request week of Thanksgiving her daughter is a     HYSTERECTOMY      INTRAOCULAR PROSTHESES INSERTION Right 10/18/2020    Procedure: INSERTION, IOL PROSTHESIS;  Surgeon: Karishma Mata MD;  Location: [facility] OR 1ST FLR;  Service: Ophthalmology;  Laterality: Right;    INTRAOCULAR PROSTHESES INSERTION Left 12/21/2020    Procedure: INSERTION, IOL PROSTHESIS;  Surgeon: Karishma Mata MD;  [facility];  Service: Ophthalmology;  Laterality: Left;    JOINT REPLACEMENT      left and right k nee    KNEE SURGERY      PHACOEMULSIFICATION OF  CATARACT Right 10/18/2020    Procedure: PHACOEMULSIFICATION, CATARACT;  Surgeon: Karishma Mata MD;  Location: [facility] OR 1ST FLR;  Service: Ophthalmology;  Laterality: Right;    PHACOEMULSIFICATION OF CATARACT Left 2020    Procedure: PHACOEMULSIFICATION, CATARACT;  Surgeon: Karishma Mata MD;  Location: [facility] OR 1ST FLR;  Service: Ophthalmology;  Laterality: Left;    REVISION OF KNEE ARTHROPLASTY Left 4/15/2021    Procedure: REVISION, ARTHROPLASTY, KNEE;  Surgeon: Gilson Wilkerson MD;  Location: [facility] OR 2ND FLR;  Service: Orthopedics;  Laterality: Left;    TOTAL ABDOMINAL HYSTERECTOMY W/ BILATERAL SALPINGOOPHORECTOMY       Family History   Problem Relation Age of Onset    Cancer Mother 55        colon    Diabetes Mother     Hypertension Mother     Breast cancer Mother 45    Ovarian cancer Mother     Hypertension Maternal Aunt     Hyperlipidemia Maternal Aunt     Breast cancer Maternal Aunt     Hypertension Maternal Uncle     Heart disease Father     Breast cancer Daughter 45    Breast cancer Maternal Aunt     Diabetes Son     Stroke Son     Kidney disease Son     Glaucoma Neg Hx     Heart attack Neg Hx     Heart failure Neg Hx      Social History     Tobacco Use    Smoking status: Former     Current packs/day: 0.00     Average packs/day: 1 pack/day for 12.0 years (12.0 ttl pk-yrs)     Types: Cigarettes     Start date: 1994     Quit date: 2006     Years since quittin.6    Smokeless tobacco: Never   Substance Use Topics    Alcohol use: Not Currently    Drug use: No     Review of Systems    Physical Exam     Initial Vitals [24]   BP Pulse Resp Temp SpO2   124/62 68 14 99.3 °F (37.4 °C) 99 %      MAP       --         Physical Exam    Nursing note and vitals reviewed.  Constitutional: She appears well-developed and well-nourished.   HENT:   Head: Normocephalic and atraumatic.   Eyes: EOM are normal. Pupils are equal, round, and reactive to light.   Neck: Neck supple.   Normal range  of motion.  Cardiovascular:  Normal rate, regular rhythm, normal heart sounds and intact distal pulses.     Exam reveals no gallop and no friction rub.       No murmur heard.  Pulmonary/Chest: Breath sounds normal. No respiratory distress. She has no wheezes. She has no rhonchi. She has no rales. She exhibits no tenderness.   Musculoskeletal:         General: Normal range of motion.      Cervical back: Normal range of motion and neck supple.     Neurological: She is alert and oriented to person, place, and time.   Skin: Skin is warm and dry.   Psychiatric: She has a normal mood and affect.         ED Course   Procedures  Labs Reviewed   CBC W/ AUTO DIFFERENTIAL - Abnormal; Notable for the following components:       Result Value     (*)     MCH 31.2 (*)     MCHC 31.0 (*)     All other components within normal limits   COMPREHENSIVE METABOLIC PANEL - Abnormal; Notable for the following components:    Sodium 135 (*)     CO2 22 (*)     Glucose 121 (*)     BUN 29 (*)     Creatinine 2.4 (*)     eGFR 19.8 (*)     All other components within normal limits   INFLUENZA A & B BY MOLECULAR   SARS-COV-2 RNA AMPLIFICATION, QUAL   URINALYSIS, REFLEX TO URINE CULTURE   URINALYSIS MICROSCOPIC          Imaging Results              X-Ray Chest PA And Lateral (Final result)  Result time 03/22/24 23:00:36      Final result by Westley Haynes DO (03/22/24 23:00:36)                   Impression:      No acute abnormality.      Electronically signed by: Westley Haynes  Date:    03/22/2024  Time:    23:00               Narrative:    EXAMINATION:  XR CHEST PA AND LATERAL    CLINICAL HISTORY:  Fever, unspecified    TECHNIQUE:  PA and lateral views of the chest were performed.    COMPARISON:  07/16/2022.    FINDINGS:  Redemonstration of a curvilinear catheter fragment projecting over the right atrium, stable.  The lungs are well expanded and clear. No focal opacities are seen. The pleural spaces are clear. The cardiac silhouette is  unremarkable. The visualized osseous structures are unremarkable.                                       Medications   lactated ringers bolus 500 mL (0 mLs Intravenous Stopped 3/22/24 2340)   lactated ringers bolus 500 mL (0 mLs Intravenous Stopped 3/23/24 0127)     Medical Decision Making  83-year-old female presenting for fever and cough.  Her vitals are normal and she is nontoxic appearing.    Differential diagnosis:  Influenza   COVID-19  Pneumonia  Pyelonephritis   Dehydration    Will check labs, give fluid bolus and reassess.    Labs notable for MARIA ALEJANDRA.  Suspect due to dehydration.  UA is pending.  She will be admitted to Hospital Medicine for further management.  Patient and family comfortable with admission.    Amount and/or Complexity of Data Reviewed  Labs: ordered. Decision-making details documented in ED Course.  Radiology: ordered and independent interpretation performed. Decision-making details documented in ED Course.    Risk  Decision regarding hospitalization.               ED Course as of 03/23/24 0141   Fri Mar 22, 2024   2235 SARS-CoV-2 RNA, Amplification, Qual: Negative [CC]   2237 Influenza A, Molecular: Negative [CC]   2237 Influenza B, Molecular: Negative [CC]   2256 WBC: 6.95 [CC]   2303 X-Ray Chest PA And Lateral  No consolidation [CC]   2316 Creatinine(!): 2.4 [CC]      ED Course User Index  [CC] Maggie Castillo PA-C                           Clinical Impression:  Final diagnoses:  [R50.9] Fever  [N17.9] MARIA ALEJANDRA (acute kidney injury) (Primary)          ED Disposition Condition    Observation Stable                Maggie Castillo PA-C  03/23/24 0141

## 2024-03-23 NOTE — PLAN OF CARE
Problem: Adult Inpatient Plan of Care  Goal: Plan of Care Review  Outcome: Ongoing, Progressing  Goal: Patient-Specific Goal (Individualized)  Outcome: Ongoing, Progressing  Goal: Absence of Hospital-Acquired Illness or Injury  Outcome: Ongoing, Progressing  Goal: Optimal Comfort and Wellbeing  Outcome: Ongoing, Progressing  Goal: Readiness for Transition of Care  Outcome: Ongoing, Progressing     Problem: Bariatric Environmental Safety  Goal: Safety Maintained with Care  Outcome: Ongoing, Progressing     Problem: Infection  Goal: Absence of Infection Signs and Symptoms  Outcome: Ongoing, Progressing     Problem: Diabetes Comorbidity  Goal: Blood Glucose Level Within Targeted Range  Outcome: Ongoing, Progressing     Problem: Fall Injury Risk  Goal: Absence of Fall and Fall-Related Injury  Outcome: Ongoing, Progressing     Problem: Hypertension Comorbidity  Goal: Blood Pressure in Desired Range  Outcome: Ongoing, Progressing     Problem: Seizure Disorder Comorbidity  Goal: Maintenance of Seizure Control  Outcome: Ongoing, Progressing     Problem: Fatigue  Goal: Improved Activity Tolerance  Outcome: Ongoing, Progressing     Problem: Adjustment to Illness (Chronic Kidney Disease)  Goal: Optimal Coping with Chronic Illness  Outcome: Ongoing, Progressing     Problem: Electrolyte Imbalance (Chronic Kidney Disease)  Goal: Electrolyte Balance  Outcome: Ongoing, Progressing     Problem: Fluid Volume Excess (Chronic Kidney Disease)  Goal: Fluid Balance  Outcome: Ongoing, Progressing     Problem: Functional Decline (Chronic Kidney Disease)  Goal: Optimal Functional Ability  Outcome: Ongoing, Progressing     Problem: Hematologic Alteration (Chronic Kidney Disease)  Goal: Absence of Anemia Signs and Symptoms  Outcome: Ongoing, Progressing     Problem: Oral Intake Inadequate (Chronic Kidney Disease)  Goal: Optimal Oral Intake  Outcome: Ongoing, Progressing     Problem: Pain (Chronic Kidney Disease)  Goal: Acceptable Pain  Control  Outcome: Ongoing, Progressing     Problem: Renal Function Impairment (Chronic Kidney Disease)  Goal: Minimize Renal Failure Effects  Outcome: Ongoing, Progressing     Problem: Fluid and Electrolyte Imbalance (Acute Kidney Injury/Impairment)  Goal: Fluid and Electrolyte Balance  Outcome: Ongoing, Progressing     Problem: Oral Intake Inadequate (Acute Kidney Injury/Impairment)  Goal: Optimal Nutrition Intake  Outcome: Ongoing, Progressing     Problem: Renal Function Impairment (Acute Kidney Injury/Impairment)  Goal: Effective Renal Function  Outcome: Ongoing, Progressing     Problem: Fever  Goal: Body Temperature in Desired Range  Outcome: Ongoing, Progressing     Problem: Chest Pain  Goal: Resolution of Chest Pain Symptoms  Outcome: Ongoing, Progressing

## 2024-03-23 NOTE — ASSESSMENT & PLAN NOTE
Patient's anemia is currently controlled. Has not received any PRBCs to date. Etiology likely d/t chronic blood loss  Current CBC reviewed-   Lab Results   Component Value Date    HGB 14.4 03/22/2024    HCT 46.5 03/22/2024     Monitor serial CBC and transfuse if patient becomes hemodynamically unstable, symptomatic or H/H drops below 7/21.

## 2024-03-23 NOTE — NURSING
PT arrived to unit via wheelchair and transport assist. PT transferred from wheelchair to bed with stand by assist sans difficulty. PT AAOx4, no s/s of pain, distress, discomfort, denies any current pain or discomfort. Family at bedside, denies any further needs. Vitals signs within normal limits. PT and family oriented to room and call light/call light usage. PT left with bed low, wheels locked, SR up x2, call light within reach, bed alarm on, personal belongings on over bed table and within reach.

## 2024-03-23 NOTE — H&P
[facility] - Observation [address] [place] [address] Medicine  History & Physical    Patient Name: Renata Bergman  MRN: [id number]  Patient Class: OP- Observation  Admission Date: 3/22/2024  Attending Physician: Rosa Rene MD   Primary Care Provider: Ethel Berger MD         Patient information was obtained from patient and ER records.     Subjective:     Principal Problem:MARIA ALEJANDRA (acute kidney injury)    Chief Complaint:   Chief Complaint   Patient presents with    Influenza     Arrives via EMS for flu like symptoms, fever and cough for a few days, states her fever at home was 101.4, took two tylenol now down to 98.6 in EMS, denies any SOB or chest pain.         HPI: Renata Bergman is a 82 y/o F with Pmhx of HTN, T2DM, CAD, and DCIS who presented to the ED complaining of 4 days of weakness and fatigue. Reports associated sinus congestion, nonproductive cough, and fever. Daughter reports fever of 101.4° at home today around 6 pm, improved with Tylenol. Daughter also had similar symptoms recently as she works with kids. Patient states she has not been hydrating well d/t illness. She denies SOB, CP, nausea, vomiting, or chills. Denies urinary symptoms.     In ED: Afebrile. VSS. CBC unremarkable. CMP showing elevated BUN (29) and Cr (2.4) up from baseline of (1.2). UA showed leukocyte esterase 3+, WBC 24. CXR showed no acute abnormalities. Influenza A & B and COVID tests negative. Given IVFs in the ED. Admitted to .     Past Medical History:   Diagnosis Date    Anemia     Benign essential hypertension 09/04/2012    Breast cancer 2016    DCIS and IDC, stage I    Carpal tunnel syndrome 6/23/2014    Cataract     Coronary artery disease 09/04/2012    Diabetes mellitus due to abnormal insulin 09/04/2012    Diabetes mellitus type II     Genetic testing     brca2 positive     GERD (gastroesophageal reflux disease) 09/04/2012    Gout, arthritis 09/04/2012    Heart failure     Hyperlipidemia 09/04/2012    Hypertension      "Labyrinthitis of both ears 02/28/2022    Myocardial infarction     Obesity     Primary osteoarthritis of both knees 09/04/2012    Renal manifestation of secondary diabetes mellitus     Seizure     states "one time during chemo in 2018"    Type 2 diabetes with peripheral circulatory disorder, controlled        Past Surgical History:   Procedure Laterality Date    BREAST BIOPSY      BREAST LUMPECTOMY Left 08/01/2016    DCIS and IDC, s/p lumpectomy    CATARACT EXTRACTION      COLONOSCOPY N/A 10/8/2018    Procedure: COLONOSCOPY;  Surgeon: Marcio Louie MD;  Location: [facility] (4TH FLR);  Service: Endoscopy;  Laterality: N/A;    COLONOSCOPY N/A 11/22/2023    Procedure: COLONOSCOPY;  Surgeon: Shahram Urbina MD;  Location: [facility] (2ND FLR);  Service: Endoscopy;  Laterality: N/A;  9/6 changed MD from Bakari to Carlitos - EDISON  11/15-precall complete-MS  11/20/23-Pt confirmed check-in location change to 2nd Floor Surg Waiting Room, voicemail also left for pt's daughter-DS    CORONARY ANGIOPLASTY      ESOPHAGOGASTRODUODENOSCOPY N/A 10/8/2018    Procedure: ESOPHAGOGASTRODUODENOSCOPY (EGD);  Surgeon: Marcio Louie MD;  Location: [facility] (4TH FLR);  Service: Endoscopy;  Laterality: N/A;  combined egd/colon order/Plavix/OK to hold med 5 days prior to procedures per Dr Puente/see telephone encounter dated 8/22/18/tanya    ESOPHAGOGASTRODUODENOSCOPY N/A 1/31/2020    Procedure: ESOPHAGOGASTRODUODENOSCOPY (EGD);  Surgeon: Marcio Louie MD;  Location: [facility] (4TH FLR);  Service: Endoscopy;  Laterality: N/A;  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 10/2018    per Dr Agudelo-ok to hold Plavix 5 days prior to procedure    ESOPHAGOGASTRODUODENOSCOPY N/A 11/22/2023    Procedure: EGD (ESOPHAGOGASTRODUODENOSCOPY);  Surgeon: Shahram Urbina MD;  Location: [facility] (2ND FLR);  Service: Endoscopy;  Laterality: N/A;  Prep instructions snet to pt via portal and mail -  diabetic  Peg prep  time frame 5-12 weeks "   pt request week of Thanksgiving her daughter is a     HYSTERECTOMY      INTRAOCULAR PROSTHESES INSERTION Right 10/18/2020    Procedure: INSERTION, IOL PROSTHESIS;  Surgeon: Karishma Mata MD;  [facility];  Service: Ophthalmology;  Laterality: Right;    INTRAOCULAR PROSTHESES INSERTION Left 12/21/2020    Procedure: INSERTION, IOL PROSTHESIS;  Surgeon: Karishma Mata MD;  [facility];  Service: Ophthalmology;  Laterality: Left;    JOINT REPLACEMENT      left and right k nee    KNEE SURGERY      PHACOEMULSIFICATION OF CATARACT Right 10/18/2020    Procedure: PHACOEMULSIFICATION, CATARACT;  Surgeon: Karishma Mata MD;  [facility];  Service: Ophthalmology;  Laterality: Right;    PHACOEMULSIFICATION OF CATARACT Left 12/21/2020    Procedure: PHACOEMULSIFICATION, CATARACT;  Surgeon: Karishma Mata MD;  [facility];  Service: Ophthalmology;  Laterality: Left;    REVISION OF KNEE ARTHROPLASTY Left 4/15/2021    Procedure: REVISION, ARTHROPLASTY, KNEE;  Surgeon: Gilson Wilkerson MD;  [facility];  Service: Orthopedics;  Laterality: Left;    TOTAL ABDOMINAL HYSTERECTOMY W/ BILATERAL SALPINGOOPHORECTOMY         Review of patient's allergies indicates:   Allergen Reactions    Lisinopril Anaphylaxis       No current facility-administered medications on file prior to encounter.     Current Outpatient Medications on File Prior to Encounter   Medication Sig    nitroGLYCERIN (NITROSTAT) 0.4 MG SL tablet PLACE 1 TABLET UNDER TONGUE EVERY 5 MINUTES AS NEEDED FOR CHEST PAIN. USE 3 TIMES, IF NO RELIEF CALL 911    acetaminophen (TYLENOL) 500 MG tablet Take 1,000 mg by mouth daily as needed for Pain (headache).    allopurinoL (ZYLOPRIM) 300 MG tablet TAKE 1 TABLET(300 MG) BY MOUTH EVERY MORNING    amLODIPine (NORVASC) 10 MG tablet TAKE 1 TABLET(10 MG) BY MOUTH EVERY DAY    anastrozole (ARIMIDEX) 1 mg Tab Take 1 tablet (1 mg total) by mouth  once daily.    aspirin (ECOTRIN) 81 MG EC tablet Take 1 tablet (81 mg total) by mouth once daily.    blood sugar diagnostic Strp 1 strip by Misc.(Non-Drug; Combo Route) route once daily.    blood-glucose meter kit Use as instructed    carvediloL (COREG) 25 MG tablet Take 1 tablet (25 mg total) by mouth 2 (two) times daily.    ezetimibe (ZETIA) 10 mg tablet Take 1 tablet (10 mg total) by mouth once daily.    lancets Misc Check blood sugar once daily    levetiracetam XR (KEPPRA XR) 500 mg Tb24 24 hr tablet Take 2 tablets (1,000 mg total) by mouth once daily.    omeprazole (PRILOSEC) 20 MG capsule Take 1 capsule (20 mg total) by mouth 2 (two) times a day    propylene glycol (SYSTANE BALANCE OPHT) Apply 1-2 drops to eye daily as needed (dry eyes).    rosuvastatin (CRESTOR) 40 MG Tab TAKE 1 TABLET(40 MG) BY MOUTH EVERY MORNING     Family History       Problem Relation (Age of Onset)    Breast cancer Mother (45), Maternal Aunt, Daughter (45), Maternal Aunt    Cancer Mother (55)    Diabetes Mother, Son    Heart disease Father    Hyperlipidemia Maternal Aunt    Hypertension Mother, Maternal Aunt, Maternal Uncle    Kidney disease Son    Ovarian cancer Mother    Stroke Son          Tobacco Use    Smoking status: Former     Current packs/day: 0.00     Average packs/day: 1 pack/day for 12.0 years (12.0 ttl pk-yrs)     Types: Cigarettes     Start date: 1994     Quit date: 2006     Years since quittin.6    Smokeless tobacco: Never   Substance and Sexual Activity    Alcohol use: Not Currently    Drug use: No    Sexual activity: Never     Review of Systems   Constitutional:  Positive for activity change, fatigue and fever. Negative for appetite change and chills.   HENT:  Positive for congestion and rhinorrhea.    Eyes:  Negative for pain, discharge and itching.   Respiratory:  Positive for cough. Negative for shortness of breath and wheezing.    Cardiovascular:  Negative for chest pain, palpitations and leg  swelling.   Gastrointestinal:  Negative for abdominal distention, abdominal pain, constipation, diarrhea, nausea and vomiting.   Genitourinary:  Negative for dysuria, frequency, hematuria and urgency.   Neurological:  Negative for dizziness, syncope, weakness, light-headedness and headaches.     Objective:     Vital Signs (Most Recent):  Temp: 98.5 °F (36.9 °C) (03/23/24 0251)  Pulse: 76 (03/23/24 0251)  Resp: 19 (03/23/24 0251)  BP: 132/73 (03/23/24 0251)  SpO2: 97 % (03/23/24 0251) Vital Signs (24h Range):  Temp:  [98.4 °F (36.9 °C)-99.3 °F (37.4 °C)] 98.5 °F (36.9 °C)  Pulse:  [68-76] 76  Resp:  [14-20] 19  SpO2:  [97 %-99 %] 97 %  BP: (121-132)/(62-78) 132/73     Weight: 122 kg (268 lb 15.4 oz)  Body mass index is 50.82 kg/m².     Physical Exam  Vitals and nursing note reviewed.   Constitutional:       General: She is not in acute distress.     Appearance: She is obese.   HENT:      Head: Normocephalic and atraumatic.      Mouth/Throat:      Mouth: Mucous membranes are moist.      Pharynx: Oropharynx is clear.   Eyes:      Conjunctiva/sclera: Conjunctivae normal.   Cardiovascular:      Rate and Rhythm: Normal rate and regular rhythm.      Pulses: Normal pulses.      Heart sounds: Normal heart sounds. No murmur heard.     No friction rub. No gallop.   Pulmonary:      Effort: Pulmonary effort is normal.      Breath sounds: Wheezing present.      Comments: Mild wheezing noted to L upper lung field   Abdominal:      General: Bowel sounds are normal. There is no distension.      Palpations: Abdomen is soft.      Tenderness: There is no abdominal tenderness. There is no guarding.   Musculoskeletal:         General: No swelling, tenderness or deformity.   Skin:     General: Skin is warm and dry.   Neurological:      General: No focal deficit present.      Mental Status: She is alert and oriented to person, place, and time.                Significant Labs: A1C:   Recent Labs   Lab 12/16/23  0828   HGBA1C 6.4*     CBC:  "  Recent Labs   Lab 03/22/24  2242   WBC 6.95   HGB 14.4   HCT 46.5        CMP:   Recent Labs   Lab 03/22/24  2242   *   K 4.0      CO2 22*   *   BUN 29*   CREATININE 2.4*   CALCIUM 10.3   PROT 8.1   ALBUMIN 4.1   BILITOT 0.7   ALKPHOS 95   AST 29   ALT 15   ANIONGAP 11     Urine Culture: No results for input(s): "LABURIN" in the last 48 hours.  Urine Studies:   Recent Labs   Lab 03/23/24  0028   COLORU Yellow   APPEARANCEUA Hazy*   PHUR 5.0   SPECGRAV >1.030*   PROTEINUA 1+*   GLUCUA Negative   KETONESU Trace*   BILIRUBINUA 1+*   OCCULTUA Negative   NITRITE Negative   LEUKOCYTESUR 3+*   RBCUA 2   WBCUA 24*   BACTERIA Rare   SQUAMEPITHEL 4   HYALINECASTS 0       Significant Imaging: I have reviewed all pertinent imaging results/findings within the past 24 hours.  X-Ray Chest PA And Lateral  Narrative: EXAMINATION:  XR CHEST PA AND LATERAL    CLINICAL HISTORY:  Fever, unspecified    TECHNIQUE:  PA and lateral views of the chest were performed.    COMPARISON:  07/16/2022.    FINDINGS:  Redemonstration of a curvilinear catheter fragment projecting over the right atrium, stable.  The lungs are well expanded and clear. No focal opacities are seen. The pleural spaces are clear. The cardiac silhouette is unremarkable. The visualized osseous structures are unremarkable.  Impression: No acute abnormality.    Electronically signed by: Westley Haynes  Date:    03/22/2024  Time:    23:00     Assessment/Plan:     * MARIA ALEJANDRA (acute kidney injury)  Patient with acute kidney injury/acute renal failure likely due to pre-renal azotemia due to dehydration MARIA ALEJANDRA is currently worsening. Baseline creatinine  1.2  - Labs reviewed- Renal function/electrolytes with Estimated Creatinine Clearance: 22.5 mL/min (A) (based on SCr of 2.4 mg/dL (H)). according to latest data. Monitor urine output and serial BMP and adjust therapy as needed. Avoid nephrotoxins and renally dose meds for GFR listed above.  - Cr 2.4  - s/p IVFs in " the ED  - recheck in the AM  - encouraged PO intake  - avoid nephrotoxic medications     Acute cystitis  - UA infectious  - started on rocephin  - follow urine culture      Upper respiratory infection  - fever, lethargy, and cough x 4 days  - CXR showed no acute process  - flu/ COVID/ RSV neg  - symptomatic management   - encouraged increased fluid intake    Iron deficiency anemia due to chronic blood loss  Patient's anemia is currently controlled. Has not received any PRBCs to date. Etiology likely d/t chronic blood loss  Current CBC reviewed-   Lab Results   Component Value Date    HGB 14.4 03/22/2024    HCT 46.5 03/22/2024     Monitor serial CBC and transfuse if patient becomes hemodynamically unstable, symptomatic or H/H drops below 7/21.    Type 2 diabetes mellitus with kidney complication, without long-term current use of insulin  Patient's FSGs are controlled on current medication regimen.  Last A1c reviewed-   Lab Results   Component Value Date    HGBA1C 6.4 (H) 12/16/2023     Most recent fingerstick glucose reviewed-   Recent Labs   Lab 03/23/24  0253   POCTGLUCOSE 104     Current correctional scale  Low  Maintain anti-hyperglycemic dose as follows-   Antihyperglycemics (From admission, onward)      Start     Stop Route Frequency Ordered    03/23/24 0248  insulin aspart U-100 pen 0-5 Units         -- SubQ Before meals & nightly PRN 03/23/24 0148          Hold Oral hypoglycemics while patient is in the hospital.    Tonic-clonic epileptic seizures  - continue home keppra    Stage 3a chronic kidney disease  Creatine stable for now. BMP reviewed- noted Estimated Creatinine Clearance: 22.5 mL/min (A) (based on SCr of 2.4 mg/dL (H)). according to latest data. Based on current GFR, CKD stage is stage 4 - GFR 15-29.  Monitor UOP and serial BMP and adjust therapy as needed. Renally dose meds. Avoid nephrotoxic medications and procedures.    Gout, arthritis  - continue home allopurinol     Atherosclerotic heart  disease of native coronary artery with other forms of angina pectoris  Patient with known CAD s/p stent placement, which is controlled Will continue ASA and Statin and monitor for S/Sx of angina/ACS. Continue to monitor on telemetry.     Hyperlipidemia associated with type 2 diabetes mellitus  - continue statin and zetia      VTE Risk Mitigation (From admission, onward)           Ordered     heparin (porcine) injection 5,000 Units  Every 8 hours         03/23/24 0148     IP VTE HIGH RISK PATIENT  Once         03/23/24 0148     Place sequential compression device  Until discontinued         03/23/24 0148                         On 03/23/2024, patient should be placed in hospital observation services under my care in collaboration with Dr. Jama Booker.           Devorah Galvin PA-C  Department of Hospital Medicine  [facility] - Observation 11H

## 2024-03-23 NOTE — ASSESSMENT & PLAN NOTE
Creatine stable for now. BMP reviewed- noted Estimated Creatinine Clearance: 22.5 mL/min (A) (based on SCr of 2.4 mg/dL (H)). according to latest data. Based on current GFR, CKD stage is stage 4 - GFR 15-29.  Monitor UOP and serial BMP and adjust therapy as needed. Renally dose meds. Avoid nephrotoxic medications and procedures.

## 2024-03-23 NOTE — ASSESSMENT & PLAN NOTE
Patient with acute kidney injury/acute renal failure likely due to pre-renal azotemia due to dehydration MARIA ALEJANDRA is currently worsening. Baseline creatinine  1.2  - Labs reviewed- Renal function/electrolytes with Estimated Creatinine Clearance: 22.5 mL/min (A) (based on SCr of 2.4 mg/dL (H)). according to latest data. Monitor urine output and serial BMP and adjust therapy as needed. Avoid nephrotoxins and renally dose meds for GFR listed above.  - Cr 2.4  - s/p IVFs in the ED  - recheck in the AM  - encouraged PO intake  - avoid nephrotoxic medications

## 2024-03-23 NOTE — SUBJECTIVE & OBJECTIVE
"Past Medical History:   Diagnosis Date    Anemia     Benign essential hypertension 09/04/2012    Breast cancer 2016    DCIS and IDC, stage I    Carpal tunnel syndrome 6/23/2014    Cataract     Coronary artery disease 09/04/2012    Diabetes mellitus due to abnormal insulin 09/04/2012    Diabetes mellitus type II     Genetic testing     brca2 positive     GERD (gastroesophageal reflux disease) 09/04/2012    Gout, arthritis 09/04/2012    Heart failure     Hyperlipidemia 09/04/2012    Hypertension     Labyrinthitis of both ears 02/28/2022    Myocardial infarction     Obesity     Primary osteoarthritis of both knees 09/04/2012    Renal manifestation of secondary diabetes mellitus     Seizure     states "one time during chemo in 2018"    Type 2 diabetes with peripheral circulatory disorder, controlled        Past Surgical History:   Procedure Laterality Date    BREAST BIOPSY      BREAST LUMPECTOMY Left 08/01/2016    DCIS and IDC, s/p lumpectomy    CATARACT EXTRACTION      COLONOSCOPY N/A 10/8/2018    Procedure: COLONOSCOPY;  Surgeon: Marcio Louie MD;  Location: [facility] (4TH FLR);  Service: Endoscopy;  Laterality: N/A;    COLONOSCOPY N/A 11/22/2023    Procedure: COLONOSCOPY;  Surgeon: Shahram Urbina MD;  Location: [facility] (2ND FLR);  Service: Endoscopy;  Laterality: N/A;  9/6 changed MD from Bakari to Carlitos - EDISON  11/15-precall complete-MS  11/20/23-Pt confirmed check-in location change to 2nd Floor Surg Waiting Room, voicemail also left for pt's daughter-DS    CORONARY ANGIOPLASTY      ESOPHAGOGASTRODUODENOSCOPY N/A 10/8/2018    Procedure: ESOPHAGOGASTRODUODENOSCOPY (EGD);  Surgeon: Marcio Louie MD;  Location: [facility] (4TH FLR);  Service: Endoscopy;  Laterality: N/A;  combined egd/colon order/Plavix/OK to hold med 5 days prior to procedures per Dr Puente/see telephone encounter dated 8/22/18/tanya    ESOPHAGOGASTRODUODENOSCOPY N/A 1/31/2020    Procedure: ESOPHAGOGASTRODUODENOSCOPY (EGD);  Surgeon: Marcio BUTCHER" MD Jacy;  Location: [facility] ENDO (4TH FLR);  Service: Endoscopy;  Laterality: N/A;  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 10/2018    per Dr Albarran to hold Plavix 5 days prior to procedure    ESOPHAGOGASTRODUODENOSCOPY N/A 11/22/2023    Procedure: EGD (ESOPHAGOGASTRODUODENOSCOPY);  Surgeon: Shahram Urbina MD;  Location: [facility] ENDO (2ND FLR);  Service: Endoscopy;  Laterality: N/A;  Prep instructions snet to pt via portal and mail -jm  diabetic  Peg prep  time frame 5-12 weeks   pt request week of Thanksgiving her daughter is a     HYSTERECTOMY      INTRAOCULAR PROSTHESES INSERTION Right 10/18/2020    Procedure: INSERTION, IOL PROSTHESIS;  Surgeon: Karishma Mata MD;  Location: [facility] OR 1ST FLR;  Service: Ophthalmology;  Laterality: Right;    INTRAOCULAR PROSTHESES INSERTION Left 12/21/2020    Procedure: INSERTION, IOL PROSTHESIS;  Surgeon: Karishma Mata MD;  Location: [facility] OR [address] [place] [address];  Service: Ophthalmology;  Laterality: Left;    JOINT REPLACEMENT      left and right k nee    KNEE SURGERY      PHACOEMULSIFICATION OF CATARACT Right 10/18/2020    Procedure: PHACOEMULSIFICATION, CATARACT;  Surgeon: Karishma Mata MD;  Location: [facility] OR [address] [place] [address];  Service: Ophthalmology;  Laterality: Right;    PHACOEMULSIFICATION OF CATARACT Left 12/21/2020    Procedure: PHACOEMULSIFICATION, CATARACT;  Surgeon: Karishma Mata MD;  Location: [facility] OR [address] [place] [address];  Service: Ophthalmology;  Laterality: Left;    REVISION OF KNEE ARTHROPLASTY Left 4/15/2021    Procedure: REVISION, ARTHROPLASTY, KNEE;  Surgeon: Gilson Wilkerson MD;  Location: [facility] OR 2ND FLR;  Service: Orthopedics;  Laterality: Left;    TOTAL ABDOMINAL HYSTERECTOMY W/ BILATERAL SALPINGOOPHORECTOMY         Review of patient's allergies indicates:   Allergen Reactions    Lisinopril Anaphylaxis       No current facility-administered medications on file prior to encounter.     Current Outpatient Medications on File Prior to Encounter    Medication Sig    nitroGLYCERIN (NITROSTAT) 0.4 MG SL tablet PLACE 1 TABLET UNDER TONGUE EVERY 5 MINUTES AS NEEDED FOR CHEST PAIN. USE 3 TIMES, IF NO RELIEF CALL 911    acetaminophen (TYLENOL) 500 MG tablet Take 1,000 mg by mouth daily as needed for Pain (headache).    allopurinoL (ZYLOPRIM) 300 MG tablet TAKE 1 TABLET(300 MG) BY MOUTH EVERY MORNING    amLODIPine (NORVASC) 10 MG tablet TAKE 1 TABLET(10 MG) BY MOUTH EVERY DAY    anastrozole (ARIMIDEX) 1 mg Tab Take 1 tablet (1 mg total) by mouth once daily.    aspirin (ECOTRIN) 81 MG EC tablet Take 1 tablet (81 mg total) by mouth once daily.    blood sugar diagnostic Strp 1 strip by Misc.(Non-Drug; Combo Route) route once daily.    blood-glucose meter kit Use as instructed    carvediloL (COREG) 25 MG tablet Take 1 tablet (25 mg total) by mouth 2 (two) times daily.    ezetimibe (ZETIA) 10 mg tablet Take 1 tablet (10 mg total) by mouth once daily.    lancets Misc Check blood sugar once daily    levetiracetam XR (KEPPRA XR) 500 mg Tb24 24 hr tablet Take 2 tablets (1,000 mg total) by mouth once daily.    omeprazole (PRILOSEC) 20 MG capsule Take 1 capsule (20 mg total) by mouth 2 (two) times a day    propylene glycol (SYSTANE BALANCE OPHT) Apply 1-2 drops to eye daily as needed (dry eyes).    rosuvastatin (CRESTOR) 40 MG Tab TAKE 1 TABLET(40 MG) BY MOUTH EVERY MORNING     Family History       Problem Relation (Age of Onset)    Breast cancer Mother (45), Maternal Aunt, Daughter (45), Maternal Aunt    Cancer Mother (55)    Diabetes Mother, Son    Heart disease Father    Hyperlipidemia Maternal Aunt    Hypertension Mother, Maternal Aunt, Maternal Uncle    Kidney disease Son    Ovarian cancer Mother    Stroke Son          Tobacco Use    Smoking status: Former     Current packs/day: 0.00     Average packs/day: 1 pack/day for 12.0 years (12.0 ttl pk-yrs)     Types: Cigarettes     Start date: 1994     Quit date: 2006     Years since quittin.6    Smokeless  tobacco: Never   Substance and Sexual Activity    Alcohol use: Not Currently    Drug use: No    Sexual activity: Never     Review of Systems   Constitutional:  Positive for activity change, fatigue and fever. Negative for appetite change and chills.   HENT:  Positive for congestion and rhinorrhea.    Eyes:  Negative for pain, discharge and itching.   Respiratory:  Positive for cough. Negative for shortness of breath and wheezing.    Cardiovascular:  Negative for chest pain, palpitations and leg swelling.   Gastrointestinal:  Negative for abdominal distention, abdominal pain, constipation, diarrhea, nausea and vomiting.   Genitourinary:  Negative for dysuria, frequency, hematuria and urgency.   Neurological:  Negative for dizziness, syncope, weakness, light-headedness and headaches.     Objective:     Vital Signs (Most Recent):  Temp: 98.5 °F (36.9 °C) (03/23/24 0251)  Pulse: 76 (03/23/24 0251)  Resp: 19 (03/23/24 0251)  BP: 132/73 (03/23/24 0251)  SpO2: 97 % (03/23/24 0251) Vital Signs (24h Range):  Temp:  [98.4 °F (36.9 °C)-99.3 °F (37.4 °C)] 98.5 °F (36.9 °C)  Pulse:  [68-76] 76  Resp:  [14-20] 19  SpO2:  [97 %-99 %] 97 %  BP: (121-132)/(62-78) 132/73     Weight: 122 kg (268 lb 15.4 oz)  Body mass index is 50.82 kg/m².     Physical Exam  Vitals and nursing note reviewed.   Constitutional:       General: She is not in acute distress.     Appearance: She is obese.   HENT:      Head: Normocephalic and atraumatic.      Mouth/Throat:      Mouth: Mucous membranes are moist.      Pharynx: Oropharynx is clear.   Eyes:      Conjunctiva/sclera: Conjunctivae normal.   Cardiovascular:      Rate and Rhythm: Normal rate and regular rhythm.      Pulses: Normal pulses.      Heart sounds: Normal heart sounds. No murmur heard.     No friction rub. No gallop.   Pulmonary:      Effort: Pulmonary effort is normal.      Breath sounds: Wheezing present.      Comments: Mild wheezing noted to L upper lung field   Abdominal:      General:  "Bowel sounds are normal. There is no distension.      Palpations: Abdomen is soft.      Tenderness: There is no abdominal tenderness. There is no guarding.   Musculoskeletal:         General: No swelling, tenderness or deformity.   Skin:     General: Skin is warm and dry.   Neurological:      General: No focal deficit present.      Mental Status: She is alert and oriented to person, place, and time.                Significant Labs: A1C:   Recent Labs   Lab 12/16/23  0828   HGBA1C 6.4*     CBC:   Recent Labs   Lab 03/22/24  2242   WBC 6.95   HGB 14.4   HCT 46.5        CMP:   Recent Labs   Lab 03/22/24  2242   *   K 4.0      CO2 22*   *   BUN 29*   CREATININE 2.4*   CALCIUM 10.3   PROT 8.1   ALBUMIN 4.1   BILITOT 0.7   ALKPHOS 95   AST 29   ALT 15   ANIONGAP 11     Urine Culture: No results for input(s): "LABURIN" in the last 48 hours.  Urine Studies:   Recent Labs   Lab 03/23/24  0028   COLORU Yellow   APPEARANCEUA Hazy*   PHUR 5.0   SPECGRAV >1.030*   PROTEINUA 1+*   GLUCUA Negative   KETONESU Trace*   BILIRUBINUA 1+*   OCCULTUA Negative   NITRITE Negative   LEUKOCYTESUR 3+*   RBCUA 2   WBCUA 24*   BACTERIA Rare   SQUAMEPITHEL 4   HYALINECASTS 0       Significant Imaging: I have reviewed all pertinent imaging results/findings within the past 24 hours.  X-Ray Chest PA And Lateral  Narrative: EXAMINATION:  XR CHEST PA AND LATERAL    CLINICAL HISTORY:  Fever, unspecified    TECHNIQUE:  PA and lateral views of the chest were performed.    COMPARISON:  07/16/2022.    FINDINGS:  Redemonstration of a curvilinear catheter fragment projecting over the right atrium, stable.  The lungs are well expanded and clear. No focal opacities are seen. The pleural spaces are clear. The cardiac silhouette is unremarkable. The visualized osseous structures are unremarkable.  Impression: No acute abnormality.    Electronically signed by: Westley Haynes  Date:    03/22/2024  Time:    23:00     "

## 2024-03-23 NOTE — CARE UPDATE
Renata Bergman is a 83 y.o. female who was admitted to hospital medicine for a UTI and MARIA ALEJANDRA. Seen and examined by me. Resting comfortably in bed, eating breakfast. Mild expiratory wheezing bilaterally. No CVA tenderness. Cr 2.4 >> 2.0 (baseline ~1.2). Continuing gentle IVF. Treating UTI with rocephin, no prior urine cultures to look through. Patient will be discharged when medically ready.     Chery Hollingsworth PA-C  Department of Hospital Medicine  [facility]

## 2024-03-23 NOTE — ASSESSMENT & PLAN NOTE
- fever, lethargy, and cough x 4 days  - CXR showed no acute process  - flu/ COVID/ RSV neg  - symptomatic management   - encouraged increased fluid intake

## 2024-03-23 NOTE — HPI
Renata Bergman is a 82 y/o F with Pmhx of HTN, T2DM, CAD, and DCIS who presented to the ED complaining of 4 days of weakness and fatigue. Reports associated sinus congestion, nonproductive cough, and fever. Daughter reports fever of 101.4° at home today around 6 pm, improved with Tylenol. Daughter also had similar symptoms recently as she works with kids. Patient states she has not been hydrating well d/t illness. She denies SOB, CP, nausea, vomiting, or chills. Denies urinary symptoms.     In ED: Afebrile. VSS. CBC unremarkable. CMP showing elevated BUN (29) and Cr (2.4) up from baseline of (1.2). UA showed leukocyte esterase 3+, WBC 24. CXR showed no acute abnormalities. Influenza A & B and COVID tests negative. Given IVFs in the ED. Admitted to .

## 2024-03-24 VITALS
WEIGHT: 268.94 LBS | HEIGHT: 61 IN | DIASTOLIC BLOOD PRESSURE: 70 MMHG | OXYGEN SATURATION: 100 % | RESPIRATION RATE: 18 BRPM | HEART RATE: 76 BPM | SYSTOLIC BLOOD PRESSURE: 155 MMHG | TEMPERATURE: 98 F | BODY MASS INDEX: 50.78 KG/M2

## 2024-03-24 LAB
ALBUMIN SERPL BCP-MCNC: 3 G/DL (ref 3.5–5.2)
ALP SERPL-CCNC: 71 U/L (ref 55–135)
ALT SERPL W/O P-5'-P-CCNC: 18 U/L (ref 10–44)
ANION GAP SERPL CALC-SCNC: 7 MMOL/L (ref 8–16)
AST SERPL-CCNC: 35 U/L (ref 10–40)
BACTERIA UR CULT: NORMAL
BACTERIA UR CULT: NORMAL
BASOPHILS # BLD AUTO: 0.02 K/UL (ref 0–0.2)
BASOPHILS NFR BLD: 0.4 % (ref 0–1.9)
BILIRUB SERPL-MCNC: 0.4 MG/DL (ref 0.1–1)
BUN SERPL-MCNC: 30 MG/DL (ref 8–23)
CALCIUM SERPL-MCNC: 9.4 MG/DL (ref 8.7–10.5)
CHLORIDE SERPL-SCNC: 107 MMOL/L (ref 95–110)
CO2 SERPL-SCNC: 21 MMOL/L (ref 23–29)
CREAT SERPL-MCNC: 1.6 MG/DL (ref 0.5–1.4)
DIFFERENTIAL METHOD BLD: ABNORMAL
EOSINOPHIL # BLD AUTO: 0.3 K/UL (ref 0–0.5)
EOSINOPHIL NFR BLD: 4.8 % (ref 0–8)
ERYTHROCYTE [DISTWIDTH] IN BLOOD BY AUTOMATED COUNT: 13.4 % (ref 11.5–14.5)
EST. GFR  (NO RACE VARIABLE): 32.2 ML/MIN/1.73 M^2
GLUCOSE SERPL-MCNC: 97 MG/DL (ref 70–110)
HCT VFR BLD AUTO: 36.4 % (ref 37–48.5)
HGB BLD-MCNC: 12 G/DL (ref 12–16)
IMM GRANULOCYTES # BLD AUTO: 0.01 K/UL (ref 0–0.04)
IMM GRANULOCYTES NFR BLD AUTO: 0.2 % (ref 0–0.5)
LYMPHOCYTES # BLD AUTO: 1.7 K/UL (ref 1–4.8)
LYMPHOCYTES NFR BLD: 33.1 % (ref 18–48)
MAGNESIUM SERPL-MCNC: 1.9 MG/DL (ref 1.6–2.6)
MCH RBC QN AUTO: 30.4 PG (ref 27–31)
MCHC RBC AUTO-ENTMCNC: 33 G/DL (ref 32–36)
MCV RBC AUTO: 92 FL (ref 82–98)
MONOCYTES # BLD AUTO: 0.8 K/UL (ref 0.3–1)
MONOCYTES NFR BLD: 15.9 % (ref 4–15)
NEUTROPHILS # BLD AUTO: 2.4 K/UL (ref 1.8–7.7)
NEUTROPHILS NFR BLD: 45.6 % (ref 38–73)
NRBC BLD-RTO: 0 /100 WBC
PHOSPHATE SERPL-MCNC: 3.3 MG/DL (ref 2.7–4.5)
PLATELET # BLD AUTO: 153 K/UL (ref 150–450)
PMV BLD AUTO: 10.1 FL (ref 9.2–12.9)
POTASSIUM SERPL-SCNC: 4.6 MMOL/L (ref 3.5–5.1)
PROT SERPL-MCNC: 6.7 G/DL (ref 6–8.4)
RBC # BLD AUTO: 3.95 M/UL (ref 4–5.4)
SODIUM SERPL-SCNC: 135 MMOL/L (ref 136–145)
WBC # BLD AUTO: 5.17 K/UL (ref 3.9–12.7)

## 2024-03-24 PROCEDURE — 36415 COLL VENOUS BLD VENIPUNCTURE: CPT | Mod: XB | Performed by: HOSPITALIST

## 2024-03-24 PROCEDURE — G0378 HOSPITAL OBSERVATION PER HR: HCPCS

## 2024-03-24 PROCEDURE — 63600175 PHARM REV CODE 636 W HCPCS

## 2024-03-24 PROCEDURE — 36415 COLL VENOUS BLD VENIPUNCTURE: CPT

## 2024-03-24 PROCEDURE — 85025 COMPLETE CBC W/AUTO DIFF WBC: CPT | Performed by: HOSPITALIST

## 2024-03-24 PROCEDURE — 83735 ASSAY OF MAGNESIUM: CPT

## 2024-03-24 PROCEDURE — 84100 ASSAY OF PHOSPHORUS: CPT

## 2024-03-24 PROCEDURE — 96372 THER/PROPH/DIAG INJ SC/IM: CPT

## 2024-03-24 PROCEDURE — 25000003 PHARM REV CODE 250

## 2024-03-24 PROCEDURE — 96366 THER/PROPH/DIAG IV INF ADDON: CPT

## 2024-03-24 PROCEDURE — 80053 COMPREHEN METABOLIC PANEL: CPT

## 2024-03-24 RX ORDER — CEFDINIR 300 MG/1
300 CAPSULE ORAL EVERY 12 HOURS
Qty: 10 EACH | Refills: 0 | Status: SHIPPED | OUTPATIENT
Start: 2024-03-25 | End: 2024-03-30

## 2024-03-24 RX ORDER — CEFDINIR 300 MG/1
300 CAPSULE ORAL EVERY 12 HOURS
Qty: 10 EACH | Refills: 0 | Status: SHIPPED | OUTPATIENT
Start: 2024-03-24 | End: 2024-03-24

## 2024-03-24 RX ADMIN — HEPARIN SODIUM 5000 UNITS: 5000 INJECTION INTRAVENOUS; SUBCUTANEOUS at 05:03

## 2024-03-24 RX ADMIN — AMLODIPINE BESYLATE 10 MG: 10 TABLET ORAL at 09:03

## 2024-03-24 RX ADMIN — CEFTRIAXONE 1 G: 1 INJECTION, POWDER, FOR SOLUTION INTRAMUSCULAR; INTRAVENOUS at 09:03

## 2024-03-24 RX ADMIN — EZETIMIBE 10 MG: 10 TABLET ORAL at 09:03

## 2024-03-24 RX ADMIN — ASPIRIN 81 MG: 81 TABLET, COATED ORAL at 09:03

## 2024-03-24 RX ADMIN — ATORVASTATIN CALCIUM 80 MG: 40 TABLET, FILM COATED ORAL at 09:03

## 2024-03-24 RX ADMIN — ALLOPURINOL 300 MG: 300 TABLET ORAL at 05:03

## 2024-03-24 RX ADMIN — CARVEDILOL 25 MG: 25 TABLET, FILM COATED ORAL at 09:03

## 2024-03-24 RX ADMIN — LEVETIRACETAM 1000 MG: 500 TABLET, FILM COATED ORAL at 09:03

## 2024-03-24 NOTE — DISCHARGE SUMMARY
[facility] [address] [place] [address] Medicine  Discharge Summary      Patient Name: Renata Bergman  MRN: [id number]  GILBERTO: [id number]  Patient Class: OP- Observation  Admission Date: 3/22/2024  Hospital Length of Stay: 0 days  Discharge Date and Time: No discharge date for patient encounter.  Attending Physician: Rosa Rene MD   Discharging Provider: Chery Hollingsworth PA-C  Primary Care Provider: Ethel Berger MD  [facility] Medicine Team: [facility] Chery Hollingsworth PA-C  Primary Care Team: [facility]    HPI:   Renata Bergman is a 82 y/o F with Pmhx of HTN, T2DM, CAD, and DCIS who presented to the ED complaining of 4 days of weakness and fatigue. Reports associated sinus congestion, nonproductive cough, and fever. Daughter reports fever of 101.4° at home today around 6 pm, improved with Tylenol. Daughter also had similar symptoms recently as she works with kids. Patient states she has not been hydrating well d/t illness. She denies SOB, CP, nausea, vomiting, or chills. Denies urinary symptoms.     In ED: Afebrile. VSS. CBC unremarkable. CMP showing elevated BUN (29) and Cr (2.4) up from baseline of (1.2). UA showed leukocyte esterase 3+, WBC 24. CXR showed no acute abnormalities. Influenza A & B and COVID tests negative. Given IVFs in the ED. Admitted to .     * No surgery found *      Hospital Course:   Renata Bergman is a 83 y.o. female who was admitted to hospital medicine for a UTI and MARIA ALEJANDRA. Cr 2.4 >> 2.0 >> 1.6 following IVF. Treating UTI with rocephin, urine culture with multiple organisms isolated, none in predominance. Will discharge with cefdinir. Pt was seen and evaluated by me this morning, reports feeling well, and is eager to discharge home. All questions were answered. Patient acknowledged understanding of discharge instructions and feels safe to discharge home. Patient was discharged on 3/24/2024 in stable condition with PCP follow-up. Education regarding condition provided and  return precautions given.     Physical Exam  Gen: in NAD, appears stated age  CVS: RRR  Resp: lungs CTAB  Abd: NTND, soft to palpation  Extrem: no UE or LE edema BL         Goals of Care Treatment Preferences:  Code Status: Full Code      Consults:     No new Assessment & Plan notes have been filed under this hospital service since the last note was generated.  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] 11/22/2016 Yes    Stage 3a chronic kidney disease [N18.31] 10/14/2013 Yes    Hyperlipidemia associated with type 2 diabetes mellitus [E11.69, E78.5] 09/04/2012 Yes    Gout, arthritis [M10.9] 09/04/2012 Yes    Atherosclerotic heart disease of native coronary artery with other forms of angina pectoris [I25.118]  Yes      Problems Resolved During this Admission:       Discharged Condition: stable    Disposition: Home or Self Care    Follow Up:    Patient Instructions:      Notify your health care provider if you experience any of the following:  temperature >100.4     Notify your health care provider if you experience any of the following:  persistent nausea and vomiting or diarrhea     Notify your health care provider if you experience any of the following:  increased confusion or weakness     Activity as tolerated       Significant Diagnostic Studies: Labs: All labs within the past 24 hours have been reviewed    Pending Diagnostic Studies:       None           Medications:  Reconciled Home Medications:      Medication List        START taking these medications      cefdinir 300 MG  capsule  Commonly known as: OMNICEF  Take 1 capsule (300 mg total) by mouth every 12 (twelve) hours. for 5 days  Start taking on: March 25, 2024            CONTINUE taking these medications      acetaminophen 500 MG tablet  Commonly known as: TYLENOL  Take 1,000 mg by mouth daily as needed for Pain (headache).     allopurinoL 300 MG tablet  Commonly known as: ZYLOPRIM  TAKE 1 TABLET(300 MG) BY MOUTH EVERY MORNING     amLODIPine 10 MG tablet  Commonly known as: NORVASC  TAKE 1 TABLET(10 MG) BY MOUTH EVERY DAY     anastrozole 1 mg Tab  Commonly known as: ARIMIDEX  Take 1 tablet (1 mg total) by mouth once daily.     aspirin 81 MG EC tablet  Commonly known as: ECOTRIN  Take 1 tablet (81 mg total) by mouth once daily.     blood sugar diagnostic Strp  1 strip by Misc.(Non-Drug; Combo Route) route once daily.     blood-glucose meter kit  Use as instructed     carvediloL 25 MG tablet  Commonly known as: COREG  Take 1 tablet (25 mg total) by mouth 2 (two) times daily.     ezetimibe 10 mg tablet  Commonly known as: ZETIA  Take 1 tablet (10 mg total) by mouth once daily.     lancets Misc  Check blood sugar once daily     levetiracetam  mg Tb24 24 hr tablet  Commonly known as: KEPPRA XR  Take 2 tablets (1,000 mg total) by mouth once daily.     nitroGLYCERIN 0.4 MG SL tablet  Commonly known as: NITROSTAT  PLACE 1 TABLET UNDER TONGUE EVERY 5 MINUTES AS NEEDED FOR CHEST PAIN. USE 3 TIMES, IF NO RELIEF CALL 911     omeprazole 20 MG capsule  Commonly known as: PRILOSEC  Take 1 capsule (20 mg total) by mouth 2 (two) times a day     rosuvastatin 40 MG Tab  Commonly known as: CRESTOR  TAKE 1 TABLET(40 MG) BY MOUTH EVERY MORNING     SYSTANE BALANCE OPHT  Apply 1-2 drops to eye daily as needed (dry eyes).              Indwelling Lines/Drains at time of discharge:   Lines/Drains/Airways       None                   Time spent on the discharge of patient: 36 minutes         Chery Hollingsworth PA-C  Department of Hospital Medicine  [facility]  - Observation 11[facility]

## 2024-03-24 NOTE — NURSING
Pt is discharged in stable condition. No acute distress noted. Able to voice needs. Discharge instructions read and understood. IV and tele removed.

## 2024-03-24 NOTE — PLAN OF CARE
[facility]  Discharge Final Note    Primary Care Provider: Ethel Berger MD    Expected Discharge Date: 3/24/2024    Patient cleared for discharge from case management standpoint.    Final Discharge Note (most recent)       Final Note - 03/24/24 1210          Final Note    Assessment Type Final Discharge Note     Anticipated Discharge Disposition Home or Self Care     What phone number can be called within the next 1-3 days to see how you are doing after discharge? [phone]     Hospital Resources/Appts/Education Provided Provided patient/caregiver with written discharge plan information;Appointments scheduled and added to AVS        Post-Acute Status    Discharge Delays None known at this time                   Important Message from Medicare             Future Appointments   Date Time Provider Department Center   5/16/2024  1:00 PM Moe Noel, PATO [facility]   6/13/2024 10:40 AM Bismark Guevara MD [facility]   6/21/2024  8:00 AM [facility]   6/24/2024  8:30 AM Ethel Berger MD [facility]   6/28/2024 10:00 AM Brittney Bautista, DPM [facility]      Kiat Ko RN  Weekend  - [facility] [place]  Spectra: [phone]

## 2024-03-24 NOTE — PLAN OF CARE
Problem: Adult Inpatient Plan of Care  Goal: Plan of Care Review  Outcome: Met  Goal: Patient-Specific Goal (Individualized)  Outcome: Met  Goal: Absence of Hospital-Acquired Illness or Injury  Outcome: Met  Goal: Optimal Comfort and Wellbeing  Outcome: Met  Goal: Readiness for Transition of Care  Outcome: Met     Problem: Bariatric Environmental Safety  Goal: Safety Maintained with Care  Outcome: Met     Problem: Infection  Goal: Absence of Infection Signs and Symptoms  Outcome: Met     Problem: Diabetes Comorbidity  Goal: Blood Glucose Level Within Targeted Range  Outcome: Met     Problem: Fall Injury Risk  Goal: Absence of Fall and Fall-Related Injury  Outcome: Met     Problem: Hypertension Comorbidity  Goal: Blood Pressure in Desired Range  Outcome: Met     Problem: Seizure Disorder Comorbidity  Goal: Maintenance of Seizure Control  Outcome: Met     Problem: Fatigue  Goal: Improved Activity Tolerance  Outcome: Met     Problem: Adjustment to Illness (Chronic Kidney Disease)  Goal: Optimal Coping with Chronic Illness  Outcome: Met     Problem: Electrolyte Imbalance (Chronic Kidney Disease)  Goal: Electrolyte Balance  Outcome: Met     Problem: Fluid Volume Excess (Chronic Kidney Disease)  Goal: Fluid Balance  Outcome: Met     Problem: Functional Decline (Chronic Kidney Disease)  Goal: Optimal Functional Ability  Outcome: Met     Problem: Hematologic Alteration (Chronic Kidney Disease)  Goal: Absence of Anemia Signs and Symptoms  Outcome: Met     Problem: Oral Intake Inadequate (Chronic Kidney Disease)  Goal: Optimal Oral Intake  Outcome: Met     Problem: Pain (Chronic Kidney Disease)  Goal: Acceptable Pain Control  Outcome: Met     Problem: Renal Function Impairment (Chronic Kidney Disease)  Goal: Minimize Renal Failure Effects  Outcome: Met     Problem: Fluid and Electrolyte Imbalance (Acute Kidney Injury/Impairment)  Goal: Fluid and Electrolyte Balance  Outcome: Met     Problem: Oral Intake Inadequate (Acute  Kidney Injury/Impairment)  Goal: Optimal Nutrition Intake  Outcome: Met     Problem: Renal Function Impairment (Acute Kidney Injury/Impairment)  Goal: Effective Renal Function  Outcome: Met     Problem: Fever  Goal: Body Temperature in Desired Range  Outcome: Met     Problem: Chest Pain  Goal: Resolution of Chest Pain Symptoms  Outcome: Met

## 2024-03-25 NOTE — PLAN OF CARE
[facility]  Discharge Assessment    Primary Care Provider: Ethel Berger MD     Discharge Assessment (most recent)       BRIEF DISCHARGE ASSESSMENT - 03/24/24 0930          Discharge Planning    Assessment Type Discharge Planning Brief Assessment     Resource/Environmental Concerns none     Support Systems Children     Equipment Currently Used at Home shower chair;walker, standard     Current Living Arrangements home     Patient/Family Anticipates Transition to home     Patient/Family Anticipated Services at Transition durable medical equipment     DME Needed Upon Discharge  none     Discharge Plan A Home with family     Discharge Plan B Home with family                   Discharge Plan A and Plan B have been determined by review of patient's clinical status, future medical and therapeutic needs, and coverage/benefits for post-acute care in coordination with multidisciplinary team members.      Kait Ko RN  Weekend  - [facility] [place]  Spectra: [phone]

## 2024-03-26 ENCOUNTER — PATIENT OUTREACH (OUTPATIENT)
Dept: ADMINISTRATIVE | Facility: CLINIC | Age: 84
End: 2024-03-26
Payer: MEDICARE

## 2024-03-26 NOTE — PROGRESS NOTES
C3 nurse spoke with Renata Bergman for a TCC post hospital discharge follow up call. Pt denies any new symptoms and confirms she was given the OOC number to call with any new or worsening symptoms.     The patient does not have a scheduled HOSFU appointment with her PCP, Ethel Berger MD within 5-7 days post hospital discharge date of 3/24/24. C3 nurse did schedule a [facility] HOSFU with Jada Anderson NP on 4/1/24 and the pts verbalized understanding. No messages routed at this time.

## 2024-04-01 ENCOUNTER — OFFICE VISIT (OUTPATIENT)
Dept: HOME HEALTH SERVICES | Facility: CLINIC | Age: 84
End: 2024-04-01
Payer: MEDICARE

## 2024-04-01 DIAGNOSIS — E11.51 CONTROLLED TYPE 2 DM WITH PERIPHERAL CIRCULATORY DISORDER: Primary | Chronic | ICD-10-CM

## 2024-04-01 DIAGNOSIS — K21.9 GASTROESOPHAGEAL REFLUX DISEASE, UNSPECIFIED WHETHER ESOPHAGITIS PRESENT: ICD-10-CM

## 2024-04-01 DIAGNOSIS — N18.31 STAGE 3A CHRONIC KIDNEY DISEASE: ICD-10-CM

## 2024-04-01 DIAGNOSIS — N30.00 ACUTE CYSTITIS WITHOUT HEMATURIA: ICD-10-CM

## 2024-04-01 DIAGNOSIS — E66.01 CLASS 3 SEVERE OBESITY DUE TO EXCESS CALORIES WITH SERIOUS COMORBIDITY IN ADULT, UNSPECIFIED BMI: ICD-10-CM

## 2024-04-01 DIAGNOSIS — Z17.0 MALIGNANT NEOPLASM OF UPPER-OUTER QUADRANT OF LEFT BREAST IN FEMALE, ESTROGEN RECEPTOR POSITIVE: ICD-10-CM

## 2024-04-01 DIAGNOSIS — E11.69 HYPERLIPIDEMIA ASSOCIATED WITH TYPE 2 DIABETES MELLITUS: ICD-10-CM

## 2024-04-01 DIAGNOSIS — M10.9 GOUT, ARTHRITIS: ICD-10-CM

## 2024-04-01 DIAGNOSIS — E78.5 HYPERLIPIDEMIA ASSOCIATED WITH TYPE 2 DIABETES MELLITUS: ICD-10-CM

## 2024-04-01 DIAGNOSIS — C50.412 MALIGNANT NEOPLASM OF UPPER-OUTER QUADRANT OF LEFT BREAST IN FEMALE, ESTROGEN RECEPTOR POSITIVE: ICD-10-CM

## 2024-04-01 DIAGNOSIS — G40.409 TONIC-CLONIC EPILEPTIC SEIZURES: ICD-10-CM

## 2024-04-01 PROCEDURE — 3078F DIAST BP <80 MM HG: CPT | Mod: CPTII,S$GLB,, | Performed by: NURSE PRACTITIONER

## 2024-04-01 PROCEDURE — 3074F SYST BP LT 130 MM HG: CPT | Mod: CPTII,S$GLB,, | Performed by: NURSE PRACTITIONER

## 2024-04-01 PROCEDURE — 1160F RVW MEDS BY RX/DR IN RCRD: CPT | Mod: CPTII,S$GLB,, | Performed by: NURSE PRACTITIONER

## 2024-04-01 PROCEDURE — 1101F PT FALLS ASSESS-DOCD LE1/YR: CPT | Mod: CPTII,S$GLB,, | Performed by: NURSE PRACTITIONER

## 2024-04-01 PROCEDURE — 99497 ADVNCD CARE PLAN 30 MIN: CPT | Mod: S$GLB,,, | Performed by: NURSE PRACTITIONER

## 2024-04-01 PROCEDURE — 1159F MED LIST DOCD IN RCRD: CPT | Mod: CPTII,S$GLB,, | Performed by: NURSE PRACTITIONER

## 2024-04-01 PROCEDURE — 99495 TRANSJ CARE MGMT MOD F2F 14D: CPT | Mod: S$GLB,,, | Performed by: NURSE PRACTITIONER

## 2024-04-01 PROCEDURE — 1126F AMNT PAIN NOTED NONE PRSNT: CPT | Mod: CPTII,S$GLB,, | Performed by: NURSE PRACTITIONER

## 2024-04-01 PROCEDURE — 3288F FALL RISK ASSESSMENT DOCD: CPT | Mod: CPTII,S$GLB,, | Performed by: NURSE PRACTITIONER

## 2024-04-08 VITALS
HEART RATE: 74 BPM | TEMPERATURE: 98 F | DIASTOLIC BLOOD PRESSURE: 66 MMHG | OXYGEN SATURATION: 98 % | SYSTOLIC BLOOD PRESSURE: 124 MMHG

## 2024-04-08 NOTE — PROGRESS NOTES
[facility]  Transitional Care Management (TCM) Home Visit    Encounter Provider: Jada Anderson   PCP: Ethel Berger MD  Consult Requested By: No ref. provider found  Admit Date: 3/22/24   IP Discharge Date: 3/24/24  [facility] On Call Contact Note: 3/26/24  Hospital Diagnosis: No admission diagnoses are documented for this encounter.     HISTORY OF PRESENT ILLNESS      Patient ID: Renata Bergman is a 83 y.o. female was recently admitted to the hospital, this is their TCM encounter.    Recent Hospital HPI:   Renata Bergman is a 84 y/o F with Pmhx of HTN, T2DM, CAD, and DCIS who presented to the ED complaining of 4 days of weakness and fatigue. Reports associated sinus congestion, nonproductive cough, and fever. Daughter reports fever of 101.4° at home today around 6 pm, improved with Tylenol. Daughter also had similar symptoms recently as she works with kids. Patient states she has not been hydrating well d/t illness. She denies SOB, CP, nausea, vomiting, or chills. Denies urinary symptoms.    In ED: Afebrile. VSS. CBC unremarkable. CMP showing elevated BUN (29) and Cr (2.4) up from baseline of (1.2). UA showed leukocyte esterase 3+, WBC 24. CXR showed no acute abnormalities. Influenza A & B and COVID tests negative. Given IVFs in the ED. Admitted to .         Recent Hospital Course:   Renata Bergman is a 83 y.o. female who was admitted to hospital medicine for a UTI and MARIA ALEJANDRA. Cr 2.4 >> 2.0 >> 1.6 following IVF. Treating UTI with rocephin, urine culture with multiple organisms isolated, none in predominance. Will discharge with cefdinir. Pt was seen and evaluated by me this morning, reports feeling well, and is eager to discharge home. All questions were answered. Patient acknowledged understanding of discharge instructions and feels safe to discharge home. Patient was discharged on 3/24/2024 in stable condition with PCP follow-up. Education regarding condition provided and return precautions  given.     Today:  Patient reports no acute issues or concerns.  VSS.  Reports taking meds as prescribed.  She reports completing course of antibiotics, no current urinary symptoms.  Denies fevers, chills, cough, congestion, change in bladder habits, change in bowel habits.  Patient and family aware of all upcoming appts.  Reports good BM pattern, sleep, appetite.     DECISION MAKING TODAY       Assessment & Plan:  1. Controlled type 2 DM with peripheral circulatory disorder  Assessment & Plan:  Most recent at 6.4, controlled      2. Hyperlipidemia associated with type 2 diabetes mellitus  Assessment & Plan:  Denies chest pain  Continue statin       3. Gout, arthritis  Assessment & Plan:  No acute flare  Continue allopurinol       4. Gastroesophageal reflux disease, unspecified whether esophagitis present  Assessment & Plan:  Stable  Continue PPI      5. Stage 3a chronic kidney disease  Assessment & Plan:  Stable  Avoid nephrotoxic agents  Continue to follow with PCP for lab monitoring      6. Class 3 severe obesity due to excess calories with serious comorbidity in adult, unspecified BMI  Assessment & Plan:  She has been educated on the negative effects of obesity to one's health and encouraged to consider lifestyle diet modifications and exercise.        7. Acute cystitis without hematuria  Assessment & Plan:  No acute issues  Completed course of antibiotics   Continue to follow with PCP       8. Malignant neoplasm of upper-outer quadrant of left breast in female, estrogen receptor positive  Assessment & Plan:  No acute issues  Continue arimidex  Continue to follow with hematology/oncology      9. Tonic-clonic epileptic seizures  Assessment & Plan:  Denies any recent seizure activities  Continue Keppra            Medication List on Discharge:     Medication List            Accurate as of April 1, 2024 11:59 PM. If you have any questions, ask your nurse or doctor.                CONTINUE taking these medications       acetaminophen 500 MG tablet  Commonly known as: TYLENOL  Take 1,000 mg by mouth daily as needed for Pain (headache).     allopurinoL 300 MG tablet  Commonly known as: ZYLOPRIM  TAKE 1 TABLET(300 MG) BY MOUTH EVERY MORNING     amLODIPine 10 MG tablet  Commonly known as: NORVASC  TAKE 1 TABLET(10 MG) BY MOUTH EVERY DAY     anastrozole 1 mg Tab  Commonly known as: ARIMIDEX  Take 1 tablet (1 mg total) by mouth once daily.     aspirin 81 MG EC tablet  Commonly known as: ECOTRIN  Take 1 tablet (81 mg total) by mouth once daily.     blood sugar diagnostic Strp  1 strip by Misc.(Non-Drug; Combo Route) route once daily.     blood-glucose meter kit  Use as instructed     carvediloL 25 MG tablet  Commonly known as: COREG  Take 1 tablet (25 mg total) by mouth 2 (two) times daily.     ezetimibe 10 mg tablet  Commonly known as: ZETIA  Take 1 tablet (10 mg total) by mouth once daily.     lancets Misc  Check blood sugar once daily     levetiracetam  mg Tb24 24 hr tablet  Commonly known as: KEPPRA XR  Take 2 tablets (1,000 mg total) by mouth once daily.     nitroGLYCERIN 0.4 MG SL tablet  Commonly known as: NITROSTAT  PLACE 1 TABLET UNDER TONGUE EVERY 5 MINUTES AS NEEDED FOR CHEST PAIN. USE 3 TIMES, IF NO RELIEF CALL 911     omeprazole 20 MG capsule  Commonly known as: PRILOSEC  Take 1 capsule (20 mg total) by mouth 2 (two) times a day     rosuvastatin 40 MG Tab  Commonly known as: CRESTOR  TAKE 1 TABLET(40 MG) BY MOUTH EVERY MORNING     SYSTANE BALANCE OPHT  Apply 1-2 drops to eye daily as needed (dry eyes).              Medication Reconciliation:  Were medications changed on discharge? Yes  Were medications in the home? Yes  Is the patient taking the medications as directed? Yes  Does the patient understand the medications and changes? Yes  Does updated med list accurately reflects meds patient is currently taking? Yes    ENVIRONMENT OF CARE      Family and/or Caregiver present at visit?  Yes  Name of Caregiver:  daughter  History provided by: patient and caregiver    Advance Care Planning   Advanced Care Planning Status:  Patient has had an ACP conversation  Living Will: No  Power of : No  [place]: No    Does Caregiver have HCPoA: No  Changes today: n/a  Is patient hospice appropriate: No  (If needed, use PPS <30 or FAST score >7)  Was referral to hospice placed: No    Palliative Care/Goals of care: Advanced Care Directives,  [place] forms left in the home for family review, discussion and signing with instructions to return upon their next provider encounter for inclusion to the medical record. Discussed ACP for 20 minutes.          Impression upon entering the home:  Physical Dwelling: single family home   Appearance of home environment: cleaniness: clean  Functional Status: minimal assistance  Mobility: ambulatory with device  Nutritional access: adequate intake and access  Home Health: No, and does not need it at this time   DME/Supplies: rolling walker and cane     Diagnostic tests reviewed/disposition: No diagnosic tests pending after this hospitalization.  Disease/illness education:  symptoms of UTI  Establishment or re-establishment of referral orders for community resources: No other necessary community resources.   Discussion with other health care providers: No discussion with other health care providers necessary.   Does patient have a PCP at [place]? Yes   Repatriation plan with PCP? Care at Home reason: mobility   Does patient have an ostomy (ileostomy, colostomy, suprapubic catheter, nephrostomy tube, tracheostomy, PEG tube, pleurex catheter, cholecystostomy, etc)? No  Were BPAs reviewed? No    Social History     Socioeconomic History    Marital status:    Tobacco Use    Smoking status: Former     Current packs/day: 0.00     Average packs/day: 1 pack/day for 12.0 years (12.0 ttl pk-yrs)     Types: Cigarettes     Start date: 1994     Quit date: 2006     Years since quittin.6    Smokeless  tobacco: Never   Substance and Sexual Activity    Alcohol use: Not Currently    Drug use: No    Sexual activity: Never     Social Determinants of Health     Financial Resource Strain: Low Risk  (3/23/2024)    Overall Financial Resource Strain (CARDIA)     Difficulty of Paying Living Expenses: Not very hard   Food Insecurity: No Food Insecurity (3/23/2024)    Hunger Vital Sign     Worried About Running Out of Food in the Last Year: Never true     Ran Out of Food in the Last Year: Never true   Transportation Needs: No Transportation Needs (3/23/2024)    PRAPARE - Transportation     Lack of Transportation (Medical): No     Lack of Transportation (Non-Medical): No   Physical Activity: Insufficiently Active (8/11/2022)    Exercise Vital Sign     Days of Exercise per Week: 2 days     Minutes of Exercise per Session: 40 min   Stress: No Stress Concern Present (3/23/2024)    [employer] [place] [employer] - Occupational Stress Questionnaire     Feeling of Stress : Not at all   Social Connections: Moderately Integrated (8/11/2022)    Social Connection and Isolation Panel [NHANES]     Frequency of Communication with Friends and Family: More than three times a week     Frequency of Social Gatherings with Friends and Family: Three times a week     Attends Religion Services: More than 4 times per year     Active Member of Clubs or Organizations: Yes     Attends Club or Organization Meetings: More than 4 times per year     Marital Status:    Housing Stability: Low Risk  (3/23/2024)    Housing Stability Vital Sign     Unable to Pay for Housing in the Last Year: No     Number of Places Lived in the Last Year: 1     Unstable Housing in the Last Year: No       OBJECTIVE:     Vital Signs:  Vitals:    04/01/24 1600   BP: 124/66   Pulse: 74   Temp: 98.3 °F (36.8 °C)       Review of Systems   Constitutional:  Negative for chills and fever.   HENT:  Negative for congestion, postnasal drip and rhinorrhea.    Eyes:   Negative for visual disturbance.   Respiratory:  Negative for shortness of breath and wheezing.    Cardiovascular:  Negative for chest pain and palpitations.   Gastrointestinal:  Negative for abdominal pain, blood in stool, diarrhea, nausea and vomiting.   Genitourinary:  Negative for difficulty urinating and hematuria.   Musculoskeletal:  Negative for arthralgias and myalgias.   Skin:  Negative for color change and wound.   Neurological:  Negative for dizziness, seizures and weakness.   Hematological:  Does not bruise/bleed easily.   Psychiatric/Behavioral:  Negative for agitation.        Physical Exam  HENT:      Head: Normocephalic and atraumatic.      Nose: No congestion or rhinorrhea.   Cardiovascular:      Rate and Rhythm: Normal rate.      Pulses: Normal pulses.   Pulmonary:      Effort: No respiratory distress.      Breath sounds: Normal breath sounds.   Abdominal:      General: Bowel sounds are normal.      Palpations: Abdomen is soft.   Musculoskeletal:      Cervical back: Normal range of motion and neck supple.      Right lower leg: No edema.      Left lower leg: No edema.   Skin:     General: Skin is warm and dry.   Neurological:      Mental Status: She is alert. Mental status is at baseline.   Psychiatric:         Mood and Affect: Mood normal.       INSTRUCTIONS FOR PATIENT:     Scheduled Follow-up, Appts Reviewed with Modifications if Needed: Yes  Future Appointments   Date Time Provider Department Center   5/16/2024  1:00 PM Moe Noel, PATO [facility]   6/13/2024 10:40 AM Bismark Guevara MD [facility]   6/21/2024  8:00 AM LAB, APPOINTMENT [facility] LAB IM [facility]   6/24/2024  8:30 AM Ethel Berger MD [facility]   6/28/2024 10:00 AM Brittney Bautista DPM [facility]       Signature: Jada Anderson NP    Transition of Care Visit:  I have reviewed and updated the history and problem list.  I have reconciled the medication list.  I have discussed  the hospitalization and current medical issues, prognosis and plans with the patient/family.      Total face-to-face time was 60 min, >50% of this was spent on counseling and coordination of care. The following issues were discussed: primary and secondary diagnoses, co-morbidities, prescribed medications, treatment modalities, importance of compliance with medical advice and directives for follow-up care.

## 2024-04-08 NOTE — ASSESSMENT & PLAN NOTE
She has been educated on the negative effects of obesity to one's health and encouraged to consider lifestyle diet modifications and exercise.
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
## 2024-05-14 DIAGNOSIS — C50.412 MALIGNANT NEOPLASM OF UPPER-OUTER QUADRANT OF LEFT BREAST IN FEMALE, ESTROGEN RECEPTOR POSITIVE: ICD-10-CM

## 2024-05-14 DIAGNOSIS — Z17.0 MALIGNANT NEOPLASM OF UPPER-OUTER QUADRANT OF LEFT BREAST IN FEMALE, ESTROGEN RECEPTOR POSITIVE: ICD-10-CM

## 2024-05-14 RX ORDER — ANASTROZOLE 1 MG/1
TABLET ORAL
Qty: 90 TABLET | Refills: 3 | Status: SHIPPED | OUTPATIENT
Start: 2024-05-14

## 2024-05-16 ENCOUNTER — OFFICE VISIT (OUTPATIENT)
Dept: OPTOMETRY | Facility: CLINIC | Age: 84
End: 2024-05-16
Payer: MEDICARE

## 2024-05-16 DIAGNOSIS — H52.223 REGULAR ASTIGMATISM OF BOTH EYES: ICD-10-CM

## 2024-05-16 DIAGNOSIS — E11.9 DIABETES MELLITUS TYPE 2 WITHOUT RETINOPATHY: Primary | ICD-10-CM

## 2024-05-16 DIAGNOSIS — Z13.5 GLAUCOMA SCREENING: ICD-10-CM

## 2024-05-16 DIAGNOSIS — Z96.1 PSEUDOPHAKIA OF BOTH EYES: ICD-10-CM

## 2024-05-16 PROCEDURE — 92015 DETERMINE REFRACTIVE STATE: CPT | Mod: S$GLB,,, | Performed by: OPTOMETRIST

## 2024-05-16 PROCEDURE — 92014 COMPRE OPH EXAM EST PT 1/>: CPT | Mod: S$GLB,,, | Performed by: OPTOMETRIST

## 2024-05-16 PROCEDURE — 99999 PR PBB SHADOW E&M-EST. PATIENT-LVL III: CPT | Mod: PBBFAC,,, | Performed by: OPTOMETRIST

## 2024-05-16 PROCEDURE — 1159F MED LIST DOCD IN RCRD: CPT | Mod: CPTII,S$GLB,, | Performed by: OPTOMETRIST

## 2024-05-16 PROCEDURE — 1101F PT FALLS ASSESS-DOCD LE1/YR: CPT | Mod: CPTII,S$GLB,, | Performed by: OPTOMETRIST

## 2024-05-16 PROCEDURE — 3288F FALL RISK ASSESSMENT DOCD: CPT | Mod: CPTII,S$GLB,, | Performed by: OPTOMETRIST

## 2024-05-16 PROCEDURE — 1126F AMNT PAIN NOTED NONE PRSNT: CPT | Mod: CPTII,S$GLB,, | Performed by: OPTOMETRIST

## 2024-05-16 PROCEDURE — 2023F DILAT RTA XM W/O RTNOPTHY: CPT | Mod: CPTII,S$GLB,, | Performed by: OPTOMETRIST

## 2024-05-16 NOTE — PROGRESS NOTES
HPI    Annual Diabetic Eye Exam   Pt states vision is blurry, only got reading rx, wants bifocals again     Pt denies F/F   Pt denies Dry/ Itchy/ Burning  Gtt: Yes Systane as needed   Pt reports relief     DM Dx'ed   BS: Unknown   Hemoglobin A1C       Date                     Value               Ref Range             Status                12/16/2023               6.4 (H)             4.0 - 5.6 %           Final                   06/17/2023               6.4 (H)             4.0 - 5.6 %           Final                  12/29/2022               6.5 (H)             4.0 - 5.6 %           Final               Last edited by Moe Noel, OD on 5/16/2024  1:21 PM.            Assessment /Plan     For exam results, see Encounter Report.    Diabetes mellitus type 2 without retinopathy  -No retinopathy noted today.  Continued control with primary care physician and annual comprehensive eye exam.    Glaucoma screening  -Monitor with annual eye exam and IOP check    Pseudophakia of both eyes  -clear, centered    Regular astigmatism of both eyes  Eyeglass Final Rx       Eyeglass Final Rx         Sphere Cylinder Axis Dist VA Add    Right [place] +0.50 020 20/30++ +2.50    Left -0.25 +0.75 165 20/25 +2.50      Type: Bifocal    Expiration Date: 5/16/2025                      RTC 1 yr

## 2024-06-10 ENCOUNTER — PATIENT MESSAGE (OUTPATIENT)
Dept: INTERNAL MEDICINE | Facility: CLINIC | Age: 84
End: 2024-06-10
Payer: MEDICARE

## 2024-06-13 ENCOUNTER — OFFICE VISIT (OUTPATIENT)
Dept: HEMATOLOGY/ONCOLOGY | Facility: CLINIC | Age: 84
End: 2024-06-13
Payer: MEDICARE

## 2024-06-13 VITALS
OXYGEN SATURATION: 98 % | WEIGHT: 269.81 LBS | RESPIRATION RATE: 20 BRPM | TEMPERATURE: 98 F | BODY MASS INDEX: 50.94 KG/M2 | SYSTOLIC BLOOD PRESSURE: 142 MMHG | HEART RATE: 71 BPM | HEIGHT: 61 IN | DIASTOLIC BLOOD PRESSURE: 74 MMHG

## 2024-06-13 DIAGNOSIS — Z17.0 MALIGNANT NEOPLASM OF UPPER-OUTER QUADRANT OF LEFT BREAST IN FEMALE, ESTROGEN RECEPTOR POSITIVE: Primary | ICD-10-CM

## 2024-06-13 DIAGNOSIS — Z12.31 ENCOUNTER FOR SCREENING MAMMOGRAM FOR HIGH-RISK PATIENT: ICD-10-CM

## 2024-06-13 DIAGNOSIS — C50.412 MALIGNANT NEOPLASM OF UPPER-OUTER QUADRANT OF LEFT BREAST IN FEMALE, ESTROGEN RECEPTOR POSITIVE: Primary | ICD-10-CM

## 2024-06-13 PROBLEM — D50.0 IRON DEFICIENCY ANEMIA DUE TO CHRONIC BLOOD LOSS: Status: RESOLVED | Noted: 2020-01-10 | Resolved: 2024-06-13

## 2024-06-13 PROCEDURE — 99999 PR PBB SHADOW E&M-EST. PATIENT-LVL IV: CPT | Mod: PBBFAC,,, | Performed by: INTERNAL MEDICINE

## 2024-06-13 PROCEDURE — 3288F FALL RISK ASSESSMENT DOCD: CPT | Mod: CPTII,S$GLB,, | Performed by: INTERNAL MEDICINE

## 2024-06-13 PROCEDURE — 3077F SYST BP >= 140 MM HG: CPT | Mod: CPTII,S$GLB,, | Performed by: INTERNAL MEDICINE

## 2024-06-13 PROCEDURE — 99213 OFFICE O/P EST LOW 20 MIN: CPT | Mod: S$GLB,,, | Performed by: INTERNAL MEDICINE

## 2024-06-13 PROCEDURE — 1159F MED LIST DOCD IN RCRD: CPT | Mod: CPTII,S$GLB,, | Performed by: INTERNAL MEDICINE

## 2024-06-13 PROCEDURE — 1126F AMNT PAIN NOTED NONE PRSNT: CPT | Mod: CPTII,S$GLB,, | Performed by: INTERNAL MEDICINE

## 2024-06-13 PROCEDURE — 1101F PT FALLS ASSESS-DOCD LE1/YR: CPT | Mod: CPTII,S$GLB,, | Performed by: INTERNAL MEDICINE

## 2024-06-13 PROCEDURE — 3078F DIAST BP <80 MM HG: CPT | Mod: CPTII,S$GLB,, | Performed by: INTERNAL MEDICINE

## 2024-06-13 NOTE — PROGRESS NOTES
Yes mass  Subjective:       Patient ID: Renata Bergman is a 83 y.o. female.    Chief Complaint: No chief complaint on file.    HPI Mrs. Hussein is a 83-year-old female with a history of stage I ER + left breast cancer.  She is on adjuvant anastrozole. She is BRCA 2 +.      Today she she has been doing.  She has no pain or shortness of breath.  She does have occasional sleep disturbance.  Balance is off but she uses a walker has had no falls.      Breast history: Screening mammogram on May 5, 2016 showed an irregular 22 mm mass with abnormal calcifications in the left breast 2:00 position. Follow-up ultrasound July 13 she noted a regular solid 17 mm mass.    On July 21 needle biopsy showed grade 2 infiltrating ductal carcinoma strongly ER NE positive (90%) and HER-2 negative.    On August 1, 2016 lumpectomy and sentinel lymph node biopsy were performed. That showed a 1.8 x 1.6 x 1.0 infiltrating carcinoma intermediate grade (histologic grade 3, nuclear grade 2, mitotic index 2.   The sentinel lymph node was negative and margins were negative. Final pathological stage TIc N0 stage IA.  Oncotype was 31 indicating a 21% risk of recurrence with tamoxifen alone.    She  had 3 weeks of weekly Taxol and then because of insurance issues she was changed to CMF and had 5 cycles of that.     Chemotherapy was completed in January 2017.    She completed radiation in April 2017 and began letrozole endocrine therapy that month.  Subsequently changed to anastrozole.    Breast Cancer Index March 2022 showed 14.3 % risk of late recurrence with benefit to extended endocrine therapy.      Genetics -BRCA 2 +  Review of Systems   Constitutional:  Negative for activity change, appetite change, fever and unexpected weight change.   HENT:  Negative for trouble swallowing.    Respiratory:  Negative for cough and shortness of breath.    Gastrointestinal:  Negative for abdominal pain, constipation, diarrhea and nausea.   Musculoskeletal:   Negative for back pain.   Neurological:  Negative for headaches.        Balance issues   Psychiatric/Behavioral:  Positive for sleep disturbance. Negative for dysphoric mood. The patient is not nervous/anxious.        Objective:      Physical Exam  Vitals reviewed.   Constitutional:       Appearance: She is well-developed.   HENT:      Mouth/Throat:      Pharynx: No oropharyngeal exudate.   Eyes:      General: No scleral icterus.  Cardiovascular:      Rate and Rhythm: Regular rhythm.      Heart sounds: Normal heart sounds.   Pulmonary:      Effort: Pulmonary effort is normal.      Breath sounds: Normal breath sounds. No wheezing or rales.   Chest:   Breasts:     Right: No mass, nipple discharge or skin change.      Left: Skin change present. No mass or nipple discharge.       Abdominal:      Palpations: Abdomen is soft. There is no mass.      Tenderness: There is no abdominal tenderness.   Lymphadenopathy:      Cervical: No cervical adenopathy.      Upper Body:      Right upper body: No supraclavicular or axillary adenopathy.      Left upper body: No supraclavicular or axillary adenopathy.   Neurological:      Mental Status: She is alert and oriented to person, place, and time.      Cranial Nerves: No cranial nerve deficit.   Psychiatric:         Behavior: Behavior normal.         Thought Content: Thought content normal.         Assessment:       1. Malignant neoplasm of upper-outer quadrant of left breast in female, estrogen receptor positive        Plan:   Trial of melatonin for sleep  Return to clinic in 6 months    Continue anastrozole to 10 years based on BCI.  She will be due for mammograms in August.    Route Chart for Scheduling    Med Onc Chart Routing      Follow up with physician 6 months.   Follow up with [facility]    Infusion scheduling note    Injection scheduling note    Labs None   Scheduling:  Preferred lab:  Lab interval:     Imaging Mammogram   August   Pharmacy appointment No pharmacy appointment  needed      Other referrals no referral to Oncology Primary Care needed -  no Massage appointment needed    No additional referrals needed

## 2024-06-19 ENCOUNTER — TELEPHONE (OUTPATIENT)
Dept: ADMINISTRATIVE | Facility: CLINIC | Age: 84
End: 2024-06-19
Payer: MEDICARE

## 2024-06-19 NOTE — TELEPHONE ENCOUNTER
Called pt, informed pt I was calling to remind pt of her upcoming in office EAWV appt; pt declined to verify her date of birth; informed pt without the two patient identifiers I would not be able to confirm the appt date, time, and location; pt verbalized understanding and I informed pt she could see the appt information on her portal

## 2024-06-20 ENCOUNTER — OFFICE VISIT (OUTPATIENT)
Dept: INTERNAL MEDICINE | Facility: CLINIC | Age: 84
End: 2024-06-20
Payer: MEDICARE

## 2024-06-20 ENCOUNTER — TELEPHONE (OUTPATIENT)
Dept: PODIATRY | Facility: CLINIC | Age: 84
End: 2024-06-20
Payer: MEDICARE

## 2024-06-20 VITALS
HEART RATE: 75 BPM | OXYGEN SATURATION: 97 % | BODY MASS INDEX: 50.2 KG/M2 | SYSTOLIC BLOOD PRESSURE: 142 MMHG | WEIGHT: 265.88 LBS | HEIGHT: 61 IN | DIASTOLIC BLOOD PRESSURE: 74 MMHG

## 2024-06-20 DIAGNOSIS — K21.9 GASTROESOPHAGEAL REFLUX DISEASE, UNSPECIFIED WHETHER ESOPHAGITIS PRESENT: ICD-10-CM

## 2024-06-20 DIAGNOSIS — M17.0 PRIMARY OSTEOARTHRITIS OF BOTH KNEES: ICD-10-CM

## 2024-06-20 DIAGNOSIS — E11.22 CKD STAGE 3 SECONDARY TO DIABETES: ICD-10-CM

## 2024-06-20 DIAGNOSIS — Z91.81 HISTORY OF FALL: ICD-10-CM

## 2024-06-20 DIAGNOSIS — R09.89 PROMINENT AORTA: ICD-10-CM

## 2024-06-20 DIAGNOSIS — H83.03 LABYRINTHITIS OF BOTH EARS: ICD-10-CM

## 2024-06-20 DIAGNOSIS — M10.9 GOUT, ARTHRITIS: ICD-10-CM

## 2024-06-20 DIAGNOSIS — R07.9 CHEST PAIN, UNSPECIFIED TYPE: ICD-10-CM

## 2024-06-20 DIAGNOSIS — I73.9 PERIPHERAL VASCULAR DISEASE: ICD-10-CM

## 2024-06-20 DIAGNOSIS — E66.01 CLASS 3 SEVERE OBESITY DUE TO EXCESS CALORIES WITH SERIOUS COMORBIDITY IN ADULT, UNSPECIFIED BMI: ICD-10-CM

## 2024-06-20 DIAGNOSIS — E11.22 TYPE 2 DIABETES MELLITUS WITH STAGE 3 CHRONIC KIDNEY DISEASE, WITHOUT LONG-TERM CURRENT USE OF INSULIN, UNSPECIFIED WHETHER STAGE 3A OR 3B CKD: ICD-10-CM

## 2024-06-20 DIAGNOSIS — N18.31 STAGE 3A CHRONIC KIDNEY DISEASE: ICD-10-CM

## 2024-06-20 DIAGNOSIS — H91.90 HEARING LOSS, UNSPECIFIED HEARING LOSS TYPE, UNSPECIFIED LATERALITY: ICD-10-CM

## 2024-06-20 DIAGNOSIS — E11.59 TYPE 2 DIABETES MELLITUS WITH OTHER CIRCULATORY COMPLICATION, WITHOUT LONG-TERM CURRENT USE OF INSULIN: ICD-10-CM

## 2024-06-20 DIAGNOSIS — E78.5 HYPERLIPIDEMIA ASSOCIATED WITH TYPE 2 DIABETES MELLITUS: ICD-10-CM

## 2024-06-20 DIAGNOSIS — I15.2 HYPERTENSION ASSOCIATED WITH DIABETES: ICD-10-CM

## 2024-06-20 DIAGNOSIS — E11.9 DM TYPE 2 WITHOUT RETINOPATHY: ICD-10-CM

## 2024-06-20 DIAGNOSIS — I25.2 OLD MI (MYOCARDIAL INFARCTION): ICD-10-CM

## 2024-06-20 DIAGNOSIS — E11.69 HYPERLIPIDEMIA ASSOCIATED WITH TYPE 2 DIABETES MELLITUS: ICD-10-CM

## 2024-06-20 DIAGNOSIS — Z87.19 HISTORY OF GASTROINTESTINAL BLEEDING: ICD-10-CM

## 2024-06-20 DIAGNOSIS — I65.23 BILATERAL CAROTID ARTERY STENOSIS: ICD-10-CM

## 2024-06-20 DIAGNOSIS — N18.32 STAGE 3B CHRONIC KIDNEY DISEASE: ICD-10-CM

## 2024-06-20 DIAGNOSIS — N17.9 AKI (ACUTE KIDNEY INJURY): ICD-10-CM

## 2024-06-20 DIAGNOSIS — Z95.5 S/P CORONARY ARTERY STENT PLACEMENT: ICD-10-CM

## 2024-06-20 DIAGNOSIS — N18.30 CKD STAGE 3 SECONDARY TO DIABETES: ICD-10-CM

## 2024-06-20 DIAGNOSIS — Z00.00 ENCOUNTER FOR PREVENTIVE HEALTH EXAMINATION: Primary | ICD-10-CM

## 2024-06-20 DIAGNOSIS — Z91.81 RISK FOR FALLS: ICD-10-CM

## 2024-06-20 DIAGNOSIS — N30.00 ACUTE CYSTITIS WITHOUT HEMATURIA: ICD-10-CM

## 2024-06-20 DIAGNOSIS — Z17.0 MALIGNANT NEOPLASM OF UPPER-OUTER QUADRANT OF LEFT BREAST IN FEMALE, ESTROGEN RECEPTOR POSITIVE: ICD-10-CM

## 2024-06-20 DIAGNOSIS — C50.412 MALIGNANT NEOPLASM OF UPPER-OUTER QUADRANT OF LEFT BREAST IN FEMALE, ESTROGEN RECEPTOR POSITIVE: ICD-10-CM

## 2024-06-20 DIAGNOSIS — G40.409 TONIC-CLONIC EPILEPTIC SEIZURES: ICD-10-CM

## 2024-06-20 DIAGNOSIS — E11.59 HYPERTENSION ASSOCIATED WITH DIABETES: ICD-10-CM

## 2024-06-20 DIAGNOSIS — N18.30 TYPE 2 DIABETES MELLITUS WITH STAGE 3 CHRONIC KIDNEY DISEASE, WITHOUT LONG-TERM CURRENT USE OF INSULIN, UNSPECIFIED WHETHER STAGE 3A OR 3B CKD: ICD-10-CM

## 2024-06-20 DIAGNOSIS — J06.9 UPPER RESPIRATORY TRACT INFECTION, UNSPECIFIED TYPE: ICD-10-CM

## 2024-06-20 DIAGNOSIS — H25.10 NUCLEAR SENILE CATARACT, UNSPECIFIED LATERALITY: ICD-10-CM

## 2024-06-20 DIAGNOSIS — Z80.0 FAMILY HISTORY OF COLON CANCER: ICD-10-CM

## 2024-06-20 DIAGNOSIS — Z96.652 STATUS POST REVISION OF TOTAL REPLACEMENT OF LEFT KNEE: ICD-10-CM

## 2024-06-20 DIAGNOSIS — I25.118 ATHEROSCLEROTIC HEART DISEASE OF NATIVE CORONARY ARTERY WITH OTHER FORMS OF ANGINA PECTORIS: ICD-10-CM

## 2024-06-20 DIAGNOSIS — E11.51 CONTROLLED TYPE 2 DM WITH PERIPHERAL CIRCULATORY DISORDER: Chronic | ICD-10-CM

## 2024-06-20 PROCEDURE — 99999 PR PBB SHADOW E&M-EST. PATIENT-LVL V: CPT | Mod: PBBFAC,,, | Performed by: NURSE PRACTITIONER

## 2024-06-20 NOTE — TELEPHONE ENCOUNTER
Left message that her appointment on 6/28/24 for nail care was scheduled incorrectly and to please call office so we can reschedule.

## 2024-06-20 NOTE — PROGRESS NOTES
"  Renata Bergman presented for a  Medicare AWV and comprehensive Health Risk Assessment today. The following components were reviewed and updated:    Medical history  Family History  Social history  Allergies and Current Medications  Health Risk Assessment  Health Maintenance  Care Team         ** See Completed Assessments for Annual Wellness Visit within the encounter summary.**         The following assessments were completed:  Living Situation  CAGE  Depression Screening  Timed Get Up and Go  Whisper Test  Cognitive Function Screening  Nutrition Screening  ADL Screening  PAQ Screening        Vitals:    06/20/24 1044   BP: (!) 142/74   BP Location: Right arm   Patient Position: Sitting   BP Method: X-Large (Automatic)   Pulse: 75   SpO2: 97%   Weight: 120.6 kg (265 lb 14 oz)   Height: 5' 1" (1.549 m)     Body mass index is 50.24 kg/m².    Physical Exam  Vitals reviewed.   Constitutional:       Appearance: She is well-developed. She is obese.   HENT:      Head: Normocephalic and atraumatic. Not macrocephalic and not microcephalic. No raccoon eyes, Escobedo's sign, abrasion, contusion, right periorbital erythema, left periorbital erythema or laceration. Hair is normal.      Right Ear: No decreased hearing noted. No laceration, drainage, swelling or tenderness. No middle ear effusion. No foreign body. No mastoid tenderness. No hemotympanum. Tympanic membrane is not injected, scarred, perforated, erythematous, retracted or bulging. Tympanic membrane has normal mobility.      Left Ear: No decreased hearing noted. No laceration, drainage, swelling or tenderness.  No middle ear effusion. No foreign body. No mastoid tenderness. No hemotympanum. Tympanic membrane is not injected, scarred, perforated, erythematous, retracted or bulging. Tympanic membrane has normal mobility.      Nose: Nose normal. No nasal deformity, laceration or mucosal edema.      Mouth/Throat:      Pharynx: Uvula midline.   Eyes:      General: Lids " are normal.      Conjunctiva/sclera: Conjunctivae normal.   Neck:      Thyroid: No thyroid mass or thyromegaly.      Trachea: Trachea normal.   Pulmonary:      Effort: Pulmonary effort is normal. No respiratory distress.      Breath sounds: Normal breath sounds.   Abdominal:      Palpations: Abdomen is soft.   Musculoskeletal:         General: Normal range of motion.      Cervical back: Neck supple. No edema or erythema. No spinous process tenderness or muscular tenderness. Normal range of motion.   Lymphadenopathy:      Head:      Right side of head: No submental, submandibular, tonsillar, preauricular or posterior auricular adenopathy.      Left side of head: No submental, submandibular, tonsillar, preauricular, posterior auricular or occipital adenopathy.   Skin:     General: Skin is warm and dry.   Neurological:      Mental Status: She is alert and oriented to person, place, and time.   Psychiatric:         Behavior: Behavior normal.         Thought Content: Thought content normal.         Judgment: Judgment normal.               Diagnoses and health risks identified today and associated recommendations/orders:    1. Encounter for preventive health examination  Annual Health Risk Assessment (HRA) visit today.  Counseling and referral of health maintenance and preventative health measures performed.  Patient given annual wellness paperwork to take home.  Encouraged to return in 1 year for subsequent HRA visit.     2. Stage 3b chronic kidney disease  Chronic. Stable. Continue current treatment plan as previously prescribed by PCP.    3. Malignant neoplasm of upper-outer quadrant of left breast in female, estrogen receptor positive  Chronic. Stable. Continue current treatment plan as previously prescribed by PCP.    4. Acute cystitis without hematuria  Chronic. Stable. Continue current treatment plan as previously prescribed by PCP.    5. MARIA ALEJANDRA (acute kidney injury)  Chronic. Stable. Continue current treatment plan  as previously prescribed by PCP.    6. CKD stage 3 secondary to diabetes  Chronic. Stable. Continue current treatment plan as previously prescribed by PCP.    7. Stage 3a chronic kidney disease  Chronic. Stable. Continue current treatment plan as previously prescribed by PCP.    8. Atherosclerotic heart disease of native coronary artery with other forms of angina pectoris  Chronic. Stable. Continue current treatment plan as previously prescribed by PCP.    9. Bilateral carotid artery stenosis  Chronic. Stable. Continue current treatment plan as previously prescribed by PCP.    10. Chest pain, unspecified type  Chronic. Stable. Continue current treatment plan as previously prescribed by PCP.    11. Hyperlipidemia associated with type 2 diabetes mellitus  Chronic. Stable. Continue current treatment plan as previously prescribed by PCP    12. Hypertension associated with diabetes  Chronic. Stable. Continue current treatment plan as previously prescribed by PCP.    13. Old MI (myocardial infarction)  Chronic. Stable. Continue current treatment plan as previously prescribed by PCP.    14. Peripheral vascular disease  Chronic. Stable. Continue current treatment plan as previously prescribed by PCP.    15. Prominent aorta  Chronic. Stable. Continue current treatment plan as previously prescribed by PCP.    16. S/P coronary artery stent placement  Chronic. Stable. Continue current treatment plan as previously prescribed by PCP.    17. Labyrinthitis of both ears  Chronic. Stable. Continue current treatment plan as previously prescribed by PCP.    18. Upper respiratory tract infection, unspecified type  Chronic. Stable. Continue current treatment plan as previously prescribed by PCP.    19. Nuclear senile cataract, unspecified laterality  Chronic. Stable. Continue current treatment plan as previously prescribed by PCP.    20. Tonic-clonic epileptic seizures  Chronic. Stable. Continue current treatment plan as previously  prescribed by PCP.    21. Type 2 diabetes mellitus with stage 3 chronic kidney disease, without long-term current use of insulin, unspecified whether stage 3a or 3b CKD  Chronic. Stable. Continue current treatment plan as previously prescribed by PCP.    22. Family history of colon cancer  Chronic. Stable. Continue current treatment plan as previously prescribed by PCP.    23. Type 2 diabetes mellitus with other circulatory complication, without long-term current use of insulin  Chronic. Stable. Controlled. Last Hgb A1c=6.4 from 12/16/23. Continue current treatment plan as previously prescribed by PCP.    24. DM type 2 without retinopathy  Chronic. Stable. Continue current treatment plan as previously prescribed by PCP.    25. Controlled type 2 DM with peripheral circulatory disorder  Chronic. Stable. Continue current treatment plan as previously prescribed by PCP.    26. Class 3 severe obesity due to excess calories with serious comorbidity in adult, unspecified BMI  Chronic. Stable. Encouraged to increase exercise as tolerated and improve diet to heart healthy, low sodium diet. Continue current treatment plan as previously prescribed by PCP.    27. History of gastrointestinal bleeding  Chronic. Stable. Continue current treatment plan as previously prescribed by PCP.    28. Gastroesophageal reflux disease, unspecified whether esophagitis present  Chronic. Stable. Continue current treatment plan as previously prescribed by PCP.    29. 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 Stable. Continue current treatment plan as previously prescribed by PCP.    30. Primary osteoarthritis of both knees  Chronic. Stable. Continue current treatment plan as previously prescribed by PCP.    31. Gout, arthritis  Chronic. Stable. Continue current treatment plan as previously prescribed by PCP.    32. Risk for falls  Chronic. Stable. Continue current treatment plan as previously prescribed by  PCP.    33. History of fall  Chronic. Stable. Continue current treatment plan as previously prescribed by PCP.        Provided Renata with a 5-10 year written screening schedule and personal prevention plan. Recommendations were developed using the USPSTF age appropriate recommendations. Education, counseling, and referrals were provided as needed. After Visit Summary printed and given to patient which includes a list of additional screenings\tests needed.      I offered to discuss end of life issues, including information on how to make advance directives that the patient could use to name someone who would make medical decisions on their behalf if they became too ill to make themselves.    ___Patient declined  _X_Patient is interested, I provided paper work and offered to discuss.    Follow up in about 1 year (around 6/20/2025).    DIGNA Faye offered to discuss advanced care planning, including how to pick a person who would make decisions for you if you were unable to make them for yourself, called a health care power of , and what kind of decisions you might make such as use of life sustaining treatments such as ventilators and tube feeding when faced with a life limiting illness recorded on a living will that they will need to know. (How you want to be cared for as you near the end of your natural life)     X Patient is interested in learning more about how to make advanced directives.  I provided them paperwork and offered to discuss this with them.

## 2024-06-20 NOTE — PATIENT INSTRUCTIONS
Counseling and Referral of Other Preventative  (Italic type indicates deductible and co-insurance are waived)    Patient Name: Renata Bergman  Today's Date: 6/20/2024    Health Maintenance       Date Due Completion Date    Shingles Vaccine (1 of 2) Never done ---    RSV Vaccine (Age 60+ and Pregnant patients) (1 - 1-dose 60+ series) Never done ---    Diabetes Urine Screening 08/16/2023 8/16/2022    COVID-19 Vaccine (7 - 2023-24 season) 02/16/2024 12/22/2023    Hemoglobin A1c 06/16/2024 12/16/2023    Lipid Panel 06/17/2024 6/17/2023    Influenza Vaccine (Season Ended) 09/01/2024 12/13/2022    Override on 2/6/2020: Declined    Eye Exam 05/16/2025 5/16/2024    Override on 2/28/2018: Done    Override on 4/13/2017: Done    DEXA Scan 12/19/2025 12/19/2023    Override on 11/6/2008: Done    TETANUS VACCINE 05/05/2026 5/5/2016    Colonoscopy 11/22/2028 11/22/2023        No orders of the defined types were placed in this encounter.    The following information is provided to all patients.  This information is to help you find resources for any of the problems found today that may be affecting your health:                  Living healthy guide: www.[facility].[place].gov      Understanding Diabetes: www.diabetes.org      Eating healthy: www.cdc.gov/healthyweight      CDC home safety checklist: www.cdc.gov/steadi/patient.html      Agency on Aging: www.goea.[place].gov      Alcoholics anonymous (AA): www.aa.org      Physical Activity: www.soni.nih.gov/vg4qnqx      Tobacco use: www.quitwithusla.org

## 2024-06-21 ENCOUNTER — LAB VISIT (OUTPATIENT)
Dept: LAB | Facility: HOSPITAL | Age: 84
End: 2024-06-21
Attending: INTERNAL MEDICINE
Payer: MEDICARE

## 2024-06-21 DIAGNOSIS — E55.9 VITAMIN D DEFICIENCY DISEASE: ICD-10-CM

## 2024-06-21 DIAGNOSIS — E53.8 VITAMIN B12 DEFICIENCY: ICD-10-CM

## 2024-06-21 DIAGNOSIS — E11.22 TYPE 2 DIABETES MELLITUS WITH STAGE 3 CHRONIC KIDNEY DISEASE, WITHOUT LONG-TERM CURRENT USE OF INSULIN, UNSPECIFIED WHETHER STAGE 3A OR 3B CKD: ICD-10-CM

## 2024-06-21 DIAGNOSIS — N18.30 TYPE 2 DIABETES MELLITUS WITH STAGE 3 CHRONIC KIDNEY DISEASE, WITHOUT LONG-TERM CURRENT USE OF INSULIN, UNSPECIFIED WHETHER STAGE 3A OR 3B CKD: ICD-10-CM

## 2024-06-21 LAB
25(OH)D3+25(OH)D2 SERPL-MCNC: 37 NG/ML (ref 30–96)
ALBUMIN SERPL BCP-MCNC: 3.6 G/DL (ref 3.5–5.2)
ALP SERPL-CCNC: 86 U/L (ref 55–135)
ALT SERPL W/O P-5'-P-CCNC: 16 U/L (ref 10–44)
ANION GAP SERPL CALC-SCNC: 9 MMOL/L (ref 8–16)
AST SERPL-CCNC: 19 U/L (ref 10–40)
BASOPHILS # BLD AUTO: 0.05 K/UL (ref 0–0.2)
BASOPHILS NFR BLD: 1 % (ref 0–1.9)
BILIRUB SERPL-MCNC: 0.7 MG/DL (ref 0.1–1)
BUN SERPL-MCNC: 21 MG/DL (ref 8–23)
CALCIUM SERPL-MCNC: 9.8 MG/DL (ref 8.7–10.5)
CHLORIDE SERPL-SCNC: 105 MMOL/L (ref 95–110)
CHOLEST SERPL-MCNC: 145 MG/DL (ref 120–199)
CHOLEST/HDLC SERPL: 2.6 {RATIO} (ref 2–5)
CO2 SERPL-SCNC: 25 MMOL/L (ref 23–29)
CREAT SERPL-MCNC: 1.4 MG/DL (ref 0.5–1.4)
DIFFERENTIAL METHOD BLD: ABNORMAL
EOSINOPHIL # BLD AUTO: 0.3 K/UL (ref 0–0.5)
EOSINOPHIL NFR BLD: 6.4 % (ref 0–8)
ERYTHROCYTE [DISTWIDTH] IN BLOOD BY AUTOMATED COUNT: 13.7 % (ref 11.5–14.5)
EST. GFR  (NO RACE VARIABLE): 37.8 ML/MIN/1.73 M^2
ESTIMATED AVG GLUCOSE: 134 MG/DL (ref 68–131)
GLUCOSE SERPL-MCNC: 131 MG/DL (ref 70–110)
HBA1C MFR BLD: 6.3 % (ref 4–5.6)
HCT VFR BLD AUTO: 41.1 % (ref 37–48.5)
HDLC SERPL-MCNC: 55 MG/DL (ref 40–75)
HDLC SERPL: 37.9 % (ref 20–50)
HGB BLD-MCNC: 13.1 G/DL (ref 12–16)
IMM GRANULOCYTES # BLD AUTO: 0.01 K/UL (ref 0–0.04)
IMM GRANULOCYTES NFR BLD AUTO: 0.2 % (ref 0–0.5)
LDLC SERPL CALC-MCNC: 78 MG/DL (ref 63–159)
LYMPHOCYTES # BLD AUTO: 1.8 K/UL (ref 1–4.8)
LYMPHOCYTES NFR BLD: 34.8 % (ref 18–48)
MCH RBC QN AUTO: 30.9 PG (ref 27–31)
MCHC RBC AUTO-ENTMCNC: 31.9 G/DL (ref 32–36)
MCV RBC AUTO: 97 FL (ref 82–98)
MONOCYTES # BLD AUTO: 0.6 K/UL (ref 0.3–1)
MONOCYTES NFR BLD: 12.5 % (ref 4–15)
NEUTROPHILS # BLD AUTO: 2.3 K/UL (ref 1.8–7.7)
NEUTROPHILS NFR BLD: 45.1 % (ref 38–73)
NONHDLC SERPL-MCNC: 90 MG/DL
NRBC BLD-RTO: 0 /100 WBC
PLATELET # BLD AUTO: 214 K/UL (ref 150–450)
PMV BLD AUTO: 10.6 FL (ref 9.2–12.9)
POTASSIUM SERPL-SCNC: 4.4 MMOL/L (ref 3.5–5.1)
PROT SERPL-MCNC: 7.5 G/DL (ref 6–8.4)
RBC # BLD AUTO: 4.24 M/UL (ref 4–5.4)
SODIUM SERPL-SCNC: 139 MMOL/L (ref 136–145)
TRIGL SERPL-MCNC: 60 MG/DL (ref 30–150)
TSH SERPL DL<=0.005 MIU/L-ACNC: 3.64 UIU/ML (ref 0.4–4)
VIT B12 SERPL-MCNC: 437 PG/ML (ref 210–950)
WBC # BLD AUTO: 5.12 K/UL (ref 3.9–12.7)

## 2024-06-21 PROCEDURE — 85025 COMPLETE CBC W/AUTO DIFF WBC: CPT | Performed by: INTERNAL MEDICINE

## 2024-06-21 PROCEDURE — 80061 LIPID PANEL: CPT | Performed by: INTERNAL MEDICINE

## 2024-06-21 PROCEDURE — 36415 COLL VENOUS BLD VENIPUNCTURE: CPT | Performed by: INTERNAL MEDICINE

## 2024-06-21 PROCEDURE — 83036 HEMOGLOBIN GLYCOSYLATED A1C: CPT | Performed by: INTERNAL MEDICINE

## 2024-06-21 PROCEDURE — 82306 VITAMIN D 25 HYDROXY: CPT | Performed by: INTERNAL MEDICINE

## 2024-06-21 PROCEDURE — 84443 ASSAY THYROID STIM HORMONE: CPT | Performed by: INTERNAL MEDICINE

## 2024-06-21 PROCEDURE — 82607 VITAMIN B-12: CPT | Performed by: INTERNAL MEDICINE

## 2024-06-21 PROCEDURE — 80053 COMPREHEN METABOLIC PANEL: CPT | Performed by: INTERNAL MEDICINE

## 2024-06-24 ENCOUNTER — OFFICE VISIT (OUTPATIENT)
Dept: INTERNAL MEDICINE | Facility: CLINIC | Age: 84
End: 2024-06-24
Payer: MEDICARE

## 2024-06-24 ENCOUNTER — TELEPHONE (OUTPATIENT)
Dept: INTERNAL MEDICINE | Facility: CLINIC | Age: 84
End: 2024-06-24
Payer: MEDICARE

## 2024-06-24 VITALS
WEIGHT: 270.31 LBS | BODY MASS INDEX: 51.03 KG/M2 | HEIGHT: 61 IN | SYSTOLIC BLOOD PRESSURE: 130 MMHG | OXYGEN SATURATION: 98 % | HEART RATE: 64 BPM | DIASTOLIC BLOOD PRESSURE: 70 MMHG

## 2024-06-24 DIAGNOSIS — M10.9 GOUT, ARTHRITIS: ICD-10-CM

## 2024-06-24 DIAGNOSIS — E11.59 TYPE 2 DIABETES MELLITUS WITH OTHER CIRCULATORY COMPLICATION, WITHOUT LONG-TERM CURRENT USE OF INSULIN: ICD-10-CM

## 2024-06-24 DIAGNOSIS — E78.5 HYPERLIPIDEMIA ASSOCIATED WITH TYPE 2 DIABETES MELLITUS: ICD-10-CM

## 2024-06-24 DIAGNOSIS — I10 HTN (HYPERTENSION), BENIGN: ICD-10-CM

## 2024-06-24 DIAGNOSIS — E11.69 HYPERLIPIDEMIA ASSOCIATED WITH TYPE 2 DIABETES MELLITUS: ICD-10-CM

## 2024-06-24 DIAGNOSIS — C50.412 MALIGNANT NEOPLASM OF UPPER-OUTER QUADRANT OF LEFT BREAST IN FEMALE, ESTROGEN RECEPTOR POSITIVE: Primary | ICD-10-CM

## 2024-06-24 DIAGNOSIS — G40.409 TONIC-CLONIC EPILEPTIC SEIZURES: ICD-10-CM

## 2024-06-24 DIAGNOSIS — I15.2 HYPERTENSION ASSOCIATED WITH DIABETES: ICD-10-CM

## 2024-06-24 DIAGNOSIS — E78.2 MIXED HYPERLIPIDEMIA: ICD-10-CM

## 2024-06-24 DIAGNOSIS — I65.23 BILATERAL CAROTID ARTERY STENOSIS: ICD-10-CM

## 2024-06-24 DIAGNOSIS — Z17.0 MALIGNANT NEOPLASM OF UPPER-OUTER QUADRANT OF LEFT BREAST IN FEMALE, ESTROGEN RECEPTOR POSITIVE: Primary | ICD-10-CM

## 2024-06-24 DIAGNOSIS — K21.9 GASTROESOPHAGEAL REFLUX DISEASE, UNSPECIFIED WHETHER ESOPHAGITIS PRESENT: ICD-10-CM

## 2024-06-24 DIAGNOSIS — E11.59 HYPERTENSION ASSOCIATED WITH DIABETES: ICD-10-CM

## 2024-06-24 PROBLEM — N17.9 AKI (ACUTE KIDNEY INJURY): Status: RESOLVED | Noted: 2019-12-26 | Resolved: 2024-06-24

## 2024-06-24 PROCEDURE — 99999 PR PBB SHADOW E&M-EST. PATIENT-LVL III: CPT | Mod: PBBFAC,,, | Performed by: INTERNAL MEDICINE

## 2024-06-24 PROCEDURE — 99214 OFFICE O/P EST MOD 30 MIN: CPT | Mod: S$GLB,,, | Performed by: INTERNAL MEDICINE

## 2024-06-24 PROCEDURE — 3078F DIAST BP <80 MM HG: CPT | Mod: CPTII,S$GLB,, | Performed by: INTERNAL MEDICINE

## 2024-06-24 PROCEDURE — 3075F SYST BP GE 130 - 139MM HG: CPT | Mod: CPTII,S$GLB,, | Performed by: INTERNAL MEDICINE

## 2024-06-24 RX ORDER — OMEPRAZOLE 20 MG/1
CAPSULE, DELAYED RELEASE ORAL
Qty: 180 CAPSULE | Refills: 3 | Status: SHIPPED | OUTPATIENT
Start: 2024-06-24

## 2024-06-24 RX ORDER — CARVEDILOL 25 MG/1
25 TABLET ORAL 2 TIMES DAILY
Qty: 180 TABLET | Refills: 3 | Status: SHIPPED | OUTPATIENT
Start: 2024-06-24 | End: 2025-06-24

## 2024-06-24 RX ORDER — LEVETIRACETAM 500 MG/1
1000 TABLET, EXTENDED RELEASE ORAL DAILY
Qty: 180 TABLET | Refills: 4 | Status: SHIPPED | OUTPATIENT
Start: 2024-06-24

## 2024-06-24 RX ORDER — ALLOPURINOL 300 MG/1
300 TABLET ORAL EVERY MORNING
Qty: 90 TABLET | Refills: 4 | Status: SHIPPED | OUTPATIENT
Start: 2024-06-24

## 2024-06-24 RX ORDER — ROSUVASTATIN CALCIUM 40 MG/1
40 TABLET, COATED ORAL EVERY MORNING
Qty: 90 TABLET | Refills: 4 | Status: SHIPPED | OUTPATIENT
Start: 2024-06-24

## 2024-06-24 RX ORDER — EZETIMIBE 10 MG/1
10 TABLET ORAL DAILY
Qty: 90 TABLET | Refills: 4 | Status: SHIPPED | OUTPATIENT
Start: 2024-06-24

## 2024-06-24 NOTE — PROGRESS NOTES
CHIEF COMPLAINT:  follow up NSTEMI, breast cancer, diabetes, hypertension, hyperlipidemia.     HISTORY OF PRESENT ILLNESS: 83 year-old woman who present for follow up of above.    She is not sleeping well. If she goes to sleep around 8-9 pm, she is awake between 1-3 am then goes back to sleep. Wakes up at 5:15 when her daughter leaves for school.  She will occasionally nap. She is not tired. She is not as active.  She falls asleep ok. She has not tried any medication.   Dr Guevara recommended melatonin which she has not tried yet    She has been waking up belching or passing gas.  Diet has not changed. She is not constipated.  She has a BM 3-4 times a week which is normal for her.   She is not eating later.  No heartburn.     She was 3/24/24 to 3/26/24 - she had a UTI and MARIA ALEJANDRA - was given IV fluids and abx. No dysuria or hematuria. Presented as confusion.  No confusion     She was hospitalized 7/16/22 to 7/18/22. She had chest pain and elevated blood pressure.  Nuclear stress test was fine. Coreg was increased. She takes aspirin 81 mg daily  and coreg 12.5 mg twice daily and amlodipine 10 mg daily.   No chest pain or shortness of breath.   She is on crestor 40 mg daily. 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 Dr Agudelo stopped the plavix on 8/9/21 and is doing fine off Plavix.       No vibration in the left foot.     No episodes of vertigo.     She had  a left knee replacement 4/15/21.She is walking with a rollator. Left knee is doing fine. She can tell when it is going to rain.  No falls    She has occasional left hand numbness reema in the little finger. Occurs when she wakes up - she does sleep on the hand. No daytime symptoms.      She will take tylenol 325 mg once or twice a week as needed for general aches and pain.       She is taking Keppra  mg 2 in the afternoon.  Gait is better with taking the Keppra in the late afternoon.  No falls.  No  seizures.      She has completed 5 cycles of CMF for her breast cancer. She completed radiation the end of April 6, 2017.  She took Femara  4/2017 - 4/2018. She had irritation of the lips on Femara. She is taking anastrozole 1 mg daily. Saw Heme Onc 6/13/24 and MMG on 8/2023      Back is doing well. She is doing stretches every other day     She continues to take allopurinol 300 mg daily for her gout. She has not had any gouty flares. She has occasional left elbow pain.      Her blood sugars have been normal. She is off metformin 850 mg twice daily. She checks blood sugars once daily.  She denies any polydipsia or polyuria. She continues to take aspirin for her coronary artery disease.      Sinuses have been doing well. She has not had to take Allegra lately. She denies any nausea, vomiting, diarrhea. \\     Reflux is well controlled on omeprazole 20 mg two tablets daily.      She continues to take Crestor 40 mg daily for her hyperlipidemia. She denies any joint pain or muscle pain from the Crestor.      She is NOT taking vitamin D 50,000 units twice weekly for vitamin D deficiency. Vitamin D level is normal at 37     Her daughter who is 59 had stage 2 breast cancer in 2014. She has had a lumpectomy and chemo and radiation. Her daughter is doing well. No anxiety, depression      PAST MEDICAL HISTORY:   1. Hypertension.   2. Diabetes mellitus   3. Hyperlipidemia.   4. Reflux.   5. Gout.   6. Coronary artery disease, status post MI in 2004 with stenting at that time, and then a right coronary artery stent placed in 2006. Had a negative stress test March 2008. [facility] 8/2012 - patent stents and non obstructive cad  7. Osteoarthritis of the right knee. S/P right knee replacement   8. Status post left knee replacement.   9. Status post LISA/BSO secondary to menstrual disorder or polyps after tubal ligation.   10. Left breast cancer and BRCA2 positive    MEDICATIONS and ALLERGIES: Updated on epic.       Family History  Mother  had breast cancer in her early 50's then had colon cancer in her 60's. She  of uterine cancer  2 Maternal aunts with breast cancer  Daughter with breast cancer at age 45 - BRCA positive  Father  of a gunshot wound  She is an only child    PHYSICAL EXAMINATION:         General: Alert, oriented. No apparent distress. Affect within normal   limits.   Conjunctivae anicteric. Neck supple.   Respiratory effort normal. Lungs clear to auscultation.   Heart: Regular rate and rhythm without murmurs, gallops or rubs.   No lower extremity edema.    Well healed scar over the left leg with some minimal surrounding edema.      Labs 24 reviewed      ASSESSMENT     1. HTN- controlled  2. Iron deficiency anemia - stable off iron   3. Angioectasias of duodenal bulb, gastric body, On omeprazole 20 mg 2 tablets daily. S/p EGD 20 with cautery. Will montior blood counts   4.  CAD with NSTEMI 2017- s/p stenting 2017 - on risk factor modifications.  To see cardiology  5. Left breast cancer with BRCA2 positive -Finished chemo and  radiation. On anastrazole-  6. Seizure -stable on Keppra XR   7.  Diabetes - controlled.  9. Gout -controlled on allopurinol    10. Hyperlipidemia - on Crestor 40 mg daily. Controlled   11. Obesity - working at diet, exercise and weight loss.   12.  GERD - controlled on meds  13. Hypokalemia -  on replacement potassium   14. S/P left knee replacement - doing well.   15. Carotid artery stenosis - ultrasound 2023 - due 2024 - ordered        Screening - mammogram      Colonoscopy 12 - normal, and 5/25/15 - 3 small polyps (one adenoma).   Colonoscopy 10/8/18 - one Tubular adenoma polyp - due .   Colonoscopy 23- normal - no need to repeat     Bone density was normal 23     Follow up in 6 months, sooner if issues.

## 2024-06-24 NOTE — TELEPHONE ENCOUNTER
----- Message from Bonnie Gallo sent at 6/21/2024  1:49 PM CDT -----  Regarding: call back  Type: Patient Call Back    Who called: pt     What is the request in detail: requesting a call to discuss her eawv completed yesterday, states provider told her she could receive some kind of gift? Please advise     Can the clinic reply by [facility]?no    Would the patient rather a call back or a response via [facility]? call    Best call back number: [phone]     Additional Information:

## 2024-06-24 NOTE — TELEPHONE ENCOUNTER
"Spoke with patient. Per HENRY Noland NP, patient should call/contact [facility], explaining she completed her EAWV and she should qualify for some reward/gift. Patient expressed understanding and gratitude for phone call.  Call ended.      "  ----- Message from Nicole Nadesiree Gallo sent at 6/21/2024  1:49 PM CDT -----  Regarding: call back  Type: Patient Call Back     Who called: pt      What is the request in detail: requesting a call to discuss her eawv completed yesterday, states provider told her she could receive some kind of gift? Please advise      Can the clinic reply by [email or web address]?no     Would the patient rather a call back or a response via [facility]? call     Best call back number: [phone]                Additional Information:      "  "

## 2024-06-29 DIAGNOSIS — G40.409 TONIC-CLONIC EPILEPTIC SEIZURES: ICD-10-CM

## 2024-07-01 RX ORDER — LEVETIRACETAM 500 MG/1
1000 TABLET, EXTENDED RELEASE ORAL
Qty: 180 TABLET | Refills: 4 | OUTPATIENT
Start: 2024-07-01

## 2024-07-01 NOTE — TELEPHONE ENCOUNTER
Refill Decision Note   Renata Bergman  is requesting a refill authorization.  Brief Assessment and Rationale for Refill:  Quick Discontinue     Medication Therapy Plan:  Receipt confirmed by pharmacy (6/24/2024  8:39 AM CDT)      Comments:     Note composed:8:16 AM 07/01/2024             Appointments     Last Visit   6/24/2024 Ethel Berger MD   Next Visit   12/23/2024 Ethel Berger MD

## 2024-07-08 ENCOUNTER — HOSPITAL ENCOUNTER (OUTPATIENT)
Dept: CARDIOLOGY | Facility: HOSPITAL | Age: 84
Discharge: HOME | End: 2024-07-08
Attending: INTERNAL MEDICINE
Payer: MEDICARE

## 2024-07-08 DIAGNOSIS — I65.23 BILATERAL CAROTID ARTERY STENOSIS: ICD-10-CM

## 2024-07-08 LAB
LEFT CBA DIAS: 27 CM/S
LEFT CBA SYS: 176 CM/S
LEFT CCA DIST DIAS: 12 CM/S
LEFT CCA DIST SYS: 68 CM/S
LEFT CCA MID DIAS: 8 CM/S
LEFT CCA MID SYS: 57 CM/S
LEFT CCA PROX DIAS: 14 CM/S
LEFT CCA PROX SYS: 123 CM/S
LEFT ECA DIAS: 11 CM/S
LEFT ECA SYS: 149 CM/S
LEFT ICA DIST DIAS: 16 CM/S
LEFT ICA DIST SYS: 62 CM/S
LEFT ICA MID DIAS: 19 CM/S
LEFT ICA MID SYS: 92 CM/S
LEFT ICA PROX DIAS: 17 CM/S
LEFT ICA PROX SYS: 105 CM/S
LEFT VERTEBRAL DIAS: 11 CM/S
LEFT VERTEBRAL SYS: 73 CM/S
OHS CV CAROTID RIGHT ICA EDV HIGHEST: 27
OHS CV CAROTID ULTRASOUND LEFT ICA/CCA RATIO: 1.54
OHS CV CAROTID ULTRASOUND RIGHT ICA/CCA RATIO: 2.34
OHS CV PV CAROTID LEFT HIGHEST CCA: 123
OHS CV PV CAROTID LEFT HIGHEST ICA: 105
OHS CV PV CAROTID RIGHT HIGHEST CCA: 88
OHS CV PV CAROTID RIGHT HIGHEST ICA: 199
OHS CV US CAROTID LEFT HIGHEST EDV: 19
RIGHT ARM DIASTOLIC BLOOD PRESSURE: 82 MMHG
RIGHT ARM SYSTOLIC BLOOD PRESSURE: 130 MMHG
RIGHT CBA DIAS: 17 CM/S
RIGHT CBA SYS: 209 CM/S
RIGHT CCA DIST DIAS: 12 CM/S
RIGHT CCA DIST SYS: 85 CM/S
RIGHT CCA MID DIAS: 7 CM/S
RIGHT CCA MID SYS: 60 CM/S
RIGHT CCA PROX DIAS: 8 CM/S
RIGHT CCA PROX SYS: 88 CM/S
RIGHT ECA DIAS: 19 CM/S
RIGHT ECA SYS: 169 CM/S
RIGHT ICA DIST DIAS: 27 CM/S
RIGHT ICA DIST SYS: 136 CM/S
RIGHT ICA MID DIAS: 18 CM/S
RIGHT ICA MID SYS: 90 CM/S
RIGHT ICA PROX DIAS: 24 CM/S
RIGHT ICA PROX SYS: 199 CM/S
RIGHT VERTEBRAL DIAS: 10 CM/S
RIGHT VERTEBRAL SYS: 46 CM/S

## 2024-07-08 PROCEDURE — 93880 EXTRACRANIAL BILAT STUDY: CPT | Mod: 26,,, | Performed by: INTERNAL MEDICINE

## 2024-07-08 PROCEDURE — 93880 EXTRACRANIAL BILAT STUDY: CPT

## 2024-07-11 ENCOUNTER — OFFICE VISIT (OUTPATIENT)
Dept: CARDIOLOGY | Facility: CLINIC | Age: 84
End: 2024-07-11
Payer: MEDICARE

## 2024-07-11 VITALS
BODY MASS INDEX: 51.57 KG/M2 | DIASTOLIC BLOOD PRESSURE: 83 MMHG | WEIGHT: 273.13 LBS | HEART RATE: 67 BPM | SYSTOLIC BLOOD PRESSURE: 151 MMHG | HEIGHT: 61 IN

## 2024-07-11 DIAGNOSIS — E11.59 HYPERTENSION ASSOCIATED WITH DIABETES: ICD-10-CM

## 2024-07-11 DIAGNOSIS — I25.118 ATHEROSCLEROTIC HEART DISEASE OF NATIVE CORONARY ARTERY WITH OTHER FORMS OF ANGINA PECTORIS: Primary | ICD-10-CM

## 2024-07-11 DIAGNOSIS — E78.5 HYPERLIPIDEMIA ASSOCIATED WITH TYPE 2 DIABETES MELLITUS: ICD-10-CM

## 2024-07-11 DIAGNOSIS — I15.2 HYPERTENSION ASSOCIATED WITH DIABETES: ICD-10-CM

## 2024-07-11 DIAGNOSIS — I65.23 BILATERAL CAROTID ARTERY STENOSIS: ICD-10-CM

## 2024-07-11 DIAGNOSIS — E11.69 HYPERLIPIDEMIA ASSOCIATED WITH TYPE 2 DIABETES MELLITUS: ICD-10-CM

## 2024-07-11 DIAGNOSIS — Z95.5 S/P CORONARY ARTERY STENT PLACEMENT: ICD-10-CM

## 2024-07-11 DIAGNOSIS — I25.2 OLD MI (MYOCARDIAL INFARCTION): ICD-10-CM

## 2024-07-11 PROCEDURE — 1159F MED LIST DOCD IN RCRD: CPT | Mod: CPTII,S$GLB,, | Performed by: INTERNAL MEDICINE

## 2024-07-11 PROCEDURE — 1160F RVW MEDS BY RX/DR IN RCRD: CPT | Mod: CPTII,S$GLB,, | Performed by: INTERNAL MEDICINE

## 2024-07-11 PROCEDURE — 3079F DIAST BP 80-89 MM HG: CPT | Mod: CPTII,S$GLB,, | Performed by: INTERNAL MEDICINE

## 2024-07-11 PROCEDURE — 1101F PT FALLS ASSESS-DOCD LE1/YR: CPT | Mod: CPTII,S$GLB,, | Performed by: INTERNAL MEDICINE

## 2024-07-11 PROCEDURE — 99214 OFFICE O/P EST MOD 30 MIN: CPT | Mod: S$GLB,,, | Performed by: INTERNAL MEDICINE

## 2024-07-11 PROCEDURE — 3077F SYST BP >= 140 MM HG: CPT | Mod: CPTII,S$GLB,, | Performed by: INTERNAL MEDICINE

## 2024-07-11 PROCEDURE — 99999 PR PBB SHADOW E&M-EST. PATIENT-LVL III: CPT | Mod: PBBFAC,,, | Performed by: INTERNAL MEDICINE

## 2024-07-11 PROCEDURE — 3288F FALL RISK ASSESSMENT DOCD: CPT | Mod: CPTII,S$GLB,, | Performed by: INTERNAL MEDICINE

## 2024-07-11 PROCEDURE — 1125F AMNT PAIN NOTED PAIN PRSNT: CPT | Mod: CPTII,S$GLB,, | Performed by: INTERNAL MEDICINE

## 2024-07-11 NOTE — PROGRESS NOTES
"Subjective:   07/11/2024     Patient ID:  Renata Bergman is a 83 y.o. female who presents for evaulation of Hyperlipidemia and Old MI      With a history of coronary artery disease status post coronary stents comes in with right neck pain, this is better when she holds it.  She has no chest pain associated with it.  There is no shortness of breath, she does have dyspnea on exertion.  She does have prior diagnosis of coronary artery disease in his status post coronary stent, none since could Claudia, that stent was placed in [place] when she evacuated after [place].      Hypertension is present, currently on carvedilol 25 twice a day and amlodipine 10 mg a day.  She does have chronic kidney insufficiency.    Carotid ultrasound did not show severe blockages recently.      Hypercholesterolemia is treated with max dose rosuvastatin and ezetimibe.        Past Medical History:   Diagnosis Date    Anemia     Benign essential hypertension 09/04/2012    Breast cancer 2016    DCIS and IDC, stage I    Carpal tunnel syndrome 6/23/2014    Cataract     Coronary artery disease 09/04/2012    Diabetes mellitus due to abnormal insulin 09/04/2012    Diabetes mellitus type II     Genetic testing     brca2 positive     GERD (gastroesophageal reflux disease) 09/04/2012    Gout, arthritis 09/04/2012    Heart failure     Hyperlipidemia 09/04/2012    Hypertension     Labyrinthitis of both ears 02/28/2022    Myocardial infarction     Obesity     Primary osteoarthritis of both knees 09/04/2012    Renal manifestation of secondary diabetes mellitus     Seizure     states "one time during chemo in 2018"    Type 2 diabetes with peripheral circulatory disorder, controlled        Review of patient's allergies indicates:   Allergen Reactions    Lisinopril Anaphylaxis         Current Outpatient Medications:     acetaminophen (TYLENOL) 500 MG tablet, Take 1,000 mg by mouth daily as needed for Pain (headache)., Disp: , Rfl:     allopurinoL " (ZYLOPRIM) 300 MG tablet, Take 1 tablet (300 mg total) by mouth every morning., Disp: 90 tablet, Rfl: 4    amLODIPine (NORVASC) 10 MG tablet, TAKE 1 TABLET(10 MG) BY MOUTH EVERY DAY, Disp: 90 tablet, Rfl: 3    anastrozole (ARIMIDEX) 1 mg Tab, TAKE 1 TABLET(1 MG) BY MOUTH EVERY DAY. STOP LETROZOLE FEMARA, Disp: 90 tablet, Rfl: 3    aspirin (ECOTRIN) 81 MG EC tablet, Take 1 tablet (81 mg total) by mouth once daily., Disp: , Rfl:     blood sugar diagnostic Strp, 1 strip by Misc.(Non-Drug; Combo Route) route once daily., Disp: 100 strip, Rfl: 4    blood-glucose meter kit, Use as instructed, Disp: 1 each, Rfl: 0    carvediloL (COREG) 25 MG tablet, Take 1 tablet (25 mg total) by mouth 2 (two) times daily., Disp: 180 tablet, Rfl: 3    ezetimibe (ZETIA) 10 mg tablet, Take 1 tablet (10 mg total) by mouth once daily., Disp: 90 tablet, Rfl: 4    lancets Misc, Check blood sugar once daily, Disp: 100 each, Rfl: 0    levetiracetam XR (KEPPRA XR) 500 mg Tb24 24 hr tablet, Take 2 tablets (1,000 mg total) by mouth once daily., Disp: 180 tablet, Rfl: 4    nitroGLYCERIN (NITROSTAT) 0.4 MG SL tablet, PLACE 1 TABLET UNDER TONGUE EVERY 5 MINUTES AS NEEDED FOR CHEST PAIN. USE 3 TIMES, IF NO RELIEF CALL 911, Disp: 450 tablet, Rfl: 0    omeprazole (PRILOSEC) 20 MG capsule, Take 1 capsule (20 mg total) by mouth 2 (two) times a day, Disp: 180 capsule, Rfl: 3    propylene glycol (SYSTANE BALANCE OPHT), Apply 1-2 drops to eye daily as needed (dry eyes)., Disp: , Rfl:     rosuvastatin (CRESTOR) 40 MG Tab, Take 1 tablet (40 mg total) by mouth every morning., Disp: 90 tablet, Rfl: 4     Objective:   Review of Systems   Cardiovascular:  Positive for dyspnea on exertion. Negative for chest pain, claudication, cyanosis, irregular heartbeat, leg swelling, near-syncope, orthopnea, palpitations, paroxysmal nocturnal dyspnea and syncope.   Musculoskeletal:  Positive for neck pain.         Vitals:    07/11/24 0856   BP: (!) 151/83   Pulse: 67     Wt  Readings from Last 3 Encounters:   07/11/24 123.9 kg (273 lb 2.4 oz)   06/24/24 122.6 kg (270 lb 4.5 oz)   06/20/24 120.6 kg (265 lb 14 oz)     Temp Readings from Last 3 Encounters:   06/13/24 98.1 °F (36.7 °C) (Oral)   04/01/24 98.3 °F (36.8 °C)   03/24/24 98.2 °F (36.8 °C) (Tympanic)     BP Readings from Last 3 Encounters:   07/11/24 (!) 151/83   06/24/24 130/70   06/20/24 (!) 142/74     Pulse Readings from Last 3 Encounters:   07/11/24 67   06/24/24 64   06/20/24 75             Physical Exam      Lab Results   Component Value Date    CHOL 145 06/21/2024    CHOL 167 06/17/2023    CHOL 159 06/22/2022     Lab Results   Component Value Date    HDL 55 06/21/2024    HDL 65 06/17/2023    HDL 60 06/22/2022     Lab Results   Component Value Date    LDLCALC 78.0 06/21/2024    LDLCALC 90.2 06/17/2023    LDLCALC 84.8 06/22/2022     Lab Results   Component Value Date    ALT 16 06/21/2024    AST 19 06/21/2024    AST 35 03/24/2024    AST 33 03/23/2024     Lab Results   Component Value Date    CREATININE 1.4 06/21/2024    BUN 21 06/21/2024     06/21/2024    K 4.4 06/21/2024    CO2 25 06/21/2024    CO2 21 (L) 03/24/2024    CO2 16 (L) 03/23/2024     Lab Results   Component Value Date    HGB 13.1 06/21/2024    HCT 41.1 06/21/2024    HCT 36.4 (L) 03/24/2024    HCT 42.4 03/23/2024                         Assessment and Plan:     Atherosclerotic heart disease of native coronary artery with other forms of angina pectoris  Comments:  PET stress test for evaluation  Orders:  -     Cardiac PET Scan Stress; Future; Expected date: 07/12/2024    Bilateral carotid artery stenosis  Comments:  Would not cause neck pain    Hyperlipidemia associated with type 2 diabetes mellitus  Comments:  On max dose rosuvastatin plus ezetimibe, takes regularly    Hypertension associated with diabetes  Comments:  Allergic to lisinopril, hesitate to begin ARB  Hesitate to begin diuretics with chronic kidney insufficiency    Old MI (myocardial  infarction)    S/P coronary artery stent placement  Comments:  Continue aspirin 81 mg daily         No follow-ups on file.          Future Appointments   Date Time Provider Department Center   8/22/2024  9:15 AM [facility] LAUREL MAMMO2 [facility] MAMMO [facility]   9/3/2024  8:30 AM CARDIAC, PET IMAGING [facility] CARDPET [facility]   10/16/2024  9:30 AM Riana Campuzano Au.D, CCC-A [facility] AUDIO [facility]   10/16/2024 10:30 AM Satish Richardson MD [facility] ENT [facility]   12/12/2024 10:40 AM Bismark Guevara MD [facility] TNSYHO [facility]   12/21/2024  8:00 AM LAB, APPOINTMENT [facility] INTMED [facility] LAB IM [facility] CONROY   12/23/2024  8:30 AM Ethel Berger MD [facility] Jermaine sharda [facility]

## 2024-07-14 DIAGNOSIS — E78.2 MIXED HYPERLIPIDEMIA: ICD-10-CM

## 2024-07-14 DIAGNOSIS — C50.412 MALIGNANT NEOPLASM OF UPPER-OUTER QUADRANT OF LEFT BREAST IN FEMALE, ESTROGEN RECEPTOR POSITIVE: ICD-10-CM

## 2024-07-14 DIAGNOSIS — Z17.0 MALIGNANT NEOPLASM OF UPPER-OUTER QUADRANT OF LEFT BREAST IN FEMALE, ESTROGEN RECEPTOR POSITIVE: ICD-10-CM

## 2024-07-15 RX ORDER — ANASTROZOLE 1 MG/1
1 TABLET ORAL
Qty: 90 TABLET | Refills: 3 | Status: SHIPPED | OUTPATIENT
Start: 2024-07-15

## 2024-07-15 RX ORDER — EZETIMIBE 10 MG/1
10 TABLET ORAL
Qty: 90 TABLET | Refills: 3 | Status: SHIPPED | OUTPATIENT
Start: 2024-07-15

## 2024-07-15 NOTE — TELEPHONE ENCOUNTER
Refill Routing Note   Medication(s) are not appropriate for processing by [facility] for the following reason(s):        Outside of protocol    [facility] action(s):  Route  Approve   Requires labs : Yes             Appointments  past 12m or future 3m with PCP    Date Provider   Last Visit   6/24/2024 Ethel Berger MD   Next Visit   12/23/2024 Ethel Berger MD   ED visits in past 90 days: 0        Note composed:1:55 AM 07/15/2024

## 2024-07-15 NOTE — TELEPHONE ENCOUNTER
Care Due:                  Date            Visit Type   Department     Provider  --------------------------------------------------------------------------------                                EP -                              PRIMARY      [facility] INTERNAL  Last Visit: 06-      CARE ([facility])   MEDICINE       AKANKSHA LARIOS                              EP                               PRIMARY      [facility] INTERNAL  Next Visit: 12-      CARE ([facility])   MEDICINE       AKANKSHA LARIOS                                                            Last  Test          Frequency    Reason                     Performed    Due Date  --------------------------------------------------------------------------------    Uric Acid...  12 months..  allopurinoL..............  06- 06-    Health [facility] Embedded Care Due Messages. Reference number: [id number].   7/14/2024 9:37:03 PM CDT

## 2024-08-02 RX ORDER — OMEPRAZOLE 20 MG/1
CAPSULE, DELAYED RELEASE ORAL
Qty: 180 CAPSULE | Refills: 3 | Status: SHIPPED | OUTPATIENT
Start: 2024-08-02

## 2024-08-02 NOTE — TELEPHONE ENCOUNTER
No care due was identified.  Health [facility] Embedded Care Due Messages. Reference number: [id number].   8/02/2024 5:41:52 AM CDT

## 2024-08-02 NOTE — TELEPHONE ENCOUNTER
Refill Decision Note   Renata Bergman  is requesting a refill authorization.  Brief Assessment and Rationale for Refill:  Approve     Medication Therapy Plan:         Comments:     Note composed:9:47 AM 08/02/2024

## 2024-08-22 ENCOUNTER — HOSPITAL ENCOUNTER (OUTPATIENT)
Dept: RADIOLOGY | Facility: HOSPITAL | Age: 84
Discharge: HOME | End: 2024-08-22
Attending: INTERNAL MEDICINE
Payer: MEDICARE

## 2024-08-22 DIAGNOSIS — Z12.31 ENCOUNTER FOR SCREENING MAMMOGRAM FOR HIGH-RISK PATIENT: ICD-10-CM

## 2024-08-22 PROCEDURE — 77067 SCR MAMMO BI INCL CAD: CPT | Mod: TC

## 2024-08-29 ENCOUNTER — TELEPHONE (OUTPATIENT)
Dept: CARDIOLOGY | Facility: HOSPITAL | Age: 84
End: 2024-08-29
Payer: MEDICARE

## 2024-09-03 ENCOUNTER — HOSPITAL ENCOUNTER (OUTPATIENT)
Dept: CARDIOLOGY | Facility: HOSPITAL | Age: 84
Discharge: HOME | End: 2024-09-03
Attending: INTERNAL MEDICINE
Payer: MEDICARE

## 2024-09-03 ENCOUNTER — TELEPHONE (OUTPATIENT)
Dept: CARDIOLOGY | Facility: CLINIC | Age: 84
End: 2024-09-03
Payer: MEDICARE

## 2024-09-03 VITALS
WEIGHT: 273 LBS | HEIGHT: 61 IN | SYSTOLIC BLOOD PRESSURE: 181 MMHG | BODY MASS INDEX: 51.54 KG/M2 | DIASTOLIC BLOOD PRESSURE: 79 MMHG | HEART RATE: 82 BPM

## 2024-09-03 DIAGNOSIS — I25.118 ATHEROSCLEROTIC HEART DISEASE OF NATIVE CORONARY ARTERY WITH OTHER FORMS OF ANGINA PECTORIS: ICD-10-CM

## 2024-09-03 LAB
CFR FLOW - ANTERIOR: 1.53
CFR FLOW - INFERIOR: 1.5
CFR FLOW - LATERAL: 1.45
CFR FLOW - MAX: 2.22
CFR FLOW - MIN: 1.23
CFR FLOW - SEPTAL: 1.84
CFR FLOW - WHOLE HEART: 1.58
CV PHARM DOSE: 0.4 MG
CV STRESS BASE HR: 82 BPM
DIASTOLIC BLOOD PRESSURE: 79 MMHG
EJECTION FRACTION- HIGH: 59 %
END DIASTOLIC INDEX-HIGH: 155 ML/M2
END DIASTOLIC INDEX-LOW: 91 ML/M2
END SYSTOLIC INDEX-HIGH: 78 ML/M2
END SYSTOLIC INDEX-LOW: 40 ML/M2
NUC REST DIASTOLIC VOLUME INDEX: 79
NUC REST EJECTION FRACTION: 77
NUC REST SYSTOLIC VOLUME INDEX: 18
NUC STRESS DIASTOLIC VOLUME INDEX: 79
NUC STRESS EJECTION FRACTION: 79 %
NUC STRESS SYSTOLIC VOLUME INDEX: 17
OHS CV CPX 85 PERCENT MAX PREDICTED HEART RATE MALE: 117
OHS CV CPX MAX PREDICTED HEART RATE: 138
OHS CV CPX PATIENT IS FEMALE: 1
OHS CV CPX PATIENT IS MALE: 0
OHS CV CPX PEAK DIASTOLIC BLOOD PRESSURE: 59 MMHG
OHS CV CPX PEAK HEAR RATE: 78 BPM
OHS CV CPX PEAK RATE PRESSURE PRODUCT: 7488
OHS CV CPX PEAK SYSTOLIC BLOOD PRESSURE: 96 MMHG
OHS CV CPX PERCENT MAX PREDICTED HEART RATE ACHIEVED: 58
OHS CV CPX RATE PRESSURE PRODUCT PRESENTING: NORMAL
REST FLOW - ANTERIOR: 1.47 CC/MIN/G
REST FLOW - INFERIOR: 1.37 CC/MIN/G
REST FLOW - LATERAL: 1.62 CC/MIN/G
REST FLOW - MAX: 2.03 CC/MIN/G
REST FLOW - MIN: 0.71 CC/MIN/G
REST FLOW - SEPTAL: 1.21 CC/MIN/G
REST FLOW - WHOLE HEART: 1.42 CC/MIN/G
RETIRED EF AND QEF - SEE NOTES: 47 %
STRESS FLOW - ANTERIOR: 2.24 CC/MIN/G
STRESS FLOW - INFERIOR: 2.05 CC/MIN/G
STRESS FLOW - LATERAL: 2.32 CC/MIN/G
STRESS FLOW - MAX: 2.76 CC/MIN/G
STRESS FLOW - MIN: 1.52 CC/MIN/G
STRESS FLOW - SEPTAL: 2.21 CC/MIN/G
STRESS FLOW - WHOLE HEART: 2.21 CC/MIN/G
SYSTOLIC BLOOD PRESSURE: 181 MMHG

## 2024-09-03 PROCEDURE — 78434 AQMBF PET REST & RX STRESS: CPT

## 2024-09-03 PROCEDURE — 78431 MYOCRD IMG PET RST&STRS CT: CPT | Mod: 26,,, | Performed by: INTERNAL MEDICINE

## 2024-09-03 PROCEDURE — 63600175 PHARM REV CODE 636 W HCPCS: Performed by: INTERNAL MEDICINE

## 2024-09-03 PROCEDURE — 93016 CV STRESS TEST SUPVJ ONLY: CPT | Mod: ,,, | Performed by: INTERNAL MEDICINE

## 2024-09-03 PROCEDURE — 78434 AQMBF PET REST & RX STRESS: CPT | Mod: 26,,, | Performed by: INTERNAL MEDICINE

## 2024-09-03 PROCEDURE — 93018 CV STRESS TEST I&R ONLY: CPT | Mod: ,,, | Performed by: INTERNAL MEDICINE

## 2024-09-03 PROCEDURE — A9555 RB82 RUBIDIUM: HCPCS | Performed by: INTERNAL MEDICINE

## 2024-09-03 RX ORDER — REGADENOSON 0.08 MG/ML
0.4 INJECTION, SOLUTION INTRAVENOUS
Status: COMPLETED | OUTPATIENT
Start: 2024-09-03 | End: 2024-09-03

## 2024-09-03 RX ADMIN — REGADENOSON 0.4 MG: 0.08 INJECTION, SOLUTION INTRAVENOUS at 09:09

## 2024-09-03 RX ADMIN — RUBIDIUM CHLORIDE RB-82 38 MILLICURIE: 150 INJECTION, SOLUTION INTRAVENOUS at 08:09

## 2024-09-03 RX ADMIN — RUBIDIUM CHLORIDE RB-82 37.9 MILLICURIE: 150 INJECTION, SOLUTION INTRAVENOUS at 09:09

## 2024-09-03 NOTE — TELEPHONE ENCOUNTER
----- Message from Jamal Beasley Jr., MD sent at 9/3/2024 10:21 AM CDT -----  Please call to make sure she gets her message

## 2024-10-28 DIAGNOSIS — I10 HTN (HYPERTENSION), BENIGN: ICD-10-CM

## 2024-10-28 RX ORDER — ALLOPURINOL 300 MG/1
300 TABLET ORAL EVERY MORNING
Qty: 90 TABLET | Refills: 3 | Status: SHIPPED | OUTPATIENT
Start: 2024-10-28

## 2024-10-28 RX ORDER — CARVEDILOL 25 MG/1
25 TABLET ORAL 2 TIMES DAILY
Qty: 180 TABLET | Refills: 3 | Status: SHIPPED | OUTPATIENT
Start: 2024-10-28

## 2024-11-10 RX ORDER — ROSUVASTATIN CALCIUM 40 MG/1
40 TABLET, COATED ORAL EVERY MORNING
Qty: 90 TABLET | Refills: 2 | Status: SHIPPED | OUTPATIENT
Start: 2024-11-10

## 2024-11-10 NOTE — TELEPHONE ENCOUNTER
Refill Decision Note   Renata Bergman  is requesting a refill authorization.  Brief Assessment and Rationale for Refill:  Approve     Medication Therapy Plan:        Comments:     Note composed:2:07 PM 11/10/2024

## 2024-11-10 NOTE — TELEPHONE ENCOUNTER
No care due was identified.  [facility] Embedded Care Due Messages. Reference number: [id number].   11/10/2024 1:43:31 PM CST

## 2024-12-12 ENCOUNTER — OFFICE VISIT (OUTPATIENT)
Dept: HEMATOLOGY/ONCOLOGY | Facility: CLINIC | Age: 84
End: 2024-12-12
Payer: MEDICARE

## 2024-12-12 VITALS
BODY MASS INDEX: 49.9 KG/M2 | OXYGEN SATURATION: 99 % | WEIGHT: 264.31 LBS | TEMPERATURE: 98 F | HEART RATE: 74 BPM | RESPIRATION RATE: 18 BRPM | SYSTOLIC BLOOD PRESSURE: 145 MMHG | HEIGHT: 61 IN | DIASTOLIC BLOOD PRESSURE: 73 MMHG

## 2024-12-12 DIAGNOSIS — Z17.0 MALIGNANT NEOPLASM OF UPPER-OUTER QUADRANT OF LEFT BREAST IN FEMALE, ESTROGEN RECEPTOR POSITIVE: Primary | ICD-10-CM

## 2024-12-12 DIAGNOSIS — C50.412 MALIGNANT NEOPLASM OF UPPER-OUTER QUADRANT OF LEFT BREAST IN FEMALE, ESTROGEN RECEPTOR POSITIVE: Primary | ICD-10-CM

## 2024-12-12 PROCEDURE — 3078F DIAST BP <80 MM HG: CPT | Mod: CPTII,S$GLB,, | Performed by: INTERNAL MEDICINE

## 2024-12-12 PROCEDURE — 3077F SYST BP >= 140 MM HG: CPT | Mod: CPTII,S$GLB,, | Performed by: INTERNAL MEDICINE

## 2024-12-12 PROCEDURE — 1159F MED LIST DOCD IN RCRD: CPT | Mod: CPTII,S$GLB,, | Performed by: INTERNAL MEDICINE

## 2024-12-12 PROCEDURE — 1126F AMNT PAIN NOTED NONE PRSNT: CPT | Mod: CPTII,S$GLB,, | Performed by: INTERNAL MEDICINE

## 2024-12-12 PROCEDURE — 3288F FALL RISK ASSESSMENT DOCD: CPT | Mod: CPTII,S$GLB,, | Performed by: INTERNAL MEDICINE

## 2024-12-12 PROCEDURE — 99213 OFFICE O/P EST LOW 20 MIN: CPT | Mod: S$GLB,,, | Performed by: INTERNAL MEDICINE

## 2024-12-12 PROCEDURE — 1101F PT FALLS ASSESS-DOCD LE1/YR: CPT | Mod: CPTII,S$GLB,, | Performed by: INTERNAL MEDICINE

## 2024-12-12 PROCEDURE — 99999 PR PBB SHADOW E&M-EST. PATIENT-LVL III: CPT | Mod: PBBFAC,,, | Performed by: INTERNAL MEDICINE

## 2024-12-12 NOTE — PROGRESS NOTES
Yes mass  Subjective:       Patient ID: Renata Bergman is a 84 y.o. female.    Chief Complaint: No chief complaint on file.    HPI Mrs. Hussein is a 84-year-old female with a history of stage I ER + left breast cancer.  She is on adjuvant anastrozole. She is BRCA 2 +.    She has been feeling well general.  She has no unusual pain.  She does have some sleep disturbance which is mild.  Her breathing seems better and she has less dyspnea on exertion.  Her appetite is variable.  She does report that she has lost her sense of smell.  Bowel function is normal.    Breast history: Screening mammogram on May 5, 2016 showed an irregular 22 mm mass with abnormal calcifications in the left breast 2:00 position. Follow-up ultrasound July 13 she noted a regular solid 17 mm mass.    On July 21 needle biopsy showed grade 2 infiltrating ductal carcinoma strongly ER [place] positive (90%) and HER-2 negative.    On August 1, 2016 lumpectomy and sentinel lymph node biopsy were performed. That showed a 1.8 x 1.6 x 1.0 infiltrating carcinoma intermediate grade (histologic grade 3, nuclear grade 2, mitotic index 2.   The sentinel lymph node was negative and margins were negative. Final pathological stage TIc N0 stage IA.  Oncotype was 31 indicating a 21% risk of recurrence with tamoxifen alone.    She  had 3 weeks of weekly Taxol and then because of insurance issues she was changed to CMF and had 5 cycles of that.     Chemotherapy was completed in January 2017.    She completed radiation in April 2017 and began letrozole endocrine therapy that month.  Subsequently changed to anastrozole.    Breast Cancer Index March 2022 showed 14.3 % risk of late recurrence with benefit to extended endocrine therapy.      Mammogram 8/22/24 - negative      Genetics -BRCA 2 +  Review of Systems   Constitutional:  Negative for activity change, appetite change, fever and unexpected weight change.   HENT:  Negative for trouble swallowing.         Has lost her  sense of smell   Respiratory:  Positive for shortness of breath (improved). Negative for cough.    Gastrointestinal:  Negative for abdominal pain, constipation, diarrhea and nausea.   Musculoskeletal:  Negative for back pain.   Neurological:  Negative for headaches.        Balance issues   Psychiatric/Behavioral:  Positive for sleep disturbance. Negative for dysphoric mood. The patient is not nervous/anxious.        Objective:      Physical Exam  Vitals reviewed.   Constitutional:       Appearance: She is well-developed.   Eyes:      General: No scleral icterus.  Cardiovascular:      Rate and Rhythm: Regular rhythm.      Heart sounds: Normal heart sounds.   Pulmonary:      Effort: Pulmonary effort is normal.      Breath sounds: Normal breath sounds. No wheezing or rales.   Chest:   Breasts:     Right: No mass, nipple discharge or skin change.      Left: Skin change present. No mass or nipple discharge.       Abdominal:      Palpations: Abdomen is soft. There is no mass.      Tenderness: There is no abdominal tenderness.   Lymphadenopathy:      Cervical: No cervical adenopathy.      Upper Body:      Right upper body: No supraclavicular or axillary adenopathy.      Left upper body: No supraclavicular or axillary adenopathy.   Neurological:      Mental Status: She is alert and oriented to person, place, and time.      Cranial Nerves: No cranial nerve deficit.   Psychiatric:         Behavior: Behavior normal.         Thought Content: Thought content normal.       Assessment:       1. Malignant neoplasm of upper-outer quadrant of left breast in female, estrogen receptor positive        Plan:     Return to clinic in 6 months    Continue anastrozole to 10 years based on BCI.    Route Chart for Scheduling    Med Onc Chart Routing      Follow up with physician 6 months.   Follow up with [facility]    Infusion scheduling note    Injection scheduling note    Labs None   Scheduling:  Preferred lab:  Lab interval:     Imaging None       Pharmacy appointment No pharmacy appointment needed      Other referrals no referral to Oncology Primary Care needed -  no Massage appointment needed    No additional referrals needed

## 2024-12-21 ENCOUNTER — LAB VISIT (OUTPATIENT)
Dept: LAB | Facility: HOSPITAL | Age: 84
End: 2024-12-21
Attending: INTERNAL MEDICINE
Payer: MEDICARE

## 2024-12-21 DIAGNOSIS — E11.59 TYPE 2 DIABETES MELLITUS WITH OTHER CIRCULATORY COMPLICATION, WITHOUT LONG-TERM CURRENT USE OF INSULIN: ICD-10-CM

## 2024-12-21 LAB
ALBUMIN SERPL BCP-MCNC: 3.6 G/DL (ref 3.5–5.2)
ALP SERPL-CCNC: 85 U/L (ref 40–150)
ALT SERPL W/O P-5'-P-CCNC: 15 U/L (ref 10–44)
ANION GAP SERPL CALC-SCNC: 11 MMOL/L (ref 8–16)
AST SERPL-CCNC: 18 U/L (ref 10–40)
BASOPHILS # BLD AUTO: 0.04 K/UL (ref 0–0.2)
BASOPHILS NFR BLD: 0.6 % (ref 0–1.9)
BILIRUB SERPL-MCNC: 0.6 MG/DL (ref 0.1–1)
BUN SERPL-MCNC: 15 MG/DL (ref 8–23)
CALCIUM SERPL-MCNC: 9.9 MG/DL (ref 8.7–10.5)
CHLORIDE SERPL-SCNC: 107 MMOL/L (ref 95–110)
CO2 SERPL-SCNC: 25 MMOL/L (ref 23–29)
CREAT SERPL-MCNC: 1.4 MG/DL (ref 0.5–1.4)
DIFFERENTIAL METHOD BLD: ABNORMAL
EOSINOPHIL # BLD AUTO: 0.3 K/UL (ref 0–0.5)
EOSINOPHIL NFR BLD: 4.7 % (ref 0–8)
ERYTHROCYTE [DISTWIDTH] IN BLOOD BY AUTOMATED COUNT: 13.4 % (ref 11.5–14.5)
EST. GFR  (NO RACE VARIABLE): 37.6 ML/MIN/1.73 M^2
ESTIMATED AVG GLUCOSE: 134 MG/DL (ref 68–131)
GLUCOSE SERPL-MCNC: 130 MG/DL (ref 70–110)
HBA1C MFR BLD: 6.3 % (ref 4–5.6)
HCT VFR BLD AUTO: 42.3 % (ref 37–48.5)
HGB BLD-MCNC: 14.2 G/DL (ref 12–16)
IMM GRANULOCYTES # BLD AUTO: 0.02 K/UL (ref 0–0.04)
IMM GRANULOCYTES NFR BLD AUTO: 0.3 % (ref 0–0.5)
LYMPHOCYTES # BLD AUTO: 1.8 K/UL (ref 1–4.8)
LYMPHOCYTES NFR BLD: 29 % (ref 18–48)
MCH RBC QN AUTO: 32.3 PG (ref 27–31)
MCHC RBC AUTO-ENTMCNC: 33.6 G/DL (ref 32–36)
MCV RBC AUTO: 96 FL (ref 82–98)
MONOCYTES # BLD AUTO: 0.7 K/UL (ref 0.3–1)
MONOCYTES NFR BLD: 11.9 % (ref 4–15)
NEUTROPHILS # BLD AUTO: 3.3 K/UL (ref 1.8–7.7)
NEUTROPHILS NFR BLD: 53.5 % (ref 38–73)
NRBC BLD-RTO: 0 /100 WBC
PLATELET # BLD AUTO: 229 K/UL (ref 150–450)
PMV BLD AUTO: 10.4 FL (ref 9.2–12.9)
POTASSIUM SERPL-SCNC: 4.3 MMOL/L (ref 3.5–5.1)
PROT SERPL-MCNC: 7.5 G/DL (ref 6–8.4)
RBC # BLD AUTO: 4.4 M/UL (ref 4–5.4)
SODIUM SERPL-SCNC: 143 MMOL/L (ref 136–145)
WBC # BLD AUTO: 6.21 K/UL (ref 3.9–12.7)

## 2024-12-21 PROCEDURE — 83036 HEMOGLOBIN GLYCOSYLATED A1C: CPT | Performed by: INTERNAL MEDICINE

## 2024-12-21 PROCEDURE — 80053 COMPREHEN METABOLIC PANEL: CPT | Performed by: INTERNAL MEDICINE

## 2024-12-21 PROCEDURE — 85025 COMPLETE CBC W/AUTO DIFF WBC: CPT | Performed by: INTERNAL MEDICINE

## 2024-12-21 PROCEDURE — 36415 COLL VENOUS BLD VENIPUNCTURE: CPT | Performed by: INTERNAL MEDICINE

## 2024-12-23 ENCOUNTER — IMMUNIZATION (OUTPATIENT)
Dept: INTERNAL MEDICINE | Facility: CLINIC | Age: 84
End: 2024-12-23
Payer: MEDICARE

## 2024-12-23 ENCOUNTER — OFFICE VISIT (OUTPATIENT)
Dept: INTERNAL MEDICINE | Facility: CLINIC | Age: 84
End: 2024-12-23
Payer: MEDICARE

## 2024-12-23 VITALS
OXYGEN SATURATION: 97 % | SYSTOLIC BLOOD PRESSURE: 130 MMHG | BODY MASS INDEX: 50.69 KG/M2 | HEIGHT: 61 IN | WEIGHT: 268.5 LBS | DIASTOLIC BLOOD PRESSURE: 80 MMHG | HEART RATE: 54 BPM

## 2024-12-23 DIAGNOSIS — Z00.00 ROUTINE GENERAL MEDICAL EXAMINATION AT A HEALTH CARE FACILITY: Primary | ICD-10-CM

## 2024-12-23 DIAGNOSIS — Z23 NEED FOR VACCINATION: Primary | ICD-10-CM

## 2024-12-23 DIAGNOSIS — E53.8 VITAMIN B12 DEFICIENCY: ICD-10-CM

## 2024-12-23 DIAGNOSIS — E11.59 TYPE 2 DIABETES MELLITUS WITH OTHER CIRCULATORY COMPLICATION, WITHOUT LONG-TERM CURRENT USE OF INSULIN: ICD-10-CM

## 2024-12-23 DIAGNOSIS — E55.9 VITAMIN D DEFICIENCY DISEASE: ICD-10-CM

## 2024-12-23 PROCEDURE — 91320 SARSCV2 VAC 30MCG TRS-SUC IM: CPT | Mod: S$GLB,,, | Performed by: INTERNAL MEDICINE

## 2024-12-23 PROCEDURE — G0008 ADMIN INFLUENZA VIRUS VAC: HCPCS | Mod: S$GLB,,, | Performed by: INTERNAL MEDICINE

## 2024-12-23 PROCEDURE — 90653 IIV ADJUVANT VACCINE IM: CPT | Mod: S$GLB,,, | Performed by: INTERNAL MEDICINE

## 2024-12-23 PROCEDURE — 99999 PR PBB SHADOW E&M-EST. PATIENT-LVL II: CPT | Mod: PBBFAC,,, | Performed by: INTERNAL MEDICINE

## 2024-12-23 PROCEDURE — 90480 ADMN SARSCOV2 VAC 1/ONLY CMP: CPT | Mod: S$GLB,,, | Performed by: INTERNAL MEDICINE

## 2024-12-23 RX ORDER — AMLODIPINE BESYLATE 10 MG/1
10 TABLET ORAL DAILY
Qty: 90 TABLET | Refills: 3 | Status: SHIPPED | OUTPATIENT
Start: 2024-12-23

## 2024-12-23 NOTE — PROGRESS NOTES
CHIEF COMPLAINT:  follow up NSTEMI, breast cancer, diabetes, hypertension, hyperlipidemia.     HISTORY OF PRESENT ILLNESS: 84 year-old woman who present for follow up of above.    She is still not sleeping well. If she goes to sleep around 8-9 pm, she is awake between 1-3 am then goes back to sleep. Wakes up at 5:15 when her daughter leaves for school.  She will occasionally nap. She is not tired. She is not as active.  She falls asleep ok. She tried melatonin which which did not help.      No nausea, vomiting, constipation, diarrhea.  No heartburn.     She fell in the bathroom 2-3 weeks ago. She tripped over her foot. NO injury.      No UTI. No dysuria or hematuria.     She was hospitalized 7/16/22 to 7/18/22. She had chest pain and elevated blood pressure.  Nuclear stress test was fine. Coreg was increased. She takes aspirin 81 mg daily  and coreg 12.5 mg twice daily and amlodipine 10 mg daily.   No chest pain or shortness of breath.   She is on crestor 40 mg daily. 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 Dr Agudelo stopped the plavix on 8/9/21 and is doing fine off Plavix.      No episodes of vertigo.     She had  a left knee replacement 4/15/21.She is walking with a rollator. Left knee is doing fine. She can tell when it is going to rain.  No falls     No left hand numbness      She will take tylenol 325 mg once or twice a week as needed for general aches and pain.   Occasional low back pain.      She is taking Keppra  mg 2 in the afternoon.  Gait is better with taking the Keppra in the late afternoon.  No falls.  No seizures.      She has completed 5 cycles of CMF for her breast cancer. She completed radiation the end of April 6, 2017.  She took Femara  4/2017 - 4/2018. She had irritation of the lips on Femara. She is taking anastrozole 1 mg daily. Saw Heme Onc 12/12/24 and MMG on 8/2024     She continues to take allopurinol 300 mg daily for her  "gout. She has not had any gouty flares. She has occasional left elbow pain.      Her blood sugars have been normal. She is off metformin 850 mg twice daily. She checks blood sugars once daily.  She denies any polydipsia or polyuria. She continues to take aspirin for her coronary artery disease.      Sinuses have been doing well. She has not had to take Allegra lately. She denies any nausea, vomiting, diarrhea.      Reflux is well controlled on omeprazole 20 mg two tablets daily.      She continues to take Crestor 40 mg daily for her hyperlipidemia. She denies any joint pain or muscle pain from the Crestor.      She is NOT taking vitamin D 50,000 units twice weekly for vitamin D deficiency. Vitamin D level is normal at 37     Her daughter who is 59 had stage 2 breast cancer in . She has had a lumpectomy and chemo and radiation. Her daughter is doing well. No anxiety, depression      PAST MEDICAL HISTORY:   1. Hypertension.   2. Diabetes mellitus   3. Hyperlipidemia.   4. Reflux.   5. Gout.   6. Coronary artery disease, status post MI in  with stenting at that time, and then a right coronary artery stent placed in . Had a negative stress test 2008. [facility] 2012 - patent stents and non obstructive cad  7. Osteoarthritis of the right knee. S/P right knee replacement   8. Status post left knee replacement.   9. Status post LISA/BSO secondary to menstrual disorder or polyps after tubal ligation.   10. Left breast cancer and BRCA2 positive    MEDICATIONS and ALLERGIES: Updated on epic.       Family History  Mother had breast cancer in her early 50's then had colon cancer in her 60's. She  of uterine cancer  2 Maternal aunts with breast cancer  Daughter with breast cancer at age 45 - BRCA positive  Father  of a gunshot wound  She is an only child    PHYSICAL EXAMINATION:    /80   Pulse (!) 54   Ht 5' 1" (1.549 m)   Wt 121.8 kg (268 lb 8.3 oz)   LMP  (LMP Unknown)   SpO2 97%   BMI 50.74 " kg/m²        General: Alert, oriented. No apparent distress. Affect within normal   limits.   Conjunctivae anicteric. Neck supple.   Respiratory effort normal. Lungs clear to auscultation.   Heart: Regular rate and rhythm without murmurs, gallops or rubs.   No lower extremity edema.       Labs 12/21/24 reviewed      ASSESSMENT    Annual exam - discussed diet, exercise and safety issues.       1. HTN- controlled  2. Iron deficiency anemia - stable off iron   3. Angioectasias of duodenal bulb, gastric body, On omeprazole 20 mg 2 tablets daily. S/p EGD 1/31/20 with cautery. Will montior blood counts   4.  CAD with NSTEMI 5/2017- s/p stenting 5/2017 - on risk factor modifications.  PET stress test 9/2/24 no ischemia  5. Left breast cancer with BRCA2 positive -Finished chemo and  radiation. On anastrazole-  6. Seizure -stable on Keppra XR   7.  Diabetes - controlled.  9. Gout -controlled on allopurinol    10. Hyperlipidemia - on Crestor 40 mg daily. Controlled   11. Obesity - working at diet, exercise and weight loss.   12.  GERD - controlled on meds  13. Hypokalemia -  on replacement potassium   14. S/P left knee replacement - doing well.   15. Carotid artery stenosis - ultrasound  7/2024         Screening - mammogram 8/24     Colonoscopy 4/23/12 - normal, and 5/25/15 - 3 small polyps (one adenoma).   Colonoscopy 10/8/18 - one Tubular adenoma polyp - due 2023.   Colonoscopy 11/22/23- normal - no need to repeat     Bone density was normal 12/19/23     Follow up in 6 months, sooner if issues.

## 2025-02-18 DIAGNOSIS — I10 HTN (HYPERTENSION), BENIGN: ICD-10-CM

## 2025-02-19 DIAGNOSIS — I10 HTN (HYPERTENSION), BENIGN: ICD-10-CM

## 2025-02-19 RX ORDER — CARVEDILOL 25 MG/1
25 TABLET ORAL 2 TIMES DAILY
Qty: 180 TABLET | Refills: 3 | Status: SHIPPED | OUTPATIENT
Start: 2025-02-19

## 2025-02-19 RX ORDER — OMEPRAZOLE 20 MG/1
CAPSULE, DELAYED RELEASE ORAL
Qty: 180 CAPSULE | Refills: 3 | Status: SHIPPED | OUTPATIENT
Start: 2025-02-19

## 2025-02-19 RX ORDER — ALLOPURINOL 300 MG/1
300 TABLET ORAL EVERY MORNING
Qty: 90 TABLET | Refills: 3 | Status: SHIPPED | OUTPATIENT
Start: 2025-02-19

## 2025-02-19 RX ORDER — CARVEDILOL 25 MG/1
25 TABLET ORAL 2 TIMES DAILY
Qty: 180 TABLET | Refills: 3 | OUTPATIENT
Start: 2025-02-19

## 2025-02-19 NOTE — TELEPHONE ENCOUNTER
Care Due:                  Date            Visit Type   Department     Provider  --------------------------------------------------------------------------------                                EP -                              PRIMARY      [facility] INTERNAL  Last Visit: 12-      CARE ([facility])   MEDICINE       AKANKSHA LARIOS                              EP -                              PRIMARY      [facility] INTERNAL  Next Visit: 06-      CARE ([facility])   MEDICINE       AKANKSHA LARIOS                                                            Last  Test          Frequency    Reason                     Performed    Due Date  --------------------------------------------------------------------------------    Uric Acid...  12 months..  allopurinoL..............  06- 06-    Health [facility] Embedded Care Due Messages. Reference number: [id number].   2/19/2025 2:47:10 PM CST

## 2025-02-19 NOTE — TELEPHONE ENCOUNTER
Refill Routing Note   Medication(s) are not appropriate for processing by [facility] for the following reason(s):        Required labs outdated    [facility] action(s):  Defer  Quick Discontinue  Approve     Requires labs : Yes             Appointments  past 12m or future 3m with PCP    Date Provider   Last Visit   12/23/2024 Ethel Berger MD   Next Visit   6/27/2025 Ethel Berger MD   ED visits in past 90 days: 0        Note composed:4:01 PM 02/19/2025

## 2025-02-19 NOTE — TELEPHONE ENCOUNTER
----- Message from Jimbo sent at 2/19/2025  1:42 PM CST -----  Name Of Caller: Nathaniel Name: Ace eBrger patient feel the need to be seen today? noRelationship to the Pt?: patientContact Preference?: [phone]  What is the nature of the call?:  Patient states that she was notified by [facility] that she needs authorizations for the medications that are listed below: carvediloL (COREG) 25 MG tabletomeprazole (PRILOSEC) 20 MG capsuleallopurinoL (ZYLOPRIM) 300 MG tablet

## 2025-02-24 DIAGNOSIS — I10 HTN (HYPERTENSION), BENIGN: ICD-10-CM

## 2025-02-24 RX ORDER — CARVEDILOL 12.5 MG/1
12.5 TABLET ORAL
Qty: 180 TABLET | Refills: 3 | OUTPATIENT
Start: 2025-02-24

## 2025-02-24 NOTE — TELEPHONE ENCOUNTER
Refill Decision Note   Renata Bergman  is requesting a refill authorization.  Brief Assessment and Rationale for Refill:  Quick Discontinue     Medication Therapy Plan: Medication discontinued and dose adjusted on 6/23/23      Comments:     Note composed:2:29 PM 02/24/2025

## 2025-02-24 NOTE — TELEPHONE ENCOUNTER
No care due was identified.  Health [facility] Embedded Care Due Messages. Reference number: [id number].   2/24/2025 9:23:30 AM CST

## 2025-03-24 DIAGNOSIS — Z00.00 ENCOUNTER FOR MEDICARE ANNUAL WELLNESS EXAM: ICD-10-CM

## 2025-04-14 ENCOUNTER — TELEPHONE (OUTPATIENT)
Dept: INTERNAL MEDICINE | Facility: CLINIC | Age: 85
End: 2025-04-14
Payer: MEDICARE

## 2025-04-14 NOTE — TELEPHONE ENCOUNTER
Called and spoke to pt   She changed her insurance is now with [employer]   We did rcv the verification form   It was faxed on the 24th   Told her   We will go ahead and refax it

## 2025-04-14 NOTE — TELEPHONE ENCOUNTER
----- Message from Jennifernikhil sent at 4/14/2025 10:54 AM CDT -----  Contact: [phone]  .[email or web address] Patient is calling for Medical Advice regarding: Pt said she needs a call back about a form she needs How long has patient had these symptoms:Pharmacy name and phone#:Patient wants a call back or [facility]: call back Comments:Please advise patient replies from provider may take up to 48 hours.

## 2025-05-14 ENCOUNTER — TELEPHONE (OUTPATIENT)
Dept: INTERNAL MEDICINE | Facility: CLINIC | Age: 85
End: 2025-05-14
Payer: MEDICARE

## 2025-05-14 NOTE — TELEPHONE ENCOUNTER
----- Message from Ania sent at 5/14/2025 10:36 AM CDT -----  Contact: Self/ [phone]  .Type: FormsWhat type of form?Verification of Chronic Condition Was the form dropped off or mailed?Dropped offWhen was the form dropped off or mailed?In April If dropped off, who was the form given to?  Advise patient that provider has up to 7 business days to complete paperwork.Yes Would the patient rather a call back or a response via [facility]? Call back Comments: pt said that she is calling in regards to [employer] told her that they have not received her Verification of a Chronic condition, she stated that she spoke with Brittany Nicholson at  ext [phone] she stated that the form can be refaxed to the number that's on the form that the nurse already has. Please advisePt stated that her coverage is supposed to start on 6/1 but [employer] needs that form  ----- Message -----  From: Ania Lu  Sent: 5/14/2025  10:47 AM CDT  To: Ab Laurent    .Type: FormsWhat type of form?Verification of Chronic Condition Was the form dropped off or mailed?Dropped offWhen was the form dropped off or mailed?In April If dropped off, who was the form given to?  Advise patient that provider has up to 7 business days to complete paperwork.Yes Would the patient rather a call back or a response via [facility]? Call back Comments: pt said that she is calling in regards to [employer] told her that they have not received her Verification of a Chronic condition, she stated that she spoke with Brittany Nicholson at  ext [phone] she stated that the form can be refaxed to the number that's on the form that the nurse already has. Please advise

## 2025-05-15 ENCOUNTER — TELEPHONE (OUTPATIENT)
Dept: INTERNAL MEDICINE | Facility: CLINIC | Age: 85
End: 2025-05-15
Payer: MEDICARE

## 2025-05-15 NOTE — TELEPHONE ENCOUNTER
Called and spoke to pt   Told her we faxed it twice   She will contact the insureance company to see what is going on   I told her maybe she should just come and get the original

## 2025-05-15 NOTE — TELEPHONE ENCOUNTER
----- Message from Uma sent at 5/15/2025 12:30 PM CDT -----  Contact: [phone] Patient  .1MEDICALADVICE Patient is calling for Medical Advice regarding: Pt is calling in regards to asking if Soni can please give her a call back regarding her [facility] form w/ [employer] that needs to be filled out and returned. Pt needs a prescription but they wont fill it because the form needs to be filled out.  Please call and advise. Thank youHow long has patient had these symptoms:N/APharmacy name and phone#: N/APatient wants a call back or thru [facility]:Comments:Please advise patient replies from provider may take up to 48 hours.

## 2025-05-23 ENCOUNTER — TELEPHONE (OUTPATIENT)
Dept: HEMATOLOGY/ONCOLOGY | Facility: CLINIC | Age: 85
End: 2025-05-23
Payer: MEDICARE

## 2025-05-23 NOTE — TELEPHONE ENCOUNTER
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 Annual visit scheduled, appointment reminder mailed.       Awaiting patient call back

## 2025-06-13 ENCOUNTER — TELEPHONE (OUTPATIENT)
Dept: INTERNAL MEDICINE | Facility: CLINIC | Age: 85
End: 2025-06-13
Payer: MEDICARE

## 2025-06-13 NOTE — TELEPHONE ENCOUNTER
Copied from CRM #[id number]. Topic: General Inquiry - Patient Advice  >> Jun 13, 2025  1:21 PM Gabriella wrote:  .1MEDICALADVICE     Patient is calling for Medical Advice regarding: Mr Altman with [facility] is calling to verify is patient has any Chronic condition like diabetes, Cardiovascular Disorders or Heart Failure.     Call back [phone]  Ref [phone]      Comments: Thankyou     Please advise patient replies from provider may take up to 48 hours.

## 2025-06-21 ENCOUNTER — LAB VISIT (OUTPATIENT)
Dept: LAB | Facility: HOSPITAL | Age: 85
End: 2025-06-21
Attending: INTERNAL MEDICINE
Payer: MEDICARE

## 2025-06-21 DIAGNOSIS — E11.59 TYPE 2 DIABETES MELLITUS WITH OTHER CIRCULATORY COMPLICATION, WITHOUT LONG-TERM CURRENT USE OF INSULIN: ICD-10-CM

## 2025-06-21 DIAGNOSIS — E53.8 VITAMIN B12 DEFICIENCY: ICD-10-CM

## 2025-06-21 DIAGNOSIS — E55.9 VITAMIN D DEFICIENCY DISEASE: ICD-10-CM

## 2025-06-21 LAB
25(OH)D3+25(OH)D2 SERPL-MCNC: 28 NG/ML (ref 30–96)
ABSOLUTE EOSINOPHIL (OHS): 0.29 K/UL
ABSOLUTE MONOCYTE (OHS): 0.72 K/UL (ref 0.3–1)
ABSOLUTE NEUTROPHIL COUNT (OHS): 2.53 K/UL (ref 1.8–7.7)
ALBUMIN SERPL BCP-MCNC: 3.7 G/DL (ref 3.5–5.2)
ALP SERPL-CCNC: 93 UNIT/L (ref 40–150)
ALT SERPL W/O P-5'-P-CCNC: 10 UNIT/L (ref 10–44)
ANION GAP (OHS): 8 MMOL/L (ref 8–16)
AST SERPL-CCNC: 19 UNIT/L (ref 11–45)
BASOPHILS # BLD AUTO: 0.03 K/UL
BASOPHILS NFR BLD AUTO: 0.6 %
BILIRUB SERPL-MCNC: 0.5 MG/DL (ref 0.1–1)
BUN SERPL-MCNC: 15 MG/DL (ref 8–23)
CALCIUM SERPL-MCNC: 9.2 MG/DL (ref 8.7–10.5)
CHLORIDE SERPL-SCNC: 106 MMOL/L (ref 95–110)
CHOLEST SERPL-MCNC: 158 MG/DL (ref 120–199)
CHOLEST/HDLC SERPL: 2.7 {RATIO} (ref 2–5)
CO2 SERPL-SCNC: 28 MMOL/L (ref 23–29)
CREAT SERPL-MCNC: 1.4 MG/DL (ref 0.5–1.4)
EAG (OHS): 131 MG/DL (ref 68–131)
ERYTHROCYTE [DISTWIDTH] IN BLOOD BY AUTOMATED COUNT: 13.6 % (ref 11.5–14.5)
GFR SERPLBLD CREATININE-BSD FMLA CKD-EPI: 38 ML/MIN/1.73/M2
GLUCOSE SERPL-MCNC: 125 MG/DL (ref 70–110)
HBA1C MFR BLD: 6.2 % (ref 4–5.6)
HCT VFR BLD AUTO: 40.3 % (ref 37–48.5)
HDLC SERPL-MCNC: 59 MG/DL (ref 40–75)
HDLC SERPL: 37.3 % (ref 20–50)
HGB BLD-MCNC: 13 GM/DL (ref 12–16)
IMM GRANULOCYTES # BLD AUTO: 0.02 K/UL (ref 0–0.04)
IMM GRANULOCYTES NFR BLD AUTO: 0.4 % (ref 0–0.5)
LDLC SERPL CALC-MCNC: 87.8 MG/DL (ref 63–159)
LYMPHOCYTES # BLD AUTO: 1.71 K/UL (ref 1–4.8)
MCH RBC QN AUTO: 30.6 PG (ref 27–31)
MCHC RBC AUTO-ENTMCNC: 32.3 G/DL (ref 32–36)
MCV RBC AUTO: 95 FL (ref 82–98)
NONHDLC SERPL-MCNC: 99 MG/DL
NUCLEATED RBC (/100WBC) (OHS): 0 /100 WBC
PLATELET # BLD AUTO: 236 K/UL (ref 150–450)
PMV BLD AUTO: 10.3 FL (ref 9.2–12.9)
POTASSIUM SERPL-SCNC: 4.3 MMOL/L (ref 3.5–5.1)
PROT SERPL-MCNC: 7.6 GM/DL (ref 6–8.4)
RBC # BLD AUTO: 4.25 M/UL (ref 4–5.4)
RELATIVE EOSINOPHIL (OHS): 5.5 %
RELATIVE LYMPHOCYTE (OHS): 32.3 % (ref 18–48)
RELATIVE MONOCYTE (OHS): 13.6 % (ref 4–15)
RELATIVE NEUTROPHIL (OHS): 47.6 % (ref 38–73)
SODIUM SERPL-SCNC: 142 MMOL/L (ref 136–145)
TRIGL SERPL-MCNC: 56 MG/DL (ref 30–150)
TSH SERPL-ACNC: 3.57 UIU/ML (ref 0.4–4)
VIT B12 SERPL-MCNC: 395 PG/ML (ref 210–950)
WBC # BLD AUTO: 5.3 K/UL (ref 3.9–12.7)

## 2025-06-21 PROCEDURE — 85025 COMPLETE CBC W/AUTO DIFF WBC: CPT

## 2025-06-21 PROCEDURE — 83036 HEMOGLOBIN GLYCOSYLATED A1C: CPT

## 2025-06-21 PROCEDURE — 36415 COLL VENOUS BLD VENIPUNCTURE: CPT

## 2025-06-21 PROCEDURE — 80053 COMPREHEN METABOLIC PANEL: CPT

## 2025-06-21 PROCEDURE — 82306 VITAMIN D 25 HYDROXY: CPT

## 2025-06-21 PROCEDURE — 80061 LIPID PANEL: CPT

## 2025-06-21 PROCEDURE — 84443 ASSAY THYROID STIM HORMONE: CPT

## 2025-06-21 PROCEDURE — 82607 VITAMIN B-12: CPT

## 2025-06-26 ENCOUNTER — TELEPHONE (OUTPATIENT)
Dept: INTERNAL MEDICINE | Facility: CLINIC | Age: 85
End: 2025-06-26
Payer: MEDICARE

## 2025-06-27 ENCOUNTER — OFFICE VISIT (OUTPATIENT)
Dept: INTERNAL MEDICINE | Facility: CLINIC | Age: 85
End: 2025-06-27
Payer: MEDICARE

## 2025-06-27 VITALS
BODY MASS INDEX: 50.11 KG/M2 | HEART RATE: 77 BPM | OXYGEN SATURATION: 97 % | SYSTOLIC BLOOD PRESSURE: 122 MMHG | HEIGHT: 61 IN | DIASTOLIC BLOOD PRESSURE: 70 MMHG | WEIGHT: 265.44 LBS

## 2025-06-27 DIAGNOSIS — E78.2 MIXED HYPERLIPIDEMIA: ICD-10-CM

## 2025-06-27 DIAGNOSIS — I25.118 ATHEROSCLEROTIC HEART DISEASE OF NATIVE CORONARY ARTERY WITH OTHER FORMS OF ANGINA PECTORIS: ICD-10-CM

## 2025-06-27 DIAGNOSIS — Z17.0 MALIGNANT NEOPLASM OF UPPER-OUTER QUADRANT OF LEFT BREAST IN FEMALE, ESTROGEN RECEPTOR POSITIVE: ICD-10-CM

## 2025-06-27 DIAGNOSIS — G40.409 TONIC-CLONIC EPILEPTIC SEIZURES: ICD-10-CM

## 2025-06-27 DIAGNOSIS — E66.813 CLASS 3 SEVERE OBESITY DUE TO EXCESS CALORIES WITH SERIOUS COMORBIDITY IN ADULT, UNSPECIFIED BMI: ICD-10-CM

## 2025-06-27 DIAGNOSIS — E11.59 TYPE 2 DIABETES MELLITUS WITH OTHER CIRCULATORY COMPLICATION, WITHOUT LONG-TERM CURRENT USE OF INSULIN: ICD-10-CM

## 2025-06-27 DIAGNOSIS — I15.2 HYPERTENSION ASSOCIATED WITH DIABETES: ICD-10-CM

## 2025-06-27 DIAGNOSIS — Z12.31 SCREENING MAMMOGRAM FOR BREAST CANCER: Primary | ICD-10-CM

## 2025-06-27 DIAGNOSIS — E11.59 HYPERTENSION ASSOCIATED WITH DIABETES: ICD-10-CM

## 2025-06-27 DIAGNOSIS — E53.8 VITAMIN B12 DEFICIENCY: ICD-10-CM

## 2025-06-27 DIAGNOSIS — E66.01 CLASS 3 SEVERE OBESITY DUE TO EXCESS CALORIES WITH SERIOUS COMORBIDITY IN ADULT, UNSPECIFIED BMI: ICD-10-CM

## 2025-06-27 DIAGNOSIS — E55.9 VITAMIN D DEFICIENCY DISEASE: ICD-10-CM

## 2025-06-27 DIAGNOSIS — C50.412 MALIGNANT NEOPLASM OF UPPER-OUTER QUADRANT OF LEFT BREAST IN FEMALE, ESTROGEN RECEPTOR POSITIVE: ICD-10-CM

## 2025-06-27 DIAGNOSIS — K21.9 GASTROESOPHAGEAL REFLUX DISEASE, UNSPECIFIED WHETHER ESOPHAGITIS PRESENT: ICD-10-CM

## 2025-06-27 DIAGNOSIS — N18.32 STAGE 3B CHRONIC KIDNEY DISEASE: ICD-10-CM

## 2025-06-27 PROCEDURE — 99999 PR PBB SHADOW E&M-EST. PATIENT-LVL IV: CPT | Mod: PBBFAC,,, | Performed by: INTERNAL MEDICINE

## 2025-06-27 RX ORDER — VIT C/E/ZN/COPPR/LUTEIN/ZEAXAN 250MG-90MG
1000 CAPSULE ORAL DAILY
COMMUNITY
Start: 2025-06-27

## 2025-06-27 RX ORDER — EZETIMIBE 10 MG/1
10 TABLET ORAL DAILY
Qty: 90 TABLET | Refills: 3 | Status: SHIPPED | OUTPATIENT
Start: 2025-06-27

## 2025-06-27 RX ORDER — ROSUVASTATIN CALCIUM 40 MG/1
40 TABLET, COATED ORAL EVERY MORNING
Qty: 90 TABLET | Refills: 3 | Status: SHIPPED | OUTPATIENT
Start: 2025-06-27

## 2025-06-27 RX ORDER — ANASTROZOLE 1 MG/1
1 TABLET ORAL DAILY
Qty: 90 TABLET | Refills: 0 | Status: SHIPPED | OUTPATIENT
Start: 2025-06-27

## 2025-06-27 RX ORDER — LANOLIN ALCOHOL/MO/W.PET/CERES
1000 CREAM (GRAM) TOPICAL DAILY
COMMUNITY
Start: 2025-06-27

## 2025-06-27 RX ORDER — LEVETIRACETAM 500 MG/1
1000 TABLET, EXTENDED RELEASE ORAL DAILY
Qty: 180 TABLET | Refills: 4 | Status: SHIPPED | OUTPATIENT
Start: 2025-06-27

## 2025-06-27 NOTE — PROGRESS NOTES
CHIEF COMPLAINT:  follow up NSTEMI, breast cancer, diabetes, hypertension, hyperlipidemia.     HISTORY OF PRESENT ILLNESS: 84 year-old woman who present for follow up of above.    Sleep is the same.   Can be broken at times.   Not as frequent.  She goes to sleep around 8-9 pm, she is awake between 1-3 am then goes back to sleep. Wakes up at 5:15 when her daughter leaves for school.  She takes a nap after her daughter leaves.       She was hospitalized 7/16/22 to 7/18/22. She had chest pain and elevated blood pressure.  Nuclear stress test was fine. Coreg was increased. She takes aspirin 81 mg daily  and coreg 12.5 mg twice daily and amlodipine 10 mg daily.   No chest pain or shortness of breath.   She has some dyspnea at one block.  She does not exercise very much. Has not gotten worse. She is on crestor 40 mg daily. 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 Dr Agudelo stopped the plavix on 8/9/21 and is doing fine off Plavix.     No nausea, vomiting, constipation, diarrhea, heartburn.       No UTI. No dysuria or hematuria.     No episodes of vertigo.     She had  a left knee replacement 4/15/21.She is walking with a rollator. Left knee is doing fine. She can tell when it is going to rain.  No falls     No left hand numbness      She will take tylenol 325 mg once or twice a week as needed for general aches and pain.   Occasional low back pain.  She does not walk very much due to history of sciatic nerve pain.      She is taking Keppra  mg 2 in the afternoon.  Gait is better with taking the Keppra in the late afternoon.  No falls.  No seizures.      She has completed 5 cycles of CMF for her breast cancer. She completed radiation the end of April 6, 2017.  She took Femara  4/2017 - 4/2018. She had irritation of the lips on Femara. She is taking anastrozole 1 mg daily. Saw Heme Onc 12/12/24 and MMG on 8/22/2024     She continues to take allopurinol 300 mg  "daily for her gout. She has not had any gouty flares. She has occasional left elbow pain.      Her blood sugars have been normal. She is off metformin 850 mg twice daily. She checks blood sugars once daily.  She denies any polydipsia or polyuria. She continues to take aspirin for her coronary artery disease.      Sinuses have been doing well. She has not had to take Allegra lately. She denies any nausea, vomiting, diarrhea.      Reflux is well controlled on omeprazole 20 mg two tablets daily.      She continues to take Crestor 40 mg daily for her hyperlipidemia. She denies any joint pain or muscle pain from the Crestor.      She is NOT taking vitamin D 50,000 units twice weekly for vitamin D deficiency. Vitamin D level is normal at 37     Her daughter had stage 2 breast cancer in . She has had a lumpectomy and chemo and radiation. Her daughter is doing well. No anxiety, depression      PAST MEDICAL HISTORY:   1. Hypertension.   2. Diabetes mellitus   3. Hyperlipidemia.   4. Reflux.   5. Gout.   6. Coronary artery disease, status post MI in  with stenting at that time, and then a right coronary artery stent placed in . Had a negative stress test 2008. [facility] 2012 - patent stents and non obstructive cad  7. Osteoarthritis of the right knee. S/P right knee replacement   8. Status post left knee replacement.   9. Status post LISA/BSO secondary to menstrual disorder or polyps after tubal ligation.   10. Left breast cancer and BRCA2 positive    MEDICATIONS and ALLERGIES: Updated on epic.       Family History  Mother had breast cancer in her early 50's then had colon cancer in her 60's. She  of uterine cancer  2 Maternal aunts with breast cancer  Daughter with breast cancer at age 45 - BRCA positive  Father  of a gunshot wound  She is an only child    PHYSICAL EXAMINATION:    /70 (BP Location: Right arm, Patient Position: Sitting)   Pulse 77   Ht 5' 1" (1.549 m)   Wt 120.4 kg (265 lb 6.9 " oz)   LMP  (LMP Unknown)   SpO2 97%   BMI 50.15 kg/m²     General: Alert, oriented. No apparent distress. Affect within normal   limits.   Conjunctivae anicteric. Neck supple.   Respiratory effort normal. Lungs clear to auscultation.   Heart: Regular rate and rhythm without murmurs, gallops or rubs.   No lower extremity edema.       Labs 6/21/25 reviewed      ASSESSMENT      1. HTN- controlled  2. Iron deficiency anemia - stable off iron   3. Angioectasias of duodenal bulb, gastric body, On omeprazole 20 mg 2 tablets daily. S/p EGD 1/31/20 with cautery. Will montior blood counts   4.  CAD with NSTEMI 5/2017- s/p stenting 5/2017 - on risk factor modifications.  PET stress test 9/2/24 no ischemia  5. Left breast cancer with BRCA2 positive -Finished chemo and  radiation. On anastrazole-  6. Seizure -stable on Keppra XR   7. Type 2  Diabetes - controlled.  9. Gout -controlled on allopurinol    10. Hyperlipidemia - on Crestor 40 mg daily. Controlled   11. Morbid Obesity with BMI 50 with comorbid conditions. - working at diet, exercise and weight loss.   12.  GERD - controlled on meds  13. Hypokalemia -  on replacement potassium   14. S/P left knee replacement - doing well.   15. Carotid artery stenosis - ultrasound  7/2024   16. Stage 3 B CKD - due to age - monitoring        Screening - mammogram 8/24     Colonoscopy 4/23/12 - normal, and 5/25/15 - 3 small polyps (one adenoma).   Colonoscopy 10/8/18 - one Tubular adenoma polyp - due 2023.   Colonoscopy 11/22/23- normal - no need to repeat     Bone density was normal 12/19/23     Follow up in 6 months, sooner if issues.     Visit today included increased complexity associated with the care of the episodic problem  addressed and managing the longitudinal care of the patient due to the serious and/or complex managed problem(s) .

## 2025-07-18 DIAGNOSIS — G40.409 TONIC-CLONIC EPILEPTIC SEIZURES: ICD-10-CM

## 2025-07-18 RX ORDER — LEVETIRACETAM 500 MG/1
TABLET, EXTENDED RELEASE ORAL
Qty: 180 TABLET | Refills: 4 | Status: SHIPPED | OUTPATIENT
Start: 2025-07-18

## 2025-07-18 NOTE — TELEPHONE ENCOUNTER
Refill Routing Note   Medication(s) are not appropriate for processing by [facility] for the following reason(s):        Outside of protocol    [facility] action(s):  Route               Appointments  past 12m or future 3m with PCP    Date Provider   Last Visit   6/27/2025 Ethel Berger MD   Next Visit   12/29/2025 Ethel Berger MD   ED visits in past 90 days: 0        Note composed:10:33 AM 07/18/2025

## 2025-07-19 DIAGNOSIS — Z17.0 MALIGNANT NEOPLASM OF UPPER-OUTER QUADRANT OF LEFT BREAST IN FEMALE, ESTROGEN RECEPTOR POSITIVE: ICD-10-CM

## 2025-07-19 DIAGNOSIS — C50.412 MALIGNANT NEOPLASM OF UPPER-OUTER QUADRANT OF LEFT BREAST IN FEMALE, ESTROGEN RECEPTOR POSITIVE: ICD-10-CM

## 2025-07-19 RX ORDER — ANASTROZOLE 1 MG/1
TABLET ORAL
Qty: 90 TABLET | Refills: 0 | Status: SHIPPED | OUTPATIENT
Start: 2025-07-19

## 2025-08-23 ENCOUNTER — HOSPITAL ENCOUNTER (OUTPATIENT)
Dept: RADIOLOGY | Facility: HOSPITAL | Age: 85
Discharge: HOME | End: 2025-08-23
Attending: INTERNAL MEDICINE
Payer: MEDICARE

## 2025-08-23 VITALS — HEIGHT: 64 IN | BODY MASS INDEX: 45.41 KG/M2 | WEIGHT: 266 LBS

## 2025-08-23 DIAGNOSIS — Z12.31 SCREENING MAMMOGRAM FOR BREAST CANCER: ICD-10-CM

## 2025-08-23 PROCEDURE — 77067 SCR MAMMO BI INCL CAD: CPT | Mod: 26,,, | Performed by: RADIOLOGY

## 2025-08-23 PROCEDURE — 77067 SCR MAMMO BI INCL CAD: CPT | Mod: TC

## 2025-08-23 PROCEDURE — 77063 BREAST TOMOSYNTHESIS BI: CPT | Mod: 26,,, | Performed by: RADIOLOGY

## 2025-08-26 ENCOUNTER — TELEPHONE (OUTPATIENT)
Dept: INTERNAL MEDICINE | Facility: CLINIC | Age: 85
End: 2025-08-26
Payer: MEDICARE

## 2025-08-26 ENCOUNTER — OFFICE VISIT (OUTPATIENT)
Dept: INTERNAL MEDICINE | Facility: CLINIC | Age: 85
End: 2025-08-26
Payer: MEDICARE

## 2025-08-26 VITALS
SYSTOLIC BLOOD PRESSURE: 124 MMHG | HEIGHT: 64 IN | OXYGEN SATURATION: 97 % | TEMPERATURE: 99 F | RESPIRATION RATE: 18 BRPM | BODY MASS INDEX: 45 KG/M2 | WEIGHT: 263.56 LBS | DIASTOLIC BLOOD PRESSURE: 60 MMHG | HEART RATE: 72 BPM

## 2025-08-26 DIAGNOSIS — Z11.52 ENCOUNTER FOR SCREENING FOR COVID-19: Primary | ICD-10-CM

## 2025-08-26 PROCEDURE — 99999 PR PBB SHADOW E&M-EST. PATIENT-LVL V: CPT | Mod: PBBFAC,,, | Performed by: PHYSICIAN ASSISTANT

## 2025-08-26 PROCEDURE — 99213 OFFICE O/P EST LOW 20 MIN: CPT | Mod: S$GLB,,, | Performed by: PHYSICIAN ASSISTANT

## 2025-08-27 ENCOUNTER — TELEPHONE (OUTPATIENT)
Dept: INTERNAL MEDICINE | Facility: CLINIC | Age: 85
End: 2025-08-27
Payer: MEDICARE

## 2025-08-27 LAB
CTP QC/QA: YES
CTP QC/QA: YES
POC MOLECULAR INFLUENZA A AGN: NEGATIVE
POC MOLECULAR INFLUENZA B AGN: NEGATIVE
SARS-COV-2 RDRP RESP QL NAA+PROBE: NEGATIVE

## 2025-09-02 ENCOUNTER — OFFICE VISIT (OUTPATIENT)
Dept: HEMATOLOGY/ONCOLOGY | Facility: CLINIC | Age: 85
End: 2025-09-02
Payer: MEDICARE

## 2025-09-02 VITALS
RESPIRATION RATE: 18 BRPM | SYSTOLIC BLOOD PRESSURE: 167 MMHG | TEMPERATURE: 98 F | BODY MASS INDEX: 44.87 KG/M2 | OXYGEN SATURATION: 97 % | WEIGHT: 262.81 LBS | DIASTOLIC BLOOD PRESSURE: 75 MMHG | HEIGHT: 64 IN | HEART RATE: 71 BPM

## 2025-09-02 DIAGNOSIS — C50.412 MALIGNANT NEOPLASM OF UPPER-OUTER QUADRANT OF LEFT BREAST IN FEMALE, ESTROGEN RECEPTOR POSITIVE: Primary | ICD-10-CM

## 2025-09-02 DIAGNOSIS — Z17.0 MALIGNANT NEOPLASM OF UPPER-OUTER QUADRANT OF LEFT BREAST IN FEMALE, ESTROGEN RECEPTOR POSITIVE: Primary | ICD-10-CM

## 2025-09-02 PROCEDURE — 1101F PT FALLS ASSESS-DOCD LE1/YR: CPT | Mod: CPTII,S$GLB,, | Performed by: INTERNAL MEDICINE

## 2025-09-02 PROCEDURE — 3077F SYST BP >= 140 MM HG: CPT | Mod: CPTII,S$GLB,, | Performed by: INTERNAL MEDICINE

## 2025-09-02 PROCEDURE — 1159F MED LIST DOCD IN RCRD: CPT | Mod: CPTII,S$GLB,, | Performed by: INTERNAL MEDICINE

## 2025-09-02 PROCEDURE — 1126F AMNT PAIN NOTED NONE PRSNT: CPT | Mod: CPTII,S$GLB,, | Performed by: INTERNAL MEDICINE

## 2025-09-02 PROCEDURE — 3072F LOW RISK FOR RETINOPATHY: CPT | Mod: CPTII,S$GLB,, | Performed by: INTERNAL MEDICINE

## 2025-09-02 PROCEDURE — 3288F FALL RISK ASSESSMENT DOCD: CPT | Mod: CPTII,S$GLB,, | Performed by: INTERNAL MEDICINE

## 2025-09-02 PROCEDURE — 99213 OFFICE O/P EST LOW 20 MIN: CPT | Mod: S$GLB,,, | Performed by: INTERNAL MEDICINE

## 2025-09-02 PROCEDURE — 3078F DIAST BP <80 MM HG: CPT | Mod: CPTII,S$GLB,, | Performed by: INTERNAL MEDICINE

## 2025-09-02 PROCEDURE — 99999 PR PBB SHADOW E&M-EST. PATIENT-LVL IV: CPT | Mod: PBBFAC,,, | Performed by: INTERNAL MEDICINE

## (undated) DEVICE — SHIELD EYE CLEAR ACRYLIC UNIV

## (undated) DEVICE — GOWN SURGICAL X-LARGE

## (undated) DEVICE — DRAPE STERI INSTRUMENT 1018

## (undated) DEVICE — PUMP COLD THERAPY

## (undated) DEVICE — DRAPE OPHTHALMIC 48X62 FEN

## (undated) DEVICE — CANISTER INFOV.A.C WOUND 500ML

## (undated) DEVICE — DRESSING EYE OVAL LF

## (undated) DEVICE — KIT TOTAL KNEE TKOFG

## (undated) DEVICE — SEE MEDLINE ITEM 146298

## (undated) DEVICE — BLADE OSTEOTOME THIN STER 10MM

## (undated) DEVICE — SOL WATER STRL IRR 1000ML

## (undated) DEVICE — BOWL CEMENT

## (undated) DEVICE — KIT IRR SUCTION HND PIECE

## (undated) DEVICE — SUT VICRYL PLUS 2-0 CT1 18

## (undated) DEVICE — NDL FLTR 5MCRN BLNT TIP 18GX1

## (undated) DEVICE — SHIELD COLLAGEN 12HR CORNEAL

## (undated) DEVICE — CONTAINER SPECIMEN STRL 4OZ

## (undated) DEVICE — KIT GREY EYE

## (undated) DEVICE — PADDING CAST SPECIALIST 6X4YD

## (undated) DEVICE — SEE MEDLINE ITEM 157131

## (undated) DEVICE — Device

## (undated) DEVICE — ADHESIVE DERMABOND ADVANCED

## (undated) DEVICE — TOURNIQUET SB QC DP 44X4IN

## (undated) DEVICE — SOL BETADINE 5%

## (undated) DEVICE — SEE MEDLINE ITEM 152515

## (undated) DEVICE — SUT VICRYL+ 1 CT1 18IN

## (undated) DEVICE — SOL IRR NACL .9% 3000ML

## (undated) DEVICE — SUT VICRYL PLUS 0 CT1 18IN

## (undated) DEVICE — PAD COLD THERAPY KNEE WRAP ON

## (undated) DEVICE — TAPE SURG DURAPORE 2 X10YD

## (undated) DEVICE — DRESSING AQUACEL AG 3.5X10IN

## (undated) DEVICE — IMMOB KNEE UNIV TRI PANEL 19IN

## (undated) DEVICE — GLOVE BIOGEL ECLIPSE SZ 8

## (undated) DEVICE — MASK FLYTE HOOD PEEL AWAY

## (undated) DEVICE — ELECTRODE REM PLYHSV RETURN 9

## (undated) DEVICE — SHEET DRAPE FAN-FOLDED 3/4

## (undated) DEVICE — HOOD T-5 TEAR AWAY STERILE

## (undated) DEVICE — STAPLER SKIN PROXIMATE WIDE

## (undated) DEVICE — KIT DRESSING PREVENA PLUS

## (undated) DEVICE — SHIELD EYE PLASTIC 3100G

## (undated) DEVICE — SET CEMENT (SCULP)

## (undated) DEVICE — BANDAGE ACE ELASTIC 6"

## (undated) DEVICE — DRAPE INCISE IOBAN 2 23X33IN